# Patient Record
Sex: MALE | Race: WHITE | NOT HISPANIC OR LATINO | Employment: UNEMPLOYED | URBAN - METROPOLITAN AREA
[De-identification: names, ages, dates, MRNs, and addresses within clinical notes are randomized per-mention and may not be internally consistent; named-entity substitution may affect disease eponyms.]

---

## 2021-05-25 ENCOUNTER — RECORDS - HEALTHEAST (OUTPATIENT)
Dept: ADMINISTRATIVE | Facility: CLINIC | Age: 51
End: 2021-05-25

## 2021-09-02 ENCOUNTER — TRANSFERRED RECORDS (OUTPATIENT)
Dept: HEALTH INFORMATION MANAGEMENT | Facility: CLINIC | Age: 51
End: 2021-09-02
Payer: COMMERCIAL

## 2021-09-02 LAB
ALT SERPL-CCNC: 60 U/L (ref 9–46)
AST SERPL-CCNC: 111 U/L (ref 10–35)
CREATININE (EXTERNAL): 1.01 MG/DL (ref 0.7–1.33)
GFR ESTIMATED (EXTERNAL): 86 ML/MIN/1.73M2
GFR ESTIMATED (IF AFRICAN AMERICAN) (EXTERNAL): 99 ML/MIN/1.73M2
GLUCOSE (EXTERNAL): 94 MG/DL (ref 65–99)
POTASSIUM (EXTERNAL): 4 MMOL/L (ref 3.5–5.3)

## 2021-09-09 ENCOUNTER — TRANSFERRED RECORDS (OUTPATIENT)
Dept: HEALTH INFORMATION MANAGEMENT | Facility: CLINIC | Age: 51
End: 2021-09-09
Payer: COMMERCIAL

## 2021-11-08 ENCOUNTER — TRANSFERRED RECORDS (OUTPATIENT)
Dept: HEALTH INFORMATION MANAGEMENT | Facility: CLINIC | Age: 51
End: 2021-11-08
Payer: COMMERCIAL

## 2021-12-07 ENCOUNTER — TRANSFERRED RECORDS (OUTPATIENT)
Dept: HEALTH INFORMATION MANAGEMENT | Facility: CLINIC | Age: 51
End: 2021-12-07
Payer: COMMERCIAL

## 2022-01-18 ENCOUNTER — MEDICAL CORRESPONDENCE (OUTPATIENT)
Dept: HEALTH INFORMATION MANAGEMENT | Facility: CLINIC | Age: 52
End: 2022-01-18
Payer: COMMERCIAL

## 2022-01-19 ENCOUNTER — TRANSFERRED RECORDS (OUTPATIENT)
Dept: HEALTH INFORMATION MANAGEMENT | Facility: CLINIC | Age: 52
End: 2022-01-19
Payer: COMMERCIAL

## 2022-02-18 ENCOUNTER — APPOINTMENT (OUTPATIENT)
Dept: CT IMAGING | Facility: HOSPITAL | Age: 52
End: 2022-02-18
Attending: EMERGENCY MEDICINE
Payer: COMMERCIAL

## 2022-02-18 ENCOUNTER — HOSPITAL ENCOUNTER (INPATIENT)
Facility: HOSPITAL | Age: 52
LOS: 7 days | Discharge: PSYCHIATRIC OR SUBSTANCE USE FACILITY | End: 2022-02-25
Attending: EMERGENCY MEDICINE | Admitting: INTERNAL MEDICINE
Payer: COMMERCIAL

## 2022-02-18 ENCOUNTER — APPOINTMENT (OUTPATIENT)
Dept: RADIOLOGY | Facility: HOSPITAL | Age: 52
End: 2022-02-18
Payer: COMMERCIAL

## 2022-02-18 DIAGNOSIS — E16.2 HYPOGLYCEMIA: ICD-10-CM

## 2022-02-18 DIAGNOSIS — L03.113 RIGHT FOREARM CELLULITIS: Primary | ICD-10-CM

## 2022-02-18 DIAGNOSIS — F10.10 ALCOHOL ABUSE: ICD-10-CM

## 2022-02-18 DIAGNOSIS — E03.9 HYPOTHYROIDISM: ICD-10-CM

## 2022-02-18 DIAGNOSIS — F10.20 ALCOHOL USE DISORDER, SEVERE, DEPENDENCE (H): ICD-10-CM

## 2022-02-18 DIAGNOSIS — R29.6 FALLS FREQUENTLY: ICD-10-CM

## 2022-02-18 LAB
ALBUMIN SERPL-MCNC: 4.7 G/DL (ref 3.5–5)
ALBUMIN UR-MCNC: NEGATIVE MG/DL
ALP SERPL-CCNC: 147 U/L (ref 45–120)
ALT SERPL W P-5'-P-CCNC: 98 U/L (ref 0–45)
AMPHETAMINES UR QL SCN: NORMAL
ANION GAP SERPL CALCULATED.3IONS-SCNC: 17 MMOL/L (ref 5–18)
APPEARANCE UR: CLEAR
AST SERPL W P-5'-P-CCNC: 296 U/L (ref 0–40)
BARBITURATES UR QL: NORMAL
BASOPHILS # BLD AUTO: 0 10E3/UL (ref 0–0.2)
BASOPHILS NFR BLD AUTO: 1 %
BENZODIAZ UR QL: NORMAL
BILIRUB SERPL-MCNC: 1.2 MG/DL (ref 0–1)
BILIRUB UR QL STRIP: NEGATIVE
BUN SERPL-MCNC: 6 MG/DL (ref 8–22)
CALCIUM SERPL-MCNC: 10.2 MG/DL (ref 8.5–10.5)
CANNABINOIDS UR QL SCN: NORMAL
CHLORIDE BLD-SCNC: 95 MMOL/L (ref 98–107)
CO2 SERPL-SCNC: 24 MMOL/L (ref 22–31)
COCAINE UR QL: NORMAL
COLOR UR AUTO: YELLOW
CREAT SERPL-MCNC: 0.87 MG/DL (ref 0.7–1.3)
CREAT UR-MCNC: 68 MG/DL
EOSINOPHIL # BLD AUTO: 0.1 10E3/UL (ref 0–0.7)
EOSINOPHIL NFR BLD AUTO: 4 %
ERYTHROCYTE [DISTWIDTH] IN BLOOD BY AUTOMATED COUNT: 11.9 % (ref 10–15)
ETHANOL SERPL-MCNC: 176 MG/DL
GFR SERPL CREATININE-BSD FRML MDRD: >90 ML/MIN/1.73M2
GLUCOSE BLD-MCNC: 57 MG/DL (ref 70–125)
GLUCOSE BLDC GLUCOMTR-MCNC: 50 MG/DL (ref 70–99)
GLUCOSE BLDC GLUCOMTR-MCNC: 61 MG/DL (ref 70–99)
GLUCOSE BLDC GLUCOMTR-MCNC: 62 MG/DL (ref 70–99)
GLUCOSE BLDC GLUCOMTR-MCNC: 85 MG/DL (ref 70–99)
GLUCOSE BLDC GLUCOMTR-MCNC: 90 MG/DL (ref 70–99)
GLUCOSE UR STRIP-MCNC: NEGATIVE MG/DL
HCT VFR BLD AUTO: 34 % (ref 40–53)
HGB BLD-MCNC: 11.8 G/DL (ref 13.3–17.7)
HGB UR QL STRIP: NEGATIVE
HYALINE CASTS: 11 /LPF
IMM GRANULOCYTES # BLD: 0 10E3/UL
IMM GRANULOCYTES NFR BLD: 1 %
KETONES UR STRIP-MCNC: NEGATIVE MG/DL
LEUKOCYTE ESTERASE UR QL STRIP: NEGATIVE
LIPASE SERPL-CCNC: 128 U/L (ref 0–52)
LYMPHOCYTES # BLD AUTO: 2 10E3/UL (ref 0.8–5.3)
LYMPHOCYTES NFR BLD AUTO: 51 %
MAGNESIUM SERPL-MCNC: 1.7 MG/DL (ref 1.8–2.6)
MAGNESIUM SERPL-MCNC: 1.7 MG/DL (ref 1.8–2.6)
MAGNESIUM SERPL-MCNC: 1.8 MG/DL (ref 1.8–2.6)
MCH RBC QN AUTO: 36.9 PG (ref 26.5–33)
MCHC RBC AUTO-ENTMCNC: 34.7 G/DL (ref 31.5–36.5)
MCV RBC AUTO: 106 FL (ref 78–100)
METHADONE UR QL SCN: NORMAL
MONOCYTES # BLD AUTO: 0.3 10E3/UL (ref 0–1.3)
MONOCYTES NFR BLD AUTO: 9 %
MUCOUS THREADS #/AREA URNS LPF: PRESENT /LPF
NEUTROPHILS # BLD AUTO: 1.3 10E3/UL (ref 1.6–8.3)
NEUTROPHILS NFR BLD AUTO: 34 %
NITRATE UR QL: NEGATIVE
NRBC # BLD AUTO: 0 10E3/UL
NRBC BLD AUTO-RTO: 0 /100
OPIATES UR QL SCN: NORMAL
OXYCODONE UR QL: NORMAL
PCP UR QL SCN: NORMAL
PH UR STRIP: 5 [PH] (ref 5–7)
PHOSPHATE SERPL-MCNC: 2.4 MG/DL (ref 2.5–4.5)
PHOSPHATE SERPL-MCNC: 2.5 MG/DL (ref 2.5–4.5)
PLATELET # BLD AUTO: 212 10E3/UL (ref 150–450)
POTASSIUM BLD-SCNC: 3.8 MMOL/L (ref 3.5–5)
PROT SERPL-MCNC: 8.1 G/DL (ref 6–8)
RBC # BLD AUTO: 3.2 10E6/UL (ref 4.4–5.9)
RBC URINE: 0 /HPF
SARS-COV-2 RNA RESP QL NAA+PROBE: NEGATIVE
SODIUM SERPL-SCNC: 136 MMOL/L (ref 136–145)
SP GR UR STRIP: 1.01 (ref 1–1.03)
TROPONIN I SERPL-MCNC: <0.01 NG/ML (ref 0–0.29)
TSH SERPL DL<=0.005 MIU/L-ACNC: 184.09 UIU/ML (ref 0.3–5)
UROBILINOGEN UR STRIP-MCNC: <2 MG/DL
WBC # BLD AUTO: 3.8 10E3/UL (ref 4–11)
WBC URINE: 1 /HPF

## 2022-02-18 PROCEDURE — 250N000013 HC RX MED GY IP 250 OP 250 PS 637: Performed by: EMERGENCY MEDICINE

## 2022-02-18 PROCEDURE — 72125 CT NECK SPINE W/O DYE: CPT

## 2022-02-18 PROCEDURE — 83735 ASSAY OF MAGNESIUM: CPT | Performed by: INTERNAL MEDICINE

## 2022-02-18 PROCEDURE — 250N000009 HC RX 250: Performed by: PHYSICIAN ASSISTANT

## 2022-02-18 PROCEDURE — 250N000011 HC RX IP 250 OP 636: Performed by: PHYSICIAN ASSISTANT

## 2022-02-18 PROCEDURE — 84439 ASSAY OF FREE THYROXINE: CPT | Performed by: EMERGENCY MEDICINE

## 2022-02-18 PROCEDURE — 90471 IMMUNIZATION ADMIN: CPT | Performed by: EMERGENCY MEDICINE

## 2022-02-18 PROCEDURE — 80307 DRUG TEST PRSMV CHEM ANLYZR: CPT | Performed by: EMERGENCY MEDICINE

## 2022-02-18 PROCEDURE — 258N000003 HC RX IP 258 OP 636: Performed by: PHYSICIAN ASSISTANT

## 2022-02-18 PROCEDURE — 81001 URINALYSIS AUTO W/SCOPE: CPT | Performed by: EMERGENCY MEDICINE

## 2022-02-18 PROCEDURE — 85004 AUTOMATED DIFF WBC COUNT: CPT | Performed by: EMERGENCY MEDICINE

## 2022-02-18 PROCEDURE — 36415 COLL VENOUS BLD VENIPUNCTURE: CPT | Performed by: EMERGENCY MEDICINE

## 2022-02-18 PROCEDURE — 87635 SARS-COV-2 COVID-19 AMP PRB: CPT | Performed by: PHYSICIAN ASSISTANT

## 2022-02-18 PROCEDURE — 93005 ELECTROCARDIOGRAM TRACING: CPT | Performed by: EMERGENCY MEDICINE

## 2022-02-18 PROCEDURE — 70486 CT MAXILLOFACIAL W/O DYE: CPT

## 2022-02-18 PROCEDURE — 0HQ0XZZ REPAIR SCALP SKIN, EXTERNAL APPROACH: ICD-10-PCS | Performed by: PHYSICIAN ASSISTANT

## 2022-02-18 PROCEDURE — 83735 ASSAY OF MAGNESIUM: CPT | Performed by: PHYSICIAN ASSISTANT

## 2022-02-18 PROCEDURE — 250N000013 HC RX MED GY IP 250 OP 250 PS 637: Performed by: INTERNAL MEDICINE

## 2022-02-18 PROCEDURE — 84100 ASSAY OF PHOSPHORUS: CPT | Performed by: INTERNAL MEDICINE

## 2022-02-18 PROCEDURE — 99223 1ST HOSP IP/OBS HIGH 75: CPT | Performed by: INTERNAL MEDICINE

## 2022-02-18 PROCEDURE — 90715 TDAP VACCINE 7 YRS/> IM: CPT | Performed by: EMERGENCY MEDICINE

## 2022-02-18 PROCEDURE — C9803 HOPD COVID-19 SPEC COLLECT: HCPCS

## 2022-02-18 PROCEDURE — 250N000013 HC RX MED GY IP 250 OP 250 PS 637: Performed by: PHYSICIAN ASSISTANT

## 2022-02-18 PROCEDURE — 83690 ASSAY OF LIPASE: CPT | Performed by: PHYSICIAN ASSISTANT

## 2022-02-18 PROCEDURE — 258N000003 HC RX IP 258 OP 636: Performed by: INTERNAL MEDICINE

## 2022-02-18 PROCEDURE — 70450 CT HEAD/BRAIN W/O DYE: CPT

## 2022-02-18 PROCEDURE — 99285 EMERGENCY DEPT VISIT HI MDM: CPT | Mod: 25

## 2022-02-18 PROCEDURE — 96361 HYDRATE IV INFUSION ADD-ON: CPT

## 2022-02-18 PROCEDURE — 84439 ASSAY OF FREE THYROXINE: CPT | Performed by: INTERNAL MEDICINE

## 2022-02-18 PROCEDURE — 71045 X-RAY EXAM CHEST 1 VIEW: CPT

## 2022-02-18 PROCEDURE — 82077 ASSAY SPEC XCP UR&BREATH IA: CPT | Performed by: EMERGENCY MEDICINE

## 2022-02-18 PROCEDURE — 96374 THER/PROPH/DIAG INJ IV PUSH: CPT

## 2022-02-18 PROCEDURE — HZ2ZZZZ DETOXIFICATION SERVICES FOR SUBSTANCE ABUSE TREATMENT: ICD-10-PCS | Performed by: INTERNAL MEDICINE

## 2022-02-18 PROCEDURE — 250N000011 HC RX IP 250 OP 636: Performed by: INTERNAL MEDICINE

## 2022-02-18 PROCEDURE — 36415 COLL VENOUS BLD VENIPUNCTURE: CPT | Performed by: INTERNAL MEDICINE

## 2022-02-18 PROCEDURE — 250N000011 HC RX IP 250 OP 636: Performed by: EMERGENCY MEDICINE

## 2022-02-18 PROCEDURE — 80053 COMPREHEN METABOLIC PANEL: CPT | Performed by: EMERGENCY MEDICINE

## 2022-02-18 PROCEDURE — 84484 ASSAY OF TROPONIN QUANT: CPT | Performed by: EMERGENCY MEDICINE

## 2022-02-18 PROCEDURE — 250N000009 HC RX 250: Performed by: INTERNAL MEDICINE

## 2022-02-18 PROCEDURE — 120N000001 HC R&B MED SURG/OB

## 2022-02-18 PROCEDURE — 84443 ASSAY THYROID STIM HORMONE: CPT | Performed by: EMERGENCY MEDICINE

## 2022-02-18 RX ORDER — MAGNESIUM SULFATE HEPTAHYDRATE 40 MG/ML
2 INJECTION, SOLUTION INTRAVENOUS ONCE
Status: COMPLETED | OUTPATIENT
Start: 2022-02-18 | End: 2022-02-19

## 2022-02-18 RX ORDER — POLYETHYLENE GLYCOL 3350 17 G/17G
17 POWDER, FOR SOLUTION ORAL 2 TIMES DAILY
Status: DISCONTINUED | OUTPATIENT
Start: 2022-02-18 | End: 2022-02-25 | Stop reason: HOSPADM

## 2022-02-18 RX ORDER — LEVOTHYROXINE SODIUM 150 UG/1
150 TABLET ORAL DAILY
COMMUNITY
End: 2022-11-02

## 2022-02-18 RX ORDER — LORAZEPAM 2 MG/ML
1 INJECTION INTRAMUSCULAR ONCE
Status: COMPLETED | OUTPATIENT
Start: 2022-02-18 | End: 2022-02-18

## 2022-02-18 RX ORDER — OLANZAPINE 5 MG/1
5-10 TABLET, ORALLY DISINTEGRATING ORAL EVERY 6 HOURS PRN
Status: DISCONTINUED | OUTPATIENT
Start: 2022-02-18 | End: 2022-02-23

## 2022-02-18 RX ORDER — NICOTINE 21 MG/24HR
1 PATCH, TRANSDERMAL 24 HOURS TRANSDERMAL DAILY
Status: DISCONTINUED | OUTPATIENT
Start: 2022-02-19 | End: 2022-02-18

## 2022-02-18 RX ORDER — MULTIPLE VITAMINS W/ MINERALS TAB 9MG-400MCG
1 TAB ORAL ONCE
Status: COMPLETED | OUTPATIENT
Start: 2022-02-18 | End: 2022-02-18

## 2022-02-18 RX ORDER — LIDOCAINE 40 MG/G
CREAM TOPICAL
Status: DISCONTINUED | OUTPATIENT
Start: 2022-02-18 | End: 2022-02-25 | Stop reason: HOSPADM

## 2022-02-18 RX ORDER — LORAZEPAM 2 MG/ML
1-2 INJECTION INTRAMUSCULAR EVERY 30 MIN PRN
Status: DISCONTINUED | OUTPATIENT
Start: 2022-02-18 | End: 2022-02-21

## 2022-02-18 RX ORDER — TRAZODONE HYDROCHLORIDE 50 MG/1
100 TABLET, FILM COATED ORAL
COMMUNITY
End: 2024-05-19

## 2022-02-18 RX ORDER — ONDANSETRON 4 MG/1
4 TABLET, ORALLY DISINTEGRATING ORAL EVERY 6 HOURS PRN
Status: DISCONTINUED | OUTPATIENT
Start: 2022-02-18 | End: 2022-02-25 | Stop reason: HOSPADM

## 2022-02-18 RX ORDER — LORAZEPAM 1 MG/1
1-2 TABLET ORAL EVERY 30 MIN PRN
Status: DISCONTINUED | OUTPATIENT
Start: 2022-02-18 | End: 2022-02-21

## 2022-02-18 RX ORDER — MULTIPLE VITAMINS W/ MINERALS TAB 9MG-400MCG
1 TAB ORAL DAILY
Status: DISCONTINUED | OUTPATIENT
Start: 2022-02-19 | End: 2022-02-21

## 2022-02-18 RX ORDER — FLUMAZENIL 0.1 MG/ML
0.2 INJECTION, SOLUTION INTRAVENOUS
Status: DISCONTINUED | OUTPATIENT
Start: 2022-02-18 | End: 2022-02-23

## 2022-02-18 RX ORDER — FOLIC ACID 1 MG/1
1 TABLET ORAL ONCE
Status: COMPLETED | OUTPATIENT
Start: 2022-02-18 | End: 2022-02-18

## 2022-02-18 RX ORDER — CALCIUM CARBONATE 500 MG/1
1000 TABLET, CHEWABLE ORAL 4 TIMES DAILY PRN
Status: DISCONTINUED | OUTPATIENT
Start: 2022-02-18 | End: 2022-02-25 | Stop reason: HOSPADM

## 2022-02-18 RX ORDER — HALOPERIDOL 5 MG/ML
2.5-5 INJECTION INTRAMUSCULAR EVERY 6 HOURS PRN
Status: DISCONTINUED | OUTPATIENT
Start: 2022-02-18 | End: 2022-02-23

## 2022-02-18 RX ORDER — NICOTINE 21 MG/24HR
1 PATCH, TRANSDERMAL 24 HOURS TRANSDERMAL DAILY
Status: DISCONTINUED | OUTPATIENT
Start: 2022-02-18 | End: 2022-02-19

## 2022-02-18 RX ORDER — ONDANSETRON 2 MG/ML
4 INJECTION INTRAMUSCULAR; INTRAVENOUS EVERY 6 HOURS PRN
Status: DISCONTINUED | OUTPATIENT
Start: 2022-02-18 | End: 2022-02-25 | Stop reason: HOSPADM

## 2022-02-18 RX ORDER — FOLIC ACID 1 MG/1
1 TABLET ORAL DAILY
Status: DISCONTINUED | OUTPATIENT
Start: 2022-02-19 | End: 2022-02-21

## 2022-02-18 RX ADMIN — MULTIPLE VITAMINS W/ MINERALS TAB 1 TABLET: TAB at 17:11

## 2022-02-18 RX ADMIN — LORAZEPAM 1 MG: 2 INJECTION INTRAMUSCULAR; INTRAVENOUS at 19:12

## 2022-02-18 RX ADMIN — CLOSTRIDIUM TETANI TOXOID ANTIGEN (FORMALDEHYDE INACTIVATED), CORYNEBACTERIUM DIPHTHERIAE TOXOID ANTIGEN (FORMALDEHYDE INACTIVATED), BORDETELLA PERTUSSIS TOXOID ANTIGEN (GLUTARALDEHYDE INACTIVATED), BORDETELLA PERTUSSIS FILAMENTOUS HEMAGGLUTININ ANTIGEN (FORMALDEHYDE INACTIVATED), BORDETELLA PERTUSSIS PERTACTIN ANTIGEN, AND BORDETELLA PERTUSSIS FIMBRIAE 2/3 ANTIGEN 0.5 ML: 5; 2; 2.5; 5; 3; 5 INJECTION, SUSPENSION INTRAMUSCULAR at 16:21

## 2022-02-18 RX ADMIN — THIAMINE HYDROCHLORIDE: 100 INJECTION, SOLUTION INTRAMUSCULAR; INTRAVENOUS at 21:57

## 2022-02-18 RX ADMIN — AMOXICILLIN AND CLAVULANATE POTASSIUM 1 TABLET: 875; 125 TABLET, FILM COATED ORAL at 21:58

## 2022-02-18 RX ADMIN — Medication 1 PATCH: at 21:57

## 2022-02-18 RX ADMIN — EPINEPHRINE BITARTRATE 3 ML: 1 POWDER at 19:07

## 2022-02-18 RX ADMIN — FOLIC ACID 1 MG: 1 TABLET ORAL at 16:20

## 2022-02-18 RX ADMIN — DEXTROSE AND SODIUM CHLORIDE: 5; 900 INJECTION, SOLUTION INTRAVENOUS at 19:14

## 2022-02-18 RX ADMIN — Medication 100 MG: at 16:20

## 2022-02-18 RX ADMIN — POLYETHYLENE GLYCOL 3350 17 G: 17 POWDER, FOR SOLUTION ORAL at 21:58

## 2022-02-18 ASSESSMENT — ENCOUNTER SYMPTOMS
COUGH: 0
NECK PAIN: 0
BACK PAIN: 0
FACIAL ASYMMETRY: 0
HEADACHES: 0
APPETITE CHANGE: 1
FEVER: 0
DIARRHEA: 0
CHILLS: 0
WOUND: 1
SPEECH DIFFICULTY: 0
ABDOMINAL PAIN: 0
VOMITING: 0
SHORTNESS OF BREATH: 0
DIZZINESS: 1
NAUSEA: 0
LIGHT-HEADEDNESS: 0
WEAKNESS: 0

## 2022-02-18 ASSESSMENT — ACTIVITIES OF DAILY LIVING (ADL)
FALL_HISTORY_WITHIN_LAST_SIX_MONTHS: YES
WEAR_GLASSES_OR_BLIND: NO
VISION_MANAGEMENT: GLASSES
DIFFICULTY_COMMUNICATING: NO
CHANGE_IN_FUNCTIONAL_STATUS_SINCE_ONSET_OF_CURRENT_ILLNESS/INJURY: NO
DOING_ERRANDS_INDEPENDENTLY_DIFFICULTY: NO
TOILETING_ISSUES: NO
ADLS_ACUITY_SCORE: 5
HEARING_DIFFICULTY_OR_DEAF: NO
ADLS_ACUITY_SCORE: 5
WALKING_OR_CLIMBING_STAIRS_DIFFICULTY: NO
CONCENTRATING,_REMEMBERING_OR_MAKING_DECISIONS_DIFFICULTY: NO
ADLS_ACUITY_SCORE: 12
DIFFICULTY_EATING/SWALLOWING: NO
DRESSING/BATHING_DIFFICULTY: NO
NUMBER_OF_TIMES_PATIENT_HAS_FALLEN_WITHIN_LAST_SIX_MONTHS: 1
ADLS_ACUITY_SCORE: 3
ADLS_ACUITY_SCORE: 12

## 2022-02-18 NOTE — ED PROVIDER NOTES
ED Triage Provider Note  [facility]  Encounter Date: Feb 18, 2022      The patient was seen in triage to expedite ED workup.     History:  Chief Complaint   Patient presents with     Fall     Getachew Barcenas is a 51 year old male who presents to the ED with recent fall. His daughter notes he was found to have laceration to right parietal scalp and contusions to right forehead and right face, scabs to both knees and arms, with frequent falls. Daughter notes he is an alcoholic, last EtOH last night. She brought him here for assessment, last tetanus booster 10-15 years ago. Daughter notes she found blood on floor of his home. He lives alone.    Review of Systems:  No fever    Vitals:  /59   Pulse 107   Temp 98.5  F (36.9  C) (Oral)   Resp 16   Wt 95.3 kg (210 lb)   SpO2 98%   BMI 26.96 kg/m      Brief Exam:  Constitutional: Awake, alert, no acute distress apparent  HEENT: No nasal septal hematoma, right pareital scalp laceration without bleeding, right forehead abrasion, right midface abrasion  PSYCH: Alert, oriented and good historian  Skin: Bilateral knee scabs without swelling or redness or drainage. Bilateral dorsal upper extremity excorciated scabs present.    Medical Decision Making:  Patient arriving to the ED with problem as above. A medical screening exam was performed. Orders initiated from triage  to expedite ED workup.             Gisela Oreilly MD  02/18/22  Emergency Medicine  [facility] EMERGENCY DEPARTMENT  [address] [place] [id number]  [phone]  Dept: [phone]     Gisela Oreilly MD  02/18/22 9842

## 2022-02-18 NOTE — ED TRIAGE NOTES
Pt comes in with daughter, pt fell at his home last night around 2000. Daughter came to house this afternoon found a pool of blood on floor. Pt was sitting in his chair. Fell last night but didn't call family because he got up off the floor. Abrasions to forehead, back R of head, knees, hands .Pt alcoholic, last drink last night

## 2022-02-18 NOTE — ED PROVIDER NOTES
"Emergency Department Staff Physician Note     I had a face to face encounter with this patient seen by the Advanced Practice Provider (JAYNE).  I have seen, examined, and discussed the patient with the JAYNE and agree with their assessment and plan of management.    I, Estee Young, am serving as a scribe to document services personally performed by Dr. Ceballos, based on my observations and the provider's statements to me.     Relevant HPI:     Getachew Barcenas is a 51 year old male who presents to the Emergency Department for evaluation of fall.      I, Dr. Ceballos, attest that Estee Young was acting in a scribe capacity, has observed my performance of the services and has documented them in accordance with my direction.    ED Course:  5:30 PM  I received the patient report from the JAYNE Jessica Keller PA-C . I agree with her assessment and plan of management, and I will see the patient myself.  6:08 PM  I met with the patient to introduce myself, gather additional history, perform my initial exam, and discuss the plan.   6:43 PM Spoke to hospitalist regarding patient plan of care.     Exam:  /69 (BP Location: Left arm)   Pulse 83   Temp 98.1  F (36.7  C) (Oral)   Resp 16   Ht 1.88 m (6' 2\")   Wt 87.6 kg (193 lb 3.2 oz)   SpO2 97%   BMI 24.81 kg/m       Constitutional: tired, chronically ill appeaing  HENT: Normocephalic, laceration to scalp, mucous membranes dry, nose normal  Eyes: PERRL, EOMI, conjunctiva normal, no discharge.  Neck- Supple, gross ROM intact.   Respiratory: Normal work of breathing, normal rate, speaks in full sentences  Cardiovascular: Normal heart rate  Musculoskeletal: Moving all 4 extremities intentionally and without pain.  Neurologic: Alert & oriented x 3, cranial nerves grossly intact. Normal gross coordination  Skin: skin tears, bruising over all 4 extremities  Psychiatric: Affect normal, cooperative.       Impression / ED Plan:  ED Course as of 02/18/22 2034 Fri Feb 18, 2022 "   1700 Midlevel staffin yo M. Pt fell, had head laceration, likely intoxicated. Etoh dependent. Getting head imaging, will need ambulation trial     Head CT shows no new cranial injury, laceration was closed, he is unsteady on his feet and daughter feels very unsafe with him going home.  Plan to admit for hypoglycemia (was started on D5 drip) alcohol intoxication, ataxia with falls    Please refer to the Advanced Practice Provider's note for further details and ED course. Agree with history, plan and disposition.     Final diagnoses:   Falls frequently   Hypothyroidism   Alcohol abuse   Hypoglycemia          I reviewed all general radiology procedures.  I was present for the key portions of this procedure: none    Brice Ceballos MD  Staff Physician  [facility] Emergency Department     Brice Ceballos MD  22

## 2022-02-18 NOTE — ED PROVIDER NOTES
Emergency Department Encounter   NAME: Getachew Barcenas ; AGE: 51 year old male ; YOB: 1970 ; MRN: [id number] ; PCP: No primary care provider on file.   ED PROVIDER: Jessica Keller PA-C    Evaluation Date & Time:   2/18/2022  3:48 PM    CHIEF COMPLAINT:  Fall      Impression and Plan   MDM: Getachew Barcenas is a 51 year old male with a pertinent history of hypothyroidism and alcohol abuse, who presents to the ED by family for evaluation of following a fall.  The patient presented to the emergency department for evaluation after an unwitnessed fall that occurred yesterday or today.  He has a known history of alcohol abuse, per daughter drinks 1-2 handles of vodka per day.  She found a pool of blood on the floor and her father with a head laceration when she went to visit him today.  Here in the ED, he is hemodynamically stable, and in no acute distress.  He is alert with a GCS of 15 and is oriented though has some intermittent slurred speech and appears to be under the influence.  He denies any alcohol use today.  Performed a thorough trauma exam which was overall unremarkable besides a 5 cm laceration with underlying hematoma to his right parietal scalp.  We discussed plan for head and cervical spine imaging, continued monitoring, blood work, and EKG to further evaluate.    EKG shows normal sinus rhythm with low voltage QRS and incomplete right bundle alicia block.  T wave inversion in V1, flattening in V2, and inversion in aVR.  When compared to EKG from July 4 of 2017, T wave inversion in aVR is unchanged.  Troponin is negative.  No evidence of ACS.  Labs notable for an alcohol level of 176, lipase in the 120s, and a blood glucose in the 50s.  Sodium, magnesium, potassium all within normal limits.  Patient was given oral OJ as well as a sandwich, however blood sugar only improved into the 60's. He is not a diabetic. NS with dextrose ordered.  His TSH is extremely high at 184.  He has been off of his  levothyroxine and this likely playing into his ongoing dizziness.  CT of his head, facial bones, and cervical spine unremarkable.  UA, chest x-ray, and drug abuse is pending at this time.    Discussed detox versus admission versus discharge with the patient.  He is his own medical decision maker, however is agreeable to seeking further treatment especially with his daughter support.  Plan at this time is to admit to med telemetry for continued monitoring, further assessment of his chronic dizziness, treatment of his hypoglycemia and likely pending withdrawal.  He is not showing any symptoms at this time of active withdrawal.  Patient and daughter are comfortable with this plan.  Dr. Ceballos spoke with hospitalist.  Plan for staple repair of his right parietal scalp laceration after Ativan and topical let as patient has fear of needles.      ED COURSE:  4:05 PM I met and introduced myself to the patient. I gathered initial history and performed my physical exam. We discussed plan for initial workup.   5:10 PM I have staffed the patient with Dr. Ceballos, ED MD, who has evaluated the patient and agrees with all aspects of today's care.   6:20 PM Nursing staff states that patient is willing to stay in the hospital and/or go to detox.   6:45 PM Dr. Ceballos spoke with hospitalist - accepted for med tele admission.   7:00 PM Rechecked the patient and discussed plan for staples in his scalp.  Patient is terrified of needles.  Will order him Ativan and topical let and then reattempt with nursing assistance.  7:57 PM Performed laceration repair.       At the conclusion of the encounter I discussed the results of all the tests and the disposition. The questions were answered. The patient or family acknowledged understanding and was agreeable with the care plan.    FINAL IMPRESSION:    ICD-10-CM    1. Hypoglycemia  E16.2    2. Falls frequently  R29.6    3. Hypothyroidism  E03.9    4. Alcohol abuse  F10.10           MEDICATIONS GIVEN IN THE EMERGENCY DEPARTMENT:  Medications   thiamine (B-1) tablet 100 mg (100 mg Oral Given 2/18/22 1620)   dextrose 5% and 0.9% NaCl infusion (has no administration in time range)   LORazepam (ATIVAN) injection 1 mg (has no administration in time range)   lidocaine/EPINEPHrine/tetracaine (LET) solution KIT 3 mL (has no administration in time range)   Tdap (tetanus-diphtheria-acell pertussis) (ADACEL) injection 0.5 mL (0.5 mLs Intramuscular Given 2/18/22 1621)   folic acid (FOLVITE) tablet 1 mg (1 mg Oral Given 2/18/22 1620)   multivitamin w/minerals (THERA-VIT-M) tablet 1 tablet (1 tablet Oral Given 2/18/22 1711)         NEW PRESCRIPTIONS STARTED AT TODAY'S ED VISIT:  New Prescriptions    No medications on file         HPI   Patient information was obtained from: Patient and his daughter    Use of Intrepreter: N/A    Getachew Barcenas is a 51 year old male with a pertinent history of hypothyroidism and alcohol abuse, who presents to the ED by family for evaluation of following a fall.     The patient presents to the emergency department for evaluation after a fall in his home.  Per his daughter, he is a chronic alcoholic and drinks 1-2 handles of vodka per day.  Her and her family have been attempting to move him to a safer living environment.  They have plans to move him into an apartment and they are building a small home for him which she will move into the summer though all is unassisted.  She went over to check on him today and noticed a pool of blood on the floor as well as patient with a wound to his head.  He was sitting in a chair when she arrived.  Unsure if he fell yesterday or today, however he states this was due to his chronic dizziness.  He states that this has been thoroughly worked up and they are unable to find out the source.  He denies any alcohol use today.  Denies any other illicit drug use.  He currently states that he is asymptomatic.  He denies headache, neck pain,  "back pain, chest pain, abdominal pain.  He has not had any vomiting, tremors, seizure activity, diaphoresis or hallucinations.  He has never been hospitalized for withdrawal or suffered from a withdrawal seizure in the past.    REVIEW OF SYSTEMS:  Review of Systems   Constitutional: Positive for appetite change (decreased food intake). Negative for chills and fever.   Eyes: Negative for visual disturbance.   Respiratory: Negative for cough and shortness of breath.    Cardiovascular: Negative for chest pain.   Gastrointestinal: Negative for abdominal pain, diarrhea, nausea and vomiting.   Genitourinary: Negative.    Musculoskeletal: Negative for back pain and neck pain.   Skin: Positive for wound (scalp).   Neurological: Positive for dizziness. Negative for syncope, facial asymmetry, speech difficulty, weakness, light-headedness and headaches.   All other systems reviewed and are negative.        Medical History     No past medical history on file.    No past surgical history on file.    No family history on file.    Social History     Tobacco Use     Smoking status: Never Smoker     Smokeless tobacco: Former User   Substance Use Topics     Alcohol use: Yes     Drug use: No       acetaminophen (TYLENOL) 500 MG tablet  fenofibrate (TRICOR) 48 MG tablet  hydrOXYzine (ATARAX) 50 MG tablet  levothyroxine (SYNTHROID, LEVOTHROID) 175 MCG tablet  oxyCODONE (ROXICODONE) 5 MG immediate release tablet  psyllium (METAMUCIL) 3.4 gram packet          Physical Exam     First Vitals:  Patient Vitals for the past 24 hrs:   BP Temp Temp src Pulse Resp SpO2 Height Weight   02/18/22 1851 -- -- -- 84 12 95 % -- --   02/18/22 1830 113/68 -- -- 104 9 96 % -- --   02/18/22 1815 132/71 -- -- 109 18 93 % -- --   02/18/22 1800 126/74 -- -- 88 14 92 % -- --   02/18/22 1745 124/81 -- -- 90 11 94 % -- --   02/18/22 1730 129/89 -- -- 88 15 94 % -- --   02/18/22 1715 117/77 -- -- 82 15 95 % -- --   02/18/22 1710 -- -- -- -- -- -- 1.88 m (6' 2\") -- "   02/18/22 1700 119/84 -- -- 85 14 95 % -- --   02/18/22 1652 -- -- -- 81 19 96 % -- --   02/18/22 1650 -- -- -- 83 12 96 % -- --   02/18/22 1649 -- -- -- 83 16 95 % -- --   02/18/22 1648 -- -- -- 80 17 95 % -- --   02/18/22 1647 -- -- -- 79 12 95 % -- --   02/18/22 1645 114/86 -- -- 80 -- 94 % -- --   02/18/22 1630 111/84 -- -- 81 -- 94 % -- --   02/18/22 1615 124/84 -- -- 89 19 96 % -- --   02/18/22 1522 107/59 98.5  F (36.9  C) Oral 107 16 98 % -- 95.3 kg (210 lb)         PHYSICAL EXAM:   Physical Exam  Vitals and nursing note reviewed.   Constitutional:       General: He is not in acute distress.     Appearance: He is not toxic-appearing or diaphoretic.      Comments: Patient is disheveled. He has skin tears, and bruising throughout his body in different stages of healing.   HENT:      Head: Normocephalic.      Comments: 5 cm laceration to the right parietal scalp.  No active bleeding.  No skull depressions or bogginess.  Small hematoma underlying this laceration.     Right Ear: Tympanic membrane normal.      Left Ear: Tympanic membrane normal.      Ears:      Comments: No hemotympanum.     Mouth/Throat:      Mouth: Mucous membranes are dry.   Eyes:      Extraocular Movements: Extraocular movements intact.      Conjunctiva/sclera: Conjunctivae normal.      Pupils: Pupils are equal, round, and reactive to light.   Neck:      Comments: No midline cervical spinal tenderness or palpable bony step-offs.  Full active range of motion of the neck without pain.  Cardiovascular:      Rate and Rhythm: Normal rate and regular rhythm.      Heart sounds: Normal heart sounds.   Pulmonary:      Effort: Pulmonary effort is normal.      Breath sounds: Normal breath sounds.   Chest:      Chest wall: No tenderness.   Abdominal:      General: Abdomen is flat. Bowel sounds are normal. There is no distension.      Palpations: Abdomen is soft.      Tenderness: There is no abdominal tenderness. There is no guarding or rebound.    Musculoskeletal:         General: Normal range of motion.      Cervical back: Normal range of motion and neck supple.      Comments: No midline spinal tenderness or palpable bony step-offs.  Pelvis and hips are stable without tenderness.  Moving all extremities without difficulty.   Skin:     Comments: Telangiectasias to face.   Neurological:      Mental Status: He is alert and oriented to person, place, and time.      GCS: GCS eye subscore is 4. GCS verbal subscore is 5. GCS motor subscore is 6.      Comments: Answering questions appropriately.  Mildly slurred speech.  Under the influence.  A 5 out of 5 strength with , flexion extension at the elbow joint, shoulder flexion, dorsiflexion plantarflexion, and hip flexion.  Negative pronator drift.  Finger/nose/finger intact.  No visual field deficits.  Cranial nerves III through XII intact.             Results     LAB:  All pertinent labs reviewed and interpreted  Labs Ordered and Resulted from Time of ED Arrival to Time of ED Departure   COMPREHENSIVE METABOLIC PANEL - Abnormal       Result Value    Sodium 136      Potassium 3.8      Chloride 95 (*)     Carbon Dioxide (CO2) 24      Anion Gap 17      Urea Nitrogen 6 (*)     Creatinine 0.87      Calcium 10.2      Glucose 57 (*)     Alkaline Phosphatase 147 (*)      (*)     ALT 98 (*)     Protein Total 8.1 (*)     Albumin 4.7      Bilirubin Total 1.2 (*)     GFR Estimate >90     ETHYL ALCOHOL LEVEL - Abnormal    Alcohol, Blood 176 (*)    TSH WITH FREE T4 REFLEX - Abnormal    .09 (*)    CBC WITH PLATELETS AND DIFFERENTIAL - Abnormal    WBC Count 3.8 (*)     RBC Count 3.20 (*)     Hemoglobin 11.8 (*)     Hematocrit 34.0 (*)      (*)     MCH 36.9 (*)     MCHC 34.7      RDW 11.9      Platelet Count 212      % Neutrophils 34      % Lymphocytes 51      % Monocytes 9      % Eosinophils 4      % Basophils 1      % Immature Granulocytes 1      NRBCs per 100 WBC 0      Absolute Neutrophils 1.3 (*)      Absolute Lymphocytes 2.0      Absolute Monocytes 0.3      Absolute Eosinophils 0.1      Absolute Basophils 0.0      Absolute Immature Granulocytes 0.0      Absolute NRBCs 0.0     LIPASE - Abnormal    Lipase 128 (*)    GLUCOSE BY METER - Abnormal    GLUCOSE BY METER POCT 50 (*)    GLUCOSE BY METER - Abnormal    GLUCOSE BY METER POCT 61 (*)    TROPONIN I - Normal    Troponin I <0.01     MAGNESIUM - Normal    Magnesium 1.8     ROUTINE UA WITH MICROSCOPIC REFLEX TO CULTURE   DRUGS OF ABUSE 1+ PANEL, URINE ( EAST ONLY)   T4 FREE   COVID-19 VIRUS (CORONAVIRUS) BY PCR   GLUCOSE MONITOR NURSING POCT       RADIOLOGY:  CT Facial Bones without Contrast   Final Result   IMPRESSION:   HEAD CT:   1. Shallow soft tissue contusion is seen along the left forehead.      2. No finding for intracranial hemorrhage, mass, or acute infarct.      3. Mild volume loss.      4. Small amount of dependent fluid or mucosal thickening is seen within the mastoid sinuses compatible with inflammatory change.      FACIAL BONE CT:   1. No facial bone or mandibular fracture.      2. Lower forehead and superior periorbital soft tissue contusion without post septal extension. Orbits otherwise unremarkable.      CERVICAL SPINE CT:   1.  No fracture or posttraumatic subluxation.      2.  Straightening of the normal cervical lordosis. No prevertebral soft tissue swelling.      3.  No osseous canal or foraminal stenosis.      Cervical spine CT w/o contrast   Final Result   IMPRESSION:   HEAD CT:   1. Shallow soft tissue contusion is seen along the left forehead.      2. No finding for intracranial hemorrhage, mass, or acute infarct.      3. Mild volume loss.      4. Small amount of dependent fluid or mucosal thickening is seen within the mastoid sinuses compatible with inflammatory change.      FACIAL BONE CT:   1. No facial bone or mandibular fracture.      2. Lower forehead and superior periorbital soft tissue contusion without post septal extension.  Orbits otherwise unremarkable.      CERVICAL SPINE CT:   1.  No fracture or posttraumatic subluxation.      2.  Straightening of the normal cervical lordosis. No prevertebral soft tissue swelling.      3.  No osseous canal or foraminal stenosis.      Head CT w/o contrast   Final Result   IMPRESSION:   HEAD CT:   1. Shallow soft tissue contusion is seen along the left forehead.      2. No finding for intracranial hemorrhage, mass, or acute infarct.      3. Mild volume loss.      4. Small amount of dependent fluid or mucosal thickening is seen within the mastoid sinuses compatible with inflammatory change.      FACIAL BONE CT:   1. No facial bone or mandibular fracture.      2. Lower forehead and superior periorbital soft tissue contusion without post septal extension. Orbits otherwise unremarkable.      CERVICAL SPINE CT:   1.  No fracture or posttraumatic subluxation.      2.  Straightening of the normal cervical lordosis. No prevertebral soft tissue swelling.      3.  No osseous canal or foraminal stenosis.            ECG:  Performed at: 1610    Impression: Low voltage QRS, incomplete Right bundle branch block, left anterior fascicular block. Cannot rule out anterior infarct, age undetermined.   Abnormal ECG     Rate: 91 bpm  Rhythm: sinus rhythm  Axis: 46, -72, 55  FL Interval: 200 ms  QRS Interval: 108 ms  QTc Interval: 392/482  Comparison: when compared with ECG of 07/04/2017, premature ventricular complexes are no longer present, incomplete Right bundle branch block is now present, and minimal criteria for anterior infarct are now present    EKG results reviewed and interpreted by Dr. Tucker ED MD.       Procedures:     PROCEDURE: Laceration Repair   INDICATIONS: Laceration   PROCEDURE PROVIDER: Jessica Keller PA-C   SITE: Right parietal scalp   TYPE/SIZE: simple, clean and no foreign body visualized  5 cm (total length)   FUNCTIONAL ASSESSMENT: Distal sensation and circulation intact prior to and following  repair.    MEDICATION: Topical LET   PREPARATION: Please see nursing note   DEBRIDEMENT: wound explored, no foreign body found   CLOSURE:  Wound was closed in   one layer: Skin closed with 3 staples.     Total number of sutures/staples placed: 3         Jessica Keller PA-C   Emergency Medicine   [facility] EMERGENCY DEPARTMENT       Jessica Keller PA-C  02/18/22 1958

## 2022-02-19 ENCOUNTER — APPOINTMENT (OUTPATIENT)
Dept: ULTRASOUND IMAGING | Facility: HOSPITAL | Age: 52
End: 2022-02-19
Attending: INTERNAL MEDICINE
Payer: COMMERCIAL

## 2022-02-19 ENCOUNTER — APPOINTMENT (OUTPATIENT)
Dept: OCCUPATIONAL THERAPY | Facility: HOSPITAL | Age: 52
End: 2022-02-19
Attending: INTERNAL MEDICINE
Payer: COMMERCIAL

## 2022-02-19 ENCOUNTER — APPOINTMENT (OUTPATIENT)
Dept: PHYSICAL THERAPY | Facility: HOSPITAL | Age: 52
End: 2022-02-19
Attending: INTERNAL MEDICINE
Payer: COMMERCIAL

## 2022-02-19 PROBLEM — R79.89 LFT ELEVATION: Status: ACTIVE | Noted: 2022-02-19

## 2022-02-19 PROBLEM — E83.39 HYPOPHOSPHATEMIA: Status: ACTIVE | Noted: 2022-02-19

## 2022-02-19 PROBLEM — E83.42 HYPOMAGNESEMIA: Status: ACTIVE | Noted: 2022-02-19

## 2022-02-19 LAB
ALBUMIN SERPL-MCNC: 4.4 G/DL (ref 3.5–5)
ALP SERPL-CCNC: 144 U/L (ref 45–120)
ALT SERPL W P-5'-P-CCNC: 83 U/L (ref 0–45)
ANION GAP SERPL CALCULATED.3IONS-SCNC: 14 MMOL/L (ref 5–18)
AST SERPL W P-5'-P-CCNC: 238 U/L (ref 0–40)
BASOPHILS # BLD AUTO: 0.1 10E3/UL (ref 0–0.2)
BASOPHILS NFR BLD AUTO: 1 %
BILIRUB SERPL-MCNC: 1.5 MG/DL (ref 0–1)
BUN SERPL-MCNC: 6 MG/DL (ref 8–22)
CALCIUM SERPL-MCNC: 9.4 MG/DL (ref 8.5–10.5)
CHLORIDE BLD-SCNC: 96 MMOL/L (ref 98–107)
CO2 SERPL-SCNC: 26 MMOL/L (ref 22–31)
CORTIS SERPL-MCNC: 16.3 UG/DL
CREAT SERPL-MCNC: 0.86 MG/DL (ref 0.7–1.3)
EOSINOPHIL # BLD AUTO: 0.1 10E3/UL (ref 0–0.7)
EOSINOPHIL NFR BLD AUTO: 2 %
ERYTHROCYTE [DISTWIDTH] IN BLOOD BY AUTOMATED COUNT: 11.8 % (ref 10–15)
GFR SERPL CREATININE-BSD FRML MDRD: >90 ML/MIN/1.73M2
GLUCOSE BLD-MCNC: 107 MG/DL (ref 70–125)
HCT VFR BLD AUTO: 35.3 % (ref 40–53)
HGB BLD-MCNC: 12 G/DL (ref 13.3–17.7)
IMM GRANULOCYTES # BLD: 0 10E3/UL
IMM GRANULOCYTES NFR BLD: 1 %
INR PPP: 1.24 (ref 0.9–1.15)
LACTATE SERPL-SCNC: 2 MMOL/L (ref 0.7–2)
LACTATE SERPL-SCNC: 2.8 MMOL/L (ref 0.7–2)
LYMPHOCYTES # BLD AUTO: 1.2 10E3/UL (ref 0.8–5.3)
LYMPHOCYTES NFR BLD AUTO: 32 %
MAGNESIUM SERPL-MCNC: 2.2 MG/DL (ref 1.8–2.6)
MCH RBC QN AUTO: 36.6 PG (ref 26.5–33)
MCHC RBC AUTO-ENTMCNC: 34 G/DL (ref 31.5–36.5)
MCV RBC AUTO: 108 FL (ref 78–100)
MONOCYTES # BLD AUTO: 0.6 10E3/UL (ref 0–1.3)
MONOCYTES NFR BLD AUTO: 16 %
NEUTROPHILS # BLD AUTO: 1.9 10E3/UL (ref 1.6–8.3)
NEUTROPHILS NFR BLD AUTO: 48 %
NRBC # BLD AUTO: 0 10E3/UL
NRBC BLD AUTO-RTO: 0 /100
PHOSPHATE SERPL-MCNC: 3.8 MG/DL (ref 2.5–4.5)
PLATELET # BLD AUTO: 178 10E3/UL (ref 150–450)
POTASSIUM BLD-SCNC: 3.9 MMOL/L (ref 3.5–5)
PROT SERPL-MCNC: 7.5 G/DL (ref 6–8)
RBC # BLD AUTO: 3.28 10E6/UL (ref 4.4–5.9)
SODIUM SERPL-SCNC: 136 MMOL/L (ref 136–145)
T4 FREE SERPL-MCNC: <0.4 NG/DL (ref 0.7–1.8)
T4 FREE SERPL-MCNC: <0.4 NG/DL (ref 0.7–1.8)
WBC # BLD AUTO: 3.9 10E3/UL (ref 4–11)

## 2022-02-19 PROCEDURE — 250N000013 HC RX MED GY IP 250 OP 250 PS 637: Performed by: EMERGENCY MEDICINE

## 2022-02-19 PROCEDURE — 85610 PROTHROMBIN TIME: CPT | Performed by: INTERNAL MEDICINE

## 2022-02-19 PROCEDURE — 36415 COLL VENOUS BLD VENIPUNCTURE: CPT | Performed by: INTERNAL MEDICINE

## 2022-02-19 PROCEDURE — 76705 ECHO EXAM OF ABDOMEN: CPT

## 2022-02-19 PROCEDURE — 250N000013 HC RX MED GY IP 250 OP 250 PS 637: Performed by: STUDENT IN AN ORGANIZED HEALTH CARE EDUCATION/TRAINING PROGRAM

## 2022-02-19 PROCEDURE — 258N000003 HC RX IP 258 OP 636

## 2022-02-19 PROCEDURE — 99232 SBSQ HOSP IP/OBS MODERATE 35: CPT | Performed by: INTERNAL MEDICINE

## 2022-02-19 PROCEDURE — 97166 OT EVAL MOD COMPLEX 45 MIN: CPT | Mod: GO

## 2022-02-19 PROCEDURE — 84100 ASSAY OF PHOSPHORUS: CPT | Performed by: INTERNAL MEDICINE

## 2022-02-19 PROCEDURE — 82533 TOTAL CORTISOL: CPT | Performed by: INTERNAL MEDICINE

## 2022-02-19 PROCEDURE — 250N000009 HC RX 250: Performed by: INTERNAL MEDICINE

## 2022-02-19 PROCEDURE — 83605 ASSAY OF LACTIC ACID: CPT | Performed by: INTERNAL MEDICINE

## 2022-02-19 PROCEDURE — 250N000011 HC RX IP 250 OP 636: Performed by: INTERNAL MEDICINE

## 2022-02-19 PROCEDURE — 120N000001 HC R&B MED SURG/OB

## 2022-02-19 PROCEDURE — 83605 ASSAY OF LACTIC ACID: CPT

## 2022-02-19 PROCEDURE — 36415 COLL VENOUS BLD VENIPUNCTURE: CPT

## 2022-02-19 PROCEDURE — 258N000003 HC RX IP 258 OP 636: Performed by: INTERNAL MEDICINE

## 2022-02-19 PROCEDURE — 97110 THERAPEUTIC EXERCISES: CPT | Mod: GP

## 2022-02-19 PROCEDURE — 250N000013 HC RX MED GY IP 250 OP 250 PS 637: Performed by: INTERNAL MEDICINE

## 2022-02-19 PROCEDURE — 97161 PT EVAL LOW COMPLEX 20 MIN: CPT | Mod: GP

## 2022-02-19 PROCEDURE — 80053 COMPREHEN METABOLIC PANEL: CPT | Performed by: INTERNAL MEDICINE

## 2022-02-19 PROCEDURE — 97530 THERAPEUTIC ACTIVITIES: CPT | Mod: GP

## 2022-02-19 PROCEDURE — 85025 COMPLETE CBC W/AUTO DIFF WBC: CPT | Performed by: INTERNAL MEDICINE

## 2022-02-19 PROCEDURE — 83735 ASSAY OF MAGNESIUM: CPT | Performed by: INTERNAL MEDICINE

## 2022-02-19 PROCEDURE — 97535 SELF CARE MNGMENT TRAINING: CPT | Mod: GO

## 2022-02-19 RX ORDER — LEVOTHYROXINE SODIUM 25 UG/1
75 TABLET ORAL
Status: DISCONTINUED | OUTPATIENT
Start: 2022-02-19 | End: 2022-02-20

## 2022-02-19 RX ORDER — NICOTINE 21 MG/24HR
1 PATCH, TRANSDERMAL 24 HOURS TRANSDERMAL DAILY
Status: DISCONTINUED | OUTPATIENT
Start: 2022-02-19 | End: 2022-02-25 | Stop reason: HOSPADM

## 2022-02-19 RX ADMIN — AMOXICILLIN AND CLAVULANATE POTASSIUM 1 TABLET: 875; 125 TABLET, FILM COATED ORAL at 08:25

## 2022-02-19 RX ADMIN — Medication 1 TABLET: at 08:25

## 2022-02-19 RX ADMIN — POLYETHYLENE GLYCOL 3350 17 G: 17 POWDER, FOR SOLUTION ORAL at 08:25

## 2022-02-19 RX ADMIN — SODIUM CHLORIDE 500 ML: 9 INJECTION, SOLUTION INTRAVENOUS at 20:42

## 2022-02-19 RX ADMIN — NICOTINE POLACRILEX 2 MG: 2 GUM, CHEWING BUCCAL at 05:16

## 2022-02-19 RX ADMIN — Medication 1 PATCH: at 08:25

## 2022-02-19 RX ADMIN — NICOTINE POLACRILEX 2 MG: 2 GUM, CHEWING BUCCAL at 14:09

## 2022-02-19 RX ADMIN — LEVOTHYROXINE SODIUM 75 MCG: 0.03 TABLET ORAL at 14:12

## 2022-02-19 RX ADMIN — SODIUM PHOSPHATE, MONOBASIC, MONOHYDRATE 9 MMOL: 276; 142 INJECTION, SOLUTION INTRAVENOUS at 01:19

## 2022-02-19 RX ADMIN — MAGNESIUM SULFATE HEPTAHYDRATE 2 G: 40 INJECTION, SOLUTION INTRAVENOUS at 00:03

## 2022-02-19 RX ADMIN — POLYETHYLENE GLYCOL 3350 17 G: 17 POWDER, FOR SOLUTION ORAL at 20:00

## 2022-02-19 RX ADMIN — FOLIC ACID 1 MG: 1 TABLET ORAL at 08:25

## 2022-02-19 RX ADMIN — AMOXICILLIN AND CLAVULANATE POTASSIUM 1 TABLET: 875; 125 TABLET, FILM COATED ORAL at 20:01

## 2022-02-19 RX ADMIN — Medication 100 MG: at 08:25

## 2022-02-19 ASSESSMENT — ACTIVITIES OF DAILY LIVING (ADL)
ADLS_ACUITY_SCORE: 5
ADLS_ACUITY_SCORE: 7
ADLS_ACUITY_SCORE: 5
ADLS_ACUITY_SCORE: 5
ADLS_ACUITY_SCORE: 7
ADLS_ACUITY_SCORE: 5
ADLS_ACUITY_SCORE: 7
ADLS_ACUITY_SCORE: 5
ADLS_ACUITY_SCORE: 7
ADLS_ACUITY_SCORE: 5
ADLS_ACUITY_SCORE: 7
ADLS_ACUITY_SCORE: 5
ADLS_ACUITY_SCORE: 7
ADLS_ACUITY_SCORE: 5
ADLS_ACUITY_SCORE: 7
ADLS_ACUITY_SCORE: 7
ADLS_ACUITY_SCORE: 5
ADLS_ACUITY_SCORE: 5
ADLS_ACUITY_SCORE: 7

## 2022-02-19 NOTE — PROGRESS NOTES
[facility]    PROGRESS NOTE - Hospitalist Service    Assessment and Plan    Principal Problem:    Hypoglycemia  Active Problems:    Alcohol abuse    Hypothyroidism    Falls frequently    LFT elevation    Hypophosphatemia    Hypomagnesemia    Getachew Barcenas is a 51 year old old male with h/o hypothyroidism, alcohol abuse, peripheral neuropathy, falls, hepatic steatosis, who presented for evaluation after a fall     Falls frequently.  Likely multifactorial secondary to peripheral neuropathy, alcohol intoxication, dizziness.   - PT/OT  -  Right parietal scalp laceration s/p staples in the ER    Alcohol abuse and intoxication.  - monitor for w/d   - CIWA orders placed.   -  MVT, thiamine, folate.   -  Social work consult for CD treatment resources    Abnormal LFTs.  Likely secondary to EtOH hepatitis, also history of hepatic steatosis seen on abdominal imaging previously.    - RUQ    Hypoglycemia.  Likely due to poor oral intake.   - improving     Hypomagnesemia, hypophosphatemia.   -  Replace per protocol    Suspected left hand cellulitis (due to trauma from dog claws).  Augmentin x7 days    Hypothyroidism.   - started levothyroxine today     Constipation.    - Bowel regiment  - treating hypothyroid     Recent diagnosis of LULI.  Was recommended CPAP.  Will use supplemental O2 if needed to keep O2 sats above 94%     Chronic anemia and Leukopenia.  Likely due to bone marrow suppression from EtOH.  Monitor    COVID-19 PCR Results    COVID-19 PCR Results 2/18/22   SARS CoV2 PCR Negative      Comments are available for some flowsheets but are not being displayed.         COVID-19 Antibody Results, Testing for Immunity    COVID-19 Antibody Results, Testing for Immunity   No data to display.            VTE prophylaxis:  Pneumatic Compression Devices  DIET: Orders Placed This Encounter      Combination Diet Regular Diet Adult    Drains/Lines: none  Weight bearing status: WBAT  Disposition/Barriers to  discharge: pending improvement and likely inpatient treatment   Code Status: Full Code    Subjective:  No complaints today     PHYSICAL EXAM  Temp:  [97.5  F (36.4  C)-98.5  F (36.9  C)] 97.6  F (36.4  C)  Pulse:  [] 78  Resp:  [9-19] 16  BP: (101-132)/(59-89) 110/78  SpO2:  [92 %-98 %] 95 %  Wt Readings from Last 1 Encounters:   02/18/22 87.6 kg (193 lb 3.2 oz)       Intake/Output Summary (Last 24 hours) at 2/19/2022 0759  Last data filed at 2/19/2022 0600  Gross per 24 hour   Intake --   Output 350 ml   Net -350 ml      Body mass index is 24.81 kg/m .    Physical Exam  HENT:      Head: Normocephalic and atraumatic.   Cardiovascular:      Rate and Rhythm: Normal rate and regular rhythm.      Pulses: Normal pulses.   Pulmonary:      Effort: Pulmonary effort is normal.      Breath sounds: Normal breath sounds.   Abdominal:      General: Bowel sounds are normal. There is no distension.      Palpations: Abdomen is soft.      Tenderness: There is no abdominal tenderness.   Skin:     General: Skin is warm and dry.      Capillary Refill: Capillary refill takes less than 2 seconds.   Neurological:      General: No focal deficit present.      Mental Status: He is oriented to person, place, and time.       PERTINENT LABS/IMAGING:  Results for orders placed or performed during the hospital encounter of 02/18/22   Head CT w/o contrast    Impression    IMPRESSION:  HEAD CT:  1. Shallow soft tissue contusion is seen along the left forehead.    2. No finding for intracranial hemorrhage, mass, or acute infarct.    3. Mild volume loss.    4. Small amount of dependent fluid or mucosal thickening is seen within the mastoid sinuses compatible with inflammatory change.    FACIAL BONE CT:  1. No facial bone or mandibular fracture.    2. Lower forehead and superior periorbital soft tissue contusion without post septal extension. Orbits otherwise unremarkable.    CERVICAL SPINE CT:  1.  No fracture or posttraumatic  subluxation.    2.  Straightening of the normal cervical lordosis. No prevertebral soft tissue swelling.    3.  No osseous canal or foraminal stenosis.   Cervical spine CT w/o contrast    Impression    IMPRESSION:  HEAD CT:  1. Shallow soft tissue contusion is seen along the left forehead.    2. No finding for intracranial hemorrhage, mass, or acute infarct.    3. Mild volume loss.    4. Small amount of dependent fluid or mucosal thickening is seen within the mastoid sinuses compatible with inflammatory change.    FACIAL BONE CT:  1. No facial bone or mandibular fracture.    2. Lower forehead and superior periorbital soft tissue contusion without post septal extension. Orbits otherwise unremarkable.    CERVICAL SPINE CT:  1.  No fracture or posttraumatic subluxation.    2.  Straightening of the normal cervical lordosis. No prevertebral soft tissue swelling.    3.  No osseous canal or foraminal stenosis.   CT Facial Bones without Contrast    Impression    IMPRESSION:  HEAD CT:  1. Shallow soft tissue contusion is seen along the left forehead.    2. No finding for intracranial hemorrhage, mass, or acute infarct.    3. Mild volume loss.    4. Small amount of dependent fluid or mucosal thickening is seen within the mastoid sinuses compatible with inflammatory change.    FACIAL BONE CT:  1. No facial bone or mandibular fracture.    2. Lower forehead and superior periorbital soft tissue contusion without post septal extension. Orbits otherwise unremarkable.    CERVICAL SPINE CT:  1.  No fracture or posttraumatic subluxation.    2.  Straightening of the normal cervical lordosis. No prevertebral soft tissue swelling.    3.  No osseous canal or foraminal stenosis.   XR Chest Port 1 View    Impression    IMPRESSION: Negative chest.   US Abdomen Limited    Impression    IMPRESSION:  1.  Cholelithiasis again noted without evidence of cholecystitis.  2.  Marked hepatic steatosis.           Recent Labs   Lab 02/19/22  6295  02/18/22 2149 02/18/22  2017 02/18/22  1717 02/18/22  1622   WBC 3.9*  --   --   --  3.8*   HGB 12.0*  --   --   --  11.8*   *  --   --   --  106*     --   --   --  212   INR 1.24*  --   --   --   --      --   --   --  136   POTASSIUM 3.9  --   --   --  3.8   CHLORIDE 96*  --   --   --  95*   CO2 26  --   --   --  24   BUN 6*  --   --   --  6*   CR 0.86  --   --   --  0.87   ANIONGAP 14  --   --   --  17   ABDULKADIR 9.4  --   --   --  10.2    90 85   < > 57*   ALBUMIN 4.4  --   --   --  4.7   PROTTOTAL 7.5  --   --   --  8.1*   BILITOTAL 1.5*  --   --   --  1.2*   ALKPHOS 144*  --   --   --  147*   ALT 83*  --   --   --  98*   *  --   --   --  296*   LIPASE  --   --   --   --  128*    < > = values in this interval not displayed.     Recent Results (from the past 24 hour(s))   Head CT w/o contrast    Narrative    EXAM: CT HEAD W/O CONTRAST, CT FACIAL BONES WITHOUT CONTRAST, CT CERVICAL SPINE W/O CONTRAST  LOCATION: [facility]  DATE/TIME: 2/18/2022 3:51 PM    INDICATION: Cerebral hemorrhage suspected. Fall. Laceration right parietal scalp. Contusions right forehead and right face.  COMPARISON: CT brain and CT cervical spine 09/30/2019.  TECHNIQUE:   1) Routine CT Head without IV contrast. Multiplanar reformats. Dose reduction techniques were used.  2) Routine CT Facial Bones without IV contrast. Multiplanar reformats. Dose reduction techniques were used.  3) Routine CT Cervical Spine without IV contrast. Multiplanar reformats. Dose reduction techniques were used.    FINDINGS:  HEAD CT:   INTRACRANIAL CONTENTS: No finding for intracranial hemorrhage, mass, or acute infarct. Mild prominence of the lateral ventricles and sulci. Gray-white matter differentiation is preserved. No mass effect or midline shift.    Cerebellar tonsils are normally positioned. Sella is unremarkable. Corpus callosum is normally formed.    OSSEOUS STRUCTURES/SOFT TISSUES: Calvarium is intact,  without fracture or suspicious lytic or blastic foci. Right lateral parietal scalp soft tissue swelling. Lower left forehead superficial soft tissue swelling/contusion. Middle ear cavities are clear.   There is a small amount of patchy fluid or mucosal thickening within both mastoid tips.    FACIAL BONE CT:  OSSEOUS STRUCTURES/SOFT TISSUES: There is lower forehead superficial soft tissue swelling/contusion. No post septal extension of hemorrhage. Allowing for dental amalgam, no radiopaque foreign bodies. No fracture. Temporomandibular joints are intact.   Teeth are unremarkable.    ORBITAL CONTENTS: Periorbital contusion without post septal extension. Orbits otherwise unremarkable.    SINUSES: Left maxillary sinus retention cysts. No air-fluid levels.    CERVICAL SPINE CT:   VERTEBRA: Lateral alignment is normal. There is straightening of the normal cervical lordosis. Craniocervical junction is intact. Anterior C1-C2 articulation is unremarkable.    Vertebral body heights and intervertebral disc space heights are preserved. Facets are unremarkable.    CANAL/FORAMINA: No canal or neural foraminal stenosis.    PARASPINAL: No extraspinal abnormality. Visualized lung fields are clear.      Impression    IMPRESSION:  HEAD CT:  1. Shallow soft tissue contusion is seen along the left forehead.    2. No finding for intracranial hemorrhage, mass, or acute infarct.    3. Mild volume loss.    4. Small amount of dependent fluid or mucosal thickening is seen within the mastoid sinuses compatible with inflammatory change.    FACIAL BONE CT:  1. No facial bone or mandibular fracture.    2. Lower forehead and superior periorbital soft tissue contusion without post septal extension. Orbits otherwise unremarkable.    CERVICAL SPINE CT:  1.  No fracture or posttraumatic subluxation.    2.  Straightening of the normal cervical lordosis. No prevertebral soft tissue swelling.    3.  No osseous canal or foraminal stenosis.   Cervical  spine CT w/o contrast    Narrative    EXAM: CT HEAD W/O CONTRAST, CT FACIAL BONES WITHOUT CONTRAST, CT CERVICAL SPINE W/O CONTRAST  LOCATION: [facility]  DATE/TIME: 2/18/2022 3:51 PM    INDICATION: Cerebral hemorrhage suspected. Fall. Laceration right parietal scalp. Contusions right forehead and right face.  COMPARISON: CT brain and CT cervical spine 09/30/2019.  TECHNIQUE:   1) Routine CT Head without IV contrast. Multiplanar reformats. Dose reduction techniques were used.  2) Routine CT Facial Bones without IV contrast. Multiplanar reformats. Dose reduction techniques were used.  3) Routine CT Cervical Spine without IV contrast. Multiplanar reformats. Dose reduction techniques were used.    FINDINGS:  HEAD CT:   INTRACRANIAL CONTENTS: No finding for intracranial hemorrhage, mass, or acute infarct. Mild prominence of the lateral ventricles and sulci. Gray-white matter differentiation is preserved. No mass effect or midline shift.    Cerebellar tonsils are normally positioned. Sella is unremarkable. Corpus callosum is normally formed.    OSSEOUS STRUCTURES/SOFT TISSUES: Calvarium is intact, without fracture or suspicious lytic or blastic foci. Right lateral parietal scalp soft tissue swelling. Lower left forehead superficial soft tissue swelling/contusion. Middle ear cavities are clear.   There is a small amount of patchy fluid or mucosal thickening within both mastoid tips.    FACIAL BONE CT:  OSSEOUS STRUCTURES/SOFT TISSUES: There is lower forehead superficial soft tissue swelling/contusion. No post septal extension of hemorrhage. Allowing for dental amalgam, no radiopaque foreign bodies. No fracture. Temporomandibular joints are intact.   Teeth are unremarkable.    ORBITAL CONTENTS: Periorbital contusion without post septal extension. Orbits otherwise unremarkable.    SINUSES: Left maxillary sinus retention cysts. No air-fluid levels.    CERVICAL SPINE CT:   VERTEBRA: Lateral alignment  is normal. There is straightening of the normal cervical lordosis. Craniocervical junction is intact. Anterior C1-C2 articulation is unremarkable.    Vertebral body heights and intervertebral disc space heights are preserved. Facets are unremarkable.    CANAL/FORAMINA: No canal or neural foraminal stenosis.    PARASPINAL: No extraspinal abnormality. Visualized lung fields are clear.      Impression    IMPRESSION:  HEAD CT:  1. Shallow soft tissue contusion is seen along the left forehead.    2. No finding for intracranial hemorrhage, mass, or acute infarct.    3. Mild volume loss.    4. Small amount of dependent fluid or mucosal thickening is seen within the mastoid sinuses compatible with inflammatory change.    FACIAL BONE CT:  1. No facial bone or mandibular fracture.    2. Lower forehead and superior periorbital soft tissue contusion without post septal extension. Orbits otherwise unremarkable.    CERVICAL SPINE CT:  1.  No fracture or posttraumatic subluxation.    2.  Straightening of the normal cervical lordosis. No prevertebral soft tissue swelling.    3.  No osseous canal or foraminal stenosis.   CT Facial Bones without Contrast    Narrative    EXAM: CT HEAD W/O CONTRAST, CT FACIAL BONES WITHOUT CONTRAST, CT CERVICAL SPINE W/O CONTRAST  LOCATION: [facility]  DATE/TIME: 2/18/2022 3:51 PM    INDICATION: Cerebral hemorrhage suspected. Fall. Laceration right parietal scalp. Contusions right forehead and right face.  COMPARISON: CT brain and CT cervical spine 09/30/2019.  TECHNIQUE:   1) Routine CT Head without IV contrast. Multiplanar reformats. Dose reduction techniques were used.  2) Routine CT Facial Bones without IV contrast. Multiplanar reformats. Dose reduction techniques were used.  3) Routine CT Cervical Spine without IV contrast. Multiplanar reformats. Dose reduction techniques were used.    FINDINGS:  HEAD CT:   INTRACRANIAL CONTENTS: No finding for intracranial hemorrhage,  mass, or acute infarct. Mild prominence of the lateral ventricles and sulci. Gray-white matter differentiation is preserved. No mass effect or midline shift.    Cerebellar tonsils are normally positioned. Sella is unremarkable. Corpus callosum is normally formed.    OSSEOUS STRUCTURES/SOFT TISSUES: Calvarium is intact, without fracture or suspicious lytic or blastic foci. Right lateral parietal scalp soft tissue swelling. Lower left forehead superficial soft tissue swelling/contusion. Middle ear cavities are clear.   There is a small amount of patchy fluid or mucosal thickening within both mastoid tips.    FACIAL BONE CT:  OSSEOUS STRUCTURES/SOFT TISSUES: There is lower forehead superficial soft tissue swelling/contusion. No post septal extension of hemorrhage. Allowing for dental amalgam, no radiopaque foreign bodies. No fracture. Temporomandibular joints are intact.   Teeth are unremarkable.    ORBITAL CONTENTS: Periorbital contusion without post septal extension. Orbits otherwise unremarkable.    SINUSES: Left maxillary sinus retention cysts. No air-fluid levels.    CERVICAL SPINE CT:   VERTEBRA: Lateral alignment is normal. There is straightening of the normal cervical lordosis. Craniocervical junction is intact. Anterior C1-C2 articulation is unremarkable.    Vertebral body heights and intervertebral disc space heights are preserved. Facets are unremarkable.    CANAL/FORAMINA: No canal or neural foraminal stenosis.    PARASPINAL: No extraspinal abnormality. Visualized lung fields are clear.      Impression    IMPRESSION:  HEAD CT:  1. Shallow soft tissue contusion is seen along the left forehead.    2. No finding for intracranial hemorrhage, mass, or acute infarct.    3. Mild volume loss.    4. Small amount of dependent fluid or mucosal thickening is seen within the mastoid sinuses compatible with inflammatory change.    FACIAL BONE CT:  1. No facial bone or mandibular fracture.    2. Lower forehead and  superior periorbital soft tissue contusion without post septal extension. Orbits otherwise unremarkable.    CERVICAL SPINE CT:  1.  No fracture or posttraumatic subluxation.    2.  Straightening of the normal cervical lordosis. No prevertebral soft tissue swelling.    3.  No osseous canal or foraminal stenosis.   XR Chest Port 1 View    Narrative    EXAM: XR CHEST PORT 1 VIEW  LOCATION: [facility]  DATE/TIME: 2/18/2022 7:19 PM    INDICATION: Fall. Chest pain.  COMPARISON: None.      Impression    IMPRESSION: Negative chest.   US Abdomen Limited    Narrative    EXAM: US ABDOMEN LIMITED  LOCATION: [facility]  DATE/TIME: 2/19/2022 6:28 AM    INDICATION: Abnormal LFTs, please evaluate for biliary obstruction  COMPARISON: Ultrasound 11/24/2020, CT 09/30/2019 and older studies.  TECHNIQUE: Limited abdominal ultrasound.    FINDINGS:    GALLBLADDER: Well-distended. Mild dependent sludge and few gallstones again noted. No pericholecystic fluid or wall thickening.    BILE DUCTS: No biliary dilatation. The common duct measures 2 mm.    LIVER: Diffuse increased echotexture throughout. No focal mass.    RIGHT KIDNEY: No hydronephrosis.    PANCREAS: The visualized portions are normal.    No ascites.      Impression    IMPRESSION:  1.  Cholelithiasis again noted without evidence of cholecystitis.  2.  Marked hepatic steatosis.         Recent Labs   Lab Test 07/04/17  1659   CHOL 645*   TRIG 2,744*     No results for input(s): LDL in the last 45034 hours.  Recent Labs   Lab Test 02/18/22  2149 02/18/22  1717 02/18/22  1622   NA  --   --  136   POTASSIUM  --   --  3.8   CHLORIDE  --   --  95*   CO2  --   --  24   GLC 90   < > 57*   BUN  --   --  6*   CR  --   --  0.87   GFRESTIMATED  --   --  >90   ABDULKADIR  --   --  10.2    < > = values in this interval not displayed.     No results for input(s): A1C in the last 84296 hours.  Recent Labs   Lab Test 02/18/22  1622   HGB 11.8*     Recent  Labs   Lab Test 02/18/22  1622   TROPONINI <0.01     No results for input(s): BNP, NTBNPI, NTBNP in the last 84112 hours.  Recent Labs   Lab Test 02/18/22  1622   .09*     No results for input(s): INR in the last 33644 hours.    Anabel Paulson MD  [facility]  [phone]

## 2022-02-19 NOTE — CONSULTS
Care Management Initial Consult    General Information  Assessment completed with: Children,  (Daughter Maria Teresa)  Type of CM/SW Visit: Initial Assessment    Primary Care Provider verified and updated as needed: No   Readmission within the last 30 days:        Reason for Consult: discharge planning, substance use concerns  Advance Care Planning:            Communication Assessment  Patient's communication style: spoken language (English or Bilingual)    Hearing Difficulty or Deaf: no   Wear Glasses or Blind: no    Cognitive  Cognitive/Neuro/Behavioral: WDL                      Living Environment:   People in home: alone     Current living Arrangements: house      Able to return to prior arrangements: no  Living Arrangement Comments:  (Per dtr, pt is building a new house. Has apt from Mar-April.)    Family/Social Support:  Care provided by: self, child(caleb)  Provides care for:    Marital Status:   Children          Description of Support System:           Current Resources:   Patient receiving home care services:       Community Resources:    Equipment currently used at home: none  Supplies currently used at home:      Employment/Financial:  Employment Status:          Financial Concerns:             Lifestyle & Psychosocial Needs:  Social Determinants of Health     Tobacco Use: Not on file   Alcohol Use: Not on file   Financial Resource Strain: Not on file   Food Insecurity: Not on file   Transportation Needs: Not on file   Physical Activity: Not on file   Stress: Not on file   Social Connections: Not on file   Intimate Partner Violence: Not on file   Depression: Not on file   Housing Stability: Not on file       Functional Status:  Prior to admission patient needed assistance:              Mental Health Status:          Chemical Dependency Status:  Chemical Dependency Status: Current Concern             Values/Beliefs:  Spiritual, Cultural Beliefs, Scientology Practices, Values that affect care:                  Additional Information:    [facility] met with daughter aJmes to discuss discharge needs. Pt currently working with therapy. Pt lives along and has a hx of ETOH abuse. Pt is in the process of packing up his house, moving into an apartment for 2 months while new house in being built, and having house built for May move-in. Pt is supposed to move into new temporary apartment on March 1; ideally family could cancel apartment and get pt into IP trx program for multi-month stay. Per Maria Teresa, pt agrees to detox or IP trx - need some clarity on this from pt. Daughter aware the pt needs a Rule 25 for treatment and asks for CM to assist with setting thing up.     Plan for pt to go to detox or IP CD trx.         [facility] attempted to set up Rule 25 with [facility], [facility], or [facility]. Only open during weekdays.    Referral sent to [facility] for screening and Rule 25 scheduling after the weekend. Shawn Ramirez is primary contact for coordination.     Left generic VM with daughter Maria Teresa at the end-of-day with [facility] office phone number and advised daughter to call back for update. CM to f/u with her tomorrow.     Ruthann Hopkins, SETHSW

## 2022-02-19 NOTE — DISCHARGE INSTRUCTIONS
[facility]   [phone]   [address] [place] [id number]       For [facility] chemical dependency evaluation scheduling call [phone] Monday through Friday between 7 AM and 6 PM.      [facility] Admissions Process -- ADMISSIONS: [phone]  [facility] offers Intensive Outpatient (IOP) and Outpatient (OP) treatment to adult males age 18 and up. [facility] is now offering full in-person IOP and OP classes with standard safety protocol for COVID-19. [facility] also offers access to appointments with contracted providers for MAT, psychotropic medication, individual and/or couples/family therapy, and physical health medications. Chemical Health Assessments available by appointment. [facility] Rent Reduction Program available upon request.

## 2022-02-19 NOTE — PLAN OF CARE
Goal Outcome Evaluation:  Outcome Evaluation: CM following for transfer to [facility]      NURSING NOTE  0700 - 1500      Problem: Alcohol Withdrawal  Goal: Alcohol Withdrawal Symptom Control  Outcome: Ongoing, Progressing    D: CIWA score <5; scoring for tremors, tactile disturbance and orientation.           Withdrawn, cooperative with cares. VSS, on room air. Medical tele monitoring -                NSR. PT/OT. Denies of pain/discomfort.   A: Due meds given. Encouraged activity as tolerated. Ambulated with OT, up on       the chair - tolerated, denies lightheadedness/dizziness.  R: No acute issues. Mg & Phos recheck in am per protocol. Will continue with plan of care.

## 2022-02-19 NOTE — PROGRESS NOTES
Physical Therapy       02/19/22 1150   Quick Adds   Type of Visit Initial PT Evaluation   Living Environment   People in Home alone   Current Living Arrangements house   Home Accessibility no concerns   Transportation Anticipated family or friend will provide   Self-Care   Usual Activity Tolerance moderate   Current Activity Tolerance fair   Equipment Currently Used at Home none   Fall history within last six months yes   Number of times patient has fallen within last six months 3   General Information   Onset of Illness/Injury or Date of Surgery 02/18/22   Referring Physician Gabriela Rae MD   Pertinent History of Current Problem (include personal factors and/or comorbidities that impact the POC) Patient admitted to hospital following fall at home.   Existing Precautions/Restrictions fall   Cognition   Affect/Mental Status (Cognition) WNL   Orientation Status (Cognition) oriented x 3   Pain Assessment   Patient Currently in Pain No   Posture    Posture Forward head position   Range of Motion (ROM)   Range of Motion ROM is WFL   Strength (Manual Muscle Testing)   Strength (Manual Muscle Testing) strength is WFL   Bed Mobility   Bed Mobility supine-sit   Supine-Sit [place] (Bed Mobility) modified independence   Assistive Device (Bed Mobility) bed rails   Transfers   Transfers sit-stand transfer   Impairments Contributing to Impaired Transfers impaired balance   Sit-Stand Transfer   Sit-Stand [place] (Transfers) contact guard   Assistive Device (Sit-Stand Transfers) walker, front-wheeled   Gait/Stairs (Locomotion)   [place] Level (Gait) contact guard   Assistive Device (Gait) walker, front-wheeled   Distance in Feet (Required for LE Total Joints) 100'   Pattern (Gait) step-through   Balance   Balance other (describe)   Sitting Balance: Static good balance   Standing Balance: Static poor balance   Balance Quick Add Sitting balance: Static;Standing balance: static   Clinical Impression   Criteria  for Skilled Therapeutic Intervention Yes, treatment indicated   PT Diagnosis (PT) gait instability   Influenced by the following impairments dizziness, impaired balance    Functional limitations due to impairments gait, transfers   Clinical Presentation (PT Evaluation Complexity) Stable/Uncomplicated   Clinical Presentation Rationale Presents as diagnosed   Clinical Decision Making (Complexity) low complexity   Planned Therapy Interventions (PT) gait training;transfer training   Anticipated Equipment Needs at Discharge (PT) walker, standard  (may not need FWW pending improvement in status )   Risk & Benefits of therapy have been explained evaluation/treatment results reviewed;participants included;patient   PT Discharge Planning   PT Discharge Recommendation (DC Rec) Transitional Care Facility;home with assist   PT Rationale for DC Rec Inpatient CD, pending progress he may be appropriate for TCU placement as he requires CGA with mobility tasks for safety and falls prevention.   Total Evaluation Time   Total Evaluation Time (Minutes) 10   Physical Therapy Goals   PT Frequency Daily   PT Predicated Duration/Target Date for Goal Attainment 02/23/22   PT Goals Transfers;Gait   PT: Transfers Modified independent;Sit to/from stand   PT: Gait Modified independent;150 feet;Standard walker     Merlene Palencia DPT

## 2022-02-19 NOTE — ED NOTES
"[facility] ED Handoff Report    ED Chief Complaint: Fall    ED Diagnosis:  (E16.2) Hypoglycemia  (primary encounter diagnosis)    (R29.6) Falls frequently    (E03.9) Hypothyroidism    (F10.10) Alcohol abuse       PMH:  No past medical history on file.     Code Status:  No Order     Falls Risk: Yes Band: Applied    Current Living Situation/Residence: lives alone     Elimination Status: Continent: Yes     Activity Level: SBA    Patients Preferred Language:  English     Needed: No    Vital Signs:  /70   Pulse 92   Temp 98.5  F (36.9  C) (Oral)   Resp 12   Ht 1.88 m (6' 2\")   Wt 95.3 kg (210 lb)   SpO2 93%   BMI 26.96 kg/m       Cardiac Rhythm: ST    Pain Score: 0/10    Is the Patient Confused:  No    Last Food or Drink: 02/18/22    Focused Assessment:  Pt Alert, occasionally forgetful. Daughter at bedside, tearfully concerned about fathers health and need for sobriety. Pt verbalized he is willing to go to Detox upon discharge from hospital. Pt has hx of non compliance with medications as he admits to taking medication on his schedule and not the recommended dosing schedule. Pt treated in ED for hypoglycemia and now on continuous D5 and 0.9 NaCl 100 ml/hr. Pt had fall yesterday, found in home by daughter this morning with blood throughout the home. Pt has head lac on top of head to right side of head. Wound cleansed in ED and staples placed by provider. CIWA 3    Tests Performed: Done: Labs and Imaging    Treatments Provided:  Rest/fluids/Ativan prior to Staples placement    Family Dynamics/Concerns: No    Family Updated On Visitor Policy: Yes    Plan of Care Communicated to Family: Yes    Who Was Updated about Plan of Care: daughter at bedside informed of hospital visitor policy    Belongings Checklist Done and Signed by Patient: Yes    Medications sent with patient: Denies    Covid: asymptomatic , negative    Additional Information:     RN: Coby Mejia 2/18/2022 7:19 PM      "

## 2022-02-19 NOTE — PLAN OF CARE
Pt denies pain or discomfort.  Laceration on scalp intact with three staples placed in ER.  Scant drainage on pillowcase.  Scoring zero on CIWA.  Concerns about nicotine; patch in place and PRN nicotine gum ordered.    US of abdomen to be done this AM.  IVF continuous per order.  Mg and phosphorus replaced IV.  Urinal at bedside.  Pt reports unsteadiness about two steps into ambulation and needs to stop; unsteady.  Bed alarm in place and call light within reach.       Elizabeth Guzman RN

## 2022-02-19 NOTE — PROVIDER NOTIFICATION
"Pt requesting another nicotine patch, \"... or something.\"  Writer paged [facility]/C hospitalist requesting nicotine gum PRN.  Writer spoke with pharmacist and inquired about having PRN gum available with patch ordered; pharmacist states this is ok.    Elizabeth Guzman RN    "

## 2022-02-19 NOTE — ED NOTES
Pt's walked fairly well from the room to the bathroom and bathroom back to his room, He didn't show any pain or dizziness while ambulating. RN is been notified.

## 2022-02-19 NOTE — H&P
Admission History and Physical   Getachew MICHAEL Rai,  1970, MRN [id number]    [facility]  Alcohol abuse [F10.10]  Hypoglycemia [E16.2]  Hypothyroidism [E03.9]  Falls frequently [R29.6]    PCP: No Ref-Primary, Physician, No address on file, None   Code status:  Full Code       Extended Emergency Contact Information  Primary Emergency Contact: TIFFANIE RAI  Home Phone: [phone]  Relation: Daughter  Secondary Emergency Contact: ALESSIA RAI  Home Phone: [phone]  Relation: Son       Assessment and Plan   51 year old old male with past medical history of hypothyroidism, alcohol abuse, peripheral neuropathy, falls, hepatic steatosis, who presented for evaluation after a fall    1.  Falls frequently.  Likely multifactorial secondary to peripheral neuropathy, alcohol intoxication, dizziness.  PT/OT eval  2.  Alcohol abuse and intoxication.  No evidence of withdrawal symptoms at this time.  Patient denies any withdrawal symptoms in the past.  CIWA orders placed.  MVT, thiamine, folate.  Social work consult for CD treatment resources  3.  Abnormal LFTs.  Likely secondary to EtOH hepatitis, also history of hepatic steatosis seen on abdominal imaging previously.  Will obtain right upper quadrant ultrasound for further evaluation  4.  Hypoglycemia.  Likely due to poor oral intake.  Check cortisol level.  Improved after D10  5.  Hypomagnesemia, hypophosphatemia.  Replace per protocol  6.  Suspected left hand cellulitis (due to trauma from dog claws).  Augmentin x7 days  7.  Hypothyroidism.  TSH is significantly elevated, free T4 level is pending.  Will need to start levothyroxine  8.  Constipation.  Might be related to uncontrolled hypothyroidism.  Started on bowel regimen.  9.  Recent diagnosis of LULI.  Was recommended CPAP.  Will use supplemental O2 if needed to keep O2 sats above 94%  10.  Right parietal scalp laceration s/p staples in the ER  11.  Chronic anemia.  No evidence of active bleeding.   Monitor  12.  Leukopenia.  Likely due to bone marrow suppression from EtOH.  Monitor    DVT Prophylaxis: SCDs     Chief Complaint:  Fall     HPI:    Getachew Barcenas is a 51 year old old male with past medical history of hypothyroidism, alcohol abuse, peripheral neuropathy, falls, hepatic steatosis, who presented for evaluation after a fall  History is provided by patient, daughter and medical records  Patient was brought in to the ER by daughter for evaluation after an unwitnessed fall which happened yesterday or today.  She found patient on the floor with head laceration.  Patient reports that he has peripheral neuropathy and has fallen several times in the past.  Reports ongoing dizziness.  Complains of constipation, last bowel movement was 10 days ago.  Denies having any abdominal pain, nausea or vomiting.  He has had decreased oral intake recently.  He drinks vodka almost daily, never had withdrawal symptoms in the past.  He is interested in detox and CD treatment.  He has been seeing neurologist.  He saw sleep medicine specialist recently and was recommended CPAP.  He needs to find a new PCP.  He has only been taking multivitamins recently  On initial evaluation in the ER vital signs stable.  Labs significant for hyperglycemia BS 50, abnormal LFTs, significantly elevated TSH, leukopenia, hemoglobin 11.8, macrocytosis.  CT of the head and facial bones showed soft tissue contusion along the left forehead and superior periorbital soft tissue, no intracranial bleeding.  Cervical spine CT with no fracture or subluxation.  Patient was given orange juice and was briefly on D10 infusion with improvement in his blood sugars.     Medical History  Hypothyroidism  Alcohol abuse  Peripheral neuropathy  Falls frequently  Hepatic steatosis     Surgical History  He had no surgeries       Social History  Reviewed, and he  reports that he chews tobacco.   He reports current alcohol use. He reports that he does not use drugs.  "  Social History     Tobacco Use     Smoking status: Never Smoker     Smokeless tobacco: Former User   Substance Use Topics     Alcohol use: Yes          Allergies  No Known Allergies Family History  Reviewed, and noncontributory          Prior to Admission Medications   Medications Prior to Admission   Medication Sig Dispense Refill Last Dose     levothyroxine (SYNTHROID/LEVOTHROID) 150 MCG tablet Take 150 mcg by mouth daily   Past Month at 1 tablet in the past month     acetaminophen (TYLENOL) 500 MG tablet [ACETAMINOPHEN (TYLENOL) 500 MG TABLET] Take 1-2 tablets (500-1,000 mg total) by mouth every 6 (six) hours as needed for pain.  0 Unknown     fenofibrate (TRICOR) 48 MG tablet [FENOFIBRATE (TRICOR) 48 MG TABLET] Take 1 tablet (48 mg total) by mouth daily. 30 tablet 0 Not Taking     hydrOXYzine (ATARAX) 50 MG tablet [HYDROXYZINE (ATARAX) 50 MG TABLET] Take 50 mg by mouth at bedtime as needed for itching.   Not Taking     oxyCODONE (ROXICODONE) 5 MG immediate release tablet [OXYCODONE (ROXICODONE) 5 MG IMMEDIATE RELEASE TABLET] Take 1 tablet (5 mg total) by mouth every 6 (six) hours as needed for pain. 30 tablet 0 Not Taking     psyllium (METAMUCIL) 3.4 gram packet [PSYLLIUM (METAMUCIL) 3.4 GRAM PACKET] Take 1 packet by mouth daily.   Not Taking     traZODone (DESYREL) 50 MG tablet Take 50 mg by mouth nightly as needed for sleep   Unknown          Review of Systems:  A 12 point comprehensive review of systems was negative except as noted. Physical Exam:  Temp:  [98.1  F (36.7  C)-98.5  F (36.9  C)] 98.1  F (36.7  C)  Pulse:  [] 83  Resp:  [9-19] 16  BP: (101-132)/(59-89) 101/69  SpO2:  [92 %-98 %] 97 %    /69 (BP Location: Left arm)   Pulse 83   Temp 98.1  F (36.7  C) (Oral)   Resp 16   Ht 1.88 m (6' 2\")   Wt 87.6 kg (193 lb 3.2 oz)   SpO2 97%   BMI 24.81 kg/m      General Appearance:   No acute distress, appears chronically ill, appears older than stated age   Head:   Right parietal scalp " laceration s/p staples   Eyes:    PERRL, conjunctiva/corneas clear, EOM's intact,both eyes    Ears:    Normal external ear canals no drainage or erythema bilat.   Nose:   Nares normal by gross inspection,  mucosa normal, no drainage or sinus tenderness   Throat:   Lips, mucosa, and tongue normal; teeth and gums normal   Neck:   Supple, symmetrical, trachea midline, no adenopathy;        thyroid:  No enlargement/tenderness/nodules   Back:     Symmetric, no curvature, ROM normal, no CVA tenderness   Lungs:    Clear   Chest wall:    No tenderness or deformity   Heart:    Regular rate and rhythm, S1 and S2 normal, no murmur, no rubs, no JVD, no edema   Abdomen:     Soft, non-tender, bowel sounds active all four quadrants,     no masses, no hepatosplenomegaly   Musculoskeletal:   Extremities are warm and non-tender, atraumatic, no joint swelling or tenderness   Pulses:   2+ and symmetric all extremities   Skin:  Right parietal scalp laceration s/p staples, all extremities, mostly arms with multiple petechiae, bruises of various stages, scabbed abrasions, slight erythema around 2 scabbed wounds on the left hand    Neurologic:  Awake and alert, grossly nonfocal, no tremors noted        Pertinent Labs  Lab Results: personally reviewed.   Recent Labs   Lab 02/18/22  1622      CO2 24   BUN 6*   ALBUMIN 4.7   ALKPHOS 147*   ALT 98*   *     Recent Labs   Lab 02/18/22  1622   WBC 3.8*   HGB 11.8*   HCT 34.0*        Recent Labs   Lab 02/18/22  1622   TROPONINI <0.01       MOST RECENT A1c, Iron, TIBC, Coags, TFTs  No results found for: HGBA1C, INR, PTT  No results found for: IRON  Lab Results   Component Value Date    .09 (H) 02/18/2022         Pertinent Radiology  Radiology Results: I personally reviewed.  Results for orders placed or performed during the hospital encounter of 02/18/22   Head CT w/o contrast    Impression    IMPRESSION:  HEAD CT:  1. Shallow soft tissue contusion is seen along the left  forehead.    2. No finding for intracranial hemorrhage, mass, or acute infarct.    3. Mild volume loss.    4. Small amount of dependent fluid or mucosal thickening is seen within the mastoid sinuses compatible with inflammatory change.    FACIAL BONE CT:  1. No facial bone or mandibular fracture.    2. Lower forehead and superior periorbital soft tissue contusion without post septal extension. Orbits otherwise unremarkable.    CERVICAL SPINE CT:  1.  No fracture or posttraumatic subluxation.    2.  Straightening of the normal cervical lordosis. No prevertebral soft tissue swelling.    3.  No osseous canal or foraminal stenosis.   Cervical spine CT w/o contrast    Impression    IMPRESSION:  HEAD CT:  1. Shallow soft tissue contusion is seen along the left forehead.    2. No finding for intracranial hemorrhage, mass, or acute infarct.    3. Mild volume loss.    4. Small amount of dependent fluid or mucosal thickening is seen within the mastoid sinuses compatible with inflammatory change.    FACIAL BONE CT:  1. No facial bone or mandibular fracture.    2. Lower forehead and superior periorbital soft tissue contusion without post septal extension. Orbits otherwise unremarkable.    CERVICAL SPINE CT:  1.  No fracture or posttraumatic subluxation.    2.  Straightening of the normal cervical lordosis. No prevertebral soft tissue swelling.    3.  No osseous canal or foraminal stenosis.   CT Facial Bones without Contrast    Impression    IMPRESSION:  HEAD CT:  1. Shallow soft tissue contusion is seen along the left forehead.    2. No finding for intracranial hemorrhage, mass, or acute infarct.    3. Mild volume loss.    4. Small amount of dependent fluid or mucosal thickening is seen within the mastoid sinuses compatible with inflammatory change.    FACIAL BONE CT:  1. No facial bone or mandibular fracture.    2. Lower forehead and superior periorbital soft tissue contusion without post septal extension. Orbits otherwise  unremarkable.    CERVICAL SPINE CT:  1.  No fracture or posttraumatic subluxation.    2.  Straightening of the normal cervical lordosis. No prevertebral soft tissue swelling.    3.  No osseous canal or foraminal stenosis.   XR Chest Port 1 View    Impression    IMPRESSION: Negative chest.         Advanced Care Planning  Treatment and discharge Planning discussed with patient, daughter, nursing staff  Anticipated Length of Stay in midnights (including a midnight in the Emergency Department after triage if applicable): At least 2 midnights for evaluation and treatment of hypoglycemia, alcohol intoxication      Gabriela Rae MD  Internal Medicine Hospitalist  2/18/2022

## 2022-02-19 NOTE — PHARMACY-ADMISSION MEDICATION HISTORY
Pharmacy Note - Admission Medication History    Pertinent Provider Information:  No recent fills on any of patients medications, but he still has the medications at home.     ______________________________________________________________________    Prior To Admission (PTA) med list completed and updated in EMR.       PTA Med List   Medication Sig Note Last Dose     levothyroxine (SYNTHROID/LEVOTHROID) 150 MCG tablet Take 150 mcg by mouth daily 2/18/2022: Last filled 6/15/21 for 90 day supply Past Month at 1 tablet in the past month       Information source(s): Patient, Family member and [facility]/[employer]  Method of interview communication: in-person    Summary of Changes to PTA Med List  New: trazodone  Discontinued: none  Changed: levothyroxine dose    Patient was asked about OTC/herbal products specifically.  PTA med list reflects this.    In the past week, patient estimated taking medication this percent of the time:  less than 50% due to other.    Allergies were reviewed, assessed, and updated with the patient.      Patient does not use any multi-dose medications prior to admission.    The information provided in this note is only as accurate as the sources available at the time of the update(s).    Thank you,  Cathleen Kendirck, [facility]  2/18/2022 7:38 PM

## 2022-02-20 PROBLEM — F39 MOOD DISORDER (H): Status: ACTIVE | Noted: 2022-02-20

## 2022-02-20 PROBLEM — E87.20 LACTIC ACID ACIDOSIS: Status: ACTIVE | Noted: 2022-02-20

## 2022-02-20 PROBLEM — E87.6 HYPOKALEMIA: Status: ACTIVE | Noted: 2022-02-20

## 2022-02-20 LAB
ALBUMIN SERPL-MCNC: 4.1 G/DL (ref 3.5–5)
ALP SERPL-CCNC: 131 U/L (ref 45–120)
ALT SERPL W P-5'-P-CCNC: 73 U/L (ref 0–45)
ANION GAP SERPL CALCULATED.3IONS-SCNC: 10 MMOL/L (ref 5–18)
AST SERPL W P-5'-P-CCNC: 196 U/L (ref 0–40)
BILIRUB SERPL-MCNC: 1.5 MG/DL (ref 0–1)
BUN SERPL-MCNC: 6 MG/DL (ref 8–22)
CALCIUM SERPL-MCNC: 9.1 MG/DL (ref 8.5–10.5)
CHLORIDE BLD-SCNC: 98 MMOL/L (ref 98–107)
CO2 SERPL-SCNC: 26 MMOL/L (ref 22–31)
CREAT SERPL-MCNC: 0.82 MG/DL (ref 0.7–1.3)
ERYTHROCYTE [DISTWIDTH] IN BLOOD BY AUTOMATED COUNT: 11.9 % (ref 10–15)
GFR SERPL CREATININE-BSD FRML MDRD: >90 ML/MIN/1.73M2
GLUCOSE BLD-MCNC: 98 MG/DL (ref 70–125)
HCT VFR BLD AUTO: 32.2 % (ref 40–53)
HGB BLD-MCNC: 11.1 G/DL (ref 13.3–17.7)
MAGNESIUM SERPL-MCNC: 2.1 MG/DL (ref 1.8–2.6)
MCH RBC QN AUTO: 37.1 PG (ref 26.5–33)
MCHC RBC AUTO-ENTMCNC: 34.5 G/DL (ref 31.5–36.5)
MCV RBC AUTO: 108 FL (ref 78–100)
PHOSPHATE SERPL-MCNC: 2.5 MG/DL (ref 2.5–4.5)
PLATELET # BLD AUTO: 170 10E3/UL (ref 150–450)
POTASSIUM BLD-SCNC: 3.4 MMOL/L (ref 3.5–5)
POTASSIUM BLD-SCNC: 4 MMOL/L (ref 3.5–5)
PROT SERPL-MCNC: 7 G/DL (ref 6–8)
RBC # BLD AUTO: 2.99 10E6/UL (ref 4.4–5.9)
SODIUM SERPL-SCNC: 134 MMOL/L (ref 136–145)
WBC # BLD AUTO: 4 10E3/UL (ref 4–11)

## 2022-02-20 PROCEDURE — 250N000013 HC RX MED GY IP 250 OP 250 PS 637: Performed by: INTERNAL MEDICINE

## 2022-02-20 PROCEDURE — 84100 ASSAY OF PHOSPHORUS: CPT | Performed by: INTERNAL MEDICINE

## 2022-02-20 PROCEDURE — 99233 SBSQ HOSP IP/OBS HIGH 50: CPT | Performed by: INTERNAL MEDICINE

## 2022-02-20 PROCEDURE — 36415 COLL VENOUS BLD VENIPUNCTURE: CPT | Performed by: INTERNAL MEDICINE

## 2022-02-20 PROCEDURE — 250N000013 HC RX MED GY IP 250 OP 250 PS 637: Performed by: STUDENT IN AN ORGANIZED HEALTH CARE EDUCATION/TRAINING PROGRAM

## 2022-02-20 PROCEDURE — 80053 COMPREHEN METABOLIC PANEL: CPT | Performed by: INTERNAL MEDICINE

## 2022-02-20 PROCEDURE — 85027 COMPLETE CBC AUTOMATED: CPT | Performed by: INTERNAL MEDICINE

## 2022-02-20 PROCEDURE — 83735 ASSAY OF MAGNESIUM: CPT | Performed by: INTERNAL MEDICINE

## 2022-02-20 PROCEDURE — 84132 ASSAY OF SERUM POTASSIUM: CPT | Performed by: INTERNAL MEDICINE

## 2022-02-20 PROCEDURE — 250N000013 HC RX MED GY IP 250 OP 250 PS 637: Performed by: EMERGENCY MEDICINE

## 2022-02-20 PROCEDURE — 120N000001 HC R&B MED SURG/OB

## 2022-02-20 RX ORDER — POTASSIUM CHLORIDE 1500 MG/1
40 TABLET, EXTENDED RELEASE ORAL ONCE
Status: COMPLETED | OUTPATIENT
Start: 2022-02-20 | End: 2022-02-20

## 2022-02-20 RX ORDER — LEVOTHYROXINE SODIUM 25 UG/1
75 TABLET ORAL ONCE
Status: COMPLETED | OUTPATIENT
Start: 2022-02-20 | End: 2022-02-20

## 2022-02-20 RX ADMIN — Medication 1 TABLET: at 08:49

## 2022-02-20 RX ADMIN — LEVOTHYROXINE SODIUM 75 MCG: 0.03 TABLET ORAL at 13:37

## 2022-02-20 RX ADMIN — NICOTINE POLACRILEX 2 MG: 2 GUM, CHEWING BUCCAL at 20:58

## 2022-02-20 RX ADMIN — PSYLLIUM HUSK 1 PACKET: 3.4 POWDER ORAL at 08:49

## 2022-02-20 RX ADMIN — POTASSIUM CHLORIDE 40 MEQ: 1500 TABLET, EXTENDED RELEASE ORAL at 08:49

## 2022-02-20 RX ADMIN — AMOXICILLIN AND CLAVULANATE POTASSIUM 1 TABLET: 875; 125 TABLET, FILM COATED ORAL at 20:23

## 2022-02-20 RX ADMIN — POLYETHYLENE GLYCOL 3350 17 G: 17 POWDER, FOR SOLUTION ORAL at 08:49

## 2022-02-20 RX ADMIN — Medication 100 MG: at 08:48

## 2022-02-20 RX ADMIN — AMOXICILLIN AND CLAVULANATE POTASSIUM 1 TABLET: 875; 125 TABLET, FILM COATED ORAL at 08:49

## 2022-02-20 RX ADMIN — POLYETHYLENE GLYCOL 3350 17 G: 17 POWDER, FOR SOLUTION ORAL at 20:23

## 2022-02-20 RX ADMIN — Medication 1 PATCH: at 08:52

## 2022-02-20 RX ADMIN — FOLIC ACID 1 MG: 1 TABLET ORAL at 08:49

## 2022-02-20 RX ADMIN — LEVOTHYROXINE SODIUM 75 MCG: 0.03 TABLET ORAL at 06:14

## 2022-02-20 ASSESSMENT — ACTIVITIES OF DAILY LIVING (ADL)
ADLS_ACUITY_SCORE: 7

## 2022-02-20 NOTE — PLAN OF CARE
Goal Outcome Evaluation:    CIWA scores were 0 and 0 this shift.  No reports of pain.  Pt's O2 sats dipping to 86-87% on RA so he was placed on 1L O2 via NC and rebounded to 94%.  Plan is for pt to discharge to a treatment facility after this.   Addiction medicine and psychiatry consulted.  K+ protocol in place. Level was 3.4 this morning. Pt was given PO potassium replacement and a recheck level was 4.0. No further replacement needed today.                  Outcome Evaluation: CM following for transfer to [facility]

## 2022-02-20 NOTE — PLAN OF CARE
Goal Outcome Evaluation:               Outcome Evaluation: CM following for transfer to [facility]          Problem: Plan of Care - These are the overarching goals to be used throughout the patient stay.    Goal: Absence of Hospital-Acquired Illness or Injury  Intervention: Prevent Skin Injury  Recent Flowsheet Documentation  Taken 2/19/2022 2329 by Elizabeth Guzman, RN  Body Position: position changed independently   Pt denies pain.  Up OOB with assist of one with walker.  Had a formed BM.    Scoring 1 (tremor) and 0 on CIWA.  NSR on telemetry.    Elizabeth Guzman, RN

## 2022-02-20 NOTE — PLAN OF CARE
PProblem: Alcohol Withdrawal  Goal: Alcohol Withdrawal Symptom Control  Outcome: Ongoing, Progressing   Goal Outcome Evaluation:        Pt alert and oriented times 4. BP was slightly low around 1900. Writer rechecked and it was much better. Sepsis protocol fired up due to low BP. Temperature was WNL vitals done and lactic done too. Lactic came 2.8. House doctor was called and notified. MD seen pt and ordered bolus. Bolus  ml going right now. Will need to check lactic again at 2245 per MD order. Pt denied any pain at this moment. He complained mild pain on right shoulder at the beginning of the shift but refused medication. Denied any dizziness and lightheaded. CIWA score was 1 both times. Pt on mg and phos protocol. Recheck labs in am per protocol.

## 2022-02-20 NOTE — SIGNIFICANT EVENT
Sepsis Evaluation Progress Note    I was called to see Getachew Nuneson due to elevated lactic acid. He is known to have an infection. He is being treated for possible hand cellulitis with Augmentin. Patient denies new infectious symptoms.     Physical Exam   Vital Signs:  Temp: 97.8  F (36.6  C) Temp src: Oral BP: 94/66 Pulse: 85   Resp: 17 SpO2: 93 % O2 Device: None (Room air)       General: in no acute distress  Mental Status: baseline mental status.     Remainder of physical exam is significant for  Lungs: CTAB  CV: RRR  Abdomen: soft, non-distended, non-tender  Skin: abrasions noted on left hand with some surrounding erythema    Data   Lactic Acid   Date Value Ref Range Status   02/19/2022 2.8 (H) 0.7 - 2.0 mmol/L Final       Assessment & Plan   NO EVIDENCE OF SEPSIS at this time.  Vital sign, physical exam, and lab findings are due to liver dysfunction, here for alcohol intoxication on CIWA.. However; blood pressure is soft and patient states he has not been drinking/eating per baseline. Will give 500ml bolus with a recheck to ensure lactic acid is not elevating.    Disposition: The patient will remain on the current unit. We will continue to monitor this patient closely..  Erika Conti MD

## 2022-02-20 NOTE — PROGRESS NOTES
[facility]    PROGRESS NOTE - Hospitalist Service    Assessment and Plan    Principal Problem:    Hypoglycemia  Active Problems:    Alcohol abuse    Hypothyroidism    Falls frequently    LFT elevation    Hypophosphatemia    Hypomagnesemia    Hypokalemia    Lactic acid acidosis    Mood disorder (H)    Getcahew Barcenas is a 51 year old old male with h/o hypothyroidism, alcohol abuse, peripheral neuropathy, falls, hepatic steatosis, who presented for evaluation after a fall     Falls frequently.  Likely multifactorial secondary to peripheral neuropathy, alcohol intoxication, dizziness.   - PT/OT  -  Right parietal scalp laceration s/p staples in the ER  - Head CT no concerning findings results reviewed     Alcohol abuse and intoxication.  -  on admission. Informed daughter koffi lylest start having withdrawals symptoms until 48-96 hrs from then and that meaning this evening. Will be monitoring closely   - CIWA orders, has not been scoring but continue to monitor for now   -  MVT, thiamine, folate.   -  Social work consult for CD treatment resources  - rule 25 unable to do on the weekend and as well tomorrow since holiday   - Addiction medicine consult but do not see on weekends   - no h/o w/d in the past or seizures     Abnormal LFTs.  Likely secondary to EtOH hepatitis, also history of hepatic steatosis seen on abdominal imaging previously.    - RUQ fatty liver and cholestasis no acute findings   - LFTs trending down continue to monitor     Mood disorder with paranoia and wanting to die per daughter and question of insomnia. Per daughter she found guns and ammo with spent shells next to his chair and bed at home which she removed per SW.   - Psych to see, daughter aware they do not see on weekends  - monitor for now     Hypoglycemia.  Likely due to poor oral intake.   - improved eating now  - monitoring     Hypomagnesemia, hypophosphatemia, hypokalemia   -  Replace per protocol    Lactic  acidosis resolved     Suspected left hand cellulitis (due to trauma from dog claws).  Augmentin x7 days    Hypothyroidism.   - was not taking home dose 150mg daily  - will need reassessment in the future     Constipation.    - Bowel regiment  - treating hypothyroid     Recent diagnosis of LULI.  Was recommended CPAP.  Will use supplemental O2 if needed to keep O2 sats above 94%     Chronic anemia and Leukopenia.  Likely due to bone marrow suppression from EtOH.  Monitor    COVID-19 PCR Results    COVID-19 PCR Results 2/18/22   SARS CoV2 PCR Negative      Comments are available for some flowsheets but are not being displayed.         COVID-19 Antibody Results, Testing for Immunity    COVID-19 Antibody Results, Testing for Immunity   No data to display.            VTE prophylaxis:  Pneumatic Compression Devices  DIET: Orders Placed This Encounter      Combination Diet Regular Diet Adult    Drains/Lines: none  Weight bearing status: WBAT  Disposition/Barriers to discharge: pending improvement and likely inpatient treatment   Code Status: Full Code    Subjective:  Daughter present patient much more awake and alert. Reviewed imaging results and labs with her and about consultations.     PHYSICAL EXAM  Temp:  [97.8  F (36.6  C)-98.9  F (37.2  C)] 97.8  F (36.6  C)  Pulse:  [76-95] 90  Resp:  [16-19] 16  BP: ()/(52-76) 103/76  SpO2:  [90 %-94 %] 91 %  Wt Readings from Last 1 Encounters:   02/18/22 87.6 kg (193 lb 3.2 oz)       Intake/Output Summary (Last 24 hours) at 2/19/2022 0759  Last data filed at 2/19/2022 0600  Gross per 24 hour   Intake --   Output 350 ml   Net -350 ml      Body mass index is 24.81 kg/m .  Physical Exam  Constitutional:       Appearance: Normal appearance.   HENT:      Head: Normocephalic.   Cardiovascular:      Rate and Rhythm: Normal rate and regular rhythm.      Pulses: Normal pulses.      Heart sounds: Normal heart sounds.   Pulmonary:      Effort: Pulmonary effort is normal.   Abdominal:       General: Bowel sounds are normal.      Palpations: Abdomen is soft.   Musculoskeletal:         General: Normal range of motion.      Right lower leg: No edema.      Left lower leg: No edema.      Comments: Left hand redness   Skin:     General: Skin is warm and dry.      Capillary Refill: Capillary refill takes less than 2 seconds.   Neurological:      General: No focal deficit present.      Mental Status: He is alert and oriented to person, place, and time.      Comments: does get confused during discussion          PERTINENT LABS/IMAGING:  Results for orders placed or performed during the hospital encounter of 02/18/22   Head CT w/o contrast    Impression    IMPRESSION:  HEAD CT:  1. Shallow soft tissue contusion is seen along the left forehead.    2. No finding for intracranial hemorrhage, mass, or acute infarct.    3. Mild volume loss.    4. Small amount of dependent fluid or mucosal thickening is seen within the mastoid sinuses compatible with inflammatory change.    FACIAL BONE CT:  1. No facial bone or mandibular fracture.    2. Lower forehead and superior periorbital soft tissue contusion without post septal extension. Orbits otherwise unremarkable.    CERVICAL SPINE CT:  1.  No fracture or posttraumatic subluxation.    2.  Straightening of the normal cervical lordosis. No prevertebral soft tissue swelling.    3.  No osseous canal or foraminal stenosis.   Cervical spine CT w/o contrast    Impression    IMPRESSION:  HEAD CT:  1. Shallow soft tissue contusion is seen along the left forehead.    2. No finding for intracranial hemorrhage, mass, or acute infarct.    3. Mild volume loss.    4. Small amount of dependent fluid or mucosal thickening is seen within the mastoid sinuses compatible with inflammatory change.    FACIAL BONE CT:  1. No facial bone or mandibular fracture.    2. Lower forehead and superior periorbital soft tissue contusion without post septal extension. Orbits otherwise  unremarkable.    CERVICAL SPINE CT:  1.  No fracture or posttraumatic subluxation.    2.  Straightening of the normal cervical lordosis. No prevertebral soft tissue swelling.    3.  No osseous canal or foraminal stenosis.   CT Facial Bones without Contrast    Impression    IMPRESSION:  HEAD CT:  1. Shallow soft tissue contusion is seen along the left forehead.    2. No finding for intracranial hemorrhage, mass, or acute infarct.    3. Mild volume loss.    4. Small amount of dependent fluid or mucosal thickening is seen within the mastoid sinuses compatible with inflammatory change.    FACIAL BONE CT:  1. No facial bone or mandibular fracture.    2. Lower forehead and superior periorbital soft tissue contusion without post septal extension. Orbits otherwise unremarkable.    CERVICAL SPINE CT:  1.  No fracture or posttraumatic subluxation.    2.  Straightening of the normal cervical lordosis. No prevertebral soft tissue swelling.    3.  No osseous canal or foraminal stenosis.   XR Chest Port 1 View    Impression    IMPRESSION: Negative chest.   US Abdomen Limited    Impression    IMPRESSION:  1.  Cholelithiasis again noted without evidence of cholecystitis.  2.  Marked hepatic steatosis.           Recent Labs   Lab 02/20/22  0641 02/19/22  0822 02/18/22  2149 02/18/22  1717 02/18/22  1622   WBC 4.0 3.9*  --   --  3.8*   HGB 11.1* 12.0*  --   --  11.8*   * 108*  --   --  106*    178  --   --  212   INR  --  1.24*  --   --   --    * 136  --   --  136   POTASSIUM 3.4* 3.9  --   --  3.8   CHLORIDE 98 96*  --   --  95*   CO2 26 26  --   --  24   BUN 6* 6*  --   --  6*   CR 0.82 0.86  --   --  0.87   ANIONGAP 10 14  --   --  17   ABDULKADIR 9.1 9.4  --   --  10.2   GLC 98 107 90   < > 57*   ALBUMIN 4.1 4.4  --   --  4.7   PROTTOTAL 7.0 7.5  --   --  8.1*   BILITOTAL 1.5* 1.5*  --   --  1.2*   ALKPHOS 131* 144*  --   --  147*   ALT 73* 83*  --   --  98*   * 238*  --   --  296*   LIPASE  --   --   --   --   128*    < > = values in this interval not displayed.     No results found for this or any previous visit (from the past 24 hour(s)).  Recent Labs   Lab Test 07/04/17  1659   CHOL 645*   TRIG 2,744*     No results for input(s): LDL in the last 29233 hours.  Recent Labs   Lab Test 02/18/22  2149 02/18/22  1717 02/18/22  1622   NA  --   --  136   POTASSIUM  --   --  3.8   CHLORIDE  --   --  95*   CO2  --   --  24   GLC 90   < > 57*   BUN  --   --  6*   CR  --   --  0.87   GFRESTIMATED  --   --  >90   ABDULKADIR  --   --  10.2    < > = values in this interval not displayed.     No results for input(s): A1C in the last 15442 hours.  Recent Labs   Lab Test 02/18/22  1622   HGB 11.8*     Recent Labs   Lab Test 02/18/22  1622   TROPONINI <0.01     No results for input(s): BNP, NTBNPI, NTBNP in the last 16876 hours.  Recent Labs   Lab Test 02/18/22  1622   .09*     No results for input(s): INR in the last 64217 hours.    Anabel Paulson MD  [facility]  [phone]

## 2022-02-20 NOTE — PROGRESS NOTES
Care Management Follow Up    Length of Stay (days): 2    Expected Discharge Date: 02/22/2022     Concerns to be Addressed: substance/tobacco abuse/use   (Dtr requests detox or IP CD trx, pending pt wishes)  Patient plan of care discussed at interdisciplinary rounds: Yes    Anticipated Discharge Disposition:  Treatment center      Anticipated Discharge Services:    Anticipated Discharge DME:      Patient/family educated on Medicare website which has current facility and service quality ratings:    Education Provided on the Discharge Plan:    Patient/Family in Agreement with the Plan: yes    Referrals Placed by CM/SW:    Private pay costs discussed: Not applicable    Additional Information:  SW met with pt and pts daughter Maria Teresa in pt room. Pt janak is requesting CM look into possible treatment fo pt at.   [facility]  Address: [address] [place] [address]  Phone Number: [phone]  Website: [email or web address]    Pt stated he is agreeable to go to substance use treatment and is aware that he will be seen by addiction medicine. PRICE clarified with pt and Maria Teresa, that the referral for pt in the next couple of days for Rule 25 assessment and we would need to find opening for inpatient treatment. Maria Teresa requested to talk to SW privately. She reported that she has discovered multiple guns in his home, in his bed and car, and spent casings near his bed and chair. She has not discovered holes. She said pt states he wants to be in the ground, and has become paranoid.   PRICE requested pt be seen by Behavioral psyc.      HEATHER Joyce

## 2022-02-21 ENCOUNTER — APPOINTMENT (OUTPATIENT)
Dept: OCCUPATIONAL THERAPY | Facility: HOSPITAL | Age: 52
End: 2022-02-21
Payer: COMMERCIAL

## 2022-02-21 ENCOUNTER — APPOINTMENT (OUTPATIENT)
Dept: PHYSICAL THERAPY | Facility: HOSPITAL | Age: 52
End: 2022-02-21
Payer: COMMERCIAL

## 2022-02-21 LAB
GLUCOSE BLDC GLUCOMTR-MCNC: 98 MG/DL (ref 70–99)
MAGNESIUM SERPL-MCNC: 2.2 MG/DL (ref 1.8–2.6)
POTASSIUM BLD-SCNC: 3.6 MMOL/L (ref 3.5–5)

## 2022-02-21 PROCEDURE — 250N000013 HC RX MED GY IP 250 OP 250 PS 637: Performed by: INTERNAL MEDICINE

## 2022-02-21 PROCEDURE — 97110 THERAPEUTIC EXERCISES: CPT | Mod: GO

## 2022-02-21 PROCEDURE — 99233 SBSQ HOSP IP/OBS HIGH 50: CPT | Performed by: INTERNAL MEDICINE

## 2022-02-21 PROCEDURE — 250N000011 HC RX IP 250 OP 636: Performed by: INTERNAL MEDICINE

## 2022-02-21 PROCEDURE — 250N000013 HC RX MED GY IP 250 OP 250 PS 637: Performed by: STUDENT IN AN ORGANIZED HEALTH CARE EDUCATION/TRAINING PROGRAM

## 2022-02-21 PROCEDURE — 97110 THERAPEUTIC EXERCISES: CPT | Mod: GP

## 2022-02-21 PROCEDURE — 83735 ASSAY OF MAGNESIUM: CPT | Performed by: INTERNAL MEDICINE

## 2022-02-21 PROCEDURE — 120N000001 HC R&B MED SURG/OB

## 2022-02-21 PROCEDURE — 36415 COLL VENOUS BLD VENIPUNCTURE: CPT | Performed by: INTERNAL MEDICINE

## 2022-02-21 PROCEDURE — 97116 GAIT TRAINING THERAPY: CPT | Mod: GP

## 2022-02-21 PROCEDURE — 250N000013 HC RX MED GY IP 250 OP 250 PS 637: Performed by: EMERGENCY MEDICINE

## 2022-02-21 PROCEDURE — 99221 1ST HOSP IP/OBS SF/LOW 40: CPT | Mod: 95 | Performed by: INTERNAL MEDICINE

## 2022-02-21 PROCEDURE — 97535 SELF CARE MNGMENT TRAINING: CPT | Mod: GO

## 2022-02-21 PROCEDURE — 84132 ASSAY OF SERUM POTASSIUM: CPT | Performed by: INTERNAL MEDICINE

## 2022-02-21 RX ORDER — FOLIC ACID 1 MG/1
1 TABLET ORAL DAILY
Status: DISCONTINUED | OUTPATIENT
Start: 2022-02-22 | End: 2022-02-25 | Stop reason: HOSPADM

## 2022-02-21 RX ORDER — MULTIPLE VITAMINS W/ MINERALS TAB 9MG-400MCG
1 TAB ORAL DAILY
Status: DISCONTINUED | OUTPATIENT
Start: 2022-02-22 | End: 2022-02-25 | Stop reason: HOSPADM

## 2022-02-21 RX ORDER — GABAPENTIN 100 MG/1
100 CAPSULE ORAL 2 TIMES DAILY
Status: DISCONTINUED | OUTPATIENT
Start: 2022-02-21 | End: 2022-02-25 | Stop reason: HOSPADM

## 2022-02-21 RX ORDER — NALTREXONE HYDROCHLORIDE 50 MG/1
50 TABLET, FILM COATED ORAL AT BEDTIME
Status: DISCONTINUED | OUTPATIENT
Start: 2022-02-23 | End: 2022-02-24

## 2022-02-21 RX ORDER — POTASSIUM CHLORIDE 1500 MG/1
20 TABLET, EXTENDED RELEASE ORAL ONCE
Status: COMPLETED | OUTPATIENT
Start: 2022-02-21 | End: 2022-02-21

## 2022-02-21 RX ORDER — SODIUM CHLORIDE AND POTASSIUM CHLORIDE 150; 900 MG/100ML; MG/100ML
INJECTION, SOLUTION INTRAVENOUS CONTINUOUS
Status: DISCONTINUED | OUTPATIENT
Start: 2022-02-21 | End: 2022-02-23

## 2022-02-21 RX ADMIN — POTASSIUM CHLORIDE 20 MEQ: 1500 TABLET, EXTENDED RELEASE ORAL at 16:13

## 2022-02-21 RX ADMIN — Medication 1 PATCH: at 09:00

## 2022-02-21 RX ADMIN — GABAPENTIN 100 MG: 100 CAPSULE ORAL at 20:26

## 2022-02-21 RX ADMIN — LEVOTHYROXINE SODIUM 150 MCG: 0.03 TABLET ORAL at 08:10

## 2022-02-21 RX ADMIN — NICOTINE POLACRILEX 2 MG: 2 GUM, CHEWING BUCCAL at 15:01

## 2022-02-21 RX ADMIN — AMOXICILLIN AND CLAVULANATE POTASSIUM 1 TABLET: 875; 125 TABLET, FILM COATED ORAL at 08:10

## 2022-02-21 RX ADMIN — Medication 1 TABLET: at 08:12

## 2022-02-21 RX ADMIN — PSYLLIUM HUSK 1 PACKET: 3.4 POWDER ORAL at 08:18

## 2022-02-21 RX ADMIN — GABAPENTIN 100 MG: 100 CAPSULE ORAL at 14:49

## 2022-02-21 RX ADMIN — AMOXICILLIN AND CLAVULANATE POTASSIUM 1 TABLET: 875; 125 TABLET, FILM COATED ORAL at 20:26

## 2022-02-21 RX ADMIN — POTASSIUM CHLORIDE AND SODIUM CHLORIDE: 900; 150 INJECTION, SOLUTION INTRAVENOUS at 14:49

## 2022-02-21 RX ADMIN — FOLIC ACID 1 MG: 1 TABLET ORAL at 08:12

## 2022-02-21 RX ADMIN — Medication 100 MG: at 08:11

## 2022-02-21 RX ADMIN — NALTREXONE HYDROCHLORIDE 25 MG: 50 TABLET, FILM COATED ORAL at 20:26

## 2022-02-21 ASSESSMENT — ACTIVITIES OF DAILY LIVING (ADL)
ADLS_ACUITY_SCORE: 7
ADLS_ACUITY_SCORE: 9
ADLS_ACUITY_SCORE: 9
ADLS_ACUITY_SCORE: 7
ADLS_ACUITY_SCORE: 9
ADLS_ACUITY_SCORE: 9
ADLS_ACUITY_SCORE: 7
ADLS_ACUITY_SCORE: 9
ADLS_ACUITY_SCORE: 7
ADLS_ACUITY_SCORE: 9
ADLS_ACUITY_SCORE: 7
ADLS_ACUITY_SCORE: 7
ADLS_ACUITY_SCORE: 9
ADLS_ACUITY_SCORE: 9
ADLS_ACUITY_SCORE: 7
ADLS_ACUITY_SCORE: 7
ADLS_ACUITY_SCORE: 9
ADLS_ACUITY_SCORE: 7
ADLS_ACUITY_SCORE: 9

## 2022-02-21 NOTE — PROGRESS NOTES
[facility]    PROGRESS NOTE - Hospitalist Service    Assessment and Plan    51 years old male with a past medical history of hypothyroidism, alcohol abuse, peripheral neuropathy, recurrent to the ER falls, hepatic steatosis, who presented for evaluation after a fall     Recurrent falls with head trauma  - Likely multifactorial secondary to peripheral neuropathy, alcohol intoxication, dizziness.   - PT/OT  - Right parietal scalp laceration s/p staples in the ER  - Head CT no concerning findings results reviewed   - Patient may needs TCU versus home care on discharge     Alcohol abuse and intoxication.  -  on admission.   - Informed daughter koffi guzman start having withdrawals symptoms until 48-96 hrs from then and that meaning this evening. Will be monitoring closely   - CIWA orders, has not been scoring but continue to monitor for now   - Continue with MVT, thiamine, folate.   -  Social work consult for CD treatment resources   - Addiction medicine consult, appreciate input   - Continue naltrexone as per addiction medicine      LFTs elevation with alcoholic hepatitis  - likely secondary to EtOH hepatitis, also history of hepatic steatosis seen on abdominal imaging previously.    - RUQ fatty liver and cholestasis no acute findings   - LFTs trending down continue to monitor   - Avoid hepatotoxic drugs     Mood disorder with paranoia   - As per patient daughter he wanting to die   - Per daughter she found guns and ammo with spent shells next to his chair and bed at home which she removed per SW.   - Patient denied any suicidal ideation  -Psychiatric consult, appreciate input.    Hypoglycemia.   - Likely due to poor oral intake.   - improved after eating now  -Continue monitoring      Hypomagnesemia, hypophosphatemia, hypokalemia   - Secondary to alcohol abuse and poor oral intake  -  Replace per protocol  -Continue to monitor level     Lactic acidosis   - Second alcohol dependence   -  Improving.  - Continue IV fluid support     Suspected left hand cellulitis   - History of trauma from dog claws).   - Resume Augmentin x7 days     Hypothyroidism.   - was not taking home dose 150mg daily  - Significantly elevated TSH  - Undetectable free T4  - Resume Synthroid     Constipation.    - Bowel regiment  - treating hypothyroid      Recent diagnosis of LULI.    - Was recommended CPAP.    - Will use supplemental O2 if needed to keep O2 sats above 94%      Chronic anemia and Leukopenia.   -  Likely due to bone marrow suppression from EtOH.  - Continue to monitor CBC    Principal Problem:    Hypoglycemia  Active Problems:    Alcohol abuse    Hypothyroidism    Falls frequently    LFT elevation    Hypophosphatemia    Hypomagnesemia    Hypokalemia    Lactic acid acidosis    Mood disorder (H)      VTE prophylaxis:  Pneumatic Compression Devices  DIET: Orders Placed This Encounter      Combination Diet Regular Diet Adult      Disposition/Barriers to discharge: Psychiatric evaluation, IV fluid, monitor alcohol withdrawal  Code Status: Full Code    Subjective:  Getachew is feeling slightly better today, still feeling weak.  No nausea or vomiting.  Denies any suicidal or homicidal ideation.    PHYSICAL EXAM  Vitals:    02/18/22 1522 02/18/22 2013   Weight: 95.3 kg (210 lb) 87.6 kg (193 lb 3.2 oz)     B/P:101/68 T:98.3 P:85 R:16     Intake/Output Summary (Last 24 hours) at 2/21/2022 1623  Last data filed at 2/21/2022 1519  Gross per 24 hour   Intake 720 ml   Output 375 ml   Net 345 ml      Body mass index is 24.81 kg/m .    Constitutional: awake, alert, cooperative, no apparent distress, and appears stated age  Eyes: Lids and lashes normal, pupils equal, round and reactive to light, extra ocular muscles intact, sclera clear, conjunctiva normal  ENT: Normocephalic, without obvious abnormality, atraumatic, sinuses nontender on palpation, external ears without lesions, oral pharynx with moist mucous membranes, tonsils  without erythema or exudates, gums normal and good dentition.  Respiratory: No increased work of breathing, good air exchange, clear to auscultation bilaterally, no crackles or wheezing  Cardiovascular: Normal apical impulse, regular rate and rhythm, normal S1 and S2, no S3 or S4, and no murmur noted  GI: No scars, normal bowel sounds, soft, non-distended, non-tender, no masses palpated, no hepatosplenomegally  Skin: no bruising or bleeding and normal skin color, texture, turgor  Musculoskeletal: Multiple bruises with healing wounds on upper extremities.  Neurologic: Awake, alert, oriented to name, place and time.  Cranial nerves II-XII are grossly intact.  Motor is 5 out of 5 bilaterally.   Sensory is intact.    Neuropsychiatric: Appropriate with examiner      PERTINENT LABS/IMAGING:  Recent Labs   Lab 02/21/22  0647 02/20/22  1329 02/20/22  0641 02/19/22  0822 02/18/22  2149 02/18/22  1717 02/18/22  1622   WBC  --   --  4.0 3.9*  --   --  3.8*   HGB  --   --  11.1* 12.0*  --   --  11.8*   MCV  --   --  108* 108*  --   --  106*   PLT  --   --  170 178  --   --  212   INR  --   --   --  1.24*  --   --   --    NA  --   --  134* 136  --   --  136   POTASSIUM 3.6 4.0 3.4* 3.9  --   --  3.8   CHLORIDE  --   --  98 96*  --   --  95*   CO2  --   --  26 26  --   --  24   BUN  --   --  6* 6*  --   --  6*   CR  --   --  0.82 0.86  --   --  0.87   ANIONGAP  --   --  10 14  --   --  17   ABDULKADIR  --   --  9.1 9.4  --   --  10.2   GLC  --   --  98 107 90   < > 57*   ALBUMIN  --   --  4.1 4.4  --   --  4.7   PROTTOTAL  --   --  7.0 7.5  --   --  8.1*   BILITOTAL  --   --  1.5* 1.5*  --   --  1.2*   ALKPHOS  --   --  131* 144*  --   --  147*   ALT  --   --  73* 83*  --   --  98*   AST  --   --  196* 238*  --   --  296*   LIPASE  --   --   --   --   --   --  128*    < > = values in this interval not displayed.     No results found for this or any previous visit (from the past 24 hour(s)).    Discussed with patient, family, psychiatry,  nursing staff and discharge planner    Blaise Umaña MD  [facility] Medicine [facility]  [phone]

## 2022-02-21 NOTE — PLAN OF CARE
Patient skin assessment this morning showed a reaction to the teli patches. MD was notified. IV fluids were unheld. Skin is very fragile with bruises all over his body. Patient is comfortable, walked 2x in hallway and was up in the chair for 45 min without any symptoms. +    Problem: Plan of Care - These are the overarching goals to be used throughout the patient stay.    Goal: Plan of Care Review/Shift Note    Outcome: Ongoing, Progressing  Goal: Optimal Comfort and Wellbeing  Outcome: Ongoing, Progressing

## 2022-02-21 NOTE — PLAN OF CARE
Goal Outcome Evaluation:    Outcome Evaluation: CM following for transfer to [facility]      Problem: Alcohol Withdrawal  Goal: Alcohol Withdrawal Symptom Control  Outcome: Ongoing, Progressing       Patient scored 0-0-0 on his CIWA assessments this shift. No complaints of pain or discomfort. Telemetry read normal sinus rhythm with prolonged QTC. Fluids encouraged. PRN nicorette gum given at 2100.

## 2022-02-21 NOTE — CONSULTS
"Addiction Medicine Inpatient Consultation      Getachew Rai,  1970, MRN [id number]    [facility]  Alcohol abuse [F10.10]  Hypoglycemia [E16.2]  Hypothyroidism [E03.9]  Falls frequently [R29.6]    PCP: No Ref-Primary, Physician, None   Code status:  Full Code       Extended Emergency Contact Information  Primary Emergency Contact: TIFFANIE RAI  Home Phone: [phone]  Relation: Daughter  Secondary Emergency Contact: ALESSIA RAI  Home Phone: [phone]  Relation: Son       Date of Admission: 22  Date of Consult: 2022    Tele-Visit Details    Type of service:  Video Visit  Time Service Began (time 1st connected with pt): 1137  Time Service Ended (time completely finished with pt): 1215  Originating Location (pt. Location): Patient hospital room  Distant Location (provider location): [facility] and Addiction Offices  Reason for Televisit: COVID 19  Mode of Communication:  Video Conference via [employer]  Physician has received verbal consent for a video visit from the patient? Yes    Patient's daughter was present during interview with patient's permission.      Reason for Consultation: \"alcohol abuse\"       ASSESSMENT and RECOMMENDATIONS:   1.  Alcohol Use Disorder, severe -   Patient has evidence of medical complications including hepatic steatosis, elevated LFTs, macrocytosis and peripheral neuropathy.   Family concerned about use and patient has not bee successful at stopping on own.    Agree with CD treatment and patient is willing to attend.   He has [employer] care insurance, but will need Rule 25/ Chem Assessment.   Is willing to go directly from hospital which would be ideal.   Also discussed both acamprosate and naltrexone potential benefits and side effects of both with patient and daughter and he is willing to start naltrexone.   Also discussed management of acute pain while on naltrexone.   Patient has some elevation in LFTs, but not severe and I think he will likely be " much more compliant with naltrexone than acamprosate.      2.  Tobacco Use Disorder, severe - patient expresses a desire to quick chewing tobacco as well.      Recommendations:  -  No evidence for alcohol withdrawal - will discontinue CIWA monitoring.   Has not required any lorazepam.   -  Rule 25 / chemical assessment - can hopefully go directly from hospital to residential treatment.     -  Naltrexone 25 mg q hs x 2 d, then 50 mg q hs.     -  Gabapentin 200 mg tid started for peripheral neuropathy  -  B complex vitamins for peripheral neuropathy        Cathleen Tran MD  Addiction Medicine Service  [facility]   Page me (click here for Floresita Tran)                  Admission Status:  voluntary    Code Status:  Full    HPI:    Getachew Barcenas is a 51 year old old male who was admitted on 2/18/22 due to a fall with head laceration.  Reportedly has had numerous falls.   Has peripheral neuropathy, and drinks alcohol, which may contribute to falls. Also has known fatty liver, likely from alcohol as well.  He reports drinking for 3 - 5 days, then stopping for a few days and then resuming.  The patient admits that he does not eat much when drinking.  Family has expressed concern about his drinking.   He denies significant withdrawal.  He has expressed a desire to stop drinking.     Patient had scalp laceration that required staples.   Head CT was negative for hemorrhage.       ETOH:  Type and method of use: Hard Liquor  Last use: evening of 2/17/22; but MONALISA was -176 when he presented to ED on afternoon of 2/18/22  Amount/frequency: 1.75 L /4-5 days ;   20-22 days of month  Previous pharmacotherapy: No  Hx of complicated withdrawal:  No    He denies the use of other prescribed or illicit mood=altering substances.    Had Rx for Vicodin for past rib fractures, but has not taken in some time per patient and daughter.     Tobacco:    Chews tobacco - 1 can/ 5 days    Treatment Hx:    No     Past Medical History:     Hypothyroidism - non-compliant with meds and   L hand cellulitis  Recently diagnosed LULI - not yet on CPAP  Hepatic stteatosis    Psychiatric History:  Hx of depression    Social History:  .  Lives alone.   Recently sold his house.    Has adult son and daughter.      PTA Meds:  Medications Prior to Admission   Medication Sig Dispense Refill Last Dose     levothyroxine (SYNTHROID/LEVOTHROID) 150 MCG tablet Take 150 mcg by mouth daily   Past Month at 1 tablet in the past month     acetaminophen (TYLENOL) 500 MG tablet [ACETAMINOPHEN (TYLENOL) 500 MG TABLET] Take 1-2 tablets (500-1,000 mg total) by mouth every 6 (six) hours as needed for pain.  0 Unknown     fenofibrate (TRICOR) 48 MG tablet [FENOFIBRATE (TRICOR) 48 MG TABLET] Take 1 tablet (48 mg total) by mouth daily. 30 tablet 0 Not Taking     hydrOXYzine (ATARAX) 50 MG tablet [HYDROXYZINE (ATARAX) 50 MG TABLET] Take 50 mg by mouth at bedtime as needed for itching.   Not Taking     oxyCODONE (ROXICODONE) 5 MG immediate release tablet [OXYCODONE (ROXICODONE) 5 MG IMMEDIATE RELEASE TABLET] Take 1 tablet (5 mg total) by mouth every 6 (six) hours as needed for pain. 30 tablet 0 Not Taking     psyllium (METAMUCIL) 3.4 gram packet [PSYLLIUM (METAMUCIL) 3.4 GRAM PACKET] Take 1 packet by mouth daily.   Not Taking     traZODone (DESYREL) 50 MG tablet Take 50 mg by mouth nightly as needed for sleep   Unknown         Allergies:  No Known Allergies    [place] Prescription Monitoring Program:  No concerning prescriptions for controlled substances in [place] within the last year.      Review of Systems:    Constitutional               Possible weight loss  Vision and Hearing:     Hearing decreased bilaterally   Respiratory:              No cough or shortness or breath   Cardiovascular   No chest pain.   Gastrointestinal    some nausea and constipation - improved  Urologic   Denies dysuria or change in frequency.  Neurologic   Denies headache, tremor, hx of seizure.   "Has bilateral feet and LE numbness and tingling.      Psychiatric   Endorses loneliness and low mood.   Denies suicidal ideation, plan or intent.  Denies homicidal ideaton or hallucinations  Rheumatologic   No arthralgias or joint swelling  Hematologic   Bruises easily   Dermatalogic   No piloerection or diaphoresis.              Physical Exam:  /72 (BP Location: Left arm)   Pulse 80   Temp 98.1  F (36.7  C) (Oral)   Resp 16   Ht 1.88 m (6' 2\")   Wt 87.6 kg (193 lb 3.2 oz)   SpO2 97%   BMI 24.81 kg/m                  Physical Exam by hospitalist reviewed.   Significant findings: Dorsum L hand redness    General appearance   Appears older than stated age   Dermatologic              No piloerection or diaphoresis.  Multiple bruises.  Neurologic   Oriented to person, place, and situation.  Confused about day   No Tremor     Psychiatric Mental Status Examination     Cooperative     Mood:  dysthymic                Affect:  Congruent                Thought content:  No SI, HI or hallucinations                Thought processes:  Linear,                 Speech:  Monotone                Motor:  Normal                Insight/judgement:  fair/ fair    Pertinent Labs, Radiology, and EKG:    Amphetamines Urine   Date Value Ref Range Status   02/18/2022 Screen Negative Screen Negative Final     Barbiturates Urine   Date Value Ref Range Status   02/18/2022 Screen Negative Screen Negative Final     Cannabinoids Urine   Date Value Ref Range Status   02/18/2022 Screen Negative Screen Negative Final     Cocaine Urine   Date Value Ref Range Status   02/18/2022 Screen Negative Screen Negative Final     Opiates Urine   Date Value Ref Range Status   02/18/2022 Screen Negative Screen Negative Final     PCP Urine   Date Value Ref Range Status   02/18/2022 Screen Negative Screen Negative Final       T. Bili on 2/18   1.2,   Now 1.5  AST       \"         296            196  ALT       \"          98               73  TSH " 184

## 2022-02-22 ENCOUNTER — APPOINTMENT (OUTPATIENT)
Dept: PHYSICAL THERAPY | Facility: HOSPITAL | Age: 52
End: 2022-02-22
Payer: COMMERCIAL

## 2022-02-22 LAB
ALBUMIN SERPL-MCNC: 3.6 G/DL (ref 3.5–5)
ALP SERPL-CCNC: 124 U/L (ref 45–120)
ALT SERPL W P-5'-P-CCNC: 72 U/L (ref 0–45)
ANION GAP SERPL CALCULATED.3IONS-SCNC: 10 MMOL/L (ref 5–18)
AST SERPL W P-5'-P-CCNC: 197 U/L (ref 0–40)
ATRIAL RATE - MUSE: 91 BPM
BILIRUB SERPL-MCNC: 0.9 MG/DL (ref 0–1)
BUN SERPL-MCNC: 4 MG/DL (ref 8–22)
CALCIUM SERPL-MCNC: 8.9 MG/DL (ref 8.5–10.5)
CHLORIDE BLD-SCNC: 107 MMOL/L (ref 98–107)
CO2 SERPL-SCNC: 22 MMOL/L (ref 22–31)
CREAT SERPL-MCNC: 0.76 MG/DL (ref 0.7–1.3)
DIASTOLIC BLOOD PRESSURE - MUSE: NORMAL MMHG
ERYTHROCYTE [DISTWIDTH] IN BLOOD BY AUTOMATED COUNT: 11.9 % (ref 10–15)
GFR SERPL CREATININE-BSD FRML MDRD: >90 ML/MIN/1.73M2
GLUCOSE BLD-MCNC: 84 MG/DL (ref 70–125)
HCT VFR BLD AUTO: 28.8 % (ref 40–53)
HGB BLD-MCNC: 9.7 G/DL (ref 13.3–17.7)
INR PPP: 1.16 (ref 0.9–1.15)
INTERPRETATION ECG - MUSE: NORMAL
MAGNESIUM SERPL-MCNC: 1.8 MG/DL (ref 1.8–2.6)
MCH RBC QN AUTO: 36.9 PG (ref 26.5–33)
MCHC RBC AUTO-ENTMCNC: 33.7 G/DL (ref 31.5–36.5)
MCV RBC AUTO: 110 FL (ref 78–100)
P AXIS - MUSE: 46 DEGREES
PLATELET # BLD AUTO: 146 10E3/UL (ref 150–450)
POTASSIUM BLD-SCNC: 3.8 MMOL/L (ref 3.5–5)
PR INTERVAL - MUSE: 200 MS
PROT SERPL-MCNC: 6.1 G/DL (ref 6–8)
QRS DURATION - MUSE: 108 MS
QT - MUSE: 392 MS
QTC - MUSE: 482 MS
R AXIS - MUSE: -72 DEGREES
RBC # BLD AUTO: 2.63 10E6/UL (ref 4.4–5.9)
SODIUM SERPL-SCNC: 139 MMOL/L (ref 136–145)
SYSTOLIC BLOOD PRESSURE - MUSE: NORMAL MMHG
T AXIS - MUSE: 55 DEGREES
VENTRICULAR RATE- MUSE: 91 BPM
WBC # BLD AUTO: 2.7 10E3/UL (ref 4–11)

## 2022-02-22 PROCEDURE — 83735 ASSAY OF MAGNESIUM: CPT | Performed by: INTERNAL MEDICINE

## 2022-02-22 PROCEDURE — 83605 ASSAY OF LACTIC ACID: CPT | Performed by: INTERNAL MEDICINE

## 2022-02-22 PROCEDURE — 97116 GAIT TRAINING THERAPY: CPT | Mod: GP

## 2022-02-22 PROCEDURE — 250N000013 HC RX MED GY IP 250 OP 250 PS 637: Performed by: INTERNAL MEDICINE

## 2022-02-22 PROCEDURE — 999N000157 HC STATISTIC RCP TIME EA 10 MIN

## 2022-02-22 PROCEDURE — 36415 COLL VENOUS BLD VENIPUNCTURE: CPT | Performed by: INTERNAL MEDICINE

## 2022-02-22 PROCEDURE — 99233 SBSQ HOSP IP/OBS HIGH 50: CPT | Performed by: INTERNAL MEDICINE

## 2022-02-22 PROCEDURE — 120N000001 HC R&B MED SURG/OB

## 2022-02-22 PROCEDURE — 97112 NEUROMUSCULAR REEDUCATION: CPT | Mod: GP

## 2022-02-22 PROCEDURE — 250N000011 HC RX IP 250 OP 636: Performed by: INTERNAL MEDICINE

## 2022-02-22 PROCEDURE — 80053 COMPREHEN METABOLIC PANEL: CPT | Performed by: INTERNAL MEDICINE

## 2022-02-22 PROCEDURE — 85027 COMPLETE CBC AUTOMATED: CPT | Performed by: INTERNAL MEDICINE

## 2022-02-22 PROCEDURE — 85610 PROTHROMBIN TIME: CPT | Performed by: INTERNAL MEDICINE

## 2022-02-22 PROCEDURE — 250N000013 HC RX MED GY IP 250 OP 250 PS 637: Performed by: STUDENT IN AN ORGANIZED HEALTH CARE EDUCATION/TRAINING PROGRAM

## 2022-02-22 RX ORDER — MAGNESIUM OXIDE 400 MG/1
400 TABLET ORAL 2 TIMES DAILY
Status: COMPLETED | OUTPATIENT
Start: 2022-02-22 | End: 2022-02-23

## 2022-02-22 RX ORDER — POTASSIUM CHLORIDE 1500 MG/1
20 TABLET, EXTENDED RELEASE ORAL ONCE
Status: COMPLETED | OUTPATIENT
Start: 2022-02-22 | End: 2022-02-22

## 2022-02-22 RX ADMIN — Medication 5 MG: at 19:51

## 2022-02-22 RX ADMIN — POTASSIUM CHLORIDE AND SODIUM CHLORIDE: 900; 150 INJECTION, SOLUTION INTRAVENOUS at 02:21

## 2022-02-22 RX ADMIN — Medication 1 PATCH: at 08:31

## 2022-02-22 RX ADMIN — AMOXICILLIN AND CLAVULANATE POTASSIUM 1 TABLET: 875; 125 TABLET, FILM COATED ORAL at 08:32

## 2022-02-22 RX ADMIN — POTASSIUM CHLORIDE 20 MEQ: 1500 TABLET, EXTENDED RELEASE ORAL at 08:30

## 2022-02-22 RX ADMIN — FOLIC ACID 1 MG: 1 TABLET ORAL at 08:33

## 2022-02-22 RX ADMIN — MULTIPLE VITAMINS W/ MINERALS TAB 1 TABLET: TAB at 08:30

## 2022-02-22 RX ADMIN — MAGNESIUM OXIDE TAB 400 MG (241.3 MG ELEMENTAL MG) 400 MG: 400 (241.3 MG) TAB at 08:32

## 2022-02-22 RX ADMIN — PSYLLIUM HUSK 1 PACKET: 3.4 POWDER ORAL at 08:33

## 2022-02-22 RX ADMIN — Medication 5 MG: at 00:04

## 2022-02-22 RX ADMIN — GABAPENTIN 100 MG: 100 CAPSULE ORAL at 20:01

## 2022-02-22 RX ADMIN — POTASSIUM CHLORIDE AND SODIUM CHLORIDE: 900; 150 INJECTION, SOLUTION INTRAVENOUS at 16:28

## 2022-02-22 RX ADMIN — LEVOTHYROXINE SODIUM 150 MCG: 0.03 TABLET ORAL at 08:32

## 2022-02-22 RX ADMIN — NICOTINE POLACRILEX 2 MG: 2 GUM, CHEWING BUCCAL at 12:03

## 2022-02-22 RX ADMIN — Medication 100 MG: at 08:32

## 2022-02-22 RX ADMIN — AMOXICILLIN AND CLAVULANATE POTASSIUM 1 TABLET: 875; 125 TABLET, FILM COATED ORAL at 19:51

## 2022-02-22 RX ADMIN — MAGNESIUM OXIDE TAB 400 MG (241.3 MG ELEMENTAL MG) 400 MG: 400 (241.3 MG) TAB at 20:00

## 2022-02-22 RX ADMIN — NALTREXONE HYDROCHLORIDE 25 MG: 50 TABLET, FILM COATED ORAL at 20:00

## 2022-02-22 RX ADMIN — GABAPENTIN 100 MG: 100 CAPSULE ORAL at 08:30

## 2022-02-22 ASSESSMENT — ACTIVITIES OF DAILY LIVING (ADL)
ADLS_ACUITY_SCORE: 9

## 2022-02-22 NOTE — PLAN OF CARE
Problem: Fall Injury Risk  Goal: Absence of Fall and Fall-Related Injury  Outcome: Ongoing, Progressing  Intervention: Promote Injury-Free Environment  Recent Flowsheet Documentation  Taken 2/22/2022 0000 by Beatris Burnett RN  Safety Promotion/Fall Prevention:   activity supervised   assistive device/personal items within reach   nonskid shoes/slippers when out of bed   bed alarm on   Goal Outcome Evaluation:      He is up with assist of 1, a walker & belt;  he is unsteady on his feet.         Outcome Evaluation: CM following for transfer to [facility]

## 2022-02-22 NOTE — PROGRESS NOTES
[facility]    PROGRESS NOTE - Hospitalist Service    Assessment and Plan    51 year old man with history of hypothyroidism, alcohol abuse, peripheral neuropathy, recurrent falls and hepatic steatosis who was admitted due to episode of fall in the setting of alcohol intoxication.  Augmentin was started during hospital stay for suspected cellulitis of the left hand.     Recurrent falls with head trauma  - Likely multifactorial secondary to peripheral neuropathy, alcohol intoxication, dizziness.   - PT/OT  - Right parietal scalp laceration s/p staples in the ER  - Head CT no concerning findings results reviewed   - Patient may needs TCU versus home care on discharge     Alcohol abuse and intoxication.  -  on admission.   -No evidence of alcohol withdrawal at this time  -Not requiring IV benzodiazepine  - CIWA orders, has not been scoring but continue to monitor for now   - Continue with MVT, thiamine, folate.   -  Social work consult for CD treatment resources   - Addiction medicine consult, appreciate input   - Continue naltrexone as per addiction medicine      LFTs elevation with alcoholic hepatitis  - likely secondary to EtOH hepatitis, also history of hepatic steatosis seen on abdominal imaging previously.    - RUQ fatty liver and cholestasis no acute findings   - LFTs trending down continue to monitor   - Avoid hepatotoxic drugs     Mood disorder with paranoia   - As per patient daughter he wanting to die   - Per daughter she found guns and ammo with spent shells next to his chair and bed at home which she removed per SW.   - Patient denied any suicidal ideation  -Psychiatric consult, appreciate input.    Hypoglycemia.   - Likely due to poor oral intake.   -No new episode of hypoglycemia  -Continue IV hydration   -Oral intake as tolerated     Hypomagnesemia, hypophosphatemia, hypokalemia   - Secondary to alcohol abuse and poor oral intake  -  Replace per protocol  -Continue to  monitor level     Lactic acidosis   - Second alcohol dependence   -Resolved with IV hydration     Suspected left hand cellulitis   - History of trauma from dog claws).   -WBC 2.7 noted  -Continue Augmentin x7 days  -Repeat CBC due to concern for hemodilution     Hypothyroidism.   - was not taking home dose 150mg daily  - Significantly elevated TSH  - Undetectable free T4  -Continue Synthroid     Constipation.    - Bowel regimen  - treating hypothyroid      Recent diagnosis of LULI.    - Was recommended CPAP.    - Will use supplemental O2 if needed to keep O2 sats above 94%   -CPAP at night     Chronic anemia and Leukopenia.   -  Likely due to bone marrow suppression from EtOH.  - Continue to monitor CBC    Principal Problem:    Hypoglycemia  Active Problems:    Alcohol abuse    Hypothyroidism    Falls frequently    LFT elevation    Hypophosphatemia    Hypomagnesemia    Hypokalemia    Lactic acid acidosis    Mood disorder (H)      VTE prophylaxis:  Pneumatic Compression Devices  DIET: Orders Placed This Encounter      Combination Diet Regular Diet Adult      Disposition/Barriers to discharge: Psychiatric evaluation, IV fluid,   Possible discharge in 1 to 2 days for door to door chemical dependency treatment  Code Status: Full Code    Subjective:  No new complaints today.  He denies tremor, nausea, vomiting, headache, diaphoresis or hallucination.  He states he wants to skip in accordance with his usual eating habit.  He states he feels better.  No suicidal or homicidal ideation.    PHYSICAL EXAM  Vitals:    02/18/22 1522 02/18/22 2013   Weight: 95.3 kg (210 lb) 87.6 kg (193 lb 3.2 oz)     B/P:101/68 T:98.3 P:85 R:16     Intake/Output Summary (Last 24 hours) at 2/21/2022 1623  Last data filed at 2/21/2022 1519  Gross per 24 hour   Intake 720 ml   Output 375 ml   Net 345 ml      Body mass index is 24.81 kg/m .    Constitutional: awake, alert, cooperative, no apparent distress, and appears stated age  Eyes: Lids and  lashes normal, pupils equal, round and reactive to light, extra ocular muscles intact, sclera clear, conjunctiva normal  ENT: Normocephalic, without obvious abnormality, atraumatic, sinuses nontender on palpation, external ears without lesions, oral pharynx with moist mucous membranes, tonsils without erythema or exudates, gums normal and good dentition.  Respiratory: No increased work of breathing, good air exchange, clear to auscultation bilaterally, no crackles or wheezing  Cardiovascular: Normal apical impulse, regular rate and rhythm, normal S1 and S2, no S3 or S4, and no murmur noted  GI: No scars, normal bowel sounds, soft, non-distended, non-tender, no masses palpated, no hepatosplenomegally  Skin: no bruising or bleeding and normal skin color, texture, turgor  Musculoskeletal: Multiple bruises with healing wounds on upper extremities.  Neurologic: Awake, alert, oriented to name, place and time.  Cranial nerves II-XII are grossly intact.  Motor is 5 out of 5 bilaterally.   Sensory is intact.    Neuropsychiatric: Normal mood and affect      PERTINENT LABS/IMAGING:  Recent Labs   Lab 02/22/22  0626 02/21/22  1656 02/21/22  0647 02/20/22  1329 02/20/22  0641 02/19/22  0822 02/18/22  1717 02/18/22  1622   WBC 2.7*  --   --   --  4.0 3.9*  --  3.8*   HGB 9.7*  --   --   --  11.1* 12.0*  --  11.8*   *  --   --   --  108* 108*  --  106*   *  --   --   --  170 178  --  212   INR 1.16*  --   --   --   --  1.24*  --   --      --   --   --  134* 136  --  136   POTASSIUM 3.8  --  3.6 4.0 3.4* 3.9  --  3.8   CHLORIDE 107  --   --   --  98 96*  --  95*   CO2 22  --   --   --  26 26  --  24   BUN 4*  --   --   --  6* 6*  --  6*   CR 0.76  --   --   --  0.82 0.86  --  0.87   ANIONGAP 10  --   --   --  10 14  --  17   ABDULKADIR 8.9  --   --   --  9.1 9.4  --  10.2   GLC 84 98  --   --  98 107   < > 57*   ALBUMIN 3.6  --   --   --  4.1 4.4  --  4.7   PROTTOTAL 6.1  --   --   --  7.0 7.5  --  8.1*   BILITOTAL 0.9   --   --   --  1.5* 1.5*  --  1.2*   ALKPHOS 124*  --   --   --  131* 144*  --  147*   ALT 72*  --   --   --  73* 83*  --  98*   *  --   --   --  196* 238*  --  296*   LIPASE  --   --   --   --   --   --   --  128*    < > = values in this interval not displayed.     No results found for this or any previous visit (from the past 24 hour(s)).    Discussed with patient, nursing staff and     Sushma Yang MD  [facility] [place] Medicine Service

## 2022-02-22 NOTE — PROGRESS NOTES
Care Management Follow Up    Length of Stay (days): 4    Expected Discharge Date: 02/22/2022     Concerns to be Addressed: substance/tobacco abuse/use   (Dtr requests detox or IP CD trx, pending pt wishes)  Patient plan of care discussed at interdisciplinary rounds: Yes    Anticipated Discharge Disposition: Inpatient Chemical Dependency     Anticipated Discharge Services: Chemical Dependency Resources  Anticipated Discharge DME: none      Education Provided on the Discharge Plan: yes   Patient/Family in Agreement with the Plan: yes    Referrals Placed by CM/SW:  Inpt CD Tx facility   Private pay costs discussed: Not applicable    Additional Information:  JOSE ENRIQUE left  for [facility] inquiring about possibility of Rule 25 Assessment.   JOSE ENRIQUE left  for [facility] checking on referral and inquiring about Rule 25 Assessment.   JOSE ENRIQUE spoke with pt Dtr James and updated that [facility] continues to work on Rule 25 Assessment and Inpt tx placement. James asked about Notary for POA financial paperwork . PRICE provided information on possible video chat notary as Hospital notary will only notarize health care documents. Maria Teresa did reiterate that Pt CANNOT go home due to Mental Health and addiction.     UPDATE 9:15am- Spoke with Arin at [facility] and scheduled Rule 25 Assessment for pt for 2/23/22 at 9am.     JOSE [facility]QUE [facility] and was informed that there is only one other person on waitlist. PRICE faxed over referral for review and for pt to be placed on waitlist . PRICE will need to fax Rule 25 once completed. See fax below.     PRICE met with pt and informed him of scheduled Rule 25 Assessment for tomorrow and pt is agreeable. PRICE obtained signatures on EBONIE's from [employer] and faxed. Hard Copies in pt chart.     CONTACTS:   [facility] location would be destination.   P: [phone]  F: [phone]  E:  [email or web address]    [facility] [place] Ph: [phone]    [facility] -[phone]    Elizabeth Corcoran LGSW

## 2022-02-22 NOTE — PLAN OF CARE
Problem: Plan of Care - These are the overarching goals to be used throughout the patient stay.    Goal: Plan of Care Review/Shift Note  Description: The Plan of Care Review/Shift note should be completed every shift.  The Outcome Evaluation is a brief statement about your assessment that the patient is improving, declining, or no change.  This information will be displayed automatically on your shift note.  Outcome: Ongoing, Progressing     Problem: Plan of Care - These are the overarching goals to be used throughout the patient stay.    Goal: Absence of Hospital-Acquired Illness or Injury  Intervention: Identify and Manage Fall Risk  Recent Flowsheet Documentation  Taken 2/22/2022 1144 by Temi Sandoval RN  Safety Promotion/Fall Prevention:    nonskid shoes/slippers when out of bed    clutter free environment maintained    room organization consistent     Problem: Alcohol Withdrawal  Goal: Alcohol Withdrawal Symptom Control  Outcome: Ongoing, Progressing   Goal Outcome Evaluation:    Plan of Care Reviewed With: daughter     Overall Patient Progress: improving    Outcome Evaluation: Looking for Inpt CD tx. Needs Rule 25    Pt A&O, and VSS throughout tx. Pt denies pain throughout shift. Pt denies Miralax due to loose stools. Pt received Nicotine patch and Nicotine gum. Pt c/o toe discomfort. Pt offered medication and nonpharmalogical alternatives. Pt declined. Pt provided lavender essential oil in the afternoon for foot discomfort. Pt denies numbness or tingling in any extremity.

## 2022-02-22 NOTE — PROGRESS NOTES
"Discussed CPAP use with patient, states that he has been diagnosed with sleep apnea a while ago but did not tolerate nor uses CPAP at home, declines to use CPAP at the hospital. O2 at 2 lpm/NC, SpO2 99 %. Instructed pt to notify nursing if he has any breathing difficulty or changes his mind about CPAP use. Will change order to PRN. RT available as needed.     Vital signs:  Temp: 97.3  F (36.3  C) Temp src: Oral BP: 111/77 Pulse: 71   Resp: 18 SpO2: 99 % O2 Device: Nasal cannula Oxygen Delivery: 2 LPM Height: 188 cm (6' 2\") Weight: 87.6 kg (193 lb 3.2 oz)  Estimated body mass index is 24.81 kg/m  as calculated from the following:    Height as of this encounter: 1.88 m (6' 2\").    Weight as of this encounter: 87.6 kg (193 lb 3.2 oz).        "

## 2022-02-22 NOTE — PLAN OF CARE
Problem: Fall Injury Risk  Goal: Absence of Fall and Fall-Related Injury  2/21/2022 2137 by Alba Live RN  Outcome: Ongoing, Progressing  2/21/2022 2136 by Alba Live RN  Outcome: Ongoing, Progressing  Intervention: Identify and Manage Contributors  Recent Flowsheet Documentation  Taken 2/21/2022 1600 by Alba Live RN  Medication Review/Management: medications reviewed  Intervention: Promote Injury-Free Environment  Recent Flowsheet Documentation  Taken 2/21/2022 1600 by Alab Live RN  Safety Promotion/Fall Prevention: activity supervised   Able to make needs know to staff. Call light and items placed within reach.  Problem: Skin or Soft Tissue Infection  Goal: Absence of Infection Signs and Symptoms  Outcome: Ongoing, Progressing  Afebrile. On Augmentin.      Goal Outcome Evaluation:               Outcome Evaluation: CM following for transfer to [facility]

## 2022-02-23 ENCOUNTER — TRANSFERRED RECORDS (OUTPATIENT)
Dept: HEALTH INFORMATION MANAGEMENT | Facility: CLINIC | Age: 52
End: 2022-02-23

## 2022-02-23 ENCOUNTER — APPOINTMENT (OUTPATIENT)
Dept: PHYSICAL THERAPY | Facility: HOSPITAL | Age: 52
End: 2022-02-23
Payer: COMMERCIAL

## 2022-02-23 ENCOUNTER — APPOINTMENT (OUTPATIENT)
Dept: OCCUPATIONAL THERAPY | Facility: HOSPITAL | Age: 52
End: 2022-02-23
Payer: COMMERCIAL
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
## 2022-02-23 PROBLEM — L03.113 RIGHT FOREARM CELLULITIS: Status: ACTIVE | Noted: 2022-02-23

## 2022-02-23 LAB
ANION GAP SERPL CALCULATED.3IONS-SCNC: 12 MMOL/L (ref 5–18)
BASOPHILS # BLD AUTO: 0 10E3/UL (ref 0–0.2)
BASOPHILS NFR BLD AUTO: 1 %
BUN SERPL-MCNC: 3 MG/DL (ref 8–22)
CALCIUM SERPL-MCNC: 9.3 MG/DL (ref 8.5–10.5)
CHLORIDE BLD-SCNC: 107 MMOL/L (ref 98–107)
CO2 SERPL-SCNC: 20 MMOL/L (ref 22–31)
CORTIS SERPL-MCNC: 11.4 UG/DL
CREAT SERPL-MCNC: 0.75 MG/DL (ref 0.7–1.3)
CRP SERPL HS-MCNC: 2.6 MG/L (ref 0–3)
EOSINOPHIL # BLD AUTO: 0.1 10E3/UL (ref 0–0.7)
EOSINOPHIL NFR BLD AUTO: 4 %
ERYTHROCYTE [DISTWIDTH] IN BLOOD BY AUTOMATED COUNT: 12.3 % (ref 10–15)
GFR SERPL CREATININE-BSD FRML MDRD: >90 ML/MIN/1.73M2
GLUCOSE BLD-MCNC: 85 MG/DL (ref 70–125)
HCT VFR BLD AUTO: 29.6 % (ref 40–53)
HGB BLD-MCNC: 9.7 G/DL (ref 13.3–17.7)
HOLD SPECIMEN: NORMAL
IMM GRANULOCYTES # BLD: 0 10E3/UL
IMM GRANULOCYTES NFR BLD: 1 %
LACTATE SERPL-SCNC: 1.2 MMOL/L (ref 0.7–2)
LYMPHOCYTES # BLD AUTO: 1.1 10E3/UL (ref 0.8–5.3)
LYMPHOCYTES NFR BLD AUTO: 39 %
MCH RBC QN AUTO: 37 PG (ref 26.5–33)
MCHC RBC AUTO-ENTMCNC: 32.8 G/DL (ref 31.5–36.5)
MCV RBC AUTO: 113 FL (ref 78–100)
MONOCYTES # BLD AUTO: 0.3 10E3/UL (ref 0–1.3)
MONOCYTES NFR BLD AUTO: 12 %
NEUTROPHILS # BLD AUTO: 1.3 10E3/UL (ref 1.6–8.3)
NEUTROPHILS NFR BLD AUTO: 43 %
NRBC # BLD AUTO: 0 10E3/UL
NRBC BLD AUTO-RTO: 0 /100
PLATELET # BLD AUTO: 120 10E3/UL (ref 150–450)
POTASSIUM BLD-SCNC: 4.1 MMOL/L (ref 3.5–5)
POTASSIUM BLD-SCNC: 4.1 MMOL/L (ref 3.5–5)
PROCALCITONIN SERPL-MCNC: 0.12 NG/ML (ref 0–0.49)
RBC # BLD AUTO: 2.62 10E6/UL (ref 4.4–5.9)
SODIUM SERPL-SCNC: 139 MMOL/L (ref 136–145)
WBC # BLD AUTO: 2.9 10E3/UL (ref 4–11)

## 2022-02-23 PROCEDURE — 85025 COMPLETE CBC W/AUTO DIFF WBC: CPT | Performed by: INTERNAL MEDICINE

## 2022-02-23 PROCEDURE — 84132 ASSAY OF SERUM POTASSIUM: CPT | Performed by: INTERNAL MEDICINE

## 2022-02-23 PROCEDURE — 97130 THER IVNTJ EA ADDL 15 MIN: CPT | Mod: GO

## 2022-02-23 PROCEDURE — 250N000013 HC RX MED GY IP 250 OP 250 PS 637: Performed by: INTERNAL MEDICINE

## 2022-02-23 PROCEDURE — 99233 SBSQ HOSP IP/OBS HIGH 50: CPT | Performed by: INTERNAL MEDICINE

## 2022-02-23 PROCEDURE — 87040 BLOOD CULTURE FOR BACTERIA: CPT | Performed by: INTERNAL MEDICINE

## 2022-02-23 PROCEDURE — 97110 THERAPEUTIC EXERCISES: CPT | Mod: GP

## 2022-02-23 PROCEDURE — 97129 THER IVNTJ 1ST 15 MIN: CPT | Mod: GO

## 2022-02-23 PROCEDURE — 250N000011 HC RX IP 250 OP 636: Performed by: INTERNAL MEDICINE

## 2022-02-23 PROCEDURE — 82533 TOTAL CORTISOL: CPT | Performed by: INTERNAL MEDICINE

## 2022-02-23 PROCEDURE — 84145 PROCALCITONIN (PCT): CPT | Performed by: INTERNAL MEDICINE

## 2022-02-23 PROCEDURE — 36415 COLL VENOUS BLD VENIPUNCTURE: CPT | Performed by: INTERNAL MEDICINE

## 2022-02-23 PROCEDURE — 97116 GAIT TRAINING THERAPY: CPT | Mod: GP

## 2022-02-23 PROCEDURE — 250N000013 HC RX MED GY IP 250 OP 250 PS 637: Performed by: STUDENT IN AN ORGANIZED HEALTH CARE EDUCATION/TRAINING PROGRAM

## 2022-02-23 PROCEDURE — 120N000001 HC R&B MED SURG/OB

## 2022-02-23 PROCEDURE — 86141 C-REACTIVE PROTEIN HS: CPT | Performed by: INTERNAL MEDICINE

## 2022-02-23 RX ADMIN — MAGNESIUM OXIDE TAB 400 MG (241.3 MG ELEMENTAL MG) 400 MG: 400 (241.3 MG) TAB at 20:17

## 2022-02-23 RX ADMIN — AMOXICILLIN AND CLAVULANATE POTASSIUM 1 TABLET: 875; 125 TABLET, FILM COATED ORAL at 20:17

## 2022-02-23 RX ADMIN — GABAPENTIN 100 MG: 100 CAPSULE ORAL at 20:17

## 2022-02-23 RX ADMIN — Medication 100 MG: at 09:02

## 2022-02-23 RX ADMIN — POTASSIUM CHLORIDE AND SODIUM CHLORIDE: 900; 150 INJECTION, SOLUTION INTRAVENOUS at 05:11

## 2022-02-23 RX ADMIN — NALTREXONE HYDROCHLORIDE 50 MG: 50 TABLET, FILM COATED ORAL at 20:17

## 2022-02-23 RX ADMIN — LEVOTHYROXINE SODIUM 150 MCG: 0.03 TABLET ORAL at 09:02

## 2022-02-23 RX ADMIN — POLYETHYLENE GLYCOL 3350 17 G: 17 POWDER, FOR SOLUTION ORAL at 09:02

## 2022-02-23 RX ADMIN — Medication 1 PATCH: at 09:02

## 2022-02-23 RX ADMIN — NICOTINE POLACRILEX 2 MG: 2 GUM, CHEWING BUCCAL at 16:37

## 2022-02-23 RX ADMIN — GABAPENTIN 100 MG: 100 CAPSULE ORAL at 09:02

## 2022-02-23 RX ADMIN — AMOXICILLIN AND CLAVULANATE POTASSIUM 1 TABLET: 875; 125 TABLET, FILM COATED ORAL at 09:02

## 2022-02-23 RX ADMIN — MAGNESIUM OXIDE TAB 400 MG (241.3 MG ELEMENTAL MG) 400 MG: 400 (241.3 MG) TAB at 09:02

## 2022-02-23 RX ADMIN — MULTIPLE VITAMINS W/ MINERALS TAB 1 TABLET: TAB at 09:03

## 2022-02-23 RX ADMIN — PSYLLIUM HUSK 1 PACKET: 3.4 POWDER ORAL at 09:02

## 2022-02-23 RX ADMIN — FOLIC ACID 1 MG: 1 TABLET ORAL at 09:02

## 2022-02-23 ASSESSMENT — ACTIVITIES OF DAILY LIVING (ADL)
ADLS_ACUITY_SCORE: 9
ADLS_ACUITY_SCORE: 9
ADLS_ACUITY_SCORE: 10
ADLS_ACUITY_SCORE: 9
ADLS_ACUITY_SCORE: 9
ADLS_ACUITY_SCORE: 10
ADLS_ACUITY_SCORE: 9
ADLS_ACUITY_SCORE: 10
ADLS_ACUITY_SCORE: 9
ADLS_ACUITY_SCORE: 10
ADLS_ACUITY_SCORE: 9
ADLS_ACUITY_SCORE: 10
ADLS_ACUITY_SCORE: 9
ADLS_ACUITY_SCORE: 10
ADLS_ACUITY_SCORE: 10
ADLS_ACUITY_SCORE: 9
ADLS_ACUITY_SCORE: 9
ADLS_ACUITY_SCORE: 10
ADLS_ACUITY_SCORE: 10
ADLS_ACUITY_SCORE: 9
ADLS_ACUITY_SCORE: 10
ADLS_ACUITY_SCORE: 10

## 2022-02-23 NOTE — PLAN OF CARE
Problem: Plan of Care - These are the overarching goals to be used throughout the patient stay.    Goal: Optimal Comfort and Wellbeing  Outcome: Ongoing, Progressing     Problem: Fall Injury Risk  Goal: Absence of Fall and Fall-Related Injury  Outcome: Ongoing, Progressing  Intervention: Identify and Manage Contributors     Problem: Skin or Soft Tissue Infection  Goal: Absence of Infection Signs and Symptoms  Outcome: Ongoing, Progressing      Pt alert and oriented, calm and cooperative.   Denies pain.  Up to bathroom via SBA with walker.  Sepsis protocol fired d/t soft BP: lactic 1.2.

## 2022-02-23 NOTE — PROGRESS NOTES
Occupational Therapy    Cognistat administered  LOC: Alert  Orientation: WFL  Attention: WFL  Language Comprehension: WFL  Language Repetition: WFL  Language Naming: [facility]  Constructional Ability: WFL  Memory: Minimal impairment  Calculation: [facility]  Reasoning Similarities: [facility]  Reasoning Judgement: [facility]

## 2022-02-23 NOTE — PROGRESS NOTES
[facility]    PROGRESS NOTE - Hospitalist Service    Assessment and Plan    51 year old man with history of hypothyroidism, alcohol abuse, peripheral neuropathy, recurrent falls and hepatic steatosis who was admitted due to episode of fall in the setting of alcohol intoxication.  Augmentin was started during hospital stay for suspected cellulitis of the left hand.     Recurrent falls with head trauma  - Likely multifactorial secondary to peripheral neuropathy, alcohol intoxication, dizziness.   - PT/OT  - Right parietal scalp laceration s/p staples in the ER  - Head CT no concerning findings results reviewed   - Patient may needs TCU versus home care on discharge     Alcohol abuse and intoxication.  -  on admission.   -No evidence of alcohol withdrawal at this time  -Not requiring IV benzodiazepine  - CIWA orders, has not been scoring but continue to monitor for now   - Continue with MVT, thiamine, folate.   -  Social work consult for CD treatment resources   - Addiction medicine consult, appreciate input   - Continue naltrexone as per addiction medicine      LFTs elevation with alcoholic hepatitis  - likely secondary to EtOH hepatitis, also history of hepatic steatosis seen on abdominal imaging previously.    - RUQ fatty liver and cholestasis no acute findings   - LFTs trending down continue to monitor   - Avoid hepatotoxic drugs    Mood disorder with paranoia   - As per patient daughter he wanting to die   - Per daughter she found guns and ammo with spent shells next to his chair and bed at home which she removed per SW.   - Patient denied any suicidal ideation  -Psychiatric consult, appreciate input.    Hypoglycemia.   - Likely due to poor oral intake.   -No new episode of hypoglycemia  -Oral intake as tolerated     Hypomagnesemia, hypophosphatemia, hypokalemia   - Secondary to alcohol abuse and poor oral intake  -  Replace per protocol  -Continue to monitor level     Lactic  acidosis   - Second alcohol dependence   -Resolved with IV hydration     Suspected left hand cellulitis   - He states he sustained cat bite in the dorsal aspect of right forearm 3 times in the last one month. Last cat bite occurred about 2 weeks ago. He also sustained bruises in the dorsal aspect of left hand after a fall.    -WBC improved to 2.9 from 2.7   -Continue Augmentin x7 days  -Follow up blood culture   -Consider ID evaluation     Concern for sepsis  Per chart review, he has been having intermittent episodes of hypotension with SBP in the 80s, and requiring 1 to 2 L of supplemental oxygen in the setting of pancytopenia.  Lactate level 2/22/2022 was 1.2.  Continue p.o. Augmentin for now  Obtain chest x-ray  Check CRP and procalcitonin  Stop IV fluid; will consider intermittent boluses as needed  Check a.m. cortisol level       Hypothyroidism.   - was not taking home dose 150mg daily  - Significantly elevated TSH  - Undetectable free T4  -Continue Synthroid     Constipation.    - Bowel regimen  - treating hypothyroid      Recent diagnosis of LULI.    - Was recommended CPAP.    - Will use supplemental O2 if needed to keep O2 sats above 94%   -CPAP at night     Pancytopenia  -  Likely due to bone marrow suppression from alcohol  - Continue to monitor CBC    Principal Problem:    Hypoglycemia  Active Problems:    Alcohol abuse    Hypothyroidism    Falls frequently    LFT elevation    Hypophosphatemia    Hypomagnesemia    Hypokalemia    Lactic acid acidosis    Mood disorder (H)      VTE prophylaxis:  Pneumatic Compression Devices  DIET: Orders Placed This Encounter      Combination Diet Regular Diet Adult      Disposition/Barriers to discharge: Psychiatric evaluation, IV fluid,   Possible discharge in 1 to 2 days for door to door chemical dependency treatment; pending improved BP, need for intermittent IV fluid, cortisol level, blood culture result.   Code Status: Full Code    Subjective:  No new complaints today.   He denies tremor, nausea, vomiting, headache, diaphoresis or hallucination.  He states he sustained cat bite in the dorsal aspect of right forearm 3 times in the last one month. Last cat bite occurred about 2 weeks ago. He also sustained bruises in the dorsal aspect of left hand after a fall.  Daughter updated about current status and plans at bedside     PHYSICAL EXAM  Vitals:    02/18/22 1522 02/18/22 2013   Weight: 95.3 kg (210 lb) 87.6 kg (193 lb 3.2 oz)     B/P:101/68 T:98.3 P:85 R:16     Intake/Output Summary (Last 24 hours) at 2/21/2022 1623  Last data filed at 2/21/2022 1519  Gross per 24 hour   Intake 720 ml   Output 375 ml   Net 345 ml      Body mass index is 24.81 kg/m .    Constitutional: awake, alert, cooperative, no apparent distress, and appears stated age  Eyes: Lids and lashes normal, pupils equal, round and reactive to light, extra ocular muscles intact, sclera clear, conjunctiva normal  ENT: Normocephalic, without obvious abnormality, atraumatic, sinuses nontender on palpation, external ears without lesions, oral pharynx with moist mucous membranes, tonsils without erythema or exudates, gums normal and good dentition.  Respiratory: No increased work of breathing, good air exchange, clear to auscultation bilaterally, no crackles or wheezing  Cardiovascular: Normal apical impulse, regular rate and rhythm, normal S1 and S2, no S3 or S4, and no murmur noted  GI: No scars, normal bowel sounds, soft, non-distended, non-tender, no masses palpated, no hepatosplenomegally  Skin: Extensive bruises especially in the dorsal aspects of both upper extremities   Musculoskeletal: Multiple bruises with healing wounds on upper extremities.  Neurologic: Awake, alert, oriented to name, place and time.  Cranial nerves II-XII are grossly intact.  Motor is 5 out of 5 bilaterally.   Sensation is intact.    Neuropsychiatric: Normal mood and affect      PERTINENT LABS/IMAGING:  Recent Labs   Lab 02/23/22  0670  02/22/22  0626 02/21/22  1656 02/21/22  0647 02/20/22  1329 02/20/22  0641 02/19/22  0822 02/18/22  1717 02/18/22  1622   WBC  --  2.7*  --   --   --  4.0 3.9*  --  3.8*   HGB  --  9.7*  --   --   --  11.1* 12.0*  --  11.8*   MCV  --  110*  --   --   --  108* 108*  --  106*   PLT  --  146*  --   --   --  170 178  --  212   INR  --  1.16*  --   --   --   --  1.24*  --   --    NA  --  139  --   --   --  134* 136  --  136   POTASSIUM 4.1 3.8  --  3.6   < > 3.4* 3.9  --  3.8   CHLORIDE  --  107  --   --   --  98 96*  --  95*   CO2  --  22  --   --   --  26 26  --  24   BUN  --  4*  --   --   --  6* 6*  --  6*   CR  --  0.76  --   --   --  0.82 0.86  --  0.87   ANIONGAP  --  10  --   --   --  10 14  --  17   ABDULKADIR  --  8.9  --   --   --  9.1 9.4  --  10.2   GLC  --  84 98  --   --  98 107   < > 57*   ALBUMIN  --  3.6  --   --   --  4.1 4.4  --  4.7   PROTTOTAL  --  6.1  --   --   --  7.0 7.5  --  8.1*   BILITOTAL  --  0.9  --   --   --  1.5* 1.5*  --  1.2*   ALKPHOS  --  124*  --   --   --  131* 144*  --  147*   ALT  --  72*  --   --   --  73* 83*  --  98*   AST  --  197*  --   --   --  196* 238*  --  296*   LIPASE  --   --   --   --   --   --   --   --  128*    < > = values in this interval not displayed.     No results found for this or any previous visit (from the past 24 hour(s)).    Discussed with patient. Daughter updated about current status and plans at bedside.   Sushma Yang MD  [facility] Medicine Service

## 2022-02-23 NOTE — PLAN OF CARE
Problem: Plan of Care - These are the overarching goals to be used throughout the patient stay.    Goal: Optimal Comfort and Wellbeing  Outcome: Ongoing, Progressing   Goal Outcome Evaluation:    Plan of Care Reviewed With: daughter     Overall Patient Progress: improving    Outcome Evaluation: Looking for Inpt CD tx. Needs Rule 25      Patient was very irritable at start of shift, he was exhausted from the days activities. Requested time to nap, which he was able to do and this did help his mood immensely. Ate dinner, calm and pleasant the rest of the evening. Requested PRN melatonin for sleep.

## 2022-02-23 NOTE — PLAN OF CARE
Goal Outcome Evaluation:    Plan of Care Reviewed With: daughter     Overall Patient Progress: improving    Outcome Evaluation: Looking for Inpt CD tx. Needs Rule 25    Problem: Alcohol Withdrawal  Goal: Alcohol Withdrawal Symptom Control  Outcome: Ongoing, Progressing   No symptoms noted.  Problem: Fall Injury Risk  Goal: Absence of Fall and Fall-Related Injury  Outcome: Ongoing, Progressing   Steady and call light appropriate.    Pt spoke with representative today regarding rule 25.

## 2022-02-23 NOTE — PROGRESS NOTES
Care Management Follow Up    Length of Stay (days): 5    Expected Discharge Date: 02/24/2022     Concerns to be Addressed: substance/tobacco abuse/use   (Dtr requests detox or IP CD trx, pending pt wishes)  Patient plan of care discussed at interdisciplinary rounds: Yes    Anticipated Discharge Disposition: Inpatient Chemical Dependency     Anticipated Discharge Services: Chemical Dependency Resources  Anticipated Discharge DME:  TBD     Education Provided on the Discharge Plan: yes   Patient/Family in Agreement with the Plan: yes    Referrals Placed by CM/SW:  Inpt CD tx facilities.   Private pay costs discussed: Not applicable    Additional Information:  JOSE ENRIQUE met with pt this morning and reminded pt of Rule 25 Assessment at 9am. Pt agreeable and had phone near him. Dtr James present .    JOSE ENRIQUE spoke with Chasity Butler with [facility] and she stated that Assessment is completed and faxed copy to  for placement.     JOSE ENRIQUE faxed Rule 25 to [facility] location for review. Pt on current waiting list for placement.     PRICE faxed Referral to [facility] for review.     Copy of Rule 25 Assessment placed in Pt Chart.   CONTACTS:   [employer] [facility] location would be destination.   P: [phone]  F: [phone]  E: [email or web address]     [facility] Ph: [phone] intake / Chasity Ph: [phone]     [facility] -[phone]    [facility]   Ph: [phone]/ Fax: [phone]     LIDIA Rashid

## 2022-02-24 ENCOUNTER — APPOINTMENT (OUTPATIENT)
Dept: PHYSICAL THERAPY | Facility: HOSPITAL | Age: 52
End: 2022-02-24
Payer: COMMERCIAL

## 2022-02-24 PROBLEM — F10.20 ALCOHOL USE DISORDER, SEVERE, DEPENDENCE (H): Status: ACTIVE | Noted: 2022-02-24

## 2022-02-24 LAB
BASOPHILS # BLD AUTO: 0 10E3/UL (ref 0–0.2)
BASOPHILS NFR BLD AUTO: 1 %
EOSINOPHIL # BLD AUTO: 0.1 10E3/UL (ref 0–0.7)
EOSINOPHIL NFR BLD AUTO: 4 %
ERYTHROCYTE [DISTWIDTH] IN BLOOD BY AUTOMATED COUNT: 12.6 % (ref 10–15)
HCT VFR BLD AUTO: 29.9 % (ref 40–53)
HGB BLD-MCNC: 9.9 G/DL (ref 13.3–17.7)
HOLD SPECIMEN: NORMAL
IMM GRANULOCYTES # BLD: 0 10E3/UL
IMM GRANULOCYTES NFR BLD: 1 %
LYMPHOCYTES # BLD AUTO: 1.2 10E3/UL (ref 0.8–5.3)
LYMPHOCYTES NFR BLD AUTO: 34 %
MAGNESIUM SERPL-MCNC: 1.8 MG/DL (ref 1.8–2.6)
MCH RBC QN AUTO: 36.3 PG (ref 26.5–33)
MCHC RBC AUTO-ENTMCNC: 33.1 G/DL (ref 31.5–36.5)
MCV RBC AUTO: 110 FL (ref 78–100)
MONOCYTES # BLD AUTO: 0.4 10E3/UL (ref 0–1.3)
MONOCYTES NFR BLD AUTO: 11 %
NEUTROPHILS # BLD AUTO: 1.7 10E3/UL (ref 1.6–8.3)
NEUTROPHILS NFR BLD AUTO: 49 %
NRBC # BLD AUTO: 0 10E3/UL
NRBC BLD AUTO-RTO: 0 /100
PLATELET # BLD AUTO: 160 10E3/UL (ref 150–450)
POTASSIUM BLD-SCNC: 3.8 MMOL/L (ref 3.5–5)
RBC # BLD AUTO: 2.73 10E6/UL (ref 4.4–5.9)
WBC # BLD AUTO: 3.4 10E3/UL (ref 4–11)

## 2022-02-24 PROCEDURE — 85025 COMPLETE CBC W/AUTO DIFF WBC: CPT | Performed by: INTERNAL MEDICINE

## 2022-02-24 PROCEDURE — 84132 ASSAY OF SERUM POTASSIUM: CPT | Performed by: INTERNAL MEDICINE

## 2022-02-24 PROCEDURE — 99207 PR CDG-MDM COMPONENT: MEETS MODERATE - DOWN CODED: CPT | Performed by: INTERNAL MEDICINE

## 2022-02-24 PROCEDURE — 250N000013 HC RX MED GY IP 250 OP 250 PS 637: Performed by: FAMILY MEDICINE

## 2022-02-24 PROCEDURE — 36415 COLL VENOUS BLD VENIPUNCTURE: CPT | Performed by: INTERNAL MEDICINE

## 2022-02-24 PROCEDURE — 83735 ASSAY OF MAGNESIUM: CPT | Performed by: INTERNAL MEDICINE

## 2022-02-24 PROCEDURE — 120N000001 HC R&B MED SURG/OB

## 2022-02-24 PROCEDURE — 999N000157 HC STATISTIC RCP TIME EA 10 MIN

## 2022-02-24 PROCEDURE — 97116 GAIT TRAINING THERAPY: CPT | Mod: GP

## 2022-02-24 PROCEDURE — 97112 NEUROMUSCULAR REEDUCATION: CPT | Mod: GP

## 2022-02-24 PROCEDURE — 250N000013 HC RX MED GY IP 250 OP 250 PS 637: Performed by: INTERNAL MEDICINE

## 2022-02-24 PROCEDURE — 250N000013 HC RX MED GY IP 250 OP 250 PS 637: Performed by: STUDENT IN AN ORGANIZED HEALTH CARE EDUCATION/TRAINING PROGRAM

## 2022-02-24 PROCEDURE — 99232 SBSQ HOSP IP/OBS MODERATE 35: CPT | Performed by: INTERNAL MEDICINE

## 2022-02-24 PROCEDURE — 99231 SBSQ HOSP IP/OBS SF/LOW 25: CPT | Mod: 95 | Performed by: FAMILY MEDICINE

## 2022-02-24 RX ORDER — POTASSIUM CHLORIDE 1500 MG/1
20 TABLET, EXTENDED RELEASE ORAL ONCE
Status: COMPLETED | OUTPATIENT
Start: 2022-02-24 | End: 2022-02-24

## 2022-02-24 RX ORDER — MAGNESIUM OXIDE 400 MG/1
400 TABLET ORAL 2 TIMES DAILY
Status: DISCONTINUED | OUTPATIENT
Start: 2022-02-24 | End: 2022-02-25 | Stop reason: HOSPADM

## 2022-02-24 RX ORDER — NALTREXONE HYDROCHLORIDE 50 MG/1
50 TABLET, FILM COATED ORAL AT BEDTIME
Status: DISCONTINUED | OUTPATIENT
Start: 2022-02-24 | End: 2022-02-25 | Stop reason: HOSPADM

## 2022-02-24 RX ADMIN — FOLIC ACID 1 MG: 1 TABLET ORAL at 08:45

## 2022-02-24 RX ADMIN — MULTIPLE VITAMINS W/ MINERALS TAB 1 TABLET: TAB at 08:45

## 2022-02-24 RX ADMIN — POTASSIUM CHLORIDE 20 MEQ: 1500 TABLET, EXTENDED RELEASE ORAL at 08:45

## 2022-02-24 RX ADMIN — Medication 100 MG: at 08:44

## 2022-02-24 RX ADMIN — MAGNESIUM OXIDE TAB 400 MG (241.3 MG ELEMENTAL MG) 400 MG: 400 (241.3 MG) TAB at 20:26

## 2022-02-24 RX ADMIN — MAGNESIUM OXIDE TAB 400 MG (241.3 MG ELEMENTAL MG) 400 MG: 400 (241.3 MG) TAB at 08:45

## 2022-02-24 RX ADMIN — Medication 1 PATCH: at 08:48

## 2022-02-24 RX ADMIN — PSYLLIUM HUSK 1 PACKET: 3.4 POWDER ORAL at 08:44

## 2022-02-24 RX ADMIN — GABAPENTIN 100 MG: 100 CAPSULE ORAL at 20:26

## 2022-02-24 RX ADMIN — LEVOTHYROXINE SODIUM 150 MCG: 0.03 TABLET ORAL at 06:50

## 2022-02-24 RX ADMIN — POLYETHYLENE GLYCOL 3350 17 G: 17 POWDER, FOR SOLUTION ORAL at 20:26

## 2022-02-24 RX ADMIN — NICOTINE POLACRILEX 2 MG: 2 GUM, CHEWING BUCCAL at 13:57

## 2022-02-24 RX ADMIN — GABAPENTIN 100 MG: 100 CAPSULE ORAL at 08:45

## 2022-02-24 RX ADMIN — AMOXICILLIN AND CLAVULANATE POTASSIUM 1 TABLET: 875; 125 TABLET, FILM COATED ORAL at 20:26

## 2022-02-24 RX ADMIN — AMOXICILLIN AND CLAVULANATE POTASSIUM 1 TABLET: 875; 125 TABLET, FILM COATED ORAL at 08:44

## 2022-02-24 RX ADMIN — NALTREXONE HYDROCHLORIDE 50 MG: 50 TABLET, FILM COATED ORAL at 20:26

## 2022-02-24 ASSESSMENT — ACTIVITIES OF DAILY LIVING (ADL)
ADLS_ACUITY_SCORE: 10
ADLS_ACUITY_SCORE: 9
ADLS_ACUITY_SCORE: 7
ADLS_ACUITY_SCORE: 9
ADLS_ACUITY_SCORE: 9
ADLS_ACUITY_SCORE: 10
ADLS_ACUITY_SCORE: 9
ADLS_ACUITY_SCORE: 10
ADLS_ACUITY_SCORE: 9
ADLS_ACUITY_SCORE: 9
ADLS_ACUITY_SCORE: 7
ADLS_ACUITY_SCORE: 9
ADLS_ACUITY_SCORE: 10
ADLS_ACUITY_SCORE: 9
ADLS_ACUITY_SCORE: 10

## 2022-02-24 NOTE — PROGRESS NOTES
Care Management Follow Up    Length of Stay (days): 6    Expected Discharge Date: 02/24/2022     Concerns to be Addressed: substance/tobacco abuse/use   (Dtr requests detox or IP CD trx, pending pt wishes)  Patient plan of care discussed at interdisciplinary rounds: Yes    Anticipated Discharge Disposition: Inpatient Chemical Dependency     Anticipated Discharge Services: Chemical Dependency Resources  Anticipated Discharge DME:  None     Education Provided on the Discharge Plan:  yes  Patient/Family in Agreement with the Plan: yes    Referrals Placed by CM/SW:  Inpt CD Tx/ Rule 25 assessment   Private pay costs discussed: Not applicable    Additional Information:  JOSE ENRIQUE made follow up calls to Inpt CD tx facilities:     -[id number]: Left VM for Ciera in admissions.   -[facility] [place]: Per intake the referrals is currently being screened.     PRICE met with pt and discussed referral to [facility]. Pt agreeable and signed EBONIE.   -PRICE faxed referral to [facility]     PRICE left VM for dtr James with updated on referrals.     3:53pm- Spoke with Ciera with Ricardo Morrow at [facility] ([phone]). She noted pt looks appropriate for services but did not that there is a steep staircase to get to the Group Therapy room and wanted to make sure pt could do the stairs. PRICE call in am on discharge status to arrange transport.     PRICE left message with therapy team to try stairs.     CONTACTS:   "[employer] [facility] [facility] location would be destination.   P: [phone]  F: [phone]  E: [email or web address]     [facility] [place] Ph: [phone] intake / Chasity Ph: [phone]     [facility] -[phone]     [facility]   Ph: [phone]/ Fax: [phone]    [facility]   Ph:[phone]/ Fax: [phone]    LIDIA Rashid

## 2022-02-24 NOTE — PROGRESS NOTES
[facility]    PROGRESS NOTE - Hospitalist Service    Assessment and Plan    51 year old man with history of hypothyroidism, alcohol abuse, peripheral neuropathy, recurrent falls and hepatic steatosis who was admitted due to episode of fall in the setting of alcohol intoxication.  Augmentin was started during hospital stay for suspected cellulitis of the right forearm secondary to cat bite.     Recurrent falls with head trauma  - Likely multifactorial secondary to peripheral neuropathy, alcohol intoxication, dizziness.   - PT/OT  - Right parietal scalp laceration s/p staples in the ER  - Head CT no concerning findings results reviewed   - is assisting with placement into inpatient chemical dependency treatment facility     Alcohol abuse and intoxication.  -  on admission.   -No evidence of alcohol withdrawal at this time  -Not requiring IV benzodiazepine  - CIWA orders, has not been scoring but continue to monitor for now   - Continue with MVT, thiamine, folate.   -  Social work consult for CD treatment resources   - Addiction medicine consult, appreciate input   - Continue naltrexone as per addiction medicine      LFTs elevation with alcoholic hepatitis  - likely secondary to EtOH hepatitis, also history of hepatic steatosis seen on abdominal imaging previously.    - RUQ fatty liver and cholestasis no acute findings   - LFTs trending down continue to monitor   - Avoid hepatotoxic drugs    Mood disorder with paranoia   - As per patient daughter he wanting to die   - Per daughter she found guns and ammo with spent shells next to his chair and bed at home which she removed per SW.   - Patient denied any suicidal ideation  -Psychiatric consult, appreciate input.    Hypoglycemia.   -Resolved  -Likely due to poor oral intake.   -No new episode of hypoglycemia  -Oral intake as tolerated     Hypomagnesemia, hypophosphatemia, hypokalemia   - Secondary to alcohol abuse and poor  oral intake  -  Replace per protocol  -Continue to monitor level     Lactic acidosis   - Second alcohol dependence   -Resolved with IV hydration     Suspected right forearm cellulitis   - He states he sustained cat bite in the dorsal aspect of right forearm 3 times in the last one month. Last cat bite occurred about 2 weeks ago. He also sustained bruises in the dorsal aspect of left hand after a fall.    -WBC improved to 3.4 from 2.9  -Continue Augmentin x7 days  -Follow up blood culture       Concern for sepsis  Per chart review, he has been having intermittent episodes of hypotension with SBP in the 80s, and requiring 1 to 2 L of supplemental oxygen in the setting of pancytopenia.  Lactate level 2/22/2022 was 1.2.  A.m. cortisol is within normal limits.  Continue p.o. Augmentin-to be completed 2/27/2022  Stop IV fluid; will consider intermittent boluses as needed       Hypothyroidism.   - was not taking home dose 150mg daily  - Significantly elevated TSH  - Undetectable free T4  -Continue Synthroid     Constipation.    - Bowel regimen  - treating hypothyroid      Recent diagnosis of LULI.    -CPAP at night     Pancytopenia  -  Likely due to bone marrow suppression from alcohol  - Continue to monitor CBC-improving    Principal Problem:    Right forearm cellulitis  Active Problems:    Hypoglycemia    Hypothyroidism    Falls frequently    LFT elevation    Hypophosphatemia    Hypomagnesemia    Hypokalemia    Lactic acid acidosis    Mood disorder (H)    Cat bite    Alcohol use disorder, severe, dependence (H)      VTE prophylaxis:  Pneumatic Compression Devices  DIET: Orders Placed This Encounter      Combination Diet Regular Diet Adult      Disposition/Barriers to discharge: Medically ready for discharge.  Needs door to door chemical dependency treatment;  is assisting with placement.  Code Status: Full Code    Subjective:  No new complaints today.  He denies tremor, nausea, vomiting, headache, diaphoresis  or hallucination.  He endorsed occasional numbness in the toes bilaterally.  States numbness in the fingers has improved.    PHYSICAL EXAM  Vitals:    02/18/22 1522 02/18/22 2013   Weight: 95.3 kg (210 lb) 87.6 kg (193 lb 3.2 oz)     B/P:101/68 T:98.3 P:85 R:16     Intake/Output Summary (Last 24 hours) at 2/21/2022 1623  Last data filed at 2/21/2022 1519  Gross per 24 hour   Intake 720 ml   Output 375 ml   Net 345 ml      Body mass index is 24.81 kg/m .    Constitutional: awake, alert, cooperative, no apparent distress, and appears stated age  Eyes: Lids and lashes normal, pupils equal, round and reactive to light, extra ocular muscles intact, sclera clear, conjunctiva normal  ENT: Normocephalic, without obvious abnormality, atraumatic, sinuses nontender on palpation, external ears without lesions, oral pharynx with moist mucous membranes, tonsils without erythema or exudates, gums normal and good dentition.  Respiratory: No increased work of breathing, good air exchange, clear to auscultation bilaterally, no crackles or wheezing  Cardiovascular: Normal apical impulse, regular rate and rhythm, normal S1 and S2, no S3 or S4, and no murmur noted  GI: No scars, normal bowel sounds, soft, non-distended, non-tender, no masses palpated, no hepatosplenomegally  Skin: Extensive bruises especially in the dorsal aspects of both upper extremities   Musculoskeletal: Multiple bruises with healing wounds on upper extremities.  Neurologic: Awake, alert, oriented to name, place and time.  Cranial nerves II-XII are grossly intact.  Motor is 5 out of 5 bilaterally.   Sensation is intact.    Neuropsychiatric: Normal mood and affect      PERTINENT LABS/IMAGING:  Recent Labs   Lab 02/24/22  1001 02/24/22  0628 02/23/22  0847 02/23/22  0640 02/22/22  0626 02/21/22  1656 02/20/22  1329 02/20/22  0641 02/19/22  0822 02/18/22  1717 02/18/22  1622   WBC 3.4*  --   --  2.9* 2.7*  --   --  4.0 3.9*  --  3.8*   HGB 9.9*  --   --  9.7* 9.7*  --    --  11.1* 12.0*  --  11.8*   *  --   --  113* 110*  --   --  108* 108*  --  106*     --   --  120* 146*  --   --  170 178  --  212   INR  --   --   --   --  1.16*  --   --   --  1.24*  --   --    NA  --   --  139  --  139  --   --  134* 136  --  136   POTASSIUM  --  3.8 4.1 4.1 3.8  --    < > 3.4* 3.9  --  3.8   CHLORIDE  --   --  107  --  107  --   --  98 96*  --  95*   CO2  --   --  20*  --  22  --   --  26 26  --  24   BUN  --   --  3*  --  4*  --   --  6* 6*  --  6*   CR  --   --  0.75  --  0.76  --   --  0.82 0.86  --  0.87   ANIONGAP  --   --  12  --  10  --   --  10 14  --  17   ABDULKADIR  --   --  9.3  --  8.9  --   --  9.1 9.4  --  10.2   GLC  --   --  85  --  84 98  --  98 107   < > 57*   ALBUMIN  --   --   --   --  3.6  --   --  4.1 4.4  --  4.7   PROTTOTAL  --   --   --   --  6.1  --   --  7.0 7.5  --  8.1*   BILITOTAL  --   --   --   --  0.9  --   --  1.5* 1.5*  --  1.2*   ALKPHOS  --   --   --   --  124*  --   --  131* 144*  --  147*   ALT  --   --   --   --  72*  --   --  73* 83*  --  98*   AST  --   --   --   --  197*  --   --  196* 238*  --  296*   LIPASE  --   --   --   --   --   --   --   --   --   --  128*    < > = values in this interval not displayed.     No results found for this or any previous visit (from the past 24 hour(s)).    Discussed with patient.   Sushma Yang MD  [facility] Medicine Service

## 2022-02-24 NOTE — PROGRESS NOTES
Addiction Medicine Progress Note    Tele-Visit Details    Type of service:  Video Visit  Video Start Time: 1134  Video End Time 1146  Originating Location (pt. Location): Patient's room  Distant Location (provider location): [facility] office  Reason for Televisit: COVID 19   Mode of Communication:  VideoConference via [employer]      Assessment/Plan    Principal Problem:    Right forearm cellulitis  Active Problems:    Hypoglycemia    Hypothyroidism    Falls frequently    LFT elevation    Hypophosphatemia    Hypomagnesemia    Hypokalemia    Lactic acid acidosis    Mood disorder (H)    Cat bite    Alcohol use disorder, severe, dependence (H)    51-year-old male admitted on 02/18/2022 for a fall with head laceration.  He has reportedly been falling frequently, has peripheral neuropathy and alcohol use disorder, likely contributing to the falls.  Additionally, patient has known hepatic steatosis secondary to alcohol use.    Patient was started on naltrexone 25mg daily which was titrated up to 50mg daily. He has tolerated this well and without any side effects. A rule 25 was obtained yesterday and SW is working on referrals to substance use treatment. LFTs are mildly elevated and anticipated to continue trending down with abstinence from alcohol. It is recommended that these be rechecked in 4-6 weeks to ensure that naltrexone is not causing hepatotoxicity which would be rare.    Recommendations:  1. Continue naltrexone 50 mg daily unchanged  2. Agree with residential substance use treatment with a direct admission from this hospitalization.  3. Continue gabapentin unchanged  4. Addiction medicine will sign off.  Please do not hesitate to page with any questions or concerns through [facility].      Subjective  Patient reports no known side effects to naltrexone.  Specifically, he has had no nausea or vomiting or mood changes.  Rule 25 assessment was obtained yesterday and he is awaiting placement at a residential  treatment facility.  He agrees to going to treatment and taking naltrexone.  Family is not present but patient does state that his daughter is pleased with the plans.    ROS:    No headache  No nausea or vomiting  No changes in mood    Objective    Vital signs in last 24 hours  Temp:  [97.7  F (36.5  C)-98.3  F (36.8  C)] 98.3  F (36.8  C)  Pulse:  [64-82] 76  Resp:  [16] 16  BP: ()/(52-84) 118/84  SpO2:  [90 %-97 %] 97 %        Physical Exam    Physical exam from previous clinicians reviewed for this tele visit.    Gen: Awake and alert. In no acute distress. Pleasant and cooperative  Neuro: speech is normal  Tremor: none  Eyes: EOMI. There is no scleral icterus.  Skin: no jaundice, diaphoresis, goose bumps  Respiratory: no cough, breathing is non-labored        Pertinent Labs   Lab Results: I have personally reviewed the labs.      Clarisse Schrader MD  Addiction Medicine

## 2022-02-24 NOTE — PLAN OF CARE
Problem: Plan of Care - These are the overarching goals to be used throughout the patient stay.    Goal: Optimal Comfort and Wellbeing  Outcome: Ongoing, Progressing     Problem: Fall Injury Risk  Goal: Absence of Fall and Fall-Related Injury  Outcome: Ongoing, Progressing   Goal Outcome Evaluation:    Plan of Care Reviewed With: daughter     Overall Patient Progress: improving    Outcome Evaluation: Looking for Inpt CD tx. Needs Rule 25        Pt calm and cooperative.  He reports chronic numbness/tingling pain to bilateral feet.  Up to bathroom with gait belt, walker, and standby assist.

## 2022-02-24 NOTE — PLAN OF CARE
Problem: Pain Acute  Goal: Acceptable Pain Control and Functional Ability  Outcome: Ongoing, Progressing    Problem: Skin or Soft Tissue Infection  Goal: Absence of Infection Signs and Symptoms       Outcome: Ongoing, Progressing    Pt alert and orientated. Denies pain. On Mag and K+ Protocols. Mag 1.8, pt will get 4 doses then re check Saturday 2/26 AM. K+ 3.8, received one dose, re check tomorrow AM. Multiple bruises. Receiving Augmentin to treat hand infection. VSS. No s/sx of alcohol withdrawal noted. Awaiting acceptance for CD program.

## 2022-02-24 NOTE — PLAN OF CARE
Problem: Alcohol Withdrawal  Goal: Alcohol Withdrawal Symptom Control  Outcome: Met  Off CIWA, no withdrawal Sx.      Problem: Plan of Care  Goal: Optimal Comfort and Wellbeing  Outcome: Ongoing, Progressing  Intervention: Monitor Pain and Promote Comfort  Reports chronic neuropathy pain, ok without prn medication.  Slept well.

## 2022-02-25 ENCOUNTER — APPOINTMENT (OUTPATIENT)
Dept: PHYSICAL THERAPY | Facility: HOSPITAL | Age: 52
End: 2022-02-25
Payer: COMMERCIAL

## 2022-02-25 VITALS
TEMPERATURE: 97.7 F | DIASTOLIC BLOOD PRESSURE: 81 MMHG | BODY MASS INDEX: 24.79 KG/M2 | RESPIRATION RATE: 16 BRPM | WEIGHT: 193.2 LBS | HEART RATE: 76 BPM | HEIGHT: 74 IN | SYSTOLIC BLOOD PRESSURE: 110 MMHG | OXYGEN SATURATION: 93 %

## 2022-02-25 LAB — POTASSIUM BLD-SCNC: 3.6 MMOL/L (ref 3.5–5)

## 2022-02-25 PROCEDURE — 36415 COLL VENOUS BLD VENIPUNCTURE: CPT | Performed by: INTERNAL MEDICINE

## 2022-02-25 PROCEDURE — 250N000013 HC RX MED GY IP 250 OP 250 PS 637: Performed by: INTERNAL MEDICINE

## 2022-02-25 PROCEDURE — 97116 GAIT TRAINING THERAPY: CPT | Mod: GP

## 2022-02-25 PROCEDURE — 99239 HOSP IP/OBS DSCHRG MGMT >30: CPT | Performed by: INTERNAL MEDICINE

## 2022-02-25 PROCEDURE — 84132 ASSAY OF SERUM POTASSIUM: CPT | Performed by: INTERNAL MEDICINE

## 2022-02-25 PROCEDURE — 250N000013 HC RX MED GY IP 250 OP 250 PS 637: Performed by: STUDENT IN AN ORGANIZED HEALTH CARE EDUCATION/TRAINING PROGRAM

## 2022-02-25 PROCEDURE — 97530 THERAPEUTIC ACTIVITIES: CPT | Mod: GP

## 2022-02-25 RX ORDER — LANOLIN ALCOHOL/MO/W.PET/CERES
100 CREAM (GRAM) TOPICAL DAILY
Status: ON HOLD | DISCHARGE
Start: 2022-02-25 | End: 2022-11-04

## 2022-02-25 RX ORDER — MAGNESIUM OXIDE 400 MG/1
400 TABLET ORAL 2 TIMES DAILY
Status: ON HOLD | DISCHARGE
Start: 2022-02-25 | End: 2022-11-04

## 2022-02-25 RX ORDER — NALTREXONE HYDROCHLORIDE 50 MG/1
50 TABLET, FILM COATED ORAL AT BEDTIME
Status: ON HOLD | DISCHARGE
Start: 2022-02-25 | End: 2022-11-04

## 2022-02-25 RX ORDER — FOLIC ACID 1 MG/1
1 TABLET ORAL DAILY
DISCHARGE
Start: 2022-02-25 | End: 2023-03-17

## 2022-02-25 RX ORDER — POTASSIUM CHLORIDE 1500 MG/1
20 TABLET, EXTENDED RELEASE ORAL ONCE
Status: COMPLETED | OUTPATIENT
Start: 2022-02-25 | End: 2022-02-25

## 2022-02-25 RX ORDER — MULTIPLE VITAMINS W/ MINERALS TAB 9MG-400MCG
1 TAB ORAL DAILY
DISCHARGE
Start: 2022-02-25 | End: 2023-03-17

## 2022-02-25 RX ORDER — GABAPENTIN 100 MG/1
100 CAPSULE ORAL 2 TIMES DAILY
Status: ON HOLD | DISCHARGE
Start: 2022-02-25 | End: 2022-11-04

## 2022-02-25 RX ADMIN — GABAPENTIN 100 MG: 100 CAPSULE ORAL at 08:17

## 2022-02-25 RX ADMIN — FOLIC ACID 1 MG: 1 TABLET ORAL at 08:18

## 2022-02-25 RX ADMIN — MULTIPLE VITAMINS W/ MINERALS TAB 1 TABLET: TAB at 08:17

## 2022-02-25 RX ADMIN — PSYLLIUM HUSK 1 PACKET: 3.4 POWDER ORAL at 08:17

## 2022-02-25 RX ADMIN — Medication 1 PATCH: at 08:20

## 2022-02-25 RX ADMIN — AMOXICILLIN AND CLAVULANATE POTASSIUM 1 TABLET: 875; 125 TABLET, FILM COATED ORAL at 08:18

## 2022-02-25 RX ADMIN — POLYETHYLENE GLYCOL 3350 17 G: 17 POWDER, FOR SOLUTION ORAL at 08:18

## 2022-02-25 RX ADMIN — MAGNESIUM OXIDE TAB 400 MG (241.3 MG ELEMENTAL MG) 400 MG: 400 (241.3 MG) TAB at 08:18

## 2022-02-25 RX ADMIN — POTASSIUM CHLORIDE 20 MEQ: 1500 TABLET, EXTENDED RELEASE ORAL at 11:01

## 2022-02-25 RX ADMIN — LEVOTHYROXINE SODIUM 150 MCG: 0.03 TABLET ORAL at 06:48

## 2022-02-25 RX ADMIN — Medication 100 MG: at 08:18

## 2022-02-25 ASSESSMENT — ACTIVITIES OF DAILY LIVING (ADL)
ADLS_ACUITY_SCORE: 7

## 2022-02-25 NOTE — PLAN OF CARE
"  Problem: Plan of Care - These are the overarching goals to be used throughout the patient stay.    Goal: Plan of Care Review/Shift Note  Description: The Plan of Care Review/Shift note should be completed every shift.  The Outcome Evaluation is a brief statement about your assessment that the patient is improving, declining, or no change.  This information will be displayed automatically on your shift note.  Outcome: Adequate for Care Transition     Problem: Plan of Care - These are the overarching goals to be used throughout the patient stay.    Goal: Patient-Specific Goal (Individualized)  Description: You can add care plan individualizations to a care plan. Examples of Individualization might be:  \"Parent requests to be called daily at 9am for status\", \"I have a hard time hearing out of my right ear\", or \"Do not touch me to wake me up as it startles me\".  Outcome: Adequate for Care Transition   Goal Outcome Evaluation:    Plan of Care Reviewed With: daughter     Overall Patient Progress: improving    Outcome Evaluation: Looking for In CD tx. Needs Rule 25    Discharge orders written.  Daughter coming to transport patient to [facility].  Paperwork faxed and to be sent with patient.        "

## 2022-02-25 NOTE — PLAN OF CARE
Problem: Alcohol Withdrawal  Goal: Alcohol Withdrawal Symptom Control  Outcome: Ongoing, Progressing     Problem: Skin or Soft Tissue Infection  Goal: Absence of Infection Signs and Symptoms  Outcome: Ongoing, Progressing     Problem: Plan of Care - These are the overarching goals to be used throughout the patient stay.    Goal: Optimal Comfort and Wellbeing  Outcome: Ongoing, Progressing  Pt on PO antibiotics for cellulitis. Denies pain. Endorses denae Le numbness and tingling which is chronic. Cares clustered per pt request to promote sleep.     Goal Outcome Evaluation:    Plan of Care Reviewed With: daughter     Overall Patient Progress: improving    Outcome Evaluation: Looking for Inpt CD tx. Needs Rule 25

## 2022-02-25 NOTE — PROGRESS NOTES
Care Management Discharge Note    Discharge Date: 02/25/2022       Discharge Disposition: Inpatient Chemical Dependency    Discharge Services: Chemical Dependency Resources    Discharge DME:      Discharge Transportation:daughter, James    Private pay costs discussed: Not applicable        Handoff Referral Completed: Yes    Additional Information:  Pt to discharge to [facility]. [address] [place] [address]. BELEM spoke to Ciera who confirmed admission. discharge paperwork sent to [facility]. Fax: [phone]. Updated Ciera that pt will be arriving by 1:30.   Updated Charge RN.      LIDIA Anglin

## 2022-02-25 NOTE — PLAN OF CARE
Pt. Denies pain and nausea.    Pt. Requests that cares be clustered at night so he can sleep.

## 2022-02-25 NOTE — DISCHARGE SUMMARY
[facility]  Hospitalist Discharge Summary      Date of Admission:  2/18/2022  Date of Discharge:  2/25/2022  Discharging Provider: Sushma Yang MD  Discharge Service: Hospitalist Service    Discharge Diagnoses   Right forearm cellulitis, alcohol intoxication, recurrent falls    Follow-up Appointments    Follow Up and recommended labs and tests     Follow up with primary care provider in 1 week.  The following labs/tests   are recommended: CBC, LFT.     Repeat thyroid function tests in the next 2 to 3 weeks given markedly elevated TSH level during hospital stay.  Consider referral to endocrinologist if TSH remains persistently elevated despite therapy.  Continue Augmentin 1 tab twice daily for the next 3 days.    Unresulted Labs Ordered in the Past 30 Days of this Admission     Date and Time Order Name Status Description    2/23/2022 10:13 AM Blood Culture Peripheral Blood Preliminary     2/23/2022 10:13 AM Blood Culture Peripheral Blood Preliminary           Discharge Disposition   Discharged to chemical dependency treatment facility  Condition at discharge: Stable      Hospital Course   Getachew Barcenas is a 51 year old man with history of hypothyroidism, alcohol abuse, tobacco use disorder, peripheral neuropathy, recurrent falls and hepatic steatosis who was admitted due to episode of fall in the setting of alcohol intoxication, suspected alcohol induced hepatitis, suspected cellulitis of the right forearm secondary to cat bite on right parietal scalp laceration requiring staples in the ER.    There was no evidence of alcohol withdrawal throughout hospital stay.  Naltrexone was started by psychiatrist for alcohol abuse after discussion with patient and daughter.     He received Augmentin during hospital stay for right forearm cellulitis and will continue for another 3 days after discharge.  Blood culture is negative to date.  He was also placed on gabapentin and vitamin B complex for  peripheral neuropathy.        Patient agreed to start inpatient chemical dependency treatment.   has arranged for placement into chemical dependency treatment facility for inpatient treatment.  He is clinically stable for discharge at this time.       Consultations This Hospital Stay   CARE MANAGEMENT / SOCIAL WORK IP CONSULT  PHYSICAL THERAPY ADULT IP CONSULT  OCCUPATIONAL THERAPY ADULT IP CONSULT  ADDICTION MEDICINE INPATIENT CONSULT  PSYCHIATRY IP CONSULT  PSYCHIATRY IP CONSULT  OCCUPATIONAL THERAPY ADULT IP CONSULT  PHYSICAL THERAPY ADULT IP CONSULT    Code Status   Full Code    Time Spent on this Encounter   I, Sushma Yang MD, personally saw the patient today and spent greater than 30 minutes discharging this patient.       Sushma Yang MD  [address] [address]  Phone: [phone]  Fax: [phone]  ______________________________________________________________________    Physical Exam   Vital Signs: Temp: 97.7  F (36.5  C) Temp src: Oral BP: 110/81 Pulse: 76   Resp: 16 SpO2: 93 % O2 Device: Nasal cannula Oxygen Delivery: 1 LPM  Weight: 193 lbs 3.2 oz  Constitutional: awake, alert, cooperative, no apparent distress, and appears stated age  Eyes: Lids and lashes normal, pupils equal, round and reactive to light, extra ocular muscles intact, sclera clear, conjunctiva normal  ENT: Normocephalic, without obvious abnormality, atraumatic, sinuses nontender on palpation, external ears without lesions, oral pharynx with moist mucous membranes, tonsils without erythema or exudates, gums normal and good dentition.  Respiratory: No increased work of breathing, good air exchange, clear to auscultation bilaterally, no crackles or wheezing  Cardiovascular: Normal apical impulse, regular rate and rhythm, normal S1 and S2, no S3 or S4, and no murmur noted  GI: No scars, normal bowel sounds, soft, non-distended, non-tender, no masses  palpated, no hepatosplenomegally  Skin: Extensive bruises especially in the dorsal aspects of both upper extremities   Musculoskeletal: Multiple bruises with healing wounds on upper extremities.  Neurologic: Awake, alert, oriented to name, place and time.  Cranial nerves II-XII are grossly intact.  Motor is 5 out of 5 bilaterally.   Sensation is intact.    Neuropsychiatric: Normal mood and affect          Primary Care Physician   Physician No Ref-Primary    Discharge Orders      General info for SNF    Length of Stay Estimate: Short Term Care: Estimated # of Days <30  Condition at Discharge: Stable  Level of care:board and care  Rehabilitation Potential: Good  Admission H&P remains valid and up-to-date: Yes  Recent Chemotherapy: N/A  Use Nursing Home Standing Orders: Yes     Follow Up and recommended labs and tests    Follow up with primary care provider in 1 week.  The following labs/tests are recommended: CBC, LFT.     Reason for your hospital stay    Right forearm cellulitis, alcohol intoxication/withdrawal     Activity - Up ad johanny     Full Code     Occupational Therapy Adult Consult    Evaluate and treat as clinically indicated.    Reason:   frailty, recurrent falls     Physical Therapy Adult Consult    Evaluate and treat as clinically indicated.    Reason:  frailty, recurrent falls     Fall precautions     Diet    Follow this diet upon discharge: Orders Placed This Encounter      Combination Diet Regular Diet Adult       Significant Results and Procedures   Results for orders placed or performed during the hospital encounter of 02/18/22   Head CT w/o contrast    Narrative    EXAM: CT HEAD W/O CONTRAST, CT FACIAL BONES WITHOUT CONTRAST, CT CERVICAL SPINE W/O CONTRAST  LOCATION: [facility]  DATE/TIME: 2/18/2022 3:51 PM    INDICATION: Cerebral hemorrhage suspected. Fall. Laceration right parietal scalp. Contusions right forehead and right face.  COMPARISON: CT brain and CT cervical spine  09/30/2019.  TECHNIQUE:   1) Routine CT Head without IV contrast. Multiplanar reformats. Dose reduction techniques were used.  2) Routine CT Facial Bones without IV contrast. Multiplanar reformats. Dose reduction techniques were used.  3) Routine CT Cervical Spine without IV contrast. Multiplanar reformats. Dose reduction techniques were used.    FINDINGS:  HEAD CT:   INTRACRANIAL CONTENTS: No finding for intracranial hemorrhage, mass, or acute infarct. Mild prominence of the lateral ventricles and sulci. Gray-white matter differentiation is preserved. No mass effect or midline shift.    Cerebellar tonsils are normally positioned. Sella is unremarkable. Corpus callosum is normally formed.    OSSEOUS STRUCTURES/SOFT TISSUES: Calvarium is intact, without fracture or suspicious lytic or blastic foci. Right lateral parietal scalp soft tissue swelling. Lower left forehead superficial soft tissue swelling/contusion. Middle ear cavities are clear.   There is a small amount of patchy fluid or mucosal thickening within both mastoid tips.    FACIAL BONE CT:  OSSEOUS STRUCTURES/SOFT TISSUES: There is lower forehead superficial soft tissue swelling/contusion. No post septal extension of hemorrhage. Allowing for dental amalgam, no radiopaque foreign bodies. No fracture. Temporomandibular joints are intact.   Teeth are unremarkable.    ORBITAL CONTENTS: Periorbital contusion without post septal extension. Orbits otherwise unremarkable.    SINUSES: Left maxillary sinus retention cysts. No air-fluid levels.    CERVICAL SPINE CT:   VERTEBRA: Lateral alignment is normal. There is straightening of the normal cervical lordosis. Craniocervical junction is intact. Anterior C1-C2 articulation is unremarkable.    Vertebral body heights and intervertebral disc space heights are preserved. Facets are unremarkable.    CANAL/FORAMINA: No canal or neural foraminal stenosis.    PARASPINAL: No extraspinal abnormality. Visualized lung fields are  clear.      Impression    IMPRESSION:  HEAD CT:  1. Shallow soft tissue contusion is seen along the left forehead.    2. No finding for intracranial hemorrhage, mass, or acute infarct.    3. Mild volume loss.    4. Small amount of dependent fluid or mucosal thickening is seen within the mastoid sinuses compatible with inflammatory change.    FACIAL BONE CT:  1. No facial bone or mandibular fracture.    2. Lower forehead and superior periorbital soft tissue contusion without post septal extension. Orbits otherwise unremarkable.    CERVICAL SPINE CT:  1.  No fracture or posttraumatic subluxation.    2.  Straightening of the normal cervical lordosis. No prevertebral soft tissue swelling.    3.  No osseous canal or foraminal stenosis.   Cervical spine CT w/o contrast    Narrative    EXAM: CT HEAD W/O CONTRAST, CT FACIAL BONES WITHOUT CONTRAST, CT CERVICAL SPINE W/O CONTRAST  LOCATION: [facility]  DATE/TIME: 2/18/2022 3:51 PM    INDICATION: Cerebral hemorrhage suspected. Fall. Laceration right parietal scalp. Contusions right forehead and right face.  COMPARISON: CT brain and CT cervical spine 09/30/2019.  TECHNIQUE:   1) Routine CT Head without IV contrast. Multiplanar reformats. Dose reduction techniques were used.  2) Routine CT Facial Bones without IV contrast. Multiplanar reformats. Dose reduction techniques were used.  3) Routine CT Cervical Spine without IV contrast. Multiplanar reformats. Dose reduction techniques were used.    FINDINGS:  HEAD CT:   INTRACRANIAL CONTENTS: No finding for intracranial hemorrhage, mass, or acute infarct. Mild prominence of the lateral ventricles and sulci. Gray-white matter differentiation is preserved. No mass effect or midline shift.    Cerebellar tonsils are normally positioned. Sella is unremarkable. Corpus callosum is normally formed.    OSSEOUS STRUCTURES/SOFT TISSUES: Calvarium is intact, without fracture or suspicious lytic or blastic foci. Right  lateral parietal scalp soft tissue swelling. Lower left forehead superficial soft tissue swelling/contusion. Middle ear cavities are clear.   There is a small amount of patchy fluid or mucosal thickening within both mastoid tips.    FACIAL BONE CT:  OSSEOUS STRUCTURES/SOFT TISSUES: There is lower forehead superficial soft tissue swelling/contusion. No post septal extension of hemorrhage. Allowing for dental amalgam, no radiopaque foreign bodies. No fracture. Temporomandibular joints are intact.   Teeth are unremarkable.    ORBITAL CONTENTS: Periorbital contusion without post septal extension. Orbits otherwise unremarkable.    SINUSES: Left maxillary sinus retention cysts. No air-fluid levels.    CERVICAL SPINE CT:   VERTEBRA: Lateral alignment is normal. There is straightening of the normal cervical lordosis. Craniocervical junction is intact. Anterior C1-C2 articulation is unremarkable.    Vertebral body heights and intervertebral disc space heights are preserved. Facets are unremarkable.    CANAL/FORAMINA: No canal or neural foraminal stenosis.    PARASPINAL: No extraspinal abnormality. Visualized lung fields are clear.      Impression    IMPRESSION:  HEAD CT:  1. Shallow soft tissue contusion is seen along the left forehead.    2. No finding for intracranial hemorrhage, mass, or acute infarct.    3. Mild volume loss.    4. Small amount of dependent fluid or mucosal thickening is seen within the mastoid sinuses compatible with inflammatory change.    FACIAL BONE CT:  1. No facial bone or mandibular fracture.    2. Lower forehead and superior periorbital soft tissue contusion without post septal extension. Orbits otherwise unremarkable.    CERVICAL SPINE CT:  1.  No fracture or posttraumatic subluxation.    2.  Straightening of the normal cervical lordosis. No prevertebral soft tissue swelling.    3.  No osseous canal or foraminal stenosis.   CT Facial Bones without Contrast    Narrative    EXAM: CT HEAD W/O  CONTRAST, CT FACIAL BONES WITHOUT CONTRAST, CT CERVICAL SPINE W/O CONTRAST  LOCATION: [facility]  DATE/TIME: 2/18/2022 3:51 PM    INDICATION: Cerebral hemorrhage suspected. Fall. Laceration right parietal scalp. Contusions right forehead and right face.  COMPARISON: CT brain and CT cervical spine 09/30/2019.  TECHNIQUE:   1) Routine CT Head without IV contrast. Multiplanar reformats. Dose reduction techniques were used.  2) Routine CT Facial Bones without IV contrast. Multiplanar reformats. Dose reduction techniques were used.  3) Routine CT Cervical Spine without IV contrast. Multiplanar reformats. Dose reduction techniques were used.    FINDINGS:  HEAD CT:   INTRACRANIAL CONTENTS: No finding for intracranial hemorrhage, mass, or acute infarct. Mild prominence of the lateral ventricles and sulci. Gray-white matter differentiation is preserved. No mass effect or midline shift.    Cerebellar tonsils are normally positioned. Sella is unremarkable. Corpus callosum is normally formed.    OSSEOUS STRUCTURES/SOFT TISSUES: Calvarium is intact, without fracture or suspicious lytic or blastic foci. Right lateral parietal scalp soft tissue swelling. Lower left forehead superficial soft tissue swelling/contusion. Middle ear cavities are clear.   There is a small amount of patchy fluid or mucosal thickening within both mastoid tips.    FACIAL BONE CT:  OSSEOUS STRUCTURES/SOFT TISSUES: There is lower forehead superficial soft tissue swelling/contusion. No post septal extension of hemorrhage. Allowing for dental amalgam, no radiopaque foreign bodies. No fracture. Temporomandibular joints are intact.   Teeth are unremarkable.    ORBITAL CONTENTS: Periorbital contusion without post septal extension. Orbits otherwise unremarkable.    SINUSES: Left maxillary sinus retention cysts. No air-fluid levels.    CERVICAL SPINE CT:   VERTEBRA: Lateral alignment is normal. There is straightening of the normal cervical  lordosis. Craniocervical junction is intact. Anterior C1-C2 articulation is unremarkable.    Vertebral body heights and intervertebral disc space heights are preserved. Facets are unremarkable.    CANAL/FORAMINA: No canal or neural foraminal stenosis.    PARASPINAL: No extraspinal abnormality. Visualized lung fields are clear.      Impression    IMPRESSION:  HEAD CT:  1. Shallow soft tissue contusion is seen along the left forehead.    2. No finding for intracranial hemorrhage, mass, or acute infarct.    3. Mild volume loss.    4. Small amount of dependent fluid or mucosal thickening is seen within the mastoid sinuses compatible with inflammatory change.    FACIAL BONE CT:  1. No facial bone or mandibular fracture.    2. Lower forehead and superior periorbital soft tissue contusion without post septal extension. Orbits otherwise unremarkable.    CERVICAL SPINE CT:  1.  No fracture or posttraumatic subluxation.    2.  Straightening of the normal cervical lordosis. No prevertebral soft tissue swelling.    3.  No osseous canal or foraminal stenosis.   XR Chest Port 1 View    Narrative    EXAM: XR CHEST PORT 1 VIEW  LOCATION: [facility]  DATE/TIME: 2/18/2022 7:19 PM    INDICATION: Fall. Chest pain.  COMPARISON: None.      Impression    IMPRESSION: Negative chest.   US Abdomen Limited    Narrative    EXAM: US ABDOMEN LIMITED  LOCATION: [facility]  DATE/TIME: 2/19/2022 6:28 AM    INDICATION: Abnormal LFTs, please evaluate for biliary obstruction  COMPARISON: Ultrasound 11/24/2020, CT 09/30/2019 and older studies.  TECHNIQUE: Limited abdominal ultrasound.    FINDINGS:    GALLBLADDER: Well-distended. Mild dependent sludge and few gallstones again noted. No pericholecystic fluid or wall thickening.    BILE DUCTS: No biliary dilatation. The common duct measures 2 mm.    LIVER: Diffuse increased echotexture throughout. No focal mass.    RIGHT KIDNEY: No  hydronephrosis.    PANCREAS: The visualized portions are normal.    No ascites.      Impression    IMPRESSION:  1.  Cholelithiasis again noted without evidence of cholecystitis.  2.  Marked hepatic steatosis.             Discharge Medications   Current Discharge Medication List      START taking these medications    Details   amoxicillin-clavulanate (AUGMENTIN) 875-125 MG tablet Take 1 tablet by mouth every 12 hours for 3 days    Associated Diagnoses: Alcohol use disorder, severe, dependence (H); Right forearm cellulitis      folic acid (FOLVITE) 1 MG tablet Take 1 tablet (1 mg) by mouth daily    Associated Diagnoses: Alcohol use disorder, severe, dependence (H); Right forearm cellulitis      gabapentin (NEURONTIN) 100 MG capsule Take 1 capsule (100 mg) by mouth 2 times daily    Associated Diagnoses: Alcohol use disorder, severe, dependence (H); Right forearm cellulitis      magnesium oxide (MAG-OX) 400 MG tablet Take 1 tablet (400 mg) by mouth 2 times daily    Associated Diagnoses: Alcohol use disorder, severe, dependence (H); Right forearm cellulitis      multivitamin w/minerals (THERA-VIT-M) tablet Take 1 tablet by mouth daily    Associated Diagnoses: Alcohol use disorder, severe, dependence (H); Right forearm cellulitis      naltrexone (DEPADE/REVIA) 50 MG tablet Take 1 tablet (50 mg) by mouth At Bedtime    Associated Diagnoses: Alcohol abuse      thiamine (B-1) 100 MG tablet Take 1 tablet (100 mg) by mouth daily    Associated Diagnoses: Alcohol use disorder, severe, dependence (H); Right forearm cellulitis         CONTINUE these medications which have NOT CHANGED    Details   levothyroxine (SYNTHROID/LEVOTHROID) 150 MCG tablet Take 150 mcg by mouth daily      acetaminophen (TYLENOL) 500 MG tablet [ACETAMINOPHEN (TYLENOL) 500 MG TABLET] Take 1-2 tablets (500-1,000 mg total) by mouth every 6 (six) hours as needed for pain.  Refills: 0    Associated Diagnoses: Contusion of head of pancreas, initial encounter       fenofibrate (TRICOR) 48 MG tablet [FENOFIBRATE (TRICOR) 48 MG TABLET] Take 1 tablet (48 mg total) by mouth daily.  Qty: 30 tablet, Refills: 0    Associated Diagnoses: Hypertriglyceridemia      hydrOXYzine (ATARAX) 50 MG tablet [HYDROXYZINE (ATARAX) 50 MG TABLET] Take 50 mg by mouth at bedtime as needed for itching.      psyllium (METAMUCIL) 3.4 gram packet [PSYLLIUM (METAMUCIL) 3.4 GRAM PACKET] Take 1 packet by mouth daily.      traZODone (DESYREL) 50 MG tablet Take 50 mg by mouth nightly as needed for sleep         STOP taking these medications       oxyCODONE (ROXICODONE) 5 MG immediate release tablet Comments:   Reason for Stopping:             Allergies   No Known Allergies

## 2022-02-25 NOTE — PLAN OF CARE
Occupational Therapy Discharge Summary    Reason for therapy discharge:    All goals and outcomes met, no further needs identified.    Progress towards therapy goal(s). See goals on Care Plan in [facility] electronic health record for goal details.  Goals met    Therapy recommendation(s):    discharged to CD treatment.

## 2022-02-25 NOTE — PLAN OF CARE
Physical Therapy Discharge Summary    Reason for therapy discharge:    Discharged to Inpatient CD    Progress towards therapy goal(s). See goals on Care Plan in [facility] electronic health record for goal details.  Goals partially met.  Barriers to achieving goals:   discharge from facility.    Therapy recommendation(s):    Continue home exercise program.     Radha Carrillo, SPT   2/25/2022

## 2022-02-28 LAB
BACTERIA BLD CULT: NO GROWTH
BACTERIA BLD CULT: NO GROWTH

## 2022-11-02 ENCOUNTER — APPOINTMENT (OUTPATIENT)
Dept: CT IMAGING | Facility: HOSPITAL | Age: 52
End: 2022-11-02
Attending: EMERGENCY MEDICINE
Payer: COMMERCIAL

## 2022-11-02 ENCOUNTER — HOSPITAL ENCOUNTER (INPATIENT)
Facility: HOSPITAL | Age: 52
LOS: 1 days | Discharge: ANOTHER HOSPITAL | End: 2022-11-04
Attending: EMERGENCY MEDICINE | Admitting: INTERNAL MEDICINE
Payer: COMMERCIAL

## 2022-11-02 DIAGNOSIS — K83.8 DILATED CBD, ACQUIRED: ICD-10-CM

## 2022-11-02 DIAGNOSIS — S06.5XAA SUBDURAL HEMATOMA (H): ICD-10-CM

## 2022-11-02 DIAGNOSIS — R17 ELEVATED BILIRUBIN: ICD-10-CM

## 2022-11-02 DIAGNOSIS — K80.20 GALLSTONES: ICD-10-CM

## 2022-11-02 DIAGNOSIS — F10.929 ALCOHOLIC INTOXICATION WITH COMPLICATION (H): ICD-10-CM

## 2022-11-02 DIAGNOSIS — F10.939 ALCOHOL WITHDRAWAL SYNDROME WITH COMPLICATION (H): ICD-10-CM

## 2022-11-02 DIAGNOSIS — D64.9 ANEMIA, UNSPECIFIED TYPE: ICD-10-CM

## 2022-11-02 DIAGNOSIS — E83.42 HYPOMAGNESEMIA: ICD-10-CM

## 2022-11-02 PROBLEM — I49.3 PVC'S (PREMATURE VENTRICULAR CONTRACTIONS): Status: ACTIVE | Noted: 2022-11-02

## 2022-11-02 PROBLEM — S36.220A: Status: ACTIVE | Noted: 2017-07-04

## 2022-11-02 PROBLEM — G47.50 PARASOMNIA: Status: ACTIVE | Noted: 2021-11-10

## 2022-11-02 PROBLEM — K59.00 CONSTIPATION: Status: ACTIVE | Noted: 2022-11-02

## 2022-11-02 PROBLEM — G47.61 PERIODIC LIMB MOVEMENT DISORDER: Status: ACTIVE | Noted: 2021-12-21

## 2022-11-02 PROBLEM — R79.89 ABNORMAL TSH: Status: ACTIVE | Noted: 2022-11-02

## 2022-11-02 PROBLEM — F10.10 ALCOHOL ABUSE: Status: ACTIVE | Noted: 2022-08-24

## 2022-11-02 PROBLEM — K76.0 NONALCOHOLIC FATTY LIVER DISEASE: Status: ACTIVE | Noted: 2022-09-14

## 2022-11-02 PROBLEM — G47.00 INSOMNIA: Status: ACTIVE | Noted: 2021-11-10

## 2022-11-02 PROBLEM — G47.36 HYPOXEMIA ASSOCIATED WITH SLEEP: Status: ACTIVE | Noted: 2021-12-21

## 2022-11-02 PROBLEM — E51.9 THIAMINE DEFICIENCY: Status: ACTIVE | Noted: 2021-10-11

## 2022-11-02 PROBLEM — S39.91XA BLUNT ABDOMINAL TRAUMA, INITIAL ENCOUNTER: Status: ACTIVE | Noted: 2022-11-02

## 2022-11-02 PROBLEM — R10.12 ABDOMINAL WALL PAIN IN LEFT UPPER QUADRANT: Status: ACTIVE | Noted: 2022-11-02

## 2022-11-02 PROBLEM — Z72.821 INADEQUATE SLEEP HYGIENE: Status: ACTIVE | Noted: 2021-11-10

## 2022-11-02 LAB
ALBUMIN SERPL BCG-MCNC: 4 G/DL (ref 3.5–5.2)
ALP SERPL-CCNC: 261 U/L (ref 40–129)
ALT SERPL W P-5'-P-CCNC: 99 U/L (ref 10–50)
ANION GAP SERPL CALCULATED.3IONS-SCNC: 25 MMOL/L (ref 7–15)
AST SERPL W P-5'-P-CCNC: ABNORMAL U/L
BASOPHILS # BLD AUTO: 0.1 10E3/UL (ref 0–0.2)
BASOPHILS NFR BLD AUTO: 2 %
BILIRUB SERPL-MCNC: 6.9 MG/DL
BUN SERPL-MCNC: 8.7 MG/DL (ref 6–20)
CALCIUM SERPL-MCNC: 8.5 MG/DL (ref 8.6–10)
CHLORIDE SERPL-SCNC: 90 MMOL/L (ref 98–107)
CREAT SERPL-MCNC: 0.74 MG/DL (ref 0.67–1.17)
DEPRECATED HCO3 PLAS-SCNC: 19 MMOL/L (ref 22–29)
EOSINOPHIL # BLD AUTO: 0 10E3/UL (ref 0–0.7)
EOSINOPHIL NFR BLD AUTO: 0 %
ERYTHROCYTE [DISTWIDTH] IN BLOOD BY AUTOMATED COUNT: 11.9 % (ref 10–15)
ETHANOL SERPL-MCNC: 0.31 G/DL
GFR SERPL CREATININE-BSD FRML MDRD: >90 ML/MIN/1.73M2
GLUCOSE SERPL-MCNC: 91 MG/DL (ref 70–99)
HCT VFR BLD AUTO: 37.6 % (ref 40–53)
HGB BLD-MCNC: 12.9 G/DL (ref 13.3–17.7)
HOLD SPECIMEN: NORMAL
IMM GRANULOCYTES # BLD: 0 10E3/UL
IMM GRANULOCYTES NFR BLD: 1 %
INR PPP: 1.17 (ref 0.85–1.15)
LYMPHOCYTES # BLD AUTO: 1.7 10E3/UL (ref 0.8–5.3)
LYMPHOCYTES NFR BLD AUTO: 52 %
MAGNESIUM SERPL-MCNC: 1.6 MG/DL (ref 1.7–2.3)
MCH RBC QN AUTO: 35.4 PG (ref 26.5–33)
MCHC RBC AUTO-ENTMCNC: 34.3 G/DL (ref 31.5–36.5)
MCV RBC AUTO: 103 FL (ref 78–100)
MONOCYTES # BLD AUTO: 0.4 10E3/UL (ref 0–1.3)
MONOCYTES NFR BLD AUTO: 12 %
NEUTROPHILS # BLD AUTO: 1.1 10E3/UL (ref 1.6–8.3)
NEUTROPHILS NFR BLD AUTO: 33 %
NRBC # BLD AUTO: 0 10E3/UL
NRBC BLD AUTO-RTO: 0 /100
PLATELET # BLD AUTO: 82 10E3/UL (ref 150–450)
POTASSIUM SERPL-SCNC: 3.9 MMOL/L (ref 3.4–5.3)
PROT SERPL-MCNC: 6.7 G/DL (ref 6.4–8.3)
RADIOLOGIST FLAGS: ABNORMAL
RBC # BLD AUTO: 3.64 10E6/UL (ref 4.4–5.9)
SODIUM SERPL-SCNC: 134 MMOL/L (ref 136–145)
T4 FREE SERPL-MCNC: 0.81 NG/DL (ref 0.9–1.7)
TSH SERPL DL<=0.005 MIU/L-ACNC: 36.09 UIU/ML (ref 0.3–4.2)
WBC # BLD AUTO: 3.3 10E3/UL (ref 4–11)

## 2022-11-02 PROCEDURE — 70450 CT HEAD/BRAIN W/O DYE: CPT

## 2022-11-02 PROCEDURE — U0003 INFECTIOUS AGENT DETECTION BY NUCLEIC ACID (DNA OR RNA); SEVERE ACUTE RESPIRATORY SYNDROME CORONAVIRUS 2 (SARS-COV-2) (CORONAVIRUS DISEASE [COVID-19]), AMPLIFIED PROBE TECHNIQUE, MAKING USE OF HIGH THROUGHPUT TECHNOLOGIES AS DESCRIBED BY CMS-2020-01-R: HCPCS | Performed by: EMERGENCY MEDICINE

## 2022-11-02 PROCEDURE — 96375 TX/PRO/DX INJ NEW DRUG ADDON: CPT | Mod: 59

## 2022-11-02 PROCEDURE — 250N000011 HC RX IP 250 OP 636: Performed by: EMERGENCY MEDICINE

## 2022-11-02 PROCEDURE — 84439 ASSAY OF FREE THYROXINE: CPT | Performed by: EMERGENCY MEDICINE

## 2022-11-02 PROCEDURE — HZ2ZZZZ DETOXIFICATION SERVICES FOR SUBSTANCE ABUSE TREATMENT: ICD-10-PCS | Performed by: EMERGENCY MEDICINE

## 2022-11-02 PROCEDURE — 84443 ASSAY THYROID STIM HORMONE: CPT | Performed by: EMERGENCY MEDICINE

## 2022-11-02 PROCEDURE — 85610 PROTHROMBIN TIME: CPT | Performed by: EMERGENCY MEDICINE

## 2022-11-02 PROCEDURE — 36415 COLL VENOUS BLD VENIPUNCTURE: CPT | Performed by: STUDENT IN AN ORGANIZED HEALTH CARE EDUCATION/TRAINING PROGRAM

## 2022-11-02 PROCEDURE — 74177 CT ABD & PELVIS W/CONTRAST: CPT

## 2022-11-02 PROCEDURE — 85014 HEMATOCRIT: CPT | Performed by: EMERGENCY MEDICINE

## 2022-11-02 PROCEDURE — C9803 HOPD COVID-19 SPEC COLLECT: HCPCS

## 2022-11-02 PROCEDURE — 84155 ASSAY OF PROTEIN SERUM: CPT | Performed by: EMERGENCY MEDICINE

## 2022-11-02 PROCEDURE — 82077 ASSAY SPEC XCP UR&BREATH IA: CPT | Performed by: EMERGENCY MEDICINE

## 2022-11-02 PROCEDURE — 93005 ELECTROCARDIOGRAM TRACING: CPT | Performed by: EMERGENCY MEDICINE

## 2022-11-02 PROCEDURE — 36415 COLL VENOUS BLD VENIPUNCTURE: CPT | Performed by: EMERGENCY MEDICINE

## 2022-11-02 PROCEDURE — 99285 EMERGENCY DEPT VISIT HI MDM: CPT | Mod: 25

## 2022-11-02 PROCEDURE — 258N000003 HC RX IP 258 OP 636: Performed by: EMERGENCY MEDICINE

## 2022-11-02 PROCEDURE — 83735 ASSAY OF MAGNESIUM: CPT | Performed by: EMERGENCY MEDICINE

## 2022-11-02 PROCEDURE — 72125 CT NECK SPINE W/O DYE: CPT

## 2022-11-02 PROCEDURE — 93005 ELECTROCARDIOGRAM TRACING: CPT | Performed by: STUDENT IN AN ORGANIZED HEALTH CARE EDUCATION/TRAINING PROGRAM

## 2022-11-02 PROCEDURE — 96361 HYDRATE IV INFUSION ADD-ON: CPT

## 2022-11-02 RX ORDER — THIAMINE HYDROCHLORIDE 100 MG/ML
100 INJECTION, SOLUTION INTRAMUSCULAR; INTRAVENOUS ONCE
Status: COMPLETED | OUTPATIENT
Start: 2022-11-02 | End: 2022-11-02

## 2022-11-02 RX ORDER — LEVOTHYROXINE SODIUM 175 UG/1
175 TABLET ORAL EVERY MORNING
COMMUNITY

## 2022-11-02 RX ORDER — IOPAMIDOL 755 MG/ML
100 INJECTION, SOLUTION INTRAVASCULAR ONCE
Status: COMPLETED | OUTPATIENT
Start: 2022-11-02 | End: 2022-11-02

## 2022-11-02 RX ORDER — FOLIC ACID 5 MG/ML
1 INJECTION, SOLUTION INTRAMUSCULAR; INTRAVENOUS; SUBCUTANEOUS ONCE
Status: COMPLETED | OUTPATIENT
Start: 2022-11-02 | End: 2022-11-02

## 2022-11-02 RX ADMIN — THIAMINE HYDROCHLORIDE 100 MG: 100 INJECTION, SOLUTION INTRAMUSCULAR; INTRAVENOUS at 23:28

## 2022-11-02 RX ADMIN — IOPAMIDOL 100 ML: 755 INJECTION, SOLUTION INTRAVENOUS at 23:19

## 2022-11-02 RX ADMIN — FOLIC ACID 1 MG: 5 INJECTION, SOLUTION INTRAMUSCULAR; INTRAVENOUS; SUBCUTANEOUS at 23:27

## 2022-11-02 RX ADMIN — SODIUM CHLORIDE 1000 ML: 9 INJECTION, SOLUTION INTRAVENOUS at 21:44

## 2022-11-02 ASSESSMENT — ACTIVITIES OF DAILY LIVING (ADL): ADLS_ACUITY_SCORE: 35

## 2022-11-03 ENCOUNTER — HOSPITAL ENCOUNTER (EMERGENCY)
Age: 52
End: 2022-11-03
Attending: NEUROLOGICAL SURGERY | Admitting: NEUROLOGICAL SURGERY
Payer: COMMERCIAL

## 2022-11-03 ENCOUNTER — APPOINTMENT (OUTPATIENT)
Dept: ULTRASOUND IMAGING | Facility: HOSPITAL | Age: 52
End: 2022-11-03
Attending: EMERGENCY MEDICINE
Payer: COMMERCIAL

## 2022-11-03 ENCOUNTER — APPOINTMENT (OUTPATIENT)
Dept: CT IMAGING | Facility: HOSPITAL | Age: 52
End: 2022-11-03
Attending: EMERGENCY MEDICINE
Payer: COMMERCIAL

## 2022-11-03 ENCOUNTER — HOSPITAL ENCOUNTER (EMERGENCY)
Facility: CLINIC | Age: 52
End: 2022-11-03
Payer: COMMERCIAL

## 2022-11-03 PROBLEM — D70.8 OTHER NEUTROPENIA (H): Status: ACTIVE | Noted: 2022-11-03

## 2022-11-03 PROBLEM — F10.929 ALCOHOLIC INTOXICATION WITH COMPLICATION (H): Status: ACTIVE | Noted: 2022-11-03

## 2022-11-03 LAB
ABO/RH(D): NORMAL
ALBUMIN SERPL BCG-MCNC: 3.6 G/DL (ref 3.5–5.2)
ALP SERPL-CCNC: 222 U/L (ref 40–129)
ALT SERPL W P-5'-P-CCNC: 89 U/L (ref 10–50)
ANION GAP SERPL CALCULATED.3IONS-SCNC: 21 MMOL/L (ref 7–15)
ANTIBODY SCREEN: NEGATIVE
APTT PPP: 32 SECONDS (ref 22–38)
APTT PPP: 32 SECONDS (ref 22–38)
AST SERPL W P-5'-P-CCNC: 348 U/L (ref 10–50)
BILIRUB DIRECT SERPL-MCNC: 4.58 MG/DL (ref 0–0.3)
BILIRUB SERPL-MCNC: 6.3 MG/DL
BLD PROD TYP BPU: NORMAL
BLD PROD TYP BPU: NORMAL
BLOOD COMPONENT TYPE: NORMAL
BLOOD COMPONENT TYPE: NORMAL
BUN SERPL-MCNC: 7.3 MG/DL (ref 6–20)
CALCIUM SERPL-MCNC: 7.8 MG/DL (ref 8.6–10)
CHLORIDE SERPL-SCNC: 97 MMOL/L (ref 98–107)
CLOSURE TME COLL+ADP BLD: 130 SECONDS
CLOSURE TME COLL+EPINEP BLD: 143 SECONDS
CLOSURE TME COLL+EPINEP BLD: 214 SECONDS
CODING SYSTEM: NORMAL
CODING SYSTEM: NORMAL
CREAT SERPL-MCNC: 0.57 MG/DL (ref 0.67–1.17)
DEPRECATED HCO3 PLAS-SCNC: 20 MMOL/L (ref 22–29)
ERYTHROCYTE [DISTWIDTH] IN BLOOD BY AUTOMATED COUNT: 11.9 % (ref 10–15)
GFR SERPL CREATININE-BSD FRML MDRD: >90 ML/MIN/1.73M2
GLUCOSE SERPL-MCNC: 75 MG/DL (ref 70–99)
HCT VFR BLD AUTO: 33 % (ref 40–53)
HGB BLD-MCNC: 11.4 G/DL (ref 13.3–17.7)
HOLD SPECIMEN: NORMAL
INR PPP: 1.24 (ref 0.85–1.15)
ISSUE DATE AND TIME: NORMAL
ISSUE DATE AND TIME: NORMAL
LACTATE SERPL-SCNC: 3.9 MMOL/L (ref 0.7–2)
LACTATE SERPL-SCNC: 4.3 MMOL/L (ref 0.7–2)
MAGNESIUM SERPL-MCNC: 1.5 MG/DL (ref 1.7–2.3)
MAGNESIUM SERPL-MCNC: 1.8 MG/DL (ref 1.7–2.3)
MCH RBC QN AUTO: 35.6 PG (ref 26.5–33)
MCHC RBC AUTO-ENTMCNC: 34.5 G/DL (ref 31.5–36.5)
MCV RBC AUTO: 103 FL (ref 78–100)
PHOSPHATE SERPL-MCNC: 3.2 MG/DL (ref 2.5–4.5)
PLATELET # BLD AUTO: 67 10E3/UL (ref 150–450)
POTASSIUM SERPL-SCNC: 3.7 MMOL/L (ref 3.4–5.3)
PROT SERPL-MCNC: 6.1 G/DL (ref 6.4–8.3)
RBC # BLD AUTO: 3.2 10E6/UL (ref 4.4–5.9)
SARS-COV-2 RNA RESP QL NAA+PROBE: NEGATIVE
SODIUM SERPL-SCNC: 138 MMOL/L (ref 136–145)
SPECIMEN EXPIRATION DATE: NORMAL
UNIT ABO/RH: NORMAL
UNIT ABO/RH: NORMAL
UNIT NUMBER: NORMAL
UNIT NUMBER: NORMAL
UNIT STATUS: NORMAL
UNIT STATUS: NORMAL
UNIT TYPE ISBT: 6200
UNIT TYPE ISBT: 6200
WBC # BLD AUTO: 2.6 10E3/UL (ref 4–11)

## 2022-11-03 PROCEDURE — 36415 COLL VENOUS BLD VENIPUNCTURE: CPT | Performed by: INTERNAL MEDICINE

## 2022-11-03 PROCEDURE — 83735 ASSAY OF MAGNESIUM: CPT | Performed by: INTERNAL MEDICINE

## 2022-11-03 PROCEDURE — 80053 COMPREHEN METABOLIC PANEL: CPT | Performed by: INTERNAL MEDICINE

## 2022-11-03 PROCEDURE — 258N000003 HC RX IP 258 OP 636: Performed by: EMERGENCY MEDICINE

## 2022-11-03 PROCEDURE — 250N000011 HC RX IP 250 OP 636: Performed by: SURGERY

## 2022-11-03 PROCEDURE — 87040 BLOOD CULTURE FOR BACTERIA: CPT | Performed by: EMERGENCY MEDICINE

## 2022-11-03 PROCEDURE — 250N000013 HC RX MED GY IP 250 OP 250 PS 637: Performed by: INTERNAL MEDICINE

## 2022-11-03 PROCEDURE — 36415 COLL VENOUS BLD VENIPUNCTURE: CPT | Performed by: EMERGENCY MEDICINE

## 2022-11-03 PROCEDURE — 85730 THROMBOPLASTIN TIME PARTIAL: CPT | Performed by: INTERNAL MEDICINE

## 2022-11-03 PROCEDURE — 96375 TX/PRO/DX INJ NEW DRUG ADDON: CPT

## 2022-11-03 PROCEDURE — 86850 RBC ANTIBODY SCREEN: CPT | Performed by: EMERGENCY MEDICINE

## 2022-11-03 PROCEDURE — 83605 ASSAY OF LACTIC ACID: CPT | Performed by: INTERNAL MEDICINE

## 2022-11-03 PROCEDURE — 82248 BILIRUBIN DIRECT: CPT | Performed by: INTERNAL MEDICINE

## 2022-11-03 PROCEDURE — 96366 THER/PROPH/DIAG IV INF ADDON: CPT

## 2022-11-03 PROCEDURE — 200N000001 HC R&B ICU

## 2022-11-03 PROCEDURE — 85018 HEMOGLOBIN: CPT | Performed by: INTERNAL MEDICINE

## 2022-11-03 PROCEDURE — P9035 PLATELET PHERES LEUKOREDUCED: HCPCS | Performed by: EMERGENCY MEDICINE

## 2022-11-03 PROCEDURE — 250N000011 HC RX IP 250 OP 636: Performed by: INTERNAL MEDICINE

## 2022-11-03 PROCEDURE — 96361 HYDRATE IV INFUSION ADD-ON: CPT

## 2022-11-03 PROCEDURE — 85610 PROTHROMBIN TIME: CPT | Performed by: SURGERY

## 2022-11-03 PROCEDURE — 86901 BLOOD TYPING SEROLOGIC RH(D): CPT | Performed by: EMERGENCY MEDICINE

## 2022-11-03 PROCEDURE — 250N000011 HC RX IP 250 OP 636: Performed by: EMERGENCY MEDICINE

## 2022-11-03 PROCEDURE — 85730 THROMBOPLASTIN TIME PARTIAL: CPT | Performed by: SURGERY

## 2022-11-03 PROCEDURE — 258N000003 HC RX IP 258 OP 636: Performed by: SURGERY

## 2022-11-03 PROCEDURE — 250N000009 HC RX 250: Performed by: EMERGENCY MEDICINE

## 2022-11-03 PROCEDURE — 85576 BLOOD PLATELET AGGREGATION: CPT

## 2022-11-03 PROCEDURE — 258N000003 HC RX IP 258 OP 636: Performed by: INTERNAL MEDICINE

## 2022-11-03 PROCEDURE — 70450 CT HEAD/BRAIN W/O DYE: CPT

## 2022-11-03 PROCEDURE — 76705 ECHO EXAM OF ABDOMEN: CPT

## 2022-11-03 PROCEDURE — 96376 TX/PRO/DX INJ SAME DRUG ADON: CPT

## 2022-11-03 PROCEDURE — 83735 ASSAY OF MAGNESIUM: CPT | Performed by: EMERGENCY MEDICINE

## 2022-11-03 PROCEDURE — 272N000452 HC KIT SHRLOCK 5FR POWER PICC TRIPLE LUMEN

## 2022-11-03 PROCEDURE — 36430 TRANSFUSION BLD/BLD COMPNT: CPT

## 2022-11-03 PROCEDURE — 36569 INSJ PICC 5 YR+ W/O IMAGING: CPT

## 2022-11-03 PROCEDURE — 99223 1ST HOSP IP/OBS HIGH 75: CPT | Mod: AI | Performed by: INTERNAL MEDICINE

## 2022-11-03 PROCEDURE — 96365 THER/PROPH/DIAG IV INF INIT: CPT

## 2022-11-03 RX ORDER — ONDANSETRON 2 MG/ML
4 INJECTION INTRAMUSCULAR; INTRAVENOUS EVERY 6 HOURS PRN
Status: DISCONTINUED | OUTPATIENT
Start: 2022-11-03 | End: 2022-11-04 | Stop reason: HOSPADM

## 2022-11-03 RX ORDER — LORAZEPAM 2 MG/ML
1-2 INJECTION INTRAMUSCULAR EVERY 30 MIN PRN
Status: DISCONTINUED | OUTPATIENT
Start: 2022-11-03 | End: 2022-11-04 | Stop reason: HOSPADM

## 2022-11-03 RX ORDER — CLONIDINE HYDROCHLORIDE 0.1 MG/1
0.1 TABLET ORAL EVERY 8 HOURS
Status: DISCONTINUED | OUTPATIENT
Start: 2022-11-03 | End: 2022-11-04 | Stop reason: HOSPADM

## 2022-11-03 RX ORDER — ACETAMINOPHEN 325 MG/1
650 TABLET ORAL EVERY 4 HOURS PRN
Status: DISCONTINUED | OUTPATIENT
Start: 2022-11-03 | End: 2022-11-03

## 2022-11-03 RX ORDER — FOLIC ACID 1 MG/1
1 TABLET ORAL DAILY
Status: DISCONTINUED | OUTPATIENT
Start: 2022-11-04 | End: 2022-11-04 | Stop reason: HOSPADM

## 2022-11-03 RX ORDER — FLUMAZENIL 0.1 MG/ML
0.2 INJECTION, SOLUTION INTRAVENOUS
Status: DISCONTINUED | OUTPATIENT
Start: 2022-11-03 | End: 2022-11-04 | Stop reason: HOSPADM

## 2022-11-03 RX ORDER — MAGNESIUM SULFATE HEPTAHYDRATE 40 MG/ML
2 INJECTION, SOLUTION INTRAVENOUS ONCE
Status: COMPLETED | OUTPATIENT
Start: 2022-11-03 | End: 2022-11-03

## 2022-11-03 RX ORDER — TRAZODONE HYDROCHLORIDE 50 MG/1
50 TABLET, FILM COATED ORAL
Status: DISCONTINUED | OUTPATIENT
Start: 2022-11-03 | End: 2022-11-04 | Stop reason: HOSPADM

## 2022-11-03 RX ORDER — MULTIPLE VITAMINS W/ MINERALS TAB 9MG-400MCG
1 TAB ORAL DAILY
Status: DISCONTINUED | OUTPATIENT
Start: 2022-11-03 | End: 2022-11-03

## 2022-11-03 RX ORDER — LIDOCAINE 40 MG/G
CREAM TOPICAL
Status: DISCONTINUED | OUTPATIENT
Start: 2022-11-03 | End: 2022-11-04 | Stop reason: HOSPADM

## 2022-11-03 RX ORDER — MULTIPLE VITAMINS W/ MINERALS TAB 9MG-400MCG
1 TAB ORAL DAILY
Status: DISCONTINUED | OUTPATIENT
Start: 2022-11-04 | End: 2022-11-04 | Stop reason: HOSPADM

## 2022-11-03 RX ORDER — OLANZAPINE 5 MG/1
5-10 TABLET, ORALLY DISINTEGRATING ORAL EVERY 6 HOURS PRN
Status: DISCONTINUED | OUTPATIENT
Start: 2022-11-03 | End: 2022-11-04 | Stop reason: HOSPADM

## 2022-11-03 RX ORDER — MAGNESIUM OXIDE 400 MG/1
400 TABLET ORAL 2 TIMES DAILY
Status: DISCONTINUED | OUTPATIENT
Start: 2022-11-03 | End: 2022-11-04 | Stop reason: HOSPADM

## 2022-11-03 RX ORDER — SODIUM CHLORIDE 9 MG/ML
INJECTION, SOLUTION INTRAVENOUS CONTINUOUS
Status: DISCONTINUED | OUTPATIENT
Start: 2022-11-03 | End: 2022-11-04 | Stop reason: HOSPADM

## 2022-11-03 RX ORDER — PIPERACILLIN SODIUM, TAZOBACTAM SODIUM 3; .375 G/15ML; G/15ML
3.38 INJECTION, POWDER, LYOPHILIZED, FOR SOLUTION INTRAVENOUS ONCE
Status: COMPLETED | OUTPATIENT
Start: 2022-11-03 | End: 2022-11-03

## 2022-11-03 RX ORDER — LORAZEPAM 0.5 MG/1
1-2 TABLET ORAL EVERY 30 MIN PRN
Status: DISCONTINUED | OUTPATIENT
Start: 2022-11-03 | End: 2022-11-04 | Stop reason: HOSPADM

## 2022-11-03 RX ORDER — HALOPERIDOL 5 MG/ML
2.5-5 INJECTION INTRAMUSCULAR EVERY 6 HOURS PRN
Status: DISCONTINUED | OUTPATIENT
Start: 2022-11-03 | End: 2022-11-04 | Stop reason: HOSPADM

## 2022-11-03 RX ADMIN — CLONIDINE HYDROCHLORIDE 0.1 MG: 0.1 TABLET ORAL at 22:32

## 2022-11-03 RX ADMIN — LIDOCAINE HYDROCHLORIDE 2.5 ML: 10 INJECTION, SOLUTION EPIDURAL; INFILTRATION; INTRACAUDAL; PERINEURAL at 13:51

## 2022-11-03 RX ADMIN — LEVETIRACETAM 1000 MG: 100 INJECTION, SOLUTION INTRAVENOUS at 23:45

## 2022-11-03 RX ADMIN — SODIUM CHLORIDE: 9 INJECTION, SOLUTION INTRAVENOUS at 10:11

## 2022-11-03 RX ADMIN — LEVOTHYROXINE SODIUM 175 MCG: 100 TABLET ORAL at 06:57

## 2022-11-03 RX ADMIN — LEVETIRACETAM 1000 MG: 100 INJECTION, SOLUTION INTRAVENOUS at 11:56

## 2022-11-03 RX ADMIN — LEVETIRACETAM 1000 MG: 100 INJECTION, SOLUTION INTRAVENOUS at 00:30

## 2022-11-03 RX ADMIN — Medication 400 MG: at 19:59

## 2022-11-03 RX ADMIN — SODIUM CHLORIDE: 9 INJECTION, SOLUTION INTRAVENOUS at 02:35

## 2022-11-03 RX ADMIN — LORAZEPAM 1 MG: 2 INJECTION INTRAMUSCULAR; INTRAVENOUS at 11:05

## 2022-11-03 RX ADMIN — PIPERACILLIN AND TAZOBACTAM 3.38 G: 3; .375 INJECTION, POWDER, LYOPHILIZED, FOR SOLUTION INTRAVENOUS at 10:19

## 2022-11-03 RX ADMIN — SODIUM CHLORIDE 500 ML: 9 INJECTION, SOLUTION INTRAVENOUS at 10:14

## 2022-11-03 RX ADMIN — MAGNESIUM SULFATE HEPTAHYDRATE 2 G: 40 INJECTION, SOLUTION INTRAVENOUS at 06:26

## 2022-11-03 RX ADMIN — SODIUM CHLORIDE 1000 ML: 9 INJECTION, SOLUTION INTRAVENOUS at 06:26

## 2022-11-03 RX ADMIN — PHYTONADIONE 5 MG: 10 INJECTION, EMULSION INTRAMUSCULAR; INTRAVENOUS; SUBCUTANEOUS at 22:32

## 2022-11-03 RX ADMIN — SODIUM CHLORIDE: 9 INJECTION, SOLUTION INTRAVENOUS at 19:56

## 2022-11-03 RX ADMIN — SODIUM CHLORIDE 500 ML: 9 INJECTION, SOLUTION INTRAVENOUS at 09:35

## 2022-11-03 ASSESSMENT — ACTIVITIES OF DAILY LIVING (ADL)
ADLS_ACUITY_SCORE: 35
ADLS_ACUITY_SCORE: 22
DRESSING/BATHING_DIFFICULTY: NO
DIFFICULTY_EATING/SWALLOWING: NO
ADLS_ACUITY_SCORE: 35
ADLS_ACUITY_SCORE: 37
DOING_ERRANDS_INDEPENDENTLY_DIFFICULTY: NO
ADLS_ACUITY_SCORE: 35
FALL_HISTORY_WITHIN_LAST_SIX_MONTHS: YES
ADLS_ACUITY_SCORE: 35
ADLS_ACUITY_SCORE: 35
TOILETING_ISSUES: NO
CHANGE_IN_FUNCTIONAL_STATUS_SINCE_ONSET_OF_CURRENT_ILLNESS/INJURY: NO
CONCENTRATING,_REMEMBERING_OR_MAKING_DECISIONS_DIFFICULTY: NO
ADLS_ACUITY_SCORE: 37
VISION_MANAGEMENT: GLASSES
ADLS_ACUITY_SCORE: 35
ADLS_ACUITY_SCORE: 35
WALKING_OR_CLIMBING_STAIRS_DIFFICULTY: NO
ADLS_ACUITY_SCORE: 37
ADLS_ACUITY_SCORE: 37
WEAR_GLASSES_OR_BLIND: YES

## 2022-11-03 NOTE — PROGRESS NOTES
Chart is reviewed, discussed with ER provider Dr. Jefferson and neurosurgery Divya Jacobsen, KAROL-MARTIR    Basically Getachew Barcenas is a 52 year old male of severe alcohol use disorder, diverticulitis, hepatic steatosis, thiamine deficiency, mood disorder, hypoglycemia, frequent falls striking head who presents to this ED in with daughter for evaluation of ongoing alcohol abuse, dizziness and weakness and found to have acute on chronic subdural hematoma on left, measuring up to 17mm in size. Also right SDH, subacute measuring 7mm with associated 3mm left to right midline shift.    Neurosurgery service has been consulted and recommended transfer the patient to higher level facility where they have cranial neurosurgical care and ICU monitoring.    Hospitalist service is following as a consult regarding his alcohol abuse.    ER provider Dr. Jefferson will be arranging for patient transfer to higher level facility where they have cranial neurosurgery services.      Blaise Umaña MD

## 2022-11-03 NOTE — PROGRESS NOTES
Have discussed patient with Dr Jefferson and Dr Umaña. Patient is waiting transfer to higher level of care ([facility], [facility] without beds) for bilateral SDH (17 mm mixed density left; subacute SDH right convexity 7 mm with left to right midline shift 3 mm) in the setting of liver disease and abnormal coagulopathy. Patient is alert, neuro intact with exception of tremors likely due to alcohol withdrawal. Family at bedside. They state patient falls a lot and sleeps a lot. Question of last fall 5 days ago. Last drink yesterday prior to arriving to ED at 8pm. Patient reports going to treatment and coming home last April. Started drinking again two weeks after he got home. Sclera jaundice. Discussed with patient and family CT scans and plan of care moving forward. Answered questions. Dr Jefferson spoke with Neurocritical care at the  and has received management recommendations including platelet transfusion, Keppra. Repeat Head CT stable this . [facility] ED will call [facility] again to see if there is a bed available. SOC aware patient needing transfer.     Divya Jacobsen, AG-CNP  [facility] Neurosurgery  O: [phone]

## 2022-11-03 NOTE — ED NOTES
EMERGENCY DEPARTMENT SIGN OUT NOTE        ED COURSE AND MEDICAL DECISION MAKING  7:03 AM Patient was signed out to me by Dr Yeimy Payton.  8:39 AM I spoke to Divya Jacobsen PA-C, neurosurgery. Asking for consult.   8:42 AM I met and updated the patient.   9:46 AM I spoke to Dr. Velez, [facility] Neuro Critical Care.   9:51 AM I spoke to Dr. Umaña, the hospitalist.   10:01 AM I updated the patient and his family. I updated the patient's nurse as well.   10:23 AM I spoke to Dr. Kaur, [facility] Neurosurgery. Declining trauma transfer due to case not being acute.  10:33 AM I spoke to Dr. Coreas, [facility]. Agreed to transfer.  11:10 AM I updated the patient.   1300: updated that transfer has been refused    In brief, Getachew Barcenas is a 52 year old male who initially presented alcohol problem and dizziness.    Patient presents with drowsiness, frequent falls, failure to thrive in the setting of severe alcoholism.  Found to have bilateral subdurals acute on subacute with 3 mm change.  Unfortunately last night no ICU beds at [facility].  Hospitalist was consulted to help with withdrawal.  Neurosurgical team has been consulted with helping with transfer.  Repeat head CT ordered for 7 AM.  Repeat head CT shows subdurals and midline shift are stable    Repeat labs this morning show persistent thrombocytopenia discussed with neuro critical care at [facility] who agrees with 2 units of platelets.  Elevated bilirubin likely secondary to cirrhosis.  Does have gallstones.  Plan for right upper quadrant ultrasound to evaluate for choledocholithiasis.  With elevated lactic acid will give dose of Zosyn and send blood cultures.  Additional IV placed and additional IV fluids given.  Received IV magnesium, thiamine, lorazepam.  Plan for Keppra 1 g twice daily.  Next Keppra dose should be due at noon    Was mildly hypotensive this morning which improved with IV fluids      Neurosurgery came by and  evaluated patient this morning and agrees with transfer since he can't be managed at [facility].    I spoke with Dr. Kaur trauma neurosurgeon at [facility] who states this is not a trauma patient and the patient needs to be in a neuro critical care bed.  Dr. Kaur did not think he was a surgical candidate.    SOC recommend the ER to ER transfer is [facility].  Dr. Gotti was updated on plan for ER to ER transfer    Patient is currently hemodynamically stable.    Just notified by coordinator that patient has now been refused for transfer to [facility].     Spoke with triage intensivist patient was reportedly top of the list    PICC line placed.     Ultrasound shows CBD dilation.  Received IV Zosyn.    Patient signedout on to Dr. Womack pending transfer and likely need for GI consult.         I, Wilmar Marcelino, am serving as a scribe to document services personally performed by Gordo Jefferson MD, based on my observations and the provider's statements to me.  I, Gordo Jefferson MD, attest that Wilmar Marcelino is acting in a scribe capacity, has observed my performance of the services and has documented them in accordance with my direction.     Critical Care  Performed by: Gordo Jefferson MD  Total critical care time: 90 minutes  Critical care time was exclusive of separately billable procedures and treating other patients.  Critical care was necessary to treat or prevent imminent or life-threatening deterioration of the following conditions: Alcohol withdrawal and subdural hematomas complicated with coagulopathy and thrombocytopenia  Critical care was time spent personally by me on the following activities: development of treatment plan with patient or surrogate, discussions with consultants, examination of patient, evaluation of patient's response to treatment, obtaining history from patient or surrogate, ordering and performing treatments and interventions, ordering and review of laboratory studies, ordering and review of  radiographic studies and re-evaluation of patient's condition, this excludes any separately billable procedures.    ED Course as of 11/03/22 1638   Thu Nov 03, 2022   7235 I spoke with NCC at [facility], recommends 2 unit(s) platelets, keppra 1 gram BID   1152 Was just notified by ED coordinator that patient's now has not been accepted at [facility]   1154 I was told to call Dr. Lala ICU at [facility] who was at lunch when I called. I left message to call me back.          FINAL IMPRESSION    1. Subdural hematoma    2. Alcoholic intoxication with complication (H)    3. Anemia, unspecified type    4. Hypomagnesemia    5. Alcohol withdrawal syndrome with complication (H)    6. Gallstones    7. Elevated bilirubin        ED MEDS  Medications   cloNIDine (CATAPRES) tablet 0.1 mg (0.1 mg Oral Not Given 11/3/22 0632)   OLANZapine zydis (zyPREXA) ODT tab 5-10 mg (has no administration in time range)     Or   haloperidol lactate (HALDOL) injection 2.5-5 mg (has no administration in time range)   flumazenil (ROMAZICON) injection 0.2 mg (has no administration in time range)   melatonin tablet 5 mg (has no administration in time range)   LORazepam (ATIVAN) tablet 1-2 mg ( Oral See Alternative 11/3/22 1105)     Or   LORazepam (ATIVAN) injection 1-2 mg (1 mg Intravenous Given 11/3/22 1105)   thiamine (B-1) tablet 100 mg (has no administration in time range)   folic acid (FOLVITE) tablet 1 mg (has no administration in time range)   multivitamin w/minerals (THERA-VIT-M) tablet 1 tablet (has no administration in time range)   levETIRAcetam (KEPPRA) 1,000 mg in sodium chloride 0.9 % 100 mL intermittent infusion (1,000 mg Intravenous New Bag 11/3/22 1156)   sodium chloride 0.9% infusion ( Intravenous Rate/Dose Verify 11/3/22 1528)   acetaminophen (TYLENOL) tablet 650 mg (has no administration in time range)   ondansetron (ZOFRAN) injection 4 mg (has no administration in time range)   levothyroxine (SYNTHROID/LEVOTHROID) tablet 175  mcg (175 mcg Oral Given 11/3/22 0657)   magnesium oxide (MAG-OX) tablet 400 mg (400 mg Oral Not Given 11/3/22 0821)   traZODone (DESYREL) tablet 50 mg (has no administration in time range)   lidocaine 1 % 0.1-5 mL (2.5 mLs Other Given 11/3/22 1351)   lidocaine (LMX4) cream (has no administration in time range)   sodium chloride (PF) 0.9% PF flush 10-40 mL (has no administration in time range)   sodium chloride (PF) 0.9% PF flush 10-20 mL (has no administration in time range)   sodium chloride (PF) 0.9% PF flush 10-40 mL (10 mLs Intracatheter Not Given 11/3/22 1528)   sodium chloride (PF) 0.9% PF flush 10-40 mL (has no administration in time range)   0.9% sodium chloride BOLUS (0 mLs Intravenous Stopped 11/2/22 2244)   folic acid injection 1 mg (1 mg Intravenous Given 11/2/22 2327)   thiamine (B-1) injection 100 mg (100 mg Intravenous Given 11/2/22 2328)   iopamidol (ISOVUE-370) solution 100 mL (100 mLs Intravenous Given 11/2/22 2319)   levETIRAcetam (KEPPRA) 1,000 mg in sodium chloride 0.9 % 100 mL intermittent infusion (0 mg Intravenous Stopped 11/3/22 0045)   magnesium sulfate 2 g in water intermittent infusion (0 g Intravenous Stopped 11/3/22 0740)   0.9% sodium chloride BOLUS (0 mLs Intravenous Stopped 11/3/22 0740)   0.9% sodium chloride BOLUS (0 mLs Intravenous Stopped 11/3/22 1009)   piperacillin-tazobactam (ZOSYN) 3.375 g vial to attach to  mL bag (3.375 g Intravenous Given 11/3/22 1019)   0.9% sodium chloride BOLUS (0 mLs Intravenous Stopped 11/3/22 1045)       LAB  Labs Ordered and Resulted from Time of ED Arrival to Time of ED Departure   INR - Abnormal       Result Value    INR 1.17 (*)    COMPREHENSIVE METABOLIC PANEL - Abnormal    Sodium 134 (*)     Potassium 3.9      Chloride 90 (*)     Carbon Dioxide (CO2) 19 (*)     Anion Gap 25 (*)     Urea Nitrogen 8.7      Creatinine 0.74      Calcium 8.5 (*)     Glucose 91      Alkaline Phosphatase 261 (*)     AST        ALT 99 (*)     Protein Total 6.7       Albumin 4.0      Bilirubin Total 6.9 (*)     GFR Estimate >90     MAGNESIUM - Abnormal    Magnesium 1.6 (*)    ETHYL ALCOHOL LEVEL - Abnormal    Alcohol ethyl 0.31 (*)    TSH WITH FREE T4 REFLEX - Abnormal    TSH 36.09 (*)    CBC WITH PLATELETS AND DIFFERENTIAL - Abnormal    WBC Count 3.3 (*)     RBC Count 3.64 (*)     Hemoglobin 12.9 (*)     Hematocrit 37.6 (*)      (*)     MCH 35.4 (*)     MCHC 34.3      RDW 11.9      Platelet Count 82 (*)     % Neutrophils 33      % Lymphocytes 52      % Monocytes 12      % Eosinophils 0      % Basophils 2      % Immature Granulocytes 1      NRBCs per 100 WBC 0      Absolute Neutrophils 1.1 (*)     Absolute Lymphocytes 1.7      Absolute Monocytes 0.4      Absolute Eosinophils 0.0      Absolute Basophils 0.1      Absolute Immature Granulocytes 0.0      Absolute NRBCs 0.0     T4 FREE - Abnormal    Free T4 0.81 (*)    MAGNESIUM - Abnormal    Magnesium 1.5 (*)    LACTIC ACID WHOLE BLOOD - Abnormal    Lactic Acid 4.3 (*)    PLATELET FUNCTION CLOSURE WITH REFLEX - Abnormal    PFA-Col/Epi 214 (*)    BASIC METABOLIC PANEL - Abnormal    Sodium 138      Potassium 3.7      Chloride 97 (*)     Carbon Dioxide (CO2) 20 (*)     Anion Gap 21 (*)     Urea Nitrogen 7.3      Creatinine 0.57 (*)     Calcium 7.8 (*)     Glucose 75      GFR Estimate >90     CBC WITH PLATELETS - Abnormal    WBC Count 2.6 (*)     RBC Count 3.20 (*)     Hemoglobin 11.4 (*)     Hematocrit 33.0 (*)      (*)     MCH 35.6 (*)     MCHC 34.5      RDW 11.9      Platelet Count 67 (*)    HEPATIC FUNCTION PANEL - Abnormal    Protein Total 6.1 (*)     Albumin 3.6      Bilirubin Total 6.3 (*)     Alkaline Phosphatase 222 (*)      (*)     ALT 89 (*)     Bilirubin Direct 4.58 (*)    LACTIC ACID WHOLE BLOOD - Abnormal    Lactic Acid 3.9 (*)    PLATELET FUNCTION CLOSURE ADP - Abnormal    PFA-Col/ (*)    COVID-19 VIRUS (CORONAVIRUS) BY PCR - Normal    SARS CoV2 PCR Negative     PHOSPHORUS - Normal     Phosphorus 3.2     PARTIAL THROMBOPLASTIN TIME - Normal    aPTT 32     MAGNESIUM - Normal    Magnesium 1.8     TYPE AND SCREEN, ADULT    ABO/RH(D) AB POS      Antibody Screen Negative      SPECIMEN EXPIRATION DATE [id number]     PREPARE PHERESED PLATELETS (UNIT)    Blood Component Type Platelets      Product Code V7761M62      Unit Status Transfused      Unit Number [id number]      CODING SYSTEM [id number]      ISSUE DATE AND TIME [id number]      UNIT ABO/RH A+      UNIT TYPE ISBT 6200     PREPARE PHERESED PLATELETS (UNIT)    ISSUE DATE AND TIME [id number]      Blood Component Type Platelets      Product Code I1481W97      Unit Status Transfused      Unit Number [id number]      UNIT ABO/RH A+      CODING SYSTEM [id number]      UNIT TYPE ISBT 6200     PREPARE PHERESED PLATELETS (UNIT)   BLOOD CULTURE   BLOOD CULTURE   TRANSFUSE PHERESED PLATELETS (UNIT)   TRANSFUSE PHERESED PLATELETS (UNIT)   ABO/RH TYPE AND SCREEN         RADIOLOGY    Abdomen US, limited (RUQ only)   Final Result   IMPRESSION:   1.  Nonshadowing area at neck of gallbladder, most likely representing the stones identified on CT.  Sludge within the gallbladder.  No gallbladder wall thickening, intra/extra-hepatic biliary ductal dilatation or sonographic Kwan's sign.   2.  Hepatomegaly.  Diffuse fatty infiltration of the liver.   3.  Limited study.            Head CT w/o contrast   Final Result   IMPRESSION:   1.  Stable volume of the bilateral subdural hemorrhages.   2.  Stable associated mass effect.                        Cervical spine CT w/o contrast   Final Result   IMPRESSION:   1.  No fracture or posttraumatic subluxation.         CT Abdomen Pelvis w Contrast   Final Result   IMPRESSION:    1.  Cholelithiasis. The gallbladder is distended. No definite surrounding inflammation. Further evaluation with ultrasound or hepatobiliary scan is suggested.   2.  Diffuse fatty infiltration of the liver.   3.  Colonic diverticula without acute  diverticulitis. No bowel obstruction or inflammation.   4.  Single left renal stone. No ureteral stone or hydronephrosis.      Head CT w/o contrast   Final Result   Abnormal   IMPRESSION:   1.  Mixed attenuation likely acute on subacute left convexity subdural hemorrhage measuring up to 17 mm.      2.  Likely subacute right convexity subdural hematoma measuring 7 mm in thickness.      3.  3 mm left right midline shift.      4.  Stable age-related change.         [Critical Result: Subdural hematomas and mass effect]      Finding was identified on 11/2/2022 11:22 PM.       Dr. Shore was contacted by me on 11/2/2022 11:25 PM and verbalized understanding of the critical result.           DISCHARGE MEDS  New Prescriptions    No medications on file         Gordo Jefferson MD  Emergency Medicine  [facility] EMERGENCY DEPARTMENT  [address] [place] [id number]  [phone]       Gordo Jefferson MD  11/03/22 1111       Gordo Jefferson MD  11/03/22 1631       Gordo Jefferson MD  11/03/22 2832

## 2022-11-03 NOTE — H&P
"[facility]    History and Physical - Hospitalist Service       Date of Admission:  11/2/2022    Assessment & Plan   Chief Complaint   Weakness and dizziness with multiple falls    History is obtained from the patient, electronic health record and emergency department physician    History of Present Illness   52 year old male of severe alcohol use disorder, diverticulitis, hepatic steatosis, thiamine deficiency, mood disorder, hypoglycemia, frequent falls striking head who presents to this ED via walk in with daughter for evaluation of ongoing alcohol abuse, dizziness and weakness.      Patient reportes that leg weakness, dizziness, and balance problems have been ongoing for the past 3 years, but have recently been worse as a result from drinking alcohol. Patient reports abstaining from alcohol for four months in the past, but has been drinking since May 2022. He reports at one pint in the past he was drinking 1.75 L of alcohol every 2 days, but now reports drinking 1.75 L of alcohol every one week.   Patient reports his legs \"turn to rubber\" when he drinks. His last drink was on the day of admission.  He reports getting \"antsy\" when he experiences withdrawal symptoms.     Daughter reports the patient fell on 10/30/2022. He has bruising on his hands and back from previous falls, but he reportedly did not sustain any injuries from his most recent fall (fell on carpet). Patient reports collapsing 50 times and has 4 broken ribs. Daughter reports there are lots of holes in the drywall of his house due to his head hitting the wall. Patient also reports he needs his gallbladder removed. Patient denies abdominal pain, back pain, and headache.      Principal Problem:    Subdural hematoma: Chronic left subdural hematoma with subacute right subdural hematoma and left to right midline shift of 3 mm.  Patient has been assessed by [facility] neurosurgery and they recommend transfer to a higher level of " care.  Repeat CT pending this morning.  Patient n.p.o., on Keppra IV twice daily.  Active Problems:     Hypothyroidism: Continue home dose levothyroxine    Falls frequently: Fall precautions, PT OT    LFT elevation    Alcohol use disorder, severe, dependence: Consult  for assistance in alcohol recovery programs    Other neutropenia (H): Likely due to bone marrow suppression from alcohol    Metabolic acidosis, increased anion gap: Lactic acidosis due to alcoholic ketosis.  Aggressive fluid rehydration and recheck in the a.m.    Thrombocytopenia (H): Concerning for significant cirrhosis    Review of Systems    The 5 point Review of Systems is negative other than noted in the HPI or here.    Code Status:  Full Code      Diet: Orders Placed This Encounter      NPO per Anesthesia Guidelines for Procedure/Surgery Except for: Meds, Ice Chips      DVT prophylaxis:    Medical:  early ambulation    Mechanical:  PCD's    Triana Catheter: Not present  Central Lines/Port-a-cath: Not present  Drains: Not present    Disposition Plan   Expected discharge:    Expected Discharge Date: 11/03/2022,  3:00 PM         recommended to Pending once adequate pain management/ tolerating PO medications, mental status at baseline and safe disposition plan/ TCU bed available.    The patient's care was discussed with the Patient.    Mikael Ramachandran MD  [facility]  Securely message with the [employer] Web Console (learn more here)  Text page via [employer] Paging/Directory         Clinically Significant Risk Factors Present on Admission        _____________________________________________________________________    Medical History  I have reviewed this patient's medical history and updated it with pertinent information if needed.  Past Medical History:   Diagnosis Date     Alcohol use disorder, severe, dependence (H) 2/24/2022       Surgical History   I have reviewed this patient's surgical history and updated it with  pertinent information if needed.  No past surgical history on file.    Social History   I have reviewed this patient's social history and updated it with pertinent information if needed.  Social History     Tobacco Use     Smoking status: Never     Smokeless tobacco: Former   Substance Use Topics     Alcohol use: Yes     Drug use: No       Family History   I have reviewed this patient's family history and updated it with pertinent information if needed.  No family history on file.    Prior to Admission Medications   No current facility-administered medications on file prior to encounter.  acetaminophen (TYLENOL) 500 MG tablet, [ACETAMINOPHEN (TYLENOL) 500 MG TABLET] Take 1-2 tablets (500-1,000 mg total) by mouth every 6 (six) hours as needed for pain.  folic acid (FOLVITE) 1 MG tablet, Take 1 tablet (1 mg) by mouth daily  levothyroxine (SYNTHROID/LEVOTHROID) 175 MCG tablet, Take 175 mcg by mouth daily  multivitamin w/minerals (THERA-VIT-M) tablet, Take 1 tablet by mouth daily  psyllium (METAMUCIL) 3.4 gram packet, [PSYLLIUM (METAMUCIL) 3.4 GRAM PACKET] Take 1 packet by mouth daily.  traZODone (DESYREL) 50 MG tablet, Take 50 mg by mouth nightly as needed for sleep  fenofibrate (TRICOR) 48 MG tablet, [FENOFIBRATE (TRICOR) 48 MG TABLET] Take 1 tablet (48 mg total) by mouth daily. (Patient not taking: Reported on 11/2/2022)  gabapentin (NEURONTIN) 100 MG capsule, Take 1 capsule (100 mg) by mouth 2 times daily (Patient not taking: Reported on 11/2/2022)  hydrOXYzine (ATARAX) 50 MG tablet, [HYDROXYZINE (ATARAX) 50 MG TABLET] Take 50 mg by mouth at bedtime as needed for itching. (Patient not taking: Reported on 11/2/2022)  magnesium oxide (MAG-OX) 400 MG tablet, Take 1 tablet (400 mg) by mouth 2 times daily (Patient not taking: Reported on 11/2/2022)  naltrexone (DEPADE/REVIA) 50 MG tablet, Take 1 tablet (50 mg) by mouth At Bedtime (Patient not taking: Reported on 11/2/2022)  thiamine (B-1) 100 MG tablet, Take 1 tablet  "(100 mg) by mouth daily (Patient not taking: Reported on 11/2/2022)        Allergies    No Known Allergies    Physical Exam   Vital signs:  Temp: 98.7  F (37.1  C) Temp src: Oral BP: 95/69 Pulse: 93   Resp: 12 SpO2: 98 %     Height: 188 cm (6' 2\") Weight: 88.5 kg (195 lb)  Estimated body mass index is 25.04 kg/m  as calculated from the following:    Height as of this encounter: 1.88 m (6' 2\").    Weight as of this encounter: 88.5 kg (195 lb).    General: in no apparent distress and well developed and well nourished male lying in hospital bed oriented to person and place  HEENT: Head normocephalic atraumatic, oral mucosa moist. Sclerae anicteric  CV: Regular rhythm, normal rate, no murmurs  Resp: No wheezes, no rales or rhonchi, no focal consolidations  GI: Belly soft, nondistended, nontender, bowel sounds present  Skin: No rashes or lesions  Extremities: No peripheral edema  Psych: Normal affect, mood euthymic  Neuro: Grossly normal    Data   Data reviewed today: I reviewed all medications, new labs and imaging results over the last 24 hours.  Recent Labs   Lab 11/02/22 2150 11/02/22  2104   WBC 3.3*  --    HGB 12.9*  --    *  --    PLT 82*  --    INR  --  1.17*   *  --    POTASSIUM 3.9  --    CHLORIDE 90*  --    CO2 19*  --    BUN 8.7  --    CR 0.74  --    ANIONGAP 25*  --    ABDULKADIR 8.5*  --    GLC 91  --    ALBUMIN 4.0  --    PROTTOTAL 6.7  --    BILITOTAL 6.9*  --    ALKPHOS 261*  --    ALT 99*  --      Recent Results (from the past 24 hour(s))   Head CT w/o contrast   Result Value    Radiologist flags Subdural hematomas and mass effect (AA)    Narrative    EXAM: CT HEAD W/O CONTRAST  LOCATION: [facility]  DATE/TIME: 11/2/2022 11:14 PM    INDICATION: Frequent falls, dizziness, suspected coagulopathy from liver disease.  COMPARISON: 02/18/2022.  TECHNIQUE: Routine CT Head without IV contrast. Multiplanar reformats. Dose reduction techniques were used.    FINDINGS:  INTRACRANIAL " CONTENTS: Mixed low-attenuation left convexity subdural collection measures up to 17 mm in thickness. It is well-defined with layering hyperdense hemorrhage laterally and along its posterior margin. Additional subacute-appearing right   convexity subdural hematoma measures up to 7 mm in thickness. Mild left right midline shift of 3 mm. No parenchymal hemorrhage. Mild volume loss and presumed chronic small vessel ischemia are stable. No CT evidence of acute infarct.    VISUALIZED ORBITS/SINUSES/MASTOIDS: No intraorbital abnormality. No paranasal sinus mucosal disease. No middle ear or mastoid effusion.    BONES/SOFT TISSUES: No acute abnormality.      Impression    IMPRESSION:  1.  Mixed attenuation likely acute on subacute left convexity subdural hemorrhage measuring up to 17 mm.    2.  Likely subacute right convexity subdural hematoma measuring 7 mm in thickness.    3.  3 mm left right midline shift.    4.  Stable age-related change.      [Critical Result: Subdural hematomas and mass effect]    Finding was identified on 11/2/2022 11:22 PM.     Dr. Shore was contacted by me on 11/2/2022 11:25 PM and verbalized understanding of the critical result.    CT Abdomen Pelvis w Contrast    Narrative    EXAM: CT ABDOMEN PELVIS W CONTRAST  LOCATION: [facility]  DATE/TIME: 11/2/2022 11:18 PM    INDICATION: R flank contusions, falls, weakness  COMPARISON: 9/30/2019.  TECHNIQUE: CT scan of the abdomen and pelvis was performed following injection of IV contrast. Multiplanar reformats were obtained. Dose reduction techniques were used.  CONTRAST: 100ml isovue 370    FINDINGS:   LOWER CHEST: Mild dependent atelectasis at the right lung base. The heart size is normal.    HEPATOBILIARY: There is diffuse fatty infiltration of the liver. Multiple stones in the gallbladder. The gallbladder is distended. No definite surrounding inflammation.    PANCREAS: Normal.    SPLEEN: Normal.    ADRENAL GLANDS:  Normal.    KIDNEYS/BLADDER: There is a single 2 mm stone in the lower pole of the left kidney. The kidneys otherwise appear normal. No hydronephrosis.    BOWEL: There are colonic diverticula without acute diverticulitis. There is no bowel obstruction or inflammation. The appendix is within normal limits. No free intraperitoneal gas or fluid.    LYMPH NODES: Normal.    VASCULATURE: Atherosclerotic calcification of the aorta and its branches. No aneurysm.    PELVIC ORGANS: Normal.    MUSCULOSKELETAL: Postoperative changes in the right inguinal region.      Impression    IMPRESSION:   1.  Cholelithiasis. The gallbladder is distended. No definite surrounding inflammation. Further evaluation with ultrasound or hepatobiliary scan is suggested.  2.  Diffuse fatty infiltration of the liver.  3.  Colonic diverticula without acute diverticulitis. No bowel obstruction or inflammation.  4.  Single left renal stone. No ureteral stone or hydronephrosis.   Cervical spine CT w/o contrast    Narrative    EXAM: CT CERVICAL SPINE W/O CONTRAST  LOCATION: [facility]  DATE/TIME: 11/3/2022 12:00 AM    INDICATION: AMS, EtOH, head injury.  COMPARISON: 02/18/2022.  TECHNIQUE: Routine CT Cervical Spine without IV contrast. Multiplanar reformats. Dose reduction techniques were used.    FINDINGS:  VERTEBRA: Slight degenerative retrolisthesis C4 on C5. Alignment is otherwise normal. No acute cervical spine fracture or posttraumatic subluxation. Disc space heights are preserved. Mild multilevel facet arthropathy.    CANAL/FORAMINA: No canal or neural foraminal stenosis.    PARASPINAL: No extraspinal abnormality.      Impression    IMPRESSION:  1.  No fracture or posttraumatic subluxation.

## 2022-11-03 NOTE — ED NOTES
EMERGENCY DEPARTMENT SIGN OUT NOTE        ED COURSE AND MEDICAL DECISION MAKING  Patient was signed out to me by Dr Gautam Jefferson at 3:07 PM.    In brief, Getachew Barcenas is a 52 year old male who initially presented for evaluation of dizziness and alcohol problem. Patient presented with leg weakness, dizziness, balance problems that have worsened recently. Patient also reported he has been drinking heavily since May 2022.    Here in the ED found to have bilateral subdurals, acute on subacute, with 3 mm change. Neurology consulted, repeat head CT at 0700 showed subdurals and midline shift are stable. Previous attempts for transfer to [facility], [facility], and [facility], but no beds available.    At time of sign out, disposition was pending transfer for ICU placement.    5:22 PM Updated by charge nurse, no neuro ICU beds available in the [facility]. Call placed to intensivist at [facility] to discuss admission.  5:30 PM Updated the patient's family.  5:36 PM Update drom charge nurse, intensivist refusing to admit patient as they fell [facility] is not an appropriate place for the patient's care.  7:59 PM I spoke with the intensivist, Dr. Carlin. We discussed the patient's case and they agree to admit the patient.  Patient informed of plan.  Patient resting comfortably.  No evidence of withdrawal.  No reports of headache.  Patient alert and appropriate.    Patient represents critical care situation.  Proximately 30 minutes spent directly involved patient's care independent of any procedures.      FINAL IMPRESSION    1. Subdural hematoma    2. Alcoholic intoxication with complication (H)    3. Anemia, unspecified type    4. Hypomagnesemia    5. Alcohol withdrawal syndrome with complication (H)    6. Gallstones    7. Elevated bilirubin    8. Dilated cbd, acquired        ED MEDS  Medications   cloNIDine (CATAPRES) tablet 0.1 mg (0.1 mg Oral Not Given 11/3/22 1948)   OLANZapine zydis (zyPREXA) ODT tab 5-10 mg (has no  administration in time range)     Or   haloperidol lactate (HALDOL) injection 2.5-5 mg (has no administration in time range)   flumazenil (ROMAZICON) injection 0.2 mg (has no administration in time range)   melatonin tablet 5 mg (has no administration in time range)   LORazepam (ATIVAN) tablet 1-2 mg ( Oral See Alternative 11/3/22 1105)     Or   LORazepam (ATIVAN) injection 1-2 mg (1 mg Intravenous Given 11/3/22 1105)   thiamine (B-1) tablet 100 mg (has no administration in time range)   folic acid (FOLVITE) tablet 1 mg (has no administration in time range)   multivitamin w/minerals (THERA-VIT-M) tablet 1 tablet (has no administration in time range)   levETIRAcetam (KEPPRA) 1,000 mg in sodium chloride 0.9 % 100 mL intermittent infusion (0 mg Intravenous Stopped 11/3/22 1211)   sodium chloride 0.9% infusion ( Intravenous New Bag 11/3/22 1956)   acetaminophen (TYLENOL) tablet 650 mg (has no administration in time range)   ondansetron (ZOFRAN) injection 4 mg (has no administration in time range)   levothyroxine (SYNTHROID/LEVOTHROID) tablet 175 mcg (175 mcg Oral Given 11/3/22 0657)   magnesium oxide (MAG-OX) tablet 400 mg (400 mg Oral Given 11/3/22 1959)   traZODone (DESYREL) tablet 50 mg (has no administration in time range)   lidocaine 1 % 0.1-5 mL (2.5 mLs Other Given 11/3/22 1351)   lidocaine (LMX4) cream (has no administration in time range)   sodium chloride (PF) 0.9% PF flush 10-40 mL (has no administration in time range)   sodium chloride (PF) 0.9% PF flush 10-20 mL (has no administration in time range)   sodium chloride (PF) 0.9% PF flush 10-40 mL (10 mLs Intracatheter Not Given 11/3/22 1528)   sodium chloride (PF) 0.9% PF flush 10-40 mL (has no administration in time range)   0.9% sodium chloride BOLUS (0 mLs Intravenous Stopped 11/2/22 2244)   folic acid injection 1 mg (1 mg Intravenous Given 11/2/22 3579)   thiamine (B-1) injection 100 mg (100 mg Intravenous Given 11/2/22 2328)   iopamidol (ISOVUE-370)  solution 100 mL (100 mLs Intravenous Given 11/2/22 2319)   levETIRAcetam (KEPPRA) 1,000 mg in sodium chloride 0.9 % 100 mL intermittent infusion (0 mg Intravenous Stopped 11/3/22 0045)   magnesium sulfate 2 g in water intermittent infusion (0 g Intravenous Stopped 11/3/22 0740)   0.9% sodium chloride BOLUS (0 mLs Intravenous Stopped 11/3/22 0740)   0.9% sodium chloride BOLUS (0 mLs Intravenous Stopped 11/3/22 1009)   piperacillin-tazobactam (ZOSYN) 3.375 g vial to attach to  mL bag (3.375 g Intravenous Given 11/3/22 1019)   0.9% sodium chloride BOLUS (0 mLs Intravenous Stopped 11/3/22 1045)       LAB  Labs Ordered and Resulted from Time of ED Arrival to Time of ED Departure   INR - Abnormal       Result Value    INR 1.17 (*)    COMPREHENSIVE METABOLIC PANEL - Abnormal    Sodium 134 (*)     Potassium 3.9      Chloride 90 (*)     Carbon Dioxide (CO2) 19 (*)     Anion Gap 25 (*)     Urea Nitrogen 8.7      Creatinine 0.74      Calcium 8.5 (*)     Glucose 91      Alkaline Phosphatase 261 (*)     AST        ALT 99 (*)     Protein Total 6.7      Albumin 4.0      Bilirubin Total 6.9 (*)     GFR Estimate >90     MAGNESIUM - Abnormal    Magnesium 1.6 (*)    ETHYL ALCOHOL LEVEL - Abnormal    Alcohol ethyl 0.31 (*)    TSH WITH FREE T4 REFLEX - Abnormal    TSH 36.09 (*)    CBC WITH PLATELETS AND DIFFERENTIAL - Abnormal    WBC Count 3.3 (*)     RBC Count 3.64 (*)     Hemoglobin 12.9 (*)     Hematocrit 37.6 (*)      (*)     MCH 35.4 (*)     MCHC 34.3      RDW 11.9      Platelet Count 82 (*)     % Neutrophils 33      % Lymphocytes 52      % Monocytes 12      % Eosinophils 0      % Basophils 2      % Immature Granulocytes 1      NRBCs per 100 WBC 0      Absolute Neutrophils 1.1 (*)     Absolute Lymphocytes 1.7      Absolute Monocytes 0.4      Absolute Eosinophils 0.0      Absolute Basophils 0.1      Absolute Immature Granulocytes 0.0      Absolute NRBCs 0.0     T4 FREE - Abnormal    Free T4 0.81 (*)    MAGNESIUM -  Abnormal    Magnesium 1.5 (*)    LACTIC ACID WHOLE BLOOD - Abnormal    Lactic Acid 4.3 (*)    PLATELET FUNCTION CLOSURE WITH REFLEX - Abnormal    PFA-Col/Epi 214 (*)    BASIC METABOLIC PANEL - Abnormal    Sodium 138      Potassium 3.7      Chloride 97 (*)     Carbon Dioxide (CO2) 20 (*)     Anion Gap 21 (*)     Urea Nitrogen 7.3      Creatinine 0.57 (*)     Calcium 7.8 (*)     Glucose 75      GFR Estimate >90     CBC WITH PLATELETS - Abnormal    WBC Count 2.6 (*)     RBC Count 3.20 (*)     Hemoglobin 11.4 (*)     Hematocrit 33.0 (*)      (*)     MCH 35.6 (*)     MCHC 34.5      RDW 11.9      Platelet Count 67 (*)    HEPATIC FUNCTION PANEL - Abnormal    Protein Total 6.1 (*)     Albumin 3.6      Bilirubin Total 6.3 (*)     Alkaline Phosphatase 222 (*)      (*)     ALT 89 (*)     Bilirubin Direct 4.58 (*)    LACTIC ACID WHOLE BLOOD - Abnormal    Lactic Acid 3.9 (*)    PLATELET FUNCTION CLOSURE ADP - Abnormal    PFA-Col/ (*)    COVID-19 VIRUS (CORONAVIRUS) BY PCR - Normal    SARS CoV2 PCR Negative     PHOSPHORUS - Normal    Phosphorus 3.2     PARTIAL THROMBOPLASTIN TIME - Normal    aPTT 32     MAGNESIUM - Normal    Magnesium 1.8     TYPE AND SCREEN, ADULT    ABO/RH(D) AB POS      Antibody Screen Negative      SPECIMEN EXPIRATION DATE [id number]     PREPARE PHERESED PLATELETS (UNIT)    Blood Component Type Platelets      Product Code K4440C48      Unit Status Transfused      Unit Number [id number]      CODING SYSTEM [id number]      ISSUE DATE AND TIME [id number]      UNIT ABO/RH A+      UNIT TYPE ISBT 6200     PREPARE PHERESED PLATELETS (UNIT)    ISSUE DATE AND TIME [id number]      Blood Component Type Platelets      Product Code R2790O82      Unit Status Transfused      Unit Number [id number]      UNIT ABO/RH A+      CODING SYSTEM [id number]      UNIT TYPE ISBT 6200     PREPARE PHERESED PLATELETS (UNIT)   BLOOD CULTURE   BLOOD CULTURE   TRANSFUSE PHERESED PLATELETS (UNIT)   TRANSFUSE  PHERESED PLATELETS (UNIT)   ABO/RH TYPE AND SCREEN         RADIOLOGY    Abdomen US, limited (RUQ only)   Final Result   IMPRESSION:   1.  Nonshadowing area at neck of gallbladder, most likely representing the stones identified on CT.  Sludge within the gallbladder.  No gallbladder wall thickening, intra/extra-hepatic biliary ductal dilatation or sonographic Kwan's sign.   2.  Hepatomegaly.  Diffuse fatty infiltration of the liver.   3.  Limited study.            Head CT w/o contrast   Final Result   IMPRESSION:   1.  Stable volume of the bilateral subdural hemorrhages.   2.  Stable associated mass effect.                        Cervical spine CT w/o contrast   Final Result   IMPRESSION:   1.  No fracture or posttraumatic subluxation.         CT Abdomen Pelvis w Contrast   Final Result   IMPRESSION:    1.  Cholelithiasis. The gallbladder is distended. No definite surrounding inflammation. Further evaluation with ultrasound or hepatobiliary scan is suggested.   2.  Diffuse fatty infiltration of the liver.   3.  Colonic diverticula without acute diverticulitis. No bowel obstruction or inflammation.   4.  Single left renal stone. No ureteral stone or hydronephrosis.      Head CT w/o contrast   Final Result   Abnormal   IMPRESSION:   1.  Mixed attenuation likely acute on subacute left convexity subdural hemorrhage measuring up to 17 mm.      2.  Likely subacute right convexity subdural hematoma measuring 7 mm in thickness.      3.  3 mm left right midline shift.      4.  Stable age-related change.         [Critical Result: Subdural hematomas and mass effect]      Finding was identified on 11/2/2022 11:22 PM.       Dr. Shore was contacted by me on 11/2/2022 11:25 PM and verbalized understanding of the critical result.           DISCHARGE MEDS  New Prescriptions    No medications on file         I, Conner Mathur, am serving as a scribe to document services personally performed by Mikael Tapia MD based on my  observations and the provider's statements to me.  I, Mikael Tapia MD, attest that Conner Mathur is acting in a scribe capacity, has observed my performance of the services and has documented them in accordance with my direction.      Mikael Tapia MD  [facility] EMERGENCY DEPARTMENT  [address] [place] [id number]  [phone]     Mikael Tapia MD  11/03/22 2005

## 2022-11-03 NOTE — ED PROVIDER NOTES
EMERGENCY DEPARTMENT ENCOUNTER      NAME: Getachew Barcenas  AGE: 52 year old male  YOB: 1970  MRN: [id number]  EVALUATION DATE & TIME: 2022  9:21 PM    PCP: No Ref-Primary, Physician    ED PROVIDER: Ephraim Shore M.D.      Chief Complaint   Patient presents with     Alcohol Problem     Dizziness     failure to thrive         FINAL IMPRESSION:  1. Subdural hematoma    2. Alcoholic intoxication with complication (H)          ED COURSE & MEDICAL DECISION MAKIN year old male presents to the Emergency Department for evaluation of altered mental status and weakness.  He has a longstanding history of alcohol abuse.  He has had multiple falls at home.  He has sequelae of liver disease with scattered ecchymosis to his upper extremities and scleral icterus.  He is also intoxicated to 0.31.  He has had multiple falls at home prompting head CT which was concerning for subacute subdural hematomas more pronounced on the left side measuring 17 mm with some slight mass-effect.  He also has bruising to his right flank prompting abdominal CT otherwise negative.  Cervical spine imaging also completed and is negative.  I reviewed the case with neurosurgery at the [facility] as well as with the on-call JAYNE for community neurosurgery with [facility].  The patient has some coagulopathy of chronic liver disease.  The bleeding here was felt to be more subacute on chronic.  His intoxication also somewhat clouds his exam although he is completely oriented and extremity movement seems symmetric.  There was not felt to be an indication for immediate neurosurgical intervention, likely patient will require repeat CT scan and if stable, can be managed for his other medical comorbidities including anticipated alcohol withdrawal.  Consulted with the hospitalist here to initiate CIWA protocol.  He was given IV fluid, thiamine and folate.  He was loaded with Keppra.    Patient with SDH and mass effect  does not meet admission criteria for our level 4 trauma center.  He does not require acute neurosurgical intervention and so unfortunately  given the crisis capacity issues throughout the entire system, there is no capable center immediately available to accept him.  I did also [place] [facility] and both of these are also unable to accommodate him at this time due to crisis capacity problems.  Patient will be boarding in the emergency department with a hospitalist consult.  He is on a wait list at the [facility].  There is a repeat head CT time for 6 AM.  I did rediscuss things with the neurosurgical JAYNE and they will consult on the patient's care if he remains in the emergency department today.  I will update the oncoming ED provider to continue his evaluation.  I did try to call the patient's daughter James, she did not  for an update.      9:37 PM I met with the patient to gather history and to perform my initial exam. We discussed plans for the ED course, including diagnostic testing and treatment.    11:28 PM I spoke with [facility].  11:41 PM I spoke with neurosurgery, [place] English.  12:03 AM I spoke with neurosurgery, Dr. Walsh.  12:43 AM I spoke with [facility] to accomodate transfer.  1:12 AM I spoke with the hospitalist, Dr. Ramachandran.  1:27 AM I spoke with the [facility].  1:31 AM I spoke with the hospitalist, Dr. Ramachandran.      Critical Care  Performed by: Ephraim Shore MD  Authorized by: Ephraim Shore MD     Total critical care time: 60 minutes  Critical care time was exclusive of separately billable procedures and treating other patients.  Critical care was necessary to treat or prevent imminent or life-threatening deterioration of the following conditions:   Critical care was time spent personally by me on the following activities: development of treatment plan with patient or surrogate, discussions with consultants, examination of patient, evaluation  of patient's response to treatment, obtaining history from patient or surrogate, ordering and performing treatments and interventions, ordering and review of laboratory studies, ordering and review of radiographic studies and re-evaluation of patient's condition, this excludes any separately billable procedures.        MEDICATIONS GIVEN IN THE EMERGENCY:  Medications   cloNIDine (CATAPRES) tablet 0.1 mg (has no administration in time range)   OLANZapine zydis (zyPREXA) ODT tab 5-10 mg (has no administration in time range)     Or   haloperidol lactate (HALDOL) injection 2.5-5 mg (has no administration in time range)   flumazenil (ROMAZICON) injection 0.2 mg (has no administration in time range)   melatonin tablet 5 mg (has no administration in time range)   LORazepam (ATIVAN) tablet 1-2 mg (has no administration in time range)     Or   LORazepam (ATIVAN) injection 1-2 mg (has no administration in time range)   thiamine (B-1) tablet 100 mg (has no administration in time range)   folic acid (FOLVITE) tablet 1 mg (has no administration in time range)   multivitamin w/minerals (THERA-VIT-M) tablet 1 tablet (has no administration in time range)   levETIRAcetam (KEPPRA) 1,000 mg in sodium chloride 0.9 % 100 mL intermittent infusion (has no administration in time range)   sodium chloride 0.9% infusion ( Intravenous New Bag 11/3/22 0235)   acetaminophen (TYLENOL) tablet 650 mg (has no administration in time range)   ondansetron (ZOFRAN) injection 4 mg (has no administration in time range)   magnesium sulfate 2 g in water intermittent infusion (has no administration in time range)   0.9% sodium chloride BOLUS (0 mLs Intravenous Stopped 11/2/22 2244)   folic acid injection 1 mg (1 mg Intravenous Given 11/2/22 2327)   thiamine (B-1) injection 100 mg (100 mg Intravenous Given 11/2/22 2328)   iopamidol (ISOVUE-370) solution 100 mL (100 mLs Intravenous Given 11/2/22 2319)   levETIRAcetam (KEPPRA) 1,000 mg in sodium chloride 0.9 %  "100 mL intermittent infusion (0 mg Intravenous Stopped 11/3/22 0045)       NEW PRESCRIPTIONS STARTED AT TODAY'S ER VISIT  New Prescriptions    No medications on file          =================================================================    HPI    Patient information was obtained from: patient and daughter    Use of : N/A        Getachew Barcenas is a 52 year old male with a pertinent history of alcohol use disorder, diverticulitis, hepatic steatosis, thiamine deficiency, mood disorder, right forearm cellulitis, and hypoglycemia who presents to this ED via walk in with daughter for evaluation of alcohol problem and dizziness.     Patient presents with complaints of leg weakness, dizziness, and balance problems that have been ongoing for the past 3 years, but have recently been worse as a result from drinking alcohol. Patient reports abstaining from alcohol for four months in the past, but has been drinking since May 2022. He reports he use to drink 1.75 L of alcohol every 2 days, but now reports drinking 1.75 L of alcohol every one week. Patient reports his legs \"turn to rubber\" when he drinks. His last drink was today. He reports getting \"adams\" when he experiences withdrawal symptoms.    Daughter reports the patient fell on 10/30/2022. He has bruising on his hands and back from previous falls, but he reportedly did not sustain any injuries from his most recent fall (fell on carpet). Patient reports collapsing 50 times and has 4 broken ribs. Daughter reports there are lots of holes in the drywall of his house due to his head hitting the wall. Patient also reports he needs his gallbladder removed. Patient denies abdominal pain, back pain, and headache.     Patient does not report of any other medical concerns or complaints at this time.      HPI limited due to: Alcohol intoxication    REVIEW OF SYSTEMS   All systems reviewed and negative except as noted in HPI.    ROS limited due to: Alcohol " intoxication    PAST MEDICAL HISTORY:  Past Medical History:   Diagnosis Date     Alcohol use disorder, severe, dependence (H) 2/24/2022       PAST SURGICAL HISTORY:  No past surgical history on file.        CURRENT MEDICATIONS:    Current Facility-Administered Medications   Medication     acetaminophen (TYLENOL) tablet 650 mg     cloNIDine (CATAPRES) tablet 0.1 mg     flumazenil (ROMAZICON) injection 0.2 mg     [START ON 11/4/2022] folic acid (FOLVITE) tablet 1 mg     OLANZapine zydis (zyPREXA) ODT tab 5-10 mg    Or     haloperidol lactate (HALDOL) injection 2.5-5 mg     levETIRAcetam (KEPPRA) 1,000 mg in sodium chloride 0.9 % 100 mL intermittent infusion     LORazepam (ATIVAN) tablet 1-2 mg    Or     LORazepam (ATIVAN) injection 1-2 mg     magnesium sulfate 2 g in water intermittent infusion     melatonin tablet 5 mg     [START ON 11/4/2022] multivitamin w/minerals (THERA-VIT-M) tablet 1 tablet     ondansetron (ZOFRAN) injection 4 mg     sodium chloride 0.9% infusion     [START ON 11/4/2022] thiamine (B-1) tablet 100 mg     Current Outpatient Medications   Medication     acetaminophen (TYLENOL) 500 MG tablet     folic acid (FOLVITE) 1 MG tablet     levothyroxine (SYNTHROID/LEVOTHROID) 175 MCG tablet     multivitamin w/minerals (THERA-VIT-M) tablet     psyllium (METAMUCIL) 3.4 gram packet     traZODone (DESYREL) 50 MG tablet     fenofibrate (TRICOR) 48 MG tablet     gabapentin (NEURONTIN) 100 MG capsule     hydrOXYzine (ATARAX) 50 MG tablet     magnesium oxide (MAG-OX) 400 MG tablet     naltrexone (DEPADE/REVIA) 50 MG tablet     thiamine (B-1) 100 MG tablet         ALLERGIES:  No Known Allergies    FAMILY HISTORY:  No family history on file.    SOCIAL HISTORY:   Social History     Socioeconomic History     Marital status:    Tobacco Use     Smoking status: Never     Smokeless tobacco: Former   Substance and Sexual Activity     Alcohol use: Yes     Drug use: No       VITALS:  BP 95/69   Pulse 93   Temp  "98.7  F (37.1  C) (Oral)   Resp 12   Ht 1.88 m (6' 2\")   Wt 88.5 kg (195 lb)   SpO2 98%   BMI 25.04 kg/m      PHYSICAL EXAM    Constitutional: Disheveled appearing middle-age male patient, laying in bed, no acute distress  HENT: Normocephalic, Atraumatic. Neck Supple. No midline c-spine tenderness.  Eyes: EOMI, Conjunctiva normal.  Respiratory: Breathing comfortably on room air. Speaks full sentences easily. Lungs clear to ascultation.  Cardiovascular: Normal heart rate, Regular rhythm. No peripheral edema.  Abdomen: Soft, nontender  Musculoskeletal: There is ecchymosis to the right side low back and flank.  No midline lumbar or thoracic spine tenderness.  Integument: Warm, Dry.  Neurologic: Alert & awake, Normal motor function, Normal sensory function, No focal deficits noted.   Psychiatric: Cooperative. Affect appropriate.     LAB:  All pertinent labs reviewed and interpreted.  Labs Ordered and Resulted from Time of ED Arrival to Time of ED Departure   INR - Abnormal       Result Value    INR 1.17 (*)    COMPREHENSIVE METABOLIC PANEL - Abnormal    Sodium 134 (*)     Potassium 3.9      Chloride 90 (*)     Carbon Dioxide (CO2) 19 (*)     Anion Gap 25 (*)     Urea Nitrogen 8.7      Creatinine 0.74      Calcium 8.5 (*)     Glucose 91      Alkaline Phosphatase 261 (*)     AST        ALT 99 (*)     Protein Total 6.7      Albumin 4.0      Bilirubin Total 6.9 (*)     GFR Estimate >90     MAGNESIUM - Abnormal    Magnesium 1.6 (*)    ETHYL ALCOHOL LEVEL - Abnormal    Alcohol ethyl 0.31 (*)    TSH WITH FREE T4 REFLEX - Abnormal    TSH 36.09 (*)    CBC WITH PLATELETS AND DIFFERENTIAL - Abnormal    WBC Count 3.3 (*)     RBC Count 3.64 (*)     Hemoglobin 12.9 (*)     Hematocrit 37.6 (*)      (*)     MCH 35.4 (*)     MCHC 34.3      RDW 11.9      Platelet Count 82 (*)     % Neutrophils 33      % Lymphocytes 52      % Monocytes 12      % Eosinophils 0      % Basophils 2      % Immature Granulocytes 1      NRBCs per 100 " WBC 0      Absolute Neutrophils 1.1 (*)     Absolute Lymphocytes 1.7      Absolute Monocytes 0.4      Absolute Eosinophils 0.0      Absolute Basophils 0.1      Absolute Immature Granulocytes 0.0      Absolute NRBCs 0.0     T4 FREE - Abnormal    Free T4 0.81 (*)    MAGNESIUM - Abnormal    Magnesium 1.5 (*)    LACTIC ACID WHOLE BLOOD - Abnormal    Lactic Acid 4.3 (*)    PLATELET FUNCTION CLOSURE WITH REFLEX - Abnormal    PFA-Col/Epi 214 (*)    PLATELET FUNCTION CLOSURE ADP - Abnormal    PFA-Col/ (*)    COVID-19 VIRUS (CORONAVIRUS) BY PCR - Normal    SARS CoV2 PCR Negative     PHOSPHORUS - Normal    Phosphorus 3.2     PARTIAL THROMBOPLASTIN TIME - Normal    aPTT 32     BASIC METABOLIC PANEL   CBC WITH PLATELETS   HEPATIC FUNCTION PANEL   LACTIC ACID WHOLE BLOOD       RADIOLOGY:  Reviewed all pertinent imaging. Please see official radiology report.  Cervical spine CT w/o contrast   Final Result   IMPRESSION:   1.  No fracture or posttraumatic subluxation.         CT Abdomen Pelvis w Contrast   Final Result   IMPRESSION:    1.  Cholelithiasis. The gallbladder is distended. No definite surrounding inflammation. Further evaluation with ultrasound or hepatobiliary scan is suggested.   2.  Diffuse fatty infiltration of the liver.   3.  Colonic diverticula without acute diverticulitis. No bowel obstruction or inflammation.   4.  Single left renal stone. No ureteral stone or hydronephrosis.      Head CT w/o contrast   Final Result   Abnormal   IMPRESSION:   1.  Mixed attenuation likely acute on subacute left convexity subdural hemorrhage measuring up to 17 mm.      2.  Likely subacute right convexity subdural hematoma measuring 7 mm in thickness.      3.  3 mm left right midline shift.      4.  Stable age-related change.         [Critical Result: Subdural hematomas and mass effect]      Finding was identified on 11/2/2022 11:22 PM.       Dr. Shore was contacted by me on 11/2/2022 11:25 PM and verbalized understanding  of the critical result.       Head CT w/o contrast    (Results Pending)       EKG:    Performed at: 2052    Impression: Sinus tachycardia with PVCs, left axis deviation, incomplete RBBB    Rate: 122  Rhythm: Sinus  Axis: Leftward  ME Interval: 166  QRS Interval: 98  QTc Interval: 456  ST Changes: None significant  Comparison: Compared to February 18, 2022, PVCs and fusion complexes now present    I have independently reviewed and interpreted the EKG(s) documented above.        I, Derek Martinez, am serving as a scribe to document services personally performed by Dr. Ephraim Shore, based on my observation and the provider's statements to me. I, Ephraim Shore MD attest that Derek Martinez is acting in a scribe capacity, has observed my performance of the services and has documented them in accordance with my direction.    Ephraim Shore M.D.  Emergency Medicine  [facility] EMERGENCY DEPARTMENT  [address] [place] [id number]  [phone]  Dept: [phone]     Ephraim Shore MD  11/03/22 2047

## 2022-11-03 NOTE — PHARMACY-ADMISSION MEDICATION HISTORY
"Pharmacy Note - Admission Medication History    Pertinent Provider Information:   -Pt states only vitamin he is taking regularly is mvi   -Marked several meds on list as not taking, as pt reports not using them \"since he got out of treatment\". Please assess if these should be restarted  -pt states he is using something for pain and something for sleep, but no fills, and any meds I mentioned did not sound familiar to him, so unsure what these are. ______________________________________________________________________    Prior To Admission (PTA) med list completed and updated in EMR.       PTA Med List   Medication Sig Note Last Dose     acetaminophen (TYLENOL) 500 MG tablet [ACETAMINOPHEN (TYLENOL) 500 MG TABLET] Take 1-2 tablets (500-1,000 mg total) by mouth every 6 (six) hours as needed for pain.  More than a month     folic acid (FOLVITE) 1 MG tablet Take 1 tablet (1 mg) by mouth daily 11/2/2022: Only taking 1-2 times a month Past Month     levothyroxine (SYNTHROID/LEVOTHROID) 175 MCG tablet Take 175 mcg by mouth daily  11/2/2022     multivitamin w/minerals (THERA-VIT-M) tablet Take 1 tablet by mouth daily  11/2/2022     psyllium (METAMUCIL) 3.4 gram packet [PSYLLIUM (METAMUCIL) 3.4 GRAM PACKET] Take 1 packet by mouth daily.  More than a month     traZODone (DESYREL) 50 MG tablet Take 50 mg by mouth nightly as needed for sleep  Past Month       [facility]  Method of interview communication: in-person    Summary of Changes to PTA Med List  New: -  Discontinued: tricor, gabapentin, thiamine, vistaril, magnesium, depade  Changed: synthroid    Patient was asked about OTC/herbal products specifically.  PTA med list reflects this.    In the past week, patient estimated taking medication this percent of the time:  50-90% due to other.    Allergies were reviewed, assessed, and updated with the patient.      Patient does not use any multi-dose medications prior to " admission.    The information provided in this note is only as accurate as the sources available at the time of the update(s).    Thank you for the opportunity to participate in the care of this patient.    Chasiyt Robles, PharmD, BCPS 11/02/22 11:29 PM

## 2022-11-03 NOTE — PROCEDURES
"PICC Line Insertion Procedure Note    Pt. Name:   Getachew Barcenas  MRN:          [id number]    Procedure: Insertion of a  TRIPLE Lumen  5 fr  Bard SOLO (valved) Power PICC, Lot number [id number]    Indications: Medication Drips    Contraindications : None    Procedure Details:    Patient identified with 2 identifiers and \"Time Out\" conducted.    Central line insertion bundle followed: Hand hygiene performed prior to procedure, site cleansed with Cholraprep (CHG), hat, mask, sterile gloves, sterile gown worn, patient draped with maximum barrier head to toe drape, sterile field maintained.    The vein was assessed and found to be compressible and of adequate size.     Lidocaine 1% 2.5 ml administered SQ to the insertion site.     Modified Seldinger Technique (MST) used for insertion, ONE attempt(s) required to access vein.     A 5 Fr PICC was inserted into the BASILIC vein of the right upper arm.       Catheter threaded without difficulty. Good blood return noted.    Catheter was flushed with 30 cc normal saline.     Catheter secured with Statlock, tissue adhesive, Biopatch (CHG), and Tegaderm dressing applied.    The sharps that are included in the PICC insertion kit were accounted for and disposed of in the sharps container prior to breakdown of the sterile field.    CLABSI prevention brochure left at bedside.    Patient  tolerated procedure well.     Patient's primary RN notified PICC is ready for use.      Findings:    Total catheter length  40 cm, with 0 cm exposed. Mid upper arm circumference is 34 cm.     Tip placement verified in the distal SVC by [employer]:        Comments:  None        Miguelangel Catalan, RN, MSN, [facility]   Vascular Access - [facility]        "

## 2022-11-03 NOTE — PROGRESS NOTES
ICU Update:    Made aware of patient in ED with acute on chronic subdural hematoma after multiple falls. Initially requests made for patient to admit to ICU here at [facility] for monitoring. Voiced my concern over lack of ability to evacuate blood should his neuro exam evolve.    Discussed with Divya, neurosurgery NP for possible contingency plans as agree that patient better monitored in ICU than in ED but concerned if decompensates then more difficult to transport him to location that could intervene.    No way for this to be done at [facility] emergently.    After discussion with numerous staff, although not ideal, if no bed available we could monitor him here but prefer for patient care that he be transferred to location where surgery is possible if needed.    Khalida Hinkle MD

## 2022-11-03 NOTE — ED NOTES
"EMERGENCY DEPARTMENT SIGN OUT NOTE        ED COURSE AND MEDICAL DECISION MAKING  Patient was signed out to me by Dr Cory Shore at 4:51 AM.    In brief, Getachew Barcenas is a 52 year old male who initially presented for evaluation of alcohol problem and dizziness. Patient presents with complaints of leg weakness, dizziness, and balance problems that have been ongoing for the past 3 years, but have recently been worse as a result from drinking alcohol. Patient reports abstaining from alcohol for four months in the past, but has been drinking since May 2022. He reports he use to drink 1.75 L of alcohol every 2 days, but now reports drinking 1.75 L of alcohol every one week. Patient reports his legs \"turn to rubber\" when he drinks. His last drink was today.     At time of sign out, disposition was pending Head CT.    FINAL IMPRESSION    1. Subdural hematoma    2. Alcoholic intoxication with complication (H)        ED MEDS  Medications   cloNIDine (CATAPRES) tablet 0.1 mg (has no administration in time range)   OLANZapine zydis (zyPREXA) ODT tab 5-10 mg (has no administration in time range)     Or   haloperidol lactate (HALDOL) injection 2.5-5 mg (has no administration in time range)   flumazenil (ROMAZICON) injection 0.2 mg (has no administration in time range)   melatonin tablet 5 mg (has no administration in time range)   LORazepam (ATIVAN) tablet 1-2 mg (has no administration in time range)     Or   LORazepam (ATIVAN) injection 1-2 mg (has no administration in time range)   thiamine (B-1) tablet 100 mg (has no administration in time range)   folic acid (FOLVITE) tablet 1 mg (has no administration in time range)   multivitamin w/minerals (THERA-VIT-M) tablet 1 tablet (has no administration in time range)   levETIRAcetam (KEPPRA) 1,000 mg in sodium chloride 0.9 % 100 mL intermittent infusion (has no administration in time range)   sodium chloride 0.9% infusion ( Intravenous New Bag 11/3/22 0235)   acetaminophen " (TYLENOL) tablet 650 mg (has no administration in time range)   ondansetron (ZOFRAN) injection 4 mg (has no administration in time range)   magnesium sulfate 2 g in water intermittent infusion (has no administration in time range)   0.9% sodium chloride BOLUS (0 mLs Intravenous Stopped 11/2/22 2244)   folic acid injection 1 mg (1 mg Intravenous Given 11/2/22 2327)   thiamine (B-1) injection 100 mg (100 mg Intravenous Given 11/2/22 2328)   iopamidol (ISOVUE-370) solution 100 mL (100 mLs Intravenous Given 11/2/22 2319)   levETIRAcetam (KEPPRA) 1,000 mg in sodium chloride 0.9 % 100 mL intermittent infusion (0 mg Intravenous Stopped 11/3/22 0045)       LAB  Labs Ordered and Resulted from Time of ED Arrival to Time of ED Departure   INR - Abnormal       Result Value    INR 1.17 (*)    COMPREHENSIVE METABOLIC PANEL - Abnormal    Sodium 134 (*)     Potassium 3.9      Chloride 90 (*)     Carbon Dioxide (CO2) 19 (*)     Anion Gap 25 (*)     Urea Nitrogen 8.7      Creatinine 0.74      Calcium 8.5 (*)     Glucose 91      Alkaline Phosphatase 261 (*)     AST        ALT 99 (*)     Protein Total 6.7      Albumin 4.0      Bilirubin Total 6.9 (*)     GFR Estimate >90     MAGNESIUM - Abnormal    Magnesium 1.6 (*)    ETHYL ALCOHOL LEVEL - Abnormal    Alcohol ethyl 0.31 (*)    TSH WITH FREE T4 REFLEX - Abnormal    TSH 36.09 (*)    CBC WITH PLATELETS AND DIFFERENTIAL - Abnormal    WBC Count 3.3 (*)     RBC Count 3.64 (*)     Hemoglobin 12.9 (*)     Hematocrit 37.6 (*)      (*)     MCH 35.4 (*)     MCHC 34.3      RDW 11.9      Platelet Count 82 (*)     % Neutrophils 33      % Lymphocytes 52      % Monocytes 12      % Eosinophils 0      % Basophils 2      % Immature Granulocytes 1      NRBCs per 100 WBC 0      Absolute Neutrophils 1.1 (*)     Absolute Lymphocytes 1.7      Absolute Monocytes 0.4      Absolute Eosinophils 0.0      Absolute Basophils 0.1      Absolute Immature Granulocytes 0.0      Absolute NRBCs 0.0     T4 FREE -  Abnormal    Free T4 0.81 (*)    MAGNESIUM - Abnormal    Magnesium 1.5 (*)    LACTIC ACID WHOLE BLOOD - Abnormal    Lactic Acid 4.3 (*)    PLATELET FUNCTION CLOSURE WITH REFLEX - Abnormal    PFA-Col/Epi 214 (*)    PLATELET FUNCTION CLOSURE ADP - Abnormal    PFA-Col/ (*)    COVID-19 VIRUS (CORONAVIRUS) BY PCR - Normal    SARS CoV2 PCR Negative     PHOSPHORUS - Normal    Phosphorus 3.2     PARTIAL THROMBOPLASTIN TIME - Normal    aPTT 32     BASIC METABOLIC PANEL   CBC WITH PLATELETS   HEPATIC FUNCTION PANEL   LACTIC ACID WHOLE BLOOD         RADIOLOGY    Cervical spine CT w/o contrast   Final Result   IMPRESSION:   1.  No fracture or posttraumatic subluxation.         CT Abdomen Pelvis w Contrast   Final Result   IMPRESSION:    1.  Cholelithiasis. The gallbladder is distended. No definite surrounding inflammation. Further evaluation with ultrasound or hepatobiliary scan is suggested.   2.  Diffuse fatty infiltration of the liver.   3.  Colonic diverticula without acute diverticulitis. No bowel obstruction or inflammation.   4.  Single left renal stone. No ureteral stone or hydronephrosis.      Head CT w/o contrast   Final Result   Abnormal   IMPRESSION:   1.  Mixed attenuation likely acute on subacute left convexity subdural hemorrhage measuring up to 17 mm.      2.  Likely subacute right convexity subdural hematoma measuring 7 mm in thickness.      3.  3 mm left right midline shift.      4.  Stable age-related change.         [Critical Result: Subdural hematomas and mass effect]      Finding was identified on 11/2/2022 11:22 PM.       Dr. Shore was contacted by me on 11/2/2022 11:25 PM and verbalized understanding of the critical result.       Head CT w/o contrast    (Results Pending)       DISCHARGE MEDS  New Prescriptions    No medications on file       I, Elyse Cai, am serving as a scribe to document services personally performed by Dr. Payton, based on my observations and the provider's statements to  me.  I, Dr. aPyton, attest that Elyse Cai is acting in a scribe capacity, has observed my performance of the services and has documented them in accordance with my direction.      Otilia Payton MD  [facility] EMERGENCY DEPARTMENT  [address] [place] [id number] [phone]

## 2022-11-03 NOTE — ED TRIAGE NOTES
"Pt arrives to triage w/ daughter escorted by wheelchair. Pt is an alcoholic and cant walk/stand. Pts daughter endorsing cant walk/stand and not eating/drinking. \"cant get to the restroom\" but can still poop/pee. Frequent falls at home,  last fall Saturday. Last alcholic beverage today at 1200. Pt usually drinks 1.75 L of Vodka in 9-10 days and used to be 48 hours per pt. Per daughter she feels he still drinks 1.75 L in 48 hrs.  Pt endorsing \"no interest in drunkness\".     Pain w/ movement no pain while sitting. Cant walk and concerns of properly mulu medications including thyroid meds    "

## 2022-11-03 NOTE — PROGRESS NOTES
Neurosurgery Note:    Received call regarding 52 year old male with hx of alcohol use disorder, thiamine deficiency, thrombocytopenia presents with multiple frequent falls. Daughter reports over the last two days he was progressively unable to walk, multiple falls including head trauma falling into walls etc. He is acutely intoxicated, but otherwise awake and following commands. Head CT with acute on chronic subdural hematoma on left, measuring up to 17mm in size. Also right SDH, subacute measuring 7mm with associated 3mm left to right midline shift. Given his coagulopathy, patient should transfer to facility with cranial neurosurgical care and ICU monitoring.     Attending: Dr. Saavedra.     Lauryn English, CNP  [facility] Neurosurgery  O: [phone]  P: [phone]

## 2022-11-04 ENCOUNTER — APPOINTMENT (OUTPATIENT)
Dept: CT IMAGING | Facility: CLINIC | Age: 52
End: 2022-11-04
Attending: STUDENT IN AN ORGANIZED HEALTH CARE EDUCATION/TRAINING PROGRAM
Payer: COMMERCIAL

## 2022-11-04 ENCOUNTER — APPOINTMENT (OUTPATIENT)
Dept: GENERAL RADIOLOGY | Facility: CLINIC | Age: 52
End: 2022-11-04
Attending: STUDENT IN AN ORGANIZED HEALTH CARE EDUCATION/TRAINING PROGRAM
Payer: COMMERCIAL

## 2022-11-04 ENCOUNTER — HOSPITAL ENCOUNTER (INPATIENT)
Facility: CLINIC | Age: 52
LOS: 12 days | Discharge: HOME | End: 2022-11-16
Attending: SURGERY | Admitting: SURGERY
Payer: COMMERCIAL

## 2022-11-04 ENCOUNTER — DOCUMENTATION ONLY (OUTPATIENT)
Dept: OTHER | Facility: CLINIC | Age: 52
End: 2022-11-04

## 2022-11-04 VITALS
OXYGEN SATURATION: 93 % | BODY MASS INDEX: 25.63 KG/M2 | WEIGHT: 199.74 LBS | TEMPERATURE: 98.5 F | HEIGHT: 74 IN | RESPIRATION RATE: 19 BRPM | DIASTOLIC BLOOD PRESSURE: 75 MMHG | SYSTOLIC BLOOD PRESSURE: 115 MMHG | HEART RATE: 90 BPM

## 2022-11-04 DIAGNOSIS — S06.5XAA SUBDURAL HEMATOMA (H): ICD-10-CM

## 2022-11-04 DIAGNOSIS — F39 MOOD DISORDER (H): ICD-10-CM

## 2022-11-04 DIAGNOSIS — D69.6 THROMBOCYTOPENIA (H): Primary | ICD-10-CM

## 2022-11-04 DIAGNOSIS — K76.0 NONALCOHOLIC FATTY LIVER DISEASE: ICD-10-CM

## 2022-11-04 DIAGNOSIS — E51.9 THIAMINE DEFICIENCY: ICD-10-CM

## 2022-11-04 DIAGNOSIS — G47.33 OBSTRUCTIVE SLEEP APNEA SYNDROME: ICD-10-CM

## 2022-11-04 DIAGNOSIS — R29.6 FALLS FREQUENTLY: ICD-10-CM

## 2022-11-04 DIAGNOSIS — F10.929 ALCOHOLIC INTOXICATION WITH COMPLICATION (H): ICD-10-CM

## 2022-11-04 LAB
ALBUMIN SERPL BCG-MCNC: 3.4 G/DL (ref 3.5–5.2)
ALP SERPL-CCNC: 190 U/L (ref 40–129)
ALT SERPL W P-5'-P-CCNC: 66 U/L (ref 10–50)
AMMONIA PLAS-SCNC: 23 UMOL/L (ref 16–60)
AMPHETAMINES UR QL SCN: NORMAL
ANION GAP SERPL CALCULATED.3IONS-SCNC: 11 MMOL/L (ref 7–15)
ANION GAP SERPL CALCULATED.3IONS-SCNC: 13 MMOL/L (ref 7–15)
AST SERPL W P-5'-P-CCNC: 295 U/L (ref 10–50)
ATRIAL RATE - MUSE: 67 BPM
BARBITURATES UR QL SCN: NORMAL
BENZODIAZ UR QL SCN: NORMAL
BILIRUB DIRECT SERPL-MCNC: 5.41 MG/DL (ref 0–0.3)
BILIRUB SERPL-MCNC: 7.6 MG/DL
BUN SERPL-MCNC: 3.8 MG/DL (ref 6–20)
BUN SERPL-MCNC: 4.4 MG/DL (ref 6–20)
BZE UR QL SCN: NORMAL
CA-I BLD-MCNC: 4.1 MG/DL (ref 4.4–5.2)
CALCIUM SERPL-MCNC: 7.5 MG/DL (ref 8.6–10)
CALCIUM SERPL-MCNC: 7.9 MG/DL (ref 8.6–10)
CANNABINOIDS UR QL SCN: NORMAL
CHLORIDE SERPL-SCNC: 95 MMOL/L (ref 98–107)
CHLORIDE SERPL-SCNC: 98 MMOL/L (ref 98–107)
CREAT SERPL-MCNC: 0.56 MG/DL (ref 0.67–1.17)
CREAT SERPL-MCNC: 0.56 MG/DL (ref 0.67–1.17)
DEPRECATED HCO3 PLAS-SCNC: 22 MMOL/L (ref 22–29)
DEPRECATED HCO3 PLAS-SCNC: 25 MMOL/L (ref 22–29)
DIASTOLIC BLOOD PRESSURE - MUSE: NORMAL MMHG
ERYTHROCYTE [DISTWIDTH] IN BLOOD BY AUTOMATED COUNT: 11.9 % (ref 10–15)
FOLATE SERPL-MCNC: >40 NG/ML (ref 4.6–34.8)
GFR SERPL CREATININE-BSD FRML MDRD: >90 ML/MIN/1.73M2
GFR SERPL CREATININE-BSD FRML MDRD: >90 ML/MIN/1.73M2
GLUCOSE BLDC GLUCOMTR-MCNC: 126 MG/DL (ref 70–99)
GLUCOSE BLDC GLUCOMTR-MCNC: 136 MG/DL (ref 70–99)
GLUCOSE SERPL-MCNC: 131 MG/DL (ref 70–99)
GLUCOSE SERPL-MCNC: 133 MG/DL (ref 70–99)
HCT VFR BLD AUTO: 29.9 % (ref 40–53)
HGB BLD-MCNC: 10.1 G/DL (ref 13.3–17.7)
HOLD SPECIMEN: NORMAL
INTERPRETATION ECG - MUSE: NORMAL
KETONES UR STRIP-MCNC: NEGATIVE MG/DL
LIPASE SERPL-CCNC: 622 U/L (ref 13–60)
MAGNESIUM SERPL-MCNC: 1.5 MG/DL (ref 1.7–2.3)
MAGNESIUM SERPL-MCNC: 2.4 MG/DL (ref 1.7–2.3)
MCH RBC QN AUTO: 34.9 PG (ref 26.5–33)
MCHC RBC AUTO-ENTMCNC: 33.8 G/DL (ref 31.5–36.5)
MCV RBC AUTO: 104 FL (ref 78–100)
OPIATES UR QL SCN: NORMAL
OSMOLALITY SERPL: 279 MMOL/KG (ref 275–295)
OSMOLALITY UR: 276 MMOL/KG (ref 100–1200)
P AXIS - MUSE: 41 DEGREES
PCP QUAL URINE (ROCHE): NORMAL
PHOSPHATE SERPL-MCNC: 1.4 MG/DL (ref 2.5–4.5)
PHOSPHATE SERPL-MCNC: 2.7 MG/DL (ref 2.5–4.5)
PLATELET # BLD AUTO: 126 10E3/UL (ref 150–450)
POTASSIUM SERPL-SCNC: 3.5 MMOL/L (ref 3.4–5.3)
POTASSIUM SERPL-SCNC: 3.7 MMOL/L (ref 3.4–5.3)
PR INTERVAL - MUSE: 174 MS
PROT SERPL-MCNC: 5.5 G/DL (ref 6.4–8.3)
QRS DURATION - MUSE: 100 MS
QT - MUSE: 430 MS
QTC - MUSE: 454 MS
R AXIS - MUSE: -46 DEGREES
RBC # BLD AUTO: 2.89 10E6/UL (ref 4.4–5.9)
SODIUM SERPL-SCNC: 131 MMOL/L (ref 136–145)
SODIUM SERPL-SCNC: 133 MMOL/L (ref 136–145)
SODIUM UR-SCNC: 115 MMOL/L
SYSTOLIC BLOOD PRESSURE - MUSE: NORMAL MMHG
T AXIS - MUSE: 0 DEGREES
VENTRICULAR RATE- MUSE: 67 BPM
VIT B12 SERPL-MCNC: 937 PG/ML (ref 232–1245)
WBC # BLD AUTO: 2.7 10E3/UL (ref 4–11)

## 2022-11-04 PROCEDURE — 83735 ASSAY OF MAGNESIUM: CPT | Performed by: PSYCHIATRY & NEUROLOGY

## 2022-11-04 PROCEDURE — 83735 ASSAY OF MAGNESIUM: CPT | Performed by: STUDENT IN AN ORGANIZED HEALTH CARE EDUCATION/TRAINING PROGRAM

## 2022-11-04 PROCEDURE — 70450 CT HEAD/BRAIN W/O DYE: CPT

## 2022-11-04 PROCEDURE — 250N000009 HC RX 250: Performed by: STUDENT IN AN ORGANIZED HEALTH CARE EDUCATION/TRAINING PROGRAM

## 2022-11-04 PROCEDURE — 250N000011 HC RX IP 250 OP 636: Performed by: PSYCHIATRY & NEUROLOGY

## 2022-11-04 PROCEDURE — 82248 BILIRUBIN DIRECT: CPT | Performed by: STUDENT IN AN ORGANIZED HEALTH CARE EDUCATION/TRAINING PROGRAM

## 2022-11-04 PROCEDURE — 70450 CT HEAD/BRAIN W/O DYE: CPT | Mod: 26 | Performed by: STUDENT IN AN ORGANIZED HEALTH CARE EDUCATION/TRAINING PROGRAM

## 2022-11-04 PROCEDURE — 84100 ASSAY OF PHOSPHORUS: CPT | Performed by: PSYCHIATRY & NEUROLOGY

## 2022-11-04 PROCEDURE — 71045 X-RAY EXAM CHEST 1 VIEW: CPT

## 2022-11-04 PROCEDURE — 250N000013 HC RX MED GY IP 250 OP 250 PS 637: Performed by: HOSPITALIST

## 2022-11-04 PROCEDURE — 83935 ASSAY OF URINE OSMOLALITY: CPT | Performed by: STUDENT IN AN ORGANIZED HEALTH CARE EDUCATION/TRAINING PROGRAM

## 2022-11-04 PROCEDURE — 250N000013 HC RX MED GY IP 250 OP 250 PS 637: Performed by: PSYCHIATRY & NEUROLOGY

## 2022-11-04 PROCEDURE — 200N000002 HC R&B ICU UMMC

## 2022-11-04 PROCEDURE — 85027 COMPLETE CBC AUTOMATED: CPT | Performed by: STUDENT IN AN ORGANIZED HEALTH CARE EDUCATION/TRAINING PROGRAM

## 2022-11-04 PROCEDURE — 83930 ASSAY OF BLOOD OSMOLALITY: CPT | Performed by: STUDENT IN AN ORGANIZED HEALTH CARE EDUCATION/TRAINING PROGRAM

## 2022-11-04 PROCEDURE — 93005 ELECTROCARDIOGRAM TRACING: CPT

## 2022-11-04 PROCEDURE — 82746 ASSAY OF FOLIC ACID SERUM: CPT | Performed by: NURSE PRACTITIONER

## 2022-11-04 PROCEDURE — 250N000009 HC RX 250: Performed by: PSYCHIATRY & NEUROLOGY

## 2022-11-04 PROCEDURE — 80053 COMPREHEN METABOLIC PANEL: CPT | Performed by: STUDENT IN AN ORGANIZED HEALTH CARE EDUCATION/TRAINING PROGRAM

## 2022-11-04 PROCEDURE — 83690 ASSAY OF LIPASE: CPT | Performed by: NURSE PRACTITIONER

## 2022-11-04 PROCEDURE — 71045 X-RAY EXAM CHEST 1 VIEW: CPT | Mod: 26 | Performed by: RADIOLOGY

## 2022-11-04 PROCEDURE — 258N000003 HC RX IP 258 OP 636: Performed by: STUDENT IN AN ORGANIZED HEALTH CARE EDUCATION/TRAINING PROGRAM

## 2022-11-04 PROCEDURE — 82330 ASSAY OF CALCIUM: CPT | Performed by: STUDENT IN AN ORGANIZED HEALTH CARE EDUCATION/TRAINING PROGRAM

## 2022-11-04 PROCEDURE — 250N000013 HC RX MED GY IP 250 OP 250 PS 637: Performed by: STUDENT IN AN ORGANIZED HEALTH CARE EDUCATION/TRAINING PROGRAM

## 2022-11-04 PROCEDURE — 82140 ASSAY OF AMMONIA: CPT | Performed by: STUDENT IN AN ORGANIZED HEALTH CARE EDUCATION/TRAINING PROGRAM

## 2022-11-04 PROCEDURE — G0463 HOSPITAL OUTPT CLINIC VISIT: HCPCS

## 2022-11-04 PROCEDURE — 99291 CRITICAL CARE FIRST HOUR: CPT | Mod: GC | Performed by: PSYCHIATRY & NEUROLOGY

## 2022-11-04 PROCEDURE — 84300 ASSAY OF URINE SODIUM: CPT | Performed by: STUDENT IN AN ORGANIZED HEALTH CARE EDUCATION/TRAINING PROGRAM

## 2022-11-04 PROCEDURE — HZ2ZZZZ DETOXIFICATION SERVICES FOR SUBSTANCE ABUSE TREATMENT: ICD-10-PCS | Performed by: STUDENT IN AN ORGANIZED HEALTH CARE EDUCATION/TRAINING PROGRAM

## 2022-11-04 PROCEDURE — 258N000003 HC RX IP 258 OP 636: Performed by: PSYCHIATRY & NEUROLOGY

## 2022-11-04 PROCEDURE — 99221 1ST HOSP IP/OBS SF/LOW 40: CPT | Mod: GC | Performed by: INTERNAL MEDICINE

## 2022-11-04 PROCEDURE — 999N000226 HC STATISTIC SLP IP EVAL DEFER

## 2022-11-04 PROCEDURE — 82607 VITAMIN B-12: CPT | Performed by: NURSE PRACTITIONER

## 2022-11-04 PROCEDURE — 250N000011 HC RX IP 250 OP 636: Performed by: STUDENT IN AN ORGANIZED HEALTH CARE EDUCATION/TRAINING PROGRAM

## 2022-11-04 PROCEDURE — 83921 ORGANIC ACID SINGLE QUANT: CPT | Performed by: NURSE PRACTITIONER

## 2022-11-04 PROCEDURE — 81003 URINALYSIS AUTO W/O SCOPE: CPT | Performed by: STUDENT IN AN ORGANIZED HEALTH CARE EDUCATION/TRAINING PROGRAM

## 2022-11-04 PROCEDURE — 93010 ELECTROCARDIOGRAM REPORT: CPT | Performed by: INTERNAL MEDICINE

## 2022-11-04 PROCEDURE — 80307 DRUG TEST PRSMV CHEM ANLYZR: CPT | Performed by: STUDENT IN AN ORGANIZED HEALTH CARE EDUCATION/TRAINING PROGRAM

## 2022-11-04 PROCEDURE — 250N000011 HC RX IP 250 OP 636: Performed by: NURSE PRACTITIONER

## 2022-11-04 PROCEDURE — 272N000001 HC OR GENERAL SUPPLY STERILE

## 2022-11-04 PROCEDURE — 99238 HOSP IP/OBS DSCHRG MGMT 30/<: CPT | Performed by: SURGERY

## 2022-11-04 PROCEDURE — 84100 ASSAY OF PHOSPHORUS: CPT | Performed by: STUDENT IN AN ORGANIZED HEALTH CARE EDUCATION/TRAINING PROGRAM

## 2022-11-04 RX ORDER — MAGNESIUM SULFATE HEPTAHYDRATE 40 MG/ML
4 INJECTION, SOLUTION INTRAVENOUS ONCE
Status: COMPLETED | OUTPATIENT
Start: 2022-11-04 | End: 2022-11-04

## 2022-11-04 RX ORDER — POTASSIUM CHLORIDE 750 MG/1
20 TABLET, EXTENDED RELEASE ORAL ONCE
Status: COMPLETED | OUTPATIENT
Start: 2022-11-04 | End: 2022-11-04

## 2022-11-04 RX ORDER — CLONIDINE HYDROCHLORIDE 0.1 MG/1
0.1 TABLET ORAL EVERY 8 HOURS
Status: DISCONTINUED | OUTPATIENT
Start: 2022-11-04 | End: 2022-11-05

## 2022-11-04 RX ORDER — POLYETHYLENE GLYCOL 3350 17 G/17G
17 POWDER, FOR SOLUTION ORAL DAILY PRN
Status: DISCONTINUED | OUTPATIENT
Start: 2022-11-04 | End: 2022-11-13

## 2022-11-04 RX ORDER — FLUMAZENIL 0.1 MG/ML
0.2 INJECTION, SOLUTION INTRAVENOUS
Status: DISCONTINUED | OUTPATIENT
Start: 2022-11-04 | End: 2022-11-04

## 2022-11-04 RX ORDER — GABAPENTIN 300 MG/1
300 CAPSULE ORAL EVERY 8 HOURS
Status: DISPENSED | OUTPATIENT
Start: 2022-11-09 | End: 2022-11-11

## 2022-11-04 RX ORDER — SODIUM CHLORIDE, SODIUM GLUCONATE, SODIUM ACETATE, POTASSIUM CHLORIDE AND MAGNESIUM CHLORIDE 526; 502; 368; 37; 30 MG/100ML; MG/100ML; MG/100ML; MG/100ML; MG/100ML
INJECTION, SOLUTION INTRAVENOUS CONTINUOUS
Status: DISCONTINUED | OUTPATIENT
Start: 2022-11-04 | End: 2022-11-05

## 2022-11-04 RX ORDER — METOPROLOL TARTRATE 1 MG/ML
5 INJECTION, SOLUTION INTRAVENOUS EVERY 6 HOURS PRN
Status: DISCONTINUED | OUTPATIENT
Start: 2022-11-04 | End: 2022-11-04

## 2022-11-04 RX ORDER — FOLIC ACID 1 MG/1
1 TABLET ORAL DAILY
Status: DISCONTINUED | OUTPATIENT
Start: 2022-11-07 | End: 2022-11-04

## 2022-11-04 RX ORDER — GABAPENTIN 100 MG/1
100 CAPSULE ORAL EVERY 8 HOURS
Status: DISCONTINUED | OUTPATIENT
Start: 2022-11-11 | End: 2022-11-04

## 2022-11-04 RX ORDER — FLUMAZENIL 0.1 MG/ML
0.2 INJECTION, SOLUTION INTRAVENOUS
Status: DISCONTINUED | OUTPATIENT
Start: 2022-11-04 | End: 2022-11-14

## 2022-11-04 RX ORDER — FOLIC ACID 5 MG/ML
1 INJECTION, SOLUTION INTRAMUSCULAR; INTRAVENOUS; SUBCUTANEOUS ONCE
Status: DISCONTINUED | OUTPATIENT
Start: 2022-11-04 | End: 2022-11-04

## 2022-11-04 RX ORDER — MULTIPLE VITAMINS W/ MINERALS TAB 9MG-400MCG
1 TAB ORAL DAILY
Status: DISCONTINUED | OUTPATIENT
Start: 2022-11-04 | End: 2022-11-04

## 2022-11-04 RX ORDER — GABAPENTIN 600 MG/1
1200 TABLET ORAL ONCE
Status: COMPLETED | OUTPATIENT
Start: 2022-11-04 | End: 2022-11-04

## 2022-11-04 RX ORDER — LEVOTHYROXINE SODIUM 175 UG/1
175 TABLET ORAL DAILY
Status: DISCONTINUED | OUTPATIENT
Start: 2022-11-04 | End: 2022-11-16 | Stop reason: HOSPADM

## 2022-11-04 RX ORDER — GABAPENTIN 300 MG/1
300 CAPSULE ORAL EVERY 8 HOURS
Status: DISCONTINUED | OUTPATIENT
Start: 2022-11-09 | End: 2022-11-04

## 2022-11-04 RX ORDER — MULTIPLE VITAMINS W/ MINERALS TAB 9MG-400MCG
1 TAB ORAL DAILY
Status: DISCONTINUED | OUTPATIENT
Start: 2022-11-04 | End: 2022-11-16 | Stop reason: HOSPADM

## 2022-11-04 RX ORDER — FOLIC ACID 1 MG/1
1 TABLET ORAL DAILY
Status: DISCONTINUED | OUTPATIENT
Start: 2022-11-04 | End: 2022-11-16 | Stop reason: HOSPADM

## 2022-11-04 RX ORDER — GABAPENTIN 300 MG/1
900 CAPSULE ORAL EVERY 8 HOURS
Status: COMPLETED | OUTPATIENT
Start: 2022-11-04 | End: 2022-11-07

## 2022-11-04 RX ORDER — GABAPENTIN 100 MG/1
100 CAPSULE ORAL EVERY 8 HOURS
Status: DISCONTINUED | OUTPATIENT
Start: 2022-11-11 | End: 2022-11-08

## 2022-11-04 RX ORDER — AMOXICILLIN 250 MG
1-2 CAPSULE ORAL 2 TIMES DAILY PRN
Status: DISCONTINUED | OUTPATIENT
Start: 2022-11-04 | End: 2022-11-16 | Stop reason: HOSPADM

## 2022-11-04 RX ORDER — BISACODYL 10 MG
10 SUPPOSITORY, RECTAL RECTAL DAILY PRN
Status: DISCONTINUED | OUTPATIENT
Start: 2022-11-04 | End: 2022-11-16 | Stop reason: HOSPADM

## 2022-11-04 RX ORDER — HALOPERIDOL 5 MG/ML
2.5-5 INJECTION INTRAMUSCULAR EVERY 6 HOURS PRN
Status: DISCONTINUED | OUTPATIENT
Start: 2022-11-04 | End: 2022-11-04

## 2022-11-04 RX ORDER — GABAPENTIN 600 MG/1
1200 TABLET ORAL ONCE
Status: DISCONTINUED | OUTPATIENT
Start: 2022-11-04 | End: 2022-11-04

## 2022-11-04 RX ORDER — HALOPERIDOL 5 MG/ML
2.5-5 INJECTION INTRAMUSCULAR EVERY 4 HOURS PRN
Status: DISCONTINUED | OUTPATIENT
Start: 2022-11-04 | End: 2022-11-04

## 2022-11-04 RX ORDER — GABAPENTIN 300 MG/1
600 CAPSULE ORAL EVERY 8 HOURS
Status: DISCONTINUED | OUTPATIENT
Start: 2022-11-07 | End: 2022-11-08

## 2022-11-04 RX ORDER — DIAZEPAM 5 MG
10 TABLET ORAL EVERY 30 MIN PRN
Status: DISCONTINUED | OUTPATIENT
Start: 2022-11-04 | End: 2022-11-06

## 2022-11-04 RX ORDER — CALCIUM GLUCONATE 20 MG/ML
2 INJECTION, SOLUTION INTRAVENOUS ONCE
Status: COMPLETED | OUTPATIENT
Start: 2022-11-04 | End: 2022-11-04

## 2022-11-04 RX ORDER — LABETALOL HYDROCHLORIDE 5 MG/ML
10 INJECTION, SOLUTION INTRAVENOUS
Status: DISCONTINUED | OUTPATIENT
Start: 2022-11-04 | End: 2022-11-14

## 2022-11-04 RX ORDER — DIAZEPAM 10 MG/2ML
5-10 INJECTION, SOLUTION INTRAMUSCULAR; INTRAVENOUS EVERY 30 MIN PRN
Status: DISCONTINUED | OUTPATIENT
Start: 2022-11-04 | End: 2022-11-04

## 2022-11-04 RX ORDER — GABAPENTIN 300 MG/1
600 CAPSULE ORAL EVERY 8 HOURS
Status: DISCONTINUED | OUTPATIENT
Start: 2022-11-07 | End: 2022-11-04

## 2022-11-04 RX ORDER — OLANZAPINE 5 MG/1
5-10 TABLET, ORALLY DISINTEGRATING ORAL EVERY 6 HOURS PRN
Status: DISCONTINUED | OUTPATIENT
Start: 2022-11-04 | End: 2022-11-06

## 2022-11-04 RX ORDER — FOLIC ACID 5 MG/ML
1 INJECTION, SOLUTION INTRAMUSCULAR; INTRAVENOUS; SUBCUTANEOUS DAILY
Status: DISCONTINUED | OUTPATIENT
Start: 2022-11-05 | End: 2022-11-04

## 2022-11-04 RX ORDER — GABAPENTIN 300 MG/1
900 CAPSULE ORAL EVERY 8 HOURS
Status: DISCONTINUED | OUTPATIENT
Start: 2022-11-04 | End: 2022-11-04

## 2022-11-04 RX ORDER — HYDRALAZINE HYDROCHLORIDE 20 MG/ML
10 INJECTION INTRAMUSCULAR; INTRAVENOUS EVERY 4 HOURS PRN
Status: DISCONTINUED | OUTPATIENT
Start: 2022-11-04 | End: 2022-11-14

## 2022-11-04 RX ADMIN — CLONIDINE HYDROCHLORIDE 0.1 MG: 0.1 TABLET ORAL at 19:44

## 2022-11-04 RX ADMIN — POTASSIUM PHOSPHATE, MONOBASIC AND POTASSIUM PHOSPHATE, DIBASIC 30 MMOL: 224; 236 INJECTION, SOLUTION INTRAVENOUS at 10:55

## 2022-11-04 RX ADMIN — BISACODYL 10 MG: 10 SUPPOSITORY RECTAL at 03:29

## 2022-11-04 RX ADMIN — FOLIC ACID 1 MG: 1 TABLET ORAL at 08:34

## 2022-11-04 RX ADMIN — Medication 1 TABLET: at 08:34

## 2022-11-04 RX ADMIN — THIAMINE HYDROCHLORIDE 500 MG: 100 INJECTION, SOLUTION INTRAMUSCULAR; INTRAVENOUS at 08:33

## 2022-11-04 RX ADMIN — SODIUM CHLORIDE, SODIUM GLUCONATE, SODIUM ACETATE, POTASSIUM CHLORIDE AND MAGNESIUM CHLORIDE: 526; 502; 368; 37; 30 INJECTION, SOLUTION INTRAVENOUS at 22:25

## 2022-11-04 RX ADMIN — THIAMINE HYDROCHLORIDE 500 MG: 100 INJECTION, SOLUTION INTRAMUSCULAR; INTRAVENOUS at 19:44

## 2022-11-04 RX ADMIN — THIAMINE HYDROCHLORIDE 500 MG: 100 INJECTION, SOLUTION INTRAMUSCULAR; INTRAVENOUS at 14:34

## 2022-11-04 RX ADMIN — GABAPENTIN 900 MG: 300 CAPSULE ORAL at 11:36

## 2022-11-04 RX ADMIN — CALCIUM GLUCONATE 2 G: 20 INJECTION, SOLUTION INTRAVENOUS at 11:12

## 2022-11-04 RX ADMIN — LEVOTHYROXINE SODIUM 175 MCG: 0.15 TABLET ORAL at 08:34

## 2022-11-04 RX ADMIN — SODIUM CHLORIDE, SODIUM GLUCONATE, SODIUM ACETATE, POTASSIUM CHLORIDE AND MAGNESIUM CHLORIDE: 526; 502; 368; 37; 30 INJECTION, SOLUTION INTRAVENOUS at 06:45

## 2022-11-04 RX ADMIN — GABAPENTIN 900 MG: 300 CAPSULE ORAL at 19:44

## 2022-11-04 RX ADMIN — MAGNESIUM SULFATE IN WATER 4 G: 40 INJECTION, SOLUTION INTRAVENOUS at 08:33

## 2022-11-04 RX ADMIN — POTASSIUM CHLORIDE 20 MEQ: 750 TABLET, EXTENDED RELEASE ORAL at 08:34

## 2022-11-04 RX ADMIN — CLONIDINE HYDROCHLORIDE 0.1 MG: 0.1 TABLET ORAL at 11:36

## 2022-11-04 RX ADMIN — GABAPENTIN 1200 MG: 600 TABLET, FILM COATED ORAL at 04:38

## 2022-11-04 RX ADMIN — POTASSIUM & SODIUM PHOSPHATES POWDER PACK 280-160-250 MG 1 PACKET: 280-160-250 PACK at 21:50

## 2022-11-04 ASSESSMENT — ACTIVITIES OF DAILY LIVING (ADL)
TOILETING_ISSUES: NO
ADLS_ACUITY_SCORE: 45
DRESSING/BATHING_DIFFICULTY: NO
ADLS_ACUITY_SCORE: 24
ADLS_ACUITY_SCORE: 45
ADLS_ACUITY_SCORE: 24
VISION_MANAGEMENT: GLASSES
CHANGE_IN_FUNCTIONAL_STATUS_SINCE_ONSET_OF_CURRENT_ILLNESS/INJURY: NO
ADLS_ACUITY_SCORE: 45
ADLS_ACUITY_SCORE: 30
DIFFICULTY_EATING/SWALLOWING: NO
FALL_HISTORY_WITHIN_LAST_SIX_MONTHS: YES
ADLS_ACUITY_SCORE: 45
ADLS_ACUITY_SCORE: 26
WEAR_GLASSES_OR_BLIND: YES
ADLS_ACUITY_SCORE: 24
WALKING_OR_CLIMBING_STAIRS_DIFFICULTY: NO
ADLS_ACUITY_SCORE: 26
CONCENTRATING,_REMEMBERING_OR_MAKING_DECISIONS_DIFFICULTY: NO
ADLS_ACUITY_SCORE: 24
NUMBER_OF_TIMES_PATIENT_HAS_FALLEN_WITHIN_LAST_SIX_MONTHS: 1
DOING_ERRANDS_INDEPENDENTLY_DIFFICULTY: NO
ADLS_ACUITY_SCORE: 26

## 2022-11-04 NOTE — LETTER
[facility] [address] [place] [address]  [phone]    FACSIMILE TRANSMITTAL SHEET    TO: [facility]    _____URGENT _____REVIEW ONLY _____PLEASE COMMENT____PLEASE REPLY    NOTES/COMMENTS: Attached please find home care referral for  Getachew Barcenas.  Requesting home RN, PT, OT.  Please let me know if you are able to accept this referral.  Thanks    Aaron Rodriguez RN BSN  7C RN Care Coordinator   Ph: [phone]  Pager: [phone]                  IF YOU DID NOT RECEIVE THE CORRECT NUMBER OF PAGES OR THE FAX DID NOT COME THROUGH CLEARLY, PLEASE CALL THE SENDER     CONFIDENTIALITY STATEMENT: Confidential information that may accompany this transmission contains protected health information under state and federal law and is legally privileged. This information is intended only for the use of the individual or entity named above and may be used only for carrying out treatment, payment or other healthcare operations. The recipient or person responsible for delivering this information is prohibited by law from disclosing this information without proper authorization to any other party, unless required to do so by law or regulation. If you are not the intended recipient, you are hereby notified that any review, dissemination, distribution, or copying of this message is strictly prohibited. If you have received this communication in error, please destroy the materials and contact us immediately by calling the number listed above. No response indicates that the information was received by the appropriate authorized party

## 2022-11-04 NOTE — PLAN OF CARE
Major Shift Events:   Q1 neuro checks and CIWAS initiated after pt arrival. Aox3-4, able to follow commands. PERRLA. MAEx4, tremors present in BUE. N/T present in BLE at baseline. Denies auditory/visual hallucinations. SBP WNL. SR/ST in 90-100s. Satting >95% on RA. NPO besides meds. Pt c/o constipation with frequent smears, suppository given. AUO in external catheter. No new skin deficits noted. Plasmalye MIV initiated. Electroytes to be replaced this AM.     Plan: Plan for CT this AM, notify primary team with any changes in pt condition.       For vital signs and complete assessments, please see documentation flowsheets.

## 2022-11-04 NOTE — PROGRESS NOTES
[facility]    Progress Note - Teaching service Medicine Service, MARO[facility] TEAM 4       Date of Admission:  11/4/2022  Transfer from Neuro ICU to Medicine     Assessment & Plan   Getachew Barcenas is a 52 year old man with a past medical history of alcohol use disorder, thiamine deficiency, frequent falls, peripheral neuropathy, hypothyroidism, former tobacco use, mild LULI, somnambulism, RLS, diverticulitis, and mood disorder who presented as transfer to [facility] on 11/4/2022 for higher level of cares in the setting of new diagnosis of acute on chronic L SDH and subacute R SDH. Patient is hemodynamically stable and has minimal neurological deficits at this time. He does have mild dysmetria, horizontal nystagmus, and peripheral neuropathy, which I suspect are chronic in the setting of longstanding alcohol use disorder. He also has mild right hand weakness, which I suspect is secondary to subdural hematoma. Patient currently stable from neurological standpoint.     Today's change:  - MMA embolization rescheduled to Monday 11/7 by NeuroIR  - CTH confirmed stable acute and chronic b/l SDH and   - Transfer to  when bed available  - Neuro Q4h checks  - Seizure ppx  - PRN Valium/Haldol for CIWA > 7      #Acute on chronic left subdural hematoma, chronic bilateral subdural hematoma, stable  #Brain compression  #3 mm rightward midline shift, stable  #Alcohol use disorder  #Falls secondary to above  #Concern for alcohol withdrawal   #Thiamine Deficiency likely secondary to EtOH use  #Concern for Wernicke-Korsakoff Syndrome  Long history of EtOH abuse w/ short sobriety episode. Last fall happened a few days ago. Upon arrival he was found to have B/L SDH w/ 3mm L to R midline shift. Ct C-spine was unremarkableMMA embolization post-pone to Monday 11/7, pt in a stable state, no active intra-cranial bleeding confirmed on CTH 11/4 which is  showing no acute intracranial hemorrhage, no  substantial change in size or appearance of mixed density b/l SDH, left > right, c/to CTH 11/3/22.   - Neuro Q4h checks  - Seizure prophylaxis at this time   - Neurosurgery consultation, recs appreciated          - Plan for NSG follow-up in 1mo w/ repeat CTH  - NeuroIR consulted for MMA Embolization  - SBP goal < 140 mmHg           -PRN Labetalol and Hydralazine  - HOB > 30   - CT cervical spine at OSH with no fracture --> cleared C spine   - SLP defer evaluation   - to re-consult if change in swallowing function or communication skills appears  - PT/OT consults ordered  - UDS  - CIWA protocol with scheduled clonidine and gabapentin  - PRN Valium/Haldol for CIWA > 7  - IV Thiamine 500mg TID for 2d -> IV Thiamine 250mg daily for 5d -> PO Thiamine 100mg daily  - Folate 1mg daily  - MV 1 tab daily  - Vit B12 level  - Chem Dept consult when transfers to floor        #Hyperbilirubinemia (elevated direct bilirubin)  #Elevated LFT's (AST > ALT)  #Cholelithiasis   # ETOH Hepatic steatosis   #Colonic diverticula   Mild jaundice revealed clinically. CT C/A/P on 11/2. RUQ U/S on 11/3. Suspect many of these hepatic lab abnormalities are secondary to alcohol consumption. Although the same CT revealed cholethiasis and diffuse fatty infiltration of the liver. MELD-Na 19; MELD 16. In addition, Abdomen US notes likely gallstones and sludge w/o wall-thickening or biliary duct dilatation. Lipase 622, however pt reports no LUQ pain, Ct abdomen showing normal pancreas, unlikely pancreatitis  - Daily LFT's   - GI Consult  - Follow up with Hepatology outpatient     #Anion Gap Metabolic Acidosis  Highest Anion Gap = 25 mmol/L (Ref range: 7-15) in last 2 days, will monitor and treat as appropriate. Query alcoholic ketoacidosis considered. Anion gap on 11/4 is 13  - mIVF  - Urine ketones negative  - Consideration for methanol, ethylene glycol ingestion as possible etiologies   - Addiction service       #Hyponatremia    #Hypocalcemia  #Hypomagnesemia  Na 131 at 11PM at OSH prior to transfer. Na 12 hours earlier was 138. Ddx includes (beer potomania, cerebral salt wasting, SIADH, liver disease, etc.  Lowest Ca = 7.5 mg/dL (Ref range: 8.5 - 10.1 mg/dL) in last 2 days, will monitor and replace as appropriate. Mg improving from 1.6 to 2.4 w/ oral magnesium oxide 11/4   - Repeat BMP  - Monitor lytes and replace as needed  - Serum osmolality, urine osmolality, urine sodium ordered   - Ionized calcium drawn and pending.        #Peripheral neuropathy  #Mild cerebellar atrophy  Suspect from longstanding alcohol use disorder. Stable.        #PLMD vs RLS  #REM w/o atonia but no dream enactment  #Rare Somnambulism   - Consider Gabapentin 100-300mg at bedtime once out of withdrawal window       #Mild LULI  - Continuous pulse ox  - Maintain O2 saturations greater than 92%  - Consider CPAP when out of withdrawal window       #Hx of hypoglycemia   Suspect secondary to poor oral intake in the past. Diet pending neurosurgery final recs.   -Hgb A1c  -Monitor glucose levels       #Hypothyroidism  TSH 36, free T4 0.81. Unclear compliance of medication  - PTA Levothyroxine 175mcg daily  - Recheck TSH/free T4 in 2wks  - Consider Endocrine consult       #Coagulation Defect  #Macrocytic Anemia  #Thrombocytopenia  Ddx bone marrow suppression from chronic heavy EtoH abuse  INR = 1.24 (Ref range: 0.85 - 1.15) and/or PTT = 32 Seconds (Ref range: 22 - 38 Seconds), will monitor for bleeding. Presumably due to declining liver function. Lowest platelets = 67 (Ref range: 150-450) in last 2 days, will monitor for bleeding. Hgb 10.1 w/     - Daily CBC  - Hgb goal >7, plt goal >50k  -Transfuse to meet Hgb and plt goals     #Leukopenia  -No infection concern  -Daily CBC  -Follow temperature curve     Diet: Regular  DVT Prophylaxis: SCD's   Triana Catheter: Not present  Fluids: Plasmalyte 75ml/hr  Central Lines: PRESENT  PICC 11/03/22 Triple Lumen Right-Site  "Assessment: WDL  Cardiac Monitoring: ACTIVE order. Indication: ICU  Code Status: Full Code      Disposition Plan      Expected Discharge Date: 11/06/2022                The patient's care was discussed with the Attending Physician, Dr. Oliveira, Patient and Patient's Family.    Simba Craig MD  Medicine Service, [facility] TEAM [address] [place] [address]  Securely message with the Vocera Web Console (learn more here)  Text page via [facility] Paging/Directory   Please see signed in provider for up to date coverage information      Clinically Significant Risk Factors Present on Admission         # Hyponatremia: Lowest Na = 131 mmol/L (Ref range: 136-145) in last 2 days, will monitor as appropriate  # Hypocalcemia: Lowest Ca = 7.5 mg/dL (Ref range: 8.5 - 10.1 mg/dL) in last 2 days, will monitor and replace as appropriate   # Hypomagnesemia: Lowest Mg = 1.5 mg/dL (Ref range: 1.7-2.3) in last 2 days, will replace as needed  # Anion Gap Metabolic Acidosis: Highest Anion Gap = 25 mmol/L (Ref range: 7-15) in last 2 days, will monitor and treat as appropriate  # Hypoalbuminemia: Lowest albumin = 3.4 g/dL (Ref range: 3.5-5.2) at 11/4/2022  4:25 AM, will monitor as appropriate  # Coagulation Defect: INR = 1.24 (Ref range: 0.85 - 1.15) and/or PTT = 32 Seconds (Ref range: 22 - 38 Seconds), will monitor for bleeding  # Thrombocytopenia: Lowest platelets = 67 (Ref range: 150-450) in last 2 days, will monitor for bleeding       # Overweight: Estimated body mass index is 26.26 kg/m  as calculated from the following:    Height as of this encounter: 1.854 m (6' 1\").    Weight as of this encounter: 90.3 kg (199 lb 1.2 oz).    # Severe Malnutrition: based on nutrition assessment       _____________________________________________________________________    Interval History   Pt seen bedside w/ family (daughter and boyfriend) in pm. Pt is lying comfortably in bed, no acute distress. Pt reports no headache, " fever/chills/nausea/vomiting. He denies SOB, chest pain, abdominal pain, guarding and tenderness. He also reports no constipation/diarrhea or incontinence. Neurovascular resident came in to annouce to patient that procedure (MMA) was rescheduled to Monday bc of emergent procedure, pt became upset, but remained calm. We performed a brief exam revealing normal strength in all extremities except the right hand weakness and tremor noted. normal neurosensation and coordination.    Data reviewed today: I reviewed all medications, new labs and imaging results over the last 24 hours. I personally reviewed the chest x-ray revealed Right upper extremity PICC tip over the mid superior vena cava. No focal consolidation and Ct-scan head revealed No substantial change in size or appearance of mixed density bilateral subdural hematomas, left greater than right, compared to CT, Stable 3 mm left-to-right midline shift    Physical Exam   Vital Signs: Temp: 98.1  F (36.7  C) Temp src: Oral BP: 110/79 Pulse: 75   Resp: 9 SpO2: 100 %      Weight: 199 lbs 1.21 oz  Constitutional: awake, alert, cooperative, no apparent distress, and appears stated age  Eyes: Lids and lashes normal, pupils equal, round and reactive to light, extra ocular muscles intact, sclera clear, conjunctiva normal  ENT: Normocephalic, without obvious abnormality, atraumatic, sinuses nontender on palpation, external ears without lesions, oral pharynx with moist mucous membranes, tonsils without erythema or exudates, gums normal and good dentition.  Hematologic / Lymphatic: no cervical lymphadenopathy  Respiratory: No increased work of breathing, good air exchange, clear to auscultation bilaterally, no crackles or wheezing  Cardiovascular: Normal apical impulse, regular rate and rhythm, normal S1 and S2, no S3 or S4, and no murmur noted  GI: No scars, normal bowel sounds, soft, non-distended, non-tender, no masses palpated, no hepatosplenomegally  Skin: scattered  ecchymosis, superficial wounds b/l LE  Musculoskeletal: There is no redness, warmth, or swelling of the joints.  Full range of motion noted.  Motor strength is 5 out of 5 all extremities bilaterally w/ exception of subtle hand weakness on right hand and tremor.  Tone is normal.  Neurologic: Awake, alert, oriented to name, place and time.  Cranial nerves II-XII are grossly intact.  Motor is 5 out of 5 bilaterally.  Cerebellar finger to nose, heel to shin intact.  Sensory is intact.  Babinski down going, Romberg negative, and gait is normal.  Neuropsychiatric: General: normal, calm and normal eye contact  Level of consciousness: alert / normal  Affect: normal  Orientation: oriented to self, place, time and situation      Data   Recent Labs   Lab 11/04/22  0429 11/04/22  0425 11/03/22  2341 11/03/22  2141 11/03/22  0700 11/02/22  2150 11/02/22  2150 11/02/22  2104   WBC  --  2.7*  --   --  2.6*  --  3.3*  --    HGB  --  10.1*  --   --  11.4*  --  12.9*  --    MCV  --  104*  --   --  103*  --  103*  --    PLT  --  126*  --   --  67*  --  82*  --    INR  --   --   --  1.24*  --   --   --  1.17*   NA  --  133* 131*  --  138   < > 134*  --    POTASSIUM  --  3.7 3.5  --  3.7   < > 3.9  --    CHLORIDE  --  98 95*  --  97*   < > 90*  --    CO2  --  22 25  --  20*   < > 19*  --    BUN  --  3.8* 4.4*  --  7.3   < > 8.7  --    CR  --  0.56* 0.56*  --  0.57*   < > 0.74  --    ANIONGAP  --  13 11  --  21*   < > 25*  --    ABDULKADIR  --  7.9* 7.5*  --  7.8*   < > 8.5*  --    * 131* 133*  --  75   < > 91  --    ALBUMIN  --  3.4*  --   --  3.6   < > 4.0  --    PROTTOTAL  --  5.5*  --   --  6.1*   < > 6.7  --    BILITOTAL  --  7.6*  --   --  6.3*   < > 6.9*  --    ALKPHOS  --  190*  --   --  222*   < > 261*  --    ALT  --  66*  --   --  89*   < > 99*  --    AST  --  295*  --   --  348*  --   --   --    LIPASE  --  622*  --   --   --   --   --   --     < > = values in this interval not displayed.     Recent Results (from the past 24  hour(s))   XR Chest Port 1 View    Narrative    Exam: XR CHEST PORT 1 VIEW, 11/4/2022 3:36 AM    Comparison: None    History: confirm PICC    Findings:  Single portable semiupright view of the chest is obtained. Right upper  extremity PICC tip terminates over the mid superior vena cava.    Trachea is midline. Mediastinum is within normal limits.  Cardiopulmonary silhouette is within normal limits. No acute airspace  disease. There is no pneumothorax or pleural effusion. The upper  abdomen is unremarkable.      Impression    Impression:   1. Right upper extremity PICC tip over the mid superior vena cava.  2. No focal consolidation.    I have personally reviewed the examination and initial interpretation  and I agree with the findings.    SAAD BRAVO MD         SYSTEM ID:  [id number]   CT Head w/o Contrast    Narrative    EXAM: CT HEAD W/O CONTRAST  11/4/2022 9:06 AM     HISTORY:  Headache; Trauma, acute/subacute, without suspected cervical  artery trauma       COMPARISON:  11/3/2022    TECHNIQUE: Using multidetector thin collimation helical acquisition  technique, axial, coronal and sagittal CT images from the skull base  to the vertex were obtained without intravenous contrast.   (topogram) image(s) also obtained and reviewed.    FINDINGS:  No substantial change in size or appearance of predominantly hypodense  large 18 mm subdural hematoma overlying the left cerebral convexity  with layering hyperattenuating products. Additionally, no substantial  change in size or appearance of abdominal hypodense 5 mm subdural  hematoma overlying the right cerebral convexity with scattered  hyperattenuating products. Stable left right 3 mm midline shift. Mild  associated mass effect of the surrounding cerebral sulci, most  pronounced over the left frontal and parietal lobes No acute  intracranial hemorrhage. No acute loss of gray-white matter  differentiation in the cerebral hemispheres. Ventricles are  proportionate to the  cerebral sulci. No hydrocephalus. Basal cisterns  are clear. Scattered periventricular and subcortical white matter  hypodensities, nonspecific, represents sequela of chronic small  ischemic disease.. Moderate global cerebral and cerebellar volume  loss,.     The bony calvarium bones and skull base are unremarkable. Large mucous  retention cyst in left maxillary sinus. Otherwise the visualized  paranasal sinuses are unremarkable. Mastoid air cells are clear..  Orbits are grossly unremarkable.      Impression    IMPRESSION:   1. No substantial change in size or appearance of mixed density  bilateral subdural hematomas, left greater than right, compared to CT  head, 11/3/2022.  2. Stable 3 mm left-to-right midline shift.    I have personally reviewed the examination and initial interpretation  and I agree with the findings.    URIAH POST MD         SYSTEM ID:  [id number]     Medications     Plasma-Lyte A 75 mL/hr at 11/04/22 1500       cloNIDine  0.1 mg Oral Q8H     folic acid  1 mg Oral Daily     [START ON 11/11/2022] gabapentin  100 mg Oral Q8H     [START ON 11/9/2022] gabapentin  300 mg Oral Q8H     [START ON 11/7/2022] gabapentin  600 mg Oral Q8H     gabapentin  900 mg Oral Q8H     levothyroxine  175 mcg Oral or Feeding Tube Daily     multivitamin w/minerals  1 tablet Oral Daily     thiamine  500 mg Intravenous TID    Followed by     [START ON 11/6/2022] thiamine  250 mg Intravenous Daily    Followed by     [START ON 11/11/2022] thiamine  100 mg Oral or Feeding Tube Daily

## 2022-11-04 NOTE — LETTER
[facility] [address] [place]  [address]  [place] [address]  [phone]    FACSIMILE TRANSMITTAL SHEET    TO: [facility]    _____URGENT _____REVIEW ONLY _____PLEASE COMMENT____PLEASE REPLY    NOTES/COMMENTS: Attached please find initial clinical information for Getachew Barcenas.  Requesting home RN, PT, OT.  Please let me know if you are able to accept this referral.     Leyla Tierney, RN BSN, PHN, ACM-RN  7A RN Care Coordinator  Phone: [phone]  Pager [phone]    To contact the weekend On license of [facility] (0800 - 1630) Saturday and Sunday    Units: 4A, 4C, 4E, 5A and 5B- Pager 1: [phone]    Units: 6A, 6B, 6C, 6D- Pager 2: [phone]    Units: 7A, 7B, 7C, 7D, and 5C-Pager 3: [phone]    [facility] ([phone]) Saturday and Sunday    Units: 5 Ortho, 8A, 10 ICU, & Pediatric Units-Pager 4: [phone]    11/14/2022 3:16 PM                                        IF YOU DID NOT RECEIVE THE CORRECT NUMBER OF PAGES OR THE FAX DID NOT COME THROUGH CLEARLY, PLEASE CALL THE SENDER     CONFIDENTIALITY STATEMENT: Confidential information that may accompany this transmission contains protected health information under state and federal law and is legally privileged. This information is intended only for the use of the individual or entity named above and may be used only for carrying out treatment, payment or other healthcare operations. The recipient or person responsible for delivering this information is prohibited by law from disclosing this information without proper authorization to any other party, unless required to do so by law or regulation. If you are not the intended recipient, you are hereby notified that any review, dissemination, distribution, or copying of this message is strictly prohibited. If you have received this communication in error, please destroy the materials and contact us immediately by calling the number listed above. No response indicates that the  information was received by the appropriate authorized party

## 2022-11-04 NOTE — CONSULTS
Care Management Initial Consult    General Information  Assessment completed with: James Ashley  Type of CM/SW Visit: Initial Assessment    Primary Care Provider verified and updated as needed: Yes   Readmission within the last 30 days: no previous admission in last 30 days      Reason for Consult: discharge planning  Advance Care Planning: Advance Care Planning Reviewed: no concerns identified       Communication Assessment  Patient's communication style: spoken language (English or Bilingual)        Cognitive  Cognitive/Neuro/Behavioral: WDL  Level of Consciousness: alert  Arousal Level: opens eyes spontaneously  Orientation: oriented x 4        Speech: spontaneous (mumbles)    Living Environment:   People in home: alone     Current living Arrangements: house      Able to return to prior arrangements: yes     Family/Social Support:  Care provided by: self, child(caleb)  Provides care for: no one  Marital Status:   Children          Description of Support System: Supportive, Involved    Support Assessment: Adequate family and caregiver support    Current Resources:   Patient receiving home care services:  No     Community Resources:  No  Equipment currently used at home: None  Supplies currently used at home: None    Employment/Financial:  Employment Status: Unemployed.        Financial Concerns: No. Dtr is POA for finances.     Lifestyle & Psychosocial Needs:  Social Determinants of Health     Tobacco Use: Medium Risk     Smoking Tobacco Use: Never     Smokeless Tobacco Use: Former     Passive Exposure: Not on file   Alcohol Use: Not on file   Financial Resource Strain: Not on file   Food Insecurity: Not on file   Transportation Needs: Not on file   Physical Activity: Not on file   Stress: Not on file   Social Connections: Not on file   Intimate Partner Violence: Not on file   Depression: Not on file   Housing Stability: Not on file     Functional Status:  Prior to admission patient needed assistance:  "Was independent. Not receiving any services or assistance.    Mental Health Status: No        Chemical Dependency Status:  Chemical Dependency Status: Past Concern           Values/Beliefs:  Spiritual, Cultural Beliefs, Latter-day Practices, Values that affect care: No            Additional Information:  Per H&P, patient is a 52 year old man with a past medical history of alcohol use disorder, thiamine deficiency, frequent falls, peripheral neuropathy, hypothyroidism, former tobacco use, mild LULI, somnambulism, RLS, diverticulitis, and mood disorder who presented as transfer to [facility] on 11/4/2022 for higher level of cares in the setting of new diagnosis of acute on chronic L SDH and subacute R SDH.   Writer spoke with daughter James via phone due to patient's inability to communicate at this time. James is primary contact and POA for finances. Daughter reports that patient is residing in one level independent home. He does not receive any outside assistance, support or use any equipment. Daughter states patient doesn't eat much, but when he does eat, he utilizes grocery delivery. Daughter is interested in completing a POA for health care. Daughter also asked about a cognitive assessment being completed.   Daughter states that patient's chemical dependency is \"awful\". Patient went to treatment following previous admission, but left after one week. Daughter states that much of the family has not been in contact with patient recently, due to current alcohol use. He has friends that check in on him, but they also use Alcohol. Writer did speak with medical team regarding a cognitive assessment and chemical dependency referral. Team states that this will need to be completed after withdrawal symptoms are through. Writer will assess patient and daughter with providing Healthcare Directive and connecting them with notary.  to continue to follow for support and discharge planning.     ADDENDUM:     11:19 AM " Healthcare Directive left at bedside for patient with note to call when completed.     2:13 PM Healthcare Directive completed by patient and notarized via Boomi Kade Miller. Document emailed to Kade Miller.    KARLA Cisneros, MSW  Adult Acute Care Float   Pager [phone]

## 2022-11-04 NOTE — H&P
[employer] History and Physical    Getachew Barcenas MRN# [id number]   Age: 52 year old YOB: 1970     Date of Admission:  11/2/2022    Primary care provider: No Ref-Primary, Physician          Assessment and Plan:   Assessment:   53 yo male with a PMhx ETOH abuse who was drinking (ETOH 0.3) when he fell and hit his head, he has a history of multiple falls, he has an acute on chronic b/l SDH with midline shift 0.3 mm. It is stable on repeat scan. He had PLT dysfunction so received 1 pack of PLTs and Vitamin K. He was admitted for q1 hr Neuro checks.       Plan:   TBI: Patient admitted for TBI, stable on repeat examination, GCS currently 15. He will receive q1 hr neuro checks. If he worsens clinically we will initiate an emergent transfer to a level 1 or 2 trauma center pending bed availability, we do not have neurosurgical services. We attempted to have him admitted to a hospital with NS expertise; however, all of the trauma centers and level 1-2 trauma centers in our state are full. He was observed for the past 24 hours in the ED.    - I will repeat his Plt function test to confirm it improved after PLT transfusion, he may require a 2nd transfusion.    - Q1 Neuro and GCS checks.    - [facility] protocol    - Repeat Head CT in the am   - No DVT PPx at this time, SCDs    Elevated LFTs. GI medicine consultation in the AM for alcoholic cirrhosis    Restarted home medications    He received a pan CT scan that was negative for any other injuries. On my examination I did not identify any other concerns for additional injuries.                  Chief Complaint:   52 year old male of severe alcohol use disorder, diverticulitis, hepatic steatosis, thiamine deficiency, mood disorder, hypoglycemia, frequent falls striking head who presents to this ED via walk in with daughter for evaluation of ongoing alcohol abuse, dizziness and weakness.      Patient reportes that leg weakness, dizziness, and balance  "problems have been ongoing for the past 3 years, but have recently been worse as a result from drinking alcohol. Patient reports abstaining from alcohol for four months in the past, but has been drinking since May 2022. He reports at one pint in the past he was drinking 1.75 L of alcohol every 2 days, but now reports drinking 1.75 L of alcohol every one week.   Patient reports his legs \"turn to rubber\" when he drinks. His last drink was on the day of admission.  He reports getting \"antsy\" when he experiences withdrawal symptoms.     Daughter reports the patient fell on 10/30/2022. He has bruising on his hands and back from previous falls, but he reportedly did not sustain any injuries from his most recent fall (fell on carpet). Patient reports collapsing 50 times and has 4 broken ribs. Daughter reports there are lots of holes in the drywall of his house due to his head hitting the wall. Patient also reports he needs his gallbladder removed. Patient denies abdominal pain, back pain, and headache.            Past Medical History:     Past Medical History:   Diagnosis Date     Alcohol use disorder, severe, dependence (H) 2/24/2022             Past Surgical History:     Past Surgical History:   Procedure Laterality Date     PICC TRIPLE LUMEN PLACEMENT  11/3/2022                  Social History:     Social History     Tobacco Use     Smoking status: Never     Smokeless tobacco: Former   Substance Use Topics     Alcohol use: Yes             Family History:   No family history on file.  Family history reviewed and updated in [facility]          Immunizations:   Immunizations are up to date          Allergies:   All allergies reviewed and addressed          Medications:     Current Facility-Administered Medications   Medication     cloNIDine (CATAPRES) tablet 0.1 mg     flumazenil (ROMAZICON) injection 0.2 mg     [START ON 11/4/2022] folic acid (FOLVITE) tablet 1 mg     OLANZapine zydis (zyPREXA) ODT tab 5-10 mg    Or     " haloperidol lactate (HALDOL) injection 2.5-5 mg     levETIRAcetam (KEPPRA) 1,000 mg in sodium chloride 0.9 % 100 mL intermittent infusion     levothyroxine (SYNTHROID/LEVOTHROID) tablet 175 mcg     lidocaine (LMX4) cream     lidocaine 1 % 0.1-5 mL     LORazepam (ATIVAN) tablet 1-2 mg    Or     LORazepam (ATIVAN) injection 1-2 mg     magnesium oxide (MAG-OX) tablet 400 mg     melatonin tablet 5 mg     [START ON 11/4/2022] multivitamin w/minerals (THERA-VIT-M) tablet 1 tablet     ondansetron (ZOFRAN) injection 4 mg     phytonadione 5 mg in sodium chloride 0.9 % 50 mL intermittent infusion     sodium chloride (PF) 0.9% PF flush 10-20 mL     sodium chloride (PF) 0.9% PF flush 10-40 mL     sodium chloride (PF) 0.9% PF flush 10-40 mL     sodium chloride (PF) 0.9% PF flush 10-40 mL     sodium chloride 0.9% infusion     [START ON 11/4/2022] thiamine (B-1) tablet 100 mg     traZODone (DESYREL) tablet 50 mg             Review of Systems:   The Review of Systems is negative other than noted in the HPI     Gen: NAD  Pupils equal round and reactive to light, EOMI, CN intact  GCS 15  Motor/senstory adequate b/l, normal ROM. Non tender in b/l arms and legs, no tenderness with ROM  CVS: RRR  Lung CTA B/L  Abd: Soft, nt, nd  2+ peripheral pulses           Data:   All laboratory data reviewed  All cardiac studies reviewed by me.  All imaging studies reviewed by me.     Attestation:  I have reviewed today's vital signs, notes, medications, labs and imaging.    Campos Carlin MD

## 2022-11-04 NOTE — CONSULTS
[facility]    Hepatology Consult    Requesting provider: Dr. Feng    Consult requested for elevated liver tests      HPI:  52 year old male history of hypothyroidism, alcohol use disorder, LULI who was admitted to [facility] as a transfer from [facility] for acute on chronic subdural hematoma.  Patient admitted to ICU under neuro critical care.  He was evaluated by neurosurgery, neuro interventional radiology and is planned for middle meningeal artery embolization next week.  He was also found to have elevated liver tests and hepatology consult was requested for further evaluation.    Patient was seen at his bedside, his daughter and son were next to him.  He suffers from alcohol use disorder and has passed her about liver disease in 2020.  Since then he had short periods of sobriety but relapsed back and continued to drink.  Last drink was on Wednesday this week right before getting admitted to [facility].      He started alcohol use at the age of 17 or 18, initially it was predominantly beers but now he consumes vodka (1.75 L bottle in a span of 2 to 3 days).  He was  at the [employer] for prior to retiring at the age of 50.  At the time of my evaluation he denied any nausea or vomiting or abdominal pain. No fevers or chills.      Medical hx Surgical hx   Past Medical History:   Diagnosis Date    Alcohol use disorder, severe, dependence (H) 2/24/2022      Past Surgical History:   Procedure Laterality Date    PICC TRIPLE LUMEN PLACEMENT  11/3/2022               Medications  Current Facility-Administered Medications   Medication Dose Route Frequency    cloNIDine  0.1 mg Oral Q8H    folic acid  1 mg Oral Daily    [START ON 11/11/2022] gabapentin  100 mg Oral Q8H    [START ON 11/9/2022] gabapentin  300 mg Oral Q8H    [START ON 11/7/2022] gabapentin  600 mg Oral Q8H    gabapentin  900 mg Oral Q8H    levothyroxine  175 mcg Oral or Feeding Tube Daily     "multivitamin w/minerals  1 tablet Oral Daily    thiamine  500 mg Intravenous TID    Followed by    [START ON 11/6/2022] thiamine  250 mg Intravenous Daily    Followed by    [START ON 11/11/2022] thiamine  100 mg Oral or Feeding Tube Daily       Allergies  No Known Allergies    Family hx Social hx   No family history on file.   Social History     Tobacco Use    Smoking status: Never    Smokeless tobacco: Former   Substance Use Topics    Alcohol use: Yes    Drug use: No          Review of systems  A 10-point review of systems was negative.      Examination  /69   Pulse 85   Temp 98.8  F (37.1  C) (Oral)   Resp 23   Ht 1.854 m (6' 1\")   Wt 90.3 kg (199 lb 1.2 oz)   SpO2 98%   BMI 26.26 kg/m      Intake/Output Summary (Last 24 hours) at 11/4/2022 1801  Last data filed at 11/4/2022 1800  Gross per 24 hour   Intake 1651.25 ml   Output 1450 ml   Net 201.25 ml       Gen- well, NAD, A+Ox3  Eye- Sclera anciteric  CVS- RRR  RS- Non labored breathing   Abd-Soft, non distended  Extr- no JUDIT  Neuro- no asterixis  Skin- Jaundice      Laboratory  Lab Results   Component Value Date     11/04/2022    POTASSIUM 3.7 11/04/2022    POTASSIUM 3.6 02/25/2022    CHLORIDE 98 11/04/2022    CHLORIDE 107 02/23/2022    CO2 22 11/04/2022    CO2 20 02/23/2022    BUN 3.8 11/04/2022    BUN 3 02/23/2022    CR 0.56 11/04/2022       Lab Results   Component Value Date    BILITOTAL 7.6 11/04/2022    ALT 66 11/04/2022     11/04/2022    ALKPHOS 190 11/04/2022       Lab Results   Component Value Date    ALBUMIN 3.4 11/04/2022    ALBUMIN 3.6 02/22/2022    PROTTOTAL 5.5 11/04/2022        Lab Results   Component Value Date    WBC 2.7 11/04/2022    HGB 10.1 11/04/2022     11/04/2022     11/04/2022       Lab Results   Component Value Date    INR 1.24 11/03/2022       Radiology  CT Abdomen and Pelvis with IV contrast (11/2/2022)                                                                   IMPRESSION:   1.  " Cholelithiasis. The gallbladder is distended. No definite surrounding inflammation. Further evaluation with ultrasound or hepatobiliary scan is suggested.  2.  Diffuse fatty infiltration of the liver.  3.  Colonic diverticula without acute diverticulitis. No bowel obstruction or inflammation.  4.  Single left renal stone. No ureteral stone or hydronephrosis.    US Abdomen Limited (11/3/2022)  IMPRESSION:  1.  Nonshadowing area at neck of gallbladder, most likely representing the stones identified on CT.  Sludge within the gallbladder.  No gallbladder wall thickening, intra/extra-hepatic biliary ductal dilatation or sonographic Kwan's sign.  2.  Hepatomegaly.  Diffuse fatty infiltration of the liver.  3.  Limited study.     Assessment  Elevated liver tests likely alcohol liver disease   Alcohol use disorder    52 year old male history of hypothyroidism, alcohol use disorder, LULI who was admitted to [facility] as a transfer from [facility] for acute on chronic subdural hematoma.  Patient admitted to ICU under neuro critical care.  He was evaluated by neurosurgery, neuro interventional radiology and is planned for middle meningeal artery embolization next week.  He was also found to have elevated liver tests and hepatology consult was requested for further evaluation.    Labs reviewed, T denae 7.6, D denae 5.41, , ALT 66 and . AST is elevated more than ALT.  INR 1.24 and albumin 3.4.    R factor of 1 suggestive of predominant cholestatic pattern but  ultrasound imaging with normal bile duct and some gallbladder sludge.  Overall, the likely cause for elevation of liver tests is alcohol which can cause cholestatic pattern of liver enzyme elevation in addition to causing predominant elevation of AST compared to ALT. Having said that, alternate causes of chronic liver disease have to be excluded. With regards to stage of liver disease, pt most likely has steatosis and less likely to be a cirrhotic   considering absence of any radiologic evidence of both cirrhosis and portal hypertension (spleen normal on CT).     Recommendations  - Chronic liver disease work up; HAV IgG, HbsAg, anti Hbs AG, anti Hep   B core antibody  - Monitor liver tests and INR daily   --Chemical dependency consult when he is more stable and receptive  -- Nutrition consult   - Outpatient hepatology follow up on discharge      Marcus Alexander MD  Hepatology  #    Attestation:  This patient has been seen and evaluated by me, Ashley Tong.  Discussed with the house staff team or resident(s) and agree with the findings and plan in this note.

## 2022-11-04 NOTE — LETTER
[facility] [address] [place]  [address]  [place] [address]  [phone]    FACSIMILE TRANSMITTAL SHEET    TO:Marleny ANDERS ph:[phone] fx: [phone]     _____URGENT _____REVIEW ONLY _____PLEASE COMMENT____PLEASE REPLY    NOTES/COMMENTS: Attached please find initial clinical information for Getachew Barcenas.  Requesting home RN, PT, OT.  Please let me know if you are able to accept this referral.  Thanks    Leyla Tierney, RN BSN, PHN, ACM-RN  7A RN Care Coordinator  Phone: [phone]  Pager [phone]    To contact the [facility] ([phone]) Saturday and Sunday    Units: 4A, 4C, 4E, 5A and 5B- Pager 1: [phone]    Units: 6A, 6B, 6C, 6D- Pager 2: [phone]    Units: 7A, 7B, 7C, 7D, and 5C-Pager 3: [phone]    [facility] ([phone]) Saturday and Sunday    Units: 5 Ortho, 8A, 10 ICU, & Pediatric Units-Pager 4: [phone]    11/14/2022 3:26 PM                                        IF YOU DID NOT RECEIVE THE CORRECT NUMBER OF PAGES OR THE FAX DID NOT COME THROUGH CLEARLY, PLEASE CALL THE SENDER     CONFIDENTIALITY STATEMENT: Confidential information that may accompany this transmission contains protected health information under state and federal law and is legally privileged. This information is intended only for the use of the individual or entity named above and may be used only for carrying out treatment, payment or other healthcare operations. The recipient or person responsible for delivering this information is prohibited by law from disclosing this information without proper authorization to any other party, unless required to do so by law or regulation. If you are not the intended recipient, you are hereby notified that any review, dissemination, distribution, or copying of this message is strictly prohibited. If you have received this communication in error, please destroy the materials and contact us immediately by calling the number listed above. No response  indicates that the information was received by the appropriate authorized party

## 2022-11-04 NOTE — CONSULTS
[facility]       NEUROSURGERY CONSULTATION NOTE    This consultation was requested by Dr. Vleez from the Neurocritical service.    Reason for Consultation: SDH      HPI: Getachew Barcenas is a 52 year old male with a PMH of alcohol abuse,  Hepatic steatosis, possible liver cirrhosis in the setting of alcohol abuse with thrombocytopenia, frequent falls in the setting of alcohol abuse, presented to OSH with his daughter due to weakness and dizziness, last fall was 5 days ago. He denies any headaches currently. Denies weakness or numbness.         PAST MEDICAL HISTORY:   Past Medical History:   Diagnosis Date     Alcohol use disorder, severe, dependence (H) 2/24/2022       PAST SURGICAL HISTORY:   Past Surgical History:   Procedure Laterality Date     PICC TRIPLE LUMEN PLACEMENT  11/3/2022            FAMILY HISTORY: No family history on file.    SOCIAL HISTORY:   Social History     Tobacco Use     Smoking status: Never     Smokeless tobacco: Former   Substance Use Topics     Alcohol use: Yes       MEDICATIONS:  Medications Prior to Admission   Medication Sig Dispense Refill Last Dose     acetaminophen (TYLENOL) 500 MG tablet [ACETAMINOPHEN (TYLENOL) 500 MG TABLET] Take 1-2 tablets (500-1,000 mg total) by mouth every 6 (six) hours as needed for pain.  0      folic acid (FOLVITE) 1 MG tablet Take 1 tablet (1 mg) by mouth daily        levothyroxine (SYNTHROID/LEVOTHROID) 175 MCG tablet Take 175 mcg by mouth daily        multivitamin w/minerals (THERA-VIT-M) tablet Take 1 tablet by mouth daily        psyllium (METAMUCIL) 3.4 gram packet [PSYLLIUM (METAMUCIL) 3.4 GRAM PACKET] Take 1 packet by mouth daily.        traZODone (DESYREL) 50 MG tablet Take 50 mg by mouth nightly as needed for sleep          Allergies:  No Known Allergies    ROS: 10 point ROS of systems including Constitutional, Eyes, Respiratory, Cardiovascular, Gastroenterology, Genitourinary, Integumentary,  "Muscularskeletal, Psychiatric were all negative except for pertinent positives noted in my HPI.    Physical exam:   Blood pressure 98/77, pulse 83, temperature 98.2  F (36.8  C), temperature source Oral, resp. rate 17, height 1.854 m (6' 1\"), SpO2 95 %.  CV: BP and HR as noted above  PULM: breathing comfortably on room air  ABD: soft, non-distended  NEUROLOGIC:  -- Awake; Alert; oriented x 3  -- Follows commands briskly  -- +repetition, calculation, and naming  -- Speech fluent, spontaneous. No aphasia or dysarthria.  -- no gaze preference. No apparent hemineglect.  Cranial Nerves:  -- visual fields full to confrontation, PERRL 3-2mm bilat and brisk, extraocular movements intact  -- face symmetrical, tongue midline  -- sensory V1-V3 intact bilaterally  -- palate elevates symmetrically, uvula midline  -- hearing grossly intact bilat  -- Trapezii 5/5 strength bilat symmetric  -- Cerebellar: Finger nose finger without dysmetria, intact rapid alternating motions bilaterally    Motor:  Normal bulk / tone; no, rigidity, or bradykinesia.  No muscle wasting or fasciculations. Resting tremor  No Pronator Drift     Delt Bi Tri Hand Flexion/  Extension Iliopsoas Quadriceps Hamstrings Tibialis Anterior Gastroc    C5 C6 C7 C8/T1 L2 L3 L4-S1 L4 S1   R 5 5 5 5 5 5 5 5 5   L 5 5 5 5 5 5 5 5 5   Sensory:  intact to LT x 4 extremities     Reflexes:     Bi Tri BR Jayy Pat Ach Bab    C5-6 C7-8 C6 UMN L2-4 S1 UMN   R 2+ 2+ 2+ Norm 2+ 2+ Norm   L 2+ 2+ 2+ Norm 2+ 2+ Norm     Gait: Deferred      LABS:  Last Comprehensive Metabolic Panel:  Sodium   Date Value Ref Range Status   11/03/2022 131 (L) 136 - 145 mmol/L Final     Potassium   Date Value Ref Range Status   11/03/2022 3.5 3.4 - 5.3 mmol/L Final   02/25/2022 3.6 3.5 - 5.0 mmol/L Final     Chloride   Date Value Ref Range Status   11/03/2022 95 (L) 98 - 107 mmol/L Final   02/23/2022 107 98 - 107 mmol/L Final     Carbon Dioxide (CO2)   Date Value Ref Range Status   11/03/2022 25 22 - 29 " mmol/L Final   02/23/2022 20 (L) 22 - 31 mmol/L Final     Anion Gap   Date Value Ref Range Status   11/03/2022 11 7 - 15 mmol/L Final   02/23/2022 12 5 - 18 mmol/L Final     Glucose   Date Value Ref Range Status   11/03/2022 133 (H) 70 - 99 mg/dL Final   02/23/2022 85 70 - 125 mg/dL Final     Urea Nitrogen   Date Value Ref Range Status   11/03/2022 4.4 (L) 6.0 - 20.0 mg/dL Final   02/23/2022 3 (L) 8 - 22 mg/dL Final     Creatinine   Date Value Ref Range Status   11/03/2022 0.56 (L) 0.67 - 1.17 mg/dL Final     GFR Estimate   Date Value Ref Range Status   11/03/2022 >90 >60 mL/min/1.73m2 Final     Comment:     Effective December 21, 2021 eGFRcr in adults is calculated using the 2021 CKD-EPI creatinine equation which includes age and gender (Meño et al., [facility], DOI: 10.1056/SQPNwr5692273)     Calcium   Date Value Ref Range Status   11/03/2022 7.5 (L) 8.6 - 10.0 mg/dL Final     Lab Results   Component Value Date    WBC 2.6 11/03/2022     Lab Results   Component Value Date    RBC 3.20 11/03/2022     Lab Results   Component Value Date    HGB 11.4 11/03/2022     Lab Results   Component Value Date    HCT 33.0 11/03/2022     Lab Results   Component Value Date     11/03/2022     Lab Results   Component Value Date    MCH 35.6 11/03/2022     Lab Results   Component Value Date    MCHC 34.5 11/03/2022     Lab Results   Component Value Date    RDW 11.9 11/03/2022     Lab Results   Component Value Date    PLT 67 11/03/2022         IMAGING:  CT head:                                                                 IMPRESSION:  1.  Mixed attenuation likely acute on subacute left convexity subdural hemorrhage measuring up to 17 mm.     2.  Likely subacute right convexity subdural hematoma measuring 7 mm in thickness.     3.  3 mm left right midline shift.     4.  Stable age-related change.    Repeat CT head 11/3 6 am  IMPRESSION:  1.  Stable volume of the bilateral subdural hemorrhages.  2.  Stable associated mass effect.                ASSESSMENT: Getachew Barcenas is a 53 yo male with alcohol abuse and frequent falls, thrombocytopenia, found to have traumatic  L>R acute on chronic SDH, with minimal rightward midline shift, asymptomatic at this moment.     RECOMMENDATIONS:  No neurosurgical intervention indicated at this time   Platelets > 100,000  INR < 1.5  Serial neuro exams  Consider IR consultation for bilateral MMA embolization  Follow up in Neurosurgery clinic in 1 mo with repeat CT head    The patient was discussed with Dr. Contreras, neurosurgery chief resident and they agree with the above.    Norma Blanchard MD  Neurosurgery Resident, PGY-3      Clinically Significant Risk Factors Present on Admission         # Hyponatremia: Lowest Na = 131 mmol/L (Ref range: 136-145) in last 2 days, will monitor as appropriate  # Hypocalcemia: Lowest Ca = 7.5 mg/dL (Ref range: 8.5 - 10.1 mg/dL) in last 2 days, will monitor and replace as appropriate   # Hypomagnesemia: Lowest Mg = 1.5 mg/dL (Ref range: 1.7-2.3) in last 2 days, will replace as needed  # Anion Gap Metabolic Acidosis: Highest Anion Gap = 25 mmol/L (Ref range: 7-15) in last 2 days, will monitor and treat as appropriate   # Coagulation Defect: INR = 1.24 (Ref range: 0.85 - 1.15) and/or PTT = 32 Seconds (Ref range: 22 - 38 Seconds), will monitor for bleeding  # Thrombocytopenia: Lowest platelets = 67 (Ref range: 150-450) in last 2 days, will monitor for bleeding    # Compression of brain

## 2022-11-04 NOTE — LETTER
[facility] [address] [place] [address]  [phone]    FACSIMILE TRANSMITTAL SHEET    TO: [facility]    _____URGENT _____REVIEW ONLY _____PLEASE COMMENT____PLEASE REPLY    NOTES/COMMENTS: Attached please find home care referral for  Getachew Barcenas.  Requesting home RN, PT, OT.  Please let me know if you are able to accept this referral.  Thanks    Aaron Rodriguez RN BSN  7C RN Care Coordinator   Ph: [phone]  Pager: [phone]                  IF YOU DID NOT RECEIVE THE CORRECT NUMBER OF PAGES OR THE FAX DID NOT COME THROUGH CLEARLY, PLEASE CALL THE SENDER     CONFIDENTIALITY STATEMENT: Confidential information that may accompany this transmission contains protected health information under state and federal law and is legally privileged. This information is intended only for the use of the individual or entity named above and may be used only for carrying out treatment, payment or other healthcare operations. The recipient or person responsible for delivering this information is prohibited by law from disclosing this information without proper authorization to any other party, unless required to do so by law or regulation. If you are not the intended recipient, you are hereby notified that any review, dissemination, distribution, or copying of this message is strictly prohibited. If you have received this communication in error, please destroy the materials and contact us immediately by calling the number listed above. No response indicates that the information was received by the appropriate authorized party

## 2022-11-04 NOTE — PROGRESS NOTES
Discussed concerns for lack of alternative hospital bed placement with Dr Hinkle and Dr Slade and consideration of placement in ICU at [facility]. Evidently there is a not a bed in the state for this patient. [facility] is not equipped to perform any cranial procedures nor could it be arranged within a days time. Question and discussed with ICU providers if an emergency room holding bed at a higher level of care facility who has capacity to perform emergent SDH evacuation is a better option. I do not believe patient needs evacuation at this current time based on stable clinical exam this am and review of 2 head CT's. His altered coagulopathy puts him at higher risk of complications if he does in the future require evacuation. He received platelets in the ED today. If patient decompensates neurologically he should transfer lights and cory to closest hospital which I presume would be [facility]. If he stays at [facility] I would recommend repeating his coag's in the morning as well as a head CT. Discussed case and recommendations with Dr Saavedra who is in agreement.     2100 ADDENDUM: discussed with ICU charge RN - plan of care if patient has change in neuro exam. RN reports clinically patient looks very stable.     Divya Jacobsen, KAROL-CNP  [facility]  O: [phone]

## 2022-11-04 NOTE — CARE PLAN
Deferral - SLP orders received for swallow evaluation. Chart reviewed and discussed with RN. Per RN, no acute changes in swallowing. SLP will defer evaluation at this time and complete orders, will page MD. Please reconsult SLP with changes in swallow function or communication skills.

## 2022-11-04 NOTE — CONSULTS
Focus- Coccyx, BL buttocks  D/I/A: Received consult for suspected pressure injury on buttocks. Assessed the area and noted bruising on R) buttock. Currently covered with Mepilex for protection on coccyx. No pressure injury detected. Able to shift weight from side to side. Educated pt on pressure injury, risk factors, and preventive measures. Verbalized understanding.  P. Continue to follow pressure injury prevention protocol. Cover the area with Mepilex for protection and change as needed. No further visit planned, will sign off.

## 2022-11-04 NOTE — LETTER
[facility] [address] [place]  [address]  [place] [address]  [phone]    FACSIMILE TRANSMITTAL SHEET    [facility] (aka: [facility]) (ph:[phone] fx:[phone])    _____URGENT _____REVIEW ONLY _____PLEASE COMMENT____PLEASE REPLY    NOTES/COMMENTS: Attached please find initial clinical information for Getachew Barcenas.  Requesting home RN, PT, OT.  Please let me know if you are able to accept this referral.  Thanks    Leyla Tierney, RN BSN, PHN, ACM-RN  7A RN Care Coordinator  Phone: [phone]  Pager [phone]    To contact the [facility] ([phone]) Saturday and Sunday    Units: 4A, 4C, 4E, 5A and 5B- Pager 1: [phone]    Units: 6A, 6B, 6C, 6D- Pager 2: [phone]    Units: 7A, 7B, 7C, 7D, and 5C-Pager 3: [phone]    [facility] ([phone]) Saturday and Sunday    Units: 5 Ortho, 8A, 10 ICU, & Pediatric Units-Pager 4: [phone]    11/14/2022 3:30 PM                                      IF YOU DID NOT RECEIVE THE CORRECT NUMBER OF PAGES OR THE FAX DID NOT COME THROUGH CLEARLY, PLEASE CALL THE SENDER     CONFIDENTIALITY STATEMENT: Confidential information that may accompany this transmission contains protected health information under state and federal law and is legally privileged. This information is intended only for the use of the individual or entity named above and may be used only for carrying out treatment, payment or other healthcare operations. The recipient or person responsible for delivering this information is prohibited by law from disclosing this information without proper authorization to any other party, unless required to do so by law or regulation. If you are not the intended recipient, you are hereby notified that any review, dissemination, distribution, or copying of this message is strictly prohibited. If you have received this communication in error, please destroy the materials and contact us immediately by  calling the number listed above. No response indicates that the information was received by the appropriate authorized party

## 2022-11-04 NOTE — PLAN OF CARE
Admitted from: [facility]   Reason for admission/transfer: Closer monitoring   2 RN skin assessment: completed by: Gisela RN and Jean RN  Result of skin assessment and interventions/actions: Pending WOC consult from primary team  Height, weight, drug calc weight: Done  Patient belongings: Hat, glasses, cell phone  MDRO education added to care plan: N/A  ?

## 2022-11-04 NOTE — PLAN OF CARE
Major Shift Events:  Neuro checks remain unchanged; forgetful/slightly confused at times, but redirectable. Oriented x4. PERRLA. SANCHEZ purposefully with 4/5 strength. Ataxic at times when doing finger to nose exam. Denies pain/nausea/headaches. No longer having tremors. CIWA 2-3. VSS on RA. Tolerating regular diet. Voiding with urinal; has some urgency and can be incontinent at times. Loose BM x1. Stood at edge of bed x1 assist. K/Mg/Phos replaced.     Plan: Transfer to  when bed available. Q4 neuros. CIWA. Rescheduled IR for MMA embolization to Monday 11/7.    For vital signs and complete assessments, please see documentation flowsheets.         Goal Outcome Evaluation:      Plan of Care Reviewed With: patient, child    Overall Patient Progress: improvingOverall Patient Progress: improving

## 2022-11-04 NOTE — H&P
Addended by: GAUTAM ABARCA on: 9/14/2022 11:19 AM     Modules accepted: Orders     [facility]    History and Physical / Consult note: Trauma Service     Date of Admission:  11/4/2022    Time of Admission/Consult Request (page/call): 2:00 AM    Time of my evaluation: 2:30 AM  Consulting services:  Neurosurgery - Emergent consult (within 30 mins): Called by ED   Neurology    Assessment & Plan   Trauma mechanism: GLF  Time/date of injury: 3 days prior  Known Injuries:  1. Acute on chronic SDH  2. TBI    Other diagnoses:   1. Etoh abuse  2. Cirrhosis   3. Anemia  3. Hypothyroidism       Plan:  1. Admit to ICU Trauma and Neurocrit   2. Appreciate Neurosurgery recommendations  3. Appreciate Neurocrit recommendations  4. Q1 hour neuro checks  5. PT/OT  6. CIWA protocol  7. Tertiary in AM    Code status: Full confirmed with patient.     ETOH: This patient was asked if in the last 3-6 months there has been a time when he had  5 or more drinks in a single day/outing.. Patient answer to the screening question was in the positive. Spoke with the patient about the correlation of ETOH use and accidents/injuries. We also discussed the importance of abstaining from ETOH use while healing from existing injuries, especially if prescribed narcotics at the time of discharge. The patient demonstrated understanding.  Primary Care Physician   Physician No Ref-Primary    Chief Complaint   GLF    History is obtained from the patient    History of Present Illness   Getachew Barcenas is a 52 year old male with a history of alcohol abuse. He has been having frequent falls. He was admitted initially after being brought in to the ED by his daughter. Initial findings were acute on chronic SDH with a stable Follow-up head CT. Because there was no neurosurgery available at the [facility] he was transferred to the [facility] for further care. He is a poor historian but denies headache. He denies SOB/CP.      Past Medical History    I have reviewed this patient's medical history and updated it with  pertinent information if needed.   Past Medical History:   Diagnosis Date     Alcohol use disorder, severe, dependence (H) 2/24/2022     Past Medical History:   Diagnosis Date     Alcohol use disorder, severe, dependence (H) 2/24/2022     Past Surgical History   I have reviewed this patient's surgical history and updated it with pertinent information if needed.  Past Surgical History:   Procedure Laterality Date     PICC TRIPLE LUMEN PLACEMENT  11/3/2022          Prior to Admission Medications   Prior to Admission Medications   Prescriptions Last Dose Informant Patient Reported? Taking?   acetaminophen (TYLENOL) 500 MG tablet   No No   Sig: [ACETAMINOPHEN (TYLENOL) 500 MG TABLET] Take 1-2 tablets (500-1,000 mg total) by mouth every 6 (six) hours as needed for pain.   folic acid (FOLVITE) 1 MG tablet   No No   Sig: Take 1 tablet (1 mg) by mouth daily   levothyroxine (SYNTHROID/LEVOTHROID) 175 MCG tablet   Yes No   Sig: Take 175 mcg by mouth daily   multivitamin w/minerals (THERA-VIT-M) tablet   No No   Sig: Take 1 tablet by mouth daily   psyllium (METAMUCIL) 3.4 gram packet   Yes No   Sig: [PSYLLIUM (METAMUCIL) 3.4 GRAM PACKET] Take 1 packet by mouth daily.   traZODone (DESYREL) 50 MG tablet   Yes No   Sig: Take 50 mg by mouth nightly as needed for sleep      Facility-Administered Medications: None     Allergies   No Known Allergies    Social History   Social History     Socioeconomic History     Marital status:      Spouse name: Not on file     Number of children: Not on file     Years of education: Not on file     Highest education level: Not on file   Occupational History     Not on file   Tobacco Use     Smoking status: Never     Smokeless tobacco: Former   Substance and Sexual Activity     Alcohol use: Yes     Drug use: No     Sexual activity: Not on file   Other Topics Concern     Not on file   Social History Narrative     Not on file     Social Determinants of Health     Financial Resource Strain: Not  on file   Food Insecurity: Not on file   Transportation Needs: Not on file   Physical Activity: Not on file   Stress: Not on file   Social Connections: Not on file   Intimate Partner Violence: Not on file   Housing Stability: Not on file       Family History   Family history reviewed with patient and is noncontributory.    Review of Systems   CONSTITUTIONAL: No fever, chills, sweats, fatigue   EYES: no visual blurring, no double vision or visual loss  ENT: no decrease in hearing, no tinnitus, no vertigo, no hoarseness  RESPIRATORY: no shortness of breath, no cough, no sputum   CARDIOVASCULAR: no palpitations, no chest  pain, no exertional chest pain or pressure  GASTROINTESTINAL: no nausea or vomiting, or abd pain  GENITOURINARY: no dysuria, no frequency or hesitancy, no hematuria  MUSCULOSKELETAL: no weakness, no redness, no swelling, no joint pain,   SKIN: no rashes, ecchymoses, abrasions or lacerations  NEUROLOGIC: no numbness or tingling of hands, no numbness or tingling  of feet, no syncope, no tremors or weakness  PSYCHIATRIC: no sleep disturbances, no anxiety or depression    Physical Exam   Temp: 98.2  F (36.8  C) Temp src: Oral BP: 98/77 Pulse: 82   Resp: 17 SpO2: 98 %      Vital Signs with Ranges  Temp:  [98.2  F (36.8  C)-98.8  F (37.1  C)] 98.2  F (36.8  C)  Pulse:  [] 82  Resp:  [10-24] 17  BP: ()/(59-82) 98/77  SpO2:  [90 %-99 %] 98 % 0 lbs 0 oz    Primary Survey:  Airway: patient talking  Breathing: symmetric respiratory effort bilaterally  Circulation: central pulses present and peripheral pulses present  Disability: Pupils - left 4 mm and brisk, right 4 mm and brisk     Lex Coma Scale - Total 15/15  Eye Response (E): 4  4= spontaneous,  3= to verbal/voice, 2=  to pain, 1= No response   Verbal Response (V): 5   5= Orientated, converses,  4= Confused, converses, 3= Inappropriate words,  2= Incomprehensible sounds,  1=No response   Motor Response (M): 6   6= Obeys commands, 5= Localizes  to pain, 4= Withdrawal to pain, 3=Fexion to pain, 2= Extension to pain, 1= No response    Secondary Survey:  General: alert, oriented to person, place, time  Head: atraumatic, normocephalic, trachea midline  Eyes: PERRLA, pupils 3mm, EOMI, corneas and conjunctivae clear  Ears: pearly grey bilateral TMs and non-inflamed external ear canals  Nose: nares patent, no drainage, nasal septum non-tender  Mouth/Throat: no exudates or erythema,  no dental tenderness or malocclusions, no tongue lacerations  Neck:  no cervical collar present. No midline posterior tenderness, full AROM without pain or tenderness   Chest/Pulmonary: normal respiratory rate and rhythm,  bilateral clear breath sounds, no wheezes, rales or rhonchi, no chest wall tenderness or deformities,   Cardiovascular: S1, S2,  normal and regular rate and rhythm, no murmurs  Abdomen: soft, non-tender, no guarding, no rebound tenderness and no tenderness to palpation  : normal external genitalia, pelvis stable to lateral compression, no bearden, no urine assess/urine yellow and clear  Back/Spine: no deformity, no midline tenderness, no sacral tenderness,  no step-offs and no abrasions or contusions  Musculoskel/Extremities: normal extremities, full AROM of major joints without tenderness, edema, erythema, ecchymosis, or abrasions.  Hand: no gross deformities of hands or fingers. Full AROM of hand and fingers in flexion and extension.  strength equal and symmetric.   Skin: no rashes, laceration, ecchymosis, skin warm and dry.   Neuro: PERRLA, alert, oriented x 3. CN II-XII grossly intact. No focal deficits. Strength 5/5 x 4 extremities.  Sensation intact.  Psychiatric: affect/mood normal, cooperative, normal judgement/insight and memory intact  # Pain Assessment:  Current Pain Score 11/4/2022   Patient currently in pain? no   - Getachew is experiencing pain due to SDH. Pain management was discussed and the plan was created in a collaborative fashion.  Getachew's  response to the current recommendations: engaged  - Pharmacologic adjuvants: Acetaminophen        Data   UA RESULTS:  Recent Labs   Lab Test 02/18/22  1843   COLOR Yellow   APPEARANCE Clear   URINEGLC Negative   URINEBILI Negative   URINEKETONE Negative   SG 1.006   UBLD Negative   URINEPH 5.0   PROTEIN Negative   NITRITE Negative   LEUKEST Negative   RBCU 0   WBCU 1      Results for orders placed or performed during the hospital encounter of 11/02/22 (from the past 24 hour(s))   Head CT w/o contrast    Narrative    EXAM: CT HEAD W/O CONTRAST  LOCATION: [facility]  DATE/TIME: 11/3/2022 6:10 AM    INDICATION: Follow up subdural hematomas.  COMPARISON: 11/02/2022.  TECHNIQUE: Routine CT Head without IV contrast. Multiplanar reformats. Dose reduction techniques were used.    FINDINGS:  INTRACRANIAL CONTENTS: The bilateral subdural hemorrhages are stable in overall volume. Stable mass effect on the underlying parenchyma, with 3 mm left-to-right midline shift. The basal cisterns are preserved. No CT evidence of acute infarct. Mild   presumed chronic small vessel ischemic changes. Mild generalized volume loss. No hydrocephalus.     VISUALIZED ORBITS/SINUSES/MASTOIDS: No intraorbital abnormality. Mucous retention cyst in the left maxillary sinus. Trace right mastoid effusion.    BONES/SOFT TISSUES: No acute abnormality.      Impression    IMPRESSION:  1.  Stable volume of the bilateral subdural hemorrhages.  2.  Stable associated mass effect.                 Basic metabolic panel   Result Value Ref Range    Sodium 138 136 - 145 mmol/L    Potassium 3.7 3.4 - 5.3 mmol/L    Chloride 97 (L) 98 - 107 mmol/L    Carbon Dioxide (CO2) 20 (L) 22 - 29 mmol/L    Anion Gap 21 (H) 7 - 15 mmol/L    Urea Nitrogen 7.3 6.0 - 20.0 mg/dL    Creatinine 0.57 (L) 0.67 - 1.17 mg/dL    Calcium 7.8 (L) 8.6 - 10.0 mg/dL    Glucose 75 70 - 99 mg/dL    GFR Estimate >90 >60 mL/min/1.73m2   CBC with platelets   Result Value Ref  Range    WBC Count 2.6 (L) 4.0 - 11.0 10e3/uL    RBC Count 3.20 (L) 4.40 - 5.90 10e6/uL    Hemoglobin 11.4 (L) 13.3 - 17.7 g/dL    Hematocrit 33.0 (L) 40.0 - 53.0 %     (H) 78 - 100 fL    MCH 35.6 (H) 26.5 - 33.0 pg    MCHC 34.5 31.5 - 36.5 g/dL    RDW 11.9 10.0 - 15.0 %    Platelet Count 67 (L) 150 - 450 10e3/uL   Hepatic panel   Result Value Ref Range    Protein Total 6.1 (L) 6.4 - 8.3 g/dL    Albumin 3.6 3.5 - 5.2 g/dL    Bilirubin Total 6.3 (H) <=1.2 mg/dL    Alkaline Phosphatase 222 (H) 40 - 129 U/L     (H) 10 - 50 U/L    ALT 89 (H) 10 - 50 U/L    Bilirubin Direct 4.58 (H) 0.00 - 0.30 mg/dL   Lactic acid whole blood   Result Value Ref Range    Lactic Acid 3.9 (H) 0.7 - 2.0 mmol/L   ABO/Rh type and screen    Narrative    The following orders were created for panel order ABO/Rh type and screen.  Procedure                               Abnormality         Status                     ---------                               -----------         ------                     Adult Type and Screen[[id number]]                            Final result                 Please view results for these tests on the individual orders.   Adult Type and Screen   Result Value Ref Range    ABO/RH(D) AB POS     Antibody Screen Negative Negative    SPECIMEN EXPIRATION DATE [id number]    Prepare pheresed platelets (unit)   Result Value Ref Range    Blood Component Type Platelets     Product Code C6166B03     Unit Status Transfused     Unit Number [id number]     CODING SYSTEM [id number]     ISSUE DATE AND TIME [id number]     UNIT ABO/RH A+     UNIT TYPE ISBT 6200    Prepare pheresed platelets (unit)   Result Value Ref Range    ISSUE DATE AND TIME [id number]     Blood Component Type Platelets     Product Code G8072X96     Unit Status Transfused     Unit Number [id number]     UNIT ABO/RH A+     CODING SYSTEM [id number]     UNIT TYPE ISBT 6200    Blood Culture Peripheral Blood    Specimen: Peripheral Blood   Result  Value Ref Range    Culture No growth after 12 hours    Blood Culture Peripheral Blood    Specimen: Peripheral Blood   Result Value Ref Range    Culture No growth after 12 hours     Narrative    Only an Aerobic Blood Culture Bottle was collected, interpret results with caution.       Magnesium   Result Value Ref Range    Magnesium 1.8 1.7 - 2.3 mg/dL   Abdomen US, limited (RUQ only)    Narrative    EXAM: US ABDOMEN LIMITED  LOCATION: [facility]  DATE/TIME: 11/3/2022 1:00 PM    INDICATION: lactic acid elevation, elevated bilrubin, head bleed, alcohol abuse, pancytopenia  COMPARISON: None.  TECHNIQUE: Limited abdominal ultrasound.    FINDINGS:    GALLBLADDER: Nonshadowing area identified the neck of the gallbladder, measuring 2.7 x 2.4 x 1.1 cm.  This may represent the stones identified on CT. There appears to be thick sludge within the gallbladder     BILE DUCTS: No biliary dilatation. The common duct measures 2.25 mm.    LIVER: Increased echogenicity from diffuse fatty infiltration. No focal mass. The liver measures 20.1 cm.    RIGHT KIDNEY: No hydronephrosis.    PANCREAS: The pancreas is largely obscured by overlying gas.    No ascites.    Limited study due to patient's body habitus and inability to roll      Impression    IMPRESSION:  1.  Nonshadowing area at neck of gallbladder, most likely representing the stones identified on CT.  Sludge within the gallbladder.  No gallbladder wall thickening, intra/extra-hepatic biliary ductal dilatation or sonographic Kwan's sign.  2.  Hepatomegaly.  Diffuse fatty infiltration of the liver.  3.  Limited study.       INR   Result Value Ref Range    INR 1.24 (H) 0.85 - 1.15   Partial thromboplastin time   Result Value Ref Range    aPTT 32 22 - 38 Seconds   Platelet function closure with reflex   Result Value Ref Range    PFA-Col/Epi 143 <170 Seconds   Basic metabolic panel   Result Value Ref Range    Sodium 131 (L) 136 - 145 mmol/L    Potassium 3.5 3.4  - 5.3 mmol/L    Chloride 95 (L) 98 - 107 mmol/L    Carbon Dioxide (CO2) 25 22 - 29 mmol/L    Anion Gap 11 7 - 15 mmol/L    Urea Nitrogen 4.4 (L) 6.0 - 20.0 mg/dL    Creatinine 0.56 (L) 0.67 - 1.17 mg/dL    Calcium 7.5 (L) 8.6 - 10.0 mg/dL    Glucose 133 (H) 70 - 99 mg/dL    GFR Estimate >90 >60 mL/min/1.73m2       Studies:  No orders to display     Discussed with trauma staff on call, Dr. Cadena.    Johnny Bourgeois MD  [facility]

## 2022-11-04 NOTE — CONSULTS
Stroke education not needed- patient has SDH d/t frequent falls and ETOH use at home.    Ilya Bucio RN  [facility]  [phone]

## 2022-11-04 NOTE — H&P
Neurocritical Care History & Physical    Reason for critical care admission: Subdural hematoma   Admitting Team: NCC + Trauma   Date of Service:  11/04/2022  Date of Admission:  11/4/2022  Hospital Day: 1    Assessment/Plan  Getachew Barcenas is a 52 year old man with a past medical history of alcohol use disorder, thiamine deficiency, frequent falls, peripheral neuropathy, hypothyroidism, former tobacco use, mild LULI, somnambulism, RLS, diverticulitis, and mood disorder who presented as transfer to [facility] on 11/4/2022 for higher level of cares in the setting of new diagnosis of acute on chronic L SDH and subacute R SDH. Patient is hemodynamically stable and has minimal neurological deficits at this time. He does have mild dysmetria, horizontal nystagmus, and peripheral neuropathy, which I suspect are chronic in the setting of longstanding alcohol use disorder. He also has mild right hand weakness, which I suspect is secondary to subdural hematoma. Patient currently stable from neurological standpoint.     Neuro  #Acute on chronic left subdural hematoma   #Chronic bilateral subdural hematoma  #Brain compression  #3 mm rightward midline shift   - Neurochecks every 1 hr  - No indication for seizure prophylaxis at this time   - Neurosurgery consultation, recs appreciated   - Plan for NSG follow-up in 1mo w/ repeat CTH  - NeuroIR consulted for MMA Embolization  - SBP goal < 140 mmHg  - HOB > 30   - PT/OT/SLP  - UDS  - Repeat CTH this morning    #Alcohol use disorder  #Falls secondary to above  #Concern for alcohol withdrawal   - CIWA protocol with scheduled clonidine and gabapentin  - PRN Valium/Haldol for CIWA > 7  - Chem Dept consult when transfers to floor   - CT cervical spine at OSH with no fracture --> cleared C spine   - Trauma surgery following, appreciate recs. Trauma surgery to become primary when patient transfers to the floor.     #Thiamine Deficiency likely secondary to EtOH use  #Concern for Wernicke-Korsakoff  Syndrome  - IV Thiamine 500mg TID for 2d -> IV Thiamine 250mg daily for 5d -> PO Thiamine 100mg daily  - Folate 1mg daily  - MV 1 tab daily  - Vit B12 level    #Peripheral neuropathy  #Mild cerebellar atrophy  Suspect from longstanding alcohol use disorder. Stable.      #PLMD vs RLS  #REM w/o atonia but no dream enactment  #Rare Somnambulism   - Consider Gabapentin 100-300mg at bedtime once out of withdrawal window    CV  #No acute concerns.   -Cardiac monitoring  -SBP goal < 140 mmHg   -PRN Labetalol and Hydralazine    Resp  #No acute concerns.   -Continuous pulse ox  -Maintain O2 saturations greater than 92%    #Mild LULI  - Consider CPAP when out of withdrawal window    GI  #Hyperbilirubinemia (elevated direct bilirubin)  #Elevated LFT's (AST > ALT)  #Cholelithiasis   #Hepatic steatosis   #Colonic diverticula   CT C/A/P on 11/2. RUQ U/S on 11/3. Suspect many of these hepatic lab abnormalities are secondary to alcohol consumption. MELD-Na 19; MELD 16. In addition, Ab US notes likely gallstones and sludge w/o wall-thickening or biliary duct dilatation  - Daily LFT's   - Lipase  - GI Consult  - Follow up with Hepatology outpatient    Diet: NPO for procedure  Last BM: Pt reports he is constipated - PRN dulcolax suppository ordered   GI prophylaxis: Not indicated  -Bowel regimen: scheduled senna-docusate and Miralax    Renal/  #Hyponatremia   Na 131 at 11PM at OSH prior to transfer. Na 12 hours earlier was 138. .Ddx includes (beer potomania, cerebral salt wasting, SIADH, liver disease, etc.   - Repeat BMP  - Serum osmolality, urine osmolality, urine sodium ordered     #Hypocalcemia   Lowest Ca = 7.5 mg/dL (Ref range: 8.5 - 10.1 mg/dL) in last 2 days, will monitor and replace as appropriate.  - Ionized calcium drawn and pending.     #Hypomagnesemia  1.6 on admission  - Improving slowly with oral magnesium oxide at OSH  - IV replacement protocol while in ICU.     #Anion Gap Metabolic Acidosis  Highest Anion Gap = 25  mmol/L (Ref range: 7-15) in last 2 days, will monitor and treat as appropriate  - Query alcoholic ketoacidosis. Urine ketones ordered.   - Consideration for methanol, ethylene glycol ingestion as possible etiologies     Fluids:  - Plasmalyte 75ml/hr     Endo  #Hx of hypoglycemia   Suspect secondary to poor oral intake in the past. Diet pending neurosurgery final recs.   -Hgb A1c  -Monitor glucose levels    #Hypothyroidism  TSH 36, free T4 0.81. Unclear compliance of medication  - PTA Levothyroxine 175mcg daily  - Recheck TSH/free T4 in 2wks  - Consider Endocrine consult    Heme  #Coagulation Defect  INR = 1.24 (Ref range: 0.85 - 1.15) and/or PTT = 32 Seconds (Ref range: 22 - 38 Seconds), will monitor for bleeding. Presumably due to declining liver function.     #Macrocytic Anemia  #Thrombocytopenia   Lowest platelets = 67 (Ref range: 150-450) in last 2 days, will monitor for bleeding. Hgb 10.1 w/     -Daily CBC  -Hgb goal >7, plt goal >50k  -Transfuse to meet Hgb and plt goals    ID  #Leukopenia  -No infection concern  -Daily CBC  -Follow temperature curve    ICU Checklist  Lines/tubes/drains: PIV x1, PICC (confirmed acceptable location on CXR 11/4)   FEN: NPO for potential procedure  PPX: DVT - SCD's   Code: FULL  Social: Called and updated daughter James at 6:30 AM on 11/4. All questions answered to her satisfaction.   Dispo: ICU - NCC    Patient discussed with NCC attending, Dr. Velez.     Isaías Jiménez MD  PGY-4 Neurology Resident   P: [phone]    History of Present Illness:  Getachew Barcenas is a 52yr old male w/ alcohol use disorder, thiamine deficiency, frequent falls, peripheral neuropathy, hypothyroidism, former tobacco use, mild LULI, somnambulism, RLS, diverticulitis, and mood disorder who presented on 11/4/2022 as a transfer from [facility] after found to have traumatic subacute on chronic L SDH and subacute R SDH likely secondary to EtOH intoxication/peripehral neuropathy    The patient is a poor  "historian and contradicts himself within his history. Per chart review, he has been having increased falls (at least 25 this year) for at least the past 3yrs that has become worse since May 2022 when he started drinking EtOH again. He has a long hx of alcoholism and had been in treatment w/ sobriety for 4mo, but on return to home started drinking again. He typically drinks 1.75L every 48hrs and reports his \"legs turn to rubber\" when he drinks. Per his daughter, there are multiple holes in his drywall due to his head hitting the wall when he falls and he has broken at least 4 ribs. He last fell a few days ago, hitting his head onto the couch/table. He then presented to [facility] on 11/2/2022 with confusion, dizziness, and generalized weakness. BAL 0.31 at that time w/ last reported drink at noon earlier that day. A CTH was taken given his hx of frequent falls and was found to have a subacute on chronic L SDH + subacute R SDH w/ 3mm left-to-right midline shift. CT C-spine was unremarkable. A R flank contusion was also noted w/ CT Ab/Pelvis noting only cholelithiasis and diffuse fatty infiltration of the liver. The pt was subsequently loaded w/ Keppra and started on CIWA protocol. Neurosurgery was consulted and affirmed no need for acute intervention. Repeat CTH was stable and pt was ultimately transferred to [facility] on 11/4 for ICU level cares    Speaking with the pt himself, he reports he has been having increased falls for the last few years and around 25 falls this year alone. He reports he lost his balance two nights ago and fell and his head on the table or couch, prompting hospital presentation. Patient denies headache. He endorses balance difficulties and neuropathy in his feet. He denies weakness. He reports he is tired. No visual disturbances.       No Known Allergies    PAST MEDICAL HISTORY:   Past Medical History:   Diagnosis Date     Alcohol use disorder, severe, dependence (H) 2/24/2022       PAST " "SURGICAL HISTORY:   Past Surgical History:   Procedure Laterality Date     PICC TRIPLE LUMEN PLACEMENT  11/3/2022            FAMILY HISTORY: Grandmother with dementia.     SOCIAL HISTORY:   Social History     Tobacco Use     Smoking status: Never     Smokeless tobacco: Former   Substance Use Topics     Alcohol use: Yes       ROS: The 10 point Review of Systems is negative other than noted in the HPI or here.     Current Medications:  Reviewed    Physical Examination:  Vitals: /84   Pulse 75   Temp 98.2  F (36.8  C) (Oral)   Resp 17   Ht 1.854 m (6' 1\")   SpO2 95%   BMI 26.35 kg/m    General: Adult male patient, laying in bed, appears comfortable.   HEENT: Normocephalic, atraumatic, no icterus, oral cavity/oropharynx pink and moist  Cardiac: RRR, s1/s2 auscultated without murmur  Pulm: CTAB.  Abdomen: Soft, non-distended, bowel sounds present  Extremities: Warm, no edema.   Skin: No rash or lesion  Psych: Calm and cooperative  Neuro:  Mental status: Awake, alert, attentive, oriented to self, time, place, and circumstance. Language is fluent and coherent with intact comprehension of complex commands, naming and repetition.  Cranial nerves: VFF, PERRL, conjugate gaze, horizontal nystagmus present in all directions, facial sensation intact, face symmetric, shoulder shrug strong, tongue midline, no dysarthria.   Motor: Normal bulk and tone. No abnormal movements. 5/5 strength in 4/4 extremities with exception of subtle hand weakness on right (hand , finger extensors).   Sensory: Intact to light touch x 4 extremities  Coordination: FNF with very mild ataxia bilaterally. Mild ataxia of RLE.   Gait: NIYAH, deferred.    NIHSS  Interval transfer (11/04/22 0331)   1a. Level of Consciousness 0-->Alert, keenly responsive   1b. LOC Questions 0-->Answers both questions correctly   1c. LOC Commands 0-->Performs both tasks correctly   2.   Best Gaze 0-->Normal   3.   Visual 0-->No visual loss   4.   Facial Palsy " 0-->Normal symmetrical movements   5a. Motor Arm, Left 0-->No drift, limb holds 90 (or 45) degrees for full 10 secs   5b. Motor Arm, Right 0-->No drift, limb holds 90 (or 45) degrees for full 10 secs   6a. Motor Leg, Left 0-->No drift, leg holds 30 degree position for full 5 secs   6b. Motor Leg, right 0-->No drift, leg holds 30 degree position for full 5 secs   7.   Limb Ataxia 2-->Present in two limbs   8.   Sensory 0-->Normal, no sensory loss   9.   Best Language 0-->No aphasia, normal   10. Dysarthria 0-->Normal   11. Extinction and Inattention  0-->No abnormality   Total 2 (11/04/22 5350)     Labs:  Recent Labs   Lab 11/03/22  2341 11/03/22  0700 11/02/22  2150   * 138 134*   POTASSIUM 3.5 3.7 3.9   CHLORIDE 95* 97* 90*   CO2 25 20* 19*   BUN 4.4* 7.3 8.7   CR 0.56* 0.57* 0.74   ABDULKADIR 7.5* 7.8* 8.5*   BILITOTAL  --  6.3* 6.9*   ALKPHOS  --  222* 261*   ALT  --  89* 99*   AST  --  348*  --        Recent Labs   Lab 11/03/22  0700 11/02/22  2150   WBC 2.6* 3.3*   HGB 11.4* 12.9*   PLT 67* 82*       No results for input(s): PH, PCO2, PO2, HCO3 in the last 168 hours.    All cultures:  Recent Labs   Lab 11/03/22  1021 11/03/22  1012   CULTURE No growth after 12 hours No growth after 12 hours       Imaging - personally reviewed    11/3/22 CTH -   1.  Stable volume of the bilateral subdural hemorrhages.  2.  Stable associated mass effect.     11/2/22 CTH -   1.  Mixed attenuation likely acute on subacute left convexity subdural hemorrhage measuring up to 17 mm.  2.  Likely subacute right convexity subdural hematoma measuring 7 mm in thickness.  3.  3 mm left right midline shift.  4.  Stable age-related change.

## 2022-11-04 NOTE — PROGRESS NOTES
"CLINICAL NUTRITION SERVICES - ASSESSMENT NOTE     Nutrition Prescription    RECOMMENDATIONS FOR MDs/PROVIDERS TO ORDER:  - ADAT post-procedure as able; SLP assessment as indicated   - Agree with B12, thiamine, folic acid. Continue MVI, as likely at risk for other B vitamin/micronutrient deficiencies.   - Electrolyte replacement per protocol as indicated      Malnutrition Status:    - Severe malnutrition in the context of acute illness super-impossed on socio-environmental    Recommendations already ordered by Registered Dietitian (RD):  - None currently while NPO    Future/Additional Recommendations:  - Diet/PO (order supps)     REASON FOR ASSESSMENT  Getachew Barcenas is a/an 52 year old male assessed by the dietitian for Nutrition Risk Monitoring    Medical History: PMH of alcohol use disorder, thiamine deficiency, frequent falls, peripheral neuropathy, hypothyroidism, former tobacco use, mild LULI, somnambulism, RLS, diverticulitis, and mood disorder who presented as transfer to [facility] on 11/4/2022 for higher level of cares in the setting of new diagnosis of acute on chronic L SDH and subacute R SDH. Patient is hemodynamically stable and has minimal neurological deficits at this time.    NUTRITION HISTORY  Pt reports poor intake PTA -- decreased appetite and decreased PO with alcohol intake. Pt reports not being able to tolerate heavy foods. Usual intake is \"like a chicken\" and has been eating yogurt, apples, grapes, and sometimes tries to drink a protein supplement. Pt reports taking a MVI and another \"B\" vitamin at home Unclear consistency.     CURRENT NUTRITION ORDERS  Diet: NPO    LABS  Labs reviewed  Na+ 133 (H)  Creatinine: 0.56 (L)  Mg++ 1.5 (L)  Phos: 1.4 (L)  Alk phos: 190 (H)  ALT: 66 (H)  AST: 295 (H)  Bili direct 5.41 (H)    MEDICATIONS  Medications reviewed  Folic acid  Thiamine   Thera-vit  Lytes   Plasma-Lyte IVF (140 mEq Na+) @ 75 ml/hr     ANTHROPOMETRICS  Height: 185.4 cm (6' 1\") 73\"   Most Recent " Weight: 90.3 kg (199 lb 1.2 oz)    IBW: 84 kg  BMI: Overweight BMI 25-29.9  Weight History:   Wt Readings from Last 9 Encounters:   11/04/22 90.3 kg (199 lb 1.2 oz)   11/03/22 90.6 kg (199 lb 11.8 oz)   02/18/22 87.6 kg (193 lb 3.2 oz)     Dosing Weight: 90 kg (current weight)    ASSESSED NUTRITION NEEDS  Estimated Energy Needs: 3872-9280 kcals/day (25 - 30 kcals/kg)  Justification: Maintenance  Estimated Protein Needs: 135+ grams protein/day (1.5 + grams of pro/kg)  Justification: Increased needs  Estimated Fluid Needs: (1 mL/kcal)   Justification: Maintenance and Per provider pending fluid status    PHYSICAL FINDINGS  See malnutrition section below.  Skin: Pale, fragile, dry, ecchymotic   Nails: No obvious issues  Mouth, lips, tongue - no obvious issues noted  LBM assessment - pt with decreased LBM  Hair: WNL     MALNUTRITION  % Intake: </=50% for >/= 1 month (severe)  % Weight Loss: None noted - weight history; none per pt but only weight history from 2022  Subcutaneous Fat Loss: Facial region: possible mild and Upper arm: mild  Muscle Loss: Facial & jaw region:, Scapular bone:, Thoracic region (clavicle, acromium bone, deltoid, trapezius, pectoral):, Upper arm (bicep, tricep):, Lower arm  (forearm) and Posterior calf: moderate/severe  Fluid Accumulation/Edema: None noted  Malnutrition Diagnosis: Severe malnutrition in the context of acute illness super-impossed on socio-environmental     NUTRITION DIAGNOSIS  Inadequate oral intake related to decreased appetite, EtOH as evidenced by pt report, h/o micronutrient deficiency, and severe malnutrition       INTERVENTIONS  Implementation  Nutrition Education: Provided education on role of RD; NFPA   Medical food supplement therapy - once diet advanced   Micronutrients - ordered by NCC     Goals  Diet adv within 24-48 hours.     Monitoring/Evaluation  Progress toward goals will be monitored and evaluated per protocol.    Yessenia Villatoro RD, LD, [facility]  Neuro ICU  Pager:  [phone]

## 2022-11-04 NOTE — LETTER
Transition Communication Hand-off for Care Transitions to Next Level of Care Provider    Name: Getachew Barcenas  : 1970  MRN #: [id number]  Primary Care Provider: Physician No Ref-Primary     Primary Clinic: No address on file     Reason for Hospitalization:  Subdural hematoma [S06.5XAA]  Admit Date/Time: 2022  1:19 AM  Discharge Date: 22  Payor Source: Payor: ARE / Plan: UCARE PMAP / Product Type: HMO /          Reason for Communication Hand-off Referral: Continuity of Cares    Discharge Plan: Home       Concern for non-adherence with plan of care: No     Discharge Needs Assessment:  Needs    Flowsheet Row Most Recent Value   Equipment Currently Used at Home walker, standard          Follow-up plan:    Future Appointments   Date Time Provider Department Center   2022  1:45 PM Isaiah Manriquez, PT [facility] O   2022  8:30 AM Manisha Pace OTR [facility] O   2022 11:40 AM [facility]   2022 12:30 PM Judith Wan PA-C [facility]       Any outstanding tests or procedures:        Radiology & Cardiology Orders     Future Labs/Procedures Complete By Expires    CT Head w/o contrast*  2022 (Approximate) 2023    Process Instructions:    Administration of IV contrast (contrast agent, dose, and amount) will be tailored to this examination per the appropriate written protocol listed in the Protocol E-Book, or by the interpreting provider.         Referrals     Future Labs/Procedures    Adult GI  Referral - Consult Only     Comments:    Please be aware that coverage of these services is subject to the terms and limitations of your health insurance plan.  Call member services at your health plan with any benefit or coverage questions.  [facility] will call you to coordinate your care as prescribed by the provider.  If you don t hear from a representative within 2 business days, please call [phone].    Adult Mental Health   Referral     Process Instructions:    WE MAKE EVERY ATTEMPT TO SCHEDULE THE PATIENT INTERNALLY; IF CAPACITY IS LIMITED, PATIENTS WILL BE OFFERED REFERRAL TO [facility]. Please see the Psychiatry, Psychological Testing, and Eating Disorders Reference Link below.    FOR SUBSTANCE USE, PLEASE REVIEW EXCLUSION CRITERIA. Please see the Substance Use Reference Link below.    DBT Clinic Referral Form must be completed when referring to the DBT program. Please see the DBT Clinic Referral Form Reference Link below.     Comments:    Please be aware that coverage of these services is subject to the terms and limitations of your health insurance plan.  Call member services at your health plan with any benefit or coverage questions.  [facility] will call you to coordinate your care as prescribed by your provider. If you don't hear from a representative within 2 business days, please call [phone].      Neurosurgery Referral     Process Instructions:    **If referring to spine, use Spine Surgery Referral or Ortho & Spine  Referral**    Comments:    Please be aware that coverage of these services is subject to the terms and limitations of your health insurance plan.  Call member services at your health plan with any benefit or coverage questions.  Please call to schedule your appointment      Physical Therapy Referral     Process Instructions:    Work Related Injury: Functional Capacity and Work Conditioning are only offered at [facility] and [facility] (service can be provided by PT or OT).    *This therapy referral will be filtered to a centralized scheduling office at [facility] and the patient will receive a call to schedule an appointment at a [place] location most convenient for them. *    Comments:    Please be aware that coverage of these services is subject to the terms and limitations of your health insurance plan.  Call member services at your health plan with  any benefit or coverage questions.  If you have not heard from the scheduling office within 2 business days, please call [phone] for [employer], [phone] for [employer] and [phone] for [place].            Key Recommendations:      Aaron Rodriguez RN    AVS/Discharge Summary is the source of truth; this is a helpful guide for improved communication of patient story

## 2022-11-04 NOTE — PHARMACY-ADMISSION MEDICATION HISTORY
Admission Medication History Completed by Pharmacy    See [facility] Admission Navigator for allergy information, preferred outpatient pharmacy, prior to admission medications and immunization status.     Medication History Sources:     Med history completed by pharmacist at OSH prior to transfer (see note from [facility] dated 11/2/2022    Changes made to PTA medication list (reason):    Added: None    Deleted: None    Changed: None    Additional Information:    None    Prior to Admission medications    Medication Sig Last Dose Taking? Auth Provider Long Term End Date   acetaminophen (TYLENOL) 500 MG tablet [ACETAMINOPHEN (TYLENOL) 500 MG TABLET] Take 1-2 tablets (500-1,000 mg total) by mouth every 6 (six) hours as needed for pain.   Percy MANUEL MD     folic acid (FOLVITE) 1 MG tablet Take 1 tablet (1 mg) by mouth daily   Sushma Yang MD     levothyroxine (SYNTHROID/LEVOTHROID) 175 MCG tablet Take 175 mcg by mouth daily   Unknown, Entered By History     multivitamin w/minerals (THERA-VIT-M) tablet Take 1 tablet by mouth daily   Sushma Yang MD     psyllium (METAMUCIL) 3.4 gram packet [PSYLLIUM (METAMUCIL) 3.4 GRAM PACKET] Take 1 packet by mouth daily.   Provider, Historical     traZODone (DESYREL) 50 MG tablet Take 50 mg by mouth nightly as needed for sleep   Unknown, Entered By History Yes        Date completed: 11/04/22    Medication history completed by: Renee Sage, PharmD

## 2022-11-04 NOTE — PLAN OF CARE
Goal Outcome Evaluation:  Problem: Oral Intake Inadequate  Goal: Improved Oral Intake  Outcome: Not Progressing - NPO for procedure

## 2022-11-04 NOTE — LETTER
[facility] [address]  [address]  [place] [address]  [phone]    FACSIMILE TRANSMITTAL SHEET    TO: [facility]    _____URGENT _____REVIEW ONLY _____PLEASE COMMENT____PLEASE REPLY    NOTES/COMMENTS: Attached please find initially clinical information for Getachew Barcenas.  Requesting home RN, PT, OT.  Thanks    Leyla Tierney, RN BSN, PHN, ACM-RN  7A RN Care Coordinator  Phone: [phone]  Pager [phone]    To contact the [facility] (0800 - 1630) Saturday and Sunday    Units: 4A, 4C, 4E, 5A and 5B- Pager 1: [phone]    Units: 6A, 6B, 6C, 6D- Pager 2: [phone]    Units: 7A, 7B, 7C, 7D, and 5C-Pager 3: [phone]    [facility] ([phone]) Saturday and Sunday    Units: 5 Ortho, 8A, 10 ICU, & Pediatric Units-Pager 4: [phone]    11/14/2022 3:00 PM                                        IF YOU DID NOT RECEIVE THE CORRECT NUMBER OF PAGES OR THE FAX DID NOT COME THROUGH CLEARLY, PLEASE CALL THE SENDER     CONFIDENTIALITY STATEMENT: Confidential information that may accompany this transmission contains protected health information under state and federal law and is legally privileged. This information is intended only for the use of the individual or entity named above and may be used only for carrying out treatment, payment or other healthcare operations. The recipient or person responsible for delivering this information is prohibited by law from disclosing this information without proper authorization to any other party, unless required to do so by law or regulation. If you are not the intended recipient, you are hereby notified that any review, dissemination, distribution, or copying of this message is strictly prohibited. If you have received this communication in error, please destroy the materials and contact us immediately by calling the number listed above. No response indicates that the information was received by the appropriate authorized party

## 2022-11-04 NOTE — LETTER
[facility] UN[address] [place]  [address]  [place] [address]  [phone]    FACSIMILE TRANSMITTAL SHEET    TO:  [facility]  _____URGENT _____REVIEW ONLY _____PLEASE COMMENT____PLEASE REPLY    NOTES/COMMENTS: Attached please find initial clinical information for Getachew naqvi.  Requesting home RN, PT, OT.  Please let me know if you are able to accept this referral.  Thanks    Leyla Tierney, RN BSN, PHN, ACM-RN  7A RN Care Coordinator  Phone: [phone]  Pager [phone]    To contact the weekend [facility] (0800 [phone] 1630) Saturday and Sunday    Units: 4A, 4C, 4E, 5A and 5B- Pager 1: [phone]    Units: 6A, 6B, 6C, 6D- Pager 2: [phone]    Units: 7A, 7B, 7C, 7D, and 5C-Pager 3: [phone]    [facility] ([phone]) Saturday and Sunday    Units: 5 Ortho, 8A, 10 ICU, & Pediatric Units-Pager 4: [phone]    11/14/2022 3:11 PM                                        IF YOU DID NOT RECEIVE THE CORRECT NUMBER OF PAGES OR THE FAX DID NOT COME THROUGH CLEARLY, PLEASE CALL THE SENDER     CONFIDENTIALITY STATEMENT: Confidential information that may accompany this transmission contains protected health information under state and federal law and is legally privileged. This information is intended only for the use of the individual or entity named above and may be used only for carrying out treatment, payment or other healthcare operations. The recipient or person responsible for delivering this information is prohibited by law from disclosing this information without proper authorization to any other party, unless required to do so by law or regulation. If you are not the intended recipient, you are hereby notified that any review, dissemination, distribution, or copying of this message is strictly prohibited. If you have received this communication in error, please destroy the materials and contact us immediately by calling the number listed above. No response indicates that  the information was received by the appropriate authorized party

## 2022-11-05 ENCOUNTER — APPOINTMENT (OUTPATIENT)
Dept: OCCUPATIONAL THERAPY | Facility: CLINIC | Age: 52
End: 2022-11-05
Attending: STUDENT IN AN ORGANIZED HEALTH CARE EDUCATION/TRAINING PROGRAM
Payer: COMMERCIAL

## 2022-11-05 LAB
ALBUMIN SERPL BCG-MCNC: 3.1 G/DL (ref 3.5–5.2)
ALBUMIN SERPL BCG-MCNC: 3.2 G/DL (ref 3.5–5.2)
ALP SERPL-CCNC: 190 U/L (ref 40–129)
ALP SERPL-CCNC: 201 U/L (ref 40–129)
ALT SERPL W P-5'-P-CCNC: 59 U/L (ref 10–50)
ALT SERPL W P-5'-P-CCNC: 62 U/L (ref 10–50)
ANION GAP SERPL CALCULATED.3IONS-SCNC: 11 MMOL/L (ref 7–15)
AST SERPL W P-5'-P-CCNC: 206 U/L (ref 10–50)
AST SERPL W P-5'-P-CCNC: 227 U/L (ref 10–50)
BILIRUB DIRECT SERPL-MCNC: 5.22 MG/DL (ref 0–0.3)
BILIRUB SERPL-MCNC: 6.8 MG/DL
BILIRUB SERPL-MCNC: 7.5 MG/DL
BUN SERPL-MCNC: 2 MG/DL (ref 6–20)
CALCIUM SERPL-MCNC: 8.3 MG/DL (ref 8.6–10)
CHLORIDE SERPL-SCNC: 100 MMOL/L (ref 98–107)
CREAT SERPL-MCNC: 0.6 MG/DL (ref 0.67–1.17)
DEPRECATED HCO3 PLAS-SCNC: 22 MMOL/L (ref 22–29)
ERYTHROCYTE [DISTWIDTH] IN BLOOD BY AUTOMATED COUNT: 11.9 % (ref 10–15)
GFR SERPL CREATININE-BSD FRML MDRD: >90 ML/MIN/1.73M2
GLUCOSE BLDC GLUCOMTR-MCNC: 110 MG/DL (ref 70–99)
GLUCOSE SERPL-MCNC: 105 MG/DL (ref 70–99)
HAV IGG SER QL IA: NONREACTIVE
HBV CORE AB SERPL QL IA: NONREACTIVE
HBV SURFACE AB SERPL IA-ACNC: 0 M[IU]/ML
HBV SURFACE AB SERPL IA-ACNC: NONREACTIVE M[IU]/ML
HBV SURFACE AG SERPL QL IA: NONREACTIVE
HCT VFR BLD AUTO: 29.2 % (ref 40–53)
HGB BLD-MCNC: 9.7 G/DL (ref 13.3–17.7)
INR PPP: 1.21 (ref 0.85–1.15)
MCH RBC QN AUTO: 34.8 PG (ref 26.5–33)
MCHC RBC AUTO-ENTMCNC: 33.2 G/DL (ref 31.5–36.5)
MCV RBC AUTO: 105 FL (ref 78–100)
PHOSPHATE SERPL-MCNC: 2.9 MG/DL (ref 2.5–4.5)
PLATELET # BLD AUTO: 104 10E3/UL (ref 150–450)
POTASSIUM SERPL-SCNC: 3.8 MMOL/L (ref 3.4–5.3)
PROT SERPL-MCNC: 5.3 G/DL (ref 6.4–8.3)
PROT SERPL-MCNC: 5.4 G/DL (ref 6.4–8.3)
RBC # BLD AUTO: 2.79 10E6/UL (ref 4.4–5.9)
SODIUM SERPL-SCNC: 133 MMOL/L (ref 136–145)
WBC # BLD AUTO: 2.7 10E3/UL (ref 4–11)

## 2022-11-05 PROCEDURE — 84100 ASSAY OF PHOSPHORUS: CPT | Performed by: HOSPITALIST

## 2022-11-05 PROCEDURE — 250N000013 HC RX MED GY IP 250 OP 250 PS 637: Performed by: HOSPITALIST

## 2022-11-05 PROCEDURE — 97535 SELF CARE MNGMENT TRAINING: CPT | Mod: GO

## 2022-11-05 PROCEDURE — 97530 THERAPEUTIC ACTIVITIES: CPT | Mod: GO

## 2022-11-05 PROCEDURE — 87340 HEPATITIS B SURFACE AG IA: CPT | Performed by: STUDENT IN AN ORGANIZED HEALTH CARE EDUCATION/TRAINING PROGRAM

## 2022-11-05 PROCEDURE — 85610 PROTHROMBIN TIME: CPT | Performed by: STUDENT IN AN ORGANIZED HEALTH CARE EDUCATION/TRAINING PROGRAM

## 2022-11-05 PROCEDURE — 85027 COMPLETE CBC AUTOMATED: CPT | Performed by: STUDENT IN AN ORGANIZED HEALTH CARE EDUCATION/TRAINING PROGRAM

## 2022-11-05 PROCEDURE — 250N000013 HC RX MED GY IP 250 OP 250 PS 637

## 2022-11-05 PROCEDURE — 86708 HEPATITIS A ANTIBODY: CPT | Performed by: STUDENT IN AN ORGANIZED HEALTH CARE EDUCATION/TRAINING PROGRAM

## 2022-11-05 PROCEDURE — 86704 HEP B CORE ANTIBODY TOTAL: CPT | Performed by: STUDENT IN AN ORGANIZED HEALTH CARE EDUCATION/TRAINING PROGRAM

## 2022-11-05 PROCEDURE — 97165 OT EVAL LOW COMPLEX 30 MIN: CPT | Mod: GO

## 2022-11-05 PROCEDURE — 250N000011 HC RX IP 250 OP 636: Performed by: STUDENT IN AN ORGANIZED HEALTH CARE EDUCATION/TRAINING PROGRAM

## 2022-11-05 PROCEDURE — 120N000002 HC R&B MED SURG/OB UMMC

## 2022-11-05 PROCEDURE — 99233 SBSQ HOSP IP/OBS HIGH 50: CPT | Mod: GC | Performed by: STUDENT IN AN ORGANIZED HEALTH CARE EDUCATION/TRAINING PROGRAM

## 2022-11-05 PROCEDURE — 80053 COMPREHEN METABOLIC PANEL: CPT | Performed by: STUDENT IN AN ORGANIZED HEALTH CARE EDUCATION/TRAINING PROGRAM

## 2022-11-05 PROCEDURE — 250N000013 HC RX MED GY IP 250 OP 250 PS 637: Performed by: STUDENT IN AN ORGANIZED HEALTH CARE EDUCATION/TRAINING PROGRAM

## 2022-11-05 PROCEDURE — 258N000003 HC RX IP 258 OP 636: Performed by: STUDENT IN AN ORGANIZED HEALTH CARE EDUCATION/TRAINING PROGRAM

## 2022-11-05 PROCEDURE — 86706 HEP B SURFACE ANTIBODY: CPT | Performed by: STUDENT IN AN ORGANIZED HEALTH CARE EDUCATION/TRAINING PROGRAM

## 2022-11-05 PROCEDURE — 82248 BILIRUBIN DIRECT: CPT | Performed by: STUDENT IN AN ORGANIZED HEALTH CARE EDUCATION/TRAINING PROGRAM

## 2022-11-05 RX ORDER — NALOXONE HYDROCHLORIDE 0.4 MG/ML
0.4 INJECTION, SOLUTION INTRAMUSCULAR; INTRAVENOUS; SUBCUTANEOUS
Status: CANCELLED | OUTPATIENT
Start: 2022-11-05

## 2022-11-05 RX ORDER — NALOXONE HYDROCHLORIDE 0.4 MG/ML
0.2 INJECTION, SOLUTION INTRAMUSCULAR; INTRAVENOUS; SUBCUTANEOUS
Status: CANCELLED | OUTPATIENT
Start: 2022-11-05

## 2022-11-05 RX ORDER — LIDOCAINE 40 MG/G
CREAM TOPICAL
Status: CANCELLED | OUTPATIENT
Start: 2022-11-05

## 2022-11-05 RX ORDER — HEPARIN SODIUM 200 [USP'U]/100ML
1 INJECTION, SOLUTION INTRAVENOUS CONTINUOUS PRN
Status: CANCELLED | OUTPATIENT
Start: 2022-11-05

## 2022-11-05 RX ORDER — SODIUM CHLORIDE 9 MG/ML
INJECTION, SOLUTION INTRAVENOUS CONTINUOUS
Status: CANCELLED | OUTPATIENT
Start: 2022-11-05

## 2022-11-05 RX ORDER — UBIDECARENONE 75 MG
100 CAPSULE ORAL DAILY
Status: DISCONTINUED | OUTPATIENT
Start: 2022-11-05 | End: 2022-11-16 | Stop reason: HOSPADM

## 2022-11-05 RX ORDER — POTASSIUM CHLORIDE 750 MG/1
20 TABLET, EXTENDED RELEASE ORAL ONCE
Status: COMPLETED | OUTPATIENT
Start: 2022-11-05 | End: 2022-11-05

## 2022-11-05 RX ORDER — FLUMAZENIL 0.1 MG/ML
0.2 INJECTION, SOLUTION INTRAVENOUS
Status: CANCELLED | OUTPATIENT
Start: 2022-11-05

## 2022-11-05 RX ORDER — CLONIDINE HYDROCHLORIDE 0.1 MG/1
0.1 TABLET ORAL EVERY 8 HOURS PRN
Status: DISCONTINUED | OUTPATIENT
Start: 2022-11-05 | End: 2022-11-14

## 2022-11-05 RX ORDER — FENTANYL CITRATE 50 UG/ML
25-50 INJECTION, SOLUTION INTRAMUSCULAR; INTRAVENOUS EVERY 5 MIN PRN
Status: CANCELLED | OUTPATIENT
Start: 2022-11-05

## 2022-11-05 RX ADMIN — VITAM B12 100 MCG: 100 TAB at 14:40

## 2022-11-05 RX ADMIN — GABAPENTIN 900 MG: 300 CAPSULE ORAL at 13:30

## 2022-11-05 RX ADMIN — GABAPENTIN 900 MG: 300 CAPSULE ORAL at 03:26

## 2022-11-05 RX ADMIN — FOLIC ACID 1 MG: 1 TABLET ORAL at 07:32

## 2022-11-05 RX ADMIN — Medication 1 TABLET: at 07:32

## 2022-11-05 RX ADMIN — CLONIDINE HYDROCHLORIDE 0.1 MG: 0.1 TABLET ORAL at 03:26

## 2022-11-05 RX ADMIN — THIAMINE HYDROCHLORIDE 500 MG: 100 INJECTION, SOLUTION INTRAMUSCULAR; INTRAVENOUS at 07:32

## 2022-11-05 RX ADMIN — THIAMINE HYDROCHLORIDE 500 MG: 100 INJECTION, SOLUTION INTRAMUSCULAR; INTRAVENOUS at 21:09

## 2022-11-05 RX ADMIN — LEVOTHYROXINE SODIUM 175 MCG: 0.15 TABLET ORAL at 07:32

## 2022-11-05 RX ADMIN — GABAPENTIN 900 MG: 300 CAPSULE ORAL at 19:45

## 2022-11-05 RX ADMIN — SODIUM CHLORIDE, POTASSIUM CHLORIDE, SODIUM LACTATE AND CALCIUM CHLORIDE 500 ML: 600; 310; 30; 20 INJECTION, SOLUTION INTRAVENOUS at 23:34

## 2022-11-05 RX ADMIN — POTASSIUM CHLORIDE 20 MEQ: 750 TABLET, EXTENDED RELEASE ORAL at 05:07

## 2022-11-05 RX ADMIN — THIAMINE HYDROCHLORIDE 500 MG: 100 INJECTION, SOLUTION INTRAMUSCULAR; INTRAVENOUS at 16:43

## 2022-11-05 RX ADMIN — POTASSIUM & SODIUM PHOSPHATES POWDER PACK 280-160-250 MG 1 PACKET: 280-160-250 PACK at 01:28

## 2022-11-05 ASSESSMENT — VISUAL ACUITY
OU: GLASSES;NORMAL ACUITY

## 2022-11-05 ASSESSMENT — ACTIVITIES OF DAILY LIVING (ADL)
ADLS_ACUITY_SCORE: 25
ADLS_ACUITY_SCORE: 25
ADLS_ACUITY_SCORE: 24
ADLS_ACUITY_SCORE: 24
ADLS_ACUITY_SCORE: 25

## 2022-11-05 NOTE — PROGRESS NOTES
Trauma Service Interval Progress Note    Date of Service: 11/05/2022    Trauma mechanism: GLF  Time/date of injury: 3 days prior  Known Injuries:  1. Acute on chronic SDH  2. TBI     Other diagnoses:   1. Etoh abuse  2. Cirrhosis   3. Anemia  3. Hypothyroidism     Interval History   Patient was evaluated in [facility] ED and admitted to the ICU under Dr. Grady Whitten until able to transfer. Patient was admitted to the NeuroCrit ICU with Trauma assisting in managing. Tertiary was preformed by the Trauma moonlighter Dr. Johnny Bourgeois and no other acute injuries were identified.   Per discussion with NeuroCrit on 11/4, we recommended consulting Addiction Medicine. They had no concerns from a Trauma stand point and were reaching out to Medicine for a possible transfer out of the Unit.      Assessment & Plan   Medicine is now primary   Plan:  - Tertiary exam completed. No additional injuries notes.  - No further trauma workup needed. Trauma will sign off. Please contact with any questions or concerns. Job code [id number]       CLEMENCIA Cowart  To contact the trauma service use job code pager [phone],   Numeric texts or alpha text through [facility]

## 2022-11-05 NOTE — PLAN OF CARE
Transferred to: 6A at 1145  Belongings: duffel bag containing clothing/books,  Cell phone/, shoes, toiletries  Triana removed? N/A  Central line removed? No, PICC in place per MD  Chart and medications sent with patient: Yes  Family notified: James (daughter) notified

## 2022-11-05 NOTE — PLAN OF CARE
Major Shift Events:    Neuro status remains unchanged. PERRLA. Aox4, calm and cooperative with cares. CIWA scores between 1-5, primarily dt tremor. Tremors present in BUE. MAEx4. VSS. Afebrile. SR, MAPs remained >65. Satting >94% on RA. Tolerating regular diet, fair appetite. AOU in urinal. No BM this shift. No new skin deficits noted. K+ replaced this am.       Plan: Tx to 6A when bed becomes availible, notify primary team with any changes in pt condition.     For vital signs and complete assessments, please see documentation flowsheets.

## 2022-11-05 NOTE — PLAN OF CARE
Status: Getachew Barcenas is a 52 year old male with hx of alcohol abuse, thrombocytopenia, and thiamine deficiency who presented for weakness and dizziness in the context of recent falls and is found to have bilateral SDH, for whom Neuro IR is consulted for bilateral MMA embolization.   Vitals: WNL,   Neuros: oriented x 4, ataxic, BLE weakness, impulsive,  IV: right PICC line  Labs/Electrolytes: WNL  Resp/trach: sats on RA 94%  Diet: regular  Bowel status: +BS  : Voding at BS with Urinal and assist of one.  Skin: UE bruises, scabs on LE knees and shins  Pain: denies  Activity: turns self in bed,   Social: called family on person phone  Plan: Scheduled for a MMA embolization o Monday.  Updates this shift:     Stroke Patients: Pt given stroke booklet  PLC scheduled or completed: done on 4a  Pneumoboots in place: yes.

## 2022-11-05 NOTE — PROGRESS NOTES
[facility]     Endovascular Surgical Neuroradiology Pre-Procedure Note      HPI:  Getachew Barcenas is a 52 year old male with hx of alcohol abuse, thrombocytopenia, and thiamine deficiency who presented for weakness and dizziness in the context of recent falls and is found to have bilateral SDH, for whom Neuro IR is consulted for bilateral MMA embolization.    Medical History:  Reviewed    Surgical History:  Reviewed    Family History:  Reviewed    Social History:  Reviewed    Allergies:  No Known Allergies    Is there a contrast allergy?  No    Medications:  Current Facility-Administered Medications   Medication     bisacodyl (DULCOLAX) suppository 10 mg     cloNIDine (CATAPRES) tablet 0.1 mg     cyanocobalamin (VITAMIN B-12) tablet 100 mcg     diazepam (VALIUM) tablet 10 mg     flumazenil (ROMAZICON) injection 0.2 mg     folic acid (FOLVITE) tablet 1 mg     [START ON 11/11/2022] gabapentin (NEURONTIN) capsule 100 mg     [START ON 11/9/2022] gabapentin (NEURONTIN) capsule 300 mg     [START ON 11/7/2022] gabapentin (NEURONTIN) capsule 600 mg     gabapentin (NEURONTIN) capsule 900 mg     hydrALAZINE (APRESOLINE) injection 10 mg     labetalol (NORMODYNE/TRANDATE) injection 10 mg     levothyroxine (SYNTHROID/LEVOTHROID) tablet 175 mcg     melatonin tablet 5 mg     multivitamin w/minerals (THERA-VIT-M) tablet 1 tablet     OLANZapine zydis (zyPREXA) ODT tab 5-10 mg     polyethylene glycol (MIRALAX) Packet 17 g     senna-docusate (SENOKOT-S/PERICOLACE) 8.6-50 MG per tablet 1-2 tablet     thiamine (B-1) 500 mg in sodium chloride 0.9 % 50 mL intermittent infusion    Followed by     [START ON 11/6/2022] thiamine (B-1) 250 mg in sodium chloride 0.9 % 50 mL intermittent infusion    Followed by     [START ON 11/11/2022] thiamine (B-1) tablet 100 mg   .    ROS:  The 10 point Review of Systems is negative other than noted in the HPI or here.     PHYSICAL EXAMINATION  Vital Signs:  B/P:  87/67,  T: 98,  P: 67,  R: 11    Cardio:  RRR  Pulmonary:  no respiratory distress  Abdomen:  soft, non-tender, non-distended    Neurologic  Mental Status:  fully alert, attentive and oriented, follows commands, speech clear and fluent  Cranial Nerves:  visual fields intact, PERRL, EOMI with normal smooth pursuit, facial sensation intact and symmetric, facial movements symmetric, hearing not formally tested but intact to conversation, palate elevation symmetric and uvula midline, no dysarthria, shoulder shrug strong bilaterally, tongue protrusion midline  Motor:  strength 5/5 throughout upper and lower extremities  Sensory:  intact/symmetric to light touch and pin prick throughout upper and lower extremities  Coordination:  FNF and HS intact without dysmetria    Pre-procedure National Institutes of Health Stroke Scale:   Not applicable    LABS  (most recent Cr, BUN, GFR, PLT, INR, PTT within the past 7 days):  Recent Labs   Lab 11/05/22  0641 11/05/22  0324 11/03/22  2341 11/03/22  2141   CR  --  0.60*   < >  --    BUN  --  2.0*   < >  --    GFRESTIMATED  --  >90   < >  --    PLT  --  104*   < >  --    INR 1.21*  --   --  1.24*   PTT  --   --   --  32    < > = values in this interval not displayed.        Platelet Function P2Y12 (PRU):  Not applicable      ASSESSMENT: Bilateral traumatic SDH    PLAN: Bilateral MMA embolization under general anesthesia        PRE-PROCEDURE SEDATION ASSESSMENT     Per Anesthesia    Patient was discussed with the Attending, Dr. Bernard, who agrees with the plan.    Haydee Baker MD   Pager: [phone]

## 2022-11-05 NOTE — PROGRESS NOTES
"CLINICAL NUTRITION SERVICES - BRIEF NOTE     Reason for RD note: Provider order - \"severe alcohol use disorder with concern for malnutrition - please eval and assist with recs\"    New Findings/Chart Review:  -RD spoke with pt 11/4: \"Pt reports poor intake PTA -- decreased appetite and decreased PO with alcohol intake. Pt reports not being able to tolerate heavy foods. Usual intake is \"like a chicken\" and has been eating yogurt, apples, grapes, and sometimes tries to drink a protein supplement. Pt reports taking a MVI and another \"B\" vitamin at home Unclear consistency.\"  -Diagnosed with severe malnutrition   -Pt now on regular diet and has been eating % PO intake per RN documentation    Interventions:  - Continue B12, thiamine, folic acid. Continue MVI, as likely at risk for other B vitamin/micronutrient deficiencies.   - Electrolyte replacement per protocol as indicated    - Ordered Ensure BID   - Will continue to monitor PO intake/adequacy     Nutrition will follow per protocol or sooner if consulted.    Yolis Sewell, MS, RD, LD  4E (CVICU) RD pager: [phone]  Ascom: [phone]  Weekend/Holiday RD pager: [phone]  "

## 2022-11-05 NOTE — PROGRESS NOTES
[facility]    Progress Note - Teaching service Medicine Service, [facility] TEAM 4       Date of Admission:  11/4/2022    Assessment & Plan     Getachew Barcenas is a 52 year old man with a past medical history of alcohol use disorder, thiamine deficiency, frequent falls, peripheral neuropathy, hypothyroidism, former tobacco use, mild LULI, somnambulism, RLS, diverticulitis, and mood disorder who presented as transfer to [facility] on 11/4/2022 for higher level of cares in the setting of new diagnosis of acute on chronic L SDH and subacute R SDH. Patient is hemodynamically stable and has minimal neurological deficits at this time. He does have mild dysmetria, horizontal nystagmus, and peripheral neuropathy, which I suspect are chronic in the setting of longstanding alcohol use disorder. He also has mild right hand weakness, which I suspect is secondary to subdural hematoma. Patient currently stable from neurological standpoint. Pt now transferred to floor 6A.        Today's change:  - MMA embolization rescheduled to Monday 11/7 by NeuroIR  - CTH confirmed stable acute and chronic b/l SDH and   - Neuro Q4h checks  - Seizure ppx  - PRN Valium/Haldol for CIWA > 7  - Continue Thiamine protocol  - Appreciate GI hepatology reccs      - Chronic liver disease work up   - Monitor Liver test/INR   - Appreciate nutritionist recs    - Continue MV regimen added Ensure BID  - Off mIVF  - Pt inclined to seek addiction service     #Acute on chronic left subdural hematoma, chronic bilateral subdural hematoma, stable  #Brain compression  #3 mm rightward midline shift, stable  #Alcohol use disorder  #Falls secondary to above  #Concern for alcohol withdrawal   #Thiamine Deficiency likely secondary to EtOH use  #Concern for Wernicke-Korsakoff Syndrome  Long history of EtOH abuse w/ short sobriety episode. Last fall happened a few days ago. Upon arrival he was found to have B/L SDH w/ 3mm L to R midline  shift. Ct C-spine was unremarkableMMA embolization post-pone to Monday 11/7, pt in a stable state, no active intra-cranial bleeding confirmed on CTH 11/4 which is  showing no acute intracranial hemorrhage, no substantial change in size or appearance of mixed density b/l SDH, left > right, c/to CTH 11/3/22. Vit B12 WNL @ 937 11/4. As per SICU RN, neuro remained unchanged except for the slight confused/forgetful episodes. Tremors resolved, Ataxic at time when doing finger to nose 11/4 otherwise normal.   - Neuro Q4h checks  - Seizure prophylaxis at this time   - Neurosurgery consultation, recs appreciated          - Plan for NSG follow-up in 1mo w/ repeat CTH  - Off mIVF concern for fluid overload  - MMA Embolization post-pone to 11/7  - CIWA protocol with scheduled clonidine and gabapentin  - PRN Valium/Haldol for CIWA > 7  - SBP goal < 140 mmHg           - PRN Labetalol and Hydralazine  - HOB > 30   - CT cervical spine at OSH with no fracture --> cleared C spine   - SLP defer evaluation              - to re-consult if change in swallowing function or communication skills appears  - PT/OT consults ordered  - UDS  -Nutritionist recs 11/5:   - Continue B12, thiamine, folic acid. Continue MVI, as likely at risk for other B vitamin/micronutrient deficiencies.    - Electrolyte replacement per protocol as indicated     - Ordered Ensure BID    - Will continue to monitor PO intake/adequacy   - IV Thiamine 500mg TID for 2d -> IV Thiamine 250mg daily for 5d -> PO Thiamine 100mg daily  - Folate/B12 1mg daily  - MV 1 tab daily  - Chem Dept consult when transfers to floor      #Hyperbilirubinemia (elevated direct bilirubin)  #Hypoalbuminemia/hypoproteinemia  #Elevated LFT's (AST > ALT)  #Cholelithiasis   # ETOH Hepatic steatosis   #Colonic diverticula   Mild jaundice revealed clinically. CT C/A/P on 11/2. RUQ U/S on 11/3. Suspect many of these hepatic lab abnormalities are secondary to alcohol consumption. Although the same CT  revealed cholethiasis and diffuse fatty infiltration of the liver. MELD-Na 19; MELD 16. In addition, Abdomen US notes likely gallstones and sludge w/o wall-thickening or biliary duct dilatation. Lipase 622, No focal pain or symptoms suspicious for pancreatitis, Ct abdomen showing normal pancreas, unlikely pancreatitis. Bilirubin level remained elevated at 7.5 11/5.  Hepatic functions are slowly improving 11/5. Viral hepatitis test all negative more likely suggesting EtoH acute Hepatic steatosis.  - Good oral intake in the last 24h   - Nutrition revised and recommended as stated above  - Daily LFT's  - Viral Hepatitis negative   - GI hepatology recs 11/4 :   - Chronic liver disease work up   - Monitor liver tests and INR daily    - Chemical dependency consult when he is more stable and receptive    - Outpatient hepatology follow up on discharge       #Anion Gap Metabolic Acidosis, resolved   Highest Anion Gap = 25 mmol/L (Ref range: 7-15) in last 2 days, will monitor and treat as appropriate. Query alcoholic ketoacidosis considered. Anion gap on 11/4 is 13  - mIVF  - Urine ketones negative  - Consideration for methanol, ethylene glycol ingestion as possible etiologies   - Addiction service        #Hyponatremia   #Hypokalemia  #Hypocalcemia  #Hypomagnesemia  Labs notable for Na 131 at 11PM at OSH prior to transfer. Na 12 hours earlier was 138. Ddx includes (beer potomania, cerebral salt wasting, SIADH, liver disease, etc. Low K  btw 3.5-3.8. Lowest Ca = 7.5 mg/dL (Ref range: 8.5 - 10.1 mg/dL) in last 2 days, will monitor and replace as appropriate. Mg improving from 1.6 to 2.4 w/ oral magnesium oxide 11/4. - Ionized calcium 4.1 c/w hypoalbuminemia and other lab findings, off MIVF, PO intake only, pt is doing well, good appetite.  - Repeat BMP  - Monitor lytes and replace as needed  - Serum osmolality, urine osmolality, urine sodium ordered        #Peripheral neuropathy  #Mild cerebellar atrophy  Suspect from  longstanding alcohol use disorder. Stable.         #PLMD vs RLS  #REM w/o atonia but no dream enactment  #Rare Somnambulism   - Consider Gabapentin 100-300mg at bedtime once out of withdrawal window        #Mild LULI  - Continuous pulse ox  - Maintain O2 saturations greater than 92%  - Consider CPAP when out of withdrawal window        #Hx of hypoglycemia   Suspect secondary to poor oral intake in the past. Regular diet resumed, Nutritionist consulted  -Hgb A1c  -Monitor glucose levels        #Hypothyroidism  TSH 36, free T4 0.81. Unclear compliance of medication  - PTA Levothyroxine 175mcg daily  - Recheck TSH/free T4 in 2wks  - Consider Endocrine consult        #Coagulation Defect  #Macrocytic Anemia  #Thrombocytopenia  Ddx likely bone marrow suppression from chronic heavy EtoH abuse  INR = 1.24 (Ref range: 0.85 - 1.15) now downtrending.  PTT = 32 Seconds (Ref range: 22 - 38 Seconds), will monitor for bleeding. Presumably due to declining liver function. Lowest platelets = 67 (Ref range: 150-450) in last 2 days, will monitor for bleeding. Hgb 10.1 w/     - Daily CBC  - Hgb goal >7, plt goal >50k  -Transfuse to meet Hgb and plt goals     #Leukopenia  -No infection concern  -Daily CBC  -Follow temperature curve       Diet: Regular Diet Adult    DVT Prophylaxis: SCD's  Triana Catheter: Not present  Fluids: NA  Central Lines: PRESENT  PICC 11/03/22 Triple Lumen Right-Site Assessment: WDL  Cardiac Monitoring: None  Code Status: Full Code      Disposition Plan     Expected Discharge Date: 11/06/2022                The patient's care was discussed with the Attending Physician, Dr. Oliveira and Patient.    Simba Craig MD  Medicine Service, [facility] TEAM [address] [place] [address]  Securely message with the Vocera Web Console (learn more here)  Text page via [employer] Paging/Directory   Please see signed in provider for up to date coverage information      Clinically Significant Risk  "Factors         # Hyponatremia: Lowest Na = 131 mmol/L (Ref range: 136-145) in last 2 days, will monitor as appropriate  # Hypocalcemia: Lowest Ca = 7.5 mg/dL (Ref range: 8.5 - 10.1 mg/dL) in last 2 days, will monitor and replace as appropriate   # Hypomagnesemia: Lowest Mg = 1.5 mg/dL (Ref range: 1.7-2.3) in last 2 days, will replace as needed   # Hypoalbuminemia: Lowest albumin = 3.2 g/dL (Ref range: 3.5-5.2) at 11/5/2022  3:24 AM, will monitor as appropriate   # Thrombocytopenia: Lowest platelets = 104 (Ref range: 150-450) in last 2 days, will monitor for bleeding        # Overweight: Estimated body mass index is 25.83 kg/m  as calculated from the following:    Height as of this encounter: 1.854 m (6' 1\").    Weight as of this encounter: 88.8 kg (195 lb 12.3 oz)., PRESENT ON ADMISSION  # Severe Malnutrition: based on nutrition assessment, PRESENT ON ADMISSION       ______________________________________________________________________    Interval History   Pt seen bedside in am. NAEON. Afebrile, Tolerating regular diet, good appetite. Was able to get some sleep last night. Demonstrated no more tremors. However he reported Overactive bladder and urinating more frequently. Inclined to discuss with addiction service. Mentioned he would like a referral to an orthopedic clinic for new custom orthotics.  Denies Headache/F/C/SOB.     4 pt ROS performed and otherwise negative    Data reviewed today: I reviewed all medications, new labs and imaging results over the last 24 hours. I personally reviewed no images or EKG's today.    Physical Exam   Vital Signs: Temp: 97.4  F (36.3  C) Temp src: Oral BP: 96/65 Pulse: 65   Resp: 15 SpO2: 94 % O2 Device: None (Room air)    Weight: 195 lbs 12.3 oz  General Appearance: AAOx3, NAD  Respiratory: unlabored on RA, no wheezing, SOB  Cardiovascular: RRR, NS1S2   GI: No scars, normal bowel sounds, soft, non-distended, non-tender, no masses palpated, no hepatosplenomegally  Skin: " scattered ecchymosis, superficial wounds b/l LE  Other: Tremor diminished, oriented to self, place, time, situation    Data   Recent Labs   Lab 11/05/22  0641 11/05/22  0325 11/05/22  0324 11/04/22  2337 11/04/22  0429 11/04/22  0425 11/03/22  2341 11/03/22  2141 11/03/22  0700 11/02/22  2150 11/02/22  2104   WBC  --   --  2.7*  --   --  2.7*  --   --  2.6*   < >  --    HGB  --   --  9.7*  --   --  10.1*  --   --  11.4*   < >  --    MCV  --   --  105*  --   --  104*  --   --  103*   < >  --    PLT  --   --  104*  --   --  126*  --   --  67*   < >  --    INR 1.21*  --   --   --   --   --   --  1.24*  --   --  1.17*   NA  --   --  133*  --   --  133* 131*  --  138   < >  --    POTASSIUM  --   --  3.8  --   --  3.7 3.5  --  3.7   < >  --    CHLORIDE  --   --  100  --   --  98 95*  --  97*   < >  --    CO2  --   --  22  --   --  22 25  --  20*   < >  --    BUN  --   --  2.0*  --   --  3.8* 4.4*  --  7.3   < >  --    CR  --   --  0.60*  --   --  0.56* 0.56*  --  0.57*   < >  --    ANIONGAP  --   --  11  --   --  13 11  --  21*   < >  --    ABDULKADIR  --   --  8.3*  --   --  7.9* 7.5*  --  7.8*   < >  --    GLC  --  110* 105* 126*   < > 131* 133*  --  75   < >  --    ALBUMIN 3.1*  --  3.2*  --   --  3.4*  --   --  3.6   < >  --    PROTTOTAL 5.3*  --  5.4*  --   --  5.5*  --   --  6.1*   < >  --    BILITOTAL 6.8*  --  7.5*  --   --  7.6*  --   --  6.3*   < >  --    ALKPHOS 190*  --  201*  --   --  190*  --   --  222*   < >  --    ALT 59*  --  62*  --   --  66*  --   --  89*   < >  --    *  --  227*  --   --  295*  --   --  348*   < >  --    LIPASE  --   --   --   --   --  622*  --   --   --   --   --     < > = values in this interval not displayed.     Recent Results (from the past 24 hour(s))   CT Head w/o Contrast    Narrative    EXAM: CT HEAD W/O CONTRAST  11/4/2022 9:06 AM     HISTORY:  Headache; Trauma, acute/subacute, without suspected cervical  artery trauma       COMPARISON:  11/3/2022    TECHNIQUE: Using  multidetector thin collimation helical acquisition  technique, axial, coronal and sagittal CT images from the skull base  to the vertex were obtained without intravenous contrast.   (topogram) image(s) also obtained and reviewed.    FINDINGS:  No substantial change in size or appearance of predominantly hypodense  large 18 mm subdural hematoma overlying the left cerebral convexity  with layering hyperattenuating products. Additionally, no substantial  change in size or appearance of abdominal hypodense 5 mm subdural  hematoma overlying the right cerebral convexity with scattered  hyperattenuating products. Stable left right 3 mm midline shift. Mild  associated mass effect of the surrounding cerebral sulci, most  pronounced over the left frontal and parietal lobes No acute  intracranial hemorrhage. No acute loss of gray-white matter  differentiation in the cerebral hemispheres. Ventricles are  proportionate to the cerebral sulci. No hydrocephalus. Basal cisterns  are clear. Scattered periventricular and subcortical white matter  hypodensities, nonspecific, represents sequela of chronic small  ischemic disease.. Moderate global cerebral and cerebellar volume  loss,.     The bony calvarium bones and skull base are unremarkable. Large mucous  retention cyst in left maxillary sinus. Otherwise the visualized  paranasal sinuses are unremarkable. Mastoid air cells are clear..  Orbits are grossly unremarkable.      Impression    IMPRESSION:   1. No substantial change in size or appearance of mixed density  bilateral subdural hematomas, left greater than right, compared to CT  head, 11/3/2022.  2. Stable 3 mm left-to-right midline shift.    I have personally reviewed the examination and initial interpretation  and I agree with the findings.    URIAH POST MD         SYSTEM ID:  [id number]     Medications       cloNIDine  0.1 mg Oral Q8H     folic acid  1 mg Oral Daily     [START ON 11/11/2022] gabapentin  100 mg Oral Q8H      [START ON 11/9/2022] gabapentin  300 mg Oral Q8H     [START ON 11/7/2022] gabapentin  600 mg Oral Q8H     gabapentin  900 mg Oral Q8H     levothyroxine  175 mcg Oral or Feeding Tube Daily     multivitamin w/minerals  1 tablet Oral Daily     thiamine  500 mg Intravenous TID    Followed by     [START ON 11/6/2022] thiamine  250 mg Intravenous Daily    Followed by     [START ON 11/11/2022] thiamine  100 mg Oral or Feeding Tube Daily

## 2022-11-06 ENCOUNTER — APPOINTMENT (OUTPATIENT)
Dept: PHYSICAL THERAPY | Facility: CLINIC | Age: 52
End: 2022-11-06
Attending: STUDENT IN AN ORGANIZED HEALTH CARE EDUCATION/TRAINING PROGRAM
Payer: COMMERCIAL

## 2022-11-06 LAB
ALBUMIN SERPL BCG-MCNC: 3.3 G/DL (ref 3.5–5.2)
ALP SERPL-CCNC: 248 U/L (ref 40–129)
ALT SERPL W P-5'-P-CCNC: 60 U/L (ref 10–50)
ANION GAP SERPL CALCULATED.3IONS-SCNC: 15 MMOL/L (ref 7–15)
AST SERPL W P-5'-P-CCNC: 179 U/L (ref 10–50)
BILIRUB SERPL-MCNC: 4.6 MG/DL
BUN SERPL-MCNC: 5.7 MG/DL (ref 6–20)
CALCIUM SERPL-MCNC: 9.1 MG/DL (ref 8.6–10)
CHLORIDE SERPL-SCNC: 101 MMOL/L (ref 98–107)
CREAT SERPL-MCNC: 0.71 MG/DL (ref 0.67–1.17)
DEPRECATED HCO3 PLAS-SCNC: 19 MMOL/L (ref 22–29)
ERYTHROCYTE [DISTWIDTH] IN BLOOD BY AUTOMATED COUNT: 12.3 % (ref 10–15)
GFR SERPL CREATININE-BSD FRML MDRD: >90 ML/MIN/1.73M2
GLUCOSE SERPL-MCNC: 100 MG/DL (ref 70–99)
HCT VFR BLD AUTO: 28.3 % (ref 40–53)
HGB BLD-MCNC: 9.2 G/DL (ref 13.3–17.7)
INR PPP: 1.21 (ref 0.85–1.15)
MAGNESIUM SERPL-MCNC: 1.6 MG/DL (ref 1.7–2.3)
MCH RBC QN AUTO: 35.5 PG (ref 26.5–33)
MCHC RBC AUTO-ENTMCNC: 32.5 G/DL (ref 31.5–36.5)
MCV RBC AUTO: 109 FL (ref 78–100)
PHOSPHATE SERPL-MCNC: 1.6 MG/DL (ref 2.5–4.5)
PLATELET # BLD AUTO: 124 10E3/UL (ref 150–450)
POTASSIUM SERPL-SCNC: 4.3 MMOL/L (ref 3.4–5.3)
PROT SERPL-MCNC: 5.6 G/DL (ref 6.4–8.3)
RBC # BLD AUTO: 2.59 10E6/UL (ref 4.4–5.9)
SODIUM SERPL-SCNC: 135 MMOL/L (ref 136–145)
WBC # BLD AUTO: 3.5 10E3/UL (ref 4–11)

## 2022-11-06 PROCEDURE — 250N000011 HC RX IP 250 OP 636: Performed by: STUDENT IN AN ORGANIZED HEALTH CARE EDUCATION/TRAINING PROGRAM

## 2022-11-06 PROCEDURE — 999N000007 HC SITE CHECK

## 2022-11-06 PROCEDURE — 80053 COMPREHEN METABOLIC PANEL: CPT | Performed by: STUDENT IN AN ORGANIZED HEALTH CARE EDUCATION/TRAINING PROGRAM

## 2022-11-06 PROCEDURE — 250N000013 HC RX MED GY IP 250 OP 250 PS 637

## 2022-11-06 PROCEDURE — 99232 SBSQ HOSP IP/OBS MODERATE 35: CPT | Performed by: STUDENT IN AN ORGANIZED HEALTH CARE EDUCATION/TRAINING PROGRAM

## 2022-11-06 PROCEDURE — 97162 PT EVAL MOD COMPLEX 30 MIN: CPT | Mod: GP

## 2022-11-06 PROCEDURE — 97530 THERAPEUTIC ACTIVITIES: CPT | Mod: GP

## 2022-11-06 PROCEDURE — 83735 ASSAY OF MAGNESIUM: CPT | Performed by: HOSPITALIST

## 2022-11-06 PROCEDURE — 36415 COLL VENOUS BLD VENIPUNCTURE: CPT

## 2022-11-06 PROCEDURE — 258N000003 HC RX IP 258 OP 636: Performed by: STUDENT IN AN ORGANIZED HEALTH CARE EDUCATION/TRAINING PROGRAM

## 2022-11-06 PROCEDURE — 250N000013 HC RX MED GY IP 250 OP 250 PS 637: Performed by: STUDENT IN AN ORGANIZED HEALTH CARE EDUCATION/TRAINING PROGRAM

## 2022-11-06 PROCEDURE — 120N000002 HC R&B MED SURG/OB UMMC

## 2022-11-06 PROCEDURE — 250N000009 HC RX 250: Performed by: STUDENT IN AN ORGANIZED HEALTH CARE EDUCATION/TRAINING PROGRAM

## 2022-11-06 PROCEDURE — 85027 COMPLETE CBC AUTOMATED: CPT | Performed by: STUDENT IN AN ORGANIZED HEALTH CARE EDUCATION/TRAINING PROGRAM

## 2022-11-06 PROCEDURE — 85610 PROTHROMBIN TIME: CPT

## 2022-11-06 PROCEDURE — 84100 ASSAY OF PHOSPHORUS: CPT | Performed by: HOSPITALIST

## 2022-11-06 PROCEDURE — 999N000147 HC STATISTIC PT IP EVAL DEFER

## 2022-11-06 RX ORDER — MAGNESIUM SULFATE HEPTAHYDRATE 40 MG/ML
2 INJECTION, SOLUTION INTRAVENOUS ONCE
Status: COMPLETED | OUTPATIENT
Start: 2022-11-06 | End: 2022-11-06

## 2022-11-06 RX ADMIN — VITAM B12 100 MCG: 100 TAB at 08:25

## 2022-11-06 RX ADMIN — GABAPENTIN 900 MG: 300 CAPSULE ORAL at 21:19

## 2022-11-06 RX ADMIN — LEVOTHYROXINE SODIUM 175 MCG: 0.15 TABLET ORAL at 08:24

## 2022-11-06 RX ADMIN — Medication 1 TABLET: at 08:24

## 2022-11-06 RX ADMIN — GABAPENTIN 900 MG: 300 CAPSULE ORAL at 05:05

## 2022-11-06 RX ADMIN — FOLIC ACID 1 MG: 1 TABLET ORAL at 08:24

## 2022-11-06 RX ADMIN — GABAPENTIN 900 MG: 300 CAPSULE ORAL at 12:03

## 2022-11-06 RX ADMIN — POTASSIUM PHOSPHATE, MONOBASIC POTASSIUM PHOSPHATE, DIBASIC 9 MMOL: 224; 236 INJECTION, SOLUTION, CONCENTRATE INTRAVENOUS at 22:05

## 2022-11-06 RX ADMIN — MAGNESIUM SULFATE HEPTAHYDRATE 2 G: 40 INJECTION, SOLUTION INTRAVENOUS at 21:18

## 2022-11-06 RX ADMIN — POTASSIUM PHOSPHATE, MONOBASIC POTASSIUM PHOSPHATE, DIBASIC 9 MMOL: 224; 236 INJECTION, SOLUTION, CONCENTRATE INTRAVENOUS at 17:51

## 2022-11-06 RX ADMIN — THIAMINE HYDROCHLORIDE 250 MG: 100 INJECTION, SOLUTION INTRAMUSCULAR; INTRAVENOUS at 08:25

## 2022-11-06 ASSESSMENT — ACTIVITIES OF DAILY LIVING (ADL)
ADLS_ACUITY_SCORE: 27
ADLS_ACUITY_SCORE: 25
ADLS_ACUITY_SCORE: 27

## 2022-11-06 NOTE — PROVIDER NOTIFICATION
2003 - Could you change vitals order from q2hrs to q4hrs. BP 85/51. Has been low. Can you put in orders for vitals parameters for low BPs. And not running fluids for possible fluid overload?

## 2022-11-06 NOTE — PROGRESS NOTES
[facility]    Progress Note - Teaching service Medicine Service, [facility] TEAM 4       Date of Admission:  11/4/2022    Assessment & Plan     Getachew Barcenas is a 52 year old man with a past medical history of alcohol use disorder, thiamine deficiency, frequent falls, peripheral neuropathy, hypothyroidism, former tobacco use, mild LULI, somnambulism, RLS, diverticulitis, and mood disorder who presented as transfer to [facility] on 11/4/2022 for higher level of cares in the setting of new diagnosis of acute on chronic L SDH and subacute R SDH. Patient is hemodynamically stable and has minimal neurological deficits at this time. He does have mild dysmetria, horizontal nystagmus, and peripheral neuropathy, which I suspect are chronic in the setting of longstanding alcohol use disorder. He also has mild right hand weakness, which I suspect is secondary to subdural hematoma. Patient currently stable from neurological standpoint. Pt now transferred to floor 6A.        Today's change:  - MMA embolization Monday 11/7 by Neuro IR  - NPO at midnight  - Neuro Q4h checks  - Stop CIWA protocol/PRN valium and clonidine  - Continue Thiamine protocol  - Addiction Medicine consulted     #Acute on chronic left subdural hematoma, chronic bilateral subdural hematoma, stable  #Brain compression  #3 mm rightward midline shift, stable  #Alcohol use disorder  #Falls secondary to above  #Concern for alcohol withdrawal   #Thiamine Deficiency likely secondary to EtOH use  #Concern for Wernicke-Korsakoff Syndrome  Long history of EtOH abuse w/ short sobriety episode. Last fall happened a few days ago. Upon arrival he was found to have B/L SDH w/ 3mm L to R midline shift. Ct C-spine was unremarkableMMA embolization post-pone to Monday 11/7, pt in a stable state, no active intra-cranial bleeding confirmed on CTH 11/4 which is  showing no acute intracranial hemorrhage, no substantial change in size or  appearance of mixed density b/l SDH, left > right, c/to CTH 11/3/22. Vit B12 WNL @ 937 11/4. As per SICU RN, neuro remained unchanged except for the slight confused/forgetful episodes. Tremors resolved, Ataxic at time when doing finger to nose 11/4 otherwise normal.   - Neuro Q4h checks  - Continue seizure prophylaxis  - Neurosurgery consultation, recs appreciated          - Plan for NSG follow-up in 1mo w/ repeat CTH  - MMA Embolization post-pone to 11/7  - SBP goal < 140 mmHg           - PRN Labetalol and Hydralazine  - HOB > 30   - CT cervical spine at OSH with no fracture --> cleared C spine   - SLP defer evaluation              - to re-consult if change in swallowing function or communication skills appears  - PT/OT consults ordered  - UDS  -Nutritionist recs 11/5:   - Continue B12, thiamine, folic acid. Continue MVI, as likely at risk for other B vitamin/micronutrient deficiencies.    - Electrolyte replacement per protocol as indicated     - Ordered Ensure BID    - Will continue to monitor PO intake/adequacy   - IV Thiamine 500mg TID for 2d -> IV Thiamine 250mg daily for 5d -> PO Thiamine 100mg daily  - Folate/B12 1mg daily  - MV 1 tab daily  - Addiction Medicine consult placed     #Hyperbilirubinemia (elevated direct bilirubin)  #Hypoalbuminemia/hypoproteinemia  #Elevated LFT's (AST > ALT)  #Cholelithiasis   # ETOH Hepatic steatosis   #Colonic diverticula   Mild jaundice revealed clinically. CT C/A/P on 11/2. RUQ U/S on 11/3. Suspect many of these hepatic lab abnormalities are secondary to alcohol consumption. Although the same CT revealed cholethiasis and diffuse fatty infiltration of the liver. MELD-Na 19; MELD 16. In addition, Abdomen US notes likely gallstones and sludge w/o wall-thickening or biliary duct dilatation. Lipase 622, No focal pain or symptoms suspicious for pancreatitis, Ct abdomen showing normal pancreas, unlikely pancreatitis. Bilirubin level remained elevated at 7.5 11/5.  Hepatic  functions are slowly improving 11/5. Viral hepatitis test all negative more likely suggesting EtoH acute Hepatic steatosis.  - Good oral intake in the last 24h   - Nutrition revised and recommended as stated above  - Daily LFT's  - Viral Hepatitis negative   - GI hepatology recs 11/4 :   - Chronic liver disease work up   - Monitor liver tests and INR daily    - Chemical dependency consult when he is more stable and receptive    - Outpatient hepatology follow up on discharge       #Anion Gap Metabolic Acidosis, resolved   Highest Anion Gap = 25 mmol/L (Ref range: 7-15) in last 2 days, will monitor and treat as appropriate. Query alcoholic ketoacidosis considered. Anion gap on 11/4 is 13  - mIVF  - Urine ketones negative  - Consideration for methanol, ethylene glycol ingestion as possible etiologies   - Addiction service        #Hyponatremia   #Hypokalemia  #Hypocalcemia  #Hypomagnesemia  Labs notable for Na 131 at 11PM at OSH prior to transfer. Na 12 hours earlier was 138. Ddx includes (beer potomania, cerebral salt wasting, SIADH, liver disease, etc. Low K  btw 3.5-3.8. Lowest Ca = 7.5 mg/dL (Ref range: 8.5 - 10.1 mg/dL) in last 2 days, will monitor and replace as appropriate. Mg improving from 1.6 to 2.4 w/ oral magnesium oxide 11/4. - Ionized calcium 4.1 c/w hypoalbuminemia and other lab findings, off MIVF, PO intake only, pt is doing well, good appetite.  - Repeat BMP  - Monitor lytes and replace as needed  - Serum osmolality, urine osmolality, urine sodium ordered        #Peripheral neuropathy  #Mild cerebellar atrophy  Suspect from longstanding alcohol use disorder. Stable.         #PLMD vs RLS  #REM w/o atonia but no dream enactment  #Rare Somnambulism   - Consider Gabapentin 100-300mg at bedtime once out of withdrawal window        #Mild LULI  - Continuous pulse ox  - Maintain O2 saturations greater than 92%  - Sleep study referral on discharge         #Hx of hypoglycemia   Suspect secondary to poor oral  intake in the past. Regular diet resumed, Nutritionist consulted  -Hgb A1c  -Monitor glucose levels        #Hypothyroidism  TSH 36, free T4 0.81. Unclear compliance of medication  - PTA Levothyroxine 175mcg daily  - Recheck TSH/free T4 in 2wks  - Consider Endocrine consult        #Coagulation Defect  #Macrocytic Anemia  #Thrombocytopenia  Ddx likely bone marrow suppression from chronic heavy EtoH abuse  INR = 1.24 (Ref range: 0.85 - 1.15) now downtrending.  PTT = 32 Seconds (Ref range: 22 - 38 Seconds), will monitor for bleeding. Presumably due to declining liver function. Lowest platelets = 67 (Ref range: 150-450) in last 2 days, will monitor for bleeding. Hgb 10.1 w/     - Daily CBC  - Hgb goal >7, plt goal >50k  -Transfuse to meet Hgb and plt goals     #Leukopenia  -No infection concern  -Daily CBC  -Follow temperature curve       Diet: Regular Diet Adult  Snacks/Supplements Adult: Ensure Enlive; Between Meals  NPO per Anesthesia Guidelines for Procedure/Surgery Except for: Meds    DVT Prophylaxis: SCD's  Triana Catheter: Not present  Fluids: NA  Central Lines: PRESENT  PICC 11/03/22 Triple Lumen Right-Site Assessment: WDL  Cardiac Monitoring: None  Code Status: Full Code      Disposition Plan      Expected Discharge Date: 11/08/2022        Discharge Comments: Pending MMA embolization procedure with Neuro IR on 11/7        The patient's care was discussed with the Attending Physician, Dr. Oliveira and Patient.    Sheila Oliveira MD  Medicine Service, [address]  Securely message with the Vocera Web Console (learn more here)  Text page via [facility] Paging/Directory   Please see signed in provider for up to date coverage information      Clinically Significant Risk Factors         # Hyponatremia: Lowest Na = 133 mmol/L (Ref range: 136-145) in last 2 days, will monitor as appropriate      # Hypoalbuminemia: Lowest albumin = 3.1 g/dL (Ref range: 3.5-5.2)  "at 11/5/2022  6:41 AM, will monitor as appropriate   # Thrombocytopenia: Lowest platelets = 104 (Ref range: 150-450) in last 2 days, will monitor for bleeding        # Overweight: Estimated body mass index is 25.83 kg/m  as calculated from the following:    Height as of this encounter: 1.854 m (6' 1\").    Weight as of this encounter: 88.8 kg (195 lb 12.3 oz)., PRESENT ON ADMISSION  # Severe Malnutrition: based on nutrition assessment, PRESENT ON ADMISSION       ______________________________________________________________________    Interval History   Doing well today. No headache or any other pain. Eating well, no nausea or vomiting. Not having any withdrawal symptoms. Looking forward to physical therapy today. Understands he will be NPO tonight for procedure tomorrow. Grateful for the care he has received in the hospital.     4 pt ROS performed and otherwise negative    Data reviewed today: I reviewed all medications, new labs and imaging results over the last 24 hours. I personally reviewed no images or EKG's today.    Physical Exam   Vital Signs: Temp: 98.3  F (36.8  C) Temp src: Oral BP: 103/59 Pulse: 69   Resp: 16 SpO2: 93 % O2 Device: None (Room air)    Weight: 195 lbs 12.3 oz  General Appearance: AAOx3, NAD  Respiratory: unlabored on RA, no wheezing, SOB  Cardiovascular: RRR, NS1S2   GI: No scars, normal bowel sounds, soft, non-distended, non-tender, no masses palpated, no hepatosplenomegally  Skin: scattered ecchymosis, superficial wounds b/l LE  Neuro:oriented to self, place, time, situation. Face symmetric, speech intact. Moves all extremities equally and symmetrically.     Data   Recent Labs   Lab 11/06/22  1147 11/05/22  0641 11/05/22  0325 11/05/22  0324 11/04/22  2337 11/04/22  0429 11/04/22  0425 11/03/22  2341 11/03/22  2141   WBC 3.5*  --   --  2.7*  --   --  2.7*  --   --    HGB 9.2*  --   --  9.7*  --   --  10.1*  --   --    *  --   --  105*  --   --  104*  --   --    *  --   --  " 104*  --   --  126*  --   --    INR 1.21* 1.21*  --   --   --   --   --   --  1.24*   NA  --   --   --  133*  --   --  133* 131*  --    POTASSIUM  --   --   --  3.8  --   --  3.7 3.5  --    CHLORIDE  --   --   --  100  --   --  98 95*  --    CO2  --   --   --  22  --   --  22 25  --    BUN  --   --   --  2.0*  --   --  3.8* 4.4*  --    CR  --   --   --  0.60*  --   --  0.56* 0.56*  --    ANIONGAP  --   --   --  11  --   --  13 11  --    ABDULKADIR  --   --   --  8.3*  --   --  7.9* 7.5*  --    GLC  --   --  110* 105* 126*   < > 131* 133*  --    ALBUMIN  --  3.1*  --  3.2*  --   --  3.4*  --   --    PROTTOTAL  --  5.3*  --  5.4*  --   --  5.5*  --   --    BILITOTAL  --  6.8*  --  7.5*  --   --  7.6*  --   --    ALKPHOS  --  190*  --  201*  --   --  190*  --   --    ALT  --  59*  --  62*  --   --  66*  --   --    AST  --  206*  --  227*  --   --  295*  --   --    LIPASE  --   --   --   --   --   --  622*  --   --     < > = values in this interval not displayed.     No results found for this or any previous visit (from the past 24 hour(s)).  Medications       cyanocobalamin  100 mcg Oral Daily     folic acid  1 mg Oral Daily     [START ON 11/11/2022] gabapentin  100 mg Oral Q8H     [START ON 11/9/2022] gabapentin  300 mg Oral Q8H     [START ON 11/7/2022] gabapentin  600 mg Oral Q8H     gabapentin  900 mg Oral Q8H     levothyroxine  175 mcg Oral or Feeding Tube Daily     multivitamin w/minerals  1 tablet Oral Daily     thiamine  250 mg Intravenous Daily    Followed by     [START ON 11/11/2022] thiamine  100 mg Oral or Feeding Tube Daily

## 2022-11-06 NOTE — PLAN OF CARE
Status: Acute on chronic L SDH and subacute R SDH. Hx alcohol use disorder, thiamine deficiency, frequent falls, peripheral neuropathy, hypothyroidism, former tobacco use, mild LULI, somnambulism, RLS, diverticulitis, and mood disorder   Vitals: Low blood pressures this shift. 80s/40s-50s. 500 mL LR bolus given but not effective after 30 minutes. MD assessed at bedside. Patient asymptomatic. 0400 vital check 100/59.   Neuros: A&Ox4. BLE weakness, impulsive at times. Baseline numbness to BLE. Denies HA.   IV: Triple lumen PICC. SL, blood return noted.   Labs/Electrolytes: WNL  Resp/trach: Denies SOB  Diet: Regular diet. Good intake.   Bowel status: BS+  : Voiding spont  Skin: Bruising and scabs throughout   Pain: Denies pain this shift   Activity: Up with 2/GB, standing at bedside this shift. Unsteady.   Social: Updating family  Plan: Plan for a MMA embolization Monday.

## 2022-11-06 NOTE — PROGRESS NOTES
"   11/06/22 0904   Appointment Info   Signing Clinician's Name / Credentials (PT) Kayla Almanza, PT, DPT       Present no   Living Environment   People in Home alone   Current Living Arrangements house   Home Accessibility no concerns   Transportation Anticipated family or friend will provide   Living Environment Comments Pt lives alone in a single level house. Pt reporting walk - in shower, uses aluminum outdoor chair as shower chair. History provided by pt unclear with order of location ([facility], [facility] rehab and home)   Self-Care   Usual Activity Tolerance moderate   Current Activity Tolerance fair   Regular Exercise No   Equipment Currently Used at Home walker, standard   Fall history within last six months yes   Number of times patient has fallen within last six months   (Pt reports around 1x/wk both inside home and outside. Pt reports they are due to suideen onset of \"dizziness\".)   Activity/Exercise/Self-Care Comment Pt reports Mod I with FWW with all mobility.  mod I with BADLs, family/aide A with IADLs, gets groceries delivered. Services to clean home. Recently moved and has services for lawn and snow care. Pt reports falling around 1x/week. Pt reports fall are due to sudden onsets of \"dizziness\" and has 1 sec before he falls so he tries to lower himself to his knees.   General Information   Onset of Illness/Injury or Date of Surgery 11/04/22   Referring Physician Isaías Jiménez MD   Patient/Family Therapy Goals Statement (PT) Return home, be sober   Pertinent History of Current Problem (include personal factors and/or comorbidities that impact the POC) Per EMR: \" 52 year old man with a past medical history of alcohol use disorder, thiamine deficiency, frequent falls, peripheral neuropathy, hypothyroidism, former tobacco use, mild LULI, somnambulism, RLS, diverticulitis, and mood disorder who presented as transfer to [facility] on 11/4/2022 for higher level of cares in the setting of " "new diagnosis of acute on chronic L SDH and subacute R SDH\"   Existing Precautions/Restrictions fall;seizures   Cognition   Affect/Mental Status (Cognition) confused   Cognitive Status Comments Unclear and inconsistent report of timeline of symptoms   Pain Assessment   Patient Currently in Pain No   Integumentary/Edema   Integumentary/Edema Comments Yellowing of whites of eyes   Posture    Posture Protracted shoulders   Range of Motion (ROM)   Range of Motion ROM is WFL   Strength (Manual Muscle Testing)   Strength Comments General deconditioning   Bed Mobility   Bed Mobility supine-sit;sit-supine   Supine-Sit Reeves (Bed Mobility) supervision   Sit-Supine Reeves (Bed Mobility) supervision   Comment, (Bed Mobility) HOB elevated, slightly slow speed, SBA for safety, required cuyes to scoot to edge of bed   Transfers   Transfers sit-stand transfer   Sit-Stand Transfer   Sit-Stand Reeves (Transfers) contact guard   Comment, (Sit-Stand Transfer) Pt required multiple attempts before success, reaching to PT to pull for assist.   Gait/Stairs (Locomotion)   Comment, (Gait/Stairs) Did not complete due to pt tolerance and low BP   Balance   Balance Comments Pt kept B UE support when sitting. Standing, maintained B UE support on walker. When completing knee extension, posterior trunk lean demo's, nearly losing balance   Sensory Examination   Sensory Perception Comments Pt reports chronic foot numbness   Coordination   Coordination Comments Heel to shin WNL B LE, alternate toe tapping WNL. UE FTN-FTF, Slow speed, slight path deviations R>L UE   Muscle Tone   Muscle Tone no deficits were identified   Clinical Impression   Criteria for Skilled Therapeutic Intervention Yes, treatment indicated   PT Diagnosis (PT) impaired mobility   Influenced by the following impairments Strength, OH   Functional limitations due to impairments transfers, gait   Clinical Presentation (PT Evaluation Complexity) Evolving/Changing "   Clinical Presentation Rationale clinical judgement   Clinical Decision Making (Complexity) moderate complexity   Planned Therapy Interventions (PT) bed mobility training;balance training;gait training;home exercise program;strengthening;neuromuscular re-education;transfer training;progressive activity/exercise   Risk & Benefits of therapy have been explained evaluation/treatment results reviewed;care plan/treatment goals reviewed;risks/benefits reviewed;current/potential barriers reviewed;participants voiced agreement with care plan;participants included;patient   PT Total Evaluation Time   PT Eval, Moderate Complexity Minutes (32372) 10   Physical Therapy Goals   PT Frequency 5x/week   PT Predicted Duration/Target Date for Goal Attainment 11/25/22   PT Goals Bed Mobility;Transfers;Gait;PT Goal 1   PT: Bed Mobility Independent   PT: Transfers Modified independent   PT: Gait Modified independent;100 feet   Interventions   Interventions Quick Adds Gait Training;Neuromuscular Re-ed;Therapeutic Activity;Therapeutic Procedure   Therapeutic Activity   Therapeutic Activities: dynamic activities to improve functional performance Minutes (37759) 27   Treatment Detail/Skilled Intervention Pt supine upon arrival, agreeable to therapy session, RN ok'd mobility. To progress functional independence, pt completed transfers. BP monitoried with each change in position, see Vital sign Flowsheet for values. Skilled monitoring of symptoms and tolerance of each position before progressing.  STS, edu on using walker to complete transfer, after pt unsuccessful without UE support and then pt reaching to PT to pull for assist. Statc standing CGA, progressed to lateral weight shifts and lateral steps towards HOB. Assist with management of walker. Pt denied dizziness throughout session.No furhter mobility completed due to hypotension that did not improve with activity. Discussion held on discharge planning, edu on role of TCU. Pt open to  TCU, reporting he will do whatever he needs to get better. Pt supine at end of session, call light in reach, bed alarm activated.   PT Discharge Planning   PT Plan Gait with chair follow, STS   PT Discharge Recommendation (DC Rec) Transitional Care Facility   PT Rationale for DC Rec Pt requiring Ax1 for mobility, presenting with maximal tolerance of standing due to OH. Pt is a high fall risk and demonstrates impairments in safety awareness. Recommend TCU to progress functional independence to level that caregiver can provide.   PT Brief overview of current status Ax1 bed mob and STS   Total Session Time   Timed Code Treatment Minutes 27   Total Session Time (sum of timed and untimed services) 37

## 2022-11-07 ENCOUNTER — ANESTHESIA EVENT (OUTPATIENT)
Dept: SURGERY | Facility: CLINIC | Age: 52
End: 2022-11-07
Payer: COMMERCIAL

## 2022-11-07 ENCOUNTER — ANESTHESIA (OUTPATIENT)
Dept: SURGERY | Facility: CLINIC | Age: 52
End: 2022-11-07
Payer: COMMERCIAL

## 2022-11-07 ENCOUNTER — APPOINTMENT (OUTPATIENT)
Dept: INTERVENTIONAL RADIOLOGY/VASCULAR | Facility: CLINIC | Age: 52
End: 2022-11-07
Attending: SURGERY
Payer: COMMERCIAL

## 2022-11-07 LAB
ALBUMIN SERPL BCG-MCNC: 3.7 G/DL (ref 3.5–5.2)
ALP SERPL-CCNC: 246 U/L (ref 40–129)
ALT SERPL W P-5'-P-CCNC: 63 U/L (ref 10–50)
ANION GAP SERPL CALCULATED.3IONS-SCNC: 14 MMOL/L (ref 7–15)
AST SERPL W P-5'-P-CCNC: 156 U/L (ref 10–50)
BILIRUB SERPL-MCNC: 5.2 MG/DL
BUN SERPL-MCNC: 4.8 MG/DL (ref 6–20)
CALCIUM SERPL-MCNC: 9.1 MG/DL (ref 8.6–10)
CHLORIDE SERPL-SCNC: 101 MMOL/L (ref 98–107)
CREAT SERPL-MCNC: 0.68 MG/DL (ref 0.67–1.17)
DEPRECATED HCO3 PLAS-SCNC: 20 MMOL/L (ref 22–29)
ERYTHROCYTE [DISTWIDTH] IN BLOOD BY AUTOMATED COUNT: 12.4 % (ref 10–15)
GFR SERPL CREATININE-BSD FRML MDRD: >90 ML/MIN/1.73M2
GLUCOSE BLDC GLUCOMTR-MCNC: 130 MG/DL (ref 70–99)
GLUCOSE SERPL-MCNC: 97 MG/DL (ref 70–99)
HCT VFR BLD AUTO: 33.2 % (ref 40–53)
HGB BLD-MCNC: 10.9 G/DL (ref 13.3–17.7)
INR PPP: 1.11 (ref 0.85–1.15)
MAGNESIUM SERPL-MCNC: 1.9 MG/DL (ref 1.7–2.3)
MCH RBC QN AUTO: 35.2 PG (ref 26.5–33)
MCHC RBC AUTO-ENTMCNC: 32.8 G/DL (ref 31.5–36.5)
MCV RBC AUTO: 107 FL (ref 78–100)
PHOSPHATE SERPL-MCNC: 3.1 MG/DL (ref 2.5–4.5)
PLATELET # BLD AUTO: 145 10E3/UL (ref 150–450)
POTASSIUM SERPL-SCNC: 4.1 MMOL/L (ref 3.4–5.3)
PROT SERPL-MCNC: 6.5 G/DL (ref 6.4–8.3)
RBC # BLD AUTO: 3.1 10E6/UL (ref 4.4–5.9)
SODIUM SERPL-SCNC: 135 MMOL/L (ref 136–145)
WBC # BLD AUTO: 4.3 10E3/UL (ref 4–11)

## 2022-11-07 PROCEDURE — 250N000011 HC RX IP 250 OP 636: Performed by: STUDENT IN AN ORGANIZED HEALTH CARE EDUCATION/TRAINING PROGRAM

## 2022-11-07 PROCEDURE — 255N000002 HC RX 255 OP 636: Performed by: RADIOLOGY

## 2022-11-07 PROCEDURE — 36217 PLACE CATHETER IN ARTERY: CPT | Mod: XU | Performed by: RADIOLOGY

## 2022-11-07 PROCEDURE — 83735 ASSAY OF MAGNESIUM: CPT | Performed by: STUDENT IN AN ORGANIZED HEALTH CARE EDUCATION/TRAINING PROGRAM

## 2022-11-07 PROCEDURE — 85610 PROTHROMBIN TIME: CPT

## 2022-11-07 PROCEDURE — C1887 CATHETER, GUIDING: HCPCS

## 2022-11-07 PROCEDURE — 250N000009 HC RX 250: Performed by: STUDENT IN AN ORGANIZED HEALTH CARE EDUCATION/TRAINING PROGRAM

## 2022-11-07 PROCEDURE — C1769 GUIDE WIRE: HCPCS

## 2022-11-07 PROCEDURE — 75894 X-RAYS TRANSCATH THERAPY: CPT | Mod: 26 | Performed by: RADIOLOGY

## 2022-11-07 PROCEDURE — 85027 COMPLETE CBC AUTOMATED: CPT | Performed by: STUDENT IN AN ORGANIZED HEALTH CARE EDUCATION/TRAINING PROGRAM

## 2022-11-07 PROCEDURE — 272N000192 HC ACCESSORY CR2

## 2022-11-07 PROCEDURE — C2628 CATHETER, OCCLUSION: HCPCS

## 2022-11-07 PROCEDURE — 272N000566 HC SHEATH CR3

## 2022-11-07 PROCEDURE — 76937 US GUIDE VASCULAR ACCESS: CPT | Mod: 26 | Performed by: RADIOLOGY

## 2022-11-07 PROCEDURE — 61626 TCAT PERM OCCLS/EMBOL NONCNS: CPT

## 2022-11-07 PROCEDURE — 999N000141 HC STATISTIC PRE-PROCEDURE NURSING ASSESSMENT

## 2022-11-07 PROCEDURE — 250N000009 HC RX 250

## 2022-11-07 PROCEDURE — 75898 FOLLOW-UP ANGIOGRAPHY: CPT

## 2022-11-07 PROCEDURE — 250N000011 HC RX IP 250 OP 636

## 2022-11-07 PROCEDURE — 36592 COLLECT BLOOD FROM PICC: CPT

## 2022-11-07 PROCEDURE — 61624 TCAT PERM OCCLS/EMBOLJ CNS: CPT | Mod: GC | Performed by: RADIOLOGY

## 2022-11-07 PROCEDURE — 370N000017 HC ANESTHESIA TECHNICAL FEE, PER MIN

## 2022-11-07 PROCEDURE — 75898 FOLLOW-UP ANGIOGRAPHY: CPT | Mod: 26 | Performed by: RADIOLOGY

## 2022-11-07 PROCEDURE — 200N000002 HC R&B ICU UMMC

## 2022-11-07 PROCEDURE — 80053 COMPREHEN METABOLIC PANEL: CPT | Performed by: STUDENT IN AN ORGANIZED HEALTH CARE EDUCATION/TRAINING PROGRAM

## 2022-11-07 PROCEDURE — 03VG3DZ RESTRICTION OF INTRACRANIAL ARTERY WITH INTRALUMINAL DEVICE, PERCUTANEOUS APPROACH: ICD-10-PCS | Performed by: RADIOLOGY

## 2022-11-07 PROCEDURE — 272N000506 HC NEEDLE CR6

## 2022-11-07 PROCEDURE — 258N000003 HC RX IP 258 OP 636: Performed by: STUDENT IN AN ORGANIZED HEALTH CARE EDUCATION/TRAINING PROGRAM

## 2022-11-07 PROCEDURE — 258N000003 HC RX IP 258 OP 636

## 2022-11-07 PROCEDURE — 250N000013 HC RX MED GY IP 250 OP 250 PS 637: Performed by: STUDENT IN AN ORGANIZED HEALTH CARE EDUCATION/TRAINING PROGRAM

## 2022-11-07 PROCEDURE — 272N000110 HC GLUE EMBOLI CR34

## 2022-11-07 PROCEDURE — 250N000025 HC SEVOFLURANE, PER MIN

## 2022-11-07 PROCEDURE — 250N000011 HC RX IP 250 OP 636: Performed by: NURSE ANESTHETIST, CERTIFIED REGISTERED

## 2022-11-07 PROCEDURE — 710N000011 HC RECOVERY PHASE 1, LEVEL 3, PER MIN

## 2022-11-07 PROCEDURE — 99232 SBSQ HOSP IP/OBS MODERATE 35: CPT | Mod: GC | Performed by: STUDENT IN AN ORGANIZED HEALTH CARE EDUCATION/TRAINING PROGRAM

## 2022-11-07 PROCEDURE — 75894 X-RAYS TRANSCATH THERAPY: CPT

## 2022-11-07 PROCEDURE — 84100 ASSAY OF PHOSPHORUS: CPT | Performed by: STUDENT IN AN ORGANIZED HEALTH CARE EDUCATION/TRAINING PROGRAM

## 2022-11-07 PROCEDURE — 36299 UNLISTED PX VASCULAR NJX: CPT

## 2022-11-07 PROCEDURE — 250N000013 HC RX MED GY IP 250 OP 250 PS 637

## 2022-11-07 PROCEDURE — C1760 CLOSURE DEV, VASC: HCPCS

## 2022-11-07 PROCEDURE — 250N000009 HC RX 250: Performed by: NURSE ANESTHETIST, CERTIFIED REGISTERED

## 2022-11-07 PROCEDURE — 76937 US GUIDE VASCULAR ACCESS: CPT

## 2022-11-07 RX ORDER — PROCHLORPERAZINE MALEATE 5 MG
10 TABLET ORAL EVERY 6 HOURS PRN
Status: DISCONTINUED | OUTPATIENT
Start: 2022-11-07 | End: 2022-11-16 | Stop reason: HOSPADM

## 2022-11-07 RX ORDER — FENTANYL CITRATE 50 UG/ML
INJECTION, SOLUTION INTRAMUSCULAR; INTRAVENOUS PRN
Status: DISCONTINUED | OUTPATIENT
Start: 2022-11-07 | End: 2022-11-07

## 2022-11-07 RX ORDER — ALBUTEROL SULFATE 0.83 MG/ML
2.5 SOLUTION RESPIRATORY (INHALATION) EVERY 4 HOURS PRN
Status: DISCONTINUED | OUTPATIENT
Start: 2022-11-07 | End: 2022-11-07 | Stop reason: HOSPADM

## 2022-11-07 RX ORDER — LABETALOL HYDROCHLORIDE 5 MG/ML
10 INJECTION, SOLUTION INTRAVENOUS
Status: DISCONTINUED | OUTPATIENT
Start: 2022-11-07 | End: 2022-11-07 | Stop reason: HOSPADM

## 2022-11-07 RX ORDER — IODIXANOL 320 MG/ML
150 INJECTION, SOLUTION INTRAVASCULAR ONCE
Status: COMPLETED | OUTPATIENT
Start: 2022-11-07 | End: 2022-11-07

## 2022-11-07 RX ORDER — HYDROMORPHONE HCL IN WATER/PF 6 MG/30 ML
0.2 PATIENT CONTROLLED ANALGESIA SYRINGE INTRAVENOUS EVERY 5 MIN PRN
Status: DISCONTINUED | OUTPATIENT
Start: 2022-11-07 | End: 2022-11-07 | Stop reason: HOSPADM

## 2022-11-07 RX ORDER — HEPARIN SODIUM 200 [USP'U]/100ML
1 INJECTION, SOLUTION INTRAVENOUS CONTINUOUS PRN
Status: DISCONTINUED | OUTPATIENT
Start: 2022-11-07 | End: 2022-11-07 | Stop reason: HOSPADM

## 2022-11-07 RX ORDER — SODIUM CHLORIDE, SODIUM LACTATE, POTASSIUM CHLORIDE, CALCIUM CHLORIDE 600; 310; 30; 20 MG/100ML; MG/100ML; MG/100ML; MG/100ML
INJECTION, SOLUTION INTRAVENOUS CONTINUOUS PRN
Status: DISCONTINUED | OUTPATIENT
Start: 2022-11-07 | End: 2022-11-07

## 2022-11-07 RX ORDER — ONDANSETRON 2 MG/ML
4 INJECTION INTRAMUSCULAR; INTRAVENOUS EVERY 30 MIN PRN
Status: DISCONTINUED | OUTPATIENT
Start: 2022-11-07 | End: 2022-11-07 | Stop reason: HOSPADM

## 2022-11-07 RX ORDER — LIDOCAINE HYDROCHLORIDE 20 MG/ML
INJECTION, SOLUTION INFILTRATION; PERINEURAL PRN
Status: DISCONTINUED | OUTPATIENT
Start: 2022-11-07 | End: 2022-11-07

## 2022-11-07 RX ORDER — IODIXANOL 320 MG/ML
150 INJECTION, SOLUTION INTRAVASCULAR ONCE
Status: DISCONTINUED | OUTPATIENT
Start: 2022-11-07 | End: 2022-11-08

## 2022-11-07 RX ORDER — SODIUM CHLORIDE, SODIUM LACTATE, POTASSIUM CHLORIDE, CALCIUM CHLORIDE 600; 310; 30; 20 MG/100ML; MG/100ML; MG/100ML; MG/100ML
INJECTION, SOLUTION INTRAVENOUS CONTINUOUS
Status: DISCONTINUED | OUTPATIENT
Start: 2022-11-07 | End: 2022-11-07 | Stop reason: HOSPADM

## 2022-11-07 RX ORDER — MAGNESIUM OXIDE 400 MG/1
400 TABLET ORAL EVERY 4 HOURS
Status: DISPENSED | OUTPATIENT
Start: 2022-11-07 | End: 2022-11-07

## 2022-11-07 RX ORDER — PROCHLORPERAZINE 25 MG
25 SUPPOSITORY, RECTAL RECTAL EVERY 12 HOURS PRN
Status: DISCONTINUED | OUTPATIENT
Start: 2022-11-07 | End: 2022-11-16 | Stop reason: HOSPADM

## 2022-11-07 RX ORDER — HALOPERIDOL 5 MG/ML
1 INJECTION INTRAMUSCULAR
Status: DISCONTINUED | OUTPATIENT
Start: 2022-11-07 | End: 2022-11-07 | Stop reason: HOSPADM

## 2022-11-07 RX ORDER — DEXAMETHASONE SODIUM PHOSPHATE 4 MG/ML
INJECTION, SOLUTION INTRA-ARTICULAR; INTRALESIONAL; INTRAMUSCULAR; INTRAVENOUS; SOFT TISSUE PRN
Status: DISCONTINUED | OUTPATIENT
Start: 2022-11-07 | End: 2022-11-07

## 2022-11-07 RX ORDER — ONDANSETRON 2 MG/ML
4 INJECTION INTRAMUSCULAR; INTRAVENOUS EVERY 6 HOURS PRN
Status: DISCONTINUED | OUTPATIENT
Start: 2022-11-07 | End: 2022-11-16 | Stop reason: HOSPADM

## 2022-11-07 RX ORDER — ONDANSETRON 2 MG/ML
INJECTION INTRAMUSCULAR; INTRAVENOUS PRN
Status: DISCONTINUED | OUTPATIENT
Start: 2022-11-07 | End: 2022-11-07

## 2022-11-07 RX ORDER — LIDOCAINE HYDROCHLORIDE 10 MG/ML
1-30 INJECTION, SOLUTION EPIDURAL; INFILTRATION; INTRACAUDAL; PERINEURAL
Status: COMPLETED | OUTPATIENT
Start: 2022-11-07 | End: 2022-11-07

## 2022-11-07 RX ORDER — HEPARIN SODIUM 1000 [USP'U]/ML
INJECTION, SOLUTION INTRAVENOUS; SUBCUTANEOUS PRN
Status: DISCONTINUED | OUTPATIENT
Start: 2022-11-07 | End: 2022-11-07

## 2022-11-07 RX ORDER — SODIUM CHLORIDE 9 MG/ML
INJECTION, SOLUTION INTRAVENOUS CONTINUOUS
Status: DISCONTINUED | OUTPATIENT
Start: 2022-11-07 | End: 2022-11-10

## 2022-11-07 RX ORDER — OXYCODONE HYDROCHLORIDE 5 MG/1
5 TABLET ORAL EVERY 4 HOURS PRN
Status: DISCONTINUED | OUTPATIENT
Start: 2022-11-07 | End: 2022-11-07 | Stop reason: HOSPADM

## 2022-11-07 RX ORDER — FENTANYL CITRATE 50 UG/ML
25 INJECTION, SOLUTION INTRAMUSCULAR; INTRAVENOUS EVERY 5 MIN PRN
Status: DISCONTINUED | OUTPATIENT
Start: 2022-11-07 | End: 2022-11-07 | Stop reason: HOSPADM

## 2022-11-07 RX ORDER — ONDANSETRON 4 MG/1
4 TABLET, ORALLY DISINTEGRATING ORAL EVERY 6 HOURS PRN
Status: DISCONTINUED | OUTPATIENT
Start: 2022-11-07 | End: 2022-11-16 | Stop reason: HOSPADM

## 2022-11-07 RX ORDER — PROPOFOL 10 MG/ML
INJECTION, EMULSION INTRAVENOUS PRN
Status: DISCONTINUED | OUTPATIENT
Start: 2022-11-07 | End: 2022-11-07

## 2022-11-07 RX ORDER — HYDRALAZINE HYDROCHLORIDE 20 MG/ML
2.5-5 INJECTION INTRAMUSCULAR; INTRAVENOUS EVERY 10 MIN PRN
Status: DISCONTINUED | OUTPATIENT
Start: 2022-11-07 | End: 2022-11-07 | Stop reason: HOSPADM

## 2022-11-07 RX ORDER — ONDANSETRON 4 MG/1
4 TABLET, ORALLY DISINTEGRATING ORAL EVERY 30 MIN PRN
Status: DISCONTINUED | OUTPATIENT
Start: 2022-11-07 | End: 2022-11-07 | Stop reason: HOSPADM

## 2022-11-07 RX ADMIN — PHENYLEPHRINE HYDROCHLORIDE 100 MCG: 10 INJECTION INTRAVENOUS at 10:59

## 2022-11-07 RX ADMIN — SUGAMMADEX 200 MG: 100 INJECTION, SOLUTION INTRAVENOUS at 12:31

## 2022-11-07 RX ADMIN — PHENYLEPHRINE HYDROCHLORIDE 200 MCG: 10 INJECTION INTRAVENOUS at 11:23

## 2022-11-07 RX ADMIN — NOREPINEPHRINE BITARTRATE 12.8 MCG: 1 INJECTION, SOLUTION, CONCENTRATE INTRAVENOUS at 11:47

## 2022-11-07 RX ADMIN — Medication 10 MG: at 12:16

## 2022-11-07 RX ADMIN — FOLIC ACID 1 MG: 1 TABLET ORAL at 07:57

## 2022-11-07 RX ADMIN — PHENYLEPHRINE HYDROCHLORIDE 100 MCG: 10 INJECTION INTRAVENOUS at 11:53

## 2022-11-07 RX ADMIN — HEPARIN SODIUM 5 BAG: 200 INJECTION, SOLUTION INTRAVENOUS at 11:35

## 2022-11-07 RX ADMIN — FENTANYL CITRATE 25 MCG: 50 INJECTION, SOLUTION INTRAMUSCULAR; INTRAVENOUS at 14:13

## 2022-11-07 RX ADMIN — PHENYLEPHRINE HYDROCHLORIDE 200 MCG: 10 INJECTION INTRAVENOUS at 11:57

## 2022-11-07 RX ADMIN — FENTANYL CITRATE 25 MCG: 50 INJECTION, SOLUTION INTRAMUSCULAR; INTRAVENOUS at 14:02

## 2022-11-07 RX ADMIN — NOREPINEPHRINE BITARTRATE 6.4 MCG: 1 INJECTION, SOLUTION, CONCENTRATE INTRAVENOUS at 11:39

## 2022-11-07 RX ADMIN — PHENYLEPHRINE HYDROCHLORIDE 100 MCG: 10 INJECTION INTRAVENOUS at 12:08

## 2022-11-07 RX ADMIN — FENTANYL CITRATE 25 MCG: 50 INJECTION, SOLUTION INTRAMUSCULAR; INTRAVENOUS at 13:38

## 2022-11-07 RX ADMIN — PHENYLEPHRINE HYDROCHLORIDE 200 MCG: 10 INJECTION INTRAVENOUS at 11:50

## 2022-11-07 RX ADMIN — HEPARIN SODIUM 7000 UNITS: 1000 INJECTION INTRAVENOUS; SUBCUTANEOUS at 11:35

## 2022-11-07 RX ADMIN — PHENYLEPHRINE HYDROCHLORIDE 100 MCG: 10 INJECTION INTRAVENOUS at 11:08

## 2022-11-07 RX ADMIN — GABAPENTIN 600 MG: 300 CAPSULE ORAL at 19:51

## 2022-11-07 RX ADMIN — THIAMINE HYDROCHLORIDE 250 MG: 100 INJECTION, SOLUTION INTRAMUSCULAR; INTRAVENOUS at 07:56

## 2022-11-07 RX ADMIN — PROPOFOL 120 MG: 10 INJECTION, EMULSION INTRAVENOUS at 10:59

## 2022-11-07 RX ADMIN — FENTANYL CITRATE 50 MCG: 50 INJECTION, SOLUTION INTRAMUSCULAR; INTRAVENOUS at 10:59

## 2022-11-07 RX ADMIN — Medication 50 MG: at 10:59

## 2022-11-07 RX ADMIN — Medication 1 TABLET: at 07:57

## 2022-11-07 RX ADMIN — Medication 10 MG: at 11:45

## 2022-11-07 RX ADMIN — VITAM B12 100 MCG: 100 TAB at 07:57

## 2022-11-07 RX ADMIN — NOREPINEPHRINE BITARTRATE 6.4 MCG: 1 INJECTION, SOLUTION, CONCENTRATE INTRAVENOUS at 11:43

## 2022-11-07 RX ADMIN — LIDOCAINE HYDROCHLORIDE 100 MG: 20 INJECTION, SOLUTION INFILTRATION; PERINEURAL at 10:59

## 2022-11-07 RX ADMIN — SODIUM CHLORIDE, POTASSIUM CHLORIDE, SODIUM LACTATE AND CALCIUM CHLORIDE: 600; 310; 30; 20 INJECTION, SOLUTION INTRAVENOUS at 10:48

## 2022-11-07 RX ADMIN — IODIXANOL 35 ML: 320 INJECTION, SOLUTION INTRAVASCULAR at 12:47

## 2022-11-07 RX ADMIN — PHENYLEPHRINE HYDROCHLORIDE 200 MCG: 10 INJECTION INTRAVENOUS at 11:34

## 2022-11-07 RX ADMIN — Medication 400 MG: at 07:57

## 2022-11-07 RX ADMIN — GABAPENTIN 900 MG: 300 CAPSULE ORAL at 03:51

## 2022-11-07 RX ADMIN — MIDAZOLAM 2 MG: 1 INJECTION INTRAMUSCULAR; INTRAVENOUS at 10:46

## 2022-11-07 RX ADMIN — Medication 1 UNITS: at 12:08

## 2022-11-07 RX ADMIN — LIDOCAINE HYDROCHLORIDE 8 ML: 10 INJECTION, SOLUTION EPIDURAL; INFILTRATION; INTRACAUDAL; PERINEURAL at 11:34

## 2022-11-07 RX ADMIN — PHENYLEPHRINE HYDROCHLORIDE 0.5 MCG/KG/MIN: 10 INJECTION INTRAVENOUS at 11:50

## 2022-11-07 RX ADMIN — ONDANSETRON 4 MG: 2 INJECTION INTRAMUSCULAR; INTRAVENOUS at 12:28

## 2022-11-07 RX ADMIN — NOREPINEPHRINE BITARTRATE 12.8 MCG: 1 INJECTION, SOLUTION, CONCENTRATE INTRAVENOUS at 12:08

## 2022-11-07 RX ADMIN — DEXAMETHASONE SODIUM PHOSPHATE 4 MG: 4 INJECTION, SOLUTION INTRA-ARTICULAR; INTRALESIONAL; INTRAMUSCULAR; INTRAVENOUS; SOFT TISSUE at 11:23

## 2022-11-07 RX ADMIN — PHENYLEPHRINE HYDROCHLORIDE 200 MCG: 10 INJECTION INTRAVENOUS at 11:16

## 2022-11-07 RX ADMIN — LEVOTHYROXINE SODIUM 175 MCG: 0.15 TABLET ORAL at 07:57

## 2022-11-07 ASSESSMENT — VISUAL ACUITY: OU: GLASSES;NORMAL ACUITY

## 2022-11-07 ASSESSMENT — ACTIVITIES OF DAILY LIVING (ADL)
ADLS_ACUITY_SCORE: 32
ADLS_ACUITY_SCORE: 28
ADLS_ACUITY_SCORE: 27
ADLS_ACUITY_SCORE: 28
ADLS_ACUITY_SCORE: 28
ADLS_ACUITY_SCORE: 27
ADLS_ACUITY_SCORE: 28
ADLS_ACUITY_SCORE: 28
ADLS_ACUITY_SCORE: 27
ADLS_ACUITY_SCORE: 27
ADLS_ACUITY_SCORE: 32
ADLS_ACUITY_SCORE: 27

## 2022-11-07 ASSESSMENT — LIFESTYLE VARIABLES: TOBACCO_USE: 1

## 2022-11-07 NOTE — PROCEDURES
[facility]     Endovascular Surgical Neuroradiology Post-Procedure Note    Pre-Procedure Diagnosis:  BILATERAL SDH  Post-Procedure Diagnosis:   Same s/p B/l MMA embo    Procedure(s):   MMA demobilization    Findings:   As above    Plan:   1 month head CT followed by clinic visit with Dr Bernard  Right leg flat x 2 hours    Primary Surgeon:  Dr. Indra Bernard  Secondary Surgeon:  Not applicable  Secondary Surgeon Review:  None  Fellow:  Helga Reed   Additional Assistants:  None     Prior to the start of the procedure and with procedural staff participation, I verbally confirmed: the patient s identity using two indicators, relevant allergies, that the procedure was appropriate and matched the consent or emergent situation, and that the correct equipment/implants were available. Immediately prior to starting the procedure I conducted the Time Out with the procedural staff and re-confirmed the patient s name, procedure, and site/side. (The Joint Commission universal protocol was followed.)  Yes    PRU value: none    Anesthesia:  Performed by Anesthesia  Medications:  Sedation per anesthesia + Heparin 6000 IU   Puncture site:  Right Femoral Artery    Fluoroscopy time (minutes):  33.2  Radiation dose (mGy):  1015    Contrast amount (mL):  50     Estimated blood loss (mL):  40    Closure:  Device    Disposition:  Will be followed in hospital by the Neuro Critical Care/Stroke team.        Sedation Post-Procedure Summary    Sedatives: Per anesthesia     Vital signs and pulse oximetry were monitored and remained stable throughout the procedure, and sedation was maintained until the procedure was complete.  The patient was monitored by staff until sedation discharge criteria were met.    Patient tolerance:  Patient tolerated the procedure well with no immediate complications.    Time of sedation in minutes: Per anesthesia     SAUL REED MD  Pager:  [phone]

## 2022-11-07 NOTE — PROGRESS NOTES
[facility]    Progress Note - Teaching service Medicine Service, MARO[facility] TEAM 4       Date of Admission:  11/4/2022    Assessment & Plan      Getachew Barcenas is a 52 year old man with a past medical history of alcohol use disorder, thiamine deficiency, frequent falls, peripheral neuropathy, hypothyroidism, former tobacco use, mild LULI, somnambulism, RLS, diverticulitis, and mood disorder who presented as transfer to [facility] on 11/4/2022 for higher level of cares in the setting of new diagnosis of acute on chronic L SDH and subacute R SDH. Patient is hemodynamically stable and has minimal neurological deficits at this time. He does have mild dysmetria, horizontal nystagmus, and peripheral neuropathy, which I suspect are chronic in the setting of longstanding alcohol use disorder. He also has mild right hand weakness, which I suspect is secondary to subdural hematoma. Patient currently stable from neurological standpoint. Pt is now status post B/L MMA embolization.      Today's change:  - IR Embolization today   - Neuro Q4h checks  - Stop CIWA protocol/PRN valium and clonidine  - Continue Thiamine protocol  - Addiction Medicine consulted--will plan to see patient tomorrow as procedure is scheduled today      #Acute on chronic left subdural hematoma, chronic bilateral subdural hematoma, s/p B/L MMA Emb.  #Brain compression  #3 mm rightward midline shift, stable  #Alcohol use disorder  #Falls secondary to above  #Concern for alcohol withdrawal   #Thiamine Deficiency likely secondary to EtOH use  #Concern for Wernicke-Korsakoff Syndrome  Long history of EtOH abuse w/ short sobriety episode. Last fall happened a few days ago. Upon arrival he was found to have B/L SDH w/ 3mm L to R midline shift. Ct C-spine was unremarkable, pt in a stable state, no active intra-cranial bleeding confirmed on CTH 11/4 which is  showing no acute intracranial hemorrhage, no substantial change in  size or appearance of mixed density b/l SDH, left > right, c/to CTH 11/3/22. Vit B12 WNL @ 937 11/4. As per SICU RN, neuro remained unchanged except for the slight confused/forgetful episodes. Tremors resolved, Ataxic at time when doing finger to nose 11/4 otherwise normal. Pt now s/p B/L MMA embolization 11/7.  - Neuro Q4h checks  - Continue seizure prophylaxis  - Neurosurgery consultation, recs appreciated          - Plan for NSG follow-up in 1mo w/ repeat CTH  - MMA Embolization post-pone to 11/7  - SBP goal < 140 mmHg           - PRN Labetalol and Hydralazine  - HOB > 30   - CT cervical spine at OSH with no fracture --> cleared C spine   - SLP defer evaluation              - to re-consult if change in swallowing function or communication skills appears  - PT recs 11/6:   - TCU @ discharge   - Gait/imbalance disability, pt requiring aid w/ chair ans walker  - UDS  -Nutritionist recs 11/5:              - Continue B12, thiamine, folic acid. Continue MVI, as likely at risk for other B vitamin/micronutrient def.              - Electrolyte replacement per protocol as indicated                - Ordered Ensure BID               - Will continue to monitor PO intake/adequacy   - IV Thiamine 500mg TID for 2d -> IV Thiamine 250mg daily for 5d -> PO Thiamine 100mg daily  - Folate/B12 1mg daily  - MV 1 tab daily  - Addiction Medicine consult placed     #Hyperbilirubinemia (elevated direct bilirubin)  #Hypoalbuminemia/hypoproteinemia, improving  #Elevated LFT's (AST > ALT), improving  #Cholelithiasis   #ETOH Hepatic steatosis   #Colonic diverticula   Mild jaundice revealed clinically. CT C/A/P on 11/2. RUQ U/S on 11/3. Suspect many of these hepatic lab abnormalities are secondary to alcohol consumption. Although the same CT revealed cholethiasis and diffuse fatty infiltration of the liver. MELD-Na 19; MELD 16. In addition, Abdomen US notes likely gallstones and sludge w/o wall-thickening or biliary duct dilatation. Lipase 622,  No focal pain or symptoms suspicious for pancreatitis, Ct abdomen showing normal pancreas, unlikely pancreatitis. Bilirubin level remained elevated at 7.5 11/5.  Hepatic functions are slowly improving 11/5. Viral hepatitis test all negative more likely suggesting EtoH acute Hepatic steatosis.  - Good oral intake    - Nutrition revised and recommended as stated above  - Daily LFT's  - Viral Hepatitis negative   - GI hepatology recs 11/4 :              - Chronic liver disease work up              - Monitor liver tests and INR daily               - Chemical dependency consult when he is more stable and receptive               - Outpatient hepatology follow up on discharge     #Anion Gap Metabolic Acidosis, resolved   Highest Anion Gap = 25 mmol/L (Ref range: 7-15) in last 2 days, will monitor and treat as appropriate. Query alcoholic ketoacidosis considered. Anion gap on 11/4 is 13  - mIVF  - Urine ketones negative  - Consideration for methanol, ethylene glycol ingestion as possible etiologies   - Addiction service        #Hyponatremia, improving  #Hypokalemia, resolved  #Hypocalcemia resolved  #Hypomagnesemia resolved  Labs notable for Na 131 at 11PM at OSH prior to transfer. Na 12 hours earlier was 138. Ddx includes (beer potomania, cerebral salt wasting, SIADH, liver disease, etc. Low K  btw 3.5-3.8. Lowest Ca = 7.5 mg/dL (Ref range: 8.5 - 10.1 mg/dL) in last 2 days, will monitor and replace as appropriate. Mg improving and stable w/ oral magnesium oxide. - Ionized calcium 4.1 c/w hypoalbuminemia and other lab findings, off MIVF, PO intake only, pt is doing well, good appetite.  - Repeat BMP  - Monitor lytes and replace as needed  - Serum osmolality, urine osmolality, urine sodium ordered         #Peripheral neuropathy  #Mild cerebellar atrophy  Suspect from longstanding alcohol use disorder. Stable.         #PLMD vs RLS  #REM w/o atonia but no dream enactment  #Rare Somnambulism   - Consider Gabapentin 100-300mg at  bedtime once out of withdrawal window        #Mild LULI  - Continuous pulse ox  - Maintain O2 saturations greater than 92%  - Sleep study referral on discharge         #Hx of hypoglycemia, improving  Suspect secondary to poor oral intake in the past. Regular diet resumed, Nutritionist consulted  -Hgb A1c  -Monitor glucose levels        #Hypothyroidism  TSH 36, free T4 0.81. Unclear compliance of medication  - PTA Levothyroxine 175mcg daily  - Recheck TSH/free T4 in 2wks  - Consider Endocrine consult        #Coagulation Defect, improving  #Macrocytic Anemia  #Thrombocytopenia, improving  Ddx likely bone marrow suppression from chronic heavy EtoH abuse  INR = 1.24 (Ref range: 0.85 - 1.15) now downtrending.  PTT = 32 Seconds (Ref range: 22 - 38 Seconds), will monitor for bleeding. Presumably due to declining liver function. Lowest platelets = 67 (Ref range: 150-450) in last 2 days Hgb however improving steadily. 10.1 w/     - Daily CBC  - Hgb goal >7, plt goal >50k  -Transfuse to meet Hgb and plt goals     #Leukopenia  - No infection concern  - Daily CBC  - Follow temperature curve        Diet: Regular Diet Adult  Snacks/Supplements Adult: Ensure Enlive; Between Meals  NPO per Anesthesia Guidelines for Procedure/Surgery Except for: Meds    DVT Prophylaxis: SCD's  Triana Catheter: Not present  Fluids: NA  Central Lines: PRESENT  PICC 11/03/22 Triple Lumen Right-Site Assessment: WDL  Cardiac Monitoring: None  Code Status: Full Code         Disposition Plan     Expected Discharge Date: 11/08/2022        Discharge Comments: Pending MMA embolization procedure with Neuro IR on 11/7        The patient's care was discussed with the Attending Physician, Dr. Oliveira and Patient.    Simba Craig MD  Medicine Service, [facility] TEAM [address] [place] [address]  Securely message with the Vocera Web Console (learn more here)  Text page via [employer] Paging/Directory   Please see signed in provider  "for up to date coverage information      Clinically Significant Risk Factors         # Hyponatremia: Lowest Na = 135 mmol/L (Ref range: 136-145) in last 2 days, will monitor as appropriate    # Hypomagnesemia: Lowest Mg = 1.6 mg/dL (Ref range: 1.7-2.3) in last 2 days, will replace as needed   # Hypoalbuminemia: Lowest albumin = 3.1 g/dL (Ref range: 3.5-5.2) at 11/5/2022  6:41 AM, will monitor as appropriate   # Thrombocytopenia: Lowest platelets = 124 (Ref range: 150-450) in last 2 days, will monitor for bleeding        # Overweight: Estimated body mass index is 25.83 kg/m  as calculated from the following:    Height as of this encounter: 1.854 m (6' 1\").    Weight as of this encounter: 88.8 kg (195 lb 12.3 oz)., PRESENT ON ADMISSION  # Severe Malnutrition: based on nutrition assessment, PRESENT ON ADMISSION       ______________________________________________________________________    Interval History   NAEON. Doing well today. No headache or any other pain. Eating well, no nausea or vomiting. Not having any withdrawal symptoms however anxious, but ready for his surgery today. Grateful for the care he has received in the hospital. Explained what TCU entails and which type of care he will receive, still incline and willing to get better.      4 pt ROS performed and otherwise negative  Data reviewed today: I reviewed all medications, new labs and imaging results over the last 24 hours. I personally reviewed no images or EKG's today.    Physical Exam   Vital Signs: Temp: 98  F (36.7  C) Temp src: Oral BP: 90/61 Pulse: 65   Resp: 16 SpO2: 95 % O2 Device: None (Room air)    Weight: 195 lbs 12.3 oz  General Appearance:  AAOx3, NAD  Respiratory: unlabored on RA, no wheezing, SOB  Cardiovascular: RRR, NS1S2   GI: No scars, normal bowel sounds, soft, non-distended, non-tender, no masses palpated, no hepatosplenomegally  Skin: scattered ecchymosis, superficial wounds b/l LE  Neuro:oriented to self, place, time, situation. " Face symmetric, speech intact. Moves all extremities equally and symmetrically.        Data   Recent Labs   Lab 11/07/22  0427 11/06/22  1147 11/05/22  0641 11/05/22  0325 11/05/22 0324 11/04/22 0429 11/04/22 0425   WBC 4.3 3.5*  --   --  2.7*  --  2.7*   HGB 10.9* 9.2*  --   --  9.7*  --  10.1*   * 109*  --   --  105*  --  104*   * 124*  --   --  104*  --  126*   INR 1.11 1.21* 1.21*  --   --   --   --    * 135*  --   --  133*  --  133*   POTASSIUM 4.1 4.3  --   --  3.8  --  3.7   CHLORIDE 101 101  --   --  100  --  98   CO2 20* 19*  --   --  22  --  22   BUN 4.8* 5.7*  --   --  2.0*  --  3.8*   CR 0.68 0.71  --   --  0.60*  --  0.56*   ANIONGAP 14 15  --   --  11  --  13   ABDULKADIR 9.1 9.1  --   --  8.3*  --  7.9*   GLC 97 100*  --  110* 105*   < > 131*   ALBUMIN 3.7 3.3* 3.1*  --  3.2*  --  3.4*   PROTTOTAL 6.5 5.6* 5.3*  --  5.4*  --  5.5*   BILITOTAL 5.2* 4.6* 6.8*  --  7.5*  --  7.6*   ALKPHOS 246* 248* 190*  --  201*  --  190*   ALT 63* 60* 59*  --  62*  --  66*   * 179* 206*  --  227*  --  295*   LIPASE  --   --   --   --   --   --  622*    < > = values in this interval not displayed.     No results found for this or any previous visit (from the past 24 hour(s)).  Medications    heparin        cyanocobalamin  100 mcg Oral Daily    folic acid  1 mg Oral Daily    [START ON 11/11/2022] gabapentin  100 mg Oral Q8H    [START ON 11/9/2022] gabapentin  300 mg Oral Q8H    gabapentin  600 mg Oral Q8H    levothyroxine  175 mcg Oral or Feeding Tube Daily    magnesium oxide  400 mg Oral Q4H    multivitamin w/minerals  1 tablet Oral Daily    thiamine  250 mg Intravenous Daily    Followed by    [START ON 11/11/2022] thiamine  100 mg Oral or Feeding Tube Daily

## 2022-11-07 NOTE — PLAN OF CARE
Status: Acute on chronic L SDH and subacute R SDH. Hx alcohol use disorder, thiamine deficiency, frequent falls, peripheral neuropathy, hypothyroidism, former tobacco use, mild LULI, somnambulism, RLS, diverticulitis, and mood disorder   Vitals: VSS on RA, soft BP's at times. HR in 50-60.   Neuros: AO x 4. Baseline neuropathy and weakness in BLE. 5/5 BUE.   IV: PIV SL   Labs/Electrolytes: Started MG replacement this shift, next dose due when pt in OR.    Resp/trach: LS clear, no SOB reported.   Diet: NPO   Bowel status: No BM this shift.  : Urgency, intermittent incontinence.   Skin: Bruising/scabbing throughout.   Pain: No statements of pain this shift.   Activity: A1 w/ GB and walker, unsteady.  Plan: IR for embolization.   Updates this shift: CHG wipes done this shift. New gown, no void prior to OR.

## 2022-11-07 NOTE — ANESTHESIA PROCEDURE NOTES
Airway       Patient location during procedure: OR       Procedure Start/Stop Times: 11/7/2022 11:05 AM  Staff -        Anesthesiologist:  Keyla Lazar MD       CRNA: Benjy Hubbard APRN CRNA       Performed By: other anesthesia staff  Consent for Airway        Urgency: elective  Indications and Patient Condition       Indications for airway management: franck-procedural       Induction type:intravenous       Mask difficulty assessment: 1 - vent by mask    Final Airway Details       Final airway type: endotracheal airway       Successful airway: ETT - single  Endotracheal Airway Details        ETT size (mm): 7.0       Cuffed: yes       Successful intubation technique: direct laryngoscopy       DL Blade Type: MAC 3       Grade View of Cords: 1       Adjucts: stylet       Position: Center       Measured from: lips       Secured at (cm): 24       Bite block used: None    Post intubation assessment        Placement verified by: capnometry, equal breath sounds and chest rise        Number of attempts at approach: 1       Number of other approaches attempted: 0       Secured with: silk tape       Ease of procedure: easy       Dentition: Intact and Unchanged    Medication(s) Administered   Medication Administration Time: 11/7/2022 11:05 AM

## 2022-11-07 NOTE — PROGRESS NOTES
Care Management Follow Up    Length of Stay (days): 3    Expected Discharge Date: Ready for discharge when MMA embolization, addiction medicine consult, OT cognitive assessment and psychiatry consult complete   Concerns to be Addressed:  Disposition planning     Patient plan of care discussed at interdisciplinary rounds: Yes    Anticipated Discharge Disposition:  Occupational and Physical Therapy are currently recommending a TCU stay   Anticipated Discharge Services:  Occupational and Physical Therapy are currently recommending a TCU stay     Anticipated Discharge DME:  Not applicable at this time    Patient/family educated on Medicare website which has current facility and service quality ratings:    Not on this date  Education Provided on the Discharge Plan:   yes  Patient/Family in Agreement with the Plan:  yes    Referrals Placed by CM/SW:  None on this date  Private pay costs discussed: Not applicable at this time    Additional Information:  Per chart review, pt is up with 2 and a gait belt, has a picc line and has a remote/recent history of etoh abuse.  OT and Physical Therapy are recommending a TCU stay.  Per PRICE Case Management assessment, pt's daughter would like pt to have cognitive testing.  Pt has a Health Care Directive present in [facility].  An Addiction Medicine consult is pending.  PRICE received a call from pt's daughter (James).  James reiterated request for cognitive testing.  PRICE spoke with Simona from OT who states that OT will complete cognitive testing as per daughter's request.  PRICE informed James that TCU stay is recommended and addiction medicine consult is pending.  James is hopeful that in the future (following a TCU stay) that pt would be agreeable to CD treatment and that arrangements would be made for pt to participate in a CD treatment program.  James also relays that pt's mother has a history of paranoid schizophrenia.  James states that in her opinion, pt has been displaying possible  signs of paranoid schizophrenia.  James states that pt is paranoid, threatens people and has a list of about 300 people that he would like to kill.  PRICE informed James that a psych eval will be requested.  James would like update when addiction medicine, cognitive assessment and psych eval are complete.  PRICE phoned Dr. Lilly Austin ([place] [address] resident) and informed of Nick concerns about pt's mental health/behaviors.  PRICE requested psych eval orders and recommended that psych contact James for add'l information about her concerns related to pt's mental health.  Dr. Austin indicated that she would follow up with pt in the am (on 11/8/2022) in regards to ordering a psych eval.   PRICE inquired about anticipated discharge date.  Per Dr. Austin, readiness for discharge is anticipated when MMA embolization (scheduled to be completed today 11/7/2022), cogn testing, Addiction Medicine Consult and Psych eval are complete.      PRICE will continue to follow for discharge planning.      ECHO Sneed  Social Work, 6A  Phone:  [phone]  Pager:  [phone]  11/8/2022          ALEBRTO Castellanos

## 2022-11-07 NOTE — ANESTHESIA CARE TRANSFER NOTE
Patient: Getachew Barcenas    Procedure: Procedure(s):  ANESTHESIA OUT OF OR Cerebral Emolization @1000       Diagnosis: Bleeding [R58]  Diagnosis Additional Information: No value filed.    Anesthesia Type:   General     Note:    Oropharynx: spontaneously breathing  Level of Consciousness: awake  Oxygen Supplementation: face mask    Independent Airway: airway patency satisfactory and stable  Dentition: dentition unchanged  Vital Signs Stable: post-procedure vital signs reviewed and stable  Report to RN Given: handoff report given  Patient transferred to: PACU    Handoff Report: Identifed the Patient, Identified the Reponsible Provider, Reviewed the pertinent medical history, Discussed the surgical course, Reviewed Intra-OP anesthesia mangement and issues during anesthesia, Set expectations for post-procedure period and Allowed opportunity for questions and acknowledgement of understanding      Vitals:  Vitals Value Taken Time   /85 11/07/22 1245   Temp     Pulse 79 11/07/22 1247   Resp 15 11/07/22 1247   SpO2 96 % 11/07/22 1247   Vitals shown include unvalidated device data.    Electronically Signed By: THALIA Rogers CRNA  November 7, 2022  12:48 PM

## 2022-11-07 NOTE — PLAN OF CARE
Status: Getachew Barcenas is a 52 year old male with hx of alcohol abuse, thrombocytopenia, and thiamine deficiency who presented for weakness and dizziness in the context of recent falls and is found to have bilateral SDH, for whom Neuro IR is consulted for bilateral MMA embolization.   Vitals: WNL,   Neuros: oriented x 4, ataxic, BLE weakness, impulsive,  IV: right PICC line triple luman  Labs/Electrolytes: phos 1,6, Mag 1.6 both replaced redraw in AM  Resp/trach: sats on RA 94%  Diet: regular, NPO after MN for OR  Bowel status: +BS  : Voiding at BS with Urinal and assist of one.  Skin: UE bruises, scabs on LE knees and shins  Pain: denies  Activity: turns self in bed,   Social: family visited,   Plan: Scheduled for a MMA embolization o Monday.

## 2022-11-07 NOTE — ANESTHESIA PREPROCEDURE EVALUATION
Anesthesia Pre-Procedure Evaluation    Patient: Getachew Barcenas   MRN: [id number] : 1970        Procedure : Procedure(s):  ANESTHESIA OUT OF OR Cerebral Emolization @1000          Past Medical History:   Diagnosis Date     Alcohol use disorder, severe, dependence (H) 2022      Past Surgical History:   Procedure Laterality Date     PICC TRIPLE LUMEN PLACEMENT  11/3/2022           No Known Allergies   Social History     Tobacco Use     Smoking status: Never     Smokeless tobacco: Former   Substance Use Topics     Alcohol use: Yes      Wt Readings from Last 1 Encounters:   22 88.8 kg (195 lb 12.3 oz)        Anesthesia Evaluation            ROS/MED HX  ENT/Pulmonary:     (+) sleep apnea, doesn't use CPAP, tobacco use (smokeless), Past use,     Neurologic: Comment: Acute on chronic left subdural hematoma   Chronic bilateral subdural hematoma  Peripheral neuropathy likely 2/2 chronic alcohol abuse  Mild cerebellar atrophy      Cardiovascular:     (+) -----Previous cardiac testing   Echo: Date:  Results:  1. Normal left ventricular size and systolic performance with a visually estimated ejection fraction of 55-60%.   2. No significant valvular heart disease is identified on this study.   3. Normal right ventricular size and systolic performance.   Stress Test: Date: Results:    ECG Reviewed: Date: Results:    Cath: Date: Results:   (-) hypertension, irregular heartbeat/palpitations and dyslipidemia   METS/Exercise Tolerance:     Hematologic:     (+) anemia,     Musculoskeletal:  - neg musculoskeletal ROS     GI/Hepatic: Comment: Cholelithiasis   Hepatic steatosis  Thiamine Deficiency  Malnutrition   (-) GERD   Renal/Genitourinary:  - neg Renal ROS  (-) renal disease   Endo:     (+) thyroid problem, hypothyroidism,     Psychiatric/Substance Use:     (+) alcohol abuse     Infectious Disease:  - neg infectious disease ROS     Malignancy:  - neg malignancy ROS     Other:               OUTSIDE LABS:  CBC:    Lab Results   Component Value Date    WBC 4.3 11/07/2022    WBC 3.5 (L) 11/06/2022    HGB 10.9 (L) 11/07/2022    HGB 9.2 (L) 11/06/2022    HCT 33.2 (L) 11/07/2022    HCT 28.3 (L) 11/06/2022     (L) 11/07/2022     (L) 11/06/2022     BMP:   Lab Results   Component Value Date     (L) 11/07/2022     (L) 11/06/2022    POTASSIUM 4.1 11/07/2022    POTASSIUM 4.3 11/06/2022    CHLORIDE 101 11/07/2022    CHLORIDE 101 11/06/2022    CO2 20 (L) 11/07/2022    CO2 19 (L) 11/06/2022    BUN 4.8 (L) 11/07/2022    BUN 5.7 (L) 11/06/2022    CR 0.68 11/07/2022    CR 0.71 11/06/2022    GLC 97 11/07/2022     (H) 11/06/2022     COAGS:   Lab Results   Component Value Date    PTT 32 11/03/2022    INR 1.11 11/07/2022     POC: No results found for: BGM, HCG, HCGS  HEPATIC:   Lab Results   Component Value Date    ALBUMIN 3.7 11/07/2022    PROTTOTAL 6.5 11/07/2022    ALT 63 (H) 11/07/2022     (H) 11/07/2022    ALKPHOS 246 (H) 11/07/2022    BILITOTAL 5.2 (H) 11/07/2022    EVERARDO 23 11/04/2022     OTHER:   Lab Results   Component Value Date    LACT 3.9 (H) 11/03/2022    ABDULKADIR 9.1 11/07/2022    PHOS 3.1 11/07/2022    MAG 1.9 11/07/2022    LIPASE 622 (H) 11/04/2022    TSH 36.09 (H) 11/02/2022    T4 0.81 (L) 11/02/2022    CRP 2.6 02/23/2022       Anesthesia Plan    ASA Status:  3   NPO Status:  NPO Appropriate    Anesthesia Type: General.     - Airway: ETT   Induction: Intravenous.   Maintenance: Inhalation.        Consents            Postoperative Care    Pain management: IV analgesics.   PONV prophylaxis: Ondansetron (or other 5HT-3), Dexamethasone or Solumedrol     Comments:                Keyla Lazar MD

## 2022-11-07 NOTE — ANESTHESIA POSTPROCEDURE EVALUATION
Patient: Getachew Barcenas    Procedure: Procedure(s):  ANESTHESIA OUT OF OR Cerebral Emolization @1000       Anesthesia Type:  General    Note:  Disposition: Inpatient   Postop Pain Control: Uneventful            Sign Out: Well controlled pain   PONV: No   Neuro/Psych: Uneventful            Sign Out: Acceptable/Baseline neuro status   Airway/Respiratory: Uneventful            Sign Out: Acceptable/Baseline resp. status   CV/Hemodynamics: Uneventful            Sign Out: Acceptable CV status; No obvious hypovolemia; No obvious fluid overload   Other NRE: NONE   DID A NON-ROUTINE EVENT OCCUR? No           Last vitals:  Vitals Value Taken Time   /58 11/07/22 1410   Temp 36.6  C (97.8  F) 11/07/22 1245   Pulse 75 11/07/22 1412   Resp 8 11/07/22 1412   SpO2 95 % 11/07/22 1412   Vitals shown include unvalidated device data.    Electronically Signed By: Keyla Lazar MD  November 7, 2022  2:14 PM

## 2022-11-07 NOTE — PLAN OF CARE
Status: Acute on chronic L SDH and subacute R SDH. Hx alcohol use disorder, thiamine deficiency, frequent falls, peripheral neuropathy, hypothyroidism, former tobacco use, mild LULI, somnambulism, RLS, diverticulitis, and mood disorder   Vitals: VSS on RA. Keep MAPs above 65. Bradycardic into the 50s.   Neuros: A&Ox4. BLE weakness. Baseline numbness to BLE. Denies HA.   IV: Triple lumen PICC. SL, blood return noted.   Labs/Electrolytes: Rechecks this AM   Resp/trach: Denies SOB  Diet: Regular diet. Good intake.   Bowel status: BS+  : Voiding spont  Skin: Bruising and scabs throughout   Pain: Denies pain this shift   Activity: Up with 2/GB, standing at bedside this shift. Unsteady.   Plan: MMA embolization today. Jed wipes completed.

## 2022-11-07 NOTE — IR NOTE
Patient Name: Getachew Barcenas  Medical Record Number: [id number]  Today's Date: 11/7/2022    Procedure: Cerebral angiogram with bilateral middle meningeal artery embolization  Proceduralist: Dr. Bernard, Dr. Reed and Dr. Partida  Pathology present: NA    Procedure Start: 1128  Procedure end: 1230  Sedation medications administered: Off Site Anesthesia     Report given to: RN PACU  : LEILANI    Other Notes: Pt arrived to IR room 3 from . Consent reviewed. Pt denies any questions or concerns regarding procedure. Pt positioned supine and monitored per protocol. Pt tolerated procedure without any noted complications. Pt transferred to PACU.    Right groin access site WDL, +CMS, 6FR Angioseal closure device deployed at 1227. FLAT BEDREST FOR 2 HOURS, UNTIL 1427. Distal pulses +3 pedal, doppler post tib..

## 2022-11-08 LAB
ALBUMIN SERPL BCG-MCNC: 3.1 G/DL (ref 3.5–5.2)
ALP SERPL-CCNC: 197 U/L (ref 40–129)
ALT SERPL W P-5'-P-CCNC: 59 U/L (ref 10–50)
ANION GAP SERPL CALCULATED.3IONS-SCNC: 13 MMOL/L (ref 7–15)
AST SERPL W P-5'-P-CCNC: 129 U/L (ref 10–50)
BACTERIA BLD CULT: NO GROWTH
BACTERIA BLD CULT: NO GROWTH
BILIRUB SERPL-MCNC: 4.4 MG/DL
BUN SERPL-MCNC: 3.5 MG/DL (ref 6–20)
CALCIUM SERPL-MCNC: 8 MG/DL (ref 8.6–10)
CHLORIDE SERPL-SCNC: 101 MMOL/L (ref 98–107)
CREAT SERPL-MCNC: 0.54 MG/DL (ref 0.67–1.17)
DEPRECATED HCO3 PLAS-SCNC: 20 MMOL/L (ref 22–29)
ERYTHROCYTE [DISTWIDTH] IN BLOOD BY AUTOMATED COUNT: 12.6 % (ref 10–15)
GFR SERPL CREATININE-BSD FRML MDRD: >90 ML/MIN/1.73M2
GLUCOSE SERPL-MCNC: 97 MG/DL (ref 70–99)
HCT VFR BLD AUTO: 28.9 % (ref 40–53)
HGB BLD-MCNC: 9.5 G/DL (ref 13.3–17.7)
INR PPP: 1.15 (ref 0.85–1.15)
MAGNESIUM SERPL-MCNC: 1.7 MG/DL (ref 1.7–2.3)
MCH RBC QN AUTO: 35.3 PG (ref 26.5–33)
MCHC RBC AUTO-ENTMCNC: 32.9 G/DL (ref 31.5–36.5)
MCV RBC AUTO: 107 FL (ref 78–100)
PHOSPHATE SERPL-MCNC: 3.4 MG/DL (ref 2.5–4.5)
PLATELET # BLD AUTO: 154 10E3/UL (ref 150–450)
POTASSIUM SERPL-SCNC: 3.8 MMOL/L (ref 3.4–5.3)
PROT SERPL-MCNC: 5.7 G/DL (ref 6.4–8.3)
RBC # BLD AUTO: 2.69 10E6/UL (ref 4.4–5.9)
SODIUM SERPL-SCNC: 134 MMOL/L (ref 136–145)
WBC # BLD AUTO: 4.8 10E3/UL (ref 4–11)

## 2022-11-08 PROCEDURE — 258N000003 HC RX IP 258 OP 636: Performed by: STUDENT IN AN ORGANIZED HEALTH CARE EDUCATION/TRAINING PROGRAM

## 2022-11-08 PROCEDURE — 250N000013 HC RX MED GY IP 250 OP 250 PS 637: Performed by: STUDENT IN AN ORGANIZED HEALTH CARE EDUCATION/TRAINING PROGRAM

## 2022-11-08 PROCEDURE — 84100 ASSAY OF PHOSPHORUS: CPT | Performed by: STUDENT IN AN ORGANIZED HEALTH CARE EDUCATION/TRAINING PROGRAM

## 2022-11-08 PROCEDURE — 80053 COMPREHEN METABOLIC PANEL: CPT | Performed by: STUDENT IN AN ORGANIZED HEALTH CARE EDUCATION/TRAINING PROGRAM

## 2022-11-08 PROCEDURE — 250N000013 HC RX MED GY IP 250 OP 250 PS 637

## 2022-11-08 PROCEDURE — 85027 COMPLETE CBC AUTOMATED: CPT | Performed by: STUDENT IN AN ORGANIZED HEALTH CARE EDUCATION/TRAINING PROGRAM

## 2022-11-08 PROCEDURE — 83735 ASSAY OF MAGNESIUM: CPT | Performed by: STUDENT IN AN ORGANIZED HEALTH CARE EDUCATION/TRAINING PROGRAM

## 2022-11-08 PROCEDURE — 99222 1ST HOSP IP/OBS MODERATE 55: CPT | Performed by: PSYCHIATRY & NEUROLOGY

## 2022-11-08 PROCEDURE — 120N000003 HC R&B IMCU UMMC

## 2022-11-08 PROCEDURE — 99207 PR APP CREDIT; MD BILLING SHARED VISIT: CPT | Performed by: INTERNAL MEDICINE

## 2022-11-08 PROCEDURE — 250N000011 HC RX IP 250 OP 636: Performed by: STUDENT IN AN ORGANIZED HEALTH CARE EDUCATION/TRAINING PROGRAM

## 2022-11-08 PROCEDURE — 85610 PROTHROMBIN TIME: CPT

## 2022-11-08 PROCEDURE — 99232 SBSQ HOSP IP/OBS MODERATE 35: CPT | Mod: GC | Performed by: INTERNAL MEDICINE

## 2022-11-08 RX ORDER — MAGNESIUM OXIDE 400 MG/1
400 TABLET ORAL EVERY 4 HOURS
Status: COMPLETED | OUTPATIENT
Start: 2022-11-08 | End: 2022-11-08

## 2022-11-08 RX ORDER — POTASSIUM CHLORIDE 750 MG/1
20 TABLET, EXTENDED RELEASE ORAL ONCE
Status: COMPLETED | OUTPATIENT
Start: 2022-11-08 | End: 2022-11-08

## 2022-11-08 RX ORDER — OLANZAPINE 5 MG/1
5 TABLET, ORALLY DISINTEGRATING ORAL AT BEDTIME
Status: DISCONTINUED | OUTPATIENT
Start: 2022-11-08 | End: 2022-11-16 | Stop reason: HOSPADM

## 2022-11-08 RX ORDER — NALTREXONE HYDROCHLORIDE 50 MG/1
50 TABLET, FILM COATED ORAL DAILY
Status: DISCONTINUED | OUTPATIENT
Start: 2022-11-11 | End: 2022-11-16 | Stop reason: HOSPADM

## 2022-11-08 RX ADMIN — MAGNESIUM OXIDE TAB 400 MG (241.3 MG ELEMENTAL MG) 400 MG: 400 (241.3 MG) TAB at 07:55

## 2022-11-08 RX ADMIN — LEVOTHYROXINE SODIUM 175 MCG: 0.15 TABLET ORAL at 07:55

## 2022-11-08 RX ADMIN — SENNOSIDES AND DOCUSATE SODIUM 2 TABLET: 50; 8.6 TABLET ORAL at 07:55

## 2022-11-08 RX ADMIN — GABAPENTIN 600 MG: 300 CAPSULE ORAL at 04:39

## 2022-11-08 RX ADMIN — POTASSIUM CHLORIDE 20 MEQ: 750 TABLET, EXTENDED RELEASE ORAL at 07:55

## 2022-11-08 RX ADMIN — Medication 1 TABLET: at 07:55

## 2022-11-08 RX ADMIN — MAGNESIUM OXIDE TAB 400 MG (241.3 MG ELEMENTAL MG) 400 MG: 400 (241.3 MG) TAB at 10:58

## 2022-11-08 RX ADMIN — OLANZAPINE 5 MG: 5 TABLET, ORALLY DISINTEGRATING ORAL at 22:16

## 2022-11-08 RX ADMIN — FOLIC ACID 1 MG: 1 TABLET ORAL at 07:55

## 2022-11-08 RX ADMIN — VITAM B12 100 MCG: 100 TAB at 09:32

## 2022-11-08 RX ADMIN — GABAPENTIN 600 MG: 300 CAPSULE ORAL at 11:00

## 2022-11-08 RX ADMIN — THIAMINE HYDROCHLORIDE 250 MG: 100 INJECTION, SOLUTION INTRAMUSCULAR; INTRAVENOUS at 08:30

## 2022-11-08 ASSESSMENT — ACTIVITIES OF DAILY LIVING (ADL)
ADLS_ACUITY_SCORE: 28

## 2022-11-08 ASSESSMENT — VISUAL ACUITY
OU: GLASSES;BASELINE
OU: GLASSES;BASELINE

## 2022-11-08 NOTE — PLAN OF CARE
Admitted/transferred from: PACU  Reason for admission/transfer: post op  2 RN skin assessment: completed by Piper Duarte and Era Vela  Result of skin assessment and interventions/actions: Preventative mepilex  Height, weight, drug calc weight: done  Patient belongings (see Flowsheet)  MDRO education added to care plan: yes  ?    Piper Duarte, RN on 11/7/2022 at 6:57 PM

## 2022-11-08 NOTE — CONSULTS
[facility]   Consult Note - Addiction Service     Date of Admission:  11/4/2022    Consult Requested by: medicine  Reason for Consult: assistance with alcohol dependence    Assessment & Plan   Getachew Barcenas is a 53 yo with a long history of alcohol dependence, peripheral neuropathy, and recently diagnosed schizophrenia, admitted to the hospital after a fall, and found to have a chronic SDH.      # Severe Alcohol Use Disorder  # Alcohol Hepatitis  # Subdural Hematoma secondary to fall  # peripheral neuropathy/instability secondary to chronic alcohol use  -We discussed different options to maintain goal of sobriety, including medications to manage cravings, including acamprosate and naltrexone.  He would like naltrexone since its once a day.  Because naltrexone it can often cause transient elevations in liver enzymes, we will start at half dose, 25 mg for a few days, and then increase to 50 mg daily.  -Naltrexone as above, please order on discharge.  -Once disposition known, please let our team know, and if he is going home instead of transitional care unit, we can discuss options for chemical dependency treatment if he is interested.  - Would recommend offering immunizations to Hep A and B prior to discharge (future infections could lead to decompensation of his liver disease)    For alcohol withdrawal:  -Continue CIWA;  consider adding on gabapentin, 600 mg TID.  Consider prescribing at discharge if this helps with underlying anxiety.    # Mental health:Appreciate input by Psychiatry    # Immunization review: He is not immune to Hep A or B.  Would recommend offering immunizations prior to discharge (future infections could lead to decompensation of his liver disease)     # Peer Support:   -Our peer  will meet the patient if agreeable and still hospitalized on Thursday, to provide additional outpatient resources  -To contact Serene Peer  from [facility]): call or text: [phone]    # Addiction Social Worker:   Our addiction social worker Manohar Donato can be contacted on her pager [phone] or texted/called at [phone]       # Linkage to Care:   Pending disposition.  If he is discharging home, would have a low threshold to place a referral on discharge to outpatient addiction medicine (mental health ).    The patient's care was discussed with the Bedside Nurse and Patient.    I spent 120 minutes on the unit/floor managing the care of Getachew Barcenas. Over 50% of my time was spent on the following:   Significant education and counseling spent on: how substance use disorders and dependence occur, and how it can become a chronic relapsing and remitting medical condition.  In addition, the pharmacology of medical treatments including naltrexone and acamprosate, the importance of follow up were discussed today.      Tr Anderson MD  [facility]   Contact information available via [facility] Paging/Directory  Please see sign in/sign out for up to date coverage information    ChAT team (Addiction Consult Team): Coverage Monday-Friday 8-4pm    Provider (Pager)  (Pager)   Mon Dr. Tr Anderson [phone]   Tues Dr. Tr Anderson [phone]   Wed Dr. Tr Anderson [phone] None   Thurs Dr. Edy Tom [phone]   Fri Dr. Getachew Herr [phone]   [facility] Psych Team- Refer to [facility].  For urgent needs, please place a  consult for psychiatry. None     ______________________________________________________________________    Chief Complaint   Alcohol dependence    History is obtained from the patient    History of Present Illness   Getachew Barcenas is a 53 yo with a long history of alcohol dependence, peripheral neuropathy, and recently diagnosed schizophrenia, admitted to the hospital after a fall, and found to have a chronic SDH.    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  When working, he reports drinking alcohol 2 help him handle the stress of his daily work life, and would not drink till he got home from work.  He also would drink alcohol to help manage his anger and emotions.  Since he retired several years ago, he has been struggling with loneliness, and has started drinking alcohol earlier and earlier.    After a hospitalization in February he was discharged to chemical dependency treatment, to [facility], through [facility].  He reports then being sober for 4 months, but on review of the electronic medical record, other providers discussed with his daughter, and does not seem that he avoid alcohol for that length of time.    He states he has never been offered medications for alcohol cravings, and had not heard of acamprosate or naltrexone.  However, he does report cravings and anticipation of alcohol, and thinks these medications could help him meet his goal.  He does state his long-term goal is avoidance of alcohol.    He states he would benefit from \"any help offered,\" but is not clear if he would be interested in going back to chemical dependency treatment upon hospital discharge.    At the moment it sounds like his disposition would be going to a transitional care unit until he can get stronger.  History of alf or snf? Denies    Active or prior justice related issues secondary to substance use: Denies    Identified race/ethnicity/important cultural/spiritual/ethnic identities: White  Employed: no, retired  Housing status/insecurity: Has his own house  Where patient lives/what town:[place]  Transportation status/insecurity: has own vehicle         Review of Systems   The 10 point Review of Systems is negative other than noted in the HPI or here.      Past Medical History:   Diagnosis Date     Alcohol use disorder, severe, dependence (H) 2/24/2022       Past Surgical History: "   Procedure Laterality Date     IR CAROTID CEREBRAL ANGIOGRAM BILATERAL  11/7/2022     PICC TRIPLE LUMEN PLACEMENT  11/3/2022            Social History   Social History     Socioeconomic History     Marital status:      Spouse name: Not on file     Number of children: Not on file     Years of education: Not on file     Highest education level: Not on file   Occupational History     Not on file   Tobacco Use     Smoking status: Never     Smokeless tobacco: Former   Substance and Sexual Activity     Alcohol use: Yes     Drug use: No     Sexual activity: Not on file   Other Topics Concern     Not on file   Social History Narrative     Not on file     Social Determinants of Health     Financial Resource Strain: Not on file   Food Insecurity: Not on file   Transportation Needs: Not on file   Physical Activity: Not on file   Stress: Not on file   Social Connections: Not on file   Intimate Partner Violence: Not on file   Housing Stability: Not on file       Family History   Schizophrenia, mom      Medications   I have reviewed this patient's current medications    Allergies   No Known Allergies    Physical Exam   Temp: 97.7  F (36.5  C) Temp src: Oral BP: 111/84 Pulse: 77   Resp: 27 SpO2: 97 % O2 Device: None (Room air) Oxygen Delivery: 2 LPM    Gen: NAD  HEENT: EOMI, PERRL, MMM  CV: extremities warm and well perfused  Resp: breathing comfortably on RA  : deferred  Msk: no LE edema  Skin: no rashes  Neuro: nonfocal exam      Due to regulation of Title 42 of the Code of Federal Regulations (CFR) Part 2: Confidentiality laws apply to this note and the information wherein.  Thus, this note cannot be copy and pasted into any other health care staff's note nor can it be included in general medical records sent to ANY outside agency without the patient's written consent.

## 2022-11-08 NOTE — CONSULTS
"          Initial Psychiatric Consult   Consult date: November 8, 2022         Reason for Consult, requesting source:    Psychiatric condition?   Requesting source: Sheila Oliveira    Labs and imaging reviewed. Discussed with nursing.         HPI:   From 11/4 neurology note:  \"Getachew Barcenas is a 52 year old man with a past medical history of alcohol use disorder, thiamine deficiency, frequent falls, peripheral neuropathy, hypothyroidism, former tobacco use, mild LULI, somnambulism, RLS, diverticulitis, and mood disorder who presented as transfer to [facility] on 11/4/2022 for higher level of cares in the setting of new diagnosis of acute on chronic L SDH and subacute R SDH. Patient is hemodynamically stable and has minimal neurological deficits at this time. He does have mild dysmetria, horizontal nystagmus, and peripheral neuropathy, which I suspect are chronic in the setting of longstanding alcohol use disorder. He also has mild right hand weakness, which I suspect is secondary to subdural hematoma. Patient currently stable from neurological standpoint.\"    He is seen today for psychiatric consultation due to daughter's concern over him having schizophrenia (his mother had this condition); see note by Stacey Callejas 11/7.  During my interview he was clearly quite defensive and when pressed for information he became more irritable and more uncooperative.  He did clearly had a lot of paranoia and was likely delusional about a number of things.  He denies any problems with anxiety but admits to occasionally being a bit down but without associated depressive symptoms.  He denies voices or paranoia. He was not the best historian, was doing some confabulation.     I had an opportunity to talk with his daughter James.  She mentioned that he has been paranoid and delusional for many years and this finally did interfere with his employability.  Apparently he is to be quite high up in a [employer] company but by age 50 he " had lost his last 3 jobs and had to retire early.  However, he is quite well off financially due to some success with the stock market.  He just purchased a new home.  Due to him making threats against people there have been numerous police reports against him but the police are not able to take any action since he actually go through with his threats.  She mentions he does have a lot of guns in the house.           Past Psychiatric History:   No psychiatric admissions and no use of psychotropic medications.  He says he has had some therapy to work on his anger issues; he does admit to being easily angered and chronically irritable.        Substance Use and History:   He has been drinking heavily for least the last 5 years and at 1 point he was consuming 1.75 L of vodka every 2 days, about a bottle was lasting about 10 days prior to his current admission.  He was in a treatment earlier this year at [facility] he said he was sober for several months following this.  However, his daughter mentions that he did not complete the program and resumed drinking immediately.  No drug use but he does chew tobacco.        Past Medical History:   PAST MEDICAL HISTORY:   Past Medical History:   Diagnosis Date     Alcohol use disorder, severe, dependence (H) 2/24/2022       PAST SURGICAL HISTORY:   Past Surgical History:   Procedure Laterality Date     IR CAROTID CEREBRAL ANGIOGRAM BILATERAL  11/7/2022     PICC TRIPLE LUMEN PLACEMENT  11/3/2022                  Family History:   FAMILY HISTORY: His mother had paranoid schizophrenia versus schizoaffective disorder        Social History:   He grew up with 2 sisters and his parents were  when he was about age 2 and then raised by his father.  His mother was hospitalized frequently for psychiatric problems and apparently ended up in a group home.   He has 2 children in their late 20s from when he was  for about 10 years.  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         Physical ROS:   The 10 point Review of Systems is negative other than noted in the HPI or here.           Medications:       cyanocobalamin  100 mcg Oral Daily     folic acid  1 mg Oral Daily     [START ON 11/11/2022] gabapentin  100 mg Oral Q8H     [START ON 11/9/2022] gabapentin  300 mg Oral Q8H     gabapentin  600 mg Oral Q8H     levothyroxine  175 mcg Oral or Feeding Tube Daily     magnesium oxide  400 mg Oral Q4H     multivitamin w/minerals  1 tablet Oral Daily     thiamine  250 mg Intravenous Daily    Followed by     [START ON 11/11/2022] thiamine  100 mg Oral or Feeding Tube Daily              Allergies:   No Known Allergies       Labs:     Recent Results (from the past 48 hour(s))   CBC with platelets    Collection Time: 11/06/22 11:47 AM   Result Value Ref Range    WBC Count 3.5 (L) 4.0 - 11.0 10e3/uL    RBC Count 2.59 (L) 4.40 - 5.90 10e6/uL    Hemoglobin 9.2 (L) 13.3 - 17.7 g/dL    Hematocrit 28.3 (L) 40.0 - 53.0 %     (H) 78 - 100 fL    MCH 35.5 (H) 26.5 - 33.0 pg    MCHC 32.5 31.5 - 36.5 g/dL    RDW 12.3 10.0 - 15.0 %    Platelet Count 124 (L) 150 - 450 10e3/uL   Comprehensive metabolic panel    Collection Time: 11/06/22 11:47 AM   Result Value Ref Range    Sodium 135 (L) 136 - 145 mmol/L    Potassium 4.3 3.4 - 5.3 mmol/L    Chloride 101 98 - 107 mmol/L    Carbon Dioxide (CO2) 19 (L) 22 - 29 mmol/L    Anion Gap 15 7 - 15 mmol/L    Urea Nitrogen 5.7 (L) 6.0 - 20.0 mg/dL    Creatinine 0.71 0.67 - 1.17 mg/dL    Calcium 9.1 8.6 - 10.0 mg/dL    Glucose 100 (H) 70 - 99 mg/dL    Alkaline Phosphatase 248 (H) 40 - 129 U/L     (H) 10 - 50 U/L    ALT 60 (H) 10 - 50 U/L    Protein Total 5.6 (L) 6.4 - 8.3 g/dL    Albumin 3.3 (L) 3.5 - 5.2 g/dL    Bilirubin Total 4.6 (H) <=1.2 mg/dL    GFR Estimate >90 >60 mL/min/1.73m2   Magnesium    Collection Time: 11/06/22 11:47 AM   Result Value Ref Range    Magnesium 1.6 (L) 1.7 - 2.3  mg/dL   Phosphorus    Collection Time: 11/06/22 11:47 AM   Result Value Ref Range    Phosphorus 1.6 (L) 2.5 - 4.5 mg/dL   INR    Collection Time: 11/06/22 11:47 AM   Result Value Ref Range    INR 1.21 (H) 0.85 - 1.15   Phosphorus    Collection Time: 11/07/22  4:27 AM   Result Value Ref Range    Phosphorus 3.1 2.5 - 4.5 mg/dL   CBC with platelets    Collection Time: 11/07/22  4:27 AM   Result Value Ref Range    WBC Count 4.3 4.0 - 11.0 10e3/uL    RBC Count 3.10 (L) 4.40 - 5.90 10e6/uL    Hemoglobin 10.9 (L) 13.3 - 17.7 g/dL    Hematocrit 33.2 (L) 40.0 - 53.0 %     (H) 78 - 100 fL    MCH 35.2 (H) 26.5 - 33.0 pg    MCHC 32.8 31.5 - 36.5 g/dL    RDW 12.4 10.0 - 15.0 %    Platelet Count 145 (L) 150 - 450 10e3/uL   Comprehensive metabolic panel    Collection Time: 11/07/22  4:27 AM   Result Value Ref Range    Sodium 135 (L) 136 - 145 mmol/L    Potassium 4.1 3.4 - 5.3 mmol/L    Chloride 101 98 - 107 mmol/L    Carbon Dioxide (CO2) 20 (L) 22 - 29 mmol/L    Anion Gap 14 7 - 15 mmol/L    Urea Nitrogen 4.8 (L) 6.0 - 20.0 mg/dL    Creatinine 0.68 0.67 - 1.17 mg/dL    Calcium 9.1 8.6 - 10.0 mg/dL    Glucose 97 70 - 99 mg/dL    Alkaline Phosphatase 246 (H) 40 - 129 U/L     (H) 10 - 50 U/L    ALT 63 (H) 10 - 50 U/L    Protein Total 6.5 6.4 - 8.3 g/dL    Albumin 3.7 3.5 - 5.2 g/dL    Bilirubin Total 5.2 (H) <=1.2 mg/dL    GFR Estimate >90 >60 mL/min/1.73m2   INR    Collection Time: 11/07/22  4:27 AM   Result Value Ref Range    INR 1.11 0.85 - 1.15   Magnesium    Collection Time: 11/07/22  4:27 AM   Result Value Ref Range    Magnesium 1.9 1.7 - 2.3 mg/dL   Glucose by meter    Collection Time: 11/07/22  3:31 PM   Result Value Ref Range    GLUCOSE BY METER POCT 130 (H) 70 - 99 mg/dL   CBC with platelets    Collection Time: 11/08/22  4:41 AM   Result Value Ref Range    WBC Count 4.8 4.0 - 11.0 10e3/uL    RBC Count 2.69 (L) 4.40 - 5.90 10e6/uL    Hemoglobin 9.5 (L) 13.3 - 17.7 g/dL    Hematocrit 28.9 (L) 40.0 - 53.0 %     " (H) 78 - 100 fL    MCH 35.3 (H) 26.5 - 33.0 pg    MCHC 32.9 31.5 - 36.5 g/dL    RDW 12.6 10.0 - 15.0 %    Platelet Count 154 150 - 450 10e3/uL   Comprehensive metabolic panel    Collection Time: 11/08/22  4:41 AM   Result Value Ref Range    Sodium 134 (L) 136 - 145 mmol/L    Potassium 3.8 3.4 - 5.3 mmol/L    Chloride 101 98 - 107 mmol/L    Carbon Dioxide (CO2) 20 (L) 22 - 29 mmol/L    Anion Gap 13 7 - 15 mmol/L    Urea Nitrogen 3.5 (L) 6.0 - 20.0 mg/dL    Creatinine 0.54 (L) 0.67 - 1.17 mg/dL    Calcium 8.0 (L) 8.6 - 10.0 mg/dL    Glucose 97 70 - 99 mg/dL    Alkaline Phosphatase 197 (H) 40 - 129 U/L     (H) 10 - 50 U/L    ALT 59 (H) 10 - 50 U/L    Protein Total 5.7 (L) 6.4 - 8.3 g/dL    Albumin 3.1 (L) 3.5 - 5.2 g/dL    Bilirubin Total 4.4 (H) <=1.2 mg/dL    GFR Estimate >90 >60 mL/min/1.73m2   INR    Collection Time: 11/08/22  4:41 AM   Result Value Ref Range    INR 1.15 0.85 - 1.15   Magnesium    Collection Time: 11/08/22  4:41 AM   Result Value Ref Range    Magnesium 1.7 1.7 - 2.3 mg/dL   Phosphorus    Collection Time: 11/08/22  4:41 AM   Result Value Ref Range    Phosphorus 3.4 2.5 - 4.5 mg/dL          Physical and Psychiatric Examination:     /84   Pulse 77   Temp 97.7  F (36.5  C) (Oral)   Resp 27   Ht 1.854 m (6' 1\")   Wt 87 kg (191 lb 12.8 oz)   SpO2 97%   BMI 25.30 kg/m    Weight is 191 lbs 12.8 oz  Body mass index is 25.3 kg/m .    Physical Exam:  I have reviewed the physical exam as documented by by the medical team and agree with findings and assessment and have no additional findings to add at this time.         MSE:   Appearance: awake, alert and adequately groomed  Attitude:  slightly uncooperative  Eye Contact:  fair  Mood:  \"OK\"  Affect:  : slightly restricted  Speech:  clear, coherent  Psychomotor Behavior:  intact station, gait and muscle tone  Thought Process:  circumstantial  Associations:  no loose associations  Thought Content:  paranoid and delusional, confabulates " "  Insight:  limited  Judgement:  limited  Oriented to:  time, person, and place  Attention Span and Concentration:  fair  Recent and Remote Memory:  unable to formally assess (lack of cooperation)              DSM-5 Diagnosis:   295.90  (F20.9) Schizophrenia   Rule out unspecified neurocognitive disorder   Alcohol use disorder          Assessment:   He clearly does have paranoid schizophrenia but I doubt we can get him to take medications these would likely make him much more comfortable).  He would have to be more disorganized and ill in order to pursue court commitment and forced medications; this does not currently apply in his case.  The high dose thiamine is a good idea.   It would be nice to have a cognitive assessment by OT, but I'm not sure he will cooperate (he becomes very defensive).           Summary of Recommendations:   You could from 5 mg of Zyprexa at bedtime for sleep    Page me or re-consult psychiatry as needed (psychiatry is signing off).     Getachew Herr M.D.   [facility]   Contact information available via [facility] Paging/Directory.  If I am not available, then [facility] intake ([phone]) should know who   Is on call       \"This dictation was performed with voice recognition software and may contain errors,  omissions and inadvertent word substitution.\"           "

## 2022-11-08 NOTE — PROGRESS NOTES
Major Shift Events:    Neuro: A/Ox4, can be forgetful and little off at times, SANCHEZ weak, tremors at baseline in BUE. Baseline neuropathy in BLE. Pupils = round and reactive, juandice in color with sclera yellowing  CV: irregular rhythm, BP WNL, pt declined to wear tele monitor device, afebrile  Respi: Ls clear on RA   Gi/Gu: tolerating diet, good oral intake, no BM, some bowel meds given, bearden with good UOP    Plan: discontinue to bearden? Work with therapies  For vital signs and complete assessments, please see documentation flowsheets.     Transferred to: Unit 6B at @1240  Belongings:   Bearden removed? No: orders for strict I & Os  Central line removed? No, PICC in place- pt does not need PICC? Can we remove it- 6B RN will follow through with that  Chart and medications sent with patient Yes  Family notified: No: Pt stated he would inform family

## 2022-11-08 NOTE — PROVIDER NOTIFICATION
BP getting soft, 85/61. Asymptomatic. HR 80s, irregular (has been since PACU). Notified Deonte 4 via page.

## 2022-11-08 NOTE — PROGRESS NOTES
Transfer  Transferred from: 4A  Via:bed  Reason for transfer: Pt appropriate for 6B- improved patient condition  Family: Aware of transfer  Belongings: Received with pt  Chart: Received with pt  Medications: Meds received from old unit with pt  Code Status verified on armband: yes  2 RN Skin Assessment Completed By: Valeria Mccann, generalized bruising, slightly jaundice, no blanchable redness.   Med rec completed: yes  Bed surface reassessed with algorithm and charted: yes  New bed surface ordered: no  Suction/Ambu bag/Flowmeter at bedside: yes    Report received from: 4A nurse  Pt status:     Neuro: A&Ox4. Afebrile, needs bed alarm on for safety, impulsive. Intermittently forgetful, neuros unchanged. Neuro exam ordered q 6 hours.   Cardiac: Off tele. VSS. Denies SOB and chest pain.   Respiratory: Sating >95% on RA.  GI/: Adequately urine output, no BM during shift.   Diet/appetite: Tolerating regular diet. Eating well.  Activity:  Assist of x1 with gait belt and walker, independently repositioning in bed.  Pain: At acceptable level on current regimen.   Skin: No new deficits noted.   LDA's: TL PICC-SL.     Plan: Possible discharge tomorrow, keep PICC in until then. Continue with POC. Notify primary team with changes.

## 2022-11-08 NOTE — PLAN OF CARE
"Major Shift Events:  Neurologically intact. Some intermittent confusion and agitation overnight. Complained about hearing the tube system and alarms. Given ear plugs, but was still irritable and impulsive. Attempted to get out of bed and required redirecting multiple times throughout the night. More agitated this AM and stating \"I am leaving today no matter what\" and removed all monitoring equipment refusing to put back on. Ruby at baseline. Numbness and tingling in BLE at baseline. Irregular HR 60-80s and met SBP goal <140. On RA with clear lung sounds. Adequate UOP via Triana. Tolerating clear liquids. R groin site remains unchanged and WNL. NS @75.       Plan: Transfer to  when bed available. Continue to monitor neurological status.     For vital signs and complete assessments, please see documentation flowsheets.     "

## 2022-11-08 NOTE — PROGRESS NOTES
[facility]    Progress Note - Medicine Service, [facility] TEAM 4       Date of Admission:  11/4/2022    Assessment & Plan   Getachew Barcenas is a 52 year old man with a past medical history of alcohol use disorder, thiamine deficiency, frequent falls, peripheral neuropathy, hypothyroidism, former tobacco use, mild LULI, somnambulism, RLS, diverticulitis, and mood disorder who presented as transfer to [facility] on 11/4/2022 for higher level of cares in the setting of new diagnosis of acute on chronic L SDH and subacute R SDH. Patient is hemodynamically stable and has minimal neurological deficits at this time. He does have mild dysmetria, horizontal nystagmus, and peripheral neuropathy, which I suspect are chronic in the setting of longstanding alcohol use disorder. He also has mild right hand weakness, which I suspect is secondary to subdural hematoma. Patient currently stable from neurological standpoint. Pt is now status post B/L MMA embolization.      Today's changes:  - Transferred to   - Triana removed  - Neuro Q6h checks  - Stop CIWA protocol/PRN valium and clonidine  - Continue Thiamine protocol  - Psychiatry consulted no definitive medication mgmt  - Addiction Medicine consulted  - Zyprexa 5mg   - Tapered Gabapentin to 300mg  - Expected discharge pending TCU availability     #Acute on chronic left subdural hematoma, chronic bilateral subdural hematoma, s/p B/L MMA Emb.  #Brain compression  #3 mm rightward midline shift, stable  #Alcohol use disorder  #Falls secondary to above  #Concern for alcohol withdrawal   #Thiamine Deficiency likely secondary to EtOH use  #Concern for Wernicke-Korsakoff Syndrome  Long history of EtOH abuse w/ short sobriety episode. Last fall happened a few days ago. Upon arrival he was found to have B/L SDH w/ 3mm L to R midline shift. Ct C-spine was unremarkable, pt in a stable state, no active intra-cranial bleeding confirmed on CTH 11/4 which  is  showing no acute intracranial hemorrhage, no substantial change in size or appearance of mixed density b/l SDH, left > right, c/to CTH 11/3/22. Vit B12 WNL @ 937 11/4. As per SICU RN, neuro remained unchanged except for the slight confused/forgetful episodes. Tremors resolved, Ataxic at time when doing finger to nose 11/4 otherwise normal. Pt now s/p B/L MMA embolization 11/7. Pt denies any new onset of numbness/tingling s/p Embolization.  - Neuro Q6h checks  - Tapered Gabapentin to 300mg  - Continue seizure prophylaxis  - Neurosurgery consultation, recs appreciated          - Plan for NSG follow-up in 1mo w/ repeat CTH  - SBP goal < 140 mmHg           - PRN Labetalol and Hydralazine  - HOB > 30   - CT cervical spine at OSH with no fracture --> cleared C spine   - SLP defer evaluation              - to re-consult if change in swallowing function or communication skills appears  - PT recs 11/6:              - TCU @ discharge              - Gait/imbalance disability, pt requiring aid w/ chair ans walker  - UDS  -Nutritionist recs 11/5:              - Continue B12, thiamine, folic acid. Continue MVI, as likely at risk for other B vitamin/micronutrient def.              - Electrolyte replacement per protocol as indicated                - Ordered Ensure BID               - Will continue to monitor PO intake/adequacy   - IV Thiamine 500mg TID for 2d -> IV Thiamine 250mg daily for 5d -> PO Thiamine 100mg daily  - Folate/B12 1mg daily  - MV 1 tab daily  - Addiction Medicine reccs pending       #Hyperbilirubinemia (elevated direct bilirubin)  #Hypoalbuminemia/hypoproteinemia, improving  #Elevated LFT's (AST > ALT), improving  #Cholelithiasis   #ETOH Hepatic steatosis   #Colonic diverticula   Mild jaundice revealed clinically. CT C/A/P on 11/2. RUQ U/S on 11/3. Suspect many of these hepatic lab abnormalities are secondary to alcohol consumption. Although the same CT revealed cholethiasis and diffuse fatty infiltration of  the liver. MELD-Na 19; MELD 16. In addition, Abdomen US notes likely gallstones and sludge w/o wall-thickening or biliary duct dilatation. Lipase 622, No focal pain or symptoms suspicious for pancreatitis, Ct abdomen showing normal pancreas, unlikely pancreatitis. Bilirubin level remained elevated at 7.5 11/5.  Hepatic functions are slowly improving 11/5. Viral hepatitis test all negative more likely suggesting EtoH acute Hepatic steatosis.  - Good oral intake    - Nutrition revised and recommended as stated above  - Daily LFT's  - Viral Hepatitis negative   - GI hepatology recs 11/4 :              - Chronic liver disease work up              - Monitor liver tests and INR daily               - Chemical dependency consult when he is more stable and receptive               - Outpatient hepatology follow up on discharge     #Anion Gap Metabolic Acidosis, resolved   Highest Anion Gap = 25 mmol/L (Ref range: 7-15) in last 2 days, will monitor and treat as appropriate. Query alcoholic ketoacidosis considered. Anion gap on 11/4 is 13  - mIVF  - Urine ketones negative  - Consideration for methanol, ethylene glycol ingestion as possible etiologies   - Addiction service        #Hyponatremia, improving  #Hypokalemia, resolved  #Hypocalcemia resolved  #Hypomagnesemia resolved  Labs notable for Na 131 at 11PM at OSH prior to transfer. Na 12 hours earlier was 138. Ddx includes (beer potomania, cerebral salt wasting, SIADH, liver disease, etc. Low K  btw 3.5-3.8. Lowest Ca = 7.5 mg/dL (Ref range: 8.5 - 10.1 mg/dL) in last 2 days, will monitor and replace as appropriate. Mg improving and stable w/ oral magnesium oxide. - Ionized calcium 4.1 c/w hypoalbuminemia and other lab findings, off MIVF, PO intake only, pt is doing well after procedure, good appetite.  - Repeat BMP  - Monitor lytes and replace as needed  - Serum osmolality, urine osmolality, urine sodium ordered         #Peripheral neuropathy  #Mild cerebellar  "atrophy  Suspect from longstanding alcohol use disorder. Stable.     #Paranoid schizophrenia   Daughter's concern over him having schizophrenia (wife had the condition). H/o declined employability due to paranoia and delusional and making threats against people Psychiatry was consulted 11/8, pt became defensive, uncooperative and dilusional about a number of things.   As per psy reccs : \" He would have to be more disorganized and ill in order to pursue court commitment and forced medications; this does not currently apply in his case.\"  - Continue thiamine protocol  - Zyprexa 5mg prn at bedtime  - No definitive medication regimen prescribed       #PLMD vs RLS  #REM w/o atonia but no dream enactment  #Rare Somnambulism   - Consider Gabapentin 100-300mg at bedtime once out of withdrawal window        #Mild LULI  - Continuous pulse ox  - Maintain O2 saturations greater than 92%  - Sleep study referral on discharge         #Hx of hypoglycemia, stable  Suspect secondary to poor oral intake in the past. Regular diet resumed, Nutritionist consulted  -Hgb A1c  -Monitor glucose levels        #Hypothyroidism  TSH 36, free T4 0.81. Unclear compliance of medication  - PTA Levothyroxine 175mcg daily  - Recheck TSH/free T4 in 2wks  - Consider Endocrine consult        #Coagulation Defect, stable  #Macrocytic Anemia  #Thrombocytopenia, improving  Ddx likely bone marrow suppression from chronic heavy EtoH abuse  INR = 1.24 (Ref range: 0.85 - 1.15) now downtrending.  PTT = 32 Seconds (Ref range: 22 - 38 Seconds), will monitor for bleeding. Presumably due to declining liver function. Lowest platelets = 67 (Ref range: 150-450) in last 2 days Hgb however improving steadily. 10.1 w/     - Daily CBC  - Hgb goal >7, plt goal >50k  -Transfuse to meet Hgb and plt goals     #Leukopenia  - No infection concern  - Daily CBC  - Follow temperature curve        Diet: Regular Diet Adult  Snacks/Supplements Adult: Ensure Enlive; Between " "Meals  NPO per Anesthesia Guidelines for Procedure/Surgery Except for: Meds    DVT Prophylaxis: SCD's  Triana Catheter: Not present  Fluids: NA  Central Lines: PRESENT  PICC 11/03/22 Triple Lumen Right-Site Assessment: WDL will be removed 11/9  Cardiac Monitoring: None  Code Status: Full Code         Disposition Plan     Expected Discharge Date: 11/08/2022      Destination: home  Discharge Comments: Barriers: pending MMA embolization procedure with Neuro IR today 11/7  Dispo: PT recommending TCU        The patient's care was discussed with the Attending Physician, Dr. Moreno, Patient and Primary team.    Simba Craig MD  Medicine Service, [facility] TEAM [address] [place] [address]  Securely message with the Vocera Web Console (learn more here)  Text page via Touch[employer] Paging/Directory   Please see signed in provider for up to date coverage information      Clinically Significant Risk Factors         # Hyponatremia: Lowest Na = 134 mmol/L (Ref range: 136-145) in last 2 days, will monitor as appropriate      # Hypoalbuminemia: Lowest albumin = 3.1 g/dL (Ref range: 3.5-5.2) at 11/8/2022  4:41 AM, will monitor as appropriate          # Overweight: Estimated body mass index is 25.3 kg/m  as calculated from the following:    Height as of this encounter: 1.854 m (6' 1\").    Weight as of this encounter: 87 kg (191 lb 12.8 oz).   # Severe Malnutrition: based on nutrition assessment        ______________________________________________________________________    Interval History   Pt seen at pm rounds bedside. ATIF, NAD. Pt lying in comfortably extremely happy to be more isolated from other patients and noise. He's looking forward for a jens sleep, endorsing exhaustion and fatigue from all the people who come and see him. As for today, pt denies new onset of pain/numbness/tingling, denies F/C/N/V and headache. He reports improvement in his peripheral UE sensations which he says it attributed to " the gabapentin. Although, he mentioned being tired of being called depressed and anxious. He admitted     We discussed briefly about the psychiatric and addiction consult and the next step which is waiting for a spot in TCU, pt is really inclined to collaborate and is willing to cooperate with us.      Data reviewed today: I reviewed all medications, new labs and imaging results over the last 24 hours. I personally reviewed no images or EKG's today.    Physical Exam   Vital Signs: Temp: 97.7  F (36.5  C) Temp src: Oral BP: 111/84 Pulse: 77   Resp: 27 SpO2: 97 % O2 Device: None (Room air) Oxygen Delivery: 2 LPM  Weight: 191 lbs 12.8 oz  General Appearance: A/Ox4  Respiratory: Un-labored on RA  Cardiovascular: irregular rhythm, BP WNL  GI: BM +, S/ND/NT  Skin: Mild Jaundice, no new rashes and lesions  Neuro: Tremors at baseline in BUE. Baseline neuropathy.  Sclera jaundice in color    Data   Recent Labs   Lab 11/08/22  0441 11/07/22  1531 11/07/22  0427 11/06/22  1147 11/04/22  0429 11/04/22  0425   WBC 4.8  --  4.3 3.5*   < > 2.7*   HGB 9.5*  --  10.9* 9.2*   < > 10.1*   *  --  107* 109*   < > 104*     --  145* 124*   < > 126*   INR 1.15  --  1.11 1.21*   < >  --    *  --  135* 135*   < > 133*   POTASSIUM 3.8  --  4.1 4.3   < > 3.7   CHLORIDE 101  --  101 101   < > 98   CO2 20*  --  20* 19*   < > 22   BUN 3.5*  --  4.8* 5.7*   < > 3.8*   CR 0.54*  --  0.68 0.71   < > 0.56*   ANIONGAP 13  --  14 15   < > 13   ABDULKADIR 8.0*  --  9.1 9.1   < > 7.9*   GLC 97 130* 97 100*   < > 131*   ALBUMIN 3.1*  --  3.7 3.3*   < > 3.4*   PROTTOTAL 5.7*  --  6.5 5.6*   < > 5.5*   BILITOTAL 4.4*  --  5.2* 4.6*   < > 7.6*   ALKPHOS 197*  --  246* 248*   < > 190*   ALT 59*  --  63* 60*   < > 66*   *  --  156* 179*   < > 295*   LIPASE  --   --   --   --   --  622*    < > = values in this interval not displayed.     No results found for this or any previous visit (from the past 24 hour(s)).  Medications     sodium  chloride Stopped (11/08/22 0946)       cyanocobalamin  100 mcg Oral Daily     folic acid  1 mg Oral Daily     [START ON 11/9/2022] gabapentin  300 mg Oral Q8H     levothyroxine  175 mcg Oral or Feeding Tube Daily     multivitamin w/minerals  1 tablet Oral Daily     [START ON 11/9/2022] naltrexone  25 mg Oral Daily    Followed by     [START ON 11/11/2022] naltrexone  50 mg Oral Daily     OLANZapine zydis  5 mg Oral At Bedtime     thiamine  250 mg Intravenous Daily    Followed by     [START ON 11/11/2022] thiamine  100 mg Oral or Feeding Tube Daily

## 2022-11-09 ENCOUNTER — APPOINTMENT (OUTPATIENT)
Dept: PHYSICAL THERAPY | Facility: CLINIC | Age: 52
End: 2022-11-09
Attending: SURGERY
Payer: COMMERCIAL

## 2022-11-09 LAB
ALBUMIN SERPL BCG-MCNC: 3.2 G/DL (ref 3.5–5.2)
ALP SERPL-CCNC: 225 U/L (ref 40–129)
ALT SERPL W P-5'-P-CCNC: 61 U/L (ref 10–50)
ANION GAP SERPL CALCULATED.3IONS-SCNC: 13 MMOL/L (ref 7–15)
AST SERPL W P-5'-P-CCNC: 119 U/L (ref 10–50)
BILIRUB SERPL-MCNC: 3.4 MG/DL
BUN SERPL-MCNC: 5.1 MG/DL (ref 6–20)
CALCIUM SERPL-MCNC: 8.8 MG/DL (ref 8.6–10)
CHLORIDE SERPL-SCNC: 101 MMOL/L (ref 98–107)
CREAT SERPL-MCNC: 0.55 MG/DL (ref 0.67–1.17)
DEPRECATED HCO3 PLAS-SCNC: 20 MMOL/L (ref 22–29)
ERYTHROCYTE [DISTWIDTH] IN BLOOD BY AUTOMATED COUNT: 13.2 % (ref 10–15)
GFR SERPL CREATININE-BSD FRML MDRD: >90 ML/MIN/1.73M2
GLUCOSE SERPL-MCNC: 96 MG/DL (ref 70–99)
HCT VFR BLD AUTO: 30 % (ref 40–53)
HGB BLD-MCNC: 9.7 G/DL (ref 13.3–17.7)
INR PPP: 1.11 (ref 0.85–1.15)
MAGNESIUM SERPL-MCNC: 1.7 MG/DL (ref 1.7–2.3)
MCH RBC QN AUTO: 35.9 PG (ref 26.5–33)
MCHC RBC AUTO-ENTMCNC: 32.3 G/DL (ref 31.5–36.5)
MCV RBC AUTO: 111 FL (ref 78–100)
PLATELET # BLD AUTO: 200 10E3/UL (ref 150–450)
POTASSIUM SERPL-SCNC: 3.7 MMOL/L (ref 3.4–5.3)
PROT SERPL-MCNC: 5.9 G/DL (ref 6.4–8.3)
RBC # BLD AUTO: 2.7 10E6/UL (ref 4.4–5.9)
SODIUM SERPL-SCNC: 134 MMOL/L (ref 136–145)
WBC # BLD AUTO: 4.4 10E3/UL (ref 4–11)

## 2022-11-09 PROCEDURE — 250N000013 HC RX MED GY IP 250 OP 250 PS 637

## 2022-11-09 PROCEDURE — 250N000011 HC RX IP 250 OP 636: Performed by: STUDENT IN AN ORGANIZED HEALTH CARE EDUCATION/TRAINING PROGRAM

## 2022-11-09 PROCEDURE — 80053 COMPREHEN METABOLIC PANEL: CPT | Performed by: STUDENT IN AN ORGANIZED HEALTH CARE EDUCATION/TRAINING PROGRAM

## 2022-11-09 PROCEDURE — 120N000003 HC R&B IMCU UMMC

## 2022-11-09 PROCEDURE — 250N000013 HC RX MED GY IP 250 OP 250 PS 637: Performed by: STUDENT IN AN ORGANIZED HEALTH CARE EDUCATION/TRAINING PROGRAM

## 2022-11-09 PROCEDURE — 99207 PR APP CREDIT; MD BILLING SHARED VISIT: CPT | Performed by: STUDENT IN AN ORGANIZED HEALTH CARE EDUCATION/TRAINING PROGRAM

## 2022-11-09 PROCEDURE — 36415 COLL VENOUS BLD VENIPUNCTURE: CPT | Performed by: STUDENT IN AN ORGANIZED HEALTH CARE EDUCATION/TRAINING PROGRAM

## 2022-11-09 PROCEDURE — 999N000007 HC SITE CHECK

## 2022-11-09 PROCEDURE — 97116 GAIT TRAINING THERAPY: CPT | Mod: GP

## 2022-11-09 PROCEDURE — 83735 ASSAY OF MAGNESIUM: CPT | Performed by: STUDENT IN AN ORGANIZED HEALTH CARE EDUCATION/TRAINING PROGRAM

## 2022-11-09 PROCEDURE — 85014 HEMATOCRIT: CPT | Performed by: STUDENT IN AN ORGANIZED HEALTH CARE EDUCATION/TRAINING PROGRAM

## 2022-11-09 PROCEDURE — 258N000003 HC RX IP 258 OP 636: Performed by: STUDENT IN AN ORGANIZED HEALTH CARE EDUCATION/TRAINING PROGRAM

## 2022-11-09 PROCEDURE — 97530 THERAPEUTIC ACTIVITIES: CPT | Mod: GP

## 2022-11-09 PROCEDURE — 250N000013 HC RX MED GY IP 250 OP 250 PS 637: Performed by: INTERNAL MEDICINE

## 2022-11-09 PROCEDURE — 99232 SBSQ HOSP IP/OBS MODERATE 35: CPT | Mod: GC | Performed by: INTERNAL MEDICINE

## 2022-11-09 PROCEDURE — 85610 PROTHROMBIN TIME: CPT

## 2022-11-09 RX ORDER — HYDROXYZINE HYDROCHLORIDE 25 MG/1
25 TABLET, FILM COATED ORAL 3 TIMES DAILY PRN
Status: DISCONTINUED | OUTPATIENT
Start: 2022-11-09 | End: 2022-11-16 | Stop reason: HOSPADM

## 2022-11-09 RX ORDER — ACETAMINOPHEN 325 MG/1
650 TABLET ORAL EVERY 6 HOURS PRN
Status: DISCONTINUED | OUTPATIENT
Start: 2022-11-09 | End: 2022-11-16 | Stop reason: HOSPADM

## 2022-11-09 RX ADMIN — HYDROXYZINE HYDROCHLORIDE 25 MG: 25 TABLET, FILM COATED ORAL at 12:33

## 2022-11-09 RX ADMIN — HYDROXYZINE HYDROCHLORIDE 25 MG: 25 TABLET, FILM COATED ORAL at 21:15

## 2022-11-09 RX ADMIN — FOLIC ACID 1 MG: 1 TABLET ORAL at 08:11

## 2022-11-09 RX ADMIN — THIAMINE HYDROCHLORIDE 250 MG: 100 INJECTION, SOLUTION INTRAMUSCULAR; INTRAVENOUS at 08:36

## 2022-11-09 RX ADMIN — GABAPENTIN 300 MG: 300 CAPSULE ORAL at 12:33

## 2022-11-09 RX ADMIN — GABAPENTIN 300 MG: 300 CAPSULE ORAL at 21:15

## 2022-11-09 RX ADMIN — OLANZAPINE 5 MG: 5 TABLET, ORALLY DISINTEGRATING ORAL at 21:15

## 2022-11-09 RX ADMIN — Medication 25 MG: at 08:10

## 2022-11-09 RX ADMIN — Medication 5 MG: at 21:15

## 2022-11-09 RX ADMIN — Medication 1 TABLET: at 08:11

## 2022-11-09 RX ADMIN — VITAM B12 100 MCG: 100 TAB at 08:11

## 2022-11-09 RX ADMIN — LEVOTHYROXINE SODIUM 175 MCG: 0.15 TABLET ORAL at 08:11

## 2022-11-09 ASSESSMENT — ACTIVITIES OF DAILY LIVING (ADL)
ADLS_ACUITY_SCORE: 28

## 2022-11-09 NOTE — PROGRESS NOTES
HEPATOLOGY BRIEF NOTE       Pt seen by Hepatology earlier in the hospital course for elevated liver tests, clinical picture was consistent with alcohol related liver disease. Ultrasound showed evidence of gallstones but there was no clinical (no abd pain fever or RUQ tenderness) and no radiologic evidence of cholecystitis.       Reccomendations    Monitor liver tests as an inpatient (24-48 hourly)    Will need establishment of care with hepatology at the time of discharge. He can follow with local GI/Hepatology at this point of time. If continued worsening of liver disease or concern for need for liver transplant, he can be referred to [facility] transplant Hepatology clinics at that time.

## 2022-11-09 NOTE — PROGRESS NOTES
Neuro: A&Ox4. Impulsive, irritable. Needs bed alarm for safety. No new neuro deficits.  Cardiac: No tele, pt refused. VSS.      Respiratory: Sating >90 on RA.  GI/: Adequate urine output. No BM this shift.  Diet/appetite: Tolerating regular diet. Eating well.  Activity:  Assist of 1, very unsteady.  Pain: At acceptable level on current regimen.   Skin: No new deficits noted. Scattered bruises and abrasions on BUE.   LDA's: PICC SL     Plan: Continue with POC. Notify primary team with changes. Plan for TCU.

## 2022-11-09 NOTE — PROGRESS NOTES
Neuro: A&Ox4. Impulsive, irritable. Needs bed alarm for safety. No new neuro deficits, pt refused neuro assessment at 0200.   Cardiac: No tele, pt refused. VSS.   Respiratory: Sating >90 on RA.  GI/: Adequate urine output. No BM this shift.  Diet/appetite: Tolerating regular diet. Eating well.  Activity:  Assist of 1, very unsteady.  Pain: At acceptable level on current regimen.   Skin: No new deficits noted. Scattered bruises and abrasions on BUE.   LDA's: PICC SL- to be pulled in AM.     Plan: Continue with POC. Notify primary team with changes.

## 2022-11-09 NOTE — PROGRESS NOTES
[facility]    Progress Note - Medicine Service, MAROON TEAM 4       Date of Admission:  11/4/2022    Assessment & Plan   Getachew Barcenas is a 52 year old man with a past medical history of alcohol use disorder, thiamine deficiency, frequent falls, peripheral neuropathy, hypothyroidism, former tobacco use, mild LULI, somnambulism, RLS, diverticulitis, and mood disorder who presented as transfer to [facility] on 11/4/2022 for higher level of cares in the setting of new diagnosis of acute on chronic L SDH and subacute R SDH. Patient is hemodynamically stable and has minimal neurological deficits at this time. He does have mild dysmetria, horizontal nystagmus, and peripheral neuropathy, which I suspect are chronic in the setting of longstanding alcohol use disorder. He also has mild right hand weakness, which I suspect is secondary to subdural hematoma. Patient currently stable from neurological standpoint. Pt is now status post B/L MMA embolization w/out complications, now showing signs of paranoia.     Today's changes:  - Transferred to   - Stop CIWA protocol/PRN valium and clonidine  - Continue Thiamine protocol  - Appreciate Psychiatry/addiction   - Psychiatry consulted no definitive medication mgmt  - Addiction goal of sobriety w/ medication  - Discuss w/ pt tmrw about Hep A/B immu  - Zyprexa 5mg   - Tapered Gabapentin to 300mg  - Expected discharge pending TCU availability     #Acute on chronic left subdural hematoma, chronic bilateral subdural hematoma, s/p B/L MMA Emb.  #Brain compression  #3 mm rightward midline shift, stable  #Alcohol use disorder  #Falls secondary to above  #Concern for alcohol withdrawal   #Thiamine Deficiency likely secondary to EtOH use  #Concern for Wernicke-Korsakoff Syndrome  Long history of EtOH abuse w/ short sobriety episode. Last fall happened a few days ago. Upon arrival he was found to have B/L SDH w/ 3mm L to R midline shift. Ct C-spine  was unremarkable, pt in a stable state, no active intra-cranial bleeding confirmed on CTH 11/4 which is  showing no acute intracranial hemorrhage, no substantial change in size or appearance of mixed density b/l SDH, left > right, c/to CTH 11/3/22. Vit B12 WNL @ 937 11/4. As per SICU RN, neuro remained unchanged except for the slight confused/forgetful episodes. Tremors resolved, Ataxic at time when doing finger to nose 11/4 otherwise normal. Pt now s/p B/L MMA embolization 11/7. Pt denies any new onset of numbness/tingling s/p Embolization.  At bedside rounds in am, pt seemed confabulatory vs mild hallucinations w/ construction work going on until 10pm, could be his normal perspective of the reality, however we thought it was worth mentioning.  - Neuro Q6h checks  - Tapered Gabapentin to 300mg  - Continue seizure prophylaxis  - Neurosurgery consultation, recs appreciated          - Plan for NSG follow-up in 1mo w/ repeat CTH  - SBP goal < 140 mmHg           - PRN Labetalol and Hydralazine  - HOB > 30   - CT cervical spine at OSH with no fracture --> cleared C spine   - SLP defer evaluation              - to re-consult if change in swallowing function or communication skills appears  - PT recs 11/6:              - TCU @ discharge              - Gait/imbalance disability, pt requiring aid w/ chair ans walker  - UDS  -Nutritionist recs 11/5:              - Continue B12, thiamine, folic acid. Continue MVI, as likely at risk for other B vitamin/micronutrient def.              - Electrolyte replacement per protocol as indicated                - Ordered Ensure BID               - Will continue to monitor PO intake/adequacy   - Addiction 11/9:    - Medication mgmt for sobriety w/ Acamprosate vs naltrexone, pt prefer naltrexone since q24h.   - Naltrexone can elevate LFT so began w/ 1/2 dose 25mg then taper   - continue CIWA w/ bunny and consider rx @ discharge   - IV Thiamine 500mg TID for 2d -> IV Thiamine 250mg daily for  5d -> PO Thiamine 100mg daily  - Folate/B12 1mg daily  - MV 1 tab daily  - Addiction Medicine reccs pending        #Hyperbilirubinemia (elevated direct bilirubin)  #Hypoalbuminemia/hypoproteinemia, improving  #Elevated LFT's (AST > ALT), improving  #Cholelithiasis, resolved  #ETOH Hepatic steatosis   #Colonic diverticula   Mild jaundice revealed clinically. CT C/A/P on 11/2. RUQ U/S on 11/3. Suspect many of these hepatic lab abnormalities are secondary to alcohol consumption. Although the same CT revealed cholethiasis and diffuse fatty infiltration of the liver. MELD-Na 19; MELD 16. In addition, Abdomen US notes likely gallstones and sludge w/o wall-thickening or biliary duct dilatation but no clinical (no abd pain fever or RUQ tenderness) and no radiologic evidence of cholecystitis. Lipase 622, No focal pain or symptoms suspicious for pancreatitis, Ct abdomen showing normal pancreas, unlikely pancreatitis. Bilirubin level remained elevated at 7.5 11/5.  Hepatic functions are slowly improving 11/5. Viral hepatitis test all negative more likely suggesting EtoH acute Hepatic steatosis.  - Good oral intake    - Nutrition revised and recommended as stated above  - Daily LFT's  - Viral Hepatitis negative   - GI hepatology recs 11/9 updates :              - monitor LFT's   - F/U as OP w/ local hepatology/GI  - Addiction reccs 11/9:   - Recommend immu for HepA/B prior to discharge      #Anion Gap Metabolic Acidosis, resolved   Highest Anion Gap = 25 mmol/L (Ref range: 7-15) in last 2 days, will monitor and treat as appropriate. Query alcoholic ketoacidosis considered. Anion gap on 11/4 is 13  - mIVF  - Urine ketones negative  - Consideration for methanol, ethylene glycol ingestion as possible etiologies   - Addiction service        #Hyponatremia, improving  #Hypokalemia, resolved  #Hypocalcemia resolved  #Hypomagnesemia resolved  Labs notable for Na 131 at 11PM at OSH prior to transfer. Na 12 hours earlier was 138. Ddx  "includes (beer potomania, cerebral salt wasting, SIADH, liver disease, etc. Low K  btw 3.5-3.8. Lowest Ca = 7.5 mg/dL (Ref range: 8.5 - 10.1 mg/dL) in last 2 days, will monitor and replace as appropriate. Mg improving and stable w/ oral magnesium oxide. - Ionized calcium 4.1 c/w hypoalbuminemia and other lab findings, off MIVF, PO intake only, pt is doing well after procedure, good appetite.  - Repeat BMP  - Monitor lytes and replace as needed  - Serum osmolality, urine osmolality, urine sodium ordered         #Peripheral neuropathy  #Mild cerebellar atrophy  Suspect from longstanding alcohol use disorder. Stable.      #Paranoid schizophrenia   Daughter's concern over him having schizophrenia (wife had the condition). H/o declined employability due to paranoia and delusional and making threats against people Psychiatry was consulted 11/8, pt became defensive, uncooperative and dilusional about a number of things.   As per psy reccs : \" He would have to be more disorganized and ill in order to pursue court commitment and forced medications; this does not currently apply in his case.\"  - Continue thiamine protocol  - Zyprexa 5mg prn at bedtime  - No definitive medication regimen prescribed        #PLMD vs RLS  #REM w/o atonia but no dream enactment  #Rare Somnambulism   - Consider Gabapentin 100-300mg at bedtime once out of withdrawal window        #Mild LULI  - Continuous pulse ox  - Maintain O2 saturations greater than 92%  - Sleep study referral on discharge         #Hx of hypoglycemia, stable  Suspect secondary to poor oral intake in the past. Regular diet resumed, Nutritionist consulted  -Hgb A1c  -Monitor glucose levels        #Hypothyroidism  TSH 36, free T4 0.81. Unclear compliance of medication  - PTA Levothyroxine 175mcg daily  - Recheck TSH/free T4 in 2wks  - Consider Endocrine consult        #Coagulation Defect, stable  #Macrocytic Anemia  #Thrombocytopenia, improving  Ddx likely bone marrow suppression " "from chronic heavy EtoH abuse  INR = 1.24 (Ref range: 0.85 - 1.15) now downtrending.  PTT = 32 Seconds (Ref range: 22 - 38 Seconds), will monitor for bleeding. Presumably due to declining liver function. Lowest platelets = 67 (Ref range: 150-450) in last 2 days Hgb however improving steadily. 10.1 w/     - Daily CBC  - Hgb goal >7, plt goal >50k  -Transfuse to meet Hgb and plt goals     #Leukopenia  - No infection concern  - Daily CBC  - Follow temperature curve        Diet: Regular Diet Adult  Snacks/Supplements Adult: Ensure Enlive; Between Meals  NPO per Anesthesia Guidelines for Procedure/Surgery Except for: Meds    DVT Prophylaxis: SCD's  Triana Catheter: Not present  Fluids: NA  Central Lines: PRESENT  PICC 11/03/22 Triple Lumen Right-Site Assessment: WDL will be removed 11/9  Cardiac Monitoring: None  Code Status: Full Code         Disposition Plan      Expected Discharge Date: 11/09/2022      Destination: home  Discharge Comments: Medically Ready to discharge to TCU  Dispo:  TCU        The patient's care was discussed with the Attending Physician, Dr. Moreno and Patient.    Simba Craig MD  Medicine Service, [address]  Securely message with the Vocera Web Console (learn more here)  Text page via [employer] Paging/Directory   Please see signed in provider for up to date coverage information      Clinically Significant Risk Factors         # Hyponatremia: Lowest Na = 134 mmol/L (Ref range: 136-145) in last 2 days, will monitor as appropriate      # Hypoalbuminemia: Lowest albumin = 3.1 g/dL (Ref range: 3.5-5.2) at 11/8/2022  4:41 AM, will monitor as appropriate          # Overweight: Estimated body mass index is 25.3 kg/m  as calculated from the following:    Height as of this encounter: 1.854 m (6' 1\").    Weight as of this encounter: 87 kg (191 lb 12.8 oz).   # Severe Malnutrition: based on nutrition assessment    "     ______________________________________________________________________    Interval History      Pt complained of noise and construction, through the evening until 10pm. It prevented him from sleep, then he was visited by the nurse for Q6h neuro checks, which he became angry and mentioned it is the 14th day he doesn't sleep. Doesn't remember if he received his dose of zyprexa. However, still incline of continuing with our care and is willing to start PT asap. Denies new onset of neuropathy and numbness. Denies F/C/V/N and headache.     Data reviewed today: I reviewed all medications, new labs and imaging results over the last 24 hours. I personally reviewed no images or EKG's today.    Physical Exam   Vital Signs: Temp: 98.1  F (36.7  C) Temp src: Oral BP: 113/76 Pulse: 76   Resp: 16 SpO2: 98 % O2 Device: None (Room air)    Weight: 191 lbs 12.8 oz  General Appearance:  A/Ox4  Respiratory: Un-labored on RA  Cardiovascular: irregular rhythm, BP WNL  GI: BM +, S/ND/NT  Skin: Mild Jaundice, no new rashes and lesions  Neuro: Tremors at baseline in BUE. Baseline neuropathy.  Sclera jaundice in color     Data   Recent Labs   Lab 11/09/22  0623 11/08/22  0441 11/07/22  1531 11/07/22  0427 11/04/22  0429 11/04/22  0425   WBC 4.4 4.8  --  4.3   < > 2.7*   HGB 9.7* 9.5*  --  10.9*   < > 10.1*   * 107*  --  107*   < > 104*    154  --  145*   < > 126*   INR 1.11 1.15  --  1.11   < >  --    * 134*  --  135*   < > 133*   POTASSIUM 3.7 3.8  --  4.1   < > 3.7   CHLORIDE 101 101  --  101   < > 98   CO2 20* 20*  --  20*   < > 22   BUN 5.1* 3.5*  --  4.8*   < > 3.8*   CR 0.55* 0.54*  --  0.68   < > 0.56*   ANIONGAP 13 13  --  14   < > 13   ABDULKADIR 8.8 8.0*  --  9.1   < > 7.9*   GLC 96 97 130* 97   < > 131*   ALBUMIN 3.2* 3.1*  --  3.7   < > 3.4*   PROTTOTAL 5.9* 5.7*  --  6.5   < > 5.5*   BILITOTAL 3.4* 4.4*  --  5.2*   < > 7.6*   ALKPHOS 225* 197*  --  246*   < > 190*   ALT 61* 59*  --  63*   < > 66*   * 129*   --  156*   < > 295*   LIPASE  --   --   --   --   --  622*    < > = values in this interval not displayed.     No results found for this or any previous visit (from the past 24 hour(s)).  Medications     sodium chloride Stopped (11/08/22 0946)       cyanocobalamin  100 mcg Oral Daily     folic acid  1 mg Oral Daily     gabapentin  300 mg Oral Q8H     levothyroxine  175 mcg Oral or Feeding Tube Daily     multivitamin w/minerals  1 tablet Oral Daily     naltrexone  25 mg Oral Daily    Followed by     [START ON 11/11/2022] naltrexone  50 mg Oral Daily     OLANZapine zydis  5 mg Oral At Bedtime     thiamine  250 mg Intravenous Daily    Followed by     [START ON 11/11/2022] thiamine  100 mg Oral or Feeding Tube Daily

## 2022-11-09 NOTE — PLAN OF CARE
"  Problem: Plan of Care - These are the overarching goals to be used throughout the patient stay.    Goal: Plan of Care Review  Description: The Plan of Care Review/Shift note should be completed every shift.  The Outcome Evaluation is a brief statement about your assessment that the patient is improving, declining, or no change.  This information will be displayed automatically on your shift note.  Outcome: Progressing  Flowsheets (Taken 11/9/2022 1746)  Plan of Care Reviewed With: patient  Overall Patient Progress: improving  Goal: Patient-Specific Goal (Individualized)  Description: You can add care plan individualizations to a care plan. Examples of Individualization might be:  \"Parent requests to be called daily at 9am for status\", \"I have a hard time hearing out of my right ear\", or \"Do not touch me to wake me up as it startles me\".  Outcome: Progressing  Goal: Absence of Hospital-Acquired Illness or Injury  Outcome: Progressing  Intervention: Identify and Manage Fall Risk  Recent Flowsheet Documentation  Taken 11/9/2022 1600 by Xavier Avitia, RN  Safety Promotion/Fall Prevention:   activity supervised   assistive device/personal items within reach   bed alarm on   clutter free environment maintained   fall prevention program maintained   lighting adjusted   increase visualization of patient   nonskid shoes/slippers when out of bed   patient and family education   room organization consistent   safety round/check completed  Taken 11/9/2022 1200 by Xavier Avitia, RN  Safety Promotion/Fall Prevention:   activity supervised   assistive device/personal items within reach   bed alarm on   clutter free environment maintained   fall prevention program maintained   lighting adjusted   increase visualization of patient   nonskid shoes/slippers when out of bed   patient and family education   room organization consistent   safety round/check completed  Taken 11/9/2022 0800 by Xavier Avitia, RN  Safety " Promotion/Fall Prevention:   activity supervised   assistive device/personal items within reach   bed alarm on   clutter free environment maintained   fall prevention program maintained   lighting adjusted   increase visualization of patient   nonskid shoes/slippers when out of bed   patient and family education   room organization consistent   safety round/check completed  Intervention: Prevent Skin Injury  Recent Flowsheet Documentation  Taken 11/9/2022 1600 by Xavier Avitia RN  Body Position: position changed independently  Taken 11/9/2022 1200 by Xavier Avitia RN  Body Position: position changed independently  Taken 11/9/2022 0800 by Xavier Avitia RN  Body Position: position changed independently  Goal: Optimal Comfort and Wellbeing  Outcome: Progressing  Intervention: Provide Person-Centered Care  Recent Flowsheet Documentation  Taken 11/9/2022 1600 by Xavier Avitia RN  Trust Relationship/Rapport:   care explained   choices provided  Taken 11/9/2022 1200 by Xavier Avitia RN  Trust Relationship/Rapport:   care explained   choices provided  Taken 11/9/2022 0800 by Xavier Avitia RN  Trust Relationship/Rapport:   care explained   choices provided  Goal: Readiness for Transition of Care  Outcome: Progressing     Problem: Risk for Delirium  Goal: Optimal Coping  Outcome: Progressing  Goal: Improved Behavioral Control  Outcome: Progressing  Intervention: Minimize Safety Risk  Recent Flowsheet Documentation  Taken 11/9/2022 1600 by Xavier Avitia RN  Enhanced Safety Measures: bed alarm set  Trust Relationship/Rapport:   care explained   choices provided  Taken 11/9/2022 1200 by Xavier Avitia RN  Enhanced Safety Measures: bed alarm set  Trust Relationship/Rapport:   care explained   choices provided  Taken 11/9/2022 0800 by Xavier Avitia RN  Enhanced Safety Measures: bed alarm set  Trust Relationship/Rapport:   care explained   choices provided  Goal: Improved Attention and Thought  Clarity  Outcome: Progressing  Goal: Improved Sleep  Outcome: Progressing     Problem: Oral Intake Inadequate  Goal: Improved Oral Intake  Outcome: Progressing     Problem: Stroke, Hemorrhagic  Goal: Optimal Coping  Outcome: Progressing  Goal: Effective Bowel Elimination  Outcome: Progressing  Goal: Optimal Cerebral Tissue Perfusion  Outcome: Progressing  Goal: Optimal Cognitive Function  Outcome: Progressing  Goal: Effective Communication Skills  Outcome: Progressing  Goal: Optimal Functional Ability  Outcome: Progressing  Goal: Acceptable Pain Control  Outcome: Progressing  Goal: Effective Oxygenation and Ventilation  Outcome: Progressing  Intervention: Optimize Oxygenation and Ventilation  Recent Flowsheet Documentation  Taken 11/9/2022 1600 by Xavier Avitia RN  Head of Bed (HOB) Positioning: HOB at 30 degrees  Taken 11/9/2022 1200 by Xavier Avitia RN  Head of Bed ([facility]) Positioning: HOB at 30 degrees  Taken 11/9/2022 0800 by Xavier Avitia RN  Head of Bed ([facility]) Positioning: HOB at 30 degrees  Goal: Improved Sensorimotor Function  Outcome: Progressing  Intervention: Optimize Range of Motion, Motor Control and Function  Recent Flowsheet Documentation  Taken 11/9/2022 1600 by Xavier Avitia RN  Positioning/Transfer Devices:   pillows   in use  Taken 11/9/2022 1200 by Xavier Avitia RN  Positioning/Transfer Devices:   pillows   in use  Taken 11/9/2022 0800 by Xavier Avitia RN  Positioning/Transfer Devices:   pillows   in use  Goal: Optimal Eating and Swallowing Without Aspiration  Outcome: Progressing  Goal: Effective Urinary Elimination  Outcome: Progressing   Goal Outcome Evaluation:      Plan of Care Reviewed With: patient    Overall Patient Progress: improvingOverall Patient Progress: improving

## 2022-11-09 NOTE — PROGRESS NOTES
"Care Management Follow Up    Length of Stay (days): 5    Expected Discharge Date: 11/09/2022     Concerns to be Addressed: discharge planning, TCU placement       Patient plan of care discussed at interdisciplinary rounds: Yes    Anticipated Discharge Disposition: transitional care      Anticipated Discharge Services: TBD   Anticipated Discharge DME: TBD     Patient/family educated on Medicare website which has current facility and service quality ratings: yes  Education Provided on the Discharge Plan: yes  Patient/Family in Agreement with the Plan: yes    Referrals Placed by CM/SW:      Pending Referrals 11/9:    [facility]  [address] [place] [address]  [phone]    [facility]  [address] [place] [address]  [phone]    [place] at [address] [address]  PH: [phone]    [facility] [address]  [address] [place] [address]  Adm: [phone]  P: [phone]  FAX: [phone]    [facility]  [address] [place] [address]  [phone]  Admissions: [phone]    [facility]  [address] [place] [address]  P: [phone]  F: [phone]    [facility]  [address] [place] [address]  [phone]    [facility]   [address] [place] [address]  PH: [phone]    Declined/Discontinued:      [facility] - declined due to no appropriate beds     [facility] - declined in [place] [facility] due to \"acuity too high\"    [facility] - declined due to history of recent alcohol use     [facility] - not able to meet needs due to history of alcohol use     Private pay costs discussed: Not applicable    Additional Information:    SW met with pt and pt's daughter in his room at bedside. SW provided pt and daughter with " medicare.gov list of TCU choices. Pt's daughter would like for pt to discharge as soon as possible. SW made nine initial TCU referrals, with additional options available.     Update at 3:15PM: SW spoke with pt's daughter James (PH: [phone]) who had requested a phone call to see if any TCU facilities had accepted him yet. SW let her know that three facilities have declined so far. SW will continue to update pt and James with any decisions.     SW to continue to follow for discharge planning and TCU placement.     HEATHER Brady, LGSW   6B    Phone: [phone]  Pager: [phone]

## 2022-11-09 NOTE — PROGRESS NOTES
[facility]     Addiction Progress Note - Addiction Service        Date of Admission:  11/4/2022    Assessment & Plan       Getachew Barcenas is a 53 yo with a long history of alcohol dependence, peripheral neuropathy, and recently diagnosed schizophrenia, admitted to the hospital after a fall, and found to have a chronic SDH.       # Severe Alcohol Use Disorder  # Alcohol Hepatitis  # Subdural Hematoma secondary to fall  # peripheral neuropathy/instability secondary to chronic alcohol use  -We discussed different options to maintain goal of sobriety, including medications to manage cravings, including acamprosate and naltrexone.  He would like naltrexone since its once a day.  Because naltrexone it can often cause transient elevations in liver enzymes, we will start at half dose, 25 mg for a few days, and then increase to 50 mg daily.  -Naltrexone as above, please order on discharge.  -Once disposition known, please let our team know, and if he is going home instead of transitional care unit, we can discuss options for chemical dependency treatment if he is interested.  - Would recommend offering immunizations to Hep A and B prior to discharge (future infections could lead to decompensation of his liver disease)     For alcohol withdrawal:  -Continue CIWA;  consider adding on gabapentin, 600 mg TID.  Consider prescribing at discharge if this helps with underlying anxiety.     # Mental health:Appreciate input by Psychiatry     # Immunization review: He is not immune to Hep A or B.  Would recommend offering immunizations prior to discharge (future infections could lead to decompensation of his liver disease)     # Peer Support:   -Our peer  will meet the patient if agreeable and still hospitalized on Thursday, to provide additional outpatient resources  -To contact Serene Peer  from [facility] ([facility]): call or text: [phone]     # Addiction  :   Our addiction social worker Manohar Sewell can be contacted on her pager [phone] or texted/called at [phone]        # Linkage to Care:   Pending disposition.  If he is discharging home, would have a low threshold to place a referral on discharge to outpatient addiction medicine (mental health ).     The patient's care was discussed with the Bedside Nurse and Patient.      The patient's care was discussed with the Bedside Nurse and Patient.    Time Spent on this Encounter   I spent 25-34 minutes on the unit/floor managing the care of Getachew Barcenas. Over 50% of my time was spent on the following:   - Counseling the patient and/or family regarding: recommended follow-up, medical compliance and prevention of disease    Jesus Loyola,    Addiction Service   [facility]   [phone]  Contact information available via [facility] Paging/Directory    ChAT team (Addiction Consult Team): Coverage Monday-Friday 8-4pm    Provider (Pager)  (Pager)   Mon Dr. Tr Anderson [phone] Manohar Sewell [phone]   Tues Dr. Tr Anderson [phone] Manohar Sewell [phone]   Wed Dr. Tr Anderson [phone] None   Thurs Dr. Edy Tom [phone] Manohar Sewell [phone]   Fri Dr. Getachew Herr [phone] Manohar Sewell [phone]   Sat-[facility] Team- Refer to [facility].  For urgent needs, please place a  consult for psychiatry. None     ______________________________________________________________________    Interval History     Patient did not sleep well overnight as he believed there was construction underneath his room all night followed by a man playing the [employer]. Besides the no sleep he is doing well. He is having no cravings for alcohol. We discussed that he was started on naltrexone and that he should continue it when he leaves. We discussed plans for after discharge including after his TCU and resources. His goal continues to be abstinence.    ROS:  CV, Pulm, GI and  assessed. Pertinent positives as above, otherwise  negative.     Data reviewed today: I reviewed all medications, new labs and imaging results over the last 24 hours. I personally reviewed no images or EKG's today.    Physical Exam   Vital Signs: Temp: 99.5  F (37.5  C) Temp src: Oral BP: 94/63 Pulse: 108   Resp: 16 SpO2: 96 % O2 Device: None (Room air)    Weight: 185 lbs 6.51 oz  General Appearance: In bed, no distress  Respiratory: Breathing well on room air  Skin: Old bruises noted, mild scleral icterus   Other: Non focal neuro exam      Due to regulation of Title 42 of the Code of Federal Regulations (CFR) Part 2: Confidentiality laws apply to this note and the information wherein.  Thus, this note cannot be copy and pasted into any other health care staff's note nor can it be included in general medical records sent to ANY outside agency without the patient's written consent.

## 2022-11-10 ENCOUNTER — APPOINTMENT (OUTPATIENT)
Dept: PHYSICAL THERAPY | Facility: CLINIC | Age: 52
End: 2022-11-10
Attending: SURGERY
Payer: COMMERCIAL

## 2022-11-10 LAB
ALBUMIN SERPL BCG-MCNC: 3.2 G/DL (ref 3.5–5.2)
ALP SERPL-CCNC: 215 U/L (ref 40–129)
ALT SERPL W P-5'-P-CCNC: 56 U/L (ref 10–50)
ANION GAP SERPL CALCULATED.3IONS-SCNC: 11 MMOL/L (ref 7–15)
AST SERPL W P-5'-P-CCNC: 99 U/L (ref 10–50)
BILIRUB SERPL-MCNC: 3.1 MG/DL
BUN SERPL-MCNC: 5.4 MG/DL (ref 6–20)
CALCIUM SERPL-MCNC: 8.8 MG/DL (ref 8.6–10)
CHLORIDE SERPL-SCNC: 102 MMOL/L (ref 98–107)
CREAT SERPL-MCNC: 0.57 MG/DL (ref 0.67–1.17)
DEPRECATED HCO3 PLAS-SCNC: 20 MMOL/L (ref 22–29)
ERYTHROCYTE [DISTWIDTH] IN BLOOD BY AUTOMATED COUNT: 13.3 % (ref 10–15)
GFR SERPL CREATININE-BSD FRML MDRD: >90 ML/MIN/1.73M2
GLUCOSE SERPL-MCNC: 94 MG/DL (ref 70–99)
HCT VFR BLD AUTO: 31.4 % (ref 40–53)
HGB BLD-MCNC: 9.9 G/DL (ref 13.3–17.7)
INR PPP: 1.08 (ref 0.85–1.15)
MCH RBC QN AUTO: 35.6 PG (ref 26.5–33)
MCHC RBC AUTO-ENTMCNC: 31.5 G/DL (ref 31.5–36.5)
MCV RBC AUTO: 113 FL (ref 78–100)
METHYLMALONATE SERPL-SCNC: 0.1 UMOL/L (ref 0–0.4)
PLATELET # BLD AUTO: 231 10E3/UL (ref 150–450)
POTASSIUM SERPL-SCNC: 3.9 MMOL/L (ref 3.4–5.3)
PROT SERPL-MCNC: 5.9 G/DL (ref 6.4–8.3)
RBC # BLD AUTO: 2.78 10E6/UL (ref 4.4–5.9)
SODIUM SERPL-SCNC: 133 MMOL/L (ref 136–145)
WBC # BLD AUTO: 4.6 10E3/UL (ref 4–11)

## 2022-11-10 PROCEDURE — 258N000003 HC RX IP 258 OP 636: Performed by: STUDENT IN AN ORGANIZED HEALTH CARE EDUCATION/TRAINING PROGRAM

## 2022-11-10 PROCEDURE — 80053 COMPREHEN METABOLIC PANEL: CPT | Performed by: STUDENT IN AN ORGANIZED HEALTH CARE EDUCATION/TRAINING PROGRAM

## 2022-11-10 PROCEDURE — 120N000003 HC R&B IMCU UMMC

## 2022-11-10 PROCEDURE — 85610 PROTHROMBIN TIME: CPT

## 2022-11-10 PROCEDURE — 250N000013 HC RX MED GY IP 250 OP 250 PS 637: Performed by: INTERNAL MEDICINE

## 2022-11-10 PROCEDURE — 250N000011 HC RX IP 250 OP 636: Performed by: STUDENT IN AN ORGANIZED HEALTH CARE EDUCATION/TRAINING PROGRAM

## 2022-11-10 PROCEDURE — 99232 SBSQ HOSP IP/OBS MODERATE 35: CPT | Mod: GC | Performed by: INTERNAL MEDICINE

## 2022-11-10 PROCEDURE — 250N000013 HC RX MED GY IP 250 OP 250 PS 637

## 2022-11-10 PROCEDURE — 97116 GAIT TRAINING THERAPY: CPT | Mod: GP

## 2022-11-10 PROCEDURE — 36415 COLL VENOUS BLD VENIPUNCTURE: CPT | Performed by: STUDENT IN AN ORGANIZED HEALTH CARE EDUCATION/TRAINING PROGRAM

## 2022-11-10 PROCEDURE — 250N000013 HC RX MED GY IP 250 OP 250 PS 637: Performed by: STUDENT IN AN ORGANIZED HEALTH CARE EDUCATION/TRAINING PROGRAM

## 2022-11-10 PROCEDURE — 85027 COMPLETE CBC AUTOMATED: CPT | Performed by: STUDENT IN AN ORGANIZED HEALTH CARE EDUCATION/TRAINING PROGRAM

## 2022-11-10 RX ADMIN — GABAPENTIN 300 MG: 300 CAPSULE ORAL at 20:08

## 2022-11-10 RX ADMIN — Medication 25 MG: at 08:18

## 2022-11-10 RX ADMIN — ACETAMINOPHEN 650 MG: 325 TABLET, FILM COATED ORAL at 20:08

## 2022-11-10 RX ADMIN — LEVOTHYROXINE SODIUM 175 MCG: 0.15 TABLET ORAL at 08:18

## 2022-11-10 RX ADMIN — Medication 5 MG: at 20:08

## 2022-11-10 RX ADMIN — FOLIC ACID 1 MG: 1 TABLET ORAL at 08:18

## 2022-11-10 RX ADMIN — GABAPENTIN 300 MG: 300 CAPSULE ORAL at 08:22

## 2022-11-10 RX ADMIN — OLANZAPINE 5 MG: 5 TABLET, ORALLY DISINTEGRATING ORAL at 21:03

## 2022-11-10 RX ADMIN — HYDROXYZINE HYDROCHLORIDE 25 MG: 25 TABLET, FILM COATED ORAL at 08:18

## 2022-11-10 RX ADMIN — VITAM B12 100 MCG: 100 TAB at 08:19

## 2022-11-10 RX ADMIN — Medication 1 TABLET: at 08:18

## 2022-11-10 RX ADMIN — THIAMINE HYDROCHLORIDE 250 MG: 100 INJECTION, SOLUTION INTRAMUSCULAR; INTRAVENOUS at 08:22

## 2022-11-10 ASSESSMENT — ACTIVITIES OF DAILY LIVING (ADL)
ADLS_ACUITY_SCORE: 28

## 2022-11-10 NOTE — PROGRESS NOTES
[facility]    Progress Note - Medicine Service, MAROON TEAM 4       Date of Admission:  11/4/2022    Assessment & Plan     Getachew Barcenas is a 52 year old man with a past medical history of alcohol use disorder, thiamine deficiency, frequent falls, peripheral neuropathy, hypothyroidism, former tobacco use, mild LULI, somnambulism, RLS, diverticulitis, and mood disorder who presented as transfer to [facility] on 11/4/2022 for higher level of cares in the setting of new diagnosis of acute on chronic L SDH and subacute R SDH. Patient is hemodynamically stable and has minimal neurological deficits at this time. He does have mild dysmetria, horizontal nystagmus, and peripheral neuropathy, which I suspect are chronic in the setting of longstanding alcohol use disorder. He also has mild right hand weakness, which I suspect is secondary to subdural hematoma. Patient currently stable from neurological standpoint. Pt is now status post B/L MMA embolization w/out complications, now showing signs of paranoia.     Today's changes:  - Stop CIWA protocol/PRN valium and clonidine  - Vitals Qshift  - Continue Thiamine protocol  - Appreciate Psychiatry/addiction   - Psychiatry consulted no definitive medication mgmt  - Addiction goal of sobriety w/ medication  - Discuss w/ pt tmrw about Hep A/B immu  - Zyprexa 5mg   - Tapered Gabapentin to 300mg  - Expected discharge pending TCU availability  - 4/9 TCU facilities denied pt due to EtOH behavior     #Acute on chronic left subdural hematoma, chronic bilateral subdural hematoma, s/p B/L MMA Emb.  #Brain compression  #3 mm rightward midline shift, stable  #Alcohol use disorder  #Falls secondary to above  #Concern for alcohol withdrawal   #Thiamine Deficiency likely secondary to EtOH use  #Concern for Wernicke-Korsakoff Syndrome  Long history of EtOH abuse w/ short sobriety episode. Last fall happened a few days ago. Upon arrival he was found to  have B/L SDH w/ 3mm L to R midline shift. Ct C-spine was unremarkable, pt in a stable state, no active intra-cranial bleeding confirmed on CTH 11/4 which is  showing no acute intracranial hemorrhage, no substantial change in size or appearance of mixed density b/l SDH, left > right, c/to CTH 11/3/22. Vit B12 WNL @ 937 11/4. As per SICU RN, neuro remained unchanged except for the slight confused/forgetful episodes. Tremors resolved, Ataxic at time when doing finger to nose 11/4 otherwise normal. Pt now s/p B/L MMA embolization 11/7. Pt denies any new onset of numbness/tingling s/p Embolization.  At bedside rounds in am, pt seemed confabulatory vs mild hallucinations w/ construction work going on until 10pm, could be his normal perspective of the reality, however we thought it was worth mentioning.  - Stopped Neuro Q6h checks  - Tapered Gabapentin to 300mg  - Continue seizure prophylaxis  - Neurosurgery consultation, recs appreciated          - Plan for NSG follow-up in 1mo w/ repeat CTH  - SBP goal < 140 mmHg           - PRN Labetalol and Hydralazine  - HOB > 30   - CT cervical spine at OSH with no fracture --> cleared C spine   - SLP defer evaluation              - to re-consult if change in swallowing function or communication skills appears  - PT recs 11/6:              - TCU @ discharge              - Gait/imbalance disability, pt requiring aid w/ chair ans walker  -Nutritionist recs 11/5:              - Continue B12, thiamine, folic acid. Continue MVI, as likely at risk for other B vitamin/micronutrient def.              - Electrolyte replacement per protocol as indicated                - Ordered Ensure BID               - Will continue to monitor PO intake/adequacy   - Addiction 11/9:               - Medication mgmt for sobriety w/ Acamprosate vs naltrexone, pt prefer naltrexone since q24h.              - Naltrexone can elevate LFT so began w/ 1/2 dose 25mg then taper              - continue CIWA w/ bunny and  consider rx @ discharge  - UDS   - IV Thiamine 500mg TID for 2d -> IV Thiamine 250mg daily for 5d -> PO Thiamine 100mg daily  - Folate/B12 1mg daily  - MV 1 tab daily  - Addiction Medicine reccs pending        #Hyperbilirubinemia (elevated direct bilirubin)  #Hypoalbuminemia/hypoproteinemia, improving  #Elevated LFT's (AST > ALT), improving  #Cholelithiasis, resolved  #ETOH Hepatic steatosis   #Colonic diverticula   Mild jaundice revealed clinically. CT C/A/P on 11/2. RUQ U/S on 11/3. Suspect many of these hepatic lab abnormalities are secondary to alcohol consumption. Although the same CT revealed cholethiasis and diffuse fatty infiltration of the liver. MELD-Na 19; MELD 16. In addition, Abdomen US notes likely gallstones and sludge w/o wall-thickening or biliary duct dilatation but no clinical (no abd pain fever or RUQ tenderness) and no radiologic evidence of cholecystitis. Lipase 622, No focal pain or symptoms suspicious for pancreatitis, Ct abdomen showing normal pancreas, unlikely pancreatitis. Bilirubin level remained elevated at 7.5 11/5.  Hepatic functions are slowly improving 11/5. Viral hepatitis test all negative more likely suggesting EtoH acute Hepatic steatosis.  - Good oral intake    - Nutrition revised and recommended as stated above  - Daily LFT's  - Viral Hepatitis negative   - GI hepatology recs 11/9 updates :              - monitor LFT's              - F/U as OP w/ local hepatology/GI  - Addiction reccs 11/9:              - Recommend immu for HepA/B prior to discharge      #Anion Gap Metabolic Acidosis, resolved   Highest Anion Gap = 25 mmol/L (Ref range: 7-15) in last 2 days, will monitor and treat as appropriate. Query alcoholic ketoacidosis considered. Anion gap on 11/4 is 13  - mIVF  - Urine ketones negative  - Consideration for methanol, ethylene glycol ingestion as possible etiologies   - Addiction service        #Hyponatremia, improving  #Hypokalemia,  "resolved  #Hypocalcemia resolved  #Hypomagnesemia resolved  Labs notable for Na 131 at 11PM at OSH prior to transfer. Na 12 hours earlier was 138. Ddx includes (beer potomania, cerebral salt wasting, SIADH, liver disease, etc. Low K  btw 3.5-3.8. Lowest Ca = 7.5 mg/dL (Ref range: 8.5 - 10.1 mg/dL) in last 2 days, will monitor and replace as appropriate. Mg improving and stable w/ oral magnesium oxide. - Ionized calcium 4.1 c/w hypoalbuminemia and other lab findings, off MIVF, PO intake only, pt is doing well after procedure, good appetite.  - Repeat BMP  - Monitor lytes and replace as needed  - Serum osmolality, urine osmolality, urine sodium ordered         #Peripheral neuropathy  #Mild cerebellar atrophy  Suspect from longstanding alcohol use disorder. Stable.      #Paranoid schizophrenia   Daughter's concern over him having schizophrenia (wife had the condition). H/o declined employability due to paranoia and delusional and making threats against people Psychiatry was consulted 11/8, pt became defensive, uncooperative and dilusional about a number of things.   As per psy reccs : \" He would have to be more disorganized and ill in order to pursue court commitment and forced medications; this does not currently apply in his case.\"  - Continue thiamine protocol  - Zyprexa 5mg prn at bedtime  - No definitive medication regimen prescribed        #PLMD vs RLS  #REM w/o atonia but no dream enactment  #Rare Somnambulism   - Consider Gabapentin 100-300mg at bedtime once out of withdrawal window        #Mild LULI  - Continuous pulse ox  - Maintain O2 saturations greater than 92%  - Sleep study referral on discharge         #Hx of hypoglycemia, stable  Suspect secondary to poor oral intake in the past. Regular diet resumed, Nutritionist consulted  -Hgb A1c  -Monitor glucose levels        #Hypothyroidism  TSH 36, free T4 0.81. Unclear compliance of medication  - PTA Levothyroxine 175mcg daily  - Recheck TSH/free T4 in 2wks  - " Consider Endocrine consult        #Coagulation Defect, stable  #Macrocytic Anemia  #Thrombocytopenia, improving  Ddx likely bone marrow suppression from chronic heavy EtoH abuse  INR = 1.24 (Ref range: 0.85 - 1.15) now downtrending.  PTT = 32 Seconds (Ref range: 22 - 38 Seconds), will monitor for bleeding. Presumably due to declining liver function. Lowest platelets = 67 (Ref range: 150-450) in last 2 days Hgb however improving steadily. 10.1 w/     - Daily CBC  - Hgb goal >7, plt goal >50k  -Transfuse to meet Hgb and plt goals     #Leukopenia  - No infection concern  - Daily CBC  - Follow temperature curve        Diet: Regular Diet Adult  Snacks/Supplements Adult: Ensure Enlive; Between Meals  NPO per Anesthesia Guidelines for Procedure/Surgery Except for: Meds    DVT Prophylaxis: SCD's  Triana Catheter: Not present  Fluids: NA  Central Lines: PRESENT  PICC 11/03/22 Triple Lumen Right-Site Assessment: WDL will be removed 11/9  Cardiac Monitoring: None  Code Status: Full Code      Disposition Plan      Expected Discharge Date: 11/11/2022    Discharge Delays: *Medically Ready for Discharge  Placement - TCU  Destination: home  Discharge Comments: Medicaly ready on 11/9, awaiting TCU placement.        The patient's care was discussed with the Attending Physician, Dr. Moreno and Patient.    Simba Craig MD  Medicine Service, [address]  Securely message with the Vocera Web Console (learn more here)  Text page via [facility] Paging/Directory   Please see signed in provider for up to date coverage information      Clinically Significant Risk Factors         # Hyponatremia: Lowest Na = 133 mmol/L (Ref range: 136-145) in last 2 days, will monitor as appropriate      # Hypoalbuminemia: Lowest albumin = 3.1 g/dL (Ref range: 3.5-5.2) at 11/8/2022  4:41 AM, will monitor as appropriate          # Overweight: Estimated body mass index is 25.3 kg/m  as calculated  "from the following:    Height as of this encounter: 1.854 m (6' 1\").    Weight as of this encounter: 87 kg (191 lb 12.8 oz).   # Severe Malnutrition: based on nutrition assessment        ______________________________________________________________________    Interval History   Pt was sleeping while entering the room, didn't disturb considering h/o paranoid schizophrenia. Pt was sleepping comfortably in NAD. Was breathing on RA  Discussed with bedside RN, no acute events, will be transferred to med-surg.    Data reviewed today: I reviewed all medications, new labs and imaging results over the last 24 hours. I personally reviewed no images or EKG's today.    Physical Exam   Vital Signs: Temp: 98.9  F (37.2  C) Temp src: Oral BP: 115/85 Pulse: 77   Resp: 18 SpO2: 99 % O2 Device: None (Room air)    Weight: 191 lbs 12.8 oz  General Appearance: Sleeping in bed comfortably  Respiratory: Breathing on RA  Cardiovascular: RRR  GI: No reported BM  Skin: No new deficits noted    Data   Recent Labs   Lab 11/10/22  0701 11/09/22  0623 11/08/22  0441 11/04/22  0429 11/04/22  0425   WBC 4.6 4.4 4.8   < > 2.7*   HGB 9.9* 9.7* 9.5*   < > 10.1*   * 111* 107*   < > 104*    200 154   < > 126*   INR 1.08 1.11 1.15   < >  --    * 134* 134*   < > 133*   POTASSIUM 3.9 3.7 3.8   < > 3.7   CHLORIDE 102 101 101   < > 98   CO2 20* 20* 20*   < > 22   BUN 5.4* 5.1* 3.5*   < > 3.8*   CR 0.57* 0.55* 0.54*   < > 0.56*   ANIONGAP 11 13 13   < > 13   ABDULKADIR 8.8 8.8 8.0*   < > 7.9*   GLC 94 96 97   < > 131*   ALBUMIN 3.2* 3.2* 3.1*   < > 3.4*   PROTTOTAL 5.9* 5.9* 5.7*   < > 5.5*   BILITOTAL 3.1* 3.4* 4.4*   < > 7.6*   ALKPHOS 215* 225* 197*   < > 190*   ALT 56* 61* 59*   < > 66*   AST 99* 119* 129*   < > 295*   LIPASE  --   --   --   --  622*    < > = values in this interval not displayed.     No results found for this or any previous visit (from the past 24 hour(s)).  Medications       cyanocobalamin  100 mcg Oral Daily     folic " acid  1 mg Oral Daily     gabapentin  300 mg Oral Q8H     levothyroxine  175 mcg Oral or Feeding Tube Daily     multivitamin w/minerals  1 tablet Oral Daily     [START ON 11/11/2022] naltrexone  50 mg Oral Daily     OLANZapine zydis  5 mg Oral At Bedtime     [START ON 11/11/2022] thiamine  100 mg Oral or Feeding Tube Daily

## 2022-11-10 NOTE — PROGRESS NOTES
"Care Management Follow Up    Length of Stay (days): 6    Expected Discharge Date: 11/09/2022     Concerns to be Addressed: discharge planning, TCU placement   Patient plan of care discussed at interdisciplinary rounds: Yes    Anticipated Discharge Disposition: transitional care      Anticipated Discharge Services: TBD   Anticipated Discharge DME: TBD    Patient/family educated on Medicare website which has current facility and service quality ratings:    Education Provided on the Discharge Plan:    Patient/Family in Agreement with the Plan:      Referrals Placed by CM/SW:      Pending Referrals 11/10:     [facility]  [address] [place] [address]  [phone]     [facility]  [address] [place] [address]  [phone]     [place] at [address] [address]  PH: [phone]     [facility]  [address] [place] [address]  [phone]  Admissions: [phone]     [facility]  [address] [place] [address]  P: [phone]  F: [phone]     [facility]  [address] [place] [address]  [phone]     [facility]   [address] [place] [address]  PH: [phone]     Declined/Discontinued:       [facility] [place] - declined in [facility] due to complex medical needs, hx of alcohol use    [facility] - declined due to no appropriate beds     [facility] - declined in [facility] due to \"acuity too high\"    [facility] - declined due to history of recent alcohol use     [facility] - not able to meet needs due to history of alcohol use     Private pay costs discussed: Not applicable    Additional Information:    SW to continue to follow for discharge placement. No other questions at this time.     Linn Anguiano, HEATHER, LGSW   6B "    Phone: [phone]  Pager: [phone]

## 2022-11-10 NOTE — PLAN OF CARE
"/86   Pulse 72   Temp 99.3  F (37.4  C) (Oral)   Resp 18   Ht 1.854 m (6' 1\")   Wt 87 kg (191 lb 12.8 oz)   SpO2 97%   BMI 25.30 kg/m      Neuro: A&Ox4  Cardiac: Refusing tele  Respiratory:  Refusing pulse oximetry   GI/: voiding via urinal, no reported BM  Diet/appetite:  Regular diet orders  Activity:  Assist of 1  Pain: Denies   Skin: No new deficits noted  LDA's: PICC    Plan: Pt refused midnight and 4am vitals, assessment, and meds, requesting staff do not disturb him until 7am. Consider transfer orders or do not disturb orders over night. Nursing will continue to follow the POC and update the MD team with concerns.    Domonique Hearn RN  6B Intermediate Care     "

## 2022-11-10 NOTE — PLAN OF CARE
Goal Outcome Evaluation:                      Patient was appropriate and able to rest throughout the night. VSS on RA. Alert and oriented. No delusions or paranoia. Moved SBA w/ walker, would become hypotensive/dizzy initially with movement. Skin intact aside from generalized bruising. PICC to be removed, but patient refused PIV placement, so will let day team know in morning to assess further need. Continent of bowel and bladder. Awaiting TCU placement. No other concerns.

## 2022-11-11 ENCOUNTER — APPOINTMENT (OUTPATIENT)
Dept: PHYSICAL THERAPY | Facility: CLINIC | Age: 52
End: 2022-11-11
Attending: SURGERY
Payer: COMMERCIAL

## 2022-11-11 ENCOUNTER — APPOINTMENT (OUTPATIENT)
Dept: OCCUPATIONAL THERAPY | Facility: CLINIC | Age: 52
End: 2022-11-11
Attending: SURGERY
Payer: COMMERCIAL

## 2022-11-11 LAB
HOLD SPECIMEN: NORMAL
HOLD SPECIMEN: NORMAL
INR PPP: 1.2 (ref 0.85–1.15)

## 2022-11-11 PROCEDURE — 250N000013 HC RX MED GY IP 250 OP 250 PS 637: Performed by: STUDENT IN AN ORGANIZED HEALTH CARE EDUCATION/TRAINING PROGRAM

## 2022-11-11 PROCEDURE — 36592 COLLECT BLOOD FROM PICC: CPT

## 2022-11-11 PROCEDURE — 97530 THERAPEUTIC ACTIVITIES: CPT | Mod: GP

## 2022-11-11 PROCEDURE — 99232 SBSQ HOSP IP/OBS MODERATE 35: CPT | Mod: GC | Performed by: INTERNAL MEDICINE

## 2022-11-11 PROCEDURE — 250N000013 HC RX MED GY IP 250 OP 250 PS 637

## 2022-11-11 PROCEDURE — 97535 SELF CARE MNGMENT TRAINING: CPT | Mod: GO

## 2022-11-11 PROCEDURE — 85610 PROTHROMBIN TIME: CPT

## 2022-11-11 PROCEDURE — 97110 THERAPEUTIC EXERCISES: CPT | Mod: GP

## 2022-11-11 PROCEDURE — 120N000003 HC R&B IMCU UMMC

## 2022-11-11 PROCEDURE — 250N000013 HC RX MED GY IP 250 OP 250 PS 637: Performed by: INTERNAL MEDICINE

## 2022-11-11 PROCEDURE — 97530 THERAPEUTIC ACTIVITIES: CPT | Mod: GO

## 2022-11-11 PROCEDURE — 97129 THER IVNTJ 1ST 15 MIN: CPT | Mod: GO

## 2022-11-11 RX ADMIN — VITAM B12 100 MCG: 100 TAB at 08:09

## 2022-11-11 RX ADMIN — Medication 5 MG: at 21:49

## 2022-11-11 RX ADMIN — NALTREXONE HYDROCHLORIDE 50 MG: 50 TABLET, FILM COATED ORAL at 08:09

## 2022-11-11 RX ADMIN — OLANZAPINE 5 MG: 5 TABLET, ORALLY DISINTEGRATING ORAL at 21:42

## 2022-11-11 RX ADMIN — LEVOTHYROXINE SODIUM 175 MCG: 0.15 TABLET ORAL at 08:09

## 2022-11-11 RX ADMIN — THIAMINE HCL TAB 100 MG 100 MG: 100 TAB at 08:09

## 2022-11-11 RX ADMIN — HYDROXYZINE HYDROCHLORIDE 25 MG: 25 TABLET, FILM COATED ORAL at 21:49

## 2022-11-11 RX ADMIN — FOLIC ACID 1 MG: 1 TABLET ORAL at 08:09

## 2022-11-11 RX ADMIN — Medication 1 TABLET: at 08:09

## 2022-11-11 ASSESSMENT — ACTIVITIES OF DAILY LIVING (ADL)
ADLS_ACUITY_SCORE: 28

## 2022-11-11 NOTE — PLAN OF CARE
OT 6B: OT: Th administered the [employer] cognition screening version 8.1. Pt scored a 24/30 (26 is considered normal), indicating mild cognition deficits overall with most difficulty noted with visuospatial abilities and abstraction. Increased time needed for processing. Pt very cooperative during cognition screen, no evidence of paranoid thoughts. Th provided education on results of cognition screening and importance of cognition especially when completing higher level IADLs such as med management, cooking, finances, and driving. Pt very receptive to education but does demo some limited insight to deficits, reports he has not noticed any changes in cognition since being admitted to hospital. Will continue to provide reinforcement and education, pt aware that cognition is very important for safe completion of all tasks at home and in the community.

## 2022-11-11 NOTE — PROGRESS NOTES
[facility]    Progress Note - Medicine Service, MAROON TEAM 4       Date of Admission:  11/4/2022    Assessment & Plan   Getachew Barcenas is a 52 year old man with a past medical history of alcohol use disorder, thiamine deficiency, frequent falls, peripheral neuropathy, hypothyroidism, former tobacco use, mild LULI, somnambulism, RLS, diverticulitis, and mood disorder who presented as transfer to [facility] on 11/4/2022 for higher level of cares in the setting of new diagnosis of acute on chronic L SDH and subacute R SDH. Patient is hemodynamically stable and has minimal neurological deficits at this time. He does have mild dysmetria, horizontal nystagmus, and peripheral neuropathy, which I suspect are chronic in the setting of longstanding alcohol use disorder. He also has mild right hand weakness, which I suspect is secondary to subdural hematoma. Patient currently stable from neurological standpoint. Pt is now status post B/L MMA embolization w/out complications, now showing signs of paranoia.     Today's changes:  - All TCU declined for now  - Stopped CIWA protocol/PRN valium and clonidine  - Vitals Qshift  - Continue Thiamine protocol  - Appreciate Psychiatry/addiction   - Psychiatry consulted no definitive medication mgmt  - Addiction goal of sobriety w/ medication  - Discuss w/ pt tmrw about Hep A/B immu  - Zyprexa 5mg   - Tapered Gabapentin to 300mg  - Expected discharge pending TCU availability  - 4/9 TCU facilities denied pt due to EtOH behavior     #Acute on chronic left subdural hematoma, chronic bilateral subdural hematoma, s/p B/L MMA Emb.  #Brain compression  #3 mm rightward midline shift, stable  #Alcohol use disorder  #Falls secondary to above  #Concern for alcohol withdrawal   #Thiamine Deficiency likely secondary to EtOH use  #Concern for Wernicke-Korsakoff Syndrome  Long history of EtOH abuse w/ short sobriety episode. Last fall happened a few days ago.  Upon arrival he was found to have B/L SDH w/ 3mm L to R midline shift. Ct C-spine was unremarkable, pt in a stable state, no active intra-cranial bleeding confirmed on CTH 11/4 which is  showing no acute intracranial hemorrhage, no substantial change in size or appearance of mixed density b/l SDH, left > right, c/to CTH 11/3/22. Vit B12 WNL @ 937 11/4. As per SICU RN, neuro remained unchanged except for the slight confused/forgetful episodes. Tremors resolved, Ataxic at time when doing finger to nose 11/4 otherwise normal. Pt now s/p B/L MMA embolization 11/7. Pt denies any new onset of numbness/tingling s/p Embolization.  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  - Stopped Neuro Q6h checks  - Vitals Q shift  - Tapered Gabapentin to 300mg  - Continue seizure prophylaxis  - Neurosurgery consultation, recs appreciated          - Plan for NSG follow-up in 1mo w/ repeat CTH  - SBP goal < 140 mmHg           - PRN Labetalol and Hydralazine  - HOB > 30   - CT cervical spine at OSH with no fracture --> cleared C spine   - SLP defer evaluation              - to re-consult if change in swallowing function or communication skills appears  - PT recs 11/6:              - TCU @ discharge              - Gait/imbalance disability, pt requiring aid w/ chair ans walker  -Nutritionist recs 11/5:              - Continue B12, thiamine, folic acid. Continue MVI, as likely at risk for other B vitamin/micronutrient def.              - Electrolyte replacement per protocol as indicated                - Ordered Ensure BID               - Will continue to monitor PO intake/adequacy   - Addiction 11/9:               - Medication mgmt for sobriety w/ Acamprosate vs naltrexone, pt prefer naltrexone since q24h.              - Naltrexone can elevate LFT so began w/ 1/2 dose 25mg then taper              - continue CIWA w/ bunny and  consider rx @ discharge  - UDS   - IV Thiamine 500mg TID for 2d -> IV Thiamine 250mg daily for 5d -> PO Thiamine 100mg daily  - Folate/B12 1mg daily  - MV 1 tab daily  - Addiction Medicine reccs pending        #Hyperbilirubinemia (elevated direct bilirubin)  #Hypoalbuminemia/hypoproteinemia, improving  #Elevated LFT's (AST > ALT), improving  #Cholelithiasis, resolved  #ETOH Hepatic steatosis   #Colonic diverticula   Mild jaundice revealed clinically. CT C/A/P on 11/2. RUQ U/S on 11/3. Suspect many of these hepatic lab abnormalities are secondary to alcohol consumption. Although the same CT revealed cholethiasis and diffuse fatty infiltration of the liver. MELD-Na 19; MELD 16. In addition, Abdomen US notes likely gallstones and sludge w/o wall-thickening or biliary duct dilatation but no clinical (no abd pain fever or RUQ tenderness) and no radiologic evidence of cholecystitis. Lipase 622, No focal pain or symptoms suspicious for pancreatitis, Ct abdomen showing normal pancreas, unlikely pancreatitis. Bilirubin level remained elevated at 7.5 11/5.  Hepatic functions are slowly improving 11/5. Viral hepatitis test all negative more likely suggesting EtoH acute Hepatic steatosis.  - Good oral intake    - Nutrition revised and recommended as stated above  - Daily LFT's  - Viral Hepatitis negative   - GI hepatology recs 11/9 updates :              - monitor LFT's              - F/U as OP w/ local hepatology/GI  - Addiction reccs 11/9:              - Recommend immu for HepA/B prior to discharge   - Recommended Immunization for Hep A/B, need to discuss w/ pt    #Anion Gap Metabolic Acidosis, resolved   Highest Anion Gap = 25 mmol/L (Ref range: 7-15) in last 2 days, will monitor and treat as appropriate. Query alcoholic ketoacidosis considered. Anion gap on 11/4 is 13  - Stopped mIVF  - Urine ketones negative  - Consideration for methanol, ethylene glycol ingestion as possible etiologies   - Addiction  "service      #Hyponatremia, improving  #Hypokalemia, resolved  #Hypocalcemia resolved  #Hypomagnesemia resolved  Labs notable for Na 131 at 11PM at OSH prior to transfer. Na 12 hours earlier was 138. Ddx includes (beer potomania, cerebral salt wasting, SIADH, liver disease, etc. Low K  btw 3.5-3.8. Lowest Ca = 7.5 mg/dL (Ref range: 8.5 - 10.1 mg/dL) in last 2 days, will monitor and replace as appropriate. Mg improving and stable w/ oral magnesium oxide. - Ionized calcium 4.1 c/w hypoalbuminemia and other lab findings, off MIVF, PO intake only, pt is doing well after procedure, good appetite.  - Repeat BMP  - Monitor lytes and replace as needed  - Serum osmolality, urine osmolality, urine sodium ordered         #Peripheral neuropathy  #Mild cerebellar atrophy  Suspect from longstanding alcohol use disorder. Stable.      #Paranoid schizophrenia   Daughter's concern over him having schizophrenia (wife had the condition). H/o declined employability due to paranoia and delusional and making threats against people Psychiatry was consulted 11/8, pt became defensive, uncooperative and dilusional about a number of things.   As per psy reccs : \" He would have to be more disorganized and ill in order to pursue court commitment and forced medications; this does not currently apply in his case.\"  Pt demonstrated for several consecutive days no signs of delirium, hallucination or paranoia.   - Continue thiamine protocol  - Zyprexa 5mg prn at bedtime  - No definitive medication regimen prescribed        #PLMD vs RLS  #REM w/o atonia but no dream enactment  #Rare Somnambulism   - Consider Gabapentin 100-300mg at bedtime once out of withdrawal window        #Mild LULI  - Continuous pulse ox  - Maintain O2 saturations greater than 92%  - Sleep study referral on discharge         #Hx of hypoglycemia, stable  Suspect secondary to poor oral intake in the past. Regular diet resumed, Nutritionist consulted  -Hgb A1c  -Monitor glucose " levels        #Hypothyroidism  TSH 36, free T4 0.81. Unclear compliance of medication  - PTA Levothyroxine 175mcg daily  - Recheck TSH/free T4 in 2wks  - Consider Endocrine consult        #Coagulation Defect, stable  #Macrocytic Anemia  #Thrombocytopenia, improving  Ddx likely bone marrow suppression from chronic heavy EtoH abuse  INR = 1.24 (Ref range: 0.85 - 1.15) now downtrending.  PTT = 32 Seconds (Ref range: 22 - 38 Seconds), will monitor for bleeding. Presumably due to declining liver function. Lowest platelets = 67 (Ref range: 150-450) in last 2 days Hgb however improving steadily. 10.1 w/ MCV still trending high 11/11.  - Daily CBC  - Hgb goal >7, plt goal >50k  -Transfuse to meet Hgb and plt goals     #Leukopenia  - No infection concern  - Daily CBC  - Follow temperature curve        Diet: Regular Diet Adult  Snacks/Supplements Adult: Ensure Enlive; Between Meals  NPO per Anesthesia Guidelines for Procedure/Surgery Except for: Meds    DVT Prophylaxis: SCD's  Triana Catheter: Not present  Fluids: NA  Central Lines: PRESENT  PICC 11/03/22 Triple Lumen Right-Site Assessment: WDL will be removed 11/9  Cardiac Monitoring: None  Code Status: Full Code      Disposition Plan     Expected Discharge Date: 11/11/2022    Discharge Delays: *Medically Ready for Discharge  Placement - TCU  Destination: home  Discharge Comments: Medicaly ready on 11/9, awaiting TCU placement.        The patient's care was discussed with the Attending Physician, Dr. Moreno and Patient.    Simba Craig MD  Medicine Service, [facility] TEAM [address] [place] [address]  Securely message with the Vocera Web Console (learn more here)  Text page via [facility] Paging/Directory   Please see signed in provider for up to date coverage information      Clinically Significant Risk Factors         # Hyponatremia: Lowest Na = 133 mmol/L (Ref range: 136-145) in last 2 days, will monitor as appropriate      # Hypoalbuminemia:  "Lowest albumin = 3.1 g/dL (Ref range: 3.5-5.2) at 11/8/2022  4:41 AM, will monitor as appropriate          # Overweight: Estimated body mass index is 25.3 kg/m  as calculated from the following:    Height as of this encounter: 1.854 m (6' 1\").    Weight as of this encounter: 87 kg (191 lb 12.8 oz).   # Severe Malnutrition: based on nutrition assessment        ______________________________________________________________________    Interval History   Pt seen at am rounds bedside, seating in bed watching TV. Pleasant and cooperative. Reported two consecutives good night sleep w/ zyprexa. Didn't report any construction noise or music. Was able to walk 2 times with walker yesterday, willing to do the same today. Takes his time to step out of bed. He needs time to get control of his balance.  Mentioned to him we're still waiting for a TCU spot. Agreed on compliance of care. Was happy to be not disturbed last night. Refused PICC line removal.    Data reviewed today: I reviewed all medications, new labs and imaging results over the last 24 hours. I personally reviewed no images or EKG's today.    Physical Exam   Vital Signs: Temp: 99.6  F (37.6  C) Temp src: Oral BP: 117/83 Pulse: 90   Resp: 16 SpO2: 98 % O2 Device: None (Room air)    Weight: 191 lbs 12.8 oz  General Appearance:  Sitting in bed comfortably. NAD, AAOx3  Respiratory: Breathing on RA  Cardiovascular: RRR  GI: BS+, BM +. Abd soft/non-distended/non-tender  Skin: No new deficits noted  Other: Cooperative and pleasant.     Data   Recent Labs   Lab 11/11/22  0647 11/10/22  0701 11/09/22  0623 11/08/22  0441   WBC  --  4.6 4.4 4.8   HGB  --  9.9* 9.7* 9.5*   MCV  --  113* 111* 107*   PLT  --  231 200 154   INR 1.20* 1.08 1.11 1.15   NA  --  133* 134* 134*   POTASSIUM  --  3.9 3.7 3.8   CHLORIDE  --  102 101 101   CO2  --  20* 20* 20*   BUN  --  5.4* 5.1* 3.5*   CR  --  0.57* 0.55* 0.54*   ANIONGAP  --  11 13 13   ABDULKADIR  --  8.8 8.8 8.0*   GLC  --  94 96 97   ALBUMIN "  --  3.2* 3.2* 3.1*   PROTTOTAL  --  5.9* 5.9* 5.7*   BILITOTAL  --  3.1* 3.4* 4.4*   ALKPHOS  --  215* 225* 197*   ALT  --  56* 61* 59*   AST  --  99* 119* 129*     No results found for this or any previous visit (from the past 24 hour(s)).  Medications       cyanocobalamin  100 mcg Oral Daily     folic acid  1 mg Oral Daily     levothyroxine  175 mcg Oral or Feeding Tube Daily     multivitamin w/minerals  1 tablet Oral Daily     naltrexone  50 mg Oral Daily     OLANZapine zydis  5 mg Oral At Bedtime     thiamine  100 mg Oral or Feeding Tube Daily

## 2022-11-11 NOTE — PROGRESS NOTES
"Care Management Follow Up    Length of Stay (days): 7    Expected Discharge Date: 11/11/2022     Concerns to be Addressed: discharge planning, TCU placement      Patient plan of care discussed at interdisciplinary rounds: Yes    Anticipated Discharge Disposition: transitional care      Anticipated Discharge Services: TBD   Anticipated Discharge DME: TBD     Patient/family educated on Medicare website which has current facility and service quality ratings: yes  Education Provided on the Discharge Plan:    Patient/Family in Agreement with the Plan:      Referrals Placed by CM/SW:      Pending Referrals 11/11:     [facility]  [address] [place] [address]  [phone]     [facility]   [address] [place] [address]  PH: [phone]     [facility]  [address] [place] [address]  [phone]  Admissions: [phone]     [facility]  [address] [place] [address]  P: [phone]  F: [phone]     [facility]  [address] [place] [address]  [phone]     [facility]   [address] [place] [address]  PH: [phone]     Declined/Discontinued:       [facility] - declined in [facility], not able to meet needs due to history of alcohol use and psychological hx     [facility] - declined in [facility] due to complex medical needs, hx of alcohol use    [facility] - declined due to no appropriate beds     [facility] - declined in [facility] due to \"acuity too high\"    [facility] - declined due to history of recent alcohol use     [facility] - not able to meet needs due to history of alcohol use     Private pay costs discussed: Not applicable    Additional Information:    SW to continue to follow for discharge planning and TCU placement. All facilities have " been contacted or left voicemail.     HEATHER Brady, SW   6B    Phone: [phone]  Pager: [phone]

## 2022-11-11 NOTE — PLAN OF CARE
Neuro: A&Ox4. No new neuro deficit   Cardiac: Refuses tele.  VSS.   Respiratory: Sating > 95% on RA.  GI/: Adequate urine output. Uses the urinal. No BM this shift   Diet/appetite: Tolerating reg  diet. Eating well.  Activity:  Assist of 1 up to chair and in halls.  Pain: At acceptable level on current regimen. Denies pain   Skin: No new deficits noted.  LDA's: Right triple lumen PICC. Ordered to be removed but pt refused.     Plan: Discharge to TCU pending placement. Continue with POC. Notify primary team with changes.     Patient refused 11pm and 4am assessment and med.       Goal Outcome Evaluation:      Plan of Care Reviewed With: patient    Overall Patient Progress: improvingOverall Patient Progress: improving

## 2022-11-11 NOTE — PROGRESS NOTES
CLINICAL NUTRITION SERVICES - REASSESSMENT NOTE     Nutrition Prescription    Malnutrition Status:    Severe malnutrition in the context of acute illness super-impossed on socio-environmental    Recommendations already ordered by Registered Dietitian (RD):  - Encouraged PO intake, high protein foods, high protein snacks/ONS  - Modified ONS as below: Discontinued Ensure Enlive order; replaced with Ensure max Protein at 2 PM (30 gm protein per carton) and Special K protein bar at HS   - Allow/encourage additional supplements when/if requested     Future/Additional Recommendations:  Monitor nutrition-related findings and follow pt per protocol     EVALUATION OF THE PROGRESS TOWARD GOALS   Diet: Regular  Supplements: Ensure Enlive between meals (2 PM and 8 PM)     PO Intake: 100% documented at most meals, though variance on occasion. Has been ordering 2-3 meals per day via room service. Reports fair to good appetite. Likes the food here. Has only been drinking 1 Ensure per day so they are starting to collect up in his room; modified supplement order in collaboration with pt. Encouraged protein intake, discussed high protein foods.      NEW FINDINGS   General:    Tx from ICU --> [facility] 11/8    Waiting for TCU placement.     GI:    Last BM x 14 on 11/4, no BM since this date.     Weights:    Down 3.3 kg since admit (3.7%), last weight taken 4 days ago.     Labs:    BUN low, potentially 2/2 inadequate dietary protein intake     Elevated LFTs, reviewed     BG trends elevated but acceptable for acute care setting (acceptable BG range for hospital setting is 140-180 mg/dL per ADA for most patients)    Medications:    B12, 100 mcg/day    Folvite, 1 mg/day    B1/Thiamine 100 mg/day    Skin:    No new deficits noted per RN documentation    MALNUTRITION  % Intake: < 75% for > 7 days (moderate)  % Weight Loss: > 2% in 1 week (severe)  Subcutaneous Fat Loss: Facial region: possible mild and Upper arm: mild -Continues  Muscle Loss:  Facial & jaw region:, Scapular bone:, Thoracic region (clavicle, acromium bone, deltoid, trapezius, pectoral):, Upper arm (bicep, tricep):, Lower arm  (forearm) and Posterior calf: moderate/severe -Continues  Fluid Accumulation/Edema: Does not meet criteria (trace)  Malnutrition Diagnosis: Severe malnutrition in the context of acute illness super-impossed on socio-environmental    Previous Goals   Diet adv within 24-48 hours.  Evaluation: Met    Previous Nutrition Diagnosis  Inadequate oral intake related to decreased appetite, EtOH as evidenced by pt report, h/o micronutrient deficiency, and severe malnutrition     Evaluation: improving    CURRENT NUTRITION DIAGNOSIS  Predicted inadequate nutrient intake (energy/protein) related to variable appetite, poor nutrition status PTA with Etoh hx as evidenced by pt diet hx, current intake 100% of most meals with report of good intake but not consistently taking oral nutrition supplement as ordered (modified 11/11), low BUN potentially indicating inadequate protein (though trending up).    INTERVENTIONS  Implementation  Medical food supplement therapy - Modified  Nutrition education for nutrition relationship to health/disease    Goals  Patient to consume % of nutritionally adequate meal trays TID, or the equivalent with supplements/snacks.    Monitoring/Evaluation  Progress toward goals will be monitored and evaluated per protocol.    Mack Reid RDN, LD, CNSC  6B RD pager: [phone]   6B work-room RD phone: *[phone]    Weekend/Holiday RD pager [phone]

## 2022-11-12 LAB
ALBUMIN SERPL BCG-MCNC: 3.4 G/DL (ref 3.5–5.2)
ALP SERPL-CCNC: 218 U/L (ref 40–129)
ALT SERPL W P-5'-P-CCNC: 61 U/L (ref 10–50)
ANION GAP SERPL CALCULATED.3IONS-SCNC: 13 MMOL/L (ref 7–15)
AST SERPL W P-5'-P-CCNC: 96 U/L (ref 10–50)
BILIRUB SERPL-MCNC: 3.3 MG/DL
BUN SERPL-MCNC: 6.7 MG/DL (ref 6–20)
CALCIUM SERPL-MCNC: 9.1 MG/DL (ref 8.6–10)
CHLORIDE SERPL-SCNC: 101 MMOL/L (ref 98–107)
CREAT SERPL-MCNC: 0.57 MG/DL (ref 0.67–1.17)
DEPRECATED HCO3 PLAS-SCNC: 22 MMOL/L (ref 22–29)
ERYTHROCYTE [DISTWIDTH] IN BLOOD BY AUTOMATED COUNT: 12.9 % (ref 10–15)
GFR SERPL CREATININE-BSD FRML MDRD: >90 ML/MIN/1.73M2
GLUCOSE SERPL-MCNC: 89 MG/DL (ref 70–99)
HCT VFR BLD AUTO: 31.6 % (ref 40–53)
HGB BLD-MCNC: 9.9 G/DL (ref 13.3–17.7)
INR PPP: 1.1 (ref 0.85–1.15)
MAGNESIUM SERPL-MCNC: 1.8 MG/DL (ref 1.7–2.3)
MCH RBC QN AUTO: 34.9 PG (ref 26.5–33)
MCHC RBC AUTO-ENTMCNC: 31.3 G/DL (ref 31.5–36.5)
MCV RBC AUTO: 111 FL (ref 78–100)
PHOSPHATE SERPL-MCNC: 3.5 MG/DL (ref 2.5–4.5)
PLATELET # BLD AUTO: 334 10E3/UL (ref 150–450)
POTASSIUM SERPL-SCNC: 3.8 MMOL/L (ref 3.4–5.3)
PROT SERPL-MCNC: 6.1 G/DL (ref 6.4–8.3)
RBC # BLD AUTO: 2.84 10E6/UL (ref 4.4–5.9)
SODIUM SERPL-SCNC: 136 MMOL/L (ref 136–145)
WBC # BLD AUTO: 5.1 10E3/UL (ref 4–11)

## 2022-11-12 PROCEDURE — 250N000013 HC RX MED GY IP 250 OP 250 PS 637: Performed by: INTERNAL MEDICINE

## 2022-11-12 PROCEDURE — 250N000013 HC RX MED GY IP 250 OP 250 PS 637: Performed by: STUDENT IN AN ORGANIZED HEALTH CARE EDUCATION/TRAINING PROGRAM

## 2022-11-12 PROCEDURE — 250N000013 HC RX MED GY IP 250 OP 250 PS 637

## 2022-11-12 PROCEDURE — 83735 ASSAY OF MAGNESIUM: CPT | Performed by: INTERNAL MEDICINE

## 2022-11-12 PROCEDURE — 120N000003 HC R&B IMCU UMMC

## 2022-11-12 PROCEDURE — 999N000044 HC STATISTIC CVC DRESSING CHANGE

## 2022-11-12 PROCEDURE — 84100 ASSAY OF PHOSPHORUS: CPT | Performed by: INTERNAL MEDICINE

## 2022-11-12 PROCEDURE — 85610 PROTHROMBIN TIME: CPT

## 2022-11-12 PROCEDURE — 36592 COLLECT BLOOD FROM PICC: CPT

## 2022-11-12 PROCEDURE — 80053 COMPREHEN METABOLIC PANEL: CPT

## 2022-11-12 PROCEDURE — 99232 SBSQ HOSP IP/OBS MODERATE 35: CPT | Performed by: INTERNAL MEDICINE

## 2022-11-12 PROCEDURE — 85027 COMPLETE CBC AUTOMATED: CPT

## 2022-11-12 RX ORDER — MAGNESIUM OXIDE 400 MG/1
400 TABLET ORAL EVERY 4 HOURS
Status: COMPLETED | OUTPATIENT
Start: 2022-11-12 | End: 2022-11-12

## 2022-11-12 RX ORDER — POTASSIUM CHLORIDE 750 MG/1
20 TABLET, EXTENDED RELEASE ORAL ONCE
Status: COMPLETED | OUTPATIENT
Start: 2022-11-12 | End: 2022-11-12

## 2022-11-12 RX ADMIN — LEVOTHYROXINE SODIUM 175 MCG: 0.15 TABLET ORAL at 08:53

## 2022-11-12 RX ADMIN — POLYETHYLENE GLYCOL 3350 17 G: 17 POWDER, FOR SOLUTION ORAL at 08:59

## 2022-11-12 RX ADMIN — POTASSIUM CHLORIDE 20 MEQ: 750 TABLET, EXTENDED RELEASE ORAL at 10:45

## 2022-11-12 RX ADMIN — FOLIC ACID 1 MG: 1 TABLET ORAL at 08:52

## 2022-11-12 RX ADMIN — VITAM B12 100 MCG: 100 TAB at 08:54

## 2022-11-12 RX ADMIN — MAGNESIUM OXIDE TAB 400 MG (241.3 MG ELEMENTAL MG) 400 MG: 400 (241.3 MG) TAB at 10:45

## 2022-11-12 RX ADMIN — MAGNESIUM OXIDE TAB 400 MG (241.3 MG ELEMENTAL MG) 400 MG: 400 (241.3 MG) TAB at 13:48

## 2022-11-12 RX ADMIN — Medication 5 MG: at 21:17

## 2022-11-12 RX ADMIN — OLANZAPINE 5 MG: 5 TABLET, ORALLY DISINTEGRATING ORAL at 21:17

## 2022-11-12 RX ADMIN — Medication 1 TABLET: at 08:53

## 2022-11-12 RX ADMIN — NALTREXONE HYDROCHLORIDE 50 MG: 50 TABLET, FILM COATED ORAL at 08:52

## 2022-11-12 RX ADMIN — HYDROXYZINE HYDROCHLORIDE 25 MG: 25 TABLET, FILM COATED ORAL at 21:17

## 2022-11-12 RX ADMIN — THIAMINE HCL TAB 100 MG 100 MG: 100 TAB at 08:53

## 2022-11-12 ASSESSMENT — ACTIVITIES OF DAILY LIVING (ADL)
ADLS_ACUITY_SCORE: 28

## 2022-11-12 NOTE — PROGRESS NOTES
[facility]    Progress Note - Medicine Service, MAROON TEAM 4       Date of Admission:  11/4/2022    Assessment & Plan   Getachew Barcenas is a 52 year old man with a past medical history of alcohol use disorder, thiamine deficiency, frequent falls, peripheral neuropathy, hypothyroidism, former tobacco use, mild LULI, somnambulism, RLS, diverticulitis, and mood disorder who presented as transfer to [facility] on 11/4/2022 for higher level of cares in the setting of new diagnosis of acute on chronic L SDH and subacute R SDH. Patient is hemodynamically stable and has minimal neurological deficits at this time. He does have mild dysmetria, horizontal nystagmus, and peripheral neuropathy, which I suspect are chronic in the setting of longstanding alcohol use disorder. He also has mild right hand weakness, which I suspect is secondary to subdural hematoma. Patient currently stable from neurological standpoint. Pt is now status post B/L MMA embolization w/out complications, now showing signs of paranoia.     Today's changes:  - Awaiting TCU placement      #Acute on chronic left subdural hematoma, chronic bilateral subdural hematoma, s/p B/L MMA Emb.  #Brain compression  #3 mm rightward midline shift, stable  #Alcohol use disorder  #Falls secondary to above  #Concern for alcohol withdrawal   #Thiamine Deficiency likely secondary to EtOH use  #Concern for Wernicke-Korsakoff Syndrome  Long history of EtOH abuse w/ short sobriety episode. Last fall happened a few days ago. Upon arrival he was found to have B/L SDH w/ 3mm L to R midline shift. Ct C-spine was unremarkable, pt in a stable state, no active intra-cranial bleeding confirmed on CTH 11/4 which is  showing no acute intracranial hemorrhage, no substantial change in size or appearance of mixed density b/l SDH, left > right, c/to CTH 11/3/22. Vit B12 WNL @ 937 11/4. As per SICU RN, neuro remained unchanged except for the slight  confused/forgetful episodes. Tremors resolved, Ataxic at time when doing finger to nose 11/4 otherwise normal. Pt now s/p B/L MMA embolization 11/7. Pt denies any new onset of numbness/tingling s/p Embolization.  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  - Vitals Q shift  - Tapered Gabapentin to 300mg  - Continue seizure prophylaxis  - Neurosurgery consultation, recs appreciated          - Plan for NSG follow-up in 1mo w/ repeat CTH  - SBP goal < 140 mmHg           - PRN Labetalol and Hydralazine  - HOB > 30   - CT cervical spine at OSH with no fracture --> cleared C spine   - SLP defer evaluation              - to re-consult if change in swallowing function or communication skills appears  - PT recs 11/6:              - TCU @ discharge              - Gait/imbalance disability, pt requiring aid w/ chair ans walker  -Nutritionist recs 11/5:              - Continue B12, thiamine, folic acid. Continue MVI, as likely at risk for other B vitamin/micronutrient def.              - Electrolyte replacement per protocol as indicated                - Ordered Ensure BID               - Will continue to monitor PO intake/adequacy   - Addiction 11/9:               - Medication mgmt for sobriety w/ Acamprosate vs naltrexone, pt prefer naltrexone since q24h.              - Naltrexone can elevate LFT so began w/ 1/2 dose 25mg then taper              - continue CIWA w/ bunny and consider rx @ discharge  - UDS   - IV Thiamine 500mg TID for 2d -> IV Thiamine 250mg daily for 5d -> PO Thiamine 100mg daily  - Folate/B12 1mg daily  - MV 1 tab daily  - Addiction Medicine reccs pending        #Hyperbilirubinemia (elevated direct bilirubin)  #Hypoalbuminemia/hypoproteinemia, improving  #Elevated LFT's (AST > ALT), improving  #Cholelithiasis, resolved  #ETOH Hepatic steatosis   #Colonic diverticula   Mild jaundice revealed  clinically. CT C/A/P on 11/2. RUQ U/S on 11/3. Suspect many of these hepatic lab abnormalities are secondary to alcohol consumption. Although the same CT revealed cholethiasis and diffuse fatty infiltration of the liver. MELD-Na 19; MELD 16. In addition, Abdomen US notes likely gallstones and sludge w/o wall-thickening or biliary duct dilatation but no clinical (no abd pain fever or RUQ tenderness) and no radiologic evidence of cholecystitis. Lipase 622, No focal pain or symptoms suspicious for pancreatitis, Ct abdomen showing normal pancreas, unlikely pancreatitis. Bilirubin level remained elevated at 7.5 11/5.  Hepatic functions are slowly improving 11/5. Viral hepatitis test all negative more likely suggesting EtoH acute Hepatic steatosis.  - Good oral intake    - Nutrition revised and recommended as stated above  - Daily LFT's  - Viral Hepatitis negative   - GI hepatology recs 11/9 updates :              - monitor LFT's              - F/U as OP w/ local hepatology/GI  - Addiction reccs 11/9:              - Recommend immu for HepA/B prior to discharge     #Anion Gap Metabolic Acidosis, resolved   Highest Anion Gap = 25 mmol/L (Ref range: 7-15) in last 2 days, will monitor and treat as appropriate. Query alcoholic ketoacidosis considered. Anion gap on 11/4 is 13  - Stopped mIVF  - Urine ketones negative  - Consideration for methanol, ethylene glycol ingestion as possible etiologies   - Addiction service      #Hyponatremia, improving  #Hypokalemia, resolved  #Hypocalcemia resolved  #Hypomagnesemia resolved  Labs notable for Na 131 at 11PM at OSH prior to transfer. Na 12 hours earlier was 138. Ddx includes (beer potomania, cerebral salt wasting, SIADH, liver disease, etc. Low K  btw 3.5-3.8. Lowest Ca = 7.5 mg/dL (Ref range: 8.5 - 10.1 mg/dL) in last 2 days, will monitor and replace as appropriate. Mg improving and stable w/ oral magnesium oxide. - Ionized calcium 4.1 c/w hypoalbuminemia and other lab findings, off  "MIVF, PO intake only, pt is doing well after procedure, good appetite.  - Repeat BMP  - Monitor lytes and replace as needed  - Serum osmolality, urine osmolality, urine sodium ordered      #Peripheral neuropathy  #Mild cerebellar atrophy  Suspect from longstanding alcohol use disorder. Stable.      #Paranoid schizophrenia   Daughter's concern over him having schizophrenia (wife had the condition). H/o declined employability due to paranoia and delusional and making threats against people Psychiatry was consulted 11/8, pt became defensive, uncooperative and dilusional about a number of things.   As per psy reccs : \" He would have to be more disorganized and ill in order to pursue court commitment and forced medications; this does not currently apply in his case.\"  Pt demonstrated for several consecutive days no signs of delirium, hallucination or paranoia.   - Continue thiamine protocol  - Zyprexa 5mg prn at bedtime  - No definitive medication regimen prescribed        #PLMD vs RLS  #REM w/o atonia but no dream enactment  #Rare Somnambulism   - Consider Gabapentin 100-300mg at bedtime once out of withdrawal window        #Mild LULI  - Continuous pulse ox  - Maintain O2 saturations greater than 92%  - Sleep study referral on discharge         #Hx of hypoglycemia, stable  Suspect secondary to poor oral intake in the past. Regular diet resumed, Nutritionist consulted  -Hgb A1c  -Monitor glucose levels        #Hypothyroidism  TSH 36, free T4 0.81. Unclear compliance of medication  - PTA Levothyroxine 175mcg daily  - Recheck TSH/free T4 in 2wks  - Consider Endocrine consult        #Coagulation Defect, stable  #Macrocytic Anemia  #Thrombocytopenia, improving  Ddx likely bone marrow suppression from chronic heavy EtoH abuse  INR = 1.24 (Ref range: 0.85 - 1.15) now downtrending.  PTT = 32 Seconds (Ref range: 22 - 38 Seconds), will monitor for bleeding. Presumably due to declining liver function. Lowest platelets = 67 (Ref " "range: 150-450) in last 2 days Hgb however improving steadily. 10.1 w/ MCV still trending high 11/11.  - Daily CBC  - Hgb goal >7, plt goal >50k  -Transfuse to meet Hgb and plt goals     #Leukopenia  - No infection concern  - Daily CBC  - Follow temperature curve        Diet: Regular Diet Adult  Snacks/Supplements Adult: Ensure Enlive; Between Meals  NPO per Anesthesia Guidelines for Procedure/Surgery Except for: Meds    DVT Prophylaxis: SCD's  Triana Catheter: Not present  Fluids: NA  Central Lines: PRESENT  PICC 11/03/22 Triple Lumen Right-Site Assessment: WDL will be removed 11/9  Cardiac Monitoring: None  Code Status: Full Code      Disposition Plan      Expected Discharge Date: 11/14/2022    Discharge Delays: *Medically Ready for Discharge  Placement - TCU  Destination: home  Discharge Comments: Medicaly ready on 11/9, awaiting TCU placement.        The patient's care was discussed with the Patient.    Zane Moreno MD  Medicine Service, [address]  Securely message with the Vocera Web Console (learn more here)  Text page via [facility] Paging/Directory   Please see signed in provider for up to date coverage information      Clinically Significant Risk Factors              # Hypoalbuminemia: Lowest albumin = 3.1 g/dL (Ref range: 3.5-5.2) at 11/8/2022  4:41 AM, will monitor as appropriate          # Overweight: Estimated body mass index is 25.3 kg/m  as calculated from the following:    Height as of this encounter: 1.854 m (6' 1\").    Weight as of this encounter: 87 kg (191 lb 12.8 oz).   # Severe Malnutrition: based on nutrition assessment        ______________________________________________________________________    Interval History   No acute events overnight, awaiting TCU    Data reviewed today: I reviewed all medications, new labs and imaging results over the last 24 hours. I personally reviewed no images or EKG's today.    Physical Exam   Vital " Signs: Temp: 99  F (37.2  C) Temp src: Oral BP: 114/84 Pulse: 78   Resp: 18 SpO2: 96 % O2 Device: None (Room air)    Weight: 191 lbs 12.8 oz  General Appearance:  Alert, in NAD  Respiratory: CTAB  Cardiovascular: RRR  GI: BS+, BM +. Abd soft/non-distended/non-tender  Other: Cooperative and pleasant.     Data   Recent Labs   Lab 11/12/22  0630 11/11/22  0647 11/10/22  0701 11/09/22  0623   WBC 5.1  --  4.6 4.4   HGB 9.9*  --  9.9* 9.7*   *  --  113* 111*     --  231 200   INR 1.10 1.20* 1.08 1.11     --  133* 134*   POTASSIUM 3.8  --  3.9 3.7   CHLORIDE 101  --  102 101   CO2 22  --  20* 20*   BUN 6.7  --  5.4* 5.1*   CR 0.57*  --  0.57* 0.55*   ANIONGAP 13  --  11 13   ABDULKADIR 9.1  --  8.8 8.8   GLC 89  --  94 96   ALBUMIN 3.4*  --  3.2* 3.2*   PROTTOTAL 6.1*  --  5.9* 5.9*   BILITOTAL 3.3*  --  3.1* 3.4*   ALKPHOS 218*  --  215* 225*   ALT 61*  --  56* 61*   AST 96*  --  99* 119*     No results found for this or any previous visit (from the past 24 hour(s)).  Medications       cyanocobalamin  100 mcg Oral Daily     folic acid  1 mg Oral Daily     levothyroxine  175 mcg Oral or Feeding Tube Daily     magnesium oxide  400 mg Oral Q4H     multivitamin w/minerals  1 tablet Oral Daily     naltrexone  50 mg Oral Daily     OLANZapine zydis  5 mg Oral At Bedtime     thiamine  100 mg Oral or Feeding Tube Daily

## 2022-11-12 NOTE — PLAN OF CARE
End of shift summary: [id number]    Neuro: A&Ox4. Pleasant and cooperative. No witnessed paranoid or delusional thoughts. Requested something for anxiety and sleep last night.  Received atarax, zyprexa, and melatonin with good result.  Cardiac: Refuses tele.  VSS.   Respiratory: Sating > 95% on RA.  GI/: Adequate UOP. Uses the urinal. No BM overnight.  Diet/appetite: Tolerating reg diet. Good appetite.  Activity:  Up to chair with one assist.  Pain: Denies pain   Skin: No new deficits noted.  LDA's: Right triple lumen PICC.      Plan: Discharge to TCU, pending placement. Continue with POC. Notify primary team with changes.

## 2022-11-12 NOTE — PROGRESS NOTES
"Care Management Follow Up    Length of Stay (days): 8    Expected Discharge Date: 11/14/2022     Concerns to be Addressed:     discharge planning  Patient plan of care discussed at interdisciplinary rounds: Yes    Anticipated Discharge Disposition:  TCU     Anticipated Discharge Services:  rehab services via TCU  Anticipated Discharge DME:  None    Patient/family educated on Medicare website which has current facility and service quality ratings:  Yes  Education Provided on the Discharge Plan:  Yes  Patient/Family in Agreement with the Plan:  Yes    Referrals Placed by CM/SW:      Pending Referrals 11/11:     [facility]"" at [place]   [address] [place] [address]  PH: [phone]  11/12: No weekend admissions. Requested SW call back on Monday, 11/14 to inquire.     [facility]  [address] [place] [address]  P: [phone]  F: [phone]  11/12: Spoke with rep who reported that they believed there was weekend admissions but would need their charge nurse to call SW back with that information. SW provided call back phone number.     [facility]  [address] [place] [address]  [phone]     [facility]   [address] [place] [address]  PH: [phone]  11/12: Left VM for Admissions requesting a call back. No weekend admissions noted on automated VM.     Declined/Discontinued:     [facility] - declined in [facility], not able to meet needs due to history of alcohol use and psychological hx   [facility] - declined in [facility] due to complex medical needs, hx of alcohol use  [facility] - declined due to no appropriate beds   [facility] - declined in [facility] due to \"acuity too high\"  [facility] - declined due to history of recent alcohol use   [facility] - not able to meet needs due to history of alcohol use   [facility]" [place]   11/12: Spoke with Admissions, not accepting TCU referrals unless it is for pt who is from [facility] indefinitely. Pt was not living in [facility] PTA.     [facility]  [address] [place] [address]  [phone]  11/12: Confirmed they have weekend admissions. Left VM for Admissions requesting a call back.  ADDENDUM 1630: Received phone call back from [place] with admissions. Facility not currently accepting pts d/t COVID outbreak at facility. Can call back/make new referral in 10 days if pt still happens to be here.    Private pay costs discussed: Not applicable    Additional Information:  SW followed up on TCU referrals as noted above in bold. SW will continue to follow pt for discharge planning with PT/OT recs for TCU.     LIDIA Larios   [facility]     Social Work and Care Management Department       SEARCHABLE in [employer] - search SOCIAL WORK       [place] (0800 - 1630) Saturday and Sunday     Units: 4A, 4C, & 4E Pager: [phone]     Units: 5A & 5B Pager: [phone]     Units: 6A & 6B   Pager: [phone]     Units: 6C & 6D Pager: [phone]     Units: 7A &7B  Pager: [phone]     Units: 7C, 7D, & 5C Pager: [phone]     Unit: [place] [facility] Pager: [phone]      [facility] (5650-5751) Saturday and Sunday      Units: 5 Ortho, 5 Med/Surg & WB ED  Pager:[phone]     Units: 6 Med/Surg, 8A, & 10A ICU  Pager: [phone]        After hours (1630 - 0000) Saturday & Sunday; (4614-8124) Mon-Fri; (7737-1642) [facility] Recognized Holidays     Units: ALL  Pager: [phone]

## 2022-11-12 NOTE — PLAN OF CARE
End of Shift Summary. See flowsheets for vital signs and detailed assessment.    Changes this shift: Patient without acute event or change this shift. Maintaining sats on room air. Remains afebrile and hemodynamically stable. A&Ox4.    Plan: continue to monitor patient status; notify physician of changes.

## 2022-11-13 LAB
INR PPP: 1.18 (ref 0.85–1.15)
MAGNESIUM SERPL-MCNC: 2 MG/DL (ref 1.7–2.3)
PHOSPHATE SERPL-MCNC: 3.7 MG/DL (ref 2.5–4.5)
POTASSIUM SERPL-SCNC: 4 MMOL/L (ref 3.4–5.3)

## 2022-11-13 PROCEDURE — 84132 ASSAY OF SERUM POTASSIUM: CPT | Performed by: INTERNAL MEDICINE

## 2022-11-13 PROCEDURE — 250N000013 HC RX MED GY IP 250 OP 250 PS 637: Performed by: STUDENT IN AN ORGANIZED HEALTH CARE EDUCATION/TRAINING PROGRAM

## 2022-11-13 PROCEDURE — 85610 PROTHROMBIN TIME: CPT

## 2022-11-13 PROCEDURE — 250N000013 HC RX MED GY IP 250 OP 250 PS 637: Performed by: INTERNAL MEDICINE

## 2022-11-13 PROCEDURE — 36415 COLL VENOUS BLD VENIPUNCTURE: CPT

## 2022-11-13 PROCEDURE — 83735 ASSAY OF MAGNESIUM: CPT | Performed by: INTERNAL MEDICINE

## 2022-11-13 PROCEDURE — 250N000013 HC RX MED GY IP 250 OP 250 PS 637

## 2022-11-13 PROCEDURE — 84100 ASSAY OF PHOSPHORUS: CPT | Performed by: INTERNAL MEDICINE

## 2022-11-13 PROCEDURE — 120N000002 HC R&B MED SURG/OB UMMC

## 2022-11-13 PROCEDURE — 99232 SBSQ HOSP IP/OBS MODERATE 35: CPT | Mod: GC | Performed by: INTERNAL MEDICINE

## 2022-11-13 RX ORDER — POLYETHYLENE GLYCOL 3350 17 G/17G
17 POWDER, FOR SOLUTION ORAL 2 TIMES DAILY
Status: DISCONTINUED | OUTPATIENT
Start: 2022-11-13 | End: 2022-11-16 | Stop reason: HOSPADM

## 2022-11-13 RX ORDER — MAGNESIUM OXIDE 400 MG/1
400 TABLET ORAL EVERY 4 HOURS
Status: COMPLETED | OUTPATIENT
Start: 2022-11-13 | End: 2022-11-13

## 2022-11-13 RX ADMIN — LEVOTHYROXINE SODIUM 175 MCG: 0.15 TABLET ORAL at 07:47

## 2022-11-13 RX ADMIN — FOLIC ACID 1 MG: 1 TABLET ORAL at 07:47

## 2022-11-13 RX ADMIN — POLYETHYLENE GLYCOL 3350 17 G: 17 POWDER, FOR SOLUTION ORAL at 18:17

## 2022-11-13 RX ADMIN — Medication 5 MG: at 20:36

## 2022-11-13 RX ADMIN — POLYETHYLENE GLYCOL 3350 17 G: 17 POWDER, FOR SOLUTION ORAL at 07:47

## 2022-11-13 RX ADMIN — MAGNESIUM OXIDE TAB 400 MG (241.3 MG ELEMENTAL MG) 400 MG: 400 (241.3 MG) TAB at 10:03

## 2022-11-13 RX ADMIN — ACETAMINOPHEN 650 MG: 325 TABLET, FILM COATED ORAL at 20:36

## 2022-11-13 RX ADMIN — VITAM B12 100 MCG: 100 TAB at 07:47

## 2022-11-13 RX ADMIN — OLANZAPINE 5 MG: 5 TABLET, ORALLY DISINTEGRATING ORAL at 20:37

## 2022-11-13 RX ADMIN — MAGNESIUM OXIDE TAB 400 MG (241.3 MG ELEMENTAL MG) 400 MG: 400 (241.3 MG) TAB at 13:23

## 2022-11-13 RX ADMIN — NALTREXONE HYDROCHLORIDE 50 MG: 50 TABLET, FILM COATED ORAL at 07:47

## 2022-11-13 RX ADMIN — Medication 1 TABLET: at 07:47

## 2022-11-13 RX ADMIN — THIAMINE HCL TAB 100 MG 100 MG: 100 TAB at 07:47

## 2022-11-13 ASSESSMENT — ACTIVITIES OF DAILY LIVING (ADL)
ADLS_ACUITY_SCORE: 28

## 2022-11-13 NOTE — PLAN OF CARE
"Transfer  Transferred to: 7C  Via:bed  Reason for transfer: Pt no longer appropriate for 6B- improved patient condition  Family: Aware of transfer  Belongings: Packed and sent with pt  Chart: Delivered with pt to next unit  Medications: Meds sent to new unit with pt    Pt status: BP (!) 110/90 (BP Location: Left arm)   Pulse 85   Temp 98.5  F (36.9  C) (Oral)   Resp 16   Ht 1.854 m (6' 1\")   Wt 87 kg (191 lb 12.8 oz)   SpO2 98%   BMI 25.30 kg/m     Alert and oriented, on RA. No tele, VS q12 hrs. PICC. Mag replaced. Regular diet, good appetite.       "

## 2022-11-13 NOTE — PLAN OF CARE
End of shift summary: [id number]    Neuro: A&Ox4. Pleasant and cooperative. No witnessed paranoid or delusional thoughts. Requested something for anxiety and sleep.  Received atarax, zyprexa, and melatonin with good result.  Cardiac: No tele.  VSS.   Respiratory: Sating > 95% on RA.  GI/: Adequate UOP. Uses the urinal. No BM overnight.  Diet/appetite: Reg diet with good appetite  Activity:  Slept comfortably in bed most of the night.  Pain: Denies pain   Skin: No new deficits noted.  LDA's: Right triple lumen PICC. Patient refusing PIV.     Plan: Discharge to TCU, pending placement. Continue with POC. Notify primary team with changes.

## 2022-11-13 NOTE — PLAN OF CARE
"Assumed cares [id number]    /85 (BP Location: Left arm)   Pulse 76   Temp (!) 96.5  F (35.8  C) (Oral)   Resp 16   Ht 1.854 m (6' 1\")   Wt 87 kg (191 lb 12.8 oz)   SpO2 100%   BMI 25.30 kg/m        Status: VSS on RA  Neuro: A&Ox4. Bilateral upper and lower extremity numbness/tingling at baseline.   Respiratory: WDL  Cardiac: WDL  GI: Last BM 11/4/22. Passing flatus. Bowel sound audible and normoactive  : Voided spontaneously without difficulty in bedside urinal. Anaya colored urine.   Pain/Nausea: Denied both   Mobility: Independent   Diet: Tolerated regular diet with good appetite   Lines: Right basilic triple lumen PICC SL  Skin/Incisions: Bruising on bilateral upper extremities. Left great toe nail bruised and coming off.   Plan: Awaiting TCU placement       "

## 2022-11-13 NOTE — PLAN OF CARE
"Neuro: A&Ox4.   Cardiac: No tele, 's. Vitals q shift.    Respiratory: Spot check, RA.   GI/: Adequate urine output, dark laura. No BM, prn miralax given.   Diet/appetite: Tolerating regular diet. Eating well.  Activity:  Standby assist  Pain: Denied.   Skin: No new deficits noted.  LDA's: R PICC, SL.     Plan: K and Mag replaced, recheck in am. Continue with POC. Notify primary team with changes.  /84 (BP Location: Left arm)   Pulse 78   Temp 99  F (37.2  C) (Oral)   Resp 18   Ht 1.854 m (6' 1\")   Wt 87 kg (191 lb 12.8 oz)   SpO2 96%   BMI 25.30 kg/m          "

## 2022-11-13 NOTE — PROGRESS NOTES
[facility]    Progress Note - Medicine Service, [facility] TEAM 4       Date of Admission:  11/4/2022    Assessment & Plan   Getachew Barcenas is a 52 year old man with a past medical history of alcohol use disorder, thiamine deficiency, frequent falls, peripheral neuropathy, hypothyroidism, former tobacco use, mild LULI, somnambulism, RLS, diverticulitis, and mood disorder who presented as transfer to [facility] on 11/4/2022 for higher level of cares in the setting of new diagnosis of acute on chronic L SDH and subacute R SDH. Patient is hemodynamically stable and has minimal neurological deficits at this time. He does have mild dysmetria, horizontal nystagmus, and peripheral neuropathy, which I suspect are chronic in the setting of longstanding alcohol use disorder. He also has mild right hand weakness, which I suspect is secondary to subdural hematoma. Patient currently stable from neurological standpoint. Pt is now status post B/L MMA embolization w/out complications, now showing signs of paranoia.     Today's changes:  - Awaiting TCU placement      #Acute on chronic left subdural hematoma, chronic bilateral subdural hematoma, s/p B/L MMA Emb.  #Brain compression  #3 mm rightward midline shift, stable  #Alcohol use disorder  #Falls secondary to above  #Concern for alcohol withdrawal   #Thiamine Deficiency likely secondary to EtOH use  #Concern for Wernicke-Korsakoff Syndrome  Long history of EtOH abuse w/ short sobriety episode. Last fall happened a few days ago. Upon arrival he was found to have B/L SDH w/ 3mm L to R midline shift. Ct C-spine was unremarkable, pt in a stable state, no active intra-cranial bleeding confirmed on CTH 11/4 which is  showing no acute intracranial hemorrhage, no substantial change in size or appearance of mixed density b/l SDH, left > right, c/to CTH 11/3/22. Vit B12 WNL @ 937 11/4. As per SICU RN, neuro remained unchanged except for the slight  confused/forgetful episodes. Tremors resolved, Ataxic at time when doing finger to nose 11/4 otherwise normal. Pt now s/p B/L MMA embolization 11/7. Pt denies any new onset of numbness/tingling s/p Embolization.  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  - Vitals Q shift  - Tapered Gabapentin to 300mg  - Continue seizure prophylaxis  - Neurosurgery consultation, recs appreciated          - Plan for NSG follow-up in 1mo w/ repeat CTH  - SBP goal < 140 mmHg           - PRN Labetalol and Hydralazine  - HOB > 30   - CT cervical spine at OSH with no fracture --> cleared C spine   - SLP defer evaluation              - to re-consult if change in swallowing function or communication skills appears  - PT recs 11/6:              - TCU @ discharge              - Gait/imbalance disability, pt requiring aid w/ chair ans walker  -Nutritionist recs 11/5:              - Continue B12, thiamine, folic acid. Continue MVI, as likely at risk for other B vitamin/micronutrient def.              - Electrolyte replacement per protocol as indicated                - Ordered Ensure BID               - Will continue to monitor PO intake/adequacy   - Addiction 11/9:               - Medication mgmt for sobriety w/ Acamprosate vs naltrexone, pt prefer naltrexone since q24h.              - Naltrexone can elevate LFT so began w/ 1/2 dose 25mg then taper              - continue CIWA w/ bunny and consider rx @ discharge  - UDS   - IV Thiamine 500mg TID for 2d -> IV Thiamine 250mg daily for 5d -> PO Thiamine 100mg daily  - Folate/B12 1mg daily  - MV 1 tab daily  - Addiction Medicine reccs pending        #Hyperbilirubinemia (elevated direct bilirubin)  #Hypoalbuminemia/hypoproteinemia, improving  #Elevated LFT's (AST > ALT), improving  #Cholelithiasis, resolved  #ETOH Hepatic steatosis   #Colonic diverticula   Mild jaundice revealed  clinically. CT C/A/P on 11/2. RUQ U/S on 11/3. Suspect many of these hepatic lab abnormalities are secondary to alcohol consumption. Although the same CT revealed cholethiasis and diffuse fatty infiltration of the liver. MELD-Na 19; MELD 16. In addition, Abdomen US notes likely gallstones and sludge w/o wall-thickening or biliary duct dilatation but no clinical (no abd pain fever or RUQ tenderness) and no radiologic evidence of cholecystitis. Lipase 622, No focal pain or symptoms suspicious for pancreatitis, Ct abdomen showing normal pancreas, unlikely pancreatitis. Bilirubin level remained elevated at 7.5 11/5.  Hepatic functions are slowly improving 11/5. Viral hepatitis test all negative more likely suggesting EtoH acute Hepatic steatosis.  - Good oral intake    - Nutrition revised and recommended as stated above  - Daily LFT's  - Viral Hepatitis negative   - GI hepatology recs 11/9 updates :              - monitor LFT's              - F/U as OP w/ local hepatology/GI  - Addiction reccs 11/9:              - Recommend immu for HepA/B prior to discharge      #Anion Gap Metabolic Acidosis, resolved   Highest Anion Gap = 25 mmol/L (Ref range: 7-15) in last 2 days, will monitor and treat as appropriate. Query alcoholic ketoacidosis considered. Anion gap on 11/4 is 13  - Stopped mIVF  - Urine ketones negative  - Consideration for methanol, ethylene glycol ingestion as possible etiologies   - Addiction service      #Hyponatremia, improving  #Hypokalemia, resolved  #Hypocalcemia resolved  #Hypomagnesemia resolved  Labs notable for Na 131 at 11PM at OSH prior to transfer. Na 12 hours earlier was 138. Ddx includes (beer potomania, cerebral salt wasting, SIADH, liver disease, etc. Low K  btw 3.5-3.8. Lowest Ca = 7.5 mg/dL (Ref range: 8.5 - 10.1 mg/dL) in last 2 days, will monitor and replace as appropriate. Mg improving and stable w/ oral magnesium oxide. - Ionized calcium 4.1 c/w hypoalbuminemia and other lab findings, off  "MIVF, PO intake only, pt is doing well after procedure, good appetite.  - Repeat BMP  - Monitor lytes and replace as needed  - Serum osmolality, urine osmolality, urine sodium ordered      #Peripheral neuropathy  #Mild cerebellar atrophy  Suspect from longstanding alcohol use disorder. Stable.      #Paranoid schizophrenia   Daughter's concern over him having schizophrenia (wife had the condition). H/o declined employability due to paranoia and delusional and making threats against people Psychiatry was consulted 11/8, pt became defensive, uncooperative and dilusional about a number of things.   As per psy reccs : \" He would have to be more disorganized and ill in order to pursue court commitment and forced medications; this does not currently apply in his case.\"  Pt demonstrated for several consecutive days no signs of delirium, hallucination or paranoia.   - Continue thiamine protocol  - Zyprexa 5mg prn at bedtime  - No definitive medication regimen prescribed        #PLMD vs RLS  #REM w/o atonia but no dream enactment  #Rare Somnambulism   - Consider Gabapentin 100-300mg at bedtime once out of withdrawal window        #Mild LULI  - Continuous pulse ox  - Maintain O2 saturations greater than 92%  - Sleep study referral on discharge         #Hx of hypoglycemia, stable  Suspect secondary to poor oral intake in the past. Regular diet resumed, Nutritionist consulted  -Hgb A1c  -Monitor glucose levels        #Hypothyroidism  TSH 36, free T4 0.81. Unclear compliance of medication  - PTA Levothyroxine 175mcg daily  - Recheck TSH/free T4 in 2wks  - Consider Endocrine consult        #Coagulation Defect, stable  #Macrocytic Anemia  #Thrombocytopenia, improving  Ddx likely bone marrow suppression from chronic heavy EtoH abuse  INR = 1.24 (Ref range: 0.85 - 1.15) now downtrending.  PTT = 32 Seconds (Ref range: 22 - 38 Seconds), will monitor for bleeding. Presumably due to declining liver function. Lowest platelets = 67 (Ref " "range: 150-450) in last 2 days Hgb however improving steadily. 10.1 w/ MCV still trending high 11/11.  - Daily CBC  - Hgb goal >7, plt goal >50k  -Transfuse to meet Hgb and plt goals     #Leukopenia  - No infection concern  - Daily CBC  - Follow temperature curve        Diet: Regular Diet Adult  Snacks/Supplements Adult: Ensure Enlive; Between Meals  NPO per Anesthesia Guidelines for Procedure/Surgery Except for: Meds    DVT Prophylaxis: SCD's  Triana Catheter: Not present  Fluids: NA  Central Lines: PRESENT  PICC 11/03/22 Triple Lumen Right-Site Assessment: WDL will be removed 11/9  Cardiac Monitoring: None  Code Status: Full Code      Disposition Plan      Expected Discharge Date: 11/14/2022    Discharge Delays: *Medically Ready for Discharge  Placement - TCU  Destination: home  Discharge Comments: Medicaly ready on 11/9, awaiting TCU options        The patient's care was discussed with the Attending Physician, Dr. Moreno and Patient.    Simba Craig MD  Medicine Service, [address]  Securely message with the Vocera Web Console (learn more here)  Text page via [facility] Paging/Directory   Please see signed in provider for up to date coverage information      Clinically Significant Risk Factors              # Hypoalbuminemia: Lowest albumin = 3.1 g/dL (Ref range: 3.5-5.2) at 11/8/2022  4:41 AM, will monitor as appropriate          # Overweight: Estimated body mass index is 25.3 kg/m  as calculated from the following:    Height as of this encounter: 1.854 m (6' 1\").    Weight as of this encounter: 87 kg (191 lb 12.8 oz).   # Severe Malnutrition: based on nutrition assessment        ______________________________________________________________________    Interval History   NAEON  No reported new onset of pain, headache, F/C/N/V  Awaiting for TCU placement    Data reviewed today: I reviewed all medications, new labs and imaging results over the last 24 hours. " I personally reviewed no images or EKG's today.    Physical Exam   Vital Signs: Temp: 98.5  F (36.9  C) Temp src: Oral BP: (!) 110/90 Pulse: 85   Resp: 16 SpO2: 98 % O2 Device: None (Room air)    Weight: 191 lbs 12.8 oz  General Appearance: AAOx3  Respiratory: CTAB  Cardiovascular: RRR  GI: BS+, BM+, Abd soft/NT/NS  Skin: No new rashes  Other: Cooperative and pleasant    Data   Recent Labs   Lab 11/13/22  0639 11/12/22  0630 11/11/22  0647 11/10/22  0701 11/09/22  0623   WBC  --  5.1  --  4.6 4.4   HGB  --  9.9*  --  9.9* 9.7*   MCV  --  111*  --  113* 111*   PLT  --  334  --  231 200   INR 1.18* 1.10 1.20* 1.08 1.11   NA  --  136  --  133* 134*   POTASSIUM 4.0 3.8  --  3.9 3.7   CHLORIDE  --  101  --  102 101   CO2  --  22  --  20* 20*   BUN  --  6.7  --  5.4* 5.1*   CR  --  0.57*  --  0.57* 0.55*   ANIONGAP  --  13  --  11 13   ABDULKADIR  --  9.1  --  8.8 8.8   GLC  --  89  --  94 96   ALBUMIN  --  3.4*  --  3.2* 3.2*   PROTTOTAL  --  6.1*  --  5.9* 5.9*   BILITOTAL  --  3.3*  --  3.1* 3.4*   ALKPHOS  --  218*  --  215* 225*   ALT  --  61*  --  56* 61*   AST  --  96*  --  99* 119*     No results found for this or any previous visit (from the past 24 hour(s)).  Medications       cyanocobalamin  100 mcg Oral Daily     folic acid  1 mg Oral Daily     levothyroxine  175 mcg Oral or Feeding Tube Daily     magnesium oxide  400 mg Oral Q4H     multivitamin w/minerals  1 tablet Oral Daily     naltrexone  50 mg Oral Daily     OLANZapine zydis  5 mg Oral At Bedtime     sodium chloride (PF)  3 mL Intracatheter Q8H     thiamine  100 mg Oral or Feeding Tube Daily

## 2022-11-14 ENCOUNTER — APPOINTMENT (OUTPATIENT)
Dept: PHYSICAL THERAPY | Facility: CLINIC | Age: 52
End: 2022-11-14
Attending: SURGERY
Payer: COMMERCIAL

## 2022-11-14 ENCOUNTER — APPOINTMENT (OUTPATIENT)
Dept: OCCUPATIONAL THERAPY | Facility: CLINIC | Age: 52
End: 2022-11-14
Attending: SURGERY
Payer: COMMERCIAL

## 2022-11-14 LAB
ALBUMIN SERPL BCG-MCNC: 3.5 G/DL (ref 3.5–5.2)
ALP SERPL-CCNC: 227 U/L (ref 40–129)
ALT SERPL W P-5'-P-CCNC: 66 U/L (ref 10–50)
ANION GAP SERPL CALCULATED.3IONS-SCNC: 12 MMOL/L (ref 7–15)
AST SERPL W P-5'-P-CCNC: 112 U/L (ref 10–50)
BILIRUB SERPL-MCNC: 2.6 MG/DL
BUN SERPL-MCNC: 8.2 MG/DL (ref 6–20)
CALCIUM SERPL-MCNC: 9 MG/DL (ref 8.6–10)
CHLORIDE SERPL-SCNC: 102 MMOL/L (ref 98–107)
CREAT SERPL-MCNC: 0.6 MG/DL (ref 0.67–1.17)
DEPRECATED HCO3 PLAS-SCNC: 20 MMOL/L (ref 22–29)
ERYTHROCYTE [DISTWIDTH] IN BLOOD BY AUTOMATED COUNT: 12.6 % (ref 10–15)
GFR SERPL CREATININE-BSD FRML MDRD: >90 ML/MIN/1.73M2
GLUCOSE SERPL-MCNC: 88 MG/DL (ref 70–99)
HCT VFR BLD AUTO: 32.1 % (ref 40–53)
HGB BLD-MCNC: 10.2 G/DL (ref 13.3–17.7)
INR PPP: 1.11 (ref 0.85–1.15)
MAGNESIUM SERPL-MCNC: 1.9 MG/DL (ref 1.7–2.3)
MCH RBC QN AUTO: 34.8 PG (ref 26.5–33)
MCHC RBC AUTO-ENTMCNC: 31.8 G/DL (ref 31.5–36.5)
MCV RBC AUTO: 110 FL (ref 78–100)
PHOSPHATE SERPL-MCNC: 4.5 MG/DL (ref 2.5–4.5)
PLATELET # BLD AUTO: 374 10E3/UL (ref 150–450)
POTASSIUM SERPL-SCNC: 3.9 MMOL/L (ref 3.4–5.3)
PROT SERPL-MCNC: 6.4 G/DL (ref 6.4–8.3)
RBC # BLD AUTO: 2.93 10E6/UL (ref 4.4–5.9)
SODIUM SERPL-SCNC: 134 MMOL/L (ref 136–145)
WBC # BLD AUTO: 5.6 10E3/UL (ref 4–11)

## 2022-11-14 PROCEDURE — 85027 COMPLETE CBC AUTOMATED: CPT

## 2022-11-14 PROCEDURE — 83735 ASSAY OF MAGNESIUM: CPT | Performed by: INTERNAL MEDICINE

## 2022-11-14 PROCEDURE — 97530 THERAPEUTIC ACTIVITIES: CPT | Mod: GP

## 2022-11-14 PROCEDURE — 85610 PROTHROMBIN TIME: CPT

## 2022-11-14 PROCEDURE — 97110 THERAPEUTIC EXERCISES: CPT | Mod: GP

## 2022-11-14 PROCEDURE — 97530 THERAPEUTIC ACTIVITIES: CPT | Mod: GO

## 2022-11-14 PROCEDURE — 36592 COLLECT BLOOD FROM PICC: CPT

## 2022-11-14 PROCEDURE — 250N000013 HC RX MED GY IP 250 OP 250 PS 637: Performed by: INTERNAL MEDICINE

## 2022-11-14 PROCEDURE — 84100 ASSAY OF PHOSPHORUS: CPT | Performed by: INTERNAL MEDICINE

## 2022-11-14 PROCEDURE — 250N000013 HC RX MED GY IP 250 OP 250 PS 637

## 2022-11-14 PROCEDURE — 250N000013 HC RX MED GY IP 250 OP 250 PS 637: Performed by: STUDENT IN AN ORGANIZED HEALTH CARE EDUCATION/TRAINING PROGRAM

## 2022-11-14 PROCEDURE — 999N000007 HC SITE CHECK

## 2022-11-14 PROCEDURE — 97116 GAIT TRAINING THERAPY: CPT | Mod: GP

## 2022-11-14 PROCEDURE — 120N000002 HC R&B MED SURG/OB UMMC

## 2022-11-14 PROCEDURE — 99232 SBSQ HOSP IP/OBS MODERATE 35: CPT | Mod: GC | Performed by: INTERNAL MEDICINE

## 2022-11-14 PROCEDURE — 80053 COMPREHEN METABOLIC PANEL: CPT

## 2022-11-14 RX ADMIN — THIAMINE HCL TAB 100 MG 100 MG: 100 TAB at 08:17

## 2022-11-14 RX ADMIN — NALTREXONE HYDROCHLORIDE 50 MG: 50 TABLET, FILM COATED ORAL at 08:19

## 2022-11-14 RX ADMIN — HYDROXYZINE HYDROCHLORIDE 25 MG: 25 TABLET, FILM COATED ORAL at 10:31

## 2022-11-14 RX ADMIN — OLANZAPINE 5 MG: 5 TABLET, ORALLY DISINTEGRATING ORAL at 21:16

## 2022-11-14 RX ADMIN — VITAM B12 100 MCG: 100 TAB at 08:18

## 2022-11-14 RX ADMIN — Medication 1 TABLET: at 08:17

## 2022-11-14 RX ADMIN — FOLIC ACID 1 MG: 1 TABLET ORAL at 08:17

## 2022-11-14 RX ADMIN — LEVOTHYROXINE SODIUM 175 MCG: 0.15 TABLET ORAL at 10:31

## 2022-11-14 ASSESSMENT — ACTIVITIES OF DAILY LIVING (ADL)
ADLS_ACUITY_SCORE: 28

## 2022-11-14 NOTE — PROGRESS NOTES
[facility]    Progress Note - Medicine Service, [facility] TEAM 4       Date of Admission:  11/4/2022    Assessment & Plan     Getachew Barcenas is a 52 year old man with a past medical history of alcohol use disorder, thiamine deficiency, frequent falls, peripheral neuropathy, hypothyroidism, former tobacco use, mild LULI, somnambulism, RLS, diverticulitis, and mood disorder who presented as transfer to [facility] on 11/4/2022 for higher level of cares in the setting of new diagnosis of acute on chronic L SDH and subacute R SDH. Patient is hemodynamically stable and has minimal neurological deficits at this time. He does have mild dysmetria, horizontal nystagmus, and peripheral neuropathy, which I suspect are chronic in the setting of longstanding alcohol use disorder. He also has mild right hand weakness, which I suspect is secondary to subdural hematoma. Patient currently stable from neurological standpoint. Pt is now status post B/L MMA embolization w/out complications, now showing signs of paranoia.     Today's changes:  - Care mgmt home care family assistance  - Change discharge Tcu to home care  - Expected discharge trmw w/ home care     #Acute on chronic left subdural hematoma, chronic bilateral subdural hematoma, s/p B/L MMA Emb.  #Brain compression  #3 mm rightward midline shift, stable  #Alcohol use disorder  #Falls secondary to above  #Concern for alcohol withdrawal   #Thiamine Deficiency likely secondary to EtOH use  #Concern for Wernicke-Korsakoff Syndrome  Long history of EtOH abuse w/ short sobriety episode. Last fall happened a few days ago. Upon arrival he was found to have B/L SDH w/ 3mm L to R midline shift. Ct C-spine was unremarkable, pt in a stable state, no active intra-cranial bleeding confirmed on CTH 11/4 which is  showing no acute intracranial hemorrhage, no substantial change in size or appearance of mixed density b/l SDH, left > right,  c/to CTH 11/3/22. Vit B12 WNL @ 937 11/4. As per SICU RN, neuro remained unchanged except for the slight confused/forgetful episodes. Tremors resolved, Ataxic at time when doing finger to nose 11/4 otherwise normal. Pt now s/p B/L MMA embolization 11/7. Pt denies any new onset of numbness/tingling s/p Embolization.  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  - Vitals Q shift  - Tapered Gabapentin to 300mg  - Continue seizure prophylaxis  - Neurosurgery consultation, recs appreciated          - Plan for NSG follow-up in 1mo w/ repeat CTH  - SBP goal < 140 mmHg           - PRN Labetalol and Hydralazine  - HOB > 30   - CT cervical spine at OSH with no fracture --> cleared C spine   - SLP defer evaluation              - to re-consult if change in swallowing function or communication skills appears  - PT recs 11/6:              - TCU @ discharge              - Gait/imbalance disability, pt requiring aid w/ chair ans walker  -Nutritionist recs 11/5:              - Continue B12, thiamine, folic acid. Continue MVI, as likely at risk for other B vitamin/micronutrient def.              - Electrolyte replacement per protocol as indicated                - Ordered Ensure BID               - Will continue to monitor PO intake/adequacy   - Addiction 11/9:               - Medication mgmt for sobriety w/ Acamprosate vs naltrexone, pt prefer naltrexone since q24h.              - Naltrexone can elevate LFT so began w/ 1/2 dose 25mg then taper              - continue CIWA w/ bunny and consider rx @ discharge  - UDS   - IV Thiamine 500mg TID for 2d -> IV Thiamine 250mg daily for 5d -> PO Thiamine 100mg daily  - Folate/B12 1mg daily  - MV 1 tab daily  - Addiction Medicine reccs pending        #Hyperbilirubinemia (elevated direct bilirubin)  #Hypoalbuminemia/hypoproteinemia, improving  #Elevated LFT's (AST > ALT),  improving  #Cholelithiasis, resolved  #ETOH Hepatic steatosis   #Colonic diverticula   Mild jaundice revealed clinically. CT C/A/P on 11/2. RUQ U/S on 11/3. Suspect many of these hepatic lab abnormalities are secondary to alcohol consumption. Although the same CT revealed cholethiasis and diffuse fatty infiltration of the liver. MELD-Na 19; MELD 16. In addition, Abdomen US notes likely gallstones and sludge w/o wall-thickening or biliary duct dilatation but no clinical (no abd pain fever or RUQ tenderness) and no radiologic evidence of cholecystitis. Lipase 622, No focal pain or symptoms suspicious for pancreatitis, Ct abdomen showing normal pancreas, unlikely pancreatitis. Bilirubin level remained elevated at 7.5 11/5.  Hepatic functions are slowly improving 11/5. Viral hepatitis test all negative more likely suggesting EtoH acute Hepatic steatosis.  - Good oral intake    - Nutrition revised and recommended as stated above  - Daily LFT's  - Viral Hepatitis negative   - GI hepatology recs 11/9 updates :              - monitor LFT's              - F/U as OP w/ local hepatology/GI  - Addiction reccs 11/9:              - Recommend immu for HepA/B prior to discharge      #Anion Gap Metabolic Acidosis, resolved   Highest Anion Gap = 25 mmol/L (Ref range: 7-15) in last 2 days, will monitor and treat as appropriate. Query alcoholic ketoacidosis considered. Anion gap on 11/4 is 13  - Stopped mIVF  - Urine ketones negative  - Consideration for methanol, ethylene glycol ingestion as possible etiologies   - Addiction service      #Hyponatremia, improving  #Hypokalemia, resolved  #Hypocalcemia resolved  #Hypomagnesemia resolved  Labs notable for Na 131 at 11PM at OSH prior to transfer. Na 12 hours earlier was 138. Ddx includes (beer potomania, cerebral salt wasting, SIADH, liver disease, etc. Low K  btw 3.5-3.8. Lowest Ca = 7.5 mg/dL (Ref range: 8.5 - 10.1 mg/dL) in last 2 days, will monitor and replace as  "appropriate. Mg improving and stable w/ oral magnesium oxide. - Ionized calcium 4.1 c/w hypoalbuminemia and other lab findings, off MIVF, PO intake only, pt is doing well after procedure, good appetite.  - Repeat BMP  - Monitor lytes and replace as needed  - Serum osmolality, urine osmolality, urine sodium ordered      #Peripheral neuropathy  #Mild cerebellar atrophy  Suspect from longstanding alcohol use disorder. Stable.      #Paranoid schizophrenia   Daughter's concern over him having schizophrenia (wife had the condition). H/o declined employability due to paranoia and delusional and making threats against people Psychiatry was consulted 11/8, pt became defensive, uncooperative and dilusional about a number of things.   As per psy reccs : \" He would have to be more disorganized and ill in order to pursue court commitment and forced medications; this does not currently apply in his case.\"  Pt demonstrated for several consecutive days no signs of delirium, hallucination or paranoia.   - Continue thiamine protocol  - Zyprexa 5mg prn at bedtime  - No definitive medication regimen prescribed        #PLMD vs RLS  #REM w/o atonia but no dream enactment  #Rare Somnambulism   - Consider Gabapentin 100-300mg at bedtime once out of withdrawal window        #Mild LULI  - Continuous pulse ox  - Maintain O2 saturations greater than 92%  - Sleep study referral on discharge         #Hx of hypoglycemia, stable  Suspect secondary to poor oral intake in the past. Regular diet resumed, Nutritionist consulted  -Hgb A1c  -Monitor glucose levels        #Hypothyroidism  TSH 36, free T4 0.81. Unclear compliance of medication  - PTA Levothyroxine 175mcg daily  - Recheck TSH/free T4 in 2wks  - Consider Endocrine consult        #Coagulation Defect, stable  #Macrocytic Anemia  #Thrombocytopenia, improving  Ddx likely bone marrow suppression from chronic heavy EtoH abuse  INR = 1.24 (Ref range: 0.85 - 1.15) now downtrending.  PTT = 32 Seconds " "(Ref range: 22 - 38 Seconds), will monitor for bleeding. Presumably due to declining liver function. Lowest platelets = 67 (Ref range: 150-450) in last 2 days Hgb however improving steadily. 10.1 w/ MCV still trending high 11/11.  - Daily CBC  - Hgb goal >7, plt goal >50k  -Transfuse to meet Hgb and plt goals     #Leukopenia  - No infection concern  - Daily CBC  - Follow temperature curve        Diet: Regular Diet Adult  Snacks/Supplements Adult: Ensure Enlive; Between Meals  NPO per Anesthesia Guidelines for Procedure/Surgery Except for: Meds    DVT Prophylaxis: SCD's  Triana Catheter: Not present  Fluids: NA  Central Lines: PRESENT  PICC 11/03/22 Triple Lumen Right-Site Assessment: WDL will be removed 11/9  Cardiac Monitoring: None  Code Status: Full Code      Disposition Plan      Expected Discharge Date: 11/14/2022    Discharge Delays: *Medically Ready for Discharge  Placement - TCU  Destination: home  Discharge Comments: Medicaly ready on 11/9, awaiting TCU options        The patient's care was discussed with the Attending Physician, Dr. Moreno and Patient.    Simba Craig MD  Medicine Service, [facility] TEAM [address] [place] [address]  Securely message with the Vocera Web Console (learn more here)  Text page via [facility] Paging/Directory   Please see signed in provider for up to date coverage information      Clinically Significant Risk Factors         # Hyponatremia: Lowest Na = 134 mmol/L (Ref range: 136-145) in last 2 days, will monitor as appropriate      # Hypoalbuminemia: Lowest albumin = 3.1 g/dL (Ref range: 3.5-5.2) at 11/8/2022  4:41 AM, will monitor as appropriate          # Overweight: Estimated body mass index is 25.3 kg/m  as calculated from the following:    Height as of this encounter: 1.854 m (6' 1\").    Weight as of this encounter: 87 kg (191 lb 12.8 oz).   # Severe Malnutrition: based on nutrition assessment    "     ______________________________________________________________________    Interval History    NAEON, tolerating po, good night sleep. Pleasant and motivated to be discharged.  Per discussion with pt he did not want his adult children to be called or notified regarding discharge plans  Pt was able to make some great improvements with physical therapist today.  Denies F/C/V/N    Data reviewed today: I reviewed all medications, new labs and imaging results over the last 24 hours. I personally reviewed no images or EKG's today.    Physical Exam   Vital Signs: Temp: 98.4  F (36.9  C) Temp src: Temporal BP: 107/67 Pulse: 62   Resp: 18 SpO2: 95 %      Weight: 191 lbs 12.8 oz  General Appearance:  AAOx3  Respiratory: CTAB  Cardiovascular: RRR  GI: BS+, BM+, Abd soft/NT/NS  Skin: No new rashes  Other:  Cooperative and pleasant       Data   Recent Labs   Lab 11/14/22  0555 11/13/22  0639 11/12/22  0630 11/11/22  0647 11/10/22  0701   WBC 5.6  --  5.1  --  4.6   HGB 10.2*  --  9.9*  --  9.9*   *  --  111*  --  113*     --  334  --  231   INR 1.11 1.18* 1.10   < > 1.08   *  --  136  --  133*   POTASSIUM 3.9 4.0 3.8  --  3.9   CHLORIDE 102  --  101  --  102   CO2 20*  --  22  --  20*   BUN 8.2  --  6.7  --  5.4*   CR 0.60*  --  0.57*  --  0.57*   ANIONGAP 12  --  13  --  11   ABDULKADIR 9.0  --  9.1  --  8.8   GLC 88  --  89  --  94   ALBUMIN 3.5  --  3.4*  --  3.2*   PROTTOTAL 6.4  --  6.1*  --  5.9*   BILITOTAL 2.6*  --  3.3*  --  3.1*   ALKPHOS 227*  --  218*  --  215*   ALT 66*  --  61*  --  56*   *  --  96*  --  99*    < > = values in this interval not displayed.     No results found for this or any previous visit (from the past 24 hour(s)).  Medications       cyanocobalamin  100 mcg Oral Daily     folic acid  1 mg Oral Daily     levothyroxine  175 mcg Oral or Feeding Tube Daily     multivitamin w/minerals  1 tablet Oral Daily     naltrexone  50 mg Oral Daily     OLANZapine zydis  5 mg Oral At  Bedtime     polyethylene glycol  17 g Oral or Feeding Tube BID     sodium chloride (PF)  3 mL Intracatheter Q8H     thiamine  100 mg Oral or Feeding Tube Daily

## 2022-11-14 NOTE — PROGRESS NOTES
HD10 admitted from OSH with chronic L SDH and subacute R SDH. A&Ox4, VSS on room air. Denies pain. Up SBA, GB, walker, calls appropriately. Skin and sclera yellow. Denies nausea, voiding adequately, BM 11/13. Plan is to discharge home with home care.     1700 - pt ambulated the halls with GB and no walker. Unsteady at times. Showered, Refused bed alarm, states he will call for assistance, although has been found not to call while preparing for the shower.     Pt's daughter James called. Requested information on pt status. Explained that Pt does not want information shared with her. She states she is POA of pt. Which she is. Explained that pt is decisional and does not information shared with her. She States she has information for us and that Pt's sister will call back with the information.

## 2022-11-14 NOTE — PLAN OF CARE
Goal Outcome Evaluation:  Care from 7 to 11 pm  Pt transferred from  this evening per day RN report  A&Ox4. AVSS.Pt reported headache rated 3/10, PRN tylenol 650 mg tab po given.  Bruising on bilateral upper extremities. Left great toe nail bruised and coming off.   Pt is on seizure precaution; seizure pads are on bilateral side rails.      Continue with POC     Medicaly ready on 11/9, awaiting TCU options  per provider note.

## 2022-11-14 NOTE — PROGRESS NOTES
Care Management Follow Up    Length of Stay (days): 10    Expected Discharge Date: 11/14/2022     Concerns to be Addressed:     Discharge  Patient plan of care discussed at interdisciplinary rounds: Yes    Anticipated Discharge Disposition: home with home care      Anticipated Discharge Services:  Home care  Anticipated Discharge DME:  None    Patient/family educated on Medicare website which has current facility and service quality ratings:  Yes  Education Provided on the Discharge Plan:  Yes  Patient/Family in Agreement with the Plan:  Yes    Referrals Placed by CM/SW:  Home Care    Additional Information:  Pt called writer requesting assistance with discharge planning.  Writer had received notification from  [facility] that pt discharge plan had shifted from TCU to home with home care.  Per discussion with pt he did not want his adult children to be called or notified regarding discharge plans.   Reviewed home health options, medicare.gov site.  Per pt his family will be able to assist him with transportation.  PT notes that his PCP is Dr. Domonique [facility].   Pt open to having home health referrals sent.       Spoke with Deonte Sevilla  Resident.  Pt is medically ready for discharge.  Discharge today vs tomorrow pending confirmation of home care acceptance.     Home Care Referrals initiated:  [facility]  (ph:[phone] fx:[phone]) - initial clinical faxed for review    [facility]  (ph:[phone] fx:[phone])  left requesting return/clnical faxed for review.    [facility] AND[facility] ph:[phone] fx: [phone] -  left and clinical information faxed.     [facility] (aka: [facility]) (ph:[phone] fx:[phone])  left and clinical information faxed    Home Care Declined:  [facility] - unable to accept d/t staffing.   [facility] unable to accept d/t pt payor.   [facility] - not accepting new referrals  [facility] - unable to accept at capacity with payor  [facility] -unable to accept d/t staffing  [facility] - don't take insurance  [facility]    - don't take insurance  [facility] - take insurance, as of 4/21 only taking [facility] ref[facility]  [facility] - unable to accept d/t payor   [facility] - unable to accept d/t payor  [facility] - unable to accept d/t staffing  [facility] - unable to accept d/t staffing  [facility] - unable to accept d/t staffing      Leyla Tierney, RN BSN, PHN, ACM-RN  7A RN Care Coordinator  Phone: [phone]  Pager [phone]    To contact the weekend RNCC  [place] (0800 - 1630) Saturday and Sunday    Units: 4A, 4C, 4E, 5A and 5B- Pager 1: [phone]    Units: 6A, 6B, 6C, 6D- Pager 2: [phone]    Units: 7A, 7B, 7C, 7D, and 5C-Pager 3: [phone]    [facility] ([phone]) Saturday and Sunday    Units: 5 Ortho, 8A, 10 ICU, & Pediatric Units-Pager 4: [phone]    11/14/2022 1:03 PM

## 2022-11-14 NOTE — PLAN OF CARE
"Goal Outcome Evaluation: Pt afebrile. Vitals stable. Pt slept at long intervals last night. He stated he slept until 0330 at which time he was able to have a medium sized soft brown bm. Pt stated,\"it was effortless.\" Pt in good spirits this am. Urinal at bs. Voiding w/o difficulty, clear laura uo. Pt denied any c/o's headache this am. Refused any tylenol. TL picc intact and saline locked. Seizure pads in place, no sz noted tonight. Plan: pt medically stable and await TCU plcmt.                        "

## 2022-11-14 NOTE — PROGRESS NOTES
Care Management Follow Up    Length of Stay (days): 10    Expected Discharge Date: 11/14/2022     Concerns to be Addressed: Discharge       Patient plan of care discussed at interdisciplinary rounds: Yes    Anticipated Discharge Disposition:  Home with Home Care and family assistance     Anticipated Discharge Services: Home Care     Anticipated Discharge DME: None      Patient/family educated on Medicare website which has current facility and service quality ratings:  Yes    Education Provided on the Discharge Plan:  Yes    Patient/Family in Agreement with the Plan: Yes     Referrals Placed by CM/SW: TCU     Private pay costs discussed: Not applicable    Additional Information:  Writer met with patient to discuss discharge plans, due to request from floor nurse. RNCC is working on setting up Home Health Care for patient, due to therapy recommendation change to home with home care this morning. Patient had requested to have his daughter and son removed from this contact list due to family conflict/argument. Writer informed patient that the family could not be removed due to them being his POAHC. He stated that this was ok, but that he didn't want them making decisions for him at this time, due to them lying to him.   He stated that his daughter told him he had to go to treatment and didn't have the option to discharge home. Writer told him that there were likely recommendations for treatment, but this was not a stipulation. Writer assured him that he did have the ability to make his own decisions at this time, but that if he went home staff would need assurance that someone was assisting him at home. Patient stated that his friend or his daughter would assist at home. He stated that he is going to schedule appointments to meet with his therapist a couple times a week and will continue going to Taoism and his Taoism group as these things assist with his sobriety.      Dayanna Aldridge, KARLA, MSW  Adult Acute Care Float    Pager [phone]

## 2022-11-15 ENCOUNTER — APPOINTMENT (OUTPATIENT)
Dept: OCCUPATIONAL THERAPY | Facility: CLINIC | Age: 52
End: 2022-11-15
Attending: SURGERY
Payer: COMMERCIAL

## 2022-11-15 LAB
HOLD SPECIMEN: NORMAL
HOLD SPECIMEN: NORMAL
INR PPP: 1.17 (ref 0.85–1.15)

## 2022-11-15 PROCEDURE — 250N000013 HC RX MED GY IP 250 OP 250 PS 637: Performed by: STUDENT IN AN ORGANIZED HEALTH CARE EDUCATION/TRAINING PROGRAM

## 2022-11-15 PROCEDURE — 97535 SELF CARE MNGMENT TRAINING: CPT | Mod: GO | Performed by: OCCUPATIONAL THERAPIST

## 2022-11-15 PROCEDURE — 258N000003 HC RX IP 258 OP 636

## 2022-11-15 PROCEDURE — 250N000013 HC RX MED GY IP 250 OP 250 PS 637: Performed by: INTERNAL MEDICINE

## 2022-11-15 PROCEDURE — 120N000002 HC R&B MED SURG/OB UMMC

## 2022-11-15 PROCEDURE — 250N000013 HC RX MED GY IP 250 OP 250 PS 637

## 2022-11-15 PROCEDURE — 36415 COLL VENOUS BLD VENIPUNCTURE: CPT

## 2022-11-15 PROCEDURE — 99232 SBSQ HOSP IP/OBS MODERATE 35: CPT | Mod: GC | Performed by: INTERNAL MEDICINE

## 2022-11-15 PROCEDURE — 85610 PROTHROMBIN TIME: CPT

## 2022-11-15 PROCEDURE — 250N000011 HC RX IP 250 OP 636: Performed by: HOSPITALIST

## 2022-11-15 RX ORDER — HEPARIN SODIUM,PORCINE 10 UNIT/ML
5-20 VIAL (ML) INTRAVENOUS
Status: DISCONTINUED | OUTPATIENT
Start: 2022-11-15 | End: 2022-11-16 | Stop reason: HOSPADM

## 2022-11-15 RX ORDER — HEPARIN SODIUM,PORCINE 10 UNIT/ML
5-20 VIAL (ML) INTRAVENOUS EVERY 24 HOURS
Status: DISCONTINUED | OUTPATIENT
Start: 2022-11-15 | End: 2022-11-16 | Stop reason: HOSPADM

## 2022-11-15 RX ADMIN — SODIUM CHLORIDE, POTASSIUM CHLORIDE, SODIUM LACTATE AND CALCIUM CHLORIDE 500 ML: 600; 310; 30; 20 INJECTION, SOLUTION INTRAVENOUS at 02:33

## 2022-11-15 RX ADMIN — VITAM B12 100 MCG: 100 TAB at 08:32

## 2022-11-15 RX ADMIN — NALTREXONE HYDROCHLORIDE 50 MG: 50 TABLET, FILM COATED ORAL at 08:32

## 2022-11-15 RX ADMIN — OLANZAPINE 5 MG: 5 TABLET, ORALLY DISINTEGRATING ORAL at 21:08

## 2022-11-15 RX ADMIN — FOLIC ACID 1 MG: 1 TABLET ORAL at 08:32

## 2022-11-15 RX ADMIN — THIAMINE HCL TAB 100 MG 100 MG: 100 TAB at 08:32

## 2022-11-15 RX ADMIN — Medication 1 TABLET: at 08:32

## 2022-11-15 RX ADMIN — LEVOTHYROXINE SODIUM 175 MCG: 0.15 TABLET ORAL at 08:32

## 2022-11-15 RX ADMIN — Medication 15 ML: at 02:35

## 2022-11-15 ASSESSMENT — ACTIVITIES OF DAILY LIVING (ADL)
ADLS_ACUITY_SCORE: 28
ADLS_ACUITY_SCORE: 28
ADLS_ACUITY_SCORE: 22
ADLS_ACUITY_SCORE: 28
ADLS_ACUITY_SCORE: 22
ADLS_ACUITY_SCORE: 22
ADLS_ACUITY_SCORE: 28
ADLS_ACUITY_SCORE: 22
ADLS_ACUITY_SCORE: 28
ADLS_ACUITY_SCORE: 22

## 2022-11-15 NOTE — PROVIDER NOTIFICATION
Notified  at 0200 regarding pt having low UOP.      Spoke with: Maryjane Degroot    Orders 500ml LR bolus

## 2022-11-15 NOTE — PROGRESS NOTES
Care Management Follow Up    Length of Stay (days): 11    Expected Discharge Date: 11/16/2022     Concerns to be Addressed: All concern addressed      Patient plan of care discussed at interdisciplinary rounds: Yes    Anticipated Discharge Disposition:  Home with out patient PT     Anticipated Discharge Services: None   Anticipated Discharge DME: None     Education Provided on the Discharge Plan: Yes   Patient/Family in Agreement with the Plan:  Yes    Referrals Placed by CM/SW:  HC referral placed by RNCC    Additional Information:  CM met with pt at bedside for discharge planning. Discussed TCU need due to safety concern reported by pt's family. Pt insist that he wants to go home. RNCC has been working to secure home PT for pt and has not been able to fine a HC agency that will accept pt's insurance coverage.     Pt is accepting to discharge home with outpatient PT    Per family report, pt has had multiple falls due to alcohol abuse. That pt was on the floor for four (4) hours in one of his falls. That pt has been unable to independently complete ADLs and has been found multiple times in his own urine. That the police had been called multiple times for welfare check and pt won't open his door. That a vulnerable adult report was filed with [facility] and nothing was done. That family is out of option to commit pt into treatment     Intervention  CM provide family with [facility] Commitment Intake line to support family concern.      CM to continue following for safe discharge. Father Velasquez Mosher) to transport upon discharge.    Marivel Tate, 7C [facility]  P: [phone]  Pager: [phone]  F: [phone]  Weekend Pager: [phone]  Weekend Coverage: 7C; 7D; 5C

## 2022-11-15 NOTE — PLAN OF CARE
Assumed care for pt [id number]  Soft BP's, OAVSS, AOx4. Denies pain or nausea. Up with SBA voiding spontaneously with maginal urine output. MD made aware, ordered 500ml LR bolus. LBM, 11/14/22. PICC HL. Seizure pads in place, Pt refused bed alarm.     Spoke with Pt sister Fracisco Mendoza and updated her on pt status with pt permission. Pt sister would like to talk to  about pt discharge planning. Report passed on to oncoming nurse.

## 2022-11-15 NOTE — PROGRESS NOTES
Care Management Follow Up    Length of Stay (days): 11  Expected Discharge Date: 11/15/2022  Concerns to be Addressed:     Discharge planning  Patient plan of care discussed at interdisciplinary rounds: Yes  Anticipated Discharge Disposition: home  Anticipated Discharge Services:  Home care  Anticipated Discharge DME:  None  Patient/family educated on Medicare website which has current facility and service quality ratings:  Yes  Education Provided on the Discharge Plan:  Yes  Patient/Family in Agreement with the Plan:  Yes  Referrals Placed by CM/SW:  Home Care    Additional Information:  RNCC followed up on current pending referrals and sent new referrals per Medicare.gov list. Only 4 home care referrals left before home care is exhausted. [facility] Orta, spoke with pt regarding difficulty finding home care. Pt states that if home care cannot be found, he is willing to go to an OP Clinic to get therapies.       PCP is Dr. Domonique [facility]ry [facility].     Ph: [phone]  Fax: [phone]    Home Care Referrals Pending:  Marleny ANDERS ph:[phone] fx: [phone] -  left and clinical information faxed.   [facility]-sent referral via Fax: [phone]  [facility]:  for return call regarding availability for referral      Home Care Declined:  [facility] - unable to accept d/t staffing.   [facility] unable to accept d/t pt payor.   [facility] - not accepting new referrals  [facility] - unable to accept at capacity with payor  [facility] -unable to accept d/t staffing  [facility]  (ph:[phone] fx:[phone]) - unable to take due to payor  [facility] - don't take insurance  [facility]    - don't take insurance  [facility] - take insurance, as of 4/21 only taking [facility] referrals  [facility] - unable to accept d/t payor   [facility] - unable to accept d/t payor  [facility] - unable to accept d/t staffing  [facility] - unable to accept d/t staffing  [facility] - unable to accept d/t staffing  [facility]-declined  [facility]-declined  [facility]-pediatric, no adult therapy services  [facility]-does not provided skilled therapy services  [facility]-unable to accept due to distance  [facility]: Only taking [employer] system   [facility]: OON  [facility]: -unable due to staffing  [facility]: cannot do PT/OT with PMAP  [facility]- unable to service area at this time      Aaron Rodriguez, RN BSN  7C RN Care Coordinator   Ph: [phone]  Pager: [phone]

## 2022-11-15 NOTE — PROGRESS NOTES
"/78 (BP Location: Left arm)   Pulse 82   Temp 96.8  F (36  C) (Oral)   Resp 17   Ht 1.854 m (6' 1\")   Wt 87 kg (191 lb 12.8 oz)   SpO2 98%   BMI 25.30 kg/m      2083-1663    Neuro: A&Ox4. Seizure pads in place.  Cardiac:Soft BP's. Afebrile, VSS.   Respiratory: RA   GI/: Voiding spontaneously AUOP in hat.LBM 11/14/22.   Diet/appetite: Tolerating regular diet. Denies nausea   Activity: Up with 1 assist    Pain: . Denies   Skin: No new deficits noted.  Lines: PICC line HL.  Pt has been resting comfortably. Will continue to monitor and follow plan of care.Father Olson called for updates, provider paged and decision arrived not to d/t him not being listed as contact. Dad is his d/charge .  "

## 2022-11-15 NOTE — PROGRESS NOTES
[facility]    Progress Note - Medicine Service, MAROON TEAM 4       Date of Admission:  11/4/2022    Assessment & Plan     Getachew Barcenas is a 52 year old man with a past medical history of alcohol use disorder, thiamine deficiency, frequent falls, peripheral neuropathy, hypothyroidism, former tobacco use, mild LULI, somnambulism, RLS, diverticulitis, and mood disorder who presented as transfer to [facility] on 11/4/2022 for higher level of cares in the setting of new diagnosis of acute on chronic L SDH and subacute R SDH. Patient is hemodynamically stable and has minimal neurological deficits at this time. He does have mild dysmetria, horizontal nystagmus, and peripheral neuropathy, which I suspect are chronic in the setting of longstanding alcohol use disorder. He also has mild right hand weakness, which I suspect is secondary to subdural hematoma. Patient currently stable from neurological standpoint. Pt is now status post B/L MMA embolization w/out complications, now showing signs of paranoia.     Today's changes:  - Home care family assistance  - Change discharge Tcu to home care  - Expected discharge trmw w/ home care     #Acute on chronic left subdural hematoma, chronic bilateral subdural hematoma, s/p B/L MMA Emb.  #Brain compression  #3 mm rightward midline shift, stable  #Alcohol use disorder  #Falls secondary to above  #Concern for alcohol withdrawal   #Thiamine Deficiency likely secondary to EtOH use  #Concern for Wernicke-Korsakoff Syndrome  Long history of EtOH abuse w/ short sobriety episode. Last fall happened a few days ago. Upon arrival he was found to have B/L SDH w/ 3mm L to R midline shift. Ct C-spine was unremarkable, pt in a stable state, no active intra-cranial bleeding confirmed on CTH 11/4 which is  showing no acute intracranial hemorrhage, no substantial change in size or appearance of mixed density b/l SDH, left > right, c/to CTH 11/3/22. Vit B12  WNL @ 937 11/4. As per SICU RN, neuro remained unchanged except for the slight confused/forgetful episodes. Tremors resolved, Ataxic at time when doing finger to nose 11/4 otherwise normal. Pt now s/p B/L MMA embolization 11/7. Pt denies any new onset of numbness/tingling s/p Embolization.  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  - Vitals Q shift  - Tapered Gabapentin to 300mg  - Continue seizure prophylaxis  - Neurosurgery consultation, recs appreciated          - Plan for NSG follow-up in 1mo w/ repeat CTH  - SBP goal < 140 mmHg           - PRN Labetalol and Hydralazine  - HOB > 30   - CT cervical spine at OSH with no fracture --> cleared C spine   - SLP defer evaluation              - to re-consult if change in swallowing function or communication skills appears  - PT recs 11/6:              - TCU @ discharge              - Gait/imbalance disability, pt requiring aid w/ chair ans walker  -Nutritionist recs 11/5:              - Continue B12, thiamine, folic acid. Continue MVI, as likely at risk for other B vitamin/micronutrient def.              - Electrolyte replacement per protocol as indicated                - Ordered Ensure BID               - Will continue to monitor PO intake/adequacy   - Addiction 11/9:               - Medication mgmt for sobriety w/ Acamprosate vs naltrexone, pt prefer naltrexone since q24h.              - Naltrexone can elevate LFT so began w/ 1/2 dose 25mg then taper              - continue CIWA w/ bunny and consider rx @ discharge  - UDS   - IV Thiamine 500mg TID for 2d -> IV Thiamine 250mg daily for 5d -> PO Thiamine 100mg daily  - Folate/B12 1mg daily  - MV 1 tab daily  - Addiction Medicine reccs pending        #Hyperbilirubinemia (elevated direct bilirubin)  #Hypoalbuminemia/hypoproteinemia, improving  #Elevated LFT's (AST > ALT), improving  #Cholelithiasis,  resolved  #ETOH Hepatic steatosis   #Colonic diverticula   Mild jaundice revealed clinically. CT C/A/P on 11/2. RUQ U/S on 11/3. Suspect many of these hepatic lab abnormalities are secondary to alcohol consumption. Although the same CT revealed cholethiasis and diffuse fatty infiltration of the liver. MELD-Na 19; MELD 16. In addition, Abdomen US notes likely gallstones and sludge w/o wall-thickening or biliary duct dilatation but no clinical (no abd pain fever or RUQ tenderness) and no radiologic evidence of cholecystitis. Lipase 622, No focal pain or symptoms suspicious for pancreatitis, Ct abdomen showing normal pancreas, unlikely pancreatitis. Bilirubin level remained elevated at 7.5 11/5.  Hepatic functions are slowly improving 11/5. Viral hepatitis test all negative more likely suggesting EtoH acute Hepatic steatosis.  - Good oral intake    - Nutrition revised and recommended as stated above  - Daily LFT's  - Viral Hepatitis negative   - GI hepatology recs 11/9 updates :              - monitor LFT's              - F/U as OP w/ local hepatology/GI  - Addiction reccs 11/9:              - Recommend immu for HepA/B prior to discharge      #Anion Gap Metabolic Acidosis, resolved   Highest Anion Gap = 25 mmol/L (Ref range: 7-15) in last 2 days, will monitor and treat as appropriate. Query alcoholic ketoacidosis considered. Anion gap on 11/4 is 13  - Stopped mIVF  - Urine ketones negative  - Consideration for methanol, ethylene glycol ingestion as possible etiologies   - Addiction service      #Hyponatremia, improving  #Hypokalemia, resolved  #Hypocalcemia resolved  #Hypomagnesemia resolved  Labs notable for Na 131 at 11PM at OSH prior to transfer. Na 12 hours earlier was 138. Ddx includes (beer potomania, cerebral salt wasting, SIADH, liver disease, etc. Low K  btw 3.5-3.8. Lowest Ca = 7.5 mg/dL (Ref range: 8.5 - 10.1 mg/dL) in last 2 days, will monitor and replace as appropriate. Mg improving and stable w/ oral  "magnesium oxide. - Ionized calcium 4.1 c/w hypoalbuminemia and other lab findings, off MIVF, PO intake only, pt is doing well after procedure, good appetite.  - Repeat BMP  - Monitor lytes and replace as needed  - Serum osmolality, urine osmolality, urine sodium ordered      #Peripheral neuropathy  #Mild cerebellar atrophy  Suspect from longstanding alcohol use disorder. Stable.      #Paranoid schizophrenia   Daughter's concern over him having schizophrenia (wife had the condition). H/o declined employability due to paranoia and delusional and making threats against people Psychiatry was consulted 11/8, pt became defensive, uncooperative and dilusional about a number of things.   As per psy reccs : \" He would have to be more disorganized and ill in order to pursue court commitment and forced medications; this does not currently apply in his case.\"  Pt demonstrated for several consecutive days no signs of delirium, hallucination or paranoia.   - Continue thiamine protocol  - Zyprexa 5mg prn at bedtime  - No definitive medication regimen prescribed        #PLMD vs RLS  #REM w/o atonia but no dream enactment  #Rare Somnambulism   - Consider Gabapentin 100-300mg at bedtime once out of withdrawal window        #Mild LULI  - Continuous pulse ox  - Maintain O2 saturations greater than 92%  - Sleep study referral on discharge         #Hx of hypoglycemia, stable  Suspect secondary to poor oral intake in the past. Regular diet resumed, Nutritionist consulted  -Hgb A1c  -Monitor glucose levels        #Hypothyroidism  TSH 36, free T4 0.81. Unclear compliance of medication  - PTA Levothyroxine 175mcg daily  - Recheck TSH/free T4 in 2wks  - Consider Endocrine consult        #Coagulation Defect, stable  #Macrocytic Anemia  #Thrombocytopenia, improving  Ddx likely bone marrow suppression from chronic heavy EtoH abuse  INR = 1.24 (Ref range: 0.85 - 1.15) now downtrending.  PTT = 32 Seconds (Ref range: 22 - 38 Seconds), will monitor " "for bleeding. Presumably due to declining liver function. Lowest platelets = 67 (Ref range: 150-450) in last 2 days Hgb however improving steadily. 10.1 w/ MCV still trending high 11/11.  - Daily CBC  - Hgb goal >7, plt goal >50k  -Transfuse to meet Hgb and plt goals     #Leukopenia  - No infection concern  - Daily CBC  - Follow temperature curve        Diet: Regular Diet Adult  Snacks/Supplements Adult: Ensure Enlive; Between Meals  NPO per Anesthesia Guidelines for Procedure/Surgery Except for: Meds    DVT Prophylaxis: SCD's  Triana Catheter: Not present  Fluids: NA  Central Lines: PRESENT  PICC 11/03/22 Triple Lumen Right-Site Assessment: WDL will be removed 11/9  Cardiac Monitoring: None  Code Status: Full Code      Disposition Plan      Expected Discharge Date: 11/16/2022    Discharge Delays: *Medically Ready for Discharge  Placement - TCU  Destination: home  Discharge Comments: Medicaly ready on 11/9  Barriers: home care acceptance; will f/u with [facility]        The patient's care was discussed with the attending physician Dr. Avery and patient.    Simba Craig MD  Medicine Service, [address]  Securely message with the Vocera Web Console (learn more here)  Text page via [facility] Paging/Directory   Please see signed in provider for up to date coverage information      Clinically Significant Risk Factors         # Hyponatremia: Lowest Na = 134 mmol/L (Ref range: 136-145) in last 2 days, will monitor as appropriate      # Hypoalbuminemia: Lowest albumin = 3.1 g/dL (Ref range: 3.5-5.2) at 11/8/2022  4:41 AM, will monitor as appropriate          # Overweight: Estimated body mass index is 25.3 kg/m  as calculated from the following:    Height as of this encounter: 1.854 m (6' 1\").    Weight as of this encounter: 87 kg (191 lb 12.8 oz).   # Severe Malnutrition: based on nutrition assessment    "     ______________________________________________________________________    Interval History    NAEON, tolerating po, good night sleep. Pleasant and motivated to be discharged.  Per discussion with pt he did not want his adult children to be called or notified regarding discharge plans  Pt was able to make some great improvements with physical therapist today.  Denies F/C/V/N    Data reviewed today: I reviewed all medications, new labs and imaging results over the last 24 hours. I personally reviewed no images or EKG's today.    Physical Exam   Vital Signs: Temp: 96.8  F (36  C) Temp src: Oral BP: 110/78 Pulse: 82   Resp: 17 SpO2: 98 % O2 Device: None (Room air)    Weight: 191 lbs 12.8 oz  General Appearance:  AAOx3  Respiratory: CTAB  Cardiovascular: RRR  GI: BS+, BM+, Abd soft/NT/NS  Skin: No new rashes  Other:  Cooperative and pleasant       Data   Recent Labs   Lab 11/15/22  0550 11/14/22  0555 11/13/22  0639 11/12/22  0630 11/11/22  0647 11/10/22  0701   WBC  --  5.6  --  5.1  --  4.6   HGB  --  10.2*  --  9.9*  --  9.9*   MCV  --  110*  --  111*  --  113*   PLT  --  374  --  334  --  231   INR 1.17* 1.11 1.18* 1.10   < > 1.08   NA  --  134*  --  136  --  133*   POTASSIUM  --  3.9 4.0 3.8  --  3.9   CHLORIDE  --  102  --  101  --  102   CO2  --  20*  --  22  --  20*   BUN  --  8.2  --  6.7  --  5.4*   CR  --  0.60*  --  0.57*  --  0.57*   ANIONGAP  --  12  --  13  --  11   ABDULKADIR  --  9.0  --  9.1  --  8.8   GLC  --  88  --  89  --  94   ALBUMIN  --  3.5  --  3.4*  --  3.2*   PROTTOTAL  --  6.4  --  6.1*  --  5.9*   BILITOTAL  --  2.6*  --  3.3*  --  3.1*   ALKPHOS  --  227*  --  218*  --  215*   ALT  --  66*  --  61*  --  56*   AST  --  112*  --  96*  --  99*    < > = values in this interval not displayed.     No results found for this or any previous visit (from the past 24 hour(s)).  Medications      cyanocobalamin  100 mcg Oral Daily    folic acid  1 mg Oral Daily    heparin lock flush  5-20 mL Intracatheter  Q24H    levothyroxine  175 mcg Oral or Feeding Tube Daily    multivitamin w/minerals  1 tablet Oral Daily    naltrexone  50 mg Oral Daily    OLANZapine zydis  5 mg Oral At Bedtime    polyethylene glycol  17 g Oral or Feeding Tube BID    sodium chloride (PF)  10-40 mL Intracatheter Q8H    sodium chloride (PF)  3 mL Intracatheter Q8H    thiamine  100 mg Oral or Feeding Tube Daily       Internal Medicine Staff Addendum  Date of Service: 11/15/2022    I have seen and examined this patient, reviewed the data and discussed the plan of care. I agree with the above documentation including plan and ddx unless otherwise stated:     #    Rodrigo Avery MD  Internal Medicine Hospitalist  [facility]  Attending pager: [phone]

## 2022-11-16 ENCOUNTER — APPOINTMENT (OUTPATIENT)
Dept: OCCUPATIONAL THERAPY | Facility: CLINIC | Age: 52
End: 2022-11-16
Attending: SURGERY
Payer: COMMERCIAL

## 2022-11-16 VITALS
SYSTOLIC BLOOD PRESSURE: 111 MMHG | BODY MASS INDEX: 25.9 KG/M2 | OXYGEN SATURATION: 97 % | TEMPERATURE: 96.9 F | WEIGHT: 195.4 LBS | DIASTOLIC BLOOD PRESSURE: 74 MMHG | RESPIRATION RATE: 16 BRPM | HEART RATE: 101 BPM | HEIGHT: 73 IN

## 2022-11-16 LAB
ALBUMIN SERPL BCG-MCNC: 3.5 G/DL (ref 3.5–5.2)
ALP SERPL-CCNC: 210 U/L (ref 40–129)
ALT SERPL W P-5'-P-CCNC: 66 U/L (ref 10–50)
ANION GAP SERPL CALCULATED.3IONS-SCNC: 12 MMOL/L (ref 7–15)
AST SERPL W P-5'-P-CCNC: 91 U/L (ref 10–50)
BILIRUB SERPL-MCNC: 2 MG/DL
BUN SERPL-MCNC: 8.3 MG/DL (ref 6–20)
CALCIUM SERPL-MCNC: 9.2 MG/DL (ref 8.6–10)
CHLORIDE SERPL-SCNC: 106 MMOL/L (ref 98–107)
CREAT SERPL-MCNC: 0.63 MG/DL (ref 0.67–1.17)
DEPRECATED HCO3 PLAS-SCNC: 21 MMOL/L (ref 22–29)
ERYTHROCYTE [DISTWIDTH] IN BLOOD BY AUTOMATED COUNT: 12.9 % (ref 10–15)
GFR SERPL CREATININE-BSD FRML MDRD: >90 ML/MIN/1.73M2
GLUCOSE SERPL-MCNC: 87 MG/DL (ref 70–99)
HCT VFR BLD AUTO: 32.4 % (ref 40–53)
HGB BLD-MCNC: 10.2 G/DL (ref 13.3–17.7)
INR PPP: 1.08 (ref 0.85–1.15)
MAGNESIUM SERPL-MCNC: 1.7 MG/DL (ref 1.7–2.3)
MCH RBC QN AUTO: 34.7 PG (ref 26.5–33)
MCHC RBC AUTO-ENTMCNC: 31.5 G/DL (ref 31.5–36.5)
MCV RBC AUTO: 110 FL (ref 78–100)
PHOSPHATE SERPL-MCNC: 4.5 MG/DL (ref 2.5–4.5)
PLATELET # BLD AUTO: 435 10E3/UL (ref 150–450)
POTASSIUM SERPL-SCNC: 3.9 MMOL/L (ref 3.4–5.3)
PROT SERPL-MCNC: 6.4 G/DL (ref 6.4–8.3)
RBC # BLD AUTO: 2.94 10E6/UL (ref 4.4–5.9)
SODIUM SERPL-SCNC: 139 MMOL/L (ref 136–145)
WBC # BLD AUTO: 7 10E3/UL (ref 4–11)

## 2022-11-16 PROCEDURE — 84100 ASSAY OF PHOSPHORUS: CPT | Performed by: HOSPITALIST

## 2022-11-16 PROCEDURE — G0010 ADMIN HEPATITIS B VACCINE: HCPCS

## 2022-11-16 PROCEDURE — 250N000011 HC RX IP 250 OP 636: Performed by: HOSPITALIST

## 2022-11-16 PROCEDURE — 250N000013 HC RX MED GY IP 250 OP 250 PS 637

## 2022-11-16 PROCEDURE — 85027 COMPLETE CBC AUTOMATED: CPT

## 2022-11-16 PROCEDURE — 250N000011 HC RX IP 250 OP 636

## 2022-11-16 PROCEDURE — 83735 ASSAY OF MAGNESIUM: CPT | Performed by: HOSPITALIST

## 2022-11-16 PROCEDURE — 85610 PROTHROMBIN TIME: CPT

## 2022-11-16 PROCEDURE — 250N000011 HC RX IP 250 OP 636: Performed by: INTERNAL MEDICINE

## 2022-11-16 PROCEDURE — 80053 COMPREHEN METABOLIC PANEL: CPT

## 2022-11-16 PROCEDURE — 90472 IMMUNIZATION ADMIN EACH ADD: CPT | Performed by: INTERNAL MEDICINE

## 2022-11-16 PROCEDURE — 250N000013 HC RX MED GY IP 250 OP 250 PS 637: Performed by: INTERNAL MEDICINE

## 2022-11-16 PROCEDURE — 999N000007 HC SITE CHECK

## 2022-11-16 PROCEDURE — 90632 HEPA VACCINE ADULT IM: CPT | Performed by: INTERNAL MEDICINE

## 2022-11-16 PROCEDURE — 90746 HEPB VACCINE 3 DOSE ADULT IM: CPT

## 2022-11-16 PROCEDURE — 99239 HOSP IP/OBS DSCHRG MGMT >30: CPT | Mod: GC | Performed by: INTERNAL MEDICINE

## 2022-11-16 PROCEDURE — 250N000013 HC RX MED GY IP 250 OP 250 PS 637: Performed by: STUDENT IN AN ORGANIZED HEALTH CARE EDUCATION/TRAINING PROGRAM

## 2022-11-16 PROCEDURE — 36415 COLL VENOUS BLD VENIPUNCTURE: CPT

## 2022-11-16 PROCEDURE — 97530 THERAPEUTIC ACTIVITIES: CPT | Mod: GO

## 2022-11-16 RX ORDER — LANOLIN ALCOHOL/MO/W.PET/CERES
100 CREAM (GRAM) TOPICAL DAILY
Qty: 30 TABLET | Refills: 3 | Status: ON HOLD | OUTPATIENT
Start: 2022-11-17 | End: 2023-02-03

## 2022-11-16 RX ORDER — OLANZAPINE 5 MG/1
5 TABLET, ORALLY DISINTEGRATING ORAL
Qty: 30 TABLET | Refills: 3 | Status: SHIPPED | OUTPATIENT
Start: 2022-11-16 | End: 2023-02-02

## 2022-11-16 RX ORDER — GABAPENTIN 300 MG/1
300 CAPSULE ORAL 3 TIMES DAILY
Qty: 90 CAPSULE | Refills: 3 | Status: SHIPPED | OUTPATIENT
Start: 2022-11-16 | End: 2023-03-17

## 2022-11-16 RX ORDER — NALTREXONE HYDROCHLORIDE 50 MG/1
50 TABLET, FILM COATED ORAL DAILY
Qty: 30 TABLET | Refills: 3 | Status: SHIPPED | OUTPATIENT
Start: 2022-11-17 | End: 2023-02-02

## 2022-11-16 RX ADMIN — HEPATITIS A VACCINE 1440 UNITS: 1440 INJECTION, SUSPENSION INTRAMUSCULAR at 13:32

## 2022-11-16 RX ADMIN — VITAM B12 100 MCG: 100 TAB at 09:50

## 2022-11-16 RX ADMIN — NALTREXONE HYDROCHLORIDE 50 MG: 50 TABLET, FILM COATED ORAL at 09:44

## 2022-11-16 RX ADMIN — Medication 5 ML: at 06:11

## 2022-11-16 RX ADMIN — LEVOTHYROXINE SODIUM 175 MCG: 0.15 TABLET ORAL at 09:45

## 2022-11-16 RX ADMIN — THIAMINE HCL TAB 100 MG 100 MG: 100 TAB at 09:49

## 2022-11-16 RX ADMIN — HEPATITIS B VACCINE (RECOMBINANT) 20 MCG: 20 INJECTION, SUSPENSION INTRAMUSCULAR at 13:30

## 2022-11-16 RX ADMIN — POLYETHYLENE GLYCOL 3350 17 G: 17 POWDER, FOR SOLUTION ORAL at 09:43

## 2022-11-16 RX ADMIN — FOLIC ACID 1 MG: 1 TABLET ORAL at 09:45

## 2022-11-16 RX ADMIN — Medication 15 ML: at 09:58

## 2022-11-16 RX ADMIN — Medication 1 TABLET: at 09:45

## 2022-11-16 ASSESSMENT — ACTIVITIES OF DAILY LIVING (ADL)
ADLS_ACUITY_SCORE: 22

## 2022-11-16 NOTE — DISCHARGE SUMMARY
[facility]  Discharge Summary - Medicine & Pediatrics       Date of Admission:  11/4/2022  Date of Discharge:  11/16/22  Discharging Provider: Dr. Rodrigo Avery  Discharge Service: Medicine Service, JONATHAN TEAM 4    Discharge Diagnoses      #Acute on chronic left subdural hematoma, chronic bilateral subdural hematoma, s/p B/L MMA Emb.  #Brain compression  #3 mm rightward midline shift, stable  #Alcohol use disorder  #Falls secondary to above  #Concern for alcohol withdrawal   #Thiamine Deficiency likely secondary to EtOH use  #Concern for Wernicke-Korsakoff Syndrome  #Hyperbilirubinemia (elevated direct bilirubin)  #Hypoalbuminemia/hypoproteinemia, improving  #Elevated LFT's (AST > ALT), improving  #Cholelithiasis, resolved  #ETOH Hepatic steatosis   #Colonic diverticula   #Hyponatremia, improving  #Hypokalemia, resolved  #Hypocalcemia resolved  #Hypomagnesemia resolved  #Peripheral neuropathy  #Mild cerebellar atrophy  #PLMD vs RLS  #REM w/o atonia but no dream enactment  #Rare Somnambulism   #Mild LULI  #Hx of hypoglycemia, stable  #Hypothyroidism  #Coagulation Defect, stable  #Macrocytic Anemia  #Thrombocytopenia, improving  #Leukopenia    Follow-ups Needed After Discharge   Follow-up Appointments     Follow Up and recommended labs and tests      - Follow up with primary care provider, Physician No Ref-Primary, within   7 days for hospital follow- up.  No follow up labs or test are needed.  - Follow up with Neurosurgery within 1 month for repeat head CT  - Follow up with local gastroenterology for liver disease  - Follow up with physical therapy outpatient             Unresulted Labs Ordered in the Past 30 Days of this Admission       No orders found from 10/5/2022 to 11/5/2022.            Discharge Disposition   Discharged to home  Condition at discharge: Stable    Hospital Course     Getachew Barcenas is a 52 year old man with a past medical history of alcohol use  disorder, thiamine deficiency, frequent falls, peripheral neuropathy, hypothyroidism, former tobacco use, mild LULI, somnambulism, RLS, diverticulitis, and mood disorder who presented as transfer to [facility] on 11/4/2022 for higher level of cares in the setting of new diagnosis of acute on chronic L SDH and subacute R SDH. Patient is hemodynamically stable and has minimal neurological deficits at this time. He does have mild dysmetria, horizontal nystagmus, and peripheral neuropathy, which I suspect are chronic in the setting of longstanding alcohol use disorder. He also has mild right hand weakness, which I suspect is secondary to subdural hematoma. Patient currently stable from neurological standpoint. Pt is status post B/L MMA embolization w/out complications, now showing signs of paranoia.  Paranoia symptoms resolved. Pt demonstrated strong consideration to be discharged home. He's very apprehensive about TCU discharge and is really motivated to make all the necessary to get better.     #Acute on chronic left subdural hematoma, chronic bilateral subdural hematoma, s/p B/L MMA Emb.  #Brain compression  #3 mm rightward midline shift, stable  #Alcohol use disorder  #Falls secondary to above  #Concern for alcohol withdrawal   #Thiamine Deficiency likely secondary to EtOH use  #Concern for Wernicke-Korsakoff Syndrome  Long history of EtOH abuse w/ short sobriety episode. Last fall happened a few days ago. Upon arrival he was found to have B/L SDH w/ 3mm L to R midline shift. Ct C-spine was unremarkable, pt in a stable state, no active intra-cranial bleeding confirmed on CTH 11/4 which is  showing no acute intracranial hemorrhage, no substantial change in size or appearance of mixed density b/l SDH, left > right, c/to CTH 11/3/22. Vit B12 WNL @ 937 11/4. As per SICU RN, neuro remained unchanged except for the slight confused/forgetful episodes. Tremors resolved, Ataxic at time when doing finger to nose 11/4 otherwise  normal. Pt now s/p B/L MMA embolization 11/7. Pt denies any new onset of numbness/tingling s/p Embolization. 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 Pt has shown no signs of paranoia/hallucination or schizophrenia symptoms for multiple days in row.  - Pt is instructed to follow up with outpatient physical therapist  - Neurosurgery follow-up in 1mo w/ repeat CT-scan of head  - Pt is instructed to maintain a balanced diet that include a multi-vitamin and enough protein for daily requirements  - Pt instructed to continue naltrexone 50mg daily prn          #Hyperbilirubinemia (elevated direct bilirubin)  #Hypoalbuminemia/hypoproteinemia, improving  #Elevated LFT's (AST > ALT), improving  #Cholelithiasis, resolved  #ETOH Hepatic steatosis   #Colonic diverticula   Mild jaundice revealed clinically. CT C/A/P on 11/2. RUQ U/S on 11/3. Suspect many of these hepatic lab abnormalities are secondary to alcohol consumption. Although the same CT revealed cholethiasis and diffuse fatty infiltration of the liver. MELD-Na 19; MELD 16. In addition, Abdomen US notes likely gallstones and sludge w/o wall-thickening or biliary duct dilatation but no clinical (no abd pain fever or RUQ tenderness) and no radiologic evidence of cholecystitis. Lipase 622, No focal pain or symptoms suspicious for pancreatitis, Ct abdomen showing normal pancreas, unlikely pancreatitis. Bilirubin level remained elevated at 7.5 11/5.  Hepatic functions are slowly improving 11/5. Viral hepatitis test all negative more likely suggesting EtoH acute Hepatic steatosis. Pt will get vaccination for Hep A/B before discharge.  - Maintain a good well-balanced nutrition  - Follow up as outpatient w/ local hepatology/GI       #Anion Gap Metabolic Acidosis, resolved   Highest Anion Gap = 25 mmol/L (Ref range: 7-15) in last 2 days, will monitor and treat  "as appropriate. Query alcoholic ketoacidosis considered. Anion gap on 11/4 is 13  - Maintain well-balanced nutrition    #Hyponatremia, improving  #Hypokalemia, resolved  #Hypocalcemia resolved  #Hypomagnesemia resolved  Labs notable for Na 131 at 11PM at OSH prior to transfer. Na 12 hours earlier was 138. Ddx includes (beer potomania, cerebral salt wasting, SIADH, liver disease, etc. Low K  btw 3.5-3.8. Lowest Ca = 7.5 mg/dL (Ref range: 8.5 - 10.1 mg/dL) in last 2 days, will monitor and replace as appropriate. Mg improving and stable w/ oral magnesium oxide. - Ionized calcium 4.1 c/w hypoalbuminemia and other lab findings, off MIVF, PO intake only, pt is doing well after procedure, good appetite.  - Follow up with primary care provider     #Peripheral neuropathy  #Mild cerebellar atrophy  Suspect from longstanding alcohol use disorder. Stable.      #Paranoid schizophrenia   Daughter's concern over him having schizophrenia (wife had the condition). H/o declined employability due to paranoia and delusional and making threats against people Psychiatry was consulted 11/8, pt became defensive, uncooperative and dilusional about a number of things.   As per psy reccs : \" He would have to be more disorganized and ill in order to pursue court commitment and forced medications; this does not currently apply in his case.\"  Pt demonstrated for several consecutive days no signs of delirium, hallucination or paranoia.   - No definitive medication regimen prescribed        #PLMD vs RLS  #REM w/o atonia but no dream enactment  #Rare Somnambulism   -  Gabapentin 300mg TID        #Mild LULI  - Sleep study referral on discharge   - Trazodone 50mg at bedtime as needed        #Hx of hypoglycemia, stable  Suspect secondary to poor oral intake in the past. Regular diet resumed, Nutritionist consulted       #Hypothyroidism  TSH 36, free T4 0.81. Unclear compliance of medication  - PTA Levothyroxine 175mcg daily  - Recheck TSH/free T4 in " 2wks  - Consider Endocrine consult        #Coagulation Defect, stable  #Macrocytic Anemia  #Thrombocytopenia, improving  Ddx likely bone marrow suppression from chronic heavy EtoH abuse  INR = 1.24 (Ref range: 0.85 - 1.15) now downtrending.  PTT = 32 Seconds (Ref range: 22 - 38 Seconds), will monitor for bleeding. Presumably due to declining liver function. Lowest platelets = 67 (Ref range: 150-450) in last 2 days Hgb however improving steadily. 10.1 w/ MCV still trending high 11/11.  - Daily CBC  - Hgb goal >7, plt goal >50k  -Transfuse to meet Hgb and plt goals     #Leukopenia  - No infection concern  - Daily CBC  - Follow temperature curve    Consultations This Hospital Stay   WOUND OSTOMY CONTINENCE NURSE  IP CONSULT  PHARMACY IP CONSULT  PHYSICAL THERAPY ADULT IP CONSULT  OCCUPATIONAL THERAPY ADULT IP CONSULT  CARE MANAGEMENT / SOCIAL WORK IP CONSULT  SPEECH LANGUAGE PATH ADULT IP CONSULT  PATIENT LEARNING CENTER IP CONSULT  NEUROSURGERY ADULT IP CONSULT  NEUROLOGY INTERVENTIONAL ADULT IP CONSULT  GI HEPATOLOGY ADULT IP CONSULT  ADDICTION SERVICE ADULT IP CONSULT FOR [place]  NUTRITION SERVICES ADULT IP CONSULT  PSYCHIATRY IP CONSULT  NURSING TO CONSULT FOR VASCULAR ACCESS CARE IP CONSULT    Code Status   Full Code       The patient was discussed with MD Deonte Hinton  Medicine service  [facility] [address] [place]  [address] [facility] [address]  Phone: [phone]  ______________________________________________________________________    Physical Exam   Vital Signs: Temp: 96.9  F (36.1  C) Temp src: Temporal BP: 111/74 Pulse: 101   Resp: 16 SpO2: 97 % O2 Device: None (Room air)    Weight: 191 lbs 12.8 oz  General Appearance:  AAOx3  Respiratory: CTAB  Cardiovascular: RRR  GI: BS+, BM+, Abd soft/NT/NS  Skin: No new rashes  Other:  Cooperative and pleasant       Primary Care Physician   Physician No Ref-Primary    Discharge Orders      CT Head w/o contrast*      Neurosurgery Referral      Adult GI  Referral - Consult Only      Physical Therapy Referral      Adult Mental Health  Referral      Reason for your hospital stay    You were admitted after frequent falls and hitting your head in the setting of alcohol use. You were found to have a brain bleed and was assessed by the neurosurgery team without plans for intervention. You were assessed by physical therapy and discharge recommendations is to be discharged home with plans for physical therapy at home. You should follow up with physical therapy at home, follow up with a primary care physician within 1 week of discharge, follow up with neurosurgery for ongoing cares of brain bleed where they will image your head. Also, you have been vaccinated against the hepatitis A and B viruses and you should follow up with a gastroenterologist for liver disease given alcohol use history. You appear motivated for alcohol cessation and I highly recommend enrolling with your counselor for ongoing therapy.     Activity    Your activity upon discharge: activity as tolerated     Follow Up and recommended labs and tests    - Follow up with primary care provider, Physician No Ref-Primary, within 7 days for hospital follow- up.  No follow up labs or test are needed.  - Follow up with Neurosurgery within 1 month for repeat head CT  - Follow up with local gastroenterology for liver disease  - Follow up with physical therapy outpatient     Diet    Follow this diet upon discharge: Orders Placed This Encounter      Snacks/Supplements Adult: Ensure Max Protein (bariatric); Between Meals      Snacks/Supplements Adult: Other; Special K protein bar at 8 PM; Between Meals      Regular Diet Adult       Significant Results and Procedures   Most Recent 3 CBC's:  Recent Labs   Lab Test 11/16/22  0616 11/14/22  0555 11/12/22  0630   WBC 7.0 5.6 5.1   HGB 10.2* 10.2* 9.9*   * 110* 111*    374 334     Most Recent 3  BMP's:  Recent Labs   Lab Test 11/16/22  0616 11/14/22  0555 11/13/22  0639 11/12/22  0630    134*  --  136   POTASSIUM 3.9 3.9 4.0 3.8   CHLORIDE 106 102  --  101   CO2 21* 20*  --  22   BUN 8.3 8.2  --  6.7   CR 0.63* 0.60*  --  0.57*   ANIONGAP 12 12  --  13   ABDULKADIR 9.2 9.0  --  9.1   GLC 87 88  --  89     Most Recent 2 LFT's:  Recent Labs   Lab Test 11/16/22  0616 11/14/22  0555   AST 91* 112*   ALT 66* 66*   ALKPHOS 210* 227*   BILITOTAL 2.0* 2.6*       Discharge Medications   Current Discharge Medication List        START taking these medications    Details   cyanocobalamin (CYANOCOBALAMIN) 100 MCG tablet Take 1 tablet (100 mcg) by mouth daily  Qty: 30 tablet, Refills: 3    Associated Diagnoses: Alcoholic intoxication with complication (H)      gabapentin (NEURONTIN) 300 MG capsule Take 1 capsule (300 mg) by mouth 3 times daily  Qty: 90 capsule, Refills: 3    Associated Diagnoses: Alcoholic intoxication with complication (H)      naltrexone (DEPADE/REVIA) 50 MG tablet Take 1 tablet (50 mg) by mouth daily  Qty: 30 tablet, Refills: 3    Associated Diagnoses: Alcoholic intoxication with complication (H)      OLANZapine zydis (ZYPREXA) 5 MG ODT Take 1 tablet (5 mg) by mouth nightly as needed for agitation  Qty: 30 tablet, Refills: 3    Associated Diagnoses: Mood disorder (H)      thiamine (B-1) 100 MG tablet 1 tablet (100 mg) by Oral or Feeding Tube route daily  Qty: 30 tablet, Refills: 3    Associated Diagnoses: Thiamine deficiency           CONTINUE these medications which have NOT CHANGED    Details   acetaminophen (TYLENOL) 500 MG tablet [ACETAMINOPHEN (TYLENOL) 500 MG TABLET] Take 1-2 tablets (500-1,000 mg total) by mouth every 6 (six) hours as needed for pain.  Refills: 0    Associated Diagnoses: Contusion of head of pancreas, initial encounter      folic acid (FOLVITE) 1 MG tablet Take 1 tablet (1 mg) by mouth daily    Associated Diagnoses: Alcohol use disorder, severe, dependence (H); Right forearm  cellulitis      levothyroxine (SYNTHROID/LEVOTHROID) 175 MCG tablet Take 175 mcg by mouth daily      multivitamin w/minerals (THERA-VIT-M) tablet Take 1 tablet by mouth daily    Associated Diagnoses: Alcohol use disorder, severe, dependence (H); Right forearm cellulitis      psyllium (METAMUCIL) 3.4 gram packet [PSYLLIUM (METAMUCIL) 3.4 GRAM PACKET] Take 1 packet by mouth daily.      traZODone (DESYREL) 50 MG tablet Take 50 mg by mouth nightly as needed for sleep           Allergies   No Known Allergies      Staff Addendum to Discharge Summary  Date of Service: 11/16/2022  I have seen and examined the patient, reviewed the data and discussed the plan of care with the service team.  I agree with the above documentation.    >30 minutes spent in discharge, including >50% in counseling and coordination of care, medication review and plan of care recommended on follow up. Questions were answered at length with patient including medication counseling and follow-up care.     Dr. Devlin Ref-Primary, Physician  (PCP) was contacted electronically at the time of discharge, so as to bridge from hospital to outpatient care.   It was our pleasure to care for the patient during this hospitalization. Please do not hesitate to contact me should there be questions regarding the hospital course or discharge plan.      Rodrigo Avery MD   of Medicine  [facility]    Attending pager: [phone]

## 2022-11-16 NOTE — PROVIDER NOTIFICATION
DO [id number]     Raymond GUERRA [id number]    Pt has okay'd his sister Kelly Yap be updated.  She called this AM requesting a provider update, her phone number is [phone].  Thank you!   English

## 2022-11-16 NOTE — PROGRESS NOTES
"Oby-ie-rbzhu progress note. Care from: 15:00 - 23:00     /68 (BP Location: Left arm, Patient Position: Semi-Salazar's)   Pulse 82   Temp 97.6  F (36.4  C) (Oral)   Resp 16   Ht 1.854 m (6' 1\")   Wt 87 kg (191 lb 12.8 oz)   SpO2 97%   BMI 25.30 kg/m         Vitals: VSS     Neuro: WDL   o Pain: Denies   o Mood/Behavior: Calm, cooperative. Pt requests no interruptions to sleep starting at 21:00 unless absolutely necessary. Rec'd patient care order from team for no overnight vitals starting at 21:00     Activity: Resting in bed. Up ad johanny in room. Watching [place].     Cardiovascular: WDL     Respiratory: WDL     GI & Nutrition: Excellent appetite.   o Nausea: Denies   o Bowel Movement: Last BM yesterday     : Voiding not saving    Skin, Wounds & LDAs: Scattered bruising throughout skin. PICC intact, heparin locked.     Events & Plan Updates: Per notes, appears discharge to home with home care likely tomorrow pending clinical stability. No acute changes this evening, few requests.   "

## 2022-11-16 NOTE — PLAN OF CARE
Assumed care for pt [id number]  VSS on RA. Denies pain or nausea. Up with SBA voiding spontaneously with adequate output. Seizure pads in place. Refuses bed alarm. PICC HL Possible discharge today.

## 2022-11-16 NOTE — PROGRESS NOTES
"Care Management Follow Up    Length of Stay (days): 12    Expected Discharge Date: 11/16/2022     Concerns to be Addressed:  Family concern about home safety due to pt's alcohol use        Patient plan of care discussed at interdisciplinary rounds: Yes    Anticipated Discharge Disposition: Home       Anticipated Discharge Services: Outpatient PT     Patient/family educated on Medicare website which has current facility and service quality ratings:  Pt declines  Education Provided on the Discharge Plan:  Yes  Patient/Family in Agreement with the Plan:  Yes/No - Pt in agreement with discharge plan but family is not.    Referrals Placed by CM/SW:HC referral placed by [facility] and has not been able to fine a HC agency that will accept pt's insurance coverage. Pt is accepting to discharge home with outpatient PT      Additional Information:  Family continue to express concern about pt discharging home due to alcohol used. CM provide education on pt's right to self determination including his right to make bad decisions.      Family report they follow-up with [facility] and was told that referral for civil commitment should come from the hospital not family members.    Pt's father report great disappointment with the systems and states that \"too many people end up dying because they are sent home with a bandage in the same situation we are living in, as though [place] are in a [place]\".    Family request a call from attending MD. [place] [facility] team notified. CM to keep following for safe discharge planning.     Addendum:  Call from Francisca  with [facility] adult protection. Francisca admits that the [facility] has had multiple vulnerable adult (VA) report about pt by community members due to self neglect.      CM inquire what [facility] has done to mitigate the issue with multiple VA report of self-neglect to the [facility]. Per francisca, \"it's the hospital responsibility to resolve pt's " "issue of not being able to drive; will go for days without food, instead of discharging pt home, just because he says so\".     CM provide education on pt's right to self determination and encourage Francisca to do a home investigation, make a recommendation and support pt/family with community resources to resolve the issue.    It's armando that Adult Protection is now following, No further concern at this time.       Marivel Tate, 7C [facility]  P: [phone]  Pager: [phone]  F: [phone]  Weekend Pager: [phone]  Weekend Coverage: 7C; 7D; 5C      "

## 2022-11-17 NOTE — PLAN OF CARE
Occupational Therapy Discharge Summary    Reason for therapy discharge:    Discharged to home with home therapy.    Progress towards therapy goal(s). See goals on Care Plan in [facility] electronic health record for goal details.  Goals partially met.  Barriers to achieving goals:   discharge from facility.    Therapy recommendation(s):    Pt appears closer to baseline however would benefit from further therapy to progress functional independence with all mobility and ADLs/IADLs. Pt currently requires Ax1 for all mobility and ADLs and has mild cognitive impairment with limited insight to deficits. Anticipate pt will be able to dc to home with assist and continued therapy to address impairements in cognition, ADLs, and activity tolerance.

## 2022-11-18 ENCOUNTER — PATIENT OUTREACH (OUTPATIENT)
Dept: CARE COORDINATION | Facility: CLINIC | Age: 52
End: 2022-11-18

## 2022-11-18 NOTE — PROGRESS NOTES
[facility]    Background: Transitional Care Management program identified per system criteria and reviewed by [facility] Resource [place] team for possible outreach.    Assessment: Upon chart review, CCR Team member will not proceed with patient outreach related to this episode of Transitional Care Management program due to reason below:    Talked with Patient - Getachew answered the phone and then hung up. CHW was unable to talk to patient at this time.    Patient declined to answer all  post hospital discharge questions with CCRC team member and disconnected call.    Plan: Transitional Care Management episode addressed appropriately per reason noted above.      ANA Castro  [phone]  [facility]    *Connected Care Resource Team does NOT follow patient ongoing. Referrals are identified based on internal discharge reports and the outreach is to ensure patient has an understanding of their discharge instructions.

## 2022-11-25 NOTE — TELEPHONE ENCOUNTER
DIAGNOSIS:  Nonalcoholic fatty liver disease    Appt Date: 12.27.2022    NOTES STATUS DETAILS   OFFICE NOTE from referring provider Internal 11.04.2022 Rodrigo Avery MD   OFFICE NOTES from other specialists     DISCHARGE SUMMARY from hospital Internal 11.04.2022 Rodrigo Avery MD   MEDICATION LIST Internal    LIVER BIOSPY (IF APPLICABLE)      PATHOLOGY REPORTS      IMAGING     ENDOSCOPY (IF AVAILABLE)     COLONOSCOPY (IF AVAILABLE)     ULTRASOUND LIVER Internal 11.03.2022 US ABDOMEN LIMITED   CT OF ABDOMEN     MRI OF LIVER     FIBROSCAN, US ELASTOGRAPHY, FIBROSIS SCAN, MR ELASTOGRAPHY     LABS     HEPATIC PANEL (LIVER PANEL) Internal 11.05.2022   BASIC METABOLIC PANEL Internal 11.03.2022   COMPLETE METABOLIC PANEL Internal 11.16.2022   COMPLETE BLOOD COUNT (CBC) Internal 11.16.2022   INTERNATIONAL NORMALIZED RATIO (INR) Internal 11.16.2022   HEPATITIS C ANTIBODY Care Everywhere 09.24.2022   HEPATITIS C VIRAL LOAD/PCR     HEPATITIS C GENOTYPE     HEPATITIS B SURFACE ANTIGEN Internal 11.05.2022   HEPATITIS B SURFACE ANTIBODY Internal 11.05.2022   HEPATITIS B DNA QUANT LEVEL     HEPATITIS B CORE ANTIBODY Internal 11.05.2022

## 2022-11-28 NOTE — DISCHARGE SUMMARY
[facility] Discharge Summary    Getachew Barcenas MRN# [id number]   Age: 52 year old YOB: 1970     Date of Admission:  11/2/2022  Date of Discharge::  11/4/2022  1:00 AM  Admitting Physician:  Mikael Ramachandran MD  Discharge Physician:  Campos Carlin MD            Admission Diagnoses:   Hypomagnesemia [E83.42]  Subdural hematoma [S06.5XAA]  Gallstones [K80.20]  Dilated cbd, acquired [K83.8]  Elevated bilirubin [R17]  Alcohol withdrawal syndrome with complication (H) [F10.939]  Alcoholic intoxication with complication (H) [F10.929]  Anemia, unspecified type [D64.9]          Discharge Diagnosis:   TBI          Procedures:   Procedure(s): None                Consultations:   Neurosurgery and Neurocritical care and Trauma Surgery          Brief History of Illness:   Patient was briefly admitted to the ICU for approximately 1 hour until transferred to a higher level of care. Please see ICU H&P for additional details.           Hospital Course:   As per above, the patient was only briefly admitted to the ICU until transfer to [facility], a level 2 trauma center with dedicated neurosurgery, trauma, and neurocritical care services.          Discharge Disposition:   Transferred to intensive care      Attestation:  I have reviewed today's vital signs, notes, medications, labs and imaging.    Campos Carlin MD

## 2022-12-27 ENCOUNTER — PRE VISIT (OUTPATIENT)
Dept: GASTROENTEROLOGY | Facility: CLINIC | Age: 52
End: 2022-12-27

## 2023-01-03 ENCOUNTER — NURSE TRIAGE (OUTPATIENT)
Dept: NURSING | Facility: CLINIC | Age: 53
End: 2023-01-03

## 2023-01-03 NOTE — TELEPHONE ENCOUNTER
"Triage Call:    Caller: Patient  Vertigo has kicking in \"tremendously\". He had brain surgery 5 weeks ago to try and correct it.  He has \"tipped over\" twice today.  He is currently not comfortable standing.    Patient is wanting to have a pacemaker and wondering how to get that scheduled.    Got patient to agree to triage and he is having to pee in a large cup next to his couch as he is too concerned about falling again in his bathroom, which is where he did twice today already.          Protocol Recommended Disposition: ED  Patient verbalized that he would wait until tomorrow.  Advised patient since he is home alone to call 911.  Patient declined and is going to call family to come and get him.     Caller verbalized understanding of instructions and questions answered.      Ciera Moore RN on 1/3/2023 at 5:25 PM        Reason for Disposition    SEVERE dizziness (vertigo) (e.g., unable to walk without assistance)    Additional Information    Negative: [1] Weakness (i.e., paralysis, loss of muscle strength) of the face, arm or leg on one side of the body AND [2] sudden onset AND [3] present now    Negative: [1] Numbness (i.e., loss of sensation) of the face, arm or leg on one side of the body AND [2] sudden onset AND [3] present now    Negative: [1] Loss of speech or garbled speech AND [2] sudden onset AND [3] present now    Negative: Difficult to awaken or acting confused (e.g., disoriented, slurred speech)    Negative: Sounds like a life-threatening emergency to the triager    Protocols used: DIZZINESS - VERTIGO-A-AH      "

## 2023-01-07 ENCOUNTER — HEALTH MAINTENANCE LETTER (OUTPATIENT)
Age: 53
End: 2023-01-07

## 2023-01-12 LAB
ATRIAL RATE - MUSE: 122 BPM
DIASTOLIC BLOOD PRESSURE - MUSE: NORMAL MMHG
INTERPRETATION ECG - MUSE: NORMAL
P AXIS - MUSE: 70 DEGREES
PR INTERVAL - MUSE: 166 MS
QRS DURATION - MUSE: 98 MS
QT - MUSE: 320 MS
QTC - MUSE: 456 MS
R AXIS - MUSE: -57 DEGREES
SYSTOLIC BLOOD PRESSURE - MUSE: NORMAL MMHG
T AXIS - MUSE: 67 DEGREES
VENTRICULAR RATE- MUSE: 122 BPM

## 2023-01-25 ENCOUNTER — TELEPHONE (OUTPATIENT)
Dept: NEUROSURGERY | Facility: CLINIC | Age: 53
End: 2023-01-25
Payer: COMMERCIAL

## 2023-01-25 NOTE — TELEPHONE ENCOUNTER
Writer routed to Neurosurgery Clinic Scheduling.   Per discharge summary   Follow up with Neurosurgery within 1 month for repeat head CT    Radha Burnett LPN  Neurosurgery

## 2023-01-25 NOTE — TELEPHONE ENCOUNTER
[facility] Call Center    Phone Message    May a detailed message be left on voicemail: yes     Reason for Call: Other: Patient wants to make a follow up appointment but stated he saw multiple physicians and wasnt sure who to follow up with. Please call to discuss.     Action Taken: Other: Neurosurgery    Travel Screening: Not Applicable

## 2023-01-27 ENCOUNTER — TRANSCRIBE ORDERS (OUTPATIENT)
Dept: OTHER | Age: 53
End: 2023-01-27

## 2023-01-27 DIAGNOSIS — G60.8 PERIPHERAL SENSORY NEUROPATHY: Primary | ICD-10-CM

## 2023-01-31 ENCOUNTER — TRANSCRIBE ORDERS (OUTPATIENT)
Dept: OTHER | Age: 53
End: 2023-01-31

## 2023-01-31 DIAGNOSIS — I62.03 CHRONIC SUBDURAL HEMATOMA (H): Primary | ICD-10-CM

## 2023-01-31 NOTE — TELEPHONE ENCOUNTER
1/31/2023-Spoke will [facility] Radiology to have images pushed ASAP, request for images faxed to Urgency room-MR @ 603am    2/1/2023-All Images now in PACS-MR @ 436am    FUTURE VISIT INFORMATION      FUTURE VISIT INFORMATION:    Date: 2/1/2023    Time: 8am    [place] [facility] [place]  REFERRAL INFORMATION:    Referring provider:  Dr. Gallardo     Referring providers clinic:  [facility]     Reason for visit/diagnosis  Subdural Hematoma     RECORDS REQUESTED FROM:       Clinic name Comments Records Status Imaging Status   [facility] ED Visit-1/3/2023    CT Head-1/3/2023 Care Everywhere Requested          Internal ED Visit/Admission-11/2/2022    CT Head-11/4/2022 Epic PACS

## 2023-02-01 ENCOUNTER — PRE VISIT (OUTPATIENT)
Dept: NEUROSURGERY | Facility: CLINIC | Age: 53
End: 2023-02-01

## 2023-02-01 ENCOUNTER — VIRTUAL VISIT (OUTPATIENT)
Dept: NEUROSURGERY | Facility: CLINIC | Age: 53
End: 2023-02-01
Payer: COMMERCIAL

## 2023-02-01 DIAGNOSIS — S06.5XAA SUBDURAL HEMATOMA (H): Primary | ICD-10-CM

## 2023-02-01 PROCEDURE — 99203 OFFICE O/P NEW LOW 30 MIN: CPT | Mod: 95 | Performed by: NURSE PRACTITIONER

## 2023-02-01 NOTE — LETTER
2023       RE: Getachew Barcenas  [address] [place] [address]     Dear Colleague,    Thank you for referring your patient, Getachew Barcenas, to the [facility] NEUROSURGERY CLIN[facility] [place] at [facility]. Please see a copy of my visit note below.      [facility]  Department of Neurosurgery      Name: Getachew Barcenas  MRN: [id number]  Age: 52 year old  : 1970  Referring provider: Rodrigo Avery  2023      Chief Complaint:   Acute on chronic left subdural hematoma, chronic bilateral subdural hematoma 2022  S/p B/L MMA Embolization on 2022  Post discharge follow up    History of Present Illness:   Getachew Barcenas is a 52 year old male with a history of alcohol use disorder, thiamine deficiency, frequent falls, peripheral neuropathy, hypothyroidism, former tobacco use, mild LULI, somnambulism, RLS, diverticulitis, and mood disorder who presented as transfer to [facility] on 2022 for higher level of cares in the setting of new diagnosis of acute on chronic L SDH and subacute R SDH. Ct C-spine was unremarkable, pt in a stable state, no active intra-cranial bleeding confirmed on CTH  which is  showing no acute intracranial hemorrhage, no substantial change in size or appearance of mixed density b/l SDH, left > right, c/to CTH 11/3/22.    On 2022, patient underwent bilateral MMA embolization and was discharged on 2022. Today, I had a video visit with the patient. Per patient, he still has dizziness and falls. Fall frequency reduced to 1-2 times a week from daily falls prior to the hospitalization in . He reports headaches and uses Tylenol as needed. He had right hand weakness during hospital stay, possible from the left SDH, which remains the same. He has been sober since  and currently in a outpatient Rehab program through [facility].     He is retired. He lives alone. Has family and friends who visits  with him every day. He had a follow up head CT on 1/3. Please see the results below. He is not on any anticoagulation.     Review of Systems:   Pertinent items are noted in HPI or as in patient entered ROS below, remainder of complete ROS is negative.   No flowsheet data found.     Physical Exam:   There were no vitals taken for this visit.   General: No acute distress.    Neuro: The patient is fully oriented. Speech is normal.   Psych: Normal mood and affect. Behavior is normal.        Imagin/3/2023 Head CT:  Impression    1.  No acute findings.   2.  Large left chronic subdural hematoma is similar in size and density.   3.  Smaller right chronic subdural hematoma has decreased in volume relative to prior.   4.  Left to right midline shift 1 mm.        Assessment:  Acute on chronic left subdural hematoma, chronic bilateral subdural hematoma 2022  S/p B/L MMA Embolization on 2022  Post discharge follow up    Plan: Discussed and images reviewed with Dr. Bernard.   Overall patient is progressing well after the recent MMA embolization. Head CT from 1/3/23 shows smaller SDH. We will plan for a follow-up head CT in 8 weeks. Patient to follow-up with me after imaging.      I spent 30 minutes on patient care activities related to this encounter on the date of service, including time spent reviewing the chart, obtaining history and examination and in counseling the patient, and in documentation in the electronic medical record.      Naila VÁSQUEZ, CNP  Department of Neurosurgery

## 2023-02-01 NOTE — PROGRESS NOTES
Dav is a 52 year old who is being evaluated via a billable video visit.      How would you like to obtain your AVS? MyChart  If the video visit is dropped, the invitation should be resent by: Text to cell phone: [phone]  Will anyone else be joining your video visit? No        Video-Visit Details    Type of service:  Video Visit   Video Start Time: 807  Video End Time:8:31 AM    Originating Location (pt. Location): Home    Distant Location (provider location):  On-site  Platform used for Video Visit: [facility]  Department of Neurosurgery      Name: Getachew Barcenas  MRN: [id number]  Age: 52 year old  : 1970  Referring provider: Rodrigo Avery  2023      Chief Complaint:   Acute on chronic left subdural hematoma, chronic bilateral subdural hematoma 2022  S/p B/L MMA Embolization on 2022  Post discharge follow up    History of Present Illness:   Getachew Barcenas is a 52 year old male with a history of alcohol use disorder, thiamine deficiency, frequent falls, peripheral neuropathy, hypothyroidism, former tobacco use, mild LULI, somnambulism, RLS, diverticulitis, and mood disorder who presented as transfer to [facility] on 2022 for higher level of cares in the setting of new diagnosis of acute on chronic L SDH and subacute R SDH. Ct C-spine was unremarkable, pt in a stable state, no active intra-cranial bleeding confirmed on CTH  which is  showing no acute intracranial hemorrhage, no substantial change in size or appearance of mixed density b/l SDH, left > right, c/to CTH 11/3/22.    On 2022, patient underwent bilateral MMA embolization and was discharged on 2022. Today, I had a video visit with the patient. Per patient, he still has dizziness and falls. Fall frequency reduced to 1-2 times a week from daily falls prior to the hospitalization in . He reports headaches and uses Tylenol as needed. He had right hand weakness during hospital stay,  possible from the left SDH, which remains the same. He has been sober since  and currently in a outpatient Rehab program through [facility].     He is retired. He lives alone. Has family and friends who visits with him every day. He had a follow up head CT on 1/3. Please see the results below. He is not on any anticoagulation.     Review of Systems:   Pertinent items are noted in HPI or as in patient entered ROS below, remainder of complete ROS is negative.   No flowsheet data found.     Physical Exam:   There were no vitals taken for this visit.   General: No acute distress.    Neuro: The patient is fully oriented. Speech is normal.   Psych: Normal mood and affect. Behavior is normal.        Imagin/3/2023 Head CT:  Impression    1.  No acute findings.   2.  Large left chronic subdural hematoma is similar in size and density.   3.  Smaller right chronic subdural hematoma has decreased in volume relative to prior.   4.  Left to right midline shift 1 mm.        Assessment:  Acute on chronic left subdural hematoma, chronic bilateral subdural hematoma 2022  S/p B/L MMA Embolization on 2022  Post discharge follow up    Plan: Discussed and images reviewed with Dr. Bernard.   Overall patient is progressing well after the recent MMA embolization. Head CT from 1/3/23 shows smaller SDH. We will plan for a follow-up head CT in 8 weeks. Patient to follow-up with me after imaging.      I spent 30 minutes on patient care activities related to this encounter on the date of service, including time spent reviewing the chart, obtaining history and examination and in counseling the patient, and in documentation in the electronic medical record.      Naila VÁSQUEZ CNP  Department of Neurosurgery

## 2023-02-02 ENCOUNTER — HOSPITAL ENCOUNTER (OUTPATIENT)
Facility: HOSPITAL | Age: 53
Setting detail: OBSERVATION
Discharge: HOME | End: 2023-02-03
Attending: EMERGENCY MEDICINE | Admitting: EMERGENCY MEDICINE
Payer: COMMERCIAL

## 2023-02-02 ENCOUNTER — APPOINTMENT (OUTPATIENT)
Dept: CT IMAGING | Facility: HOSPITAL | Age: 53
End: 2023-02-02
Attending: EMERGENCY MEDICINE
Payer: COMMERCIAL

## 2023-02-02 DIAGNOSIS — I62.03 ACUTE ON CHRONIC INTRACRANIAL SUBDURAL HEMATOMA (H): ICD-10-CM

## 2023-02-02 DIAGNOSIS — I62.01 ACUTE ON CHRONIC INTRACRANIAL SUBDURAL HEMATOMA (H): ICD-10-CM

## 2023-02-02 DIAGNOSIS — S06.5XAA SUBDURAL HEMATOMA (H): ICD-10-CM

## 2023-02-02 DIAGNOSIS — R52 BODY ACHES: ICD-10-CM

## 2023-02-02 DIAGNOSIS — E51.9 THIAMINE DEFICIENCY: ICD-10-CM

## 2023-02-02 DIAGNOSIS — D69.6 THROMBOCYTOPENIA (H): Primary | ICD-10-CM

## 2023-02-02 LAB
ALBUMIN SERPL BCG-MCNC: 4 G/DL (ref 3.5–5.2)
ALBUMIN UR-MCNC: NEGATIVE MG/DL
ALP SERPL-CCNC: 120 U/L (ref 40–129)
ALT SERPL W P-5'-P-CCNC: 52 U/L (ref 10–50)
AMMONIA PLAS-SCNC: 23 UMOL/L (ref 16–60)
ANION GAP SERPL CALCULATED.3IONS-SCNC: 10 MMOL/L (ref 7–15)
APPEARANCE UR: CLEAR
AST SERPL W P-5'-P-CCNC: 44 U/L (ref 10–50)
BASOPHILS # BLD AUTO: 0.1 10E3/UL (ref 0–0.2)
BASOPHILS NFR BLD AUTO: 1 %
BILIRUB SERPL-MCNC: 0.4 MG/DL
BILIRUB UR QL STRIP: NEGATIVE
BUN SERPL-MCNC: 11.7 MG/DL (ref 6–20)
CALCIUM SERPL-MCNC: 9.7 MG/DL (ref 8.6–10)
CHLORIDE SERPL-SCNC: 103 MMOL/L (ref 98–107)
CK SERPL-CCNC: 32 U/L (ref 39–308)
CLOSURE TME COLL+EPINEP BLD: 89 SECONDS
COLOR UR AUTO: YELLOW
CREAT SERPL-MCNC: 0.74 MG/DL (ref 0.67–1.17)
CRP SERPL-MCNC: <3 MG/L
DEPRECATED HCO3 PLAS-SCNC: 26 MMOL/L (ref 22–29)
EOSINOPHIL # BLD AUTO: 0.4 10E3/UL (ref 0–0.7)
EOSINOPHIL NFR BLD AUTO: 6 %
ERYTHROCYTE [DISTWIDTH] IN BLOOD BY AUTOMATED COUNT: 13.3 % (ref 10–15)
ETHANOL SERPL-MCNC: <0.01 G/DL
FLUAV AG SPEC QL IA: NEGATIVE
FLUBV AG SPEC QL IA: NEGATIVE
GFR SERPL CREATININE-BSD FRML MDRD: >90 ML/MIN/1.73M2
GLUCOSE SERPL-MCNC: 92 MG/DL (ref 70–99)
GLUCOSE UR STRIP-MCNC: NEGATIVE MG/DL
HCT VFR BLD AUTO: 42.6 % (ref 40–53)
HGB BLD-MCNC: 14.1 G/DL (ref 13.3–17.7)
HGB UR QL STRIP: NEGATIVE
IMM GRANULOCYTES # BLD: 0 10E3/UL
IMM GRANULOCYTES NFR BLD: 1 %
INR PPP: 1.07 (ref 0.85–1.15)
KETONES UR STRIP-MCNC: NEGATIVE MG/DL
LACTATE SERPL-SCNC: 1.1 MMOL/L (ref 0.7–2)
LEUKOCYTE ESTERASE UR QL STRIP: ABNORMAL
LIPASE SERPL-CCNC: 109 U/L (ref 13–60)
LYMPHOCYTES # BLD AUTO: 2.1 10E3/UL (ref 0.8–5.3)
LYMPHOCYTES NFR BLD AUTO: 34 %
MAGNESIUM SERPL-MCNC: 1.8 MG/DL (ref 1.7–2.3)
MCH RBC QN AUTO: 30.5 PG (ref 26.5–33)
MCHC RBC AUTO-ENTMCNC: 33.1 G/DL (ref 31.5–36.5)
MCV RBC AUTO: 92 FL (ref 78–100)
MONOCYTES # BLD AUTO: 0.6 10E3/UL (ref 0–1.3)
MONOCYTES NFR BLD AUTO: 10 %
MUCOUS THREADS #/AREA URNS LPF: PRESENT /LPF
NEUTROPHILS # BLD AUTO: 3.2 10E3/UL (ref 1.6–8.3)
NEUTROPHILS NFR BLD AUTO: 48 %
NITRATE UR QL: NEGATIVE
NRBC # BLD AUTO: 0 10E3/UL
NRBC BLD AUTO-RTO: 0 /100
PH UR STRIP: 5 [PH] (ref 5–7)
PLATELET # BLD AUTO: 266 10E3/UL (ref 150–450)
POTASSIUM SERPL-SCNC: 3.8 MMOL/L (ref 3.4–5.3)
PROT SERPL-MCNC: 7.1 G/DL (ref 6.4–8.3)
RBC # BLD AUTO: 4.63 10E6/UL (ref 4.4–5.9)
RBC URINE: <1 /HPF
SARS-COV-2 RNA RESP QL NAA+PROBE: NEGATIVE
SODIUM SERPL-SCNC: 139 MMOL/L (ref 136–145)
SP GR UR STRIP: 1.02 (ref 1–1.03)
SQUAMOUS EPITHELIAL: <1 /HPF
TSH SERPL DL<=0.005 MIU/L-ACNC: 1.76 UIU/ML (ref 0.3–4.2)
UROBILINOGEN UR STRIP-MCNC: <2 MG/DL
WBC # BLD AUTO: 6.4 10E3/UL (ref 4–11)
WBC URINE: 3 /HPF

## 2023-02-02 PROCEDURE — 99222 1ST HOSP IP/OBS MODERATE 55: CPT | Mod: GC | Performed by: STUDENT IN AN ORGANIZED HEALTH CARE EDUCATION/TRAINING PROGRAM

## 2023-02-02 PROCEDURE — 250N000013 HC RX MED GY IP 250 OP 250 PS 637: Performed by: EMERGENCY MEDICINE

## 2023-02-02 PROCEDURE — 250N000013 HC RX MED GY IP 250 OP 250 PS 637: Performed by: STUDENT IN AN ORGANIZED HEALTH CARE EDUCATION/TRAINING PROGRAM

## 2023-02-02 PROCEDURE — 83735 ASSAY OF MAGNESIUM: CPT | Performed by: EMERGENCY MEDICINE

## 2023-02-02 PROCEDURE — 96361 HYDRATE IV INFUSION ADD-ON: CPT

## 2023-02-02 PROCEDURE — 70450 CT HEAD/BRAIN W/O DYE: CPT | Mod: 76

## 2023-02-02 PROCEDURE — 250N000013 HC RX MED GY IP 250 OP 250 PS 637

## 2023-02-02 PROCEDURE — 258N000003 HC RX IP 258 OP 636: Performed by: EMERGENCY MEDICINE

## 2023-02-02 PROCEDURE — G0378 HOSPITAL OBSERVATION PER HR: HCPCS | Mod: CS

## 2023-02-02 PROCEDURE — 36415 COLL VENOUS BLD VENIPUNCTURE: CPT | Performed by: NURSE PRACTITIONER

## 2023-02-02 PROCEDURE — C9803 HOPD COVID-19 SPEC COLLECT: HCPCS

## 2023-02-02 PROCEDURE — 96360 HYDRATION IV INFUSION INIT: CPT

## 2023-02-02 PROCEDURE — 999N000157 HC STATISTIC RCP TIME EA 10 MIN

## 2023-02-02 PROCEDURE — 70450 CT HEAD/BRAIN W/O DYE: CPT

## 2023-02-02 PROCEDURE — 85576 BLOOD PLATELET AGGREGATION: CPT

## 2023-02-02 PROCEDURE — 250N000013 HC RX MED GY IP 250 OP 250 PS 637: Performed by: FAMILY MEDICINE

## 2023-02-02 PROCEDURE — 86140 C-REACTIVE PROTEIN: CPT | Performed by: EMERGENCY MEDICINE

## 2023-02-02 PROCEDURE — 82077 ASSAY SPEC XCP UR&BREATH IA: CPT | Performed by: EMERGENCY MEDICINE

## 2023-02-02 PROCEDURE — 82550 ASSAY OF CK (CPK): CPT | Performed by: EMERGENCY MEDICINE

## 2023-02-02 PROCEDURE — 36415 COLL VENOUS BLD VENIPUNCTURE: CPT | Performed by: EMERGENCY MEDICINE

## 2023-02-02 PROCEDURE — 99285 EMERGENCY DEPT VISIT HI MDM: CPT | Mod: 25,CS

## 2023-02-02 PROCEDURE — 99221 1ST HOSP IP/OBS SF/LOW 40: CPT | Performed by: PHYSICIAN ASSISTANT

## 2023-02-02 PROCEDURE — G0378 HOSPITAL OBSERVATION PER HR: HCPCS

## 2023-02-02 PROCEDURE — 83690 ASSAY OF LIPASE: CPT | Performed by: EMERGENCY MEDICINE

## 2023-02-02 PROCEDURE — 81001 URINALYSIS AUTO W/SCOPE: CPT | Performed by: EMERGENCY MEDICINE

## 2023-02-02 PROCEDURE — 80053 COMPREHEN METABOLIC PANEL: CPT | Performed by: EMERGENCY MEDICINE

## 2023-02-02 PROCEDURE — 85610 PROTHROMBIN TIME: CPT | Performed by: EMERGENCY MEDICINE

## 2023-02-02 PROCEDURE — 87804 INFLUENZA ASSAY W/OPTIC: CPT | Performed by: EMERGENCY MEDICINE

## 2023-02-02 PROCEDURE — 85025 COMPLETE CBC W/AUTO DIFF WBC: CPT | Performed by: EMERGENCY MEDICINE

## 2023-02-02 PROCEDURE — 82140 ASSAY OF AMMONIA: CPT | Performed by: EMERGENCY MEDICINE

## 2023-02-02 PROCEDURE — 83605 ASSAY OF LACTIC ACID: CPT | Performed by: EMERGENCY MEDICINE

## 2023-02-02 PROCEDURE — 84443 ASSAY THYROID STIM HORMONE: CPT | Performed by: EMERGENCY MEDICINE

## 2023-02-02 PROCEDURE — U0005 INFEC AGEN DETEC AMPLI PROBE: HCPCS | Performed by: EMERGENCY MEDICINE

## 2023-02-02 RX ORDER — ONDANSETRON 4 MG/1
4 TABLET, ORALLY DISINTEGRATING ORAL EVERY 6 HOURS PRN
Status: DISCONTINUED | OUTPATIENT
Start: 2023-02-02 | End: 2023-02-03 | Stop reason: HOSPADM

## 2023-02-02 RX ORDER — ACETAMINOPHEN 325 MG/1
975 TABLET ORAL EVERY 6 HOURS PRN
Status: DISCONTINUED | OUTPATIENT
Start: 2023-02-02 | End: 2023-02-03 | Stop reason: HOSPADM

## 2023-02-02 RX ORDER — HYDROCODONE BITARTRATE AND ACETAMINOPHEN 5; 325 MG/1; MG/1
2 TABLET ORAL ONCE
Status: COMPLETED | OUTPATIENT
Start: 2023-02-02 | End: 2023-02-02

## 2023-02-02 RX ORDER — HYDROXYZINE PAMOATE 25 MG/1
25 CAPSULE ORAL 3 TIMES DAILY PRN
Status: DISCONTINUED | OUTPATIENT
Start: 2023-02-02 | End: 2023-02-03 | Stop reason: HOSPADM

## 2023-02-02 RX ORDER — ONDANSETRON 2 MG/ML
4 INJECTION INTRAMUSCULAR; INTRAVENOUS EVERY 6 HOURS PRN
Status: DISCONTINUED | OUTPATIENT
Start: 2023-02-02 | End: 2023-02-03 | Stop reason: HOSPADM

## 2023-02-02 RX ORDER — HYDROXYZINE PAMOATE 25 MG/1
75 CAPSULE ORAL AT BEDTIME
COMMUNITY
End: 2023-03-17

## 2023-02-02 RX ORDER — POLYETHYLENE GLYCOL 3350 17 G/17G
17 POWDER, FOR SOLUTION ORAL DAILY PRN
Status: DISCONTINUED | OUTPATIENT
Start: 2023-02-02 | End: 2023-02-03 | Stop reason: HOSPADM

## 2023-02-02 RX ORDER — ACAMPROSATE CALCIUM 333 MG/1
666 TABLET, DELAYED RELEASE ORAL 3 TIMES DAILY
COMMUNITY
End: 2023-02-03

## 2023-02-02 RX ORDER — LIDOCAINE 40 MG/G
CREAM TOPICAL
Status: DISCONTINUED | OUTPATIENT
Start: 2023-02-02 | End: 2023-02-03 | Stop reason: HOSPADM

## 2023-02-02 RX ORDER — ACAMPROSATE CALCIUM 333 MG/1
666 TABLET, DELAYED RELEASE ORAL 3 TIMES DAILY
Status: DISCONTINUED | OUTPATIENT
Start: 2023-02-02 | End: 2023-02-03 | Stop reason: HOSPADM

## 2023-02-02 RX ORDER — TRAZODONE HYDROCHLORIDE 50 MG/1
100 TABLET, FILM COATED ORAL
Status: DISCONTINUED | OUTPATIENT
Start: 2023-02-02 | End: 2023-02-03 | Stop reason: HOSPADM

## 2023-02-02 RX ORDER — HYDROXYZINE PAMOATE 25 MG/1
25 CAPSULE ORAL 3 TIMES DAILY PRN
COMMUNITY
End: 2023-12-07

## 2023-02-02 RX ORDER — GABAPENTIN 300 MG/1
300 CAPSULE ORAL 3 TIMES DAILY
Status: DISCONTINUED | OUTPATIENT
Start: 2023-02-02 | End: 2023-02-03 | Stop reason: HOSPADM

## 2023-02-02 RX ORDER — MAGNESIUM OXIDE 400 MG/1
800 TABLET ORAL 2 TIMES DAILY
Status: DISCONTINUED | OUTPATIENT
Start: 2023-02-02 | End: 2023-02-03 | Stop reason: HOSPADM

## 2023-02-02 RX ORDER — SODIUM CHLORIDE 9 MG/ML
INJECTION, SOLUTION INTRAVENOUS CONTINUOUS
Status: DISCONTINUED | OUTPATIENT
Start: 2023-02-02 | End: 2023-02-02

## 2023-02-02 RX ORDER — PROCHLORPERAZINE 25 MG
25 SUPPOSITORY, RECTAL RECTAL EVERY 12 HOURS PRN
Status: DISCONTINUED | OUTPATIENT
Start: 2023-02-02 | End: 2023-02-03 | Stop reason: HOSPADM

## 2023-02-02 RX ORDER — MAGNESIUM OXIDE 400 MG/1
800 TABLET ORAL 2 TIMES DAILY
COMMUNITY
End: 2023-03-17

## 2023-02-02 RX ORDER — PROCHLORPERAZINE MALEATE 10 MG
10 TABLET ORAL EVERY 6 HOURS PRN
Status: DISCONTINUED | OUTPATIENT
Start: 2023-02-02 | End: 2023-02-03 | Stop reason: HOSPADM

## 2023-02-02 RX ORDER — FOLIC ACID 1 MG/1
1 TABLET ORAL DAILY
Status: DISCONTINUED | OUTPATIENT
Start: 2023-02-02 | End: 2023-02-03 | Stop reason: HOSPADM

## 2023-02-02 RX ADMIN — MAGNESIUM OXIDE TAB 400 MG (241.3 MG ELEMENTAL MG) 800 MG: 400 (241.3 MG) TAB at 19:09

## 2023-02-02 RX ADMIN — GABAPENTIN 300 MG: 300 CAPSULE ORAL at 19:07

## 2023-02-02 RX ADMIN — THIAMINE HCL TAB 100 MG 100 MG: 100 TAB at 19:06

## 2023-02-02 RX ADMIN — ACETAMINOPHEN 975 MG: 325 TABLET ORAL at 20:39

## 2023-02-02 RX ADMIN — HYDROCODONE BITARTRATE AND ACETAMINOPHEN 2 TABLET: 5; 325 TABLET ORAL at 07:44

## 2023-02-02 RX ADMIN — SODIUM CHLORIDE: 9 INJECTION, SOLUTION INTRAVENOUS at 07:50

## 2023-02-02 RX ADMIN — SODIUM CHLORIDE 1000 ML: 9 INJECTION, SOLUTION INTRAVENOUS at 06:36

## 2023-02-02 RX ADMIN — ACAMPROSATE CALCIUM 666 MG: 333 TABLET, DELAYED RELEASE ORAL at 19:08

## 2023-02-02 RX ADMIN — HYDROXYZINE HYDROCHLORIDE 75 MG: 25 TABLET, FILM COATED ORAL at 20:30

## 2023-02-02 RX ADMIN — FOLIC ACID 1 MG: 1 TABLET ORAL at 19:07

## 2023-02-02 ASSESSMENT — ENCOUNTER SYMPTOMS
NECK PAIN: 1
HEADACHES: 1
VOMITING: 0
POLYDIPSIA: 1
MYALGIAS: 1
FEVER: 0
ARTHRALGIAS: 1
DYSURIA: 0
ABDOMINAL PAIN: 0

## 2023-02-02 ASSESSMENT — ACTIVITIES OF DAILY LIVING (ADL)
ADLS_ACUITY_SCORE: 35
ADLS_ACUITY_SCORE: 35
ADLS_ACUITY_SCORE: 33
ADLS_ACUITY_SCORE: 35
DEPENDENT_IADLS:: INDEPENDENT
ADLS_ACUITY_SCORE: 35
ADLS_ACUITY_SCORE: 33

## 2023-02-02 NOTE — ED TRIAGE NOTES
Pt is a recovering alcoholic and has been sober for 32 days. Pt reports onset of bilateral leg pain, neck pain, zambrano pain and overall joint pain since last week. Also reports weight loss despite eating normally. Pt reports about 10 lbs loss over the past week.

## 2023-02-02 NOTE — ED PROVIDER NOTES
NAME: Getachew Barcenas  AGE: 52 year old male  YOB: 1970  MRN: [id number]  EVALUATION DATE & TIME: 2023  4:55 AM    PCP: No Ref-Primary, Physician    ED PROVIDER: Campos Tran M.D.      Chief Complaint   Patient presents with     Joint Pain     Weight Loss     FINAL IMPRESSION:  1. Body aches    2. Acute on chronic intracranial subdural hematoma (H)        MEDICAL DECISION MAKIN:18 AM I met with the patient, obtained history, performed an initial exam, and discussed options and plan for diagnostics and treatment here in the ED.   6:27 AM callback from radiology with findings of a new hyperdense collection of blood on the left past subdural.  6:47 AM patient discussed with Carmen from neurosurgery who recommends repeat CT in 6 hours, at 12 PM.  They will see patient first thing this morning and if any concern plan for transfer or if CT shows expansion of acute on chronic subdural likely related to fall 1 week ago.  7:03 AM patient signed out pending repeat CT and disposition/admission.  Patient was clinically assessed and consented to treatment. After assessment, medical decision making and workup were discussed with the patient. The patient was agreeable to plan for testing, workup, and treatment.  Pertinent Labs & Imaging studies reviewed. (See chart for details)       Medical Decision Making    History:    Supplemental history from: Documented in chart, if applicable    External Record(s) reviewed: Documented in chart, if applicable.    Work Up:    Chart documentation includes differential considered and any EKGs or imaging independently interpreted by provider, where specified.    In additional to work up documented, I considered the following work up: Documented in chart, if applicable.    External consultation:    Discussion of management with another provider: Documented in chart, if applicable    Complicating factors:    Care impacted by chronic illness: N/A    Care affected by  social determinants of health: N/A    Disposition considerations: Admission considered. Patient was signed out to the oncoming physician, disposition pending.    Getachew Barcenas is a 52 year old male who presents with joint pain and weight loss.   Differential diagnosis includes but not limited to diabetes, dehydration, Warnicke's encephalopathy, diabetes insipidus, acute on chronic subdural.  Patient presenting with feelings of body aches and weight loss.  He does have history of alcohol abuse as well as history of falls and intracranial injury.  Patient recently admitted at the [facility] following accident that involved fall and a acute on chronic subdural hemorrhage.  He required embolization of 2 veins that were bleeding.  Patient has subsequently recovered and been going through rehab.  He is now sober for 1 month.  He reports doing well at home however now noting some body aches that are both myalgias and arthralgias.  Patient may be just suffering from viral illness or COVID-19.  Is also possible patient may have diabetes as he does report polydipsia.  He denies any polyuria.  Patient also questionable for Warnicke's encephalopathy in the past but has been taking thiamine, folate and other multivitamins.  We will check labs and electrolytes as well as repeat CT scan of the head as he did have this acute on chronic subdural and repeat scan on January 3 was unchanged.  He does follow with neurosurgery and did have a video appointment yesterday which was stable at that time.  Patient reports fall 1 week ago that possibly struck the back of his head.  Patient waiting labs and CT.   CT scan returned showing acute on chronic subdural on the left side.  Patient likely with subdural related to fall last week.  This is new compared to prior scans on January 3 which were follow-up from previous subdural he was treated for in January.  I did speak with Carmen from neurosurgery and she did see his images.  At this  time they recommended no surgical intervention and repeat CT scan in 6 hours with disposition after that.  They did recommend admission but whether patient will need to remain here at [facility] for close follow-up or possibly [facility] to be determined after the CT scan at 12 noon.  They will come and see him first thing this morning and as long as exam does not change plan for repeat CT.  Labs so far show no acute lactic acidosis, metabolic abnormality or infectious process.  Glucose was normal and TSH was normal.  CBC unremarkable and alcohol was negative.  Patient will be plan for signout pending repeat CT scan and also follow-up for COVID which could be possibly causing his body aches.    32 minutes of critical care time    MEDICATIONS GIVEN IN THE EMERGENCY:  Medications   0.9% sodium chloride BOLUS (1,000 mLs Intravenous New Bag 2/2/23 0636)     Followed by   sodium chloride 0.9% infusion (has no administration in time range)       NEW PRESCRIPTIONS STARTED AT TODAY'S ER VISIT:  New Prescriptions    No medications on file          =================================================================    HPI    Patient information was obtained from: Patient    Use of : N/A      Getachew Barcenas is a 52 year old male with a past medical history of alcohol abuse, subdural hematoma, and vertigo, who presents myalgias and arthralgias.       Per chart review, the patient had a virtual visit with Neurosurgery yesterday.  The patient had bilateral MMA embolization on 11/7/22 for left acute on chronic SDH and subacute R SDH.  He did have a CT on 1/3/23 which showed smaller SDH.  The plan discussed with patient was f/u CT in eight weeks.     The patient reports here with millages and arthralgias especially in the legs.  He reports pain is well in his neck and right shoulder over the last 24 hours.  He reports he has been hydrating well and had a good appetite.  He is a recovering alcoholic and has been sober for 32  days.  He reports a history of vertigo with frequent falls although this is improved since he stopped using alcohol.  He reports his last fall was about 6 days ago when he scraped his bilateral arms and maybe hit his posterior head.  He reports mild headaches which is a chronic ongoing issue for him.  He denies any sick contacts.  He does report some polydipsia and a dry mouth.  He reports drinking two gallons of fluids a day but still feels dehydrated.  He denies any fevers, dysuria, abdominal pain, vomiting, rash, or other complaints at this time. Of note, he reports taking his medications and vitamins as prescribed.     REVIEW OF SYSTEMS   Review of Systems   Constitutional: Negative for fever.   HENT:        Positive for dry mouth   Gastrointestinal: Negative for abdominal pain and vomiting.   Endocrine: Positive for polydipsia.   Genitourinary: Negative for dysuria.   Musculoskeletal: Positive for arthralgias (legs), myalgias (legs) and neck pain.        Positive for right shoulder pain   Skin: Negative for rash.   Neurological: Positive for headaches (mild, chronic).   All other systems reviewed and are negative.       PAST MEDICAL HISTORY:  Past Medical History:   Diagnosis Date     Alcohol use disorder, severe, dependence (H) 2/24/2022       PAST SURGICAL HISTORY:  Past Surgical History:   Procedure Laterality Date     IR CAROTID CEREBRAL ANGIOGRAM BILATERAL  11/7/2022     PICC TRIPLE LUMEN PLACEMENT  11/3/2022            CURRENT MEDICATIONS:      Current Facility-Administered Medications:      0.9% sodium chloride BOLUS, 1,000 mL, Intravenous, Once, Last Rate: 1,000 mL/hr at 02/02/23 0636, 1,000 mL at 02/02/23 0636 **FOLLOWED BY** sodium chloride 0.9% infusion, , Intravenous, Continuous, Campos Tran MD    Current Outpatient Medications:      acetaminophen (TYLENOL) 500 MG tablet, [ACETAMINOPHEN (TYLENOL) 500 MG TABLET] Take 1-2 tablets (500-1,000 mg total) by mouth every 6 (six) hours as  "needed for pain., Disp: , Rfl: 0     cyanocobalamin (CYANOCOBALAMIN) 100 MCG tablet, Take 1 tablet (100 mcg) by mouth daily, Disp: 30 tablet, Rfl: 3     folic acid (FOLVITE) 1 MG tablet, Take 1 tablet (1 mg) by mouth daily, Disp: , Rfl:      gabapentin (NEURONTIN) 300 MG capsule, Take 1 capsule (300 mg) by mouth 3 times daily, Disp: 90 capsule, Rfl: 3     levothyroxine (SYNTHROID/LEVOTHROID) 175 MCG tablet, Take 175 mcg by mouth daily, Disp: , Rfl:      multivitamin w/minerals (THERA-VIT-M) tablet, Take 1 tablet by mouth daily, Disp: , Rfl:      naltrexone (DEPADE/REVIA) 50 MG tablet, Take 1 tablet (50 mg) by mouth daily, Disp: 30 tablet, Rfl: 3     OLANZapine zydis (ZYPREXA) 5 MG ODT, Take 1 tablet (5 mg) by mouth nightly as needed for agitation, Disp: 30 tablet, Rfl: 3     psyllium (METAMUCIL) 3.4 gram packet, [PSYLLIUM (METAMUCIL) 3.4 GRAM PACKET] Take 1 packet by mouth daily., Disp: , Rfl:      thiamine (B-1) 100 MG tablet, 1 tablet (100 mg) by Oral or Feeding Tube route daily, Disp: 30 tablet, Rfl: 3     traZODone (DESYREL) 50 MG tablet, Take 50 mg by mouth nightly as needed for sleep, Disp: , Rfl:     ALLERGIES:  No Known Allergies    FAMILY HISTORY:  No family history on file.    SOCIAL HISTORY:   Social History     Socioeconomic History     Marital status:    Tobacco Use     Smoking status: Never     Smokeless tobacco: Current     Types: Chew   Substance and Sexual Activity     Alcohol use: Yes     Drug use: No       PHYSICAL EXAM:    Vitals: /75   Pulse 81   Temp 98.1  F (36.7  C) (Oral)   Resp 13   Ht 1.88 m (6' 2\")   Wt 98.4 kg (217 lb)   SpO2 95%   BMI 27.86 kg/m     Physical Exam  Vitals and nursing note reviewed.   Constitutional:       General: He is not in acute distress.     Appearance: Normal appearance. He is normal weight. He is not ill-appearing, toxic-appearing or diaphoretic.   HENT:      Head: Normocephalic and atraumatic.      Mouth/Throat:      Mouth: Mucous membranes " are moist.      Pharynx: Oropharynx is clear.   Eyes:      Extraocular Movements: Extraocular movements intact.      Pupils: Pupils are equal, round, and reactive to light.   Cardiovascular:      Rate and Rhythm: Regular rhythm. Tachycardia present.      Heart sounds: Normal heart sounds.   Pulmonary:      Effort: Pulmonary effort is normal. No respiratory distress.      Breath sounds: Normal breath sounds.   Abdominal:      General: There is no distension.      Palpations: Abdomen is soft.      Tenderness: There is no abdominal tenderness. There is no right CVA tenderness, left CVA tenderness, guarding or rebound.   Musculoskeletal:         General: No tenderness, deformity or signs of injury.      Cervical back: Normal range of motion and neck supple. No tenderness.   Skin:     General: Skin is warm and dry.      Capillary Refill: Capillary refill takes less than 2 seconds.      Coloration: Skin is not jaundiced.   Neurological:      General: No focal deficit present.      Mental Status: He is alert and oriented to person, place, and time.      Cranial Nerves: No cranial nerve deficit.      Motor: No weakness.      Coordination: Coordination normal.   Psychiatric:         Mood and Affect: Mood normal.        LAB:  All pertinent labs reviewed and interpreted.  Labs Ordered and Resulted from Time of ED Arrival to Time of ED Departure   LIPASE - Abnormal       Result Value    Lipase 109 (*)    COMPREHENSIVE METABOLIC PANEL - Abnormal    Sodium 139      Potassium 3.8      Chloride 103      Carbon Dioxide (CO2) 26      Anion Gap 10      Urea Nitrogen 11.7      Creatinine 0.74      Calcium 9.7      Glucose 92      Alkaline Phosphatase 120      AST 44      ALT 52 (*)     Protein Total 7.1      Albumin 4.0      Bilirubin Total 0.4      GFR Estimate >90     CK TOTAL - Abnormal    CK 32 (*)    INR - Normal    INR 1.07     LACTIC ACID WHOLE BLOOD - Normal    Lactic Acid 1.1     MAGNESIUM - Normal    Magnesium 1.8     AMMONIA  - Normal    Ammonia 23     TSH WITH FREE T4 REFLEX - Normal    TSH 1.76     CRP INFLAMMATION - Normal    CRP Inflammation <3.00     COVID-19 VIRUS (CORONAVIRUS) BY PCR - Normal    SARS CoV2 PCR Negative     ETHYL ALCOHOL LEVEL - Normal    Alcohol ethyl <0.01     CBC WITH PLATELETS AND DIFFERENTIAL    WBC Count 6.4      RBC Count 4.63      Hemoglobin 14.1      Hematocrit 42.6      MCV 92      MCH 30.5      MCHC 33.1      RDW 13.3      Platelet Count 266      % Neutrophils 48      % Lymphocytes 34      % Monocytes 10      % Eosinophils 6      % Basophils 1      % Immature Granulocytes 1      NRBCs per 100 WBC 0      Absolute Neutrophils 3.2      Absolute Lymphocytes 2.1      Absolute Monocytes 0.6      Absolute Eosinophils 0.4      Absolute Basophils 0.1      Absolute Immature Granulocytes 0.0      Absolute NRBCs 0.0     ROUTINE UA WITH MICROSCOPIC REFLEX TO CULTURE   PLATELET FUNCTION CLOSURE WITH REFLEX       RADIOLOGY:  CT Head w/o Contrast   Final Result   IMPRESSION:   1.  Stable volume of the mixed density left hemispheric subdural hemorrhage with increased hyperdense blood products layering posteriorly, suspicious for an acute component.   2.  Decreased volume of the mixed density subdural hemorrhage along the right cerebral convexity.   3.  Mild associated mass effect with approximately 2 mm left-to-right midline shift.        Exams were discussed with Dr. Tran at 6:28 AM on 02/02/2023.      CT Head w/o Contrast    (Results Pending)       PROCEDURES:   Procedures       I, Jesus Leal, am serving as a scribe to document services personally performed by Dr. Campos Tran  based on my observation and the provider's statements to me. I, Campos Tran MD attest that Jesus Leal is acting in a scribe capacity, has observed my performance of the services and has documented them in accordance with my direction.      Campos Tran M.D.  Emergency Medicine  [facility] Emergency Department      Campos Tran MD  02/02/23 0627       Campos Tran MD  02/02/23 0706       Campos Tran MD  02/02/23 0717

## 2023-02-02 NOTE — CONSULTS
[facility]    Neurosurgery Consultation     Date of Admission:  2/2/2023  Date of Consult (When I saw the patient): 02/02/23    Assessment & Plan   Dav is a 53yo alcoholic in recovery with known denae SDH since Nov 2 (followed by the [facility]) status post [facility] IR cerebral angiogram with bilateral middle meningeal artery embolization on 11/7/2022 who presented to [facility] ED 2/2/23 with complaints of body aches, joint pain, leg and back pain, with 10lb weight loss over the last week. Fell 1 wk ago. Notes frequent falls     Active Problems:  SDH   Low back pain  Lumbar radiculopathy   Plan:   1. Repeat head CT 6hrs  2. Hold anticoagulation including DVT ppx  3. Do not admit patient at this time  Pending repeat head CT patient may require transfer to [facility] for further evaluation and treatment of SDH   May require further evaluation of thoracic and lumbar pain with completion of MRI of which can be completed inpatient or outpatient pending head CT     Addendum:   Repeat head CT with stable appearance of bilateral mixed density SDH noted  Okay for patient to stay at [facility] will plan for repeat head CT tomorrow morning or earlier should there be any neurological changes  Okay to eat no planned surgical intervention from neurosurgery   Okay to progress activity as tolerated with PT/OT  Neuro q4hrs checks        I have discussed the following assessment and plan with Dr. Saavedra who is in agreement with initial plan and will follow up with further consultation recommendations.    Mariela Rosado PA-C  [facility] Neurosurgery  O: [phone]        Code Status    Prior    Reason for Consult   Reason for consult: I was asked by Dr. Tran to evaluate this patient for SDH fall last week known SDH since November 2022    Primary Care Physician   Physician No Ref-Primary    Chief Complaint   Back pain leg pain dull headaches     History is obtained from the patient    History of Present Illness   Getachew RODRIGUEZ  Swapna is a 52 year old male who presents with who presents with joint pain and weight loss. He has had frequent falls in the past with the last fall a week ago stating that he was walking in his garage when he fell backwards landing on his elbows. Unsure if he hit the back of his head. He denies LOC. He notes intermittent dull headaches since November but denies any nausea, emesis, or visual changes. He is currently sober for the past month and following with rehab. He was noted to have bilateral SDH in November when he presented to [facility] ED and got transferred to [facility] of which has been followed by them with virtual visit yesterday. He is status post cerebral angiogram with bilateral middle meningeal artery embolization 11/7 with IR at [facility] and states that his most recent head CT on 1/3 was stable and planned to follow up in 8 weeks with repeat head CT at that time. His new head CT today demonstrates stable mixed density left SDH with some hyperdense layering posteriorly, suspicious for an acute component. Decreased volume of the mixed density SDH on the right. Mild assoc mass effect with 2mm left to right midline shift.  He states that over the past 4-5 days he has had progressive complaint of mid to low back pain that he describes as a tightness and stabbing pain that will radiate into his anterior thighs bilaterally (R>L). He notes chronic feet and hand neuropathy that remains unchanged and denies any radicular numbness or tingling. He also notes complaint of right shoulder pain and soreness that he states is worsened with shoulder movement. He denies any neck pain or radicular upper extremity pain, numbness, tingling or weakness. He denies any changes in bowel or bladder habits. He denies gait instability but notes limitation in activities secondary to his back pain.          Past Medical History   I have reviewed this patient's medical history and updated it with pertinent information if needed.   Past  Medical History:   Diagnosis Date     Alcohol use disorder, severe, dependence (H) 2022       Past Surgical History   I have reviewed this patient's surgical history and updated it with pertinent information if needed.  Past Surgical History:   Procedure Laterality Date     IR CAROTID CEREBRAL ANGIOGRAM BILATERAL  2022     PICC TRIPLE LUMEN PLACEMENT  11/3/2022            Prior to Admission Medications   Prior to Admission Medications   Prescriptions Last Dose Informant Patient Reported? Taking?   acamprosate (CAMPRAL) 333 MG EC tablet 2023  Yes Yes   Sig: Take 666 mg by mouth 3 times daily   acetaminophen (TYLENOL) 500 MG tablet 2023  No Yes   Sig: [ACETAMINOPHEN (TYLENOL) 500 MG TABLET] Take 1-2 tablets (500-1,000 mg total) by mouth every 6 (six) hours as needed for pain.   folic acid (FOLVITE) 1 MG tablet More than a month  No Yes   Sig: Take 1 tablet (1 mg) by mouth daily   gabapentin (NEURONTIN) 300 MG capsule 2023  No Yes   Sig: Take 1 capsule (300 mg) by mouth 3 times daily   hydrOXYzine (VISTARIL) 25 MG capsule 2023  Yes Yes   Sig: Take 25 mg by mouth 3 times daily as needed for anxiety   hydrOXYzine (VISTARIL) 25 MG capsule 2023  Yes Yes   Sig: Take 75 mg by mouth At Bedtime   levothyroxine (SYNTHROID/LEVOTHROID) 175 MCG tablet 2023  Yes Yes   Sig: Take 175 mcg by mouth daily   magnesium oxide (MAG-OX) 400 MG tablet 2023  Yes Yes   Sig: Take 800 mg by mouth 2 times daily   multivitamin w/minerals (THERA-VIT-M) tablet 2023  No Yes   Sig: Take 1 tablet by mouth daily   thiamine (B-1) 100 MG tablet 2023  No Yes   Si tablet (100 mg) by Oral or Feeding Tube route daily   Patient taking differently: Take 100 mg by mouth daily   traZODone (DESYREL) 50 MG tablet Past Month  Yes Yes   Sig: Take 100 mg by mouth nightly as needed for sleep   vitamin B complex with vitamin C (VITAMIN  B COMPLEX) tablet 2023  Yes Yes   Sig: Take 1 tablet by mouth daily     "  Facility-Administered Medications: None     Allergies   No Known Allergies    Social History   I have reviewed this patient's social history and updated it with pertinent information if needed. Getachew Barcenas  reports that he has never smoked. His smokeless tobacco use includes chew. He reports current alcohol use. He reports that he does not use drugs.    Family History   I have reviewed this patient's family history and updated it with pertinent information if needed.   No family history on file.    Review of Systems   14 point review of systems negative with exception to HPI     Physical Exam   Temp: 98.1  F (36.7  C) Temp src: Oral BP: 123/64 Pulse: 83   Resp: 13 SpO2: 96 % O2 Device: None (Room air)    Vital Signs with Ranges  Temp:  [98.1  F (36.7  C)] 98.1  F (36.7  C)  Pulse:  [] 83  Resp:  [13-16] 13  BP: (113-128)/(62-78) 123/64  SpO2:  [95 %-97 %] 96 %  217 lbs 0 oz     , Blood pressure 123/64, pulse 83, temperature 98.1  F (36.7  C), temperature source Oral, resp. rate 13, height 1.88 m (6' 2\"), weight 98.4 kg (217 lb), SpO2 96 %.  217 lbs 0 oz  HEENT:  Normocephalic, atraumatic.  PERRLA.  EOM s intact.   Neck:  Supple, non-tender, without lymphadenopathy.  Heart:  No peripheral edema  Lungs:  No SOB  Abdomen:  Soft, non-tender, non-distended.  Normal bowel sounds.  Skin:  Warm and dry, good capillary refill.  Extremities:  Good radial and dorsalis pedis pulses bilaterally, no edema, cyanosis or clubbing.    NEUROLOGICAL EXAMINATION:   Mental status:  Alert and Oriented x 3, speech is fluent.  Cranial nerves:  II-XII intact.   Motor:  Strength is 5/5 throughout the upper and lower extremities  Deltoids:  Right: 5/5   Left:  5/5  Biceps:  Right: 5/5   Left:  5/5  Triceps: Right: 5/5   Left:  5/5                       Wrist Extensors: Right: 5/5   Left:  5/5        Wrist Flexors:Right: 5/5   Left:  5/5             Hand : Right: 5/5   Left:  5/5         Hip Flexor: Right: 5/5   Left:  5/5 give " way weakness of right secondary to back pain      Knee extension:Right: 5/5   Left:  5/5               Knee flexion: Right: 5/5   Left:  5/5              Plantar flexion:Right: 5/5   Left:  5/5        dorsiflexion:Right: 5/5   Left:  5/5                        EHL:Right: 5/5   Left:  5/5                     Sensation:  Intact to LT throughout   Reflexes:  Negative Babinski.  Negative Clonus.    Coordination:  Smooth finger to nose testing.   Negative pronator drift.   Gait:  Not tested     Cervical examination reveals good range of motion.  No tenderness to palpation of the cervical spine or paraspinous muscles bilaterally.     Severe pain to palpation of mid thoracic spine with marked muscle spasms of paraspinous muscles     Lumbar examination reveals no tenderness of the spine or paraspinous muscles.    Straight leg raise is negative bilaterally.     Images reviewed personally   FINDINGS:  INTRACRANIAL CONTENTS: Decreased volume of the mixed density right-sided subdural hemorrhage measuring up to 7 mm in thickness. Stable volume of the mixed density left hemispheric subdural hemorrhage measuring up to 15 mm in thickness, however, there are   increased hyperdense blood products layering posteriorly, suspicious for an acute component. Trace left-to-right midline shift measuring 2 mm. The basal cisterns are preserved. The gray-white differentiation is preserved. Mild presumed chronic small   vessel ischemic changes. Mild generalized volume loss. No hydrocephalus.      VISUALIZED ORBITS/SINUSES/MASTOIDS: No intraorbital abnormality. Mucous retention cyst in the left maxillary sinus. No middle ear or mastoid effusion.     BONES/SOFT TISSUES: No acute abnormality.                                                                   IMPRESSION:  1.  Stable volume of the mixed density left hemispheric subdural hemorrhage with increased hyperdense blood products layering posteriorly, suspicious for an acute component.  2.   Decreased volume of the mixed density subdural hemorrhage along the right cerebral convexity.  3.  Mild associated mass effect with approximately 2 mm left-to-right midline shift.           Data    platelet function WNL   CBC RESULTS:   Recent Labs   Lab Test 02/02/23  0600   WBC 6.4   RBC 4.63   HGB 14.1   HCT 42.6   MCV 92   MCH 30.5   MCHC 33.1   RDW 13.3        Basic Metabolic Panel:  Lab Results   Component Value Date     02/02/2023      Lab Results   Component Value Date    POTASSIUM 3.8 02/02/2023    POTASSIUM 3.6 02/25/2022     Lab Results   Component Value Date    CHLORIDE 103 02/02/2023    CHLORIDE 107 02/23/2022     Lab Results   Component Value Date    ABDULKADIR 9.7 02/02/2023     Lab Results   Component Value Date    CO2 26 02/02/2023    CO2 20 02/23/2022     Lab Results   Component Value Date    BUN 11.7 02/02/2023    BUN 3 02/23/2022     Lab Results   Component Value Date    CR 0.74 02/02/2023     Lab Results   Component Value Date    GLC 92 02/02/2023     11/07/2022    GLC 85 02/23/2022     INR:  Lab Results   Component Value Date    INR 1.07 02/02/2023    INR 1.08 11/16/2022    INR 1.17 11/15/2022    INR 1.11 11/14/2022    INR 1.18 11/13/2022    INR 1.10 11/12/2022    INR 1.20 11/11/2022    INR 1.08 11/10/2022    INR 1.11 11/09/2022    INR 1.15 11/08/2022    INR 1.11 11/07/2022    INR 1.21 11/06/2022

## 2023-02-02 NOTE — H&P
"Admission History and Physical   Getachew Barcenas,  1970, MRN [id number]    [facility]  Acute on chronic intracranial subdural hematoma (H) [I62.01, I62.03]    PCP: No Ref-Primary, Physician, None   Code status:  Full Code       Extended Emergency Contact Information  Primary Emergency Contact: Kelly Yap  Address: [address] [place] [address] [place]  Mobile Phone: [phone]  Relation: Sister  Secondary Emergency Contact: James Barcenas (Do not use contact per patient request)  Address: [address] [place] [address] [place]  Mobile Phone: [phone]  Relation: Daughter       Assessment and Plan   Getachew Barcenas is a 52 year old male with PMH significant for chronic subdural hematoma, alcohol abuse in remission, frequent falls, peripheral neuropathy, hypothyroidism s/p radiation who presented to the ED for \"not feeling right,\" found to have acute on chronic subdural hematoma. Nsgy consulted and recommended monitoring overnight with repeat CT head in AM. Likely go if stable.     Active problems: Acute on chronic subdural hematoma    Acute on chronic subdural hematoma   Recurrent falls  Underwent MMA embolization 2022 for left acute on chronic subdural hematoma. Fell 2 weeks ago and thinks he hit back of head; fall described as mechanical and related to trying to throw bag of recyclables away. CT head 23 shows acute on chronic subdural and thus nsgy recommended monitoring inpatient and then repeating head CT in AM. OK to eat per nsgy given no planned surgical intervention.   -NSGY consulted  -Head CT in AM  -PT/OT  -Fall precautions  -Neuro checks q4h per nsgy    Alcohol abuse in remission   Significant prior drinking hx with x3 relapses. Quit drinking again 23. Going to regular meetings.  Unclear if truly taking acamprosate at home. Ethyl alcohol level negligible on admission.  -PTA acamprosate  -PTA vitamins/supplements    Hypothyroidism " "s/p radiation tx   And radiation treatment many years ago; has been on stable dose of Synthroid. TSH wnl here.  -PTA Synthroid    Musculoskeletal pain, generalized  Occurred after fall. Generalized soreness and discomfort. No findings on exam to suggest more nefarious etiology.  -Tylenol and conservative methods  -PTA gabapentin    Peripheral neuropathy  Thought related to prior chronic alcohol use.  Takes gabapentin at home.  -PTA gabapentin    Anxiety: PTA prn hydroxyzine  Insomnia, latency: PTA hydroxyzine and trazodone      Medication Reconciliation Status: Complete    FEN: P.o.  DVT Prophylaxis: SCDs  Code: Full    Dispo: observation status for Neuro monitoring and imaging, anticipate discharge to Home   Barriers to discharge:  work up in progress   Anticipated discharge date:  1-2 days     Chief Complaint:  Not feeling well, headaches     HPI:    Getachew Barcenas is a 52 year old male with PMH significant for chronic subdural hematoma, alcohol abuse in remission, frequent falls, peripheral neuropathy, hypothyroidism s/p radiation who presented to the ED for \"not feeling right.\"    Says body has ached after having fall 2 weeks ago.   Says he fell backwards when he lost his footing trying to throw a big bag of recyclables away outside.  States had intermittent headaches and general musculoskeletal aches.  Has not been moving around as much since fall.  Denies any muscle weakness.  Does not feel like his balance is changed.  No change in vision  Has chronic peripheral neuropathy but no change in sensation.    History is provided by patient     Medical History  .  Past Medical History:   Diagnosis Date     Alcohol use disorder, severe, dependence (H) 2/24/2022       Medications  No current facility-administered medications on file prior to encounter.  acamprosate (CAMPRAL) 333 MG EC tablet, Take 666 mg by mouth 3 times daily  acetaminophen (TYLENOL) 500 MG tablet, [ACETAMINOPHEN (TYLENOL) 500 MG TABLET] Take 1-2 " tablets (500-1,000 mg total) by mouth every 6 (six) hours as needed for pain.  folic acid (FOLVITE) 1 MG tablet, Take 1 tablet (1 mg) by mouth daily  gabapentin (NEURONTIN) 300 MG capsule, Take 1 capsule (300 mg) by mouth 3 times daily  hydrOXYzine (VISTARIL) 25 MG capsule, Take 25 mg by mouth 3 times daily as needed for anxiety  hydrOXYzine (VISTARIL) 25 MG capsule, Take 75 mg by mouth At Bedtime  levothyroxine (SYNTHROID/LEVOTHROID) 175 MCG tablet, Take 175 mcg by mouth daily  magnesium oxide (MAG-OX) 400 MG tablet, Take 800 mg by mouth 2 times daily  multivitamin w/minerals (THERA-VIT-M) tablet, Take 1 tablet by mouth daily  thiamine (B-1) 100 MG tablet, 1 tablet (100 mg) by Oral or Feeding Tube route daily (Patient taking differently: Take 100 mg by mouth daily)  traZODone (DESYREL) 50 MG tablet, Take 100 mg by mouth nightly as needed for sleep  vitamin B complex with vitamin C (VITAMIN  B COMPLEX) tablet, Take 1 tablet by mouth daily     Surgical History  Reviewed, and He  has a past surgical history that includes PICC/Midline Placement (11/3/2022) and IR Carotid Cerebral Angiogram Bilateral (11/7/2022).   Social History  Reviewed, and he  reports that he has never smoked. His smokeless tobacco use includes chew. He reports no alcohol use since 1/1/2023; has history of prior alcohol abuse. He reports that he does not use drugs.  He is retired, used to be  of a [employer].  Lives at home alone.   Allergies  No Known Allergies Family History  Reviewed, and not pertinent.        Prior to Admission Medications   (Not in a hospital admission)       Review of Systems:    12 system review of systems negative except for what's noted in HPI         Physical Exam:  Temp:  [98.1  F (36.7  C)] 98.1  F (36.7  C)  Pulse:  [] 71  Resp:  [12-30] 14  BP: (103-135)/() 111/62  SpO2:  [90 %-100 %] 99 %      General: Sitting comfortably. No acute distress.   HEENT: Conjunctivae are clear, nonicteric.  EOMI  with PERRL.  Neck: No masses appreciated.  Normal range of motion.  No tenderness over cervical spine.  Respiratory: No respiratory distress. Lung sounds are clear without rales, ronchi, or wheezes.   Cardiac: RRR. No m/g/r. Brisk cap refill.  Warm well perfused.  Abdominal: Abdomen is overweight, soft and non-tender without distention.  Back: No spinal tenderness.  Extremities: Upper and lower extremities grossly normal.  Skin: Skin in warm without rashes.  Neurological: Motor function is grossly normal.  Sensation diminished in bilateral distal lower extremities.  Cranial nerves fully intact throughout.  Psychiatric: Good insight.  Normal affect.     Pertinent Labs  Lab Results: personally reviewed.   Results for orders placed or performed during the hospital encounter of 02/02/23 (from the past 24 hour(s))   Symptomatic COVID-19 Virus (Coronavirus) by PCR Nasopharyngeal    Specimen: Nasopharyngeal; Swab   Result Value Ref Range    SARS CoV2 PCR Negative Negative    Narrative    Testing was performed using the Xpert Xpress SARS-CoV-2 Assay on the Cepheid Gene-Xpert Instrument Systems. Additional information about this Emergency Use Authorization (EUA) assay can be found via the Lab Guide. This test should be ordered for the detection of SARS-CoV-2 in individuals who meet SARS-CoV-2 clinical and/or epidemiological criteria as well as from individuals without symptoms or other reasons to suspect COVID-19. Test performance for asymptomatic patients has only been established in anterior nasal swab specimens. This test is for in vitro diagnostic use under the FDA EUA for laboratories certified under CLIA to perform high complexity testing. This test has not been FDA cleared or approved. A negative result does not rule out the presence of PCR inhibitors in the specimen or target RNA concentration below the limit of detection for the assay. The possibility of a false negative should be considered if the patient's recent  exposure or clinical presentation suggests COVID-19.. This test was validated by the [facility]. This laboratory is certified under the Clinical Laboratory Improvement Amendments (CLIA) as qualified to perform high complexity laboratory testing.     Lipase   Result Value Ref Range    Lipase 109 (H) 13 - 60 U/L   Comprehensive metabolic panel   Result Value Ref Range    Sodium 139 136 - 145 mmol/L    Potassium 3.8 3.4 - 5.3 mmol/L    Chloride 103 98 - 107 mmol/L    Carbon Dioxide (CO2) 26 22 - 29 mmol/L    Anion Gap 10 7 - 15 mmol/L    Urea Nitrogen 11.7 6.0 - 20.0 mg/dL    Creatinine 0.74 0.67 - 1.17 mg/dL    Calcium 9.7 8.6 - 10.0 mg/dL    Glucose 92 70 - 99 mg/dL    Alkaline Phosphatase 120 40 - 129 U/L    AST 44 10 - 50 U/L    ALT 52 (H) 10 - 50 U/L    Protein Total 7.1 6.4 - 8.3 g/dL    Albumin 4.0 3.5 - 5.2 g/dL    Bilirubin Total 0.4 <=1.2 mg/dL    GFR Estimate >90 >60 mL/min/1.73m2   CBC with platelets differential    Narrative    The following orders were created for panel order CBC with platelets differential.  Procedure                               Abnormality         Status                     ---------                               -----------         ------                     CBC with platelets and d...[[id number]]                      Final result                 Please view results for these tests on the individual orders.   INR   Result Value Ref Range    INR 1.07 0.85 - 1.15   Magnesium   Result Value Ref Range    Magnesium 1.8 1.7 - 2.3 mg/dL   TSH with free T4 reflex   Result Value Ref Range    TSH 1.76 0.30 - 4.20 uIU/mL   CRP inflammation   Result Value Ref Range    CRP Inflammation <3.00 <5.00 mg/L   CK total   Result Value Ref Range    CK 32 (L) 39 - 308 U/L   CBC with platelets and differential   Result Value Ref Range    WBC Count 6.4 4.0 - 11.0 10e3/uL    RBC Count 4.63 4.40 - 5.90 10e6/uL    Hemoglobin 14.1 13.3 - 17.7 g/dL    Hematocrit 42.6 40.0 -  53.0 %    MCV 92 78 - 100 fL    MCH 30.5 26.5 - 33.0 pg    MCHC 33.1 31.5 - 36.5 g/dL    RDW 13.3 10.0 - 15.0 %    Platelet Count 266 150 - 450 10e3/uL    % Neutrophils 48 %    % Lymphocytes 34 %    % Monocytes 10 %    % Eosinophils 6 %    % Basophils 1 %    % Immature Granulocytes 1 %    NRBCs per 100 WBC 0 <1 /100    Absolute Neutrophils 3.2 1.6 - 8.3 10e3/uL    Absolute Lymphocytes 2.1 0.8 - 5.3 10e3/uL    Absolute Monocytes 0.6 0.0 - 1.3 10e3/uL    Absolute Eosinophils 0.4 0.0 - 0.7 10e3/uL    Absolute Basophils 0.1 0.0 - 0.2 10e3/uL    Absolute Immature Granulocytes 0.0 <=0.4 10e3/uL    Absolute NRBCs 0.0 10e3/uL   Ethyl Alcohol Level   Result Value Ref Range    Alcohol ethyl <0.01 <=0.01 g/dL   Lactic acid whole blood   Result Value Ref Range    Lactic Acid 1.1 0.7 - 2.0 mmol/L   Ammonia   Result Value Ref Range    Ammonia 23 16 - 60 umol/L   CT Head w/o Contrast    Narrative    EXAM: CT HEAD W/O CONTRAST  LOCATION: [facility]  DATE/TIME: 2/2/2023 6:12 AM    INDICATION: Weakness and falls. History of subdural hemorrhages.  COMPARISON: 01/03/2023.  TECHNIQUE: Routine CT Head without IV contrast. Multiplanar reformats. Dose reduction techniques were used.    FINDINGS:  INTRACRANIAL CONTENTS: Decreased volume of the mixed density right-sided subdural hemorrhage measuring up to 7 mm in thickness. Stable volume of the mixed density left hemispheric subdural hemorrhage measuring up to 15 mm in thickness, however, there are   increased hyperdense blood products layering posteriorly, suspicious for an acute component. Trace left-to-right midline shift measuring 2 mm. The basal cisterns are preserved. The gray-white differentiation is preserved. Mild presumed chronic small   vessel ischemic changes. Mild generalized volume loss. No hydrocephalus.     VISUALIZED ORBITS/SINUSES/MASTOIDS: No intraorbital abnormality. Mucous retention cyst in the left maxillary sinus. No middle ear or mastoid  effusion.    BONES/SOFT TISSUES: No acute abnormality.      Impression    IMPRESSION:  1.  Stable volume of the mixed density left hemispheric subdural hemorrhage with increased hyperdense blood products layering posteriorly, suspicious for an acute component.  2.  Decreased volume of the mixed density subdural hemorrhage along the right cerebral convexity.  3.  Mild associated mass effect with approximately 2 mm left-to-right midline shift.      Exams were discussed with Dr. Tran at 6:28 AM on 02/02/2023.   Influenza A/B antigen    Specimen: Nose; Swab   Result Value Ref Range    Influenza A antigen Negative Negative    Influenza B antigen Negative Negative    Narrative    Test results must be correlated with clinical data. If necessary, results should be confirmed by a molecular assay or viral culture.   Platelet function closure with reflex   Result Value Ref Range    PFA-Col/Epi 89 <170 Seconds   UA with Microscopic reflex to Culture    Specimen: Urine, Midstream   Result Value Ref Range    Color Urine Yellow Colorless, Straw, Light Yellow, Yellow    Appearance Urine Clear Clear    Glucose Urine Negative Negative mg/dL    Bilirubin Urine Negative Negative    Ketones Urine Negative Negative mg/dL    Specific Gravity Urine 1.023 1.001 - 1.030    Blood Urine Negative Negative    pH Urine 5.0 5.0 - 7.0    Protein Albumin Urine Negative Negative mg/dL    Urobilinogen Urine <2.0 <2.0 mg/dL    Nitrite Urine Negative Negative    Leukocyte Esterase Urine 25 Nash/uL (A) Negative    Mucus Urine Present (A) None Seen /LPF    RBC Urine <1 <=2 /HPF    WBC Urine 3 <=5 /HPF    Squamous Epithelials Urine <1 <=1 /HPF    Narrative    Urine Culture not indicated   CT Head w/o Contrast    Narrative    EXAM: CT HEAD W/O CONTRAST  LOCATION: [facility]  DATE/TIME: 2/2/2023 12:12 PM    INDICATION: repeat CT for follow up of new acute on chronic SDH.  COMPARISON: CT brain to February 2023 at 0609 hours  TECHNIQUE:  Routine CT Head without IV contrast. Multiplanar reformats. Dose reduction techniques were used.    FINDINGS:  INTRACRANIAL CONTENTS: Stable volume of acute on chronic left frontoparietal subdural hematoma. Maximum thickness over the left frontoparietal junction of 15 mm. No change. Localized mass effect. Small, mixed   density right frontal subdural hematoma is present measuring up to 6 mm in thickness without substantial change in volume. No appreciable mass effect. Slight bowing of the septum pellucidum to the right of midline measures 2 mm. No change. No new   hemorrhages. Mild, generalized atrophy. Mild chronic small vessel vascular change. Skull base vascular calcifications.     6 VISUALIZED ORBITS/SINUSES/MASTOIDS: No intraorbital abnormality. Retention cyst-left maxillary sinus No middle ear or mastoid effusion.    BONES/SOFT TISSUES: No acute abnormality.      Impression    IMPRESSION:  1.  Stable bihemispheric acute on chronic subdural hemorrhages. There is slight, stable left to right midline shift.       Pertinent Radiology  Radiology Results: Personally reviewed image/s and personally reviewed impression/s  Results for orders placed or performed during the hospital encounter of 02/02/23   CT Head w/o Contrast    Impression    IMPRESSION:  1.  Stable volume of the mixed density left hemispheric subdural hemorrhage with increased hyperdense blood products layering posteriorly, suspicious for an acute component.  2.  Decreased volume of the mixed density subdural hemorrhage along the right cerebral convexity.  3.  Mild associated mass effect with approximately 2 mm left-to-right midline shift.      Exams were discussed with Dr. Tran at 6:28 AM on 02/02/2023.   CT Head w/o Contrast    Impression    IMPRESSION:  1.  Stable bihemispheric acute on chronic subdural hemorrhages. There is slight, stable left to right midline shift.       Precepted patient with Dr. Bucky Rasmussen    This note was created with the  use of voice recognition dictation technology, and as such there may be unintended typographical or voice recognition errors that were not corrected during proofreading.    Pablo Coleman MD  [facility], PGY-3  Pager # [phone]

## 2023-02-02 NOTE — CONSULTS
Care Management Initial Consult    General Information  Assessment completed with: Patient,    Type of CM/SW Visit: Initial Assessment    Primary Care Provider verified and updated as needed: Yes   Readmission within the last 30 days: no previous admission in last 30 days      Reason for Consult: discharge planning  Advance Care Planning: Advance Care Planning Reviewed: present on chart          Communication Assessment  Patient's communication style: spoken language (English or Bilingual)             Cognitive  Cognitive/Neuro/Behavioral:    Level of Consciousness: alert  Arousal Level: opens eyes spontaneously  Orientation: oriented x 4     Best Language: 0 - No aphasia  Speech: clear, spontaneous, logical    Living Environment:   People in home: alone, child(caleb), adult     Current living Arrangements: house      Able to return to prior arrangements: yes       Family/Social Support:  Care provided by: self  Provides care for: no one                Description of Support System:           Current Resources:   Patient receiving home care services: No     Community Resources: Other (see comment)  Equipment currently used at home: grab bar, toilet, grab bar, tub/shower, walker, rolling  Supplies currently used at home: None    Employment/Financial:  Employment Status:          Financial Concerns:             Lifestyle & Psychosocial Needs:  Social Determinants of Health     Tobacco Use: High Risk     Smoking Tobacco Use: Never     Smokeless Tobacco Use: Current     Passive Exposure: Not on file   Alcohol Use: Not on file   Financial Resource Strain: Not on file   Food Insecurity: Not on file   Transportation Needs: Not on file   Physical Activity: Not on file   Stress: Not on file   Social Connections: Not on file   Intimate Partner Violence: Not on file   Depression: Not at risk     PHQ-2 Score: 0   Housing Stability: Not on file       Functional Status:  Prior to admission patient needed assistance:   Dependent  ADLs:: Ambulation-walker  Dependent IADLs:: Independent  Assesssment of Functional Status: At functional baseline    Mental Health Status:          Chemical Dependency Status:                Values/Beliefs:  Spiritual, Cultural Beliefs, Denominational Practices, Values that affect care:                 Additional Information:  AIDET completed. Writer met with Pt. Pt lives alone but his adult daughter will be moving in with him soon. His son is moving out to [facility] up [place]. Pt has chronic L SDH. He said he has been 32 days sober. He was to start outpatient PT through [facility] today at a center near him and hopes to be able to joinig a fitness club when more stable. He experiences vertigo when standing up. He says he has to rest on side of the bed prior to standing. He says he still drives. Pt uses a walker when needed. Pt to have repeat MRI in am.     Discussed observation stay.     Informed CM will follow. Family will transport. MRI to determine whether patient transfers to the  for more work up.     Damaris Smith RN,CM, [facility]

## 2023-02-02 NOTE — PROGRESS NOTES
Neurosurgery progress note    Plan:  1. Repeat head CT 6hrs  2. Add platelet function   3. Hold anticoagulation including DVT ppx  4. Will see him for full consult this morning  5. Do not admit patient at this time    Will discuss with Dr Saavedra and addend my note if any changes to plan    HPI:  Per Dr Tran  Dav is a 53yo alcoholic in recovery with known denae SDH since Nov 2 (followed by the ) who presented to [facility] ED 2/2/23 with complaints of body aches, joint pain, leg and back pain, with 10lb weight loss over the last week. Fell 1 wk ago. He falls 1-2x per week. His exam is reported as neurologically intact. He saw neurosurgery at the  for a virtual visit yesterday with a head CT for appt dated 1/3. Plan was to repeat head CT in 8wk with follow up visit. His new head CT today demonstrates stable mixed density left SDH with some hyperdense layering posteriorly, suspicious for an acute component. Decreased volume of the mixed density SDH on the right. Mild assoc mass effect with 2mm left to right midline shift.    Overall when I look at comparison head CT in November, L SDH looks about the same if not a little smaller now. I cannot see any images from 1/3. If patient fell a week ago, the small acute component on current scan could be from that. Would plan to repeat a head CT in 6hr and transfer out if worse.    Carmen Ramsey FNP-C  [facility]surgery  O. [phone]

## 2023-02-02 NOTE — ED PROVIDER NOTES
EMERGENCY DEPARTMENT SIGN OUT NOTE        ED COURSE AND MEDICAL DECISION MAKING  52-year-old male who initially presented to the ED for arthralgias and myalgias who was found to have a new acute on chronic subdural hemorrhage was checked out to me by Dr. Tran.  Because the patient's most recent fall was 1 week ago it is suspected that the  Hemorrhage is in fact 1 week old.  At the time of checkout the plan, as discussed with neurosurgery, is to repeat the head CT scan in 6 hours.  If the head CT scan is unchanged the patient will likely be admitted for overnight observation with neurosurgery evaluation.    The previous laboratory tests were reviewed.  CBC, BMP, hepatic panel, COVID testing, CRP, lipase, lactic acid, CK, magnesium, and TSH all reassuring.  Blood alcohol level was negative.    Additional test including influenza and urinalysis were both unremarkable.      Repeat head CT scan shows stable changes without any new or worsening bleeding or edema.  Patient was reevaluated informed of the reassuring lab and head CT scan results.   The patient's head CT scan results were discussed with the on-call neurosurgery midlevel provider.  The patient will be admitted for overnight observation with repeat CT scan in a.m.    Patient was then admitted after discussed the case with the [place] resident physician.    Patient was signed out to me by Dr Grady Tran at 7:17 AM.   12:42 PM I spoke with the Mariela Rosado, neurosurgery, regarding the patient.   12:53 PM I updated the patient on plan for admission and care, which he is agreeable to.   1:12 PM I spoke with the [place] hospitalist, Dr. Coleman, who accepts the patient for admission.  In brief, Getachew Barcenas is a 52 year old male who initially presented to the ED for evaluation of joint pain, weight loss, and frequent falls. He states that his most recent fall was 1 week ago.         FINAL IMPRESSION    1. Body aches    2. Acute on chronic intracranial subdural  hematoma (H)        ED MEDS  Medications   0.9% sodium chloride BOLUS (0 mLs Intravenous Stopped 2/2/23 0744)     Followed by   sodium chloride 0.9% infusion ( Intravenous Rate/Dose Verify 2/2/23 1045)   HYDROcodone-acetaminophen (NORCO) 5-325 MG per tablet 2 tablet (2 tablets Oral Given 2/2/23 0744)       LAB  Labs Ordered and Resulted from Time of ED Arrival to Time of ED Departure   LIPASE - Abnormal       Result Value    Lipase 109 (*)    COMPREHENSIVE METABOLIC PANEL - Abnormal    Sodium 139      Potassium 3.8      Chloride 103      Carbon Dioxide (CO2) 26      Anion Gap 10      Urea Nitrogen 11.7      Creatinine 0.74      Calcium 9.7      Glucose 92      Alkaline Phosphatase 120      AST 44      ALT 52 (*)     Protein Total 7.1      Albumin 4.0      Bilirubin Total 0.4      GFR Estimate >90     CK TOTAL - Abnormal    CK 32 (*)    ROUTINE UA WITH MICROSCOPIC REFLEX TO CULTURE - Abnormal    Color Urine Yellow      Appearance Urine Clear      Glucose Urine Negative      Bilirubin Urine Negative      Ketones Urine Negative      Specific Gravity Urine 1.023      Blood Urine Negative      pH Urine 5.0      Protein Albumin Urine Negative      Urobilinogen Urine <2.0      Nitrite Urine Negative      Leukocyte Esterase Urine 25 Nash/uL (*)     Mucus Urine Present (*)     RBC Urine <1      WBC Urine 3      Squamous Epithelials Urine <1     INR - Normal    INR 1.07     LACTIC ACID WHOLE BLOOD - Normal    Lactic Acid 1.1     MAGNESIUM - Normal    Magnesium 1.8     AMMONIA - Normal    Ammonia 23     TSH WITH FREE T4 REFLEX - Normal    TSH 1.76     CRP INFLAMMATION - Normal    CRP Inflammation <3.00     COVID-19 VIRUS (CORONAVIRUS) BY PCR - Normal    SARS CoV2 PCR Negative     ETHYL ALCOHOL LEVEL - Normal    Alcohol ethyl <0.01     PLATELET FUNCTION CLOSURE WITH REFLEX - Normal    PFA-Col/Epi 89     INFLUENZA A/B ANTIGEN - Normal    Influenza A antigen Negative      Influenza B antigen Negative     CBC WITH PLATELETS AND  DIFFERENTIAL    WBC Count 6.4      RBC Count 4.63      Hemoglobin 14.1      Hematocrit 42.6      MCV 92      MCH 30.5      MCHC 33.1      RDW 13.3      Platelet Count 266      % Neutrophils 48      % Lymphocytes 34      % Monocytes 10      % Eosinophils 6      % Basophils 1      % Immature Granulocytes 1      NRBCs per 100 WBC 0      Absolute Neutrophils 3.2      Absolute Lymphocytes 2.1      Absolute Monocytes 0.6      Absolute Eosinophils 0.4      Absolute Basophils 0.1      Absolute Immature Granulocytes 0.0      Absolute NRBCs 0.0           RADIOLOGY    CT Head w/o Contrast   Final Result   IMPRESSION:   1.  Stable bihemispheric acute on chronic subdural hemorrhages. There is slight, stable left to right midline shift.      CT Head w/o Contrast   Final Result   IMPRESSION:   1.  Stable volume of the mixed density left hemispheric subdural hemorrhage with increased hyperdense blood products layering posteriorly, suspicious for an acute component.   2.  Decreased volume of the mixed density subdural hemorrhage along the right cerebral convexity.   3.  Mild associated mass effect with approximately 2 mm left-to-right midline shift.        Exams were discussed with Dr. Tran at 6:28 AM on 02/02/2023.          DISCHARGE MEDS  New Prescriptions    No medications on file         Dr. Iverson  Emergency Medicine  [facility] EMERGENCY DEPARTMENT  [address] [facility] [id number] [phone]       Stan Iverson DO  02/02/23 4101

## 2023-02-02 NOTE — PHARMACY-ADMISSION MEDICATION HISTORY
Pharmacy Note - Admission Medication History    Pertinent Provider Information:      ______________________________________________________________________    Prior To Admission (PTA) med list completed and updated in EMR.       PTA Med List   Medication Sig Note Last Dose     acamprosate (CAMPRAL) 333 MG EC tablet Take 666 mg by mouth 3 times daily  2/1/2023     acetaminophen (TYLENOL) 500 MG tablet [ACETAMINOPHEN (TYLENOL) 500 MG TABLET] Take 1-2 tablets (500-1,000 mg total) by mouth every 6 (six) hours as needed for pain.  2/1/2023     folic acid (FOLVITE) 1 MG tablet Take 1 tablet (1 mg) by mouth daily  More than a month     gabapentin (NEURONTIN) 300 MG capsule Take 1 capsule (300 mg) by mouth 3 times daily  2/1/2023     hydrOXYzine (VISTARIL) 25 MG capsule Take 25 mg by mouth 3 times daily as needed for anxiety  2/1/2023     hydrOXYzine (VISTARIL) 25 MG capsule Take 75 mg by mouth At Bedtime  2/1/2023     levothyroxine (SYNTHROID/LEVOTHROID) 175 MCG tablet Take 175 mcg by mouth daily  2/2/2023     magnesium oxide (MAG-OX) 400 MG tablet Take 800 mg by mouth 2 times daily  2/1/2023     multivitamin w/minerals (THERA-VIT-M) tablet Take 1 tablet by mouth daily  2/1/2023     thiamine (B-1) 100 MG tablet 1 tablet (100 mg) by Oral or Feeding Tube route daily (Patient taking differently: Take 100 mg by mouth daily)  2/1/2023     traZODone (DESYREL) 50 MG tablet Take 100 mg by mouth nightly as needed for sleep 2/2/2023: Dose just increased to 100mg. Patient stated 50mg was not effective. He has not tried taking 100mg yet.  Past Month     vitamin B complex with vitamin C (VITAMIN  B COMPLEX) tablet Take 1 tablet by mouth daily  2/1/2023       Information source(s): Patient and CareEverywhere/SureScripts  Method of interview communication: in-person    Summary of Changes to PTA Med List  New: acamprosate, magnesium, hydroxyzine, b complex  Discontinued: naltrexone, olanzapine, metamucil  Changed: trazodone    Patient was  asked about OTC/herbal products specifically.  PTA med list reflects this.    In the past week, patient estimated taking medication this percent of the time:  greater than 90%.    Medication Affordability:       Allergies were reviewed, assessed, and updated with the patient.      Patient does not use any multi-dose medications prior to admission.    The information provided in this note is only as accurate as the sources available at the time of the update(s).    Thank you for the opportunity to participate in the care of this patient.    Chasity Ward [facility]  2/2/2023 7:49 AM

## 2023-02-02 NOTE — ED NOTES
Bed: JNED-32  Expected date: 2/2/23  Expected time: 3:48 PM  Means of arrival:   Comments:  BOARDSUHAS

## 2023-02-03 ENCOUNTER — APPOINTMENT (OUTPATIENT)
Dept: PHYSICAL THERAPY | Facility: HOSPITAL | Age: 53
End: 2023-02-03
Attending: STUDENT IN AN ORGANIZED HEALTH CARE EDUCATION/TRAINING PROGRAM
Payer: COMMERCIAL

## 2023-02-03 ENCOUNTER — APPOINTMENT (OUTPATIENT)
Dept: CT IMAGING | Facility: HOSPITAL | Age: 53
End: 2023-02-03
Attending: STUDENT IN AN ORGANIZED HEALTH CARE EDUCATION/TRAINING PROGRAM
Payer: COMMERCIAL

## 2023-02-03 VITALS
DIASTOLIC BLOOD PRESSURE: 77 MMHG | WEIGHT: 217.2 LBS | OXYGEN SATURATION: 97 % | BODY MASS INDEX: 27.87 KG/M2 | TEMPERATURE: 98.1 F | HEART RATE: 76 BPM | RESPIRATION RATE: 18 BRPM | SYSTOLIC BLOOD PRESSURE: 121 MMHG | HEIGHT: 74 IN

## 2023-02-03 DIAGNOSIS — F10.21 ALCOHOL DEPENDENCE IN REMISSION (H): Primary | ICD-10-CM

## 2023-02-03 PROCEDURE — 97116 GAIT TRAINING THERAPY: CPT | Mod: GP

## 2023-02-03 PROCEDURE — 99238 HOSP IP/OBS DSCHRG MGMT 30/<: CPT | Mod: GC | Performed by: STUDENT IN AN ORGANIZED HEALTH CARE EDUCATION/TRAINING PROGRAM

## 2023-02-03 PROCEDURE — 250N000013 HC RX MED GY IP 250 OP 250 PS 637: Performed by: STUDENT IN AN ORGANIZED HEALTH CARE EDUCATION/TRAINING PROGRAM

## 2023-02-03 PROCEDURE — G0378 HOSPITAL OBSERVATION PER HR: HCPCS

## 2023-02-03 PROCEDURE — 97161 PT EVAL LOW COMPLEX 20 MIN: CPT | Mod: GP

## 2023-02-03 PROCEDURE — 70450 CT HEAD/BRAIN W/O DYE: CPT

## 2023-02-03 RX ORDER — ACAMPROSATE CALCIUM 333 MG/1
666 TABLET, DELAYED RELEASE ORAL 3 TIMES DAILY
Qty: 180 TABLET | Refills: 0 | Status: SHIPPED | OUTPATIENT
Start: 2023-02-03 | End: 2023-03-05

## 2023-02-03 RX ORDER — LANOLIN ALCOHOL/MO/W.PET/CERES
100 CREAM (GRAM) TOPICAL DAILY
COMMUNITY
Start: 2023-02-03 | End: 2023-03-17

## 2023-02-03 RX ADMIN — THIAMINE HCL TAB 100 MG 100 MG: 100 TAB at 09:29

## 2023-02-03 RX ADMIN — LEVOTHYROXINE SODIUM 175 MCG: 0.12 TABLET ORAL at 09:29

## 2023-02-03 RX ADMIN — FOLIC ACID 1 MG: 1 TABLET ORAL at 09:29

## 2023-02-03 RX ADMIN — GABAPENTIN 300 MG: 300 CAPSULE ORAL at 09:28

## 2023-02-03 RX ADMIN — MAGNESIUM OXIDE TAB 400 MG (241.3 MG ELEMENTAL MG) 800 MG: 400 (241.3 MG) TAB at 09:30

## 2023-02-03 RX ADMIN — ACAMPROSATE CALCIUM 666 MG: 333 TABLET, DELAYED RELEASE ORAL at 09:32

## 2023-02-03 ASSESSMENT — ACTIVITIES OF DAILY LIVING (ADL)
ADLS_ACUITY_SCORE: 33

## 2023-02-03 NOTE — PLAN OF CARE
PRIMARY DIAGNOSIS: Fall  OUTPATIENT/OBSERVATION GOALS TO BE MET BEFORE DISCHARGE:  ADLs back to baseline: Yes    Activity and level of assistance: Up with standby assistance.    Pain status: Pain free.    Return to near baseline physical activity: Yes     Discharge Planner Nurse   Safe discharge environment identified: Yes  Barriers to discharge: Yes       Entered by: Li Gill RN 02/03/2023 5:56 AM     Please review provider order for any additional goals.   Nurse to notify provider when observation goals have been met and patient is ready for discharge.Goal Outcome Evaluation:       BP soft over night, 92/50 and 96/52.  Patient denied dizziness.  Ambulates stand by assistance.  He reports feeling at baseline.  Pupils equal and reactive to light.  Numbness in fingers and feet chronic and at baseline.  Staff grouped cares to promote rest.  Neuro assessment unchanged.  Will continue to monitor.

## 2023-02-03 NOTE — PROGRESS NOTES
Patient reports he's actually out of acamprosate and worried he won't get it from PCP until later.    -Sent script for acamprosate    Pablo Coleman MD

## 2023-02-03 NOTE — PLAN OF CARE
Occupational Therapy    Orders received and chart reviewed. Per PT, pt is independent with functional mobility and ADLs. Occupational therapy services are not indicated at this time. Will complete orders.    Naty Blair, OTR/L 2/3/23

## 2023-02-03 NOTE — PLAN OF CARE
"PRIMARY DIAGNOSIS: \"GENERIC\" NURSING  OUTPATIENT/OBSERVATION GOALS TO BE MET BEFORE DISCHARGE:  ADLs back to baseline: Yes    Activity and level of assistance: Up with standby assistance.    Pain status: Pain free.    Return to near baseline physical activity: Yes     Discharge Planner Nurse   Safe discharge environment identified: Yes  Barriers to discharge: Yes       Entered by: Li Gill RN 02/03/2023 1:52 AM     Please review provider order for any additional goals.   Nurse to notify provider when observation goals have been met and patient is ready for discharge.Goal Outcome Evaluation:         Patient denied pain.  Neuro assessment done at midnight, no changes noted.  CT scan to be completed in am.  Group cares to promote rest.  Will continue to monitor               "

## 2023-02-03 NOTE — DISCHARGE INSTRUCTIONS
Ordering Provider    02/03/23 1056  Other Discharge Instructions  Start:  02/03/23 1053,   EFFECTIVE NOW,   Routine        Comments: *No lifting, pushing or pulling greater than 5-10 pounds (this is about a gallon of milk).   *No repetitive bending, twisting, or jarring activities   *No overhead work   *No aerobic or strenuous activity   *No activities with increased risk of falls   *You may move about your home as tolerated   *You may walk up and down stairs as tolerated     * WALKING PROGRAM: As you can tolerate, walk daily-start with 5-10 minutes of continuous walking. This is in addition to the walking that you do as part of your daily activities. Increase the time that you walk by 5 minutes every couple of days. Do not exceed 30-45 minutes of continuous walking until seen in follow-up.   **Listen to your body, if you find that you are more painful or fatigued, you may need to proceed more slowly.     **Do not smoke or expose yourself to second hand smoke. Cigarette smoke can delay healing and cause complications.     DRIVING: Do not drive. Plan to discuss your return to driving at your  follow-up appointment.     WORK: If you plan to return to work before your follow up  appointment, call and discuss with one of the nurses in the neurosurgery office.       BOWEL MOVEMENT: If you did not have a bowel movement (pooped) before you were discharged from the hospital, and do not have a bowel movement within 2 days of discharge. Please call our office and request to speak to a nurse.     Your insurance may not cover medications to treat or prevent  constipation.       There are many  over the counter medications for constipation including: Colace, Senakot, Dulcolax, and Miralax. In addition to medications eat a high fiber diet and drink plenty of water.     If you are taking narcotics, you should being taking medication daily for constipation.       If you develop loose or runny stools, stop taking the medications  for constipation.       Call  the neurosurgery clinic at [phone] with the following symptoms:     *Worsening pain not relieved by the  prescription given   *Worsening headache not relieved by the prescription given   *A headache that gets better or worse when you stand up or lie down   *Seizures   *Persistent nausea and vomiting   *Increased sleepiness or difficulty being awakened   *Change in ability to walk, see, think, or talk   *Worsening or new onset of weakness, or numbness and tingling   *Loss or change in your ability to control bowel or bladder function   * If you did not have a bowel movement before leaving the hospital and your bowels have not moved within 48 hours of your discharge       !!!! IF YOU HAVE A SERIOUS OR THREATENING EMERGENCY, CALL 911 OR COME TO THE EMERGENCY ROOM.     QUESTIONS OR CONCERNS:   OUR OFFICE HOURS ARE FROM 9:00 A.M -4:30 P.M. MONDAY-FRIDAY.  AFTER OFFICE HOURS, CALLS ARE ANSWERED BY THE ON-CALL PROVIDER. PLEASE CALL WITH ROUTINE QUESTIONS DURING OFFICE HOURS. THE ON-CALL PROVIDER WILL NOT REFILL PRESCRIPTIONS.     DO NOT TAKE ANY MEDICATIONS THAT COULD THIN YOUR BLOOD including over the counter IBUPROFEN, NAPROXEN, ALEVE, MOTRIN, ASPIRIN, DICLOFENAC, AND OTHER PRESCRIPTION BLOODTHINNERS. If you are unsure whether a medication Is a bloodthinner, please call our office     WE WILL CALL THE [facility] TO ARRANGE FOLLOW UP SINCE THEIR TEAM HAS BEEN FOLLOWING YOU SINCE November. If you do not hear about a follow up appt with their neurosurgery department, please call OUR office above   Phase of Care: Discharge-NON Med

## 2023-02-03 NOTE — PROGRESS NOTES
Physical Therapy Discharge Summary    Reason for therapy discharge:    All goals and outcomes met, no further needs identified.    Progress towards therapy goal(s). See goals on Care Plan in [facility] electronic health record for goal details.  Goals met    Therapy recommendation(s):    Continued therapy is recommended.  Rationale/Recommendations:  OP PT to address higher level balance deficits.

## 2023-02-03 NOTE — PLAN OF CARE
Problem: Fall Injury Risk  Goal: Absence of Fall and Fall-Related Injury  2/2/2023 2202 by Stephanie Brantley RN  Outcome: Progressing  2/2/2023 2202 by Stephanie Brantley RN  Outcome: Progressing  Intervention: Identify and Manage Contributors  Recent Flowsheet Documentation  Taken 2/2/2023 2130 by Stephanie Brantley RN  Medication Review/Management: medications reviewed  Intervention: Promote Injury-Free Environment  Recent Flowsheet Documentation  Taken 2/2/2023 2130 by Stephanie Brantley RN  Safety Promotion/Fall Prevention:   activity supervised   bed alarm on   clutter free environment maintained   fall prevention program maintained   increased rounding and observation   increase visualization of patient   nonskid shoes/slippers when out of bed   patient and family education   safety round/check completed     Problem: Pain Acute  Goal: Optimal Pain Control and Function  Outcome: Progressing  Intervention: Prevent or Manage Pain  Recent Flowsheet Documentation  Taken 2/2/2023 2130 by Stephanie Brantley RN  Medication Review/Management: medications reviewed   Goal Outcome Evaluation:    Evaluated for pain upon admission- denies any discomfort for now. States that he just had tylenol and hydroxyzine and he just wanted to go to sleep and rest. Patient noted unsteady during transfer and felt a little dizzy during sudden position change and movement. Instructed patient to use the call light and not to get up by himself. Patient was also informed that he is on bed alarm and he acknowledged.  Patient is alert and oriented x 4. Neuro is intact. Patient was also notified that neuro assessment needs to be done every 4 hours. Patient was not very happy about the plan of care about neuro check every 4 hours but I also explained to him the importance of it. Patient agreed,

## 2023-02-03 NOTE — PROGRESS NOTES
[facility]  OUTPATIENT PHYSICAL THERAPY EVALUATION  PLAN OF TREATMENT FOR OUTPATIENT REHABILITATION  (COMPLETE FOR INITIAL CLAIMS ONLY)  Patient's Last Name, First Name, M.I.  YOB: 1970  SwapnaGetachew  MICHAEL                        Provider's Name  [facility] Medical Record No.  [id number]                             Onset Date:  02/02/23   Start of Care Date:  02/03/23   Type:     _X_PT   ___OT   ___SLP Medical Diagnosis:  (P) Recurrent falls              PT Diagnosis:  Impaired gait Visits from SOC:  1     See note for plan of treatment, functional goals and certification details    I CERTIFY THE NEED FOR THESE SERVICES FURNISHED UNDER        THIS PLAN OF TREATMENT AND WHILE UNDER MY CARE     (Physician co-signature of this document indicates review and certification of the therapy plan).                02/03/23 0900   Appointment Info   Signing Clinician's Name / Credentials (PT) Elizabeth Deleon, PT, DPT   Quick Adds   Quick Adds Certification   Living Environment   People in Home alone   Current Living Arrangements house   Home Accessibility no concerns   Transportation Anticipated family or friend will provide   Living Environment Comments Patient lives alone in a house. No stairs. Patient reports he has many family members and friends who live nearby.   Self-Care   Usual Activity Tolerance good   Current Activity Tolerance good   Equipment Currently Used at Home none   Fall history within last six months yes   Number of times patient has fallen within last six months 1   Activity/Exercise/Self-Care Comment Patient reports 1 fall about 2 weeks ago while he was taking out the recycling. He does not remeber what happened but did hit his head. He was scheduled to start OP PT yesterday but cancelled due to being in the ED.   General Information   Onset of Illness/Injury or Date of Surgery 02/02/23   Referring Physician Pablo Coleman MD  "  Patient/Family Therapy Goals Statement (PT) Return home   Pertinent History of Current Problem (include personal factors and/or comorbidities that impact the POC) Per chart, \"Getachew Barcenas is a 52 year old male with PMH significant for chronic subdural hematoma, alcohol abuse in remission, frequent falls, peripheral neuropathy, hypothyroidism s/p radiation who presented to the ED for \"not feeling right,\" found to have acute on chronic subdural hematoma.\"   Existing Precautions/Restrictions fall   Weight-Bearing Status - LLE full weight-bearing   Weight-Bearing Status - RLE full weight-bearing   Posture    Posture Not impaired   Range of Motion (ROM)   Range of Motion ROM is WFL   Strength (Manual Muscle Testing)   Strength (Manual Muscle Testing) strength is WFL   Bed Mobility   Bed Mobility supine-sit;sit-supine   Supine-Sit [place] (Bed Mobility) independent   Sit-Supine [place] (Bed Mobility) independent   Transfers   Transfers sit-stand transfer   Sit-Stand Transfer   Sit-Stand [place] (Transfers) independent   Gait/Stairs (Locomotion)   [place] Level (Gait) supervision;verbal cues   Distance in Feet (Gait) >500'   Pattern (Gait) step-through   Balance   Balance no deficits were identified   Sensory Examination   Sensory Perception patient reports no sensory changes   Sensory Perception Comments Peripheral neuropathy at baseline   Coordination   Coordination no deficits were identified   Muscle Tone   Muscle Tone no deficits were identified   Clinical Impression   Criteria for Skilled Therapeutic Intervention Yes, treatment indicated   PT Diagnosis (PT) Impaired gait   Influenced by the following impairments Impaired balance   Functional limitations due to impairments Gait   Clinical Presentation (PT Evaluation Complexity) Stable/Uncomplicated   Clinical Presentation Rationale Patient presents as medically diagnosed.   Clinical Decision Making (Complexity) low complexity   Planned Therapy " Interventions (PT) balance training;gait training;patient/family education   Risk & Benefits of therapy have been explained evaluation/treatment results reviewed;care plan/treatment goals reviewed;patient   PT Total Evaluation Time   PT Eval, Low Complexity Minutes (02589) 10   Therapy Certification   Start of care date 02/03/23   Certification date from 02/03/23   Certification date to 02/10/23   Medical Diagnosis Recurrent falls   Physical Therapy Goals   PT Frequency One time eval and treatment only   PT Predicted Duration/Target Date for Goal Attainment 02/03/23   PT Goals Gait   PT: Gait Independent;Greater than 200 feet;Goal Met   Interventions   Interventions Quick Adds Gait Training   Gait Training   Gait Training Minutes (17984) 10   Symptoms Noted During/After Treatment (Gait Training) none   Treatment Detail/Skilled Intervention Patient ambulated >500' in hallways with SBA, no AD. Normal sharon, gait speed, and stride length observed. Steady without LOB. Patient was able to complete head turns while ambulating without LOB.   [place] Level (Gait Training) independent   Physical Assistance Level (Gait Training) supervision;verbal cues   Weight Bearing (Gait Training) full weight-bearing   PT Discharge Planning   PT Plan Discharge PT   PT Discharge Recommendation (DC Rec) (S)  home;home with outpatient physical therapy   PT Rationale for DC Rec Patient is independent with all functional mobility. Steady gait observed with PT. Recommend OP PT to address higher level balance deficits.   PT Brief overview of current status Independent with all functional mobility.

## 2023-02-03 NOTE — DISCHARGE SUMMARY
[facility]  DISCHARGE SUMMARY     Primary Care Physician: No Ref-Primary, Physician  Admission Date: 2/2/2023   Discharge Provider: Pablo Coleman MD Discharge Date: 2/3/2023   Diet: Orders Placed This Encounter      Combination Diet Regular Diet Adult      Diet     Code Status: Full Code   Activity: Regular        Condition at Discharge: Good     REASON FOR PRESENTATION(See Admission Note for Details)   Fall with acute on chronic subdural hematoma    PRINCIPAL & ACTIVE DISCHARGE DIAGNOSES     Subdural hematoma    SIGNIFICANT FINDINGS (Imaging, labs):     Repeat head CT on 2/3/23  Narrative & Impression   EXAM: CT HEAD W/O CONTRAST  LOCATION: [facility]  DATE/TIME: 2/3/2023 8:22 AM     INDICATION: SDH  COMPARISON: February 2, 2023  TECHNIQUE: Routine CT Head without IV contrast. Multiplanar reformats. Dose reduction techniques were used.     FINDINGS:  INTRACRANIAL CONTENTS: Subdural hematoma along the left cerebral convexity measuring 1.1 cm, unchanged. Right-sided subdural hemorrhage measuring 0.6 cm, unchanged. No other evidence of acute dural hemorrhage or mass effect. Few scattered foci of signal   abnormality within the cerebral hemispheric white matter which are nonspecific, though most commonly ascribed to chronic small vessel ischemic disease. The ventricles and sulci are prominent consistent with mild brain parenchymal volume loss. Gray-white   matter differentiation is maintained. The basilar cisterns are patent.     VISUALIZED ORBITS/SINUSES/MASTOIDS: The globes are unremarkable. The partially imaged paranasal sinuses, mastoid air cells and middle ear cavities are unremarkable.      BONES/SOFT TISSUES: The visualized skull base and calvarium are unremarkable.                                                                      IMPRESSION:    1.  Subdural hematoma along the left cerebral convexity measuring 1.1 cm, unchanged.   2.  Right-sided subdural hemorrhage  "measuring 0.6 cm, unchanged.  3.  No other evidence of acute intracranial hemorrhage or mass effect.  4.  Mild nonspecific white matter changes.  5.  Mild brain parenchymal volume loss.       PENDING LABS     None    PROCEDURES ( this hospitalization only)      None    RECOMMENDATIONS TO OUTPATIENT PROVIDER FOR F/U VISIT     -Continue PT  -Readdress gabapentin. Patient curious about potentially reducing dose. Wants to discuss with PCP further.  -Follow-up with primary NSGY in ~6 weeks    DISPOSITION     Home    SUMMARY OF HOSPITAL COURSE:      Getachew Barcenas is a 52 year old male with PMH significant for chronic subdural hematoma, alcohol abuse in remission, frequent falls, peripheral neuropathy, hypothyroidism s/p radiation who presented to the ED for \"not feeling right\" after fall 2 weeks prior. Found to have acute on chronic subdural hematoma. Patient underwent repeat head CT in AM that showed no changes and NSGY comfortable with patient discharge with follow-up with his primary nsgy as outpatient.    Discharge Medications with Med changes:        Review of your medicines      CONTINUE these medicines which have NOT CHANGED      Dose / Directions   acamprosate 333 MG EC tablet  Commonly known as: CAMPRAL      Dose: 666 mg  Take 666 mg by mouth 3 times daily  Refills: 0     acetaminophen 500 MG tablet  Commonly known as: TYLENOL  Used for: Contusion of head of pancreas, initial encounter      Dose: 500-1,000 mg  [ACETAMINOPHEN (TYLENOL) 500 MG TABLET] Take 1-2 tablets (500-1,000 mg total) by mouth every 6 (six) hours as needed for pain.  Refills: 0     folic acid 1 MG tablet  Commonly known as: FOLVITE  Used for: Alcohol use disorder, severe, dependence (H), Right forearm cellulitis      Dose: 1 mg  Take 1 tablet (1 mg) by mouth daily  Refills: 0     gabapentin 300 MG capsule  Commonly known as: NEURONTIN  Used for: Alcoholic intoxication with complication (H)      Dose: 300 mg  Take 1 capsule (300 mg) by mouth 3 " times daily  Quantity: 90 capsule  Refills: 3     * hydrOXYzine 25 MG capsule  Commonly known as: VISTARIL      Dose: 25 mg  Take 25 mg by mouth 3 times daily as needed for anxiety  Refills: 0     * hydrOXYzine 25 MG capsule  Commonly known as: VISTARIL      Dose: 75 mg  Take 75 mg by mouth At Bedtime  Refills: 0     levothyroxine 175 MCG tablet  Commonly known as: SYNTHROID/LEVOTHROID      Dose: 175 mcg  Take 175 mcg by mouth daily  Refills: 0     magnesium oxide 400 MG tablet  Commonly known as: MAG-OX      Dose: 800 mg  Take 800 mg by mouth 2 times daily  Refills: 0     multivitamin w/minerals tablet  Used for: Alcohol use disorder, severe, dependence (H), Right forearm cellulitis      Dose: 1 tablet  Take 1 tablet by mouth daily  Refills: 0     thiamine 100 MG tablet  Commonly known as: B-1  Used for: Thiamine deficiency      Dose: 100 mg  Take 1 tablet (100 mg) by mouth daily  Refills: 0     traZODone 50 MG tablet  Commonly known as: DESYREL      Dose: 100 mg  Take 100 mg by mouth nightly as needed for sleep  Refills: 0     vitamin B complex with vitamin C tablet      Dose: 1 tablet  Take 1 tablet by mouth daily  Refills: 0         * This list has 2 medication(s) that are the same as other medications prescribed for you. Read the directions carefully, and ask your doctor or other care provider to review them with you.                    Rationale for medication changes:      N/A        Consults   NSGY  PT/OT      Immunizations given this encounter     Most Recent Immunizations   Administered Date(s) Administered     HepA-Adult 11/16/2022     HepB-Adult 11/16/2022     Tdap (Adacel,Boostrix) 02/18/2022          Anticoagulation Information      Recent INR results:   Recent Labs   Lab 02/02/23  0600   INR 1.07     Warfarin doses (if applicable) or name of other anticoagulant: n/a      Discharge Orders     Discharge Procedure Orders   Follow Up Care   Order Comments: Our office will call you in 2-3 business days  after discharge to schedule your follow-up appointments. You should have a provider visit scheduled with neurosurgery at the [facility]. If you have not received a call in 2-3 business days, please call our office in [place] at [phone].     When to call - Reach out to Urgent Care   Order Comments: If you are not able to reach your Surgeon Team and you need immediate care, go to the Orthopedic Walk-in Clinic or Urgent Care at your Surgeon's office.  Do NOT go to the Emergency Room unless you have shortness of breath, chest pain, or other signs of a medical emergency.     When to call - Reasons to Call 911   Order Comments: Call 911 immediately if you experience sudden-onset chest pain, arm weakness/numbness, slurred speech, or shortness of breath     No VTE Prophylaxis     Reason for your hospital stay   Order Comments: Acute on chronic subdural hematoma     Follow-up and recommended labs and tests    Order Comments: Follow up with primary care provider, Physician No Ref-Primary, within 7 days for hospital follow- up.     Activity   Order Comments: Your activity upon discharge: activity as tolerated     Order Specific Question Answer Comments   Is discharge order? Yes      Diet   Order Comments: Follow this diet upon discharge:  Combination Diet Regular Diet Adult     Order Specific Question Answer Comments   Is discharge order? Yes        Examination     Vital Signs in last 24 hours:   B/P: 121/77, T: 98.1, P: 76, R: 18    General: Awake, alert and oriented. No acute distress.   Respiratory: No respiratory distress. Lungs CTA.  Cardiac: RRR, brisk cap refill. Warm and well perfused.  Abdominal: Abdomen is soft and non-tender.  Extremities: Grossly normal.  Skin: Warm and intact.   Neurological: The patient is fully oriented. CN II-XII in tact. Moves all extremities equally.      Pablo Coleman MD  [facility] Resident  Pager #: [phone]      Precepted patient with the esteemed   [facility]    CC:No Ref-Primary, Physician

## 2023-02-03 NOTE — PLAN OF CARE
"  Problem: Plan of Care - These are the overarching goals to be used throughout the patient stay.    Goal: Plan of Care Review  Description: The Plan of Care Review/Shift note should be completed every shift.  The Outcome Evaluation is a brief statement about your assessment that the patient is improving, declining, or no change.  This information will be displayed automatically on your shift note.  Outcome: Met  Flowsheets (Taken 2/3/2023 1251)  Plan of Care Reviewed With: patient  Goal: Patient-Specific Goal (Individualized)  Description: You can add care plan individualizations to a care plan. Examples of Individualization might be:  \"Parent requests to be called daily at 9am for status\", \"I have a hard time hearing out of my right ear\", or \"Do not touch me to wake me up as it startles me\".  Outcome: Met  Goal: Absence of Hospital-Acquired Illness or Injury  Outcome: Met  Intervention: Identify and Manage Fall Risk  Recent Flowsheet Documentation  Taken 2/3/2023 0900 by Marleny Lin RN  Safety Promotion/Fall Prevention:   activity supervised   bed alarm on   clutter free environment maintained   fall prevention program maintained   nonskid shoes/slippers when out of bed   safety round/check completed  Intervention: Prevent Skin Injury  Recent Flowsheet Documentation  Taken 2/3/2023 0900 by Marleny Lin RN  Body Position: position changed independently  Goal: Optimal Comfort and Wellbeing  Outcome: Met  Goal: Readiness for Transition of Care  Outcome: Met   Goal Outcome Evaluation:      Plan of Care Reviewed With: patient      Pt verbalized understanding of continue medication, follow-up appointments and when to call the surgery team.  Pt verbalized understanding restrictions.  Pt has belongings and vehicle is here.            "

## 2023-02-03 NOTE — PROGRESS NOTES
Neurosurgery Progress note     Date of Admission:  2/2/2023  Dr Saavedra attending neurosurgeon following    Assessment & Plan   Dav is a 51yo alcoholic in recovery with known denae SDH since Nov 2 (followed by the ) status post [facility] IR cerebral angiogram with bilateral middle meningeal artery embolization on 11/7/2022 who presented to [facility] ED 2/2/23 with complaints of body aches, joint pain, leg and back pain, with 10lb weight loss over the last week. Fell 1 wk ago. Notes frequent falls. Head CT done in ED revealed some increased hyperdense blood products layering posteriorly suspicious for an acute component of prior SDH on the left. Overall volume is stable at 15mm. Decreased volume mixed density R sided SDH 7mm on the right compared to prior scans.      Dav feels good today. His repeat head CT is stable. He has had two stable scans at this point. OK to dispo home. All dispo instructions reviewed from neurosurgery perspective. We will contact the  to recommend he has a follow up appt in 2-3 wks with new head CT with their team who has been following him since Nov. He is a reliable historian and we reviewed red flag symptoms of worsening SDH for which he should come back to the emergency room if occur.      Plan:   1. SBP <140, HOB >30 degrees  2. Hold anticoagulation including DVT ppx  3. No plans to repeat head CTs at this time unless worsening symptoms or change in exam while inpatient  4. OK to discharge from our perspective  5. Will arrange outpatient follow up with neurosurgery at the  in 2-3 wks with repeat head CT        History of Present Illness   Getachew Barcenas is a 52 year old male who presents with who presents with joint pain and weight loss. He has had frequent falls in the past with the last fall a week ago stating that he was walking in his garage when he fell backwards landing on his elbows. Unsure if he hit the back of his head. He denies LOC. He notes intermittent dull headaches since  November but denies any nausea, emesis, or visual changes. He is currently sober for the past month and following with rehab. He was noted to have bilateral SDH in November when he presented to [facility] ED and got transferred to [facility] of which has been followed by them with virtual visit yesterday. He is status post cerebral angiogram with bilateral middle meningeal artery embolization 11/7 with IR at [facility] and states that his most recent head CT on 1/3 was stable and planned to follow up in 8 weeks with repeat head CT at that time. His new head CT today demonstrates stable mixed density left SDH with some hyperdense layering posteriorly, suspicious for an acute component. Decreased volume of the mixed density SDH on the right. Mild assoc mass effect with 2mm left to right midline shift.  He states that over the past 4-5 days he has had progressive complaint of mid to low back pain that he describes as a tightness and stabbing pain that will radiate into his anterior thighs bilaterally (R>L). He notes chronic feet and hand neuropathy that remains unchanged and denies any radicular numbness or tingling. He also notes complaint of right shoulder pain and soreness that he states is worsened with shoulder movement. He denies any neck pain or radicular upper extremity pain, numbness, tingling or weakness. He denies any changes in bowel or bladder habits. He denies gait instability but notes limitation in activities secondary to his back pain.        SUBJECTIVE:  Dav feels much better. Very mild weakness in his left leg at this point. Walking wihtout difficulty. Very minor headache upon waking up this morning, which resolved after he ate and got ready for the day. He denies dizziness, n/v, change in vision, numbness, tingling, back, neck, arm or leg pain.    Physical Exam   Temp: 97.9  F (36.6  C) Temp src: Oral BP: 112/62 Pulse: 74   Resp: 18 SpO2: 97 % O2 Device: None (Room air)    Vital Signs with Ranges  Temp:  [97.7  " F (36.5  C)-98.5  F (36.9  C)] 97.9  F (36.6  C)  Pulse:  [] 74  Resp:  [13-18] 18  BP: ()/(50-62) 112/62  SpO2:  [92 %-100 %] 97 %  217 lbs 3.2 oz     , Blood pressure 112/62, pulse 74, temperature 97.9  F (36.6  C), temperature source Oral, resp. rate 18, height 6' 2\" (1.88 m), weight 217 lb 3.2 oz (98.5 kg), SpO2 97 %.  217 lbs 3.2 oz    NEUROLOGICAL EXAMINATION:   Mental status:  Alert and Oriented x 3, speech is fluent. Awake, very pleasant    Cranial nerves:  II-XII intact.     Motor:  Strength is 5/5 throughout the upper and lower extremities, all planes                  Sensation:  Intact to LT throughout upper and lower ext    Coordination:  Smooth finger to nose testing.   Negative pronator drift. Rapid alternating movements intact.    Gait:  Not tested          Images:   reviewed personally   Head CT from 2/3 am stable compared to prior    EXAM: CT HEAD W/O CONTRAST  LOCATION: [facility]  DATE/TIME: 2/3/2023 8:22 AM     INDICATION: SDH  COMPARISON: February 2, 2023  TECHNIQUE: Routine CT Head without IV contrast. Multiplanar reformats. Dose reduction techniques were used.     FINDINGS:  INTRACRANIAL CONTENTS: Subdural hematoma along the left cerebral convexity measuring 1.1 cm, unchanged. Right-sided subdural hemorrhage measuring 0.6 cm, unchanged. No other evidence of acute dural hemorrhage or mass effect. Few scattered foci of signal   abnormality within the cerebral hemispheric white matter which are nonspecific, though most commonly ascribed to chronic small vessel ischemic disease. The ventricles and sulci are prominent consistent with mild brain parenchymal volume loss. Gray-white   matter differentiation is maintained. The basilar cisterns are patent.     VISUALIZED ORBITS/SINUSES/MASTOIDS: The globes are unremarkable. The partially imaged paranasal sinuses, mastoid air cells and middle ear cavities are unremarkable.      BONES/SOFT TISSUES: The visualized " skull base and calvarium are unremarkable.                                                                      IMPRESSION:    1.  Subdural hematoma along the left cerebral convexity measuring 1.1 cm, unchanged.   2.  Right-sided subdural hemorrhage measuring 0.6 cm, unchanged.  3.  No other evidence of acute intracranial hemorrhage or mass effect.  4.  Mild nonspecific white matter changes.  5.  Mild brain parenchymal volume loss.      Carmen Ramsey FNP-C  [facility] Neurosurgery  O. [phone]

## 2023-02-06 ENCOUNTER — PATIENT OUTREACH (OUTPATIENT)
Dept: CARE COORDINATION | Facility: CLINIC | Age: 53
End: 2023-02-06
Payer: COMMERCIAL

## 2023-02-06 NOTE — PROGRESS NOTES
"Clinic Care Coordination Contact  Clinic Care Coordination Contact  [facility]: Post-Discharge Note  SITUATION                                                      Admission:    Admission Date: 02/02/23   Reason for Admission: fall  Discharge:   Discharge Date: 02/03/23  Discharge Diagnosis: Subdural hematoma    BACKGROUND                                                      Per hospital discharge summary and inpatient provider notes:Getachew Barcenas is a 52 year old male with PMH significant for chronic subdural hematoma, alcohol abuse in remission, frequent falls, peripheral neuropathy, hypothyroidism s/p radiation who presented to the ED for \"not feeling right\" after fall 2 weeks prior. Found to have acute on chronic subdural hematoma. Patient underwent repeat head CT in AM that showed no changes and NSGY comfortable with patient discharge with follow-up with his primary nsgy as outpatient      ASSESSMENT           Discharge Assessment  How are you doing now that you are home?: \" I am doing ok, just trying to coordinate my follow-up appointments\"  How are your symptoms? (Red Flag symptoms escalate to triage hotline per guidelines): Improved  Do you feel your condition is stable enough to be safe at home until your provider visit?: Yes  Does the patient have their discharge instructions? : Yes  Does the patient have questions regarding their discharge instructions? : No  Were you started on any new medications or were there changes to any of your previous medications? : No  Does the patient have all of their medications?: Yes  Do you have questions regarding any of your medications? : No  Do you have all of your needed medical supplies or equipment (DME)?  (i.e. oxygen tank, CPAP, cane, etc.): Yes  Discharge follow-up appointment scheduled within 14 calendar days? : Yes  Discharge Follow Up Appointment Date: 03/01/23  Discharge Follow Up Appointment Scheduled with?: Specialty Care Provider    Post-op ([facility] CTA " Only)  If the patient had a surgery or procedure, do they have any questions for a nurse?: No             PLAN                                                      Outpatient Plan:   Comments: Follow up with primary care provider, Physician No Ref-Primary, within 7 days for hospital follow- up         Future Appointments   Date Time Provider Department Center   3/1/2023  8:00 AM Naila Louie APRN CNP [facility]   3/13/2023  1:00 PM  LAB [facility]   3/13/2023  2:00 PM Renata Gutierrez PA-C [facility]   7/6/2023  9:00 AM Quinn Joe MD [facility]         For any urgent concerns, please contact our 24 hour nurse triage line: [phone] ([phone])         Armani Barreto

## 2023-02-07 ENCOUNTER — TELEPHONE (OUTPATIENT)
Dept: NEUROSURGERY | Facility: CLINIC | Age: 53
End: 2023-02-07
Payer: COMMERCIAL

## 2023-02-22 ENCOUNTER — HOSPITAL ENCOUNTER (OUTPATIENT)
Dept: CT IMAGING | Facility: CLINIC | Age: 53
Discharge: HOME | End: 2023-02-22
Attending: NURSE PRACTITIONER | Admitting: NURSE PRACTITIONER
Payer: COMMERCIAL

## 2023-02-22 DIAGNOSIS — S06.5XAA SUBDURAL HEMATOMA (H): ICD-10-CM

## 2023-02-22 PROCEDURE — 70450 CT HEAD/BRAIN W/O DYE: CPT

## 2023-03-01 ENCOUNTER — VIRTUAL VISIT (OUTPATIENT)
Dept: NEUROSURGERY | Facility: CLINIC | Age: 53
End: 2023-03-01
Payer: COMMERCIAL

## 2023-03-01 DIAGNOSIS — I62.03 CHRONIC SUBDURAL HEMATOMA (H): Primary | ICD-10-CM

## 2023-03-01 PROCEDURE — 99213 OFFICE O/P EST LOW 20 MIN: CPT | Mod: VID | Performed by: NURSE PRACTITIONER

## 2023-03-01 RX ORDER — DULOXETIN HYDROCHLORIDE 30 MG/1
30 CAPSULE, DELAYED RELEASE ORAL
COMMUNITY
Start: 2023-02-16 | End: 2023-12-07

## 2023-03-01 NOTE — LETTER
"3/1/2023       RE: Getachew Barcenas  [address] [place] [address]     Dear Colleague,    Thank you for referring your patient, Getachew Barcenas, to the [facility] NEUROSURGERY [facility]IC [facility]. Please see a copy of my visit note below.      [facility]  Department of Neurosurgery      Name: Getachew Barcenas  MRN: [id number]  Age: 52 year old  : 1970  Referring provider: Domonique Alcantara  2023      Chief Complaint:   Acute on chronic left subdural hematoma, chronic bilateral subdural hematoma 2022  S/p B/L MMA Embolization on 2022  Follow-up after imaging    History of Present Illness:   Getachew Barcenas is a 52 year old male with a history of alcohol use disorder, thiamine deficiency, frequent falls, peripheral neuropathy, hypothyroidism, former tobacco use, mild LULI, somnambulism, RLS, diverticulitis, and mood disorder who presented as transfer to [facility] on 2022 for higher level of cares in the setting of new diagnosis of acute on chronic L SDH and subacute R SDH. On 2022, patient underwent bilateral MMA embolization and was discharged on 2022.    Most recent visit with me on 2023.  His head CT prior to that showed improving subdural hemorrhage.  Recommended follow-up head CT in 8 weeks.  Today, I had a video visit with the patient.  He reports \"nagging headaches\" which has improved.  Dizziness continues to be the major issue, but he reports reduced fall frequency.  He uses a cane for ambulation.  He had some right hand weakness in the past and this has been improving.  He is in physical therapy once a week.  He has been sober since  and currently in second month of a 6-months outpatient rehab program through [facility].     Review of Systems:   Pertinent items are noted in HPI or as in patient entered ROS below, remainder of complete ROS is negative.   No flowsheet data found.     Physical Exam: "   There were no vitals taken for this visit.   General: No acute distress.    Neuro: The patient is fully oriented. Speech is normal.     Imagin2023 head CT:  IMPRESSION:  Interval improvement of bilateral cerebral convexity  subdural hematomas as above. No new hemorrhage. Mild mass effect with  sulcal effacement on the left but no significant shift in midline  structures.      Assessment:  Acute on chronic left subdural hematoma, chronic bilateral subdural hematoma 2022  S/p B/L MMA Embolization on 2022  Follow-up after imaging    Plan:  Overall, patient has been doing well since his last visit.  His head CT from  shows interval improvement of bilateral subdural hematomas.  We will plan for a follow-up head CT in 6 weeks from now.  Patient to follow-up with me after imaging.       I spent 20 minutes on patient care activities related to this encounter on the date of service, including time spent reviewing the chart, obtaining history and examination and in counseling the patient, and in documentation in the electronic medical record.      Naila VÁSQUEZ CNP  Department of Neurosurgery

## 2023-03-01 NOTE — PROGRESS NOTES
"Video-Visit Details    Type of service:  Video Visit    Video Start Time (time video started): 0805    Video End Time (time video stopped): 0821    Originating Location (pt. Location): Home    Distant Location (provider location):  Off-site    Mode of Communication:  Video Conference via [facility]  Department of Neurosurgery      Name: Getachew Barcenas  MRN: [id number]  Age: 52 year old  : 1970  Referring provider: Domonique Alcantara  2023      Chief Complaint:   Acute on chronic left subdural hematoma, chronic bilateral subdural hematoma 2022  S/p B/L MMA Embolization on 2022  Follow-up after imaging    History of Present Illness:   Getachew Barcenas is a 52 year old male with a history of alcohol use disorder, thiamine deficiency, frequent falls, peripheral neuropathy, hypothyroidism, former tobacco use, mild LULI, somnambulism, RLS, diverticulitis, and mood disorder who presented as transfer to [facility] on 2022 for higher level of cares in the setting of new diagnosis of acute on chronic L SDH and subacute R SDH. On 2022, patient underwent bilateral MMA embolization and was discharged on 2022.    Most recent visit with me on 2023.  His head CT prior to that showed improving subdural hemorrhage.  Recommended follow-up head CT in 8 weeks.  Today, I had a video visit with the patient.  He reports \"nagging headaches\" which has improved.  Dizziness continues to be the major issue, but he reports reduced fall frequency.  He uses a cane for ambulation.  He had some right hand weakness in the past and this has been improving.  He is in physical therapy once a week.  He has been sober since  and currently in second month of a 6-months outpatient rehab program through [facility].     Review of Systems:   Pertinent items are noted in HPI or as in patient entered ROS below, remainder of complete ROS is negative.   No flowsheet data found.     Physical Exam:   There " were no vitals taken for this visit.   General: No acute distress.    Neuro: The patient is fully oriented. Speech is normal.     Imagin2023 head CT:  IMPRESSION:  Interval improvement of bilateral cerebral convexity  subdural hematomas as above. No new hemorrhage. Mild mass effect with  sulcal effacement on the left but no significant shift in midline  structures.      Assessment:  Acute on chronic left subdural hematoma, chronic bilateral subdural hematoma 2022  S/p B/L MMA Embolization on 2022  Follow-up after imaging    Plan:  Overall, patient has been doing well since his last visit.  His head CT from  shows interval improvement of bilateral subdural hematomas.  We will plan for a follow-up head CT in 6 weeks from now.  Patient to follow-up with me after imaging.       I spent 20 minutes on patient care activities related to this encounter on the date of service, including time spent reviewing the chart, obtaining history and examination and in counseling the patient, and in documentation in the electronic medical record.      Naila VÁSQUEZ, CNP  Department of Neurosurgery

## 2023-03-01 NOTE — NURSING NOTE
Chief Complaint   Patient presents with     Video Visit     1 month follow up        Is the patient currently in the state of [place]? YES    Visit mode:VIDEO    If the visit is dropped, the patient can be reconnected by: VIDEO VISIT: Text to cell phone: [phone]    Will anyone else be joining the visit? NO      How would you like to obtain your AVS? MyChart    Are changes needed to the allergy or medication list? NO    Reason for visit: 1 month follow up

## 2023-03-06 DIAGNOSIS — K75.81 NASH (NONALCOHOLIC STEATOHEPATITIS): Primary | ICD-10-CM

## 2023-03-13 ENCOUNTER — PRE VISIT (OUTPATIENT)
Dept: GASTROENTEROLOGY | Facility: CLINIC | Age: 53
End: 2023-03-13

## 2023-03-17 ENCOUNTER — APPOINTMENT (OUTPATIENT)
Dept: RADIOLOGY | Facility: HOSPITAL | Age: 53
End: 2023-03-17
Attending: EMERGENCY MEDICINE
Payer: COMMERCIAL

## 2023-03-17 ENCOUNTER — HOSPITAL ENCOUNTER (INPATIENT)
Facility: HOSPITAL | Age: 53
LOS: 7 days | Discharge: LEFT BEFORE DISCHARGE | End: 2023-03-24
Attending: EMERGENCY MEDICINE | Admitting: FAMILY MEDICINE
Payer: COMMERCIAL

## 2023-03-17 ENCOUNTER — APPOINTMENT (OUTPATIENT)
Dept: CT IMAGING | Facility: HOSPITAL | Age: 53
End: 2023-03-17
Attending: EMERGENCY MEDICINE
Payer: COMMERCIAL

## 2023-03-17 DIAGNOSIS — I62.03 CHRONIC SUBDURAL HEMATOMA (H): ICD-10-CM

## 2023-03-17 DIAGNOSIS — F10.920 ALCOHOLIC INTOXICATION WITHOUT COMPLICATION (H): ICD-10-CM

## 2023-03-17 DIAGNOSIS — I47.29 VENTRICULAR TACHYCARDIA (PAROXYSMAL) (H): ICD-10-CM

## 2023-03-17 DIAGNOSIS — R79.0 LOW MAGNESIUM LEVEL: ICD-10-CM

## 2023-03-17 DIAGNOSIS — E87.20 LACTIC ACIDOSIS: ICD-10-CM

## 2023-03-17 PROBLEM — I47.20 VENTRICULAR TACHYCARDIA (PAROXYSMAL) (H): Status: ACTIVE | Noted: 2023-03-17

## 2023-03-17 LAB
ALBUMIN SERPL BCG-MCNC: 4.2 G/DL (ref 3.5–5.2)
ALBUMIN UR-MCNC: 10 MG/DL
ALP SERPL-CCNC: 136 U/L (ref 40–129)
ALT SERPL W P-5'-P-CCNC: 52 U/L (ref 10–50)
ANION GAP SERPL CALCULATED.3IONS-SCNC: 23 MMOL/L (ref 7–15)
APAP SERPL-MCNC: <5 UG/ML (ref 10–30)
APPEARANCE UR: CLEAR
AST SERPL W P-5'-P-CCNC: ABNORMAL U/L
BASOPHILS # BLD AUTO: 0 10E3/UL (ref 0–0.2)
BASOPHILS NFR BLD AUTO: 1 %
BILIRUB SERPL-MCNC: 1.3 MG/DL
BILIRUB UR QL STRIP: NEGATIVE
BUN SERPL-MCNC: 11.5 MG/DL (ref 6–20)
CALCIUM SERPL-MCNC: 9.3 MG/DL (ref 8.6–10)
CAOX CRY #/AREA URNS HPF: ABNORMAL /HPF
CHLORIDE SERPL-SCNC: 101 MMOL/L (ref 98–107)
COLOR UR AUTO: YELLOW
CREAT SERPL-MCNC: 0.65 MG/DL (ref 0.67–1.17)
CRP SERPL-MCNC: <3 MG/L
DEPRECATED HCO3 PLAS-SCNC: 18 MMOL/L (ref 22–29)
EOSINOPHIL # BLD AUTO: 0.1 10E3/UL (ref 0–0.7)
EOSINOPHIL NFR BLD AUTO: 1 %
ERYTHROCYTE [DISTWIDTH] IN BLOOD BY AUTOMATED COUNT: 12.6 % (ref 10–15)
ERYTHROCYTE [SEDIMENTATION RATE] IN BLOOD BY WESTERGREN METHOD: 7 MM/HR (ref 0–15)
ETHANOL SERPL-MCNC: 0.36 G/DL
GFR SERPL CREATININE-BSD FRML MDRD: >90 ML/MIN/1.73M2
GLUCOSE SERPL-MCNC: 100 MG/DL (ref 70–99)
GLUCOSE UR STRIP-MCNC: NEGATIVE MG/DL
HCT VFR BLD AUTO: 43.7 % (ref 40–53)
HGB BLD-MCNC: 15.2 G/DL (ref 13.3–17.7)
HGB UR QL STRIP: NEGATIVE
IMM GRANULOCYTES # BLD: 0 10E3/UL
IMM GRANULOCYTES NFR BLD: 0 %
KETONES UR STRIP-MCNC: 20 MG/DL
LACTATE SERPL-SCNC: 6 MMOL/L (ref 0.7–2)
LACTATE SERPL-SCNC: 7 MMOL/L (ref 0.7–2)
LEUKOCYTE ESTERASE UR QL STRIP: NEGATIVE
LIPASE SERPL-CCNC: 69 U/L (ref 13–60)
LYMPHOCYTES # BLD AUTO: 2.3 10E3/UL (ref 0.8–5.3)
LYMPHOCYTES NFR BLD AUTO: 38 %
MAGNESIUM SERPL-MCNC: 1.5 MG/DL (ref 1.7–2.3)
MCH RBC QN AUTO: 29.2 PG (ref 26.5–33)
MCHC RBC AUTO-ENTMCNC: 34.8 G/DL (ref 31.5–36.5)
MCV RBC AUTO: 84 FL (ref 78–100)
MONOCYTES # BLD AUTO: 0.2 10E3/UL (ref 0–1.3)
MONOCYTES NFR BLD AUTO: 4 %
MUCOUS THREADS #/AREA URNS LPF: PRESENT /LPF
NEUTROPHILS # BLD AUTO: 3.4 10E3/UL (ref 1.6–8.3)
NEUTROPHILS NFR BLD AUTO: 56 %
NITRATE UR QL: NEGATIVE
NRBC # BLD AUTO: 0 10E3/UL
NRBC BLD AUTO-RTO: 0 /100
PH UR STRIP: 5.5 [PH] (ref 5–7)
PLATELET # BLD AUTO: 118 10E3/UL (ref 150–450)
POTASSIUM SERPL-SCNC: 3.7 MMOL/L (ref 3.4–5.3)
PROT SERPL-MCNC: 7.6 G/DL (ref 6.4–8.3)
RBC # BLD AUTO: 5.2 10E6/UL (ref 4.4–5.9)
RBC URINE: 1 /HPF
SODIUM SERPL-SCNC: 142 MMOL/L (ref 136–145)
SP GR UR STRIP: 1.01 (ref 1–1.03)
SQUAMOUS EPITHELIAL: <1 /HPF
TROPONIN T SERPL HS-MCNC: 13 NG/L
TSH SERPL DL<=0.005 MIU/L-ACNC: 0.41 UIU/ML (ref 0.3–4.2)
UROBILINOGEN UR STRIP-MCNC: <2 MG/DL
WBC # BLD AUTO: 6 10E3/UL (ref 4–11)
WBC URINE: 1 /HPF

## 2023-03-17 PROCEDURE — 84484 ASSAY OF TROPONIN QUANT: CPT | Performed by: EMERGENCY MEDICINE

## 2023-03-17 PROCEDURE — 96365 THER/PROPH/DIAG IV INF INIT: CPT | Mod: 59

## 2023-03-17 PROCEDURE — 210N000001 HC R&B IMCU HEART CARE

## 2023-03-17 PROCEDURE — 85652 RBC SED RATE AUTOMATED: CPT | Performed by: EMERGENCY MEDICINE

## 2023-03-17 PROCEDURE — 250N000011 HC RX IP 250 OP 636: Performed by: EMERGENCY MEDICINE

## 2023-03-17 PROCEDURE — 86140 C-REACTIVE PROTEIN: CPT | Performed by: EMERGENCY MEDICINE

## 2023-03-17 PROCEDURE — 36415 COLL VENOUS BLD VENIPUNCTURE: CPT | Performed by: EMERGENCY MEDICINE

## 2023-03-17 PROCEDURE — 71045 X-RAY EXAM CHEST 1 VIEW: CPT

## 2023-03-17 PROCEDURE — 96366 THER/PROPH/DIAG IV INF ADDON: CPT

## 2023-03-17 PROCEDURE — 81001 URINALYSIS AUTO W/SCOPE: CPT | Performed by: EMERGENCY MEDICINE

## 2023-03-17 PROCEDURE — 83735 ASSAY OF MAGNESIUM: CPT | Performed by: EMERGENCY MEDICINE

## 2023-03-17 PROCEDURE — 83690 ASSAY OF LIPASE: CPT | Performed by: EMERGENCY MEDICINE

## 2023-03-17 PROCEDURE — 70450 CT HEAD/BRAIN W/O DYE: CPT

## 2023-03-17 PROCEDURE — 250N000009 HC RX 250: Performed by: EMERGENCY MEDICINE

## 2023-03-17 PROCEDURE — 84443 ASSAY THYROID STIM HORMONE: CPT | Performed by: EMERGENCY MEDICINE

## 2023-03-17 PROCEDURE — 84155 ASSAY OF PROTEIN SERUM: CPT | Performed by: EMERGENCY MEDICINE

## 2023-03-17 PROCEDURE — 258N000003 HC RX IP 258 OP 636: Performed by: EMERGENCY MEDICINE

## 2023-03-17 PROCEDURE — 85025 COMPLETE CBC W/AUTO DIFF WBC: CPT | Performed by: EMERGENCY MEDICINE

## 2023-03-17 PROCEDURE — 87040 BLOOD CULTURE FOR BACTERIA: CPT | Performed by: EMERGENCY MEDICINE

## 2023-03-17 PROCEDURE — 93005 ELECTROCARDIOGRAM TRACING: CPT | Performed by: STUDENT IN AN ORGANIZED HEALTH CARE EDUCATION/TRAINING PROGRAM

## 2023-03-17 PROCEDURE — 250N000011 HC RX IP 250 OP 636

## 2023-03-17 PROCEDURE — 74177 CT ABD & PELVIS W/CONTRAST: CPT

## 2023-03-17 PROCEDURE — 83605 ASSAY OF LACTIC ACID: CPT | Performed by: EMERGENCY MEDICINE

## 2023-03-17 PROCEDURE — 250N000013 HC RX MED GY IP 250 OP 250 PS 637: Performed by: EMERGENCY MEDICINE

## 2023-03-17 PROCEDURE — 82077 ASSAY SPEC XCP UR&BREATH IA: CPT | Performed by: EMERGENCY MEDICINE

## 2023-03-17 PROCEDURE — 99285 EMERGENCY DEPT VISIT HI MDM: CPT | Mod: 25

## 2023-03-17 PROCEDURE — 80143 DRUG ASSAY ACETAMINOPHEN: CPT | Performed by: EMERGENCY MEDICINE

## 2023-03-17 RX ORDER — GABAPENTIN 100 MG/1
100 CAPSULE ORAL EVERY 8 HOURS
Status: DISCONTINUED | OUTPATIENT
Start: 2023-03-27 | End: 2023-03-24 | Stop reason: HOSPADM

## 2023-03-17 RX ORDER — PIPERACILLIN SODIUM, TAZOBACTAM SODIUM 3; .375 G/15ML; G/15ML
3.38 INJECTION, POWDER, LYOPHILIZED, FOR SOLUTION INTRAVENOUS ONCE
Status: COMPLETED | OUTPATIENT
Start: 2023-03-17 | End: 2023-03-17

## 2023-03-17 RX ORDER — HYDROXYZINE PAMOATE 25 MG/1
75 CAPSULE ORAL AT BEDTIME
COMMUNITY
End: 2023-12-07

## 2023-03-17 RX ORDER — MAGNESIUM SULFATE HEPTAHYDRATE 40 MG/ML
2 INJECTION, SOLUTION INTRAVENOUS ONCE
Status: COMPLETED | OUTPATIENT
Start: 2023-03-17 | End: 2023-03-18

## 2023-03-17 RX ORDER — ACAMPROSATE CALCIUM 333 MG/1
666 TABLET, DELAYED RELEASE ORAL 3 TIMES DAILY
COMMUNITY
End: 2023-12-07

## 2023-03-17 RX ORDER — CLONIDINE HYDROCHLORIDE 0.1 MG/1
0.1 TABLET ORAL EVERY 8 HOURS
Status: DISCONTINUED | OUTPATIENT
Start: 2023-03-18 | End: 2023-03-24 | Stop reason: HOSPADM

## 2023-03-17 RX ORDER — GABAPENTIN 300 MG/1
600 CAPSULE ORAL EVERY 8 HOURS
Status: COMPLETED | OUTPATIENT
Start: 2023-03-18 | End: 2023-03-19

## 2023-03-17 RX ORDER — MULTIPLE VITAMINS W/ MINERALS TAB 9MG-400MCG
1 TAB ORAL DAILY
Status: DISCONTINUED | OUTPATIENT
Start: 2023-03-18 | End: 2023-03-24 | Stop reason: HOSPADM

## 2023-03-17 RX ORDER — LORAZEPAM 0.5 MG/1
1 TABLET ORAL ONCE
Status: COMPLETED | OUTPATIENT
Start: 2023-03-17 | End: 2023-03-17

## 2023-03-17 RX ORDER — SODIUM CHLORIDE 9 MG/ML
INJECTION, SOLUTION INTRAVENOUS CONTINUOUS
Status: DISCONTINUED | OUTPATIENT
Start: 2023-03-17 | End: 2023-03-18

## 2023-03-17 RX ORDER — LORAZEPAM 1 MG/1
1-2 TABLET ORAL EVERY 30 MIN PRN
Status: DISCONTINUED | OUTPATIENT
Start: 2023-03-17 | End: 2023-03-24 | Stop reason: HOSPADM

## 2023-03-17 RX ORDER — GABAPENTIN 300 MG/1
300 CAPSULE ORAL EVERY 8 HOURS
Status: DISCONTINUED | OUTPATIENT
Start: 2023-03-25 | End: 2023-03-24 | Stop reason: HOSPADM

## 2023-03-17 RX ORDER — FLUMAZENIL 0.1 MG/ML
0.2 INJECTION, SOLUTION INTRAVENOUS
Status: DISCONTINUED | OUTPATIENT
Start: 2023-03-17 | End: 2023-03-24 | Stop reason: HOSPADM

## 2023-03-17 RX ORDER — HALOPERIDOL 5 MG/ML
2.5-5 INJECTION INTRAMUSCULAR EVERY 6 HOURS PRN
Status: DISCONTINUED | OUTPATIENT
Start: 2023-03-17 | End: 2023-03-24 | Stop reason: HOSPADM

## 2023-03-17 RX ORDER — IOPAMIDOL 755 MG/ML
100 INJECTION, SOLUTION INTRAVASCULAR ONCE
Status: COMPLETED | OUTPATIENT
Start: 2023-03-17 | End: 2023-03-17

## 2023-03-17 RX ORDER — FOLIC ACID 1 MG/1
1 TABLET ORAL DAILY
Status: DISCONTINUED | OUTPATIENT
Start: 2023-03-18 | End: 2023-03-24 | Stop reason: HOSPADM

## 2023-03-17 RX ORDER — LORAZEPAM 2 MG/ML
1-2 INJECTION INTRAMUSCULAR EVERY 30 MIN PRN
Status: DISCONTINUED | OUTPATIENT
Start: 2023-03-17 | End: 2023-03-24 | Stop reason: HOSPADM

## 2023-03-17 RX ORDER — MAGNESIUM OXIDE 400 MG/1
400 TABLET ORAL DAILY PRN
COMMUNITY
End: 2023-12-07

## 2023-03-17 RX ORDER — OLANZAPINE 5 MG/1
5-10 TABLET, ORALLY DISINTEGRATING ORAL EVERY 6 HOURS PRN
Status: DISCONTINUED | OUTPATIENT
Start: 2023-03-17 | End: 2023-03-24 | Stop reason: HOSPADM

## 2023-03-17 RX ADMIN — SODIUM CHLORIDE 1000 ML: 9 INJECTION, SOLUTION INTRAVENOUS at 20:17

## 2023-03-17 RX ADMIN — MAGNESIUM SULFATE HEPTAHYDRATE 2 G: 40 INJECTION, SOLUTION INTRAVENOUS at 23:02

## 2023-03-17 RX ADMIN — LORAZEPAM 2 MG: 2 INJECTION INTRAMUSCULAR; INTRAVENOUS at 23:22

## 2023-03-17 RX ADMIN — FOLIC ACID: 5 INJECTION, SOLUTION INTRAMUSCULAR; INTRAVENOUS; SUBCUTANEOUS at 19:40

## 2023-03-17 RX ADMIN — IOPAMIDOL 100 ML: 755 INJECTION, SOLUTION INTRAVENOUS at 22:09

## 2023-03-17 RX ADMIN — PIPERACILLIN AND TAZOBACTAM 3.38 G: 3; .375 INJECTION, POWDER, LYOPHILIZED, FOR SOLUTION INTRAVENOUS at 20:45

## 2023-03-17 RX ADMIN — LORAZEPAM 1 MG: 0.5 TABLET ORAL at 20:26

## 2023-03-17 RX ADMIN — SODIUM CHLORIDE: 9 INJECTION, SOLUTION INTRAVENOUS at 22:21

## 2023-03-17 ASSESSMENT — ACTIVITIES OF DAILY LIVING (ADL)
ADLS_ACUITY_SCORE: 35
ADLS_ACUITY_SCORE: 35
ADLS_ACUITY_SCORE: 33

## 2023-03-17 NOTE — ED PROVIDER NOTES
EMERGENCY DEPARTMENT NOTE     Name: Getachew Barcenas    Age/Sex: 52 year old male   MRN: [id number]   Evaluation Date & Time:  No admission date for patient encounter.    PCP:    No Ref-Primary, Physician   ED Provider: Mikael Escobar D.O.       CHIEF COMPLAINT    Generalized Weakness       DIAGNOSIS & DISPOSITION     1. Alcoholic intoxication without complication (H)    2. Lactic acidosis    3. Low magnesium level    4. Ventricular tachycardia (paroxysmal)    5. Chronic subdural hematoma (H)      DISPOSITION: Admit to cardiac telemetry/PVS    At the conclusion of the encounter I discussed the results of all of the tests and the disposition. The questions were answered. The patient or family acknowledged understanding and was agreeable with the care plan.    TOTAL CRITICAL CARE TIME (EXCLUDING PROCEDURES): 30 minutes for treatment of electrolyte derangement/cardiac arrhythmia    PROCEDURES:   None    EMERGENCY DEPARTMENT COURSE/MEDICAL DECISION MAKING   5:59 PM I met with the patient to gather history and to perform my initial exam.  We discussed treatment options and the plan for care while in the Emergency Department.  8:17 PM I re-examined the patient. Patient is alert and oriented. Tachycardia has improved . Abdominal exam was non-tender. 5/5 strength to all extremities.  10:41 PM I rechecked and updated the patient     Getachew Barcenas is a 52 year old male with relevant past history of alcohol use disorder, chronic subdural hematoma, thyroidism who presents to the emergency department for evaluation after family members were concerned about patient's recurrent alcohol use and.  Today when he was complaining of pain.  Patient has had outpatient treatment for alcohol use disorder and had a period of sobriety but family reports over the last several weeks he has had recurrent alcohol use on a daily basis with the patient and admits to.  Patient had an episode today when he was crying and saying that he was in pain but  cannot specify, family was concerned and brought to the emergency department.  On initial evaluation he appeared intoxicated and was tachycardic but was denying headache, neck pain, chest pain, shortness of breath, abdominal pain.  Patient did endorse generalized weakness but denies any back pain, difficulty with bowel or bladder control.  Patient has neuropathy at baseline but denies any new or progressive numbness.    ED Course as of 03/18/23 0242   Fri Mar 17, 2023   2105 Chest x-ray reviewed and no acute process without infiltrate, pneumothorax.  Official radiology read pending       Triage note reviewed:Pt brought in by his sister as she went to visit him and he was crying in pain. Pt  Is rambling and not able to really say why he is here but he does say that he is weak and cant stand.  Pt is alcoholic and last drank at 1630.  Pts hr 142 in triage. He is a refusing an ekg now.he is belligerent in triage.        Differential  diagnosis  considered included but not limited to acute subdural or intracranial hemorrhage, sepsis from multiple sources including pneumonia, intra-abdominal source including appendicitis, cholecystitis, urinary tract infection, epidural abscess or discitis, hyper or hypothyroidism, DKA, electrolyte derangement    Diagnostic studies:  Imaging:  CT Abdomen Pelvis w Contrast   Final Result   IMPRESSION:    1.  Hepatic steatosis.   2.  Mildly distended gallbladder with cholelithiasis.   3.  Acute uncomplicated sigmoid diverticulitis.      XR Chest Port 1 View   Final Result   IMPRESSION: Single AP view of the chest was obtained. Cardiomediastinal silhouette is within normal limits. No suspicious focal pulmonary opacities. No significant pleural effusion or pneumothorax.      Head CT w/o contrast   Final Result   IMPRESSION:   1.  Bilateral subdural hematomas with mild mass effect, decreased in size compared to the prior.   2.  No new areas of hemorrhage.   3.  Recommend continued follow-up.          Lab:  Labs Ordered and Resulted from Time of ED Arrival to Time of ED Departure   COMPREHENSIVE METABOLIC PANEL - Abnormal       Result Value    Sodium 142      Potassium 3.7      Chloride 101      Carbon Dioxide (CO2) 18 (*)     Anion Gap 23 (*)     Urea Nitrogen 11.5      Creatinine 0.65 (*)     Calcium 9.3      Glucose 100 (*)     Alkaline Phosphatase 136 (*)     AST        ALT 52 (*)     Protein Total 7.6      Albumin 4.2      Bilirubin Total 1.3 (*)     GFR Estimate >90     MAGNESIUM - Abnormal    Magnesium 1.5 (*)    ROUTINE UA WITH MICROSCOPIC REFLEX TO CULTURE - Abnormal    Color Urine Yellow      Appearance Urine Clear      Glucose Urine Negative      Bilirubin Urine Negative      Ketones Urine 20 (*)     Specific Gravity Urine 1.015      Blood Urine Negative      pH Urine 5.5      Protein Albumin Urine 10 (*)     Urobilinogen Urine <2.0      Nitrite Urine Negative      Leukocyte Esterase Urine Negative      Mucus Urine Present (*)     Calcium Oxalate Crystals Urine Few (*)     RBC Urine 1      WBC Urine 1      Squamous Epithelials Urine <1     LACTIC ACID WHOLE BLOOD - Abnormal    Lactic Acid 7.0 (*)    ETHYL ALCOHOL LEVEL - Abnormal    Alcohol ethyl 0.36 (*)    ACETAMINOPHEN LEVEL - Abnormal    Acetaminophen <5.0 (*)    CBC WITH PLATELETS AND DIFFERENTIAL - Abnormal    WBC Count 6.0      RBC Count 5.20      Hemoglobin 15.2      Hematocrit 43.7      MCV 84      MCH 29.2      MCHC 34.8      RDW 12.6      Platelet Count 118 (*)     % Neutrophils 56      % Lymphocytes 38      % Monocytes 4      % Eosinophils 1      % Basophils 1      % Immature Granulocytes 0      NRBCs per 100 WBC 0      Absolute Neutrophils 3.4      Absolute Lymphocytes 2.3      Absolute Monocytes 0.2      Absolute Eosinophils 0.1      Absolute Basophils 0.0      Absolute Immature Granulocytes 0.0      Absolute NRBCs 0.0     LIPASE - Abnormal    Lipase 69 (*)    LACTIC ACID WHOLE BLOOD - Abnormal    Lactic Acid 6.0 (*)    LACTIC  "ACID WHOLE BLOOD - Abnormal    Lactic Acid 5.2 (*)    TROPONIN T, HIGH SENSITIVITY - Normal    Troponin T, High Sensitivity 13     TSH WITH FREE T4 REFLEX - Normal    TSH 0.41     CRP INFLAMMATION - Normal    CRP Inflammation <3.00     ERYTHROCYTE SEDIMENTATION RATE AUTO - Normal    Erythrocyte Sedimentation Rate 7     PHOSPHORUS - Normal    Phosphorus 4.1     LACTIC ACID WHOLE BLOOD   BLOOD CULTURE   BLOOD CULTURE    EKG:  Sinus tachycardia.  Interventions: IV fluids,  multivitamin, magnesium, lorazepam, Zosyn  Discharge Vital Signs:/82   Pulse 111   Temp 98.4  F (36.9  C) (Tympanic)   Resp 17   Ht 1.88 m (6' 2\")   Wt 99.8 kg (220 lb)   SpO2 96%   BMI 28.25 kg/m    Medical Decision Making  Patient's initial tachycardia with IV fluids.  Laboratory evaluation significant for lactic acidosis with initial lactic acid 7.  Patient had metabolic acidosis with CO2 18 and anion gap ion gap 23.  Glucose was 100.  Minimal elevation of liver function test with ALT 52 and total bilirubin 1.3.  WBC 6, sed rate and CRP nonelevated.  Patient complained of generalized weakness but does not have specific lower back pain or acute weakness in lower extremities and unlikely to represent to space infection and further evaluation with MRI was not pursued.  Patient initially received Zosyn pending work-up for infectious process.  Chest x-ray without evidence of pneumonia, CT of the abdomen and pelvis with cholelithiasis but no CT evidence of cholecystitis and on initial and subsequent exams no right upper quadrant tenderness.  Urinalysis negative for pyuria or bacteriuria.  Suspect lactic acidosis secondary to alcoholic ketoacidosis.  On repeat testing lactic acid is decreasing is currently 5.  CT of the head showed chronic subdural improving without acute hemorrhage or acute subdural.  Imaging was reviewed with neurosurgery who concurred.  Initial tachycardia improved with IV fluids but the patient had short runs of " nonsustained ventricular tachycardia on the monitor when he was up urinating, magnesium was 1.5 and was replaced.  Patient has remained alert and oriented x4 without delirium but is tremulous and has been started onCIWA recall with IV lorazepam.  I discussed current findings with the patient and he is agreeable for hospital admission.  I discussed the case with the [place] residency service and patient will be admitted to cardiac telemetry for further care.    History:  Supplemental history from: Family Member/Significant Other  External Record(s) reviewed: Admission November 2022, February 2003,Tele visit March 1, 2023 neurosurgery, office visit January 2023    Work Up:  Chart documentation includes differential considered and any EKGs or imaging independently interpreted by provider, where specified.  In additional to work up documented, I considered the following work up: Ultrasound right upper quadrant currently without right upper quadrant tenderness to suggest cholecystitis or cholangitis.  MRI of the thoracic or lumbar spine but currently low suspicion for discitis or epidural abscess  External consultation:  Discussion of management with another provider: Neurosurgery, hospitalist    Complicating factors:  Care impacted by chronic illness: Mental Health/alcoholism  Care affected by social determinants of health: Alcohol Abuse and/or Recreational Drug Use    Disposition considerations: Admit.      @@@    ED INTERVENTIONS     Medications   0.9% sodium chloride BOLUS (0 mLs Intravenous Stopped 3/17/23 2212)     Followed by   sodium chloride 0.9% infusion ( Intravenous Rate/Dose Verify 3/18/23 0023)   cloNIDine (CATAPRES) tablet 0.1 mg (0.1 mg Oral $Given 3/18/23 0021)   OLANZapine zydis (zyPREXA) ODT tab 5-10 mg (has no administration in time range)     Or   haloperidol lactate (HALDOL) injection 2.5-5 mg (has no administration in time range)   flumazenil (ROMAZICON) injection 0.2 mg (has no  administration in time range)   melatonin tablet 5 mg (has no administration in time range)   gabapentin (NEURONTIN) capsule 600 mg (600 mg Oral $Given 3/18/23 0020)   gabapentin (NEURONTIN) capsule 300 mg (has no administration in time range)   gabapentin (NEURONTIN) capsule 100 mg (has no administration in time range)   LORazepam (ATIVAN) tablet 1-2 mg ( Oral See Alternative 3/18/23 0058)     Or   LORazepam (ATIVAN) injection 1-2 mg (2 mg Intravenous $Given 3/18/23 0058)   thiamine (B-1) tablet 100 mg (has no administration in time range)   folic acid (FOLVITE) tablet 1 mg (has no administration in time range)   multivitamin w/minerals (THERA-VIT-M) tablet 1 tablet (has no administration in time range)   sodium chloride 0.9 % 1,000 mL with Infuvite Adult 10 mL, thiamine 100 mg, folic acid 1 mg infusion ( Intravenous Stopped 3/17/23 2027)   piperacillin-tazobactam (ZOSYN) 3.375 g vial to attach to  mL bag (0 g Intravenous Stopped 3/17/23 2212)   LORazepam (ATIVAN) tablet 1 mg (1 mg Oral $Given 3/17/23 2026)   iopamidol (ISOVUE-370) solution 100 mL (100 mLs Intravenous $Given 3/17/23 2209)   magnesium sulfate 2 g in 50 mL sterile water intermittent infusion (0 g Intravenous Stopped 3/18/23 0005)   sodium chloride 0.9 % 1,000 mL with Infuvite Adult 10 mL, thiamine 100 mg, folic acid 1 mg infusion ( Intravenous Rate/Dose Verify 3/18/23 0022)       DISCHARGE MEDICATIONS        Review of your medicines      UNREVIEWED medicines. Ask your doctor about these medicines      Dose / Directions   acamprosate 333 MG EC tablet  Commonly known as: CAMPRAL      Dose: 666 mg  Take 666 mg by mouth 3 times daily  Refills: 0     acetaminophen 500 MG tablet  Commonly known as: TYLENOL  Used for: Contusion of head of pancreas, initial encounter      Dose: 500-1,000 mg  [ACETAMINOPHEN (TYLENOL) 500 MG TABLET] Take 1-2 tablets (500-1,000 mg total) by mouth every 6 (six) hours as needed for pain.  Refills: 0     DULoxetine 30 MG  capsule  Commonly known as: CYMBALTA      Dose: 30 mg  Take 30 mg by mouth  Refills: 0     * hydrOXYzine 25 MG capsule  Commonly known as: VISTARIL  Ask about: Which instructions should I use?      Dose: 25 mg  Take 25 mg by mouth 3 times daily as needed for anxiety  Refills: 0     * hydrOXYzine 25 MG capsule  Commonly known as: VISTARIL  Ask about: Which instructions should I use?      Dose: 75 mg  Take 75 mg by mouth At Bedtime  Refills: 0     levothyroxine 175 MCG tablet  Commonly known as: SYNTHROID/LEVOTHROID      Dose: 175 mcg  Take 175 mcg by mouth daily  Refills: 0     magnesium oxide 400 MG tablet  Commonly known as: MAG-OX  Indication: Disorder with Low Magnesium Levels  Ask about: Which instructions should I use?      Dose: 400 mg  Take 400 mg by mouth daily as needed  Refills: 0     ONE DAILY COMPLETE FOR MEN PO  Ask about: Which instructions should I use?      Dose: 1 tablet  Take 1 tablet by mouth daily  Refills: 0     traZODone 50 MG tablet  Commonly known as: DESYREL      Dose: 100 mg  Take 100 mg by mouth nightly as needed for sleep  Refills: 0         * This list has 2 medication(s) that are the same as other medications prescribed for you. Read the directions carefully, and ask your doctor or other care provider to review them with you.                  INFORMATION SOURCE AND LIMITATIONS    History/Exam limitations: limited, in general alert and oriented x3, but appears to be intoxicated  Patient information was obtained from: Patient and family  Use of : N/A     HISTORY OF PRESENT ILLNESS   Getachew Barcenas is a 52 year old year old male with a relevant past history of alcohol use disorder (severe, dependence), subdural hematoma, thrombocytopenia, and acute liver failure without hepatic coma, who presents to this ED via wheelchair for evaluation of inability to walk.    Patient reports his presents today because he cannot walk, and has not been able to for 25 years. He denies pain,  stating he just can't walk. He states he can't walk from the couch to the bathroom without falling. Patient reports he won't stop drinking. He notes a history of neuropathy in his fee and hands and a 20 year history of vertigo. He states he has had 100 concussions in his lifetime. No headache, chest pain, shortness of breath, or abdominal pain. He notes that if he quits drinking, he gets a knot-like pain in his mid-abdomen. He drinks 1.75 every 3 days. Last drank at 1630.    Per family member talking away from the patient, there is concern that the patient is not telling everything going on. He has a history of brain trauma with a brain bleed and had surgery in December 2022. She notes he has a history of alcoholism. Family wanted to share a video where the patient appeared to be in pain at home and reports the patient was screaming in pain due to abdominal pain and headache. She states the patient can't walk.    Per father talking away from the patient, patient went to [facility] with physical PT 2.5 years ago and was doing well for 3 weeks, but then relapsed. They are hoping to get him into a 6 month program. He states the patient has not had 100 concussions and the patient makes up stories. No other current complaints.    Per chart review: Patient had a virtual visit with neurosurgery on 3/1/2023 for nagging headaches and dizziness with reduced fall frequency. He uses a cane for ambulation.  At the time, he was in physical therapy once a week.  He had been sober since January 1 and in the second month of a 6-months outpatient rehab program through [facility]. CT prior to 2/1/2023 showed improving subdural hemorrhage. Plan for follow-up head CT in 6 weeks.    REVIEW OF SYSTEMS:   Constitutional: Negative for  fever.   HENT: Negative for URI symptoms or sore throat.    Cardiac: Negative for  chest pain,palpitations, near syncope or syncope  Respiratory: Negative for cough and shortness of breath.    Gastrointestinal:  Negative for abdominal pain, nausea, vomiting, constipation, diarrhea, rectal bleeding or melena.  Genitourinary: Negative for dysuria, flank pain and hematuria.   Musculoskeletal: Negative for back pain.   Skin: Negative for  rash  Neurological: Negative for headache, syncope, speech difficulty. Positive for dizziness, general weakness and imbalance with walking.  Hematological: Negative for anticoagulation use.  Does not bruise/bleed easily.   Psychiatric/Behavioral: Negative for confusion.     PATIENT HISTORY     Past Medical History:   Diagnosis Date     Alcohol use disorder, severe, dependence (H) 2/24/2022     Patient Active Problem List   Diagnosis     Impingement syndrome, shoulder, left     Hypoglycemia     Hypothyroidism     Falls frequently     LFT elevation     Hypophosphatemia     Hypomagnesemia     Hypokalemia     Lactic acid acidosis     Mood disorder (H)     Right forearm cellulitis     Cat bite     Alcohol use disorder, severe, dependence (H)     Abdominal wall pain in left upper quadrant     Abnormal TSH     Alcohol abuse     Anemia due to unknown mechanism     Anxiety     Blunt abdominal trauma, initial encounter     Constipation     Contusion of head of pancreas, initial encounter     Diverticulosis     Hepatic steatosis     Hypertriglyceridemia     Hypoxemia associated with sleep     Insomnia     Nonalcoholic fatty liver disease     Obstructive sleep apnea syndrome     Parasomnia     Periodic limb movement disorder     Peripheral neuropathy     PVC's (premature ventricular contractions)     Sinus arrhythmia seen on electrocardiogram     Inadequate sleep hygiene     Thiamine deficiency     Toxic diffuse goiter     Tremor     Vertigo     Subdural hematoma     Other neutropenia (H)     Metabolic acidosis, increased anion gap     Thrombocytopenia (H)     Alcoholic intoxication with complication (H)     Lactic acidosis     Ventricular tachycardia (paroxysmal)     Chronic subdural hematoma (H)      Alcoholic intoxication without complication (H)     Low magnesium level     Past Surgical History:   Procedure Laterality Date     IR CAROTID CEREBRAL ANGIOGRAM BILATERAL  11/7/2022     PICC TRIPLE LUMEN PLACEMENT  11/3/2022            No Known Allergies    OUTPATIENT MEDICATIONS     New Prescriptions    No medications on file      Vitals:    03/17/23 2045 03/17/23 2115 03/17/23 2215 03/18/23 0021   BP: (!) 131/97 134/83 (!) 140/85 130/82   Pulse: 120 93 111    Resp:   17    Temp:       TempSrc:       SpO2: 93% 98% 96%    Weight:       Height:           Physical Exam   Constitutional: Oriented to person, place, and time. Appears well-developed and well-nourished.   HEENT:    Head: Atraumatic.   Neck: Normal range of motion. Neck supple.   Cardiovascular: Regular rhythm and normal heart sounds.  Tachycardic.  Pulmonary/Chest: Normal effort  and breath sounds normal.   Abdominal: Soft. Bowel sounds are normal.   Musculoskeletal: Normal range of motion of all major joints.  No tenderness of the cervical, thoracic or lumbar spine  Neurological: Alert and oriented to person, place, and kristina.but appears to be intoxicated. Normal strength in all extremities including 5 out of 5 motor strength of the lower extremities.. No sensory deficit. No cranial nerve deficit.   Skin: Skin is warm and dry.   Psychiatric: Breast mood and affect. Behavior is normal. Thought content normal.     DIAGNOSTICS    LABORATORY FINDINGS (REVIEWED AND INTERPRETED):  Labs Ordered and Resulted from Time of ED Arrival to Time of ED Departure   COMPREHENSIVE METABOLIC PANEL - Abnormal       Result Value    Sodium 142      Potassium 3.7      Chloride 101      Carbon Dioxide (CO2) 18 (*)     Anion Gap 23 (*)     Urea Nitrogen 11.5      Creatinine 0.65 (*)     Calcium 9.3      Glucose 100 (*)     Alkaline Phosphatase 136 (*)     AST        ALT 52 (*)     Protein Total 7.6      Albumin 4.2      Bilirubin Total 1.3 (*)     GFR Estimate >90     MAGNESIUM -  Abnormal    Magnesium 1.5 (*)    ROUTINE UA WITH MICROSCOPIC REFLEX TO CULTURE - Abnormal    Color Urine Yellow      Appearance Urine Clear      Glucose Urine Negative      Bilirubin Urine Negative      Ketones Urine 20 (*)     Specific Gravity Urine 1.015      Blood Urine Negative      pH Urine 5.5      Protein Albumin Urine 10 (*)     Urobilinogen Urine <2.0      Nitrite Urine Negative      Leukocyte Esterase Urine Negative      Mucus Urine Present (*)     Calcium Oxalate Crystals Urine Few (*)     RBC Urine 1      WBC Urine 1      Squamous Epithelials Urine <1     LACTIC ACID WHOLE BLOOD - Abnormal    Lactic Acid 7.0 (*)    ETHYL ALCOHOL LEVEL - Abnormal    Alcohol ethyl 0.36 (*)    ACETAMINOPHEN LEVEL - Abnormal    Acetaminophen <5.0 (*)    CBC WITH PLATELETS AND DIFFERENTIAL - Abnormal    WBC Count 6.0      RBC Count 5.20      Hemoglobin 15.2      Hematocrit 43.7      MCV 84      MCH 29.2      MCHC 34.8      RDW 12.6      Platelet Count 118 (*)     % Neutrophils 56      % Lymphocytes 38      % Monocytes 4      % Eosinophils 1      % Basophils 1      % Immature Granulocytes 0      NRBCs per 100 WBC 0      Absolute Neutrophils 3.4      Absolute Lymphocytes 2.3      Absolute Monocytes 0.2      Absolute Eosinophils 0.1      Absolute Basophils 0.0      Absolute Immature Granulocytes 0.0      Absolute NRBCs 0.0     LIPASE - Abnormal    Lipase 69 (*)    LACTIC ACID WHOLE BLOOD - Abnormal    Lactic Acid 6.0 (*)    LACTIC ACID WHOLE BLOOD - Abnormal    Lactic Acid 5.2 (*)    TROPONIN T, HIGH SENSITIVITY - Normal    Troponin T, High Sensitivity 13     TSH WITH FREE T4 REFLEX - Normal    TSH 0.41     CRP INFLAMMATION - Normal    CRP Inflammation <3.00     ERYTHROCYTE SEDIMENTATION RATE AUTO - Normal    Erythrocyte Sedimentation Rate 7     PHOSPHORUS - Normal    Phosphorus 4.1     LACTIC ACID WHOLE BLOOD   BLOOD CULTURE   BLOOD CULTURE         IMAGING (REVIEWED AND INTERPRETED):  CT Abdomen Pelvis w Contrast   Final Result    IMPRESSION:    1.  Hepatic steatosis.   2.  Mildly distended gallbladder with cholelithiasis.   3.  Acute uncomplicated sigmoid diverticulitis.      XR Chest Port 1 View   Final Result   IMPRESSION: Single AP view of the chest was obtained. Cardiomediastinal silhouette is within normal limits. No suspicious focal pulmonary opacities. No significant pleural effusion or pneumothorax.      Head CT w/o contrast   Final Result   IMPRESSION:   1.  Bilateral subdural hematomas with mild mass effect, decreased in size compared to the prior.   2.  No new areas of hemorrhage.   3.  Recommend continued follow-up.            ECG (REVIEWED AND INTERPRETED):   ECG:   Performed at: 17:54  HR:  123 bpm  Rhythm: Sinus  Axis: -65  QRS duration: 94 ms  QTC: 472 ms  ST changes: No ST segment elevation or depression, no T wave inversion,No Q wave  Interpretation: Sinus tachycardia  Compared to most recent ECG from: November 2022 heart rate is increased by 66 bpm    I have reviewed the patient's ECG, with comments made as listed above. Please see scanned image for full interpretation.         I, Mery Gillis, am serving as a scribe to document services personally performed by Mikael Escobar D.O., based on my observation and the provider s statements to me.    I, Mikael Escobar D.O., attest that Mery Gillis is acting in a scribe capacity, has observed my performance of the services and has documented them in accordance with my direction.    Mikael Escobar D.O.  EMERGENCY MEDICINE   03/17/23  [facility] EMERGENCY DEPARTMENT  [address] [place] [id number] [phone]  Dept: [phone]     Mikael Escobar DO  03/18/23 0308

## 2023-03-17 NOTE — ED TRIAGE NOTES
Pt brought in by his sister as she went to visit him and he was crying in pain. Pt  Is rambling and not able to really say why he is here but he does say that he is weak and cant stand.  Pt is alcoholic and last drank at 1630.  Pts hr 142 in triage. He is a refusing an ekg now.he is belligerent in triage.      
[employer] dose 1 and 2

## 2023-03-18 ENCOUNTER — APPOINTMENT (OUTPATIENT)
Dept: PHYSICAL THERAPY | Facility: HOSPITAL | Age: 53
End: 2023-03-18
Payer: COMMERCIAL

## 2023-03-18 LAB
ANION GAP SERPL CALCULATED.3IONS-SCNC: 11 MMOL/L (ref 7–15)
ATRIAL RATE - MUSE: 135 BPM
BUN SERPL-MCNC: 7.9 MG/DL (ref 6–20)
CALCIUM SERPL-MCNC: 7.7 MG/DL (ref 8.6–10)
CHLORIDE SERPL-SCNC: 106 MMOL/L (ref 98–107)
CREAT SERPL-MCNC: 0.55 MG/DL (ref 0.67–1.17)
DEPRECATED HCO3 PLAS-SCNC: 23 MMOL/L (ref 22–29)
DIASTOLIC BLOOD PRESSURE - MUSE: NORMAL MMHG
ERYTHROCYTE [DISTWIDTH] IN BLOOD BY AUTOMATED COUNT: 12.8 % (ref 10–15)
GFR SERPL CREATININE-BSD FRML MDRD: >90 ML/MIN/1.73M2
GLUCOSE BLDC GLUCOMTR-MCNC: 123 MG/DL (ref 70–99)
GLUCOSE SERPL-MCNC: 83 MG/DL (ref 70–99)
HCT VFR BLD AUTO: 34.6 % (ref 40–53)
HGB BLD-MCNC: 11.9 G/DL (ref 13.3–17.7)
INTERPRETATION ECG - MUSE: NORMAL
LACTATE SERPL-SCNC: 2.5 MMOL/L (ref 0.7–2)
LACTATE SERPL-SCNC: 5.2 MMOL/L (ref 0.7–2)
MAGNESIUM SERPL-MCNC: 1.6 MG/DL (ref 1.7–2.3)
MCH RBC QN AUTO: 29.7 PG (ref 26.5–33)
MCHC RBC AUTO-ENTMCNC: 34.4 G/DL (ref 31.5–36.5)
MCV RBC AUTO: 86 FL (ref 78–100)
P AXIS - MUSE: 50 DEGREES
PHOSPHATE SERPL-MCNC: 4.1 MG/DL (ref 2.5–4.5)
PLATELET # BLD AUTO: 82 10E3/UL (ref 150–450)
POTASSIUM SERPL-SCNC: 3.6 MMOL/L (ref 3.4–5.3)
PR INTERVAL - MUSE: 160 MS
QRS DURATION - MUSE: 94 MS
QT - MUSE: 330 MS
QTC - MUSE: 472 MS
R AXIS - MUSE: -65 DEGREES
RBC # BLD AUTO: 4.01 10E6/UL (ref 4.4–5.9)
SODIUM SERPL-SCNC: 140 MMOL/L (ref 136–145)
SYSTOLIC BLOOD PRESSURE - MUSE: NORMAL MMHG
T AXIS - MUSE: 32 DEGREES
VENTRICULAR RATE- MUSE: 123 BPM
WBC # BLD AUTO: 4.9 10E3/UL (ref 4–11)

## 2023-03-18 PROCEDURE — 97116 GAIT TRAINING THERAPY: CPT | Mod: GP

## 2023-03-18 PROCEDURE — 99222 1ST HOSP IP/OBS MODERATE 55: CPT | Performed by: INTERNAL MEDICINE

## 2023-03-18 PROCEDURE — 250N000011 HC RX IP 250 OP 636

## 2023-03-18 PROCEDURE — 210N000001 HC R&B IMCU HEART CARE

## 2023-03-18 PROCEDURE — 36415 COLL VENOUS BLD VENIPUNCTURE: CPT | Performed by: STUDENT IN AN ORGANIZED HEALTH CARE EDUCATION/TRAINING PROGRAM

## 2023-03-18 PROCEDURE — 36415 COLL VENOUS BLD VENIPUNCTURE: CPT

## 2023-03-18 PROCEDURE — 258N000003 HC RX IP 258 OP 636

## 2023-03-18 PROCEDURE — 85027 COMPLETE CBC AUTOMATED: CPT

## 2023-03-18 PROCEDURE — 97162 PT EVAL MOD COMPLEX 30 MIN: CPT | Mod: GP

## 2023-03-18 PROCEDURE — 97530 THERAPEUTIC ACTIVITIES: CPT | Mod: GP

## 2023-03-18 PROCEDURE — 82962 GLUCOSE BLOOD TEST: CPT

## 2023-03-18 PROCEDURE — 250N000009 HC RX 250

## 2023-03-18 PROCEDURE — 83735 ASSAY OF MAGNESIUM: CPT | Performed by: FAMILY MEDICINE

## 2023-03-18 PROCEDURE — 250N000013 HC RX MED GY IP 250 OP 250 PS 637

## 2023-03-18 PROCEDURE — 84100 ASSAY OF PHOSPHORUS: CPT | Performed by: STUDENT IN AN ORGANIZED HEALTH CARE EDUCATION/TRAINING PROGRAM

## 2023-03-18 PROCEDURE — 80048 BASIC METABOLIC PNL TOTAL CA: CPT

## 2023-03-18 PROCEDURE — 83605 ASSAY OF LACTIC ACID: CPT | Performed by: STUDENT IN AN ORGANIZED HEALTH CARE EDUCATION/TRAINING PROGRAM

## 2023-03-18 PROCEDURE — 99222 1ST HOSP IP/OBS MODERATE 55: CPT | Mod: AI

## 2023-03-18 RX ORDER — ACETAMINOPHEN 325 MG/1
650 TABLET ORAL EVERY 6 HOURS PRN
Status: DISCONTINUED | OUTPATIENT
Start: 2023-03-18 | End: 2023-03-24 | Stop reason: HOSPADM

## 2023-03-18 RX ORDER — DULOXETIN HYDROCHLORIDE 30 MG/1
30 CAPSULE, DELAYED RELEASE ORAL DAILY
Status: DISCONTINUED | OUTPATIENT
Start: 2023-03-18 | End: 2023-03-24 | Stop reason: HOSPADM

## 2023-03-18 RX ORDER — ACETAMINOPHEN 650 MG/1
650 SUPPOSITORY RECTAL EVERY 6 HOURS PRN
Status: DISCONTINUED | OUTPATIENT
Start: 2023-03-18 | End: 2023-03-24 | Stop reason: HOSPADM

## 2023-03-18 RX ORDER — LIDOCAINE 40 MG/G
CREAM TOPICAL
Status: DISCONTINUED | OUTPATIENT
Start: 2023-03-18 | End: 2023-03-24 | Stop reason: HOSPADM

## 2023-03-18 RX ORDER — ONDANSETRON 4 MG/1
4 TABLET, ORALLY DISINTEGRATING ORAL EVERY 6 HOURS PRN
Status: DISCONTINUED | OUTPATIENT
Start: 2023-03-18 | End: 2023-03-24 | Stop reason: HOSPADM

## 2023-03-18 RX ORDER — ONDANSETRON 2 MG/ML
4 INJECTION INTRAMUSCULAR; INTRAVENOUS EVERY 6 HOURS PRN
Status: DISCONTINUED | OUTPATIENT
Start: 2023-03-18 | End: 2023-03-24 | Stop reason: HOSPADM

## 2023-03-18 RX ADMIN — DULOXETINE 30 MG: 30 CAPSULE, DELAYED RELEASE ORAL at 08:13

## 2023-03-18 RX ADMIN — GABAPENTIN 600 MG: 300 CAPSULE ORAL at 15:23

## 2023-03-18 RX ADMIN — LORAZEPAM 1 MG: 0.5 TABLET ORAL at 13:09

## 2023-03-18 RX ADMIN — FOLIC ACID: 5 INJECTION, SOLUTION INTRAMUSCULAR; INTRAVENOUS; SUBCUTANEOUS at 00:16

## 2023-03-18 RX ADMIN — GABAPENTIN 600 MG: 300 CAPSULE ORAL at 00:20

## 2023-03-18 RX ADMIN — Medication 1 TABLET: at 13:13

## 2023-03-18 RX ADMIN — CLONIDINE HYDROCHLORIDE 0.1 MG: 0.1 TABLET ORAL at 00:21

## 2023-03-18 RX ADMIN — ACETAMINOPHEN 650 MG: 325 TABLET ORAL at 18:41

## 2023-03-18 RX ADMIN — CLONIDINE HYDROCHLORIDE 0.1 MG: 0.1 TABLET ORAL at 08:11

## 2023-03-18 RX ADMIN — MAGNESIUM SULFATE HEPTAHYDRATE 1 G: 500 INJECTION, SOLUTION INTRAMUSCULAR; INTRAVENOUS at 16:18

## 2023-03-18 RX ADMIN — LORAZEPAM 2 MG: 0.5 TABLET ORAL at 21:22

## 2023-03-18 RX ADMIN — LORAZEPAM 2 MG: 2 INJECTION INTRAMUSCULAR; INTRAVENOUS at 06:47

## 2023-03-18 RX ADMIN — CLONIDINE HYDROCHLORIDE 0.1 MG: 0.1 TABLET ORAL at 15:23

## 2023-03-18 RX ADMIN — Medication 5 MG: at 21:16

## 2023-03-18 RX ADMIN — THIAMINE HCL TAB 100 MG 100 MG: 100 TAB at 08:11

## 2023-03-18 RX ADMIN — GABAPENTIN 600 MG: 300 CAPSULE ORAL at 08:12

## 2023-03-18 RX ADMIN — LEVOTHYROXINE SODIUM 175 MCG: 0.03 TABLET ORAL at 08:13

## 2023-03-18 RX ADMIN — LORAZEPAM 2 MG: 2 INJECTION INTRAMUSCULAR; INTRAVENOUS at 00:58

## 2023-03-18 RX ADMIN — FOLIC ACID 1 MG: 1 TABLET ORAL at 08:11

## 2023-03-18 ASSESSMENT — ACTIVITIES OF DAILY LIVING (ADL)
ADLS_ACUITY_SCORE: 35
CONCENTRATING,_REMEMBERING_OR_MAKING_DECISIONS_DIFFICULTY: NO
ADLS_ACUITY_SCORE: 35
DOING_ERRANDS_INDEPENDENTLY_DIFFICULTY: NO
ADLS_ACUITY_SCORE: 35
DEPENDENT_IADLS:: INDEPENDENT
ADLS_ACUITY_SCORE: 35
ADLS_ACUITY_SCORE: 22
ADLS_ACUITY_SCORE: 35
ADLS_ACUITY_SCORE: 20
WALKING_OR_CLIMBING_STAIRS_DIFFICULTY: NO
CHANGE_IN_FUNCTIONAL_STATUS_SINCE_ONSET_OF_CURRENT_ILLNESS/INJURY: NO
ADLS_ACUITY_SCORE: 35
DIFFICULTY_EATING/SWALLOWING: NO
ADLS_ACUITY_SCORE: 22
ADLS_ACUITY_SCORE: 35
TOILETING_ISSUES: NO
ADLS_ACUITY_SCORE: 35
FALL_HISTORY_WITHIN_LAST_SIX_MONTHS: YES
ADLS_ACUITY_SCORE: 35
WEAR_GLASSES_OR_BLIND: YES
VISION_MANAGEMENT: GLASSES
DRESSING/BATHING_DIFFICULTY: NO
EQUIPMENT_CURRENTLY_USED_AT_HOME: WALKER, ROLLING;GRAB BAR, TUB/SHOWER;GRAB BAR, TOILET

## 2023-03-18 NOTE — PROGRESS NOTES
Neurosurgery Treatment Plan:   Reviewed patients chart  52 year old alcoholic male presented to ED with status post MMA embolization 11/2022 at [facility] for bilateral SDH, readmission in February at [place] with acute on chronic appearance of SDH, presents today ED after found him intoxicated screaming that he hurt all over and brought him in. Per provider patient is neurologically intact with good strength in all extremities and currently has no complaints      Images: reviewed personally and noted interval decrease in bilateral SDH   FINDINGS:  Right convexity subdural hematoma has decreased in size compared to the prior exam measuring approximately 5 mm in thickness, previously 7 mm. Left convexity subdural hematoma is also decreased in size compared to the prior exam measuring approximately   12 mm in thickness, previously 14 mm. Mild mass effect is present with subtle left to right midline shift, similar compared to the prior. No new areas of hemorrhage. Volume loss and white matter hypoattenuation likely represents chronic small vessel   ischemic change. No acute osseous abnormality. Small-volume fluid attenuation within the right mastoid tip.                                                                   IMPRESSION:  1.  Bilateral subdural hematomas with mild mass effect, decreased in size compared to the prior.  2.  No new areas of hemorrhage.  3.  Recommend continued follow-up.     Plan:   Patient discussed with Dr. Ray   No neurosurgical intervention presently indicated nor is consultation needed   Would continue to hold all anticoagulants as previously advised   No activity restrictions from neurosurgical standpoint  No admission required for neurosurgery or repeat images indicated   Would recommend he continue to follow outpatient as advised with [facility] neurosurgery (note from 3/1 stating 6 week follow up with head CT at that time)   Please call with any questions or if neurologically changes      Mariela  CHAVA Rosado  [facility]  O: [phone]

## 2023-03-18 NOTE — ED NOTES
Dr Wolfe and Dr Murphy notified of pt having 17 beats of V tach while sitting in bed. Rhythm strip shown and this writer clarifies current orders for Magnesium replacement based on Protocol. Mg 1.6 at AM draw today with no replacement indicated at this time

## 2023-03-18 NOTE — ED NOTES
11 beats V tach on cardiac monitor, pt denies chest pain/ dizziness/lightheadedness/ palpitations/shortness of breath.

## 2023-03-18 NOTE — PHARMACY-ADMISSION MEDICATION HISTORY
Pharmacy Note - Admission Medication History    Pertinent Provider Information: story changes during conversation but not really taking any meds at home but keeps asking for more Vistaril during conversation.     ______________________________________________________________________    Prior To Admission (PTA) med list completed and updated in EMR.       PTA Med List   Medication Sig Note Last Dose     acamprosate (CAMPRAL) 333 MG EC tablet Take 666 mg by mouth 3 times daily 3/17/2023: Filled for 30 days 3/1/23 but pt is not sure if taking  Unknown     acetaminophen (TYLENOL) 500 MG tablet [ACETAMINOPHEN (TYLENOL) 500 MG TABLET] Take 1-2 tablets (500-1,000 mg total) by mouth every 6 (six) hours as needed for pain.  Unknown     DULoxetine (CYMBALTA) 30 MG capsule Take 30 mg by mouth 3/17/2023: Filled 2/16/23 for 30 days - pt can't recall but reports willing to take if needs Unknown     hydrOXYzine (VISTARIL) 25 MG capsule Take 75 mg by mouth At Bedtime  gone - needs more     hydrOXYzine (VISTARIL) 25 MG capsule Take 25 mg by mouth 3 times daily as needed for anxiety  Unknown at ran out     levothyroxine (SYNTHROID/LEVOTHROID) 175 MCG tablet Take 175 mcg by mouth daily  Past Week     magnesium oxide (MAG-OX) 400 MG tablet Take 400 mg by mouth daily as needed  Unknown     Multiple Vitamins-Minerals (ONE DAILY COMPLETE FOR MEN PO) Take 1 tablet by mouth daily  Past Week     traZODone (DESYREL) 50 MG tablet Take 100 mg by mouth nightly as needed for sleep 3/17/2023: Doesn't like taking  Unknown       Information source(s): Patient, Hospital records and CareEverywhere/[facility]  Method of interview communication: in-person    Summary of Changes to PTA Med List  New: none  Discontinued: only vit is men on a day, gabapentin  Changed: none    Patient was asked about OTC/herbal products specifically.  PTA med list reflects this.    In the past week, patient estimated taking medication this percent of the time:  less than  50% due to illness.    Medication Affordability:  Not including over the counter (OTC) medications, was there a time in the past 12 months when you did not take your medications as prescribed because of cost?: No    Allergies were reviewed, assessed, and updated with the patient.      Patient does not use any multi-dose medications prior to admission.    The information provided in this note is only as accurate as the sources available at the time of the update(s).    Thank you for the opportunity to participate in the care of this patient.    Ninoska Oropeza [facility]  3/17/2023 11:26 PM

## 2023-03-18 NOTE — H&P
"    [facility]    History and Physical - Hospitalist Service       Date of Admission:  3/17/2023    Assessment & Plan      Getachew Barcenas is a 52 year old male who has a history of severe EtOH use disorder with relapse, frequent falls 2/2 vertigo, chronic subdural hematoma, hypothyroidism s/p radiation, mood disorder and is admitted for generalized weakness 2/2 EtOH intoxication.     EtOH intoxication, risk of withdrawal  H/o severe EtOH use disorder with relapse  Generalized weakness  Lactic acidosis  Presents after found in home by family crying and unable to stand/walk. Has had progressively worsening generalized weakness in setting of increased EtOH intake. Affirms currently drinking 1.75L liquor every 2-3 days. Last drink at 1630 on day of admission. Has history of withdrawal sxs but no reported seizures. Presented in ED rambling and belligerent. Patient appears intoxicated and drowsy upon encounter. Tachycardic and mildly hypertensive but afebrile and satting fine on RA. Ethyl EtOH level 0.36, lactate 7, bicarb 18, anion gap 23. Lactate improved to 6, and then to 5.2 with fluid resuscitation. Has been to treatment several days with some success but continues to relapse. Hoping to go to [facility] to a rehab center \"where I get to hunt and fish.\" Motivated to quit.   - scheduled gabapentin and clonidine   - WA protocol: prn ativan, zyprexa or haldol  - supp folate, thiamine, thera-vit-m  - fall precautions  - telemetry  - pulse ox, supp O2 as needed  - PT/OT/CM consulted, appreciate recs and support    Hepatic steatosis  Abnormal LFTs  Thrombocytopenia  Confirmed on CTAP. Alk Phos 136, ALT 52, Plt 118 on admission. FIB-4 index suggests advanced fibrosis is likely. Would help to explain persistently elevated lactate after fluid resuscitation.     Unsustained ventricular tachycardia  Hypomagnesemia  Mg 1.5 on admission, received supplementation in ED. Electrolytes otherwise fine. " "However, continues to have intermittent unsustained ventricular tachycardia on telemetry. Asymptomatic. May just be SVT. EKG showing sinus tachycardia with new PVCs and incomplete RBBB. Will continue to monitor closely.   - telemetry  - daily BMP  - Mg, Phos replacement protocols    Incidental finding: Acute uncomplicated sigmoid diverticulitis? Mildly distended gallbladder with cholelithiasis  Noted on CTAP. Patient has negative abdominal exam. Has had diverticulitis in remote past, in which patient thought \"I was going to die,\" is not experiencing anything like this now. No N/V/D. Afebrile and WBC wnl. Received dose of Zosyn in ED - do not think it is necessary to continue antibiotics at this point. Will continue to monitor closely.   - daily CBC   - hold further abx for now    Chronic conditions  Chronic subdural hematoma: reduced bilateral subdural hematomas on CT head. NSG curbsided, no formal consult necessary. Hold off anticoagulation. No activity restrictions.   Frequent falls 2/2 vertigo: very dehabilitating for patient. Fall precautions.   Hypothyroidism s/p radiation: continue PTA levothyroxine.  Mood disorder/anxiety/risk of violent behavior: Per chart, also appears to have paranoid schizophrenia. Hard to differentiate if this or early stage withdrawal causing hallucinations. Will continue PTA duloxetine. Holding PTA hydroxyzine.        Diet: Regular Diet Adult    DVT Prophylaxis: Pneumatic Compression Devices - no anticoagulation in context of subdural hematomas!  Triana Catheter: Not present  Fluids: PO  Lines: None     Cardiac Monitoring: ACTIVE order. Indication: Drug overdose (24 hours)  Code Status: Full Code    Clinically Significant Risk Factors Present on Admission            # Hypomagnesemia: Lowest Mg = 1.5 mg/dL in last 2 days, will replace as needed  # Anion Gap Metabolic Acidosis: Highest Anion Gap = 23 mmol/L in last 2 days, will monitor and treat as appropriate    # Thrombocytopenia: " "Lowest platelets = 118 in last 2 days, will monitor for bleeding        # Overweight: Estimated body mass index is 28.25 kg/m  as calculated from the following:    Height as of this encounter: 1.88 m (6' 2\").    Weight as of this encounter: 99.8 kg (220 lb).           Disposition Plan      Expected Discharge Date: 03/19/2023                The patient's care was discussed with Dr. Murphy.       Ezequiel Holliday MD  Hospitalist Service  [facility]  Securely message with [employer] (more info)  Text page via [facility] Paging/Directory   ______________________________________________________________________    Chief Complaint   EtOH intoxication with worsening generalized weakness in setting of severe dependence with relapse.    History is obtained from the patient and ED provider.     History of Present Illness   Getachew Barcenas is a 52 year old male who has a history of severe EtOH use disorder with relapse, frequent falls 2/2 vertigo, chronic subdural hematoma, hypothyroidism s/p radiation, mood disorder and is admitted for generalized weakness 2/2 EtOH intoxication.     Sister went to see patient at his home, where he was found crying and unable to walk/stand. Has had worsening generalized weakness in setting of increased EtOH intake. Currently drinking 1.75L liquor every 2-3 days. Has history of severe EtOH use withdrawal sxs but denies seizures. Has been to rehab several times with some success. Last drink at 1630 on day of admission. Presented rambling and belligerent, refusing certain cares.     Patient denies HA, CP, SOB, abd pain, bowel changes, N/V.     Has history of brain trauma and bleed requiring surgery in 12/2022. Has associated vertigo so severe it causes him frequent falls, greatly inhibiting quality of life.     Patient hoping to go to rehab at a facility in [facility] where \"he can hunt and fish.\" Plans to look into more with his daughter, thinks its \"the way I'm going to kick this.\" " No smoking or other substance use. Mentioned to nursing that has been experiencing hallucinations as well.       Past Medical History    Past Medical History:   Diagnosis Date     Alcohol use disorder, severe, dependence (H) 2/24/2022       Past Surgical History   Past Surgical History:   Procedure Laterality Date     IR CAROTID CEREBRAL ANGIOGRAM BILATERAL  11/7/2022     PICC TRIPLE LUMEN PLACEMENT  11/3/2022            Prior to Admission Medications   Prior to Admission Medications   Prescriptions Last Dose Informant Patient Reported? Taking?   DULoxetine (CYMBALTA) 30 MG capsule Unknown  Yes Yes   Sig: Take 30 mg by mouth   Multiple Vitamins-Minerals (ONE DAILY COMPLETE FOR MEN PO) Past Week  Yes Yes   Sig: Take 1 tablet by mouth daily   acamprosate (CAMPRAL) 333 MG EC tablet Unknown  Yes Yes   Sig: Take 666 mg by mouth 3 times daily   acetaminophen (TYLENOL) 500 MG tablet Unknown  No Yes   Sig: [ACETAMINOPHEN (TYLENOL) 500 MG TABLET] Take 1-2 tablets (500-1,000 mg total) by mouth every 6 (six) hours as needed for pain.   hydrOXYzine (VISTARIL) 25 MG capsule Unknown at ran out  Yes Yes   Sig: Take 25 mg by mouth 3 times daily as needed for anxiety   hydrOXYzine (VISTARIL) 25 MG capsule gone - needs more  Yes Yes   Sig: Take 75 mg by mouth At Bedtime   levothyroxine (SYNTHROID/LEVOTHROID) 175 MCG tablet Past Week  Yes Yes   Sig: Take 175 mcg by mouth daily   magnesium oxide (MAG-OX) 400 MG tablet Unknown  Yes Yes   Sig: Take 400 mg by mouth daily as needed   traZODone (DESYREL) 50 MG tablet Unknown  Yes Yes   Sig: Take 100 mg by mouth nightly as needed for sleep      Facility-Administered Medications: None        Physical Exam   Vital Signs: Temp: 98.4  F (36.9  C) Temp src: Tympanic BP: 129/81 Pulse: 106   Resp: 17 SpO2: 96 %      Weight: 220 lbs 0 oz    General Appearance: drowsy, appears chronically ill, alert, cooperative, NAD.  HEENT: Normocephalic, atraumatic. EOMi. Mucous membranes appear dry.    Respiratory: No increased work of breathing on RA. CTAB- no wheezing, crackles, rhonchi.   Cardiovascular: Tachycardia, occasional unsustained V tach on monitor, regular rhythm. Strong peripheral pulses, no edema.   GI: Soft, nondistended, nontender. No masses, rebound tenderness, guarding.   Skin: Warm, dry. No rashes, jaundice, bruising.   Musculoskeletal: Moving extremities spontaneously.   Neurologic: No focal deficits.   Psychiatric: A&Ox3. Thought process linear and logical. Depressed mood, normal affect.     Medical Decision Making       Please see A&P for additional details of medical decision making.      Data   ------------------------- PAST 24 HR DATA REVIEWED -----------------------------------------------

## 2023-03-18 NOTE — ED NOTES
Pt's sister, Kelly, updated on plan of care including PT, iv fluids, pain control and withdrawal assesment/ treatment.   high school

## 2023-03-18 NOTE — CONSULTS
"Care Management Initial Consult    General Information  Assessment completed with: PatientDav  Type of CM/SW Visit: Initial Assessment    Primary Care Provider verified and updated as needed: Yes (Domonique Alcantara Yuliana)   Readmission within the last 30 days: no previous admission in last 30 days      Reason for Consult: discharge planning  Advance Care Planning: Advance Care Planning Reviewed: present on chart          Communication Assessment  Patient's communication style: spoken language (English or Bilingual)         Living Environment:   People in home: child(caleb), adult     Current living Arrangements: house      Able to return to prior arrangements: yes       Family/Social Support:  Care provided by: self  Provides care for: no one, unable/limited ability to care for self  Marital Status:   Children          Description of Support System: Supportive, Involved    Support Assessment: Adequate family and caregiver support, Adequate social supports, Patient communicates needs well met    Current Resources:   Patient receiving home care services: No     Community Resources: Chemical Dependency Services, Other (see comment) (\"Goes to [facility] once a week\".)  Equipment currently used at home: walker, rolling, grab bar, tub/shower, grab bar, toilet (\"I only use the walker very occasionally\")  Supplies currently used at home: None    Employment/Financial:  Employment Status: unemployed        Financial Concerns:     Referral to Financial Worker: No       Lifestyle & Psychosocial Needs:  Social Determinants of Health     Tobacco Use: High Risk     Smoking Tobacco Use: Never     Smokeless Tobacco Use: Current     Passive Exposure: Not on file   Alcohol Use: Not on file   Financial Resource Strain: Not on file   Food Insecurity: Not on file   Transportation Needs: Not on file   Physical Activity: Not on file   Stress: Not on file   Social Connections: Not on file   Intimate Partner Violence: Not on file " "  Depression: Not at risk     PHQ-2 Score: 0   Housing Stability: Not on file       Functional Status:  Prior to admission patient needed assistance:   Dependent ADLs:: Ambulation-walker, Independent  Dependent IADLs:: Independent  Assesssment of Functional Status: Not at baseline with ADL Functioning, Not at baseline with mobility, Not at  functional baseline    Mental Health Status:  Mental Health Status: Past Concern  Mental Health Management: Medication, Individual Therapy, Psychiatrist    Chemical Dependency Status:  Chemical Dependency Status: Current Concern  Chemical Dependency Management: Previous treatment, Other (see comment) (\"I want to go to rehab at a facility in [facility] where I can hunt and fish\". Currently in an outpatient rehab through [facility]. It is a 6 month program and he was in month 3.)          Values/Beliefs:  Spiritual, Cultural Beliefs, Anabaptism Practices, Values that affect care:                 Additional Information:  aDv lives in a house with adult daughter. He is independent with ADLs.    He may need additional CD resources. He goes to an Outpatient Rehab program through [facility] and in month 3 of 6. \"I want to go to rehab at a facility in [facility] where I can hunt and fish\".    He also goes to [facility] once a week. He uses a \"walker occasionally\".    Family to transport at discharge.    CM to follow for medical progression of care, discharge recommendations, and final discharge plan.    Tracy Vallejo RN      "

## 2023-03-18 NOTE — ED NOTES
Pt cardiac monitor shows 17 beats V tach. Pt just got done ambulating to bathroom. Pt denies chest pain, dizziness, lightheadedness, shortness of breath nor palpitations.

## 2023-03-18 NOTE — PROGRESS NOTES
"    [facility]    Progress Note - Hospitalist Service       Date of Admission:  3/17/2023    Assessment & Plan     Getachew Barcenas is a 52 year old male who has a history of severe EtOH use disorder with relapse, frequent falls 2/2 vertigo, chronic subdural hematoma, hypothyroidism s/p radiation, mood disorder and is admitted for generalized weakness 2/2 EtOH intoxication.      EtOH intoxication, risk of withdrawal  H/o severe EtOH use disorder with relapse  Generalized weakness  Lactic acidosis - improving   Patient presents after found in home by family crying and unable to stand/walk. Has had progressively worsening generalized weakness in setting of increased EtOH intake. Affirms currently drinking 1.75L liquor every 2-3 days. Last drink at 1630 on day of admission. Has history of withdrawal sxs but no reported seizures. Presented in ED rambling and belligerent; appeard intoxicated and drowsy upon encounter. Tachycardic and mildly hypertensive on arrival. Ethyl EtOH level 0.36, lactate 7, bicarb 18, anion gap 23. Lactate improved to 2.5 with fluid resuscitation. Patient has been to treatment several days with some success but continues to relapse. Hoping to go to [facility] to a rehab center \"where I get to hunt and fish.\" Motivated to quit.   - Scheduled gabapentin and clonidine   - [facility] protocol: prn ativan, zyprexa or haldol  - Supp folate, thiamine, thera-vit-m  - Fall precautions  - Telemetry  - PT/OT/CM consulted, appreciate recs and support     Hepatic steatosis  Abnormal LFTs  Thrombocytopenia  Confirmed on CTAP. Alk Phos 136, ALT 52, Plt 118 on admission. FIB-4 index suggests advanced fibrosis is likely. Would help to explain persistently elevated lactate after fluid resuscitation.      Unsustained ventricular tachycardia  Hypomagnesemia  Mg 1.5 on admission. Electrolytes otherwise fine. However, continues to have intermittent unsustained ventricular tachycardia on telemetry, " in particular after activity. Longest run of 20 beats. Asymptomatic during these episodes.  - Cardiology consult   - Continue telemetry  - Daily BMP  - Mg, Phos replacement protocols  - Additional 1 gram magnesium IV given to improve repletion considering arrhythmias      Incidental finding: Acute uncomplicated sigmoid diverticulitis Mildly distended gallbladder with cholelithiasis  Noted on CTAP. Patient has negative abdominal exam. Has had diverticulitis in remote past. No N/V/D. Afebrile and WBC wnl.   - Daily CBC   - Hold further abx for now     Chronic conditions  Chronic subdural hematoma: reduced bilateral subdural hematomas on CT head. NSG curbsided, no formal consult necessary. Hold off anticoagulation. No activity restrictions.   Frequent falls 2/2 vertigo: very debilitating for patient. Fall precautions.   Hypothyroidism s/p radiation: continue PTA levothyroxine.  Mood disorder/anxiety/risk of violent behavior: Per chart, also appears to have paranoid schizophrenia. Hard to differentiate if this or early stage withdrawal causing hallucinations. Will continue PTA duloxetine. Holding PTA hydroxyzine.      Diet: Regular Diet Adult    DVT Prophylaxis: Pneumatic Compression Devices - no anticoagulation in context of subdural hematomas  Triana Catheter: Not present  Fluids: PO  Lines: None     Cardiac Monitoring: ACTIVE order. Indication: Drug overdose (24 hours)  Code Status: Full Code      Clinically Significant Risk Factors Present on Admission          # Hypocalcemia: Lowest Ca = 7.7 mg/dL in last 2 days, will monitor and replace as appropriate   # Hypomagnesemia: Lowest Mg = 1.5 mg/dL in last 2 days, will replace as needed  # Anion Gap Metabolic Acidosis: Highest Anion Gap = 23 mmol/L in last 2 days, will monitor and treat as appropriate    # Thrombocytopenia: Lowest platelets = 82 in last 2 days, will monitor for bleeding        # Overweight: Estimated body mass index is 28.25 kg/m  as calculated from the  "following:    Height as of this encounter: 1.88 m (6' 2\").    Weight as of this encounter: 99.8 kg (220 lb).         The patient's care was discussed with the Attending Physician, Dr. Murphy.    Mareila Wolfe MD  Hospitalist Service  [facility]  ______________________________________________________________________    Interval History   Patient had multiple runs of VTACH overnight, all asymptomatic. Patient denies chest pain or shortness of breath. Also denies abdominal pain. Notes that he is slightly tremulous, but states that he has had much worse. Has a good appetite and has been drinking plenty of water.     Physical Exam   Vital Signs: Temp: 98.4  F (36.9  C) Temp src: Tympanic BP: (!) 158/87 Pulse: 113   Resp: 23 SpO2: 94 % O2 Device: Nasal cannula Oxygen Delivery: 2 LPM  Weight: 220 lbs 0 oz    General Appearance: drowsy, appears chronically ill, cooperative, NAD.  HEENT: Normocephalic, atraumatic. EOMi.   Respiratory: No increased work of breathing on RA. CTAB- no wheezing, crackles, rhonchi.   Cardiovascular: Tachycardic, regular rhythm. Strong peripheral pulses, no edema.   GI: Soft, nondistended, nontender.   Skin: Warm, dry. No rashes, jaundice, bruising.   Musculoskeletal: Moving extremities spontaneously.   Neurologic: No focal deficits.   Psychiatric: A&Ox3. Thought process linear and logical. Normal affect.     Data     I have personally reviewed the following data over the past 24 hrs:    4.9  \   11.9 (L)   / 82 (L)     140 106 7.9 /  123 (H)   3.6 23 0.55 (L) \       ALT: 52 (H) AST: N/A AP: 136 (H) TBILI: 1.3 (H)   ALB: 4.2 TOT PROTEIN: 7.6 LIPASE: 69 (H)       Trop: 13 BNP: N/A       TSH: 0.41 T4: N/A A1C: N/A       Procal: N/A CRP: <3.00 Lactic Acid: 2.5 (H)         Imaging results reviewed over the past 24 hrs:   Recent Results (from the past 24 hour(s))   Head CT w/o contrast    Narrative    EXAM: CT HEAD W/O CONTRAST  LOCATION: [facility]" HOSPITAL  DATE/TIME: 3/17/2023 8:10 PM    INDICATION: AMS  COMPARISON: Head CT 02/03/2023  TECHNIQUE: Routine CT Head without IV contrast. Multiplanar reformats. Dose reduction techniques were used.    FINDINGS:  Right convexity subdural hematoma has decreased in size compared to the prior exam measuring approximately 5 mm in thickness, previously 7 mm. Left convexity subdural hematoma is also decreased in size compared to the prior exam measuring approximately   12 mm in thickness, previously 14 mm. Mild mass effect is present with subtle left to right midline shift, similar compared to the prior. No new areas of hemorrhage. Volume loss and white matter hypoattenuation likely represents chronic small vessel   ischemic change. No acute osseous abnormality. Small-volume fluid attenuation within the right mastoid tip.      Impression    IMPRESSION:  1.  Bilateral subdural hematomas with mild mass effect, decreased in size compared to the prior.  2.  No new areas of hemorrhage.  3.  Recommend continued follow-up.   XR Chest Port 1 View    Narrative    EXAM: XR CHEST PORT 1 VIEW  LOCATION: [facility]  DATE/TIME: 3/17/2023 8:54 PM    INDICATION: Elevated lactic acid  COMPARISON: Chest x-ray on 02/18/2022      Impression    IMPRESSION: Single AP view of the chest was obtained. Cardiomediastinal silhouette is within normal limits. No suspicious focal pulmonary opacities. No significant pleural effusion or pneumothorax.   CT Abdomen Pelvis w Contrast    Narrative    EXAM: CT ABDOMEN PELVIS W CONTRAST  LOCATION: [facility]  DATE/TIME: 3/17/2023 10:09 PM    INDICATION: abdominal pain  COMPARISON: 11/02/2022  TECHNIQUE: CT scan of the abdomen and pelvis was performed following injection of IV contrast. Multiplanar reformats were obtained. Dose reduction techniques were used.  CONTRAST: isovue 370  100ml    FINDINGS:   LOWER CHEST: Normal.    HEPATOBILIARY: Hepatic steatosis.  Mildly distended gallbladder containing stones.    PANCREAS: Normal.    SPLEEN: Normal.    ADRENAL GLANDS: Normal.    KIDNEYS/BLADDER: Normal.    BOWEL: No bowel obstruction. There is mild thickening and stranding involving a short segment of sigmoid colon which also has diverticulosis. Findings are compatible with acute diverticulitis without perforation or abscess formation. No evidence of   appendicitis.    LYMPH NODES: Normal.    VASCULATURE: Unremarkable.    PELVIC ORGANS: Normal.    MUSCULOSKELETAL: Normal.      Impression    IMPRESSION:   1.  Hepatic steatosis.  2.  Mildly distended gallbladder with cholelithiasis.  3.  Acute uncomplicated sigmoid diverticulitis.

## 2023-03-18 NOTE — CONSULTS
"  HEART CARE CONSULTATON NOTE        Assessment/Recommendations   Assessment:  1.  NSVT: Runs of nonsustained ventricular tachycardia in the setting of electrolyte imbalances.  Recommend repletion and continuing to monitor.  Echocardiogram to evaluate for structural heart disease.  2.  Alcohol abuse  3.  Liver disease  4.  Mood disorder  5.  Chronic subdural hematoma    Plan:  1.  Echocardiogram to evaluate for structural heart disease  2.  Replete magnesium  3.  Alcohol cessation       History of Present Illness/Subjective    HPI: Getachew Barcenas is a 52 year old male with history of alcohol abuse, liver disease, chronic subdural hematoma, mood disorder admitted with alcohol intoxication and generalized weakness.  Drinking 1.75 L of liquor every couple days.  Has history of withdrawal symptoms and issues with agitated behavior.  No complaints of chest pain or breathing difficulty.  Cardiology asked to see patient for runs of nonsustained VT not polymorphic.  Asymptomatic.  Magnesium level low at 1.5 and has not yet been repleted.    EKG shows sinus tachycardia, normal QTc, PVCs  Echocardiogram 7/5/2017  1. Normal left ventricular size and systolic performance with a visually estimated ejection fraction of 55-60%.   2. No significant valvular heart disease is identified on this study.   3. Normal right ventricular size and systolic performance.      Physical Examination  Review of Systems   VITALS: BP (!) 143/86   Pulse 104   Temp 98.4  F (36.9  C) (Tympanic)   Resp 21   Ht 1.88 m (6' 2\")   Wt 99.8 kg (220 lb)   SpO2 94%   BMI 28.25 kg/m    BMI: Body mass index is 28.25 kg/m .  Wt Readings from Last 3 Encounters:   03/17/23 99.8 kg (220 lb)   02/03/23 98.5 kg (217 lb 3.2 oz)   11/16/22 88.6 kg (195 lb 6.4 oz)     No intake or output data in the 24 hours ending 03/18/23 1730  General Appearance:   tremulous, normal body habitus   ENT/Mouth: membranes moist, no oral lesions or bleeding gums.      EYES:  no " scleral icterus, normal conjunctivae   Neck: no carotid bruits or thyromegaly   Chest/Lungs:   lungs are clear to auscultation   Cardiovascular:   Regular. Normal first and second heart sounds with no murmurs  no edema bilaterally    Abdomen:   bowel sounds are present   Extremities: no cyanosis or clubbing   Skin: no xanthelasma, warm.    Neurologic: normal  bilateral, no tremors     Psychiatric: alert and oriented x3, calm     Review Of Systems  Skin: negative  Eyes: negative  Ears/Nose/Throat: negative  Respiratory: No shortness of breath, dyspnea on exertion, cough, or hemoptysis  Cardiovascular: negative  Gastrointestinal: negative  Genitourinary: negative  Musculoskeletal: negative  Neurologic: negative  Psychiatric: negative  Hematologic/Lymphatic/Immunologic: negative  Endocrine: negative          Lab Results    Chemistry/lipid CBC Cardiac Enzymes/BNP/TSH/INR   Recent Labs   Lab Test 07/04/17  1659   CHOL 645*   TRIG 2,744*     No results for input(s): LDL in the last 81133 hours.  Recent Labs   Lab Test 03/18/23  1240 03/18/23  0600   NA  --  140   POTASSIUM  --  3.6   CHLORIDE  --  106   CO2  --  23   * 83   BUN  --  7.9   CR  --  0.55*   GFRESTIMATED  --  >90   ABDULKADIR  --  7.7*     Recent Labs   Lab Test 03/18/23  0600 03/17/23  1942 02/02/23  0600   CR 0.55* 0.65* 0.74     No results for input(s): A1C in the last 55086 hours.       Recent Labs   Lab Test 03/18/23  0600   WBC 4.9   HGB 11.9*   HCT 34.6*   MCV 86   PLT 82*     Recent Labs   Lab Test 03/18/23  0600 03/17/23  1942 02/02/23  0600   HGB 11.9* 15.2 14.1    Recent Labs   Lab Test 02/18/22  1622   TROPONINI <0.01     No results for input(s): BNP, NTBNPI, NTBNP in the last 32995 hours.  Recent Labs   Lab Test 03/17/23  1942   TSH 0.41     Recent Labs   Lab Test 02/02/23  0600 11/16/22  0616 11/15/22  0550   INR 1.07 1.08 1.17*        Medical History  Surgical History Family History Social History   Past Medical History:   Diagnosis Date      Alcohol use disorder, severe, dependence (H) 2/24/2022     Past Surgical History:   Procedure Laterality Date     IR CAROTID CEREBRAL ANGIOGRAM BILATERAL  11/7/2022     PICC TRIPLE LUMEN PLACEMENT  11/3/2022          No family history of heart disease   Social History     Socioeconomic History     Marital status:      Spouse name: Not on file     Number of children: Not on file     Years of education: Not on file     Highest education level: Not on file   Occupational History     Not on file   Tobacco Use     Smoking status: Never     Smokeless tobacco: Current     Types: Chew   Substance and Sexual Activity     Alcohol use: Yes     Drug use: No     Sexual activity: Not on file   Other Topics Concern     Not on file   Social History Narrative     Not on file     Social Determinants of Health     Financial Resource Strain: Not on file   Food Insecurity: Not on file   Transportation Needs: Not on file   Physical Activity: Not on file   Stress: Not on file   Social Connections: Not on file   Intimate Partner Violence: Not on file   Housing Stability: Not on file         Medications  Allergies   Current Outpatient Medications   Medication Sig Dispense Refill     acamprosate (CAMPRAL) 333 MG EC tablet Take 666 mg by mouth 3 times daily       acetaminophen (TYLENOL) 500 MG tablet [ACETAMINOPHEN (TYLENOL) 500 MG TABLET] Take 1-2 tablets (500-1,000 mg total) by mouth every 6 (six) hours as needed for pain.  0     DULoxetine (CYMBALTA) 30 MG capsule Take 30 mg by mouth       hydrOXYzine (VISTARIL) 25 MG capsule Take 75 mg by mouth At Bedtime       hydrOXYzine (VISTARIL) 25 MG capsule Take 25 mg by mouth 3 times daily as needed for anxiety       levothyroxine (SYNTHROID/LEVOTHROID) 175 MCG tablet Take 175 mcg by mouth daily       magnesium oxide (MAG-OX) 400 MG tablet Take 400 mg by mouth daily as needed       Multiple Vitamins-Minerals (ONE DAILY COMPLETE FOR MEN PO) Take 1 tablet by mouth daily       traZODone  (DESYREL) 50 MG tablet Take 100 mg by mouth nightly as needed for sleep        No Known Allergies      Holly Wiggins MD

## 2023-03-19 ENCOUNTER — APPOINTMENT (OUTPATIENT)
Dept: PHYSICAL THERAPY | Facility: HOSPITAL | Age: 53
End: 2023-03-19
Payer: COMMERCIAL

## 2023-03-19 LAB
ANION GAP SERPL CALCULATED.3IONS-SCNC: 8 MMOL/L (ref 7–15)
BUN SERPL-MCNC: 8.5 MG/DL (ref 6–20)
CALCIUM SERPL-MCNC: 8.4 MG/DL (ref 8.6–10)
CHLORIDE SERPL-SCNC: 102 MMOL/L (ref 98–107)
CREAT SERPL-MCNC: 0.55 MG/DL (ref 0.67–1.17)
DEPRECATED HCO3 PLAS-SCNC: 25 MMOL/L (ref 22–29)
ERYTHROCYTE [DISTWIDTH] IN BLOOD BY AUTOMATED COUNT: 12.3 % (ref 10–15)
GFR SERPL CREATININE-BSD FRML MDRD: >90 ML/MIN/1.73M2
GLUCOSE SERPL-MCNC: 111 MG/DL (ref 70–99)
HCT VFR BLD AUTO: 33.4 % (ref 40–53)
HGB BLD-MCNC: 11.2 G/DL (ref 13.3–17.7)
MAGNESIUM SERPL-MCNC: 1.5 MG/DL (ref 1.7–2.3)
MAGNESIUM SERPL-MCNC: 2 MG/DL (ref 1.7–2.3)
MCH RBC QN AUTO: 29.2 PG (ref 26.5–33)
MCHC RBC AUTO-ENTMCNC: 33.5 G/DL (ref 31.5–36.5)
MCV RBC AUTO: 87 FL (ref 78–100)
PHOSPHATE SERPL-MCNC: 2.3 MG/DL (ref 2.5–4.5)
PLATELET # BLD AUTO: 88 10E3/UL (ref 150–450)
POTASSIUM SERPL-SCNC: 3.3 MMOL/L (ref 3.4–5.3)
POTASSIUM SERPL-SCNC: 3.6 MMOL/L (ref 3.4–5.3)
RBC # BLD AUTO: 3.84 10E6/UL (ref 4.4–5.9)
SODIUM SERPL-SCNC: 135 MMOL/L (ref 136–145)
WBC # BLD AUTO: 4.5 10E3/UL (ref 4–11)

## 2023-03-19 PROCEDURE — 84132 ASSAY OF SERUM POTASSIUM: CPT | Performed by: MASSAGE THERAPIST

## 2023-03-19 PROCEDURE — 36415 COLL VENOUS BLD VENIPUNCTURE: CPT | Performed by: MASSAGE THERAPIST

## 2023-03-19 PROCEDURE — 83735 ASSAY OF MAGNESIUM: CPT | Performed by: FAMILY MEDICINE

## 2023-03-19 PROCEDURE — 250N000013 HC RX MED GY IP 250 OP 250 PS 637: Performed by: MASSAGE THERAPIST

## 2023-03-19 PROCEDURE — 97116 GAIT TRAINING THERAPY: CPT | Mod: GP

## 2023-03-19 PROCEDURE — 250N000013 HC RX MED GY IP 250 OP 250 PS 637: Performed by: INTERNAL MEDICINE

## 2023-03-19 PROCEDURE — 97112 NEUROMUSCULAR REEDUCATION: CPT | Mod: GP

## 2023-03-19 PROCEDURE — 250N000013 HC RX MED GY IP 250 OP 250 PS 637

## 2023-03-19 PROCEDURE — 250N000011 HC RX IP 250 OP 636

## 2023-03-19 PROCEDURE — 250N000011 HC RX IP 250 OP 636: Performed by: FAMILY MEDICINE

## 2023-03-19 PROCEDURE — 250N000013 HC RX MED GY IP 250 OP 250 PS 637: Performed by: FAMILY MEDICINE

## 2023-03-19 PROCEDURE — 250N000011 HC RX IP 250 OP 636: Performed by: MASSAGE THERAPIST

## 2023-03-19 PROCEDURE — 84100 ASSAY OF PHOSPHORUS: CPT | Performed by: STUDENT IN AN ORGANIZED HEALTH CARE EDUCATION/TRAINING PROGRAM

## 2023-03-19 PROCEDURE — 210N000001 HC R&B IMCU HEART CARE

## 2023-03-19 PROCEDURE — 99232 SBSQ HOSP IP/OBS MODERATE 35: CPT | Performed by: INTERNAL MEDICINE

## 2023-03-19 PROCEDURE — 99232 SBSQ HOSP IP/OBS MODERATE 35: CPT | Mod: GC | Performed by: MASSAGE THERAPIST

## 2023-03-19 PROCEDURE — 36415 COLL VENOUS BLD VENIPUNCTURE: CPT | Performed by: FAMILY MEDICINE

## 2023-03-19 PROCEDURE — 97530 THERAPEUTIC ACTIVITIES: CPT | Mod: GP

## 2023-03-19 PROCEDURE — 80048 BASIC METABOLIC PNL TOTAL CA: CPT

## 2023-03-19 PROCEDURE — 36415 COLL VENOUS BLD VENIPUNCTURE: CPT

## 2023-03-19 PROCEDURE — 85027 COMPLETE CBC AUTOMATED: CPT

## 2023-03-19 RX ORDER — POTASSIUM CHLORIDE 1500 MG/1
20 TABLET, EXTENDED RELEASE ORAL ONCE
Status: COMPLETED | OUTPATIENT
Start: 2023-03-19 | End: 2023-03-19

## 2023-03-19 RX ORDER — NICOTINE 21 MG/24HR
1 PATCH, TRANSDERMAL 24 HOURS TRANSDERMAL DAILY
Status: DISCONTINUED | OUTPATIENT
Start: 2023-03-19 | End: 2023-03-24 | Stop reason: HOSPADM

## 2023-03-19 RX ORDER — MAGNESIUM SULFATE HEPTAHYDRATE 40 MG/ML
2 INJECTION, SOLUTION INTRAVENOUS ONCE
Status: COMPLETED | OUTPATIENT
Start: 2023-03-19 | End: 2023-03-19

## 2023-03-19 RX ORDER — MAGNESIUM SULFATE 4 G/50ML
4 INJECTION INTRAVENOUS ONCE
Status: COMPLETED | OUTPATIENT
Start: 2023-03-19 | End: 2023-03-19

## 2023-03-19 RX ORDER — POTASSIUM CHLORIDE 1500 MG/1
40 TABLET, EXTENDED RELEASE ORAL ONCE
Status: COMPLETED | OUTPATIENT
Start: 2023-03-19 | End: 2023-03-19

## 2023-03-19 RX ADMIN — POTASSIUM & SODIUM PHOSPHATES POWDER PACK 280-160-250 MG 1 PACKET: 280-160-250 PACK at 10:47

## 2023-03-19 RX ADMIN — OLANZAPINE 10 MG: 5 TABLET, ORALLY DISINTEGRATING ORAL at 17:27

## 2023-03-19 RX ADMIN — LORAZEPAM 2 MG: 0.5 TABLET ORAL at 00:12

## 2023-03-19 RX ADMIN — LORAZEPAM 1 MG: 0.5 TABLET ORAL at 09:41

## 2023-03-19 RX ADMIN — GABAPENTIN 600 MG: 300 CAPSULE ORAL at 15:58

## 2023-03-19 RX ADMIN — OLANZAPINE 5 MG: 5 TABLET, ORALLY DISINTEGRATING ORAL at 03:06

## 2023-03-19 RX ADMIN — POTASSIUM CHLORIDE 40 MEQ: 1500 TABLET, EXTENDED RELEASE ORAL at 11:31

## 2023-03-19 RX ADMIN — LORAZEPAM 1 MG: 0.5 TABLET ORAL at 00:47

## 2023-03-19 RX ADMIN — LORAZEPAM 1 MG: 0.5 TABLET ORAL at 05:48

## 2023-03-19 RX ADMIN — CLONIDINE HYDROCHLORIDE 0.1 MG: 0.1 TABLET ORAL at 00:02

## 2023-03-19 RX ADMIN — CLONIDINE HYDROCHLORIDE 0.1 MG: 0.1 TABLET ORAL at 08:52

## 2023-03-19 RX ADMIN — GABAPENTIN 600 MG: 300 CAPSULE ORAL at 07:31

## 2023-03-19 RX ADMIN — OLANZAPINE 10 MG: 5 TABLET, ORALLY DISINTEGRATING ORAL at 09:41

## 2023-03-19 RX ADMIN — CLONIDINE HYDROCHLORIDE 0.1 MG: 0.1 TABLET ORAL at 15:58

## 2023-03-19 RX ADMIN — MAGNESIUM SULFATE HEPTAHYDRATE 2 G: 40 INJECTION, SOLUTION INTRAVENOUS at 13:10

## 2023-03-19 RX ADMIN — POTASSIUM CHLORIDE 20 MEQ: 1500 TABLET, EXTENDED RELEASE ORAL at 20:21

## 2023-03-19 RX ADMIN — LEVOTHYROXINE SODIUM 175 MCG: 0.03 TABLET ORAL at 08:51

## 2023-03-19 RX ADMIN — POTASSIUM & SODIUM PHOSPHATES POWDER PACK 280-160-250 MG 1 PACKET: 280-160-250 PACK at 06:13

## 2023-03-19 RX ADMIN — DULOXETINE 30 MG: 30 CAPSULE, DELAYED RELEASE ORAL at 08:56

## 2023-03-19 RX ADMIN — LORAZEPAM 1 MG: 0.5 TABLET ORAL at 07:31

## 2023-03-19 RX ADMIN — FOLIC ACID 1 MG: 1 TABLET ORAL at 08:51

## 2023-03-19 RX ADMIN — LORAZEPAM 2 MG: 0.5 TABLET ORAL at 11:40

## 2023-03-19 RX ADMIN — POTASSIUM & SODIUM PHOSPHATES POWDER PACK 280-160-250 MG 1 PACKET: 280-160-250 PACK at 15:58

## 2023-03-19 RX ADMIN — Medication 1 TABLET: at 11:31

## 2023-03-19 RX ADMIN — HALOPERIDOL LACTATE 5 MG: 5 INJECTION, SOLUTION INTRAMUSCULAR at 10:13

## 2023-03-19 RX ADMIN — MAGNESIUM SULFATE HEPTAHYDRATE 4 G: 80 INJECTION, SOLUTION INTRAVENOUS at 05:54

## 2023-03-19 RX ADMIN — GABAPENTIN 600 MG: 300 CAPSULE ORAL at 00:02

## 2023-03-19 RX ADMIN — THIAMINE HCL TAB 100 MG 100 MG: 100 TAB at 08:51

## 2023-03-19 RX ADMIN — LORAZEPAM 1 MG: 0.5 TABLET ORAL at 03:05

## 2023-03-19 RX ADMIN — NICOTINE 1 PATCH: 14 PATCH, EXTENDED RELEASE TRANSDERMAL at 12:00

## 2023-03-19 ASSESSMENT — ACTIVITIES OF DAILY LIVING (ADL)
ADLS_ACUITY_SCORE: 22
ADLS_ACUITY_SCORE: 26
ADLS_ACUITY_SCORE: 26
ADLS_ACUITY_SCORE: 34
ADLS_ACUITY_SCORE: 26
ADLS_ACUITY_SCORE: 34
ADLS_ACUITY_SCORE: 26

## 2023-03-19 NOTE — PROGRESS NOTES
"  HEART CARE CONSULTATON NOTE        Assessment/Recommendations   Assessment:  1.  NSVT: Runs of nonsustained ventricular tachycardia in the setting of electrolyte imbalances.  Recommend repletion and continuing to monitor.  Echocardiogram to evaluate for structural heart disease.  2.  Alcohol abuse  3.  Liver disease  4.  Mood disorder  5.  Chronic subdural hematoma     Plan:  1.  Echocardiogram pending  2.  Replete magnesium  3.  Alcohol cessation  If echocardiogram unremarkable no further recommendations at this time.     History of Present Illness/Subjective    Very agitated this morning requiring increased sedation per [facility] protocol.  Ventricular ectopy improved since magnesium repletion     Physical Examination  Review of Systems   VITALS: /73 (BP Location: Right arm, Patient Position: Semi-Salazar's, Cuff Size: Adult Regular)   Pulse 80   Temp 97.9  F (36.6  C) (Oral)   Resp 20   Ht 1.88 m (6' 2\")   Wt 100.2 kg (220 lb 12.8 oz)   SpO2 96%   BMI 28.35 kg/m    BMI: Body mass index is 28.35 kg/m .  Wt Readings from Last 3 Encounters:   03/19/23 100.2 kg (220 lb 12.8 oz)   02/03/23 98.5 kg (217 lb 3.2 oz)   11/16/22 88.6 kg (195 lb 6.4 oz)       Intake/Output Summary (Last 24 hours) at 3/19/2023 1156  Last data filed at 3/19/2023 1136  Gross per 24 hour   Intake 820 ml   Output 550 ml   Net 270 ml     General Appearance:    Somnolent after receiving Haldol   ENT/Mouth: membranes moist, no oral lesions or bleeding gums.      EYES:  no scleral icterus, normal conjunctivae       Chest/Lungs:   lungs are clear to auscultation   Cardiovascular:   Regular. Normal first and second heart sounds with no murmurs , no edema bilaterally        Extremities: no cyanosis or clubbing   Skin: no xanthelasma, warm.    Neurologic: + tremors     Psychiatric:  Agitated    Review Of Systems  Skin: negative  Eyes: negative  Ears/Nose/Throat: negative  Respiratory: No shortness of breath, dyspnea on exertion, cough, or " hemoptysis  Cardiovascular: negative  Gastrointestinal: negative  Genitourinary: negative  Musculoskeletal: negative  Neurologic: negative  Psychiatric: negative  Hematologic/Lymphatic/Immunologic: negative  Endocrine: negative          Lab Results    Chemistry/lipid CBC Cardiac Enzymes/BNP/TSH/INR   Recent Labs   Lab Test 07/04/17  1659   CHOL 645*   TRIG 2,744*     No results for input(s): LDL in the last 26565 hours.  Recent Labs   Lab Test 03/19/23 0436   *   POTASSIUM 3.3*   CHLORIDE 102   CO2 25   *   BUN 8.5   CR 0.55*   GFRESTIMATED >90   ABDULKADIR 8.4*     Recent Labs   Lab Test 03/19/23  0436 03/18/23  0600 03/17/23 1942   CR 0.55* 0.55* 0.65*     No results for input(s): A1C in the last 97821 hours.       Recent Labs   Lab Test 03/19/23 0436   WBC 4.5   HGB 11.2*   HCT 33.4*   MCV 87   PLT 88*     Recent Labs   Lab Test 03/19/23 0436 03/18/23  0600 03/17/23 1942   HGB 11.2* 11.9* 15.2    Recent Labs   Lab Test 02/18/22  1622   TROPONINI <0.01     No results for input(s): BNP, NTBNPI, NTBNP in the last 83501 hours.  Recent Labs   Lab Test 03/17/23  1942   TSH 0.41     Recent Labs   Lab Test 02/02/23  0600 11/16/22  0616 11/15/22  0550   INR 1.07 1.08 1.17*        Medical History  Surgical History Family History Social History   Past Medical History:   Diagnosis Date     Alcohol use disorder, severe, dependence (H) 2/24/2022     Past Surgical History:   Procedure Laterality Date     IR CAROTID CEREBRAL ANGIOGRAM BILATERAL  11/7/2022     PICC TRIPLE LUMEN PLACEMENT  11/3/2022          No family history on file.     Social History     Socioeconomic History     Marital status:      Spouse name: Not on file     Number of children: Not on file     Years of education: Not on file     Highest education level: Not on file   Occupational History     Not on file   Tobacco Use     Smoking status: Never     Smokeless tobacco: Current     Types: Chew   Substance and Sexual Activity     Alcohol use:  Yes     Drug use: No     Sexual activity: Not on file   Other Topics Concern     Not on file   Social History Narrative     Not on file     Social Determinants of Health     Financial Resource Strain: Not on file   Food Insecurity: Not on file   Transportation Needs: Not on file   Physical Activity: Not on file   Stress: Not on file   Social Connections: Not on file   Intimate Partner Violence: Not on file   Housing Stability: Not on file         Medications  Allergies   No current outpatient medications on file.      No Known Allergies      Holly Wiggins MD

## 2023-03-19 NOTE — PLAN OF CARE
Problem: Alcohol Withdrawal  Goal: Alcohol Withdrawal Symptom Control  Outcome: Progressing   Goal Outcome Evaluation:  Patient denying pain or SOB. FUENTES noted.   Patient scoring > 7 on CIWA protocol requiring lorazepam overnight. Overnight received 5mg lorazepam and 5mg zyprexa for visual hallucinations. Patient reported seeing people and animal in his room. Assured patient that there is no one in his room. He state he is trying to take a picture for me on his cell phone so I can see.   Patient otherwise cooperative.   Replacing electrolytes per protocols.

## 2023-03-19 NOTE — PLAN OF CARE
"  Problem: Plan of Care - These are the overarching goals to be used throughout the patient stay.    Goal: Plan of Care Review  Description: The Plan of Care Review/Shift note should be completed every shift.  The Outcome Evaluation is a brief statement about your assessment that the patient is improving, declining, or no change.  This information will be displayed automatically on your shift note.  Outcome: Progressing  Flowsheets (Taken 3/19/2023 1341)  Plan of Care Reviewed With: patient  Goal: Patient-Specific Goal (Individualized)  Description: You can add care plan individualizations to a care plan. Examples of Individualization might be:  \"Parent requests to be called daily at 9am for status\", \"I have a hard time hearing out of my right ear\", or \"Do not touch me to wake me up as it startles me\".  Outcome: Progressing  Goal: Absence of Hospital-Acquired Illness or Injury  Outcome: Progressing  Intervention: Identify and Manage Fall Risk  Recent Flowsheet Documentation  Taken 3/19/2023 0830 by Marleny Lin RN  Safety Promotion/Fall Prevention:   bed alarm on   chair alarm on   clutter free environment maintained   increased rounding and observation   lighting adjusted   increase visualization of patient   nonskid shoes/slippers when out of bed   patient and family education   room door open   room organization consistent   safety round/check completed  Intervention: Prevent Skin Injury  Recent Flowsheet Documentation  Taken 3/19/2023 1306 by Marleny Lin RN  Body Position: position changed independently  Taken 3/19/2023 1136 by Marleny Lin RN  Body Position: position changed independently  Goal: Optimal Comfort and Wellbeing  Outcome: Progressing  Goal: Readiness for Transition of Care  Outcome: Progressing     Problem: Fall Injury Risk  Goal: Absence of Fall and Fall-Related Injury  Outcome: Progressing  Intervention: Identify and Manage Contributors  Recent Flowsheet Documentation  Taken 3/19/2023 0830 by " Marleny Lin, RN  Medication Review/Management: medications reviewed  Intervention: Promote Injury-Free Environment  Recent Flowsheet Documentation  Taken 3/19/2023 0230 by Marleny Lin, RN  Safety Promotion/Fall Prevention:   bed alarm on   chair alarm on   clutter free environment maintained   increased rounding and observation   lighting adjusted   increase visualization of patient   nonskid shoes/slippers when out of bed   patient and family education   room door open   room organization consistent   safety round/check completed     Problem: Alcohol Withdrawal  Goal: Alcohol Withdrawal Symptom Control  Outcome: Progressing  Goal: Optimal Neurologic Function  Outcome: Progressing  Goal: Readiness for Change Identified  Outcome: Progressing     Problem: Dysrhythmia  Goal: Normalized Cardiac Rhythm  Outcome: Progressing   Goal Outcome Evaluation:      Plan of Care Reviewed With: patient  CIWA protocol and falls precautions have been priority this shift.  Dr Cifuentes has been updated.  Writer will continue to monitor pt closely.  Pt is a heavy assist two with walker and gait belt. Activity has been minimized this shift for  higher risk of falls during withdrawing. Naty RN/Charge is aware of possibly moving pt closer to the nursing station when bed available.

## 2023-03-19 NOTE — PROGRESS NOTES
"    [facility]    Progress Note - Hospitalist Service       Date of Admission:  3/17/2023    Assessment & Plan     Getachew Barcenas is a 52 year old male who has a history of severe EtOH use disorder with relapse, frequent falls 2/2 vertigo, chronic subdural hematoma, hypothyroidism s/p radiation, mood disorder and is admitted for alcohol withdrawal.      EtOH intoxication, risk of withdrawal  H/o severe EtOH use disorder with relapse  Generalized weakness  Lactic acidosis - improving   Patient presents after found in home by family crying and unable to stand/walk. Has had progressively worsening generalized weakness in setting of increased EtOH intake. Affirms currently drinking 1.75L liquor every 2-3 days. Last drink at 1630 on day of admission. Has history of withdrawal sxs but no reported seizures. Presented in ED rambling and belligerent; appeard intoxicated and drowsy upon encounter. Tachycardic and mildly hypertensive on arrival. Ethyl EtOH level 0.36, lactate 7, bicarb 18, anion gap 23. Lactate improved to 2.5 with fluid resuscitation. Patient has been to treatment several times with some success but continues to relapse. Hoping to go to [facility] to a rehab center \"where I get to hunt and fish.\" Motivated to quit. Continues to have visual hallucinations and incongruous speech.   - Scheduled gabapentin and clonidine   - CIWA protocol: prn ativan, zyprexa or haldol  - Supp folate, thiamine, thera-vit-m  - Fall precautions  - Telemetry  - PT/OT/CM consulted, appreciate recs and support     Hepatic steatosis  Abnormal LFTs  Thrombocytopenia  Confirmed on CTAP. Alk Phos 136, ALT 52, Plt 118 on admission. FIB-4 index suggests advanced fibrosis is likely. Would help to explain persistently elevated lactate after fluid resuscitation.      Unsustained ventricular tachycardia  Hypomagnesemia  Mg 1.5 on admission. Electrolytes otherwise fine. However, continues to have intermittent " unsustained ventricular tachycardia on telemetry, in particular after activity. Longest run of 20 beats. Asymptomatic during these episodes.  - Cardiology consult, appreciate recs   -Echo  - Continue telemetry  - Daily BMP  - Mg- change to high replacement protocol  - Phos replacement protocol  -Potassium replacement protocol     Incidental finding: Acute uncomplicated sigmoid diverticulitis Mildly distended gallbladder with cholelithiasis  Noted on CTAP. Patient has negative abdominal exam. Has had diverticulitis in remote past. No N/V/D. Afebrile and WBC wnl.   - Daily CBC   - Hold further abx for now     Chronic conditions  Chronic subdural hematoma: reduced bilateral subdural hematomas on CT head. NSG curbsided, no formal consult necessary. Hold off anticoagulation. No activity restrictions.   Frequent falls 2/2 vertigo: very debilitating for patient. Fall precautions.   Hypothyroidism s/p radiation: continue PTA levothyroxine.  Mood disorder/anxiety/risk of violent behavior: Per chart, also appears to have paranoid schizophrenia. Hard to differentiate if this or early stage withdrawal causing hallucinations. Will continue PTA duloxetine. Holding PTA hydroxyzine.      Diet: Regular Diet Adult    DVT Prophylaxis: Pneumatic Compression Devices - no anticoagulation in context of subdural hematomas  Triana Catheter: Not present  Fluids: PO  Lines: None     Cardiac Monitoring: None  Code Status: Full Code      Clinically Significant Risk Factors        # Hypokalemia: Lowest K = 3.3 mmol/L in last 2 days, will replace as needed   # Hypocalcemia: Lowest Ca = 7.7 mg/dL in last 2 days, will monitor and replace as appropriate   # Hypomagnesemia: Lowest Mg = 1.5 mg/dL in last 2 days, will replace as needed  # Anion Gap Metabolic Acidosis: Highest Anion Gap = 23 mmol/L in last 2 days, will monitor and treat as appropriate    # Thrombocytopenia: Lowest platelets = 82 in last 2 days, will monitor for bleeding          #  "Overweight: Estimated body mass index is 28.35 kg/m  as calculated from the following:    Height as of this encounter: 1.88 m (6' 2\").    Weight as of this encounter: 100.2 kg (220 lb 12.8 oz)., PRESENT ON ADMISSION       The patient's care was discussed with the Attending Physician, Dr. Murphy.    Edson Cifuentes MD  Hospitalist Service  [facility]  ______________________________________________________________________    Interval History   Denies any pain, nausea,sob. Per nursing patient was reporting seeing dogs in his room. Patient is answering questions, but then speaking about things not happening/in the room.    Has a good appetite and has been drinking plenty of water.     Physical Exam   Vital Signs: Temp: 98  F (36.7  C) Temp src: Oral BP: 111/73 Pulse: 93   Resp: 20 SpO2: 97 % O2 Device: None (Room air) Oxygen Delivery: 2 LPM  Weight: 220 lbs 12.8 oz    General Appearance: alert, appears chronically ill, cooperative, NAD.  HEENT: Normocephalic, atraumatic. EOMi.   Respiratory: No increased work of breathing on RA. CTAB- no wheezing, crackles, rhonchi.   Cardiovascular: Tachycardic, regular rhythm. Strong peripheral pulses, no edema.   GI: Soft, nondistended, nontender.   Skin: Warm, dry. No rashes, jaundice, bruising.   Musculoskeletal: Moving extremities spontaneously.   Neurologic: No focal deficits.   Psychiatric: A&Ox3.  Normal affect. Thought process incongruous to events, reporting visual hallucinations.     Data     I have personally reviewed the following data over the past 24 hrs:    4.5  \   11.2 (L)   / 88 (L)     135 (L) 102 8.5 /  111 (H)   3.3 (L) 25 0.55 (L) \       Imaging results reviewed over the past 24 hrs:   No results found for this or any previous visit (from the past 24 hour(s)).  "

## 2023-03-19 NOTE — PLAN OF CARE
Dr Cifuentes has been updated, Pt CIWAs scores are fluctuating significantly, see charting. Pt is now disorientated to place, situation.  CIWA medications as ordered, Protocols as ordered. Pt is a heavy assist two. Charge nurse aware of potential need for 1:1. Bed alarm is on. Increased rounding is in progress

## 2023-03-19 NOTE — PLAN OF CARE
Problem: Fall Injury Risk  Goal: Absence of Fall and Fall-Related Injury  Intervention: Identify and Manage Contributors  Recent Flowsheet Documentation  Taken 3/18/2023 1829 by Gordo Hobbs RN  Medication Review/Management: medications reviewed  Intervention: Promote Injury-Free Environment  Recent Flowsheet Documentation  Taken 3/18/2023 1829 by Gordo Hobbs RN  Safety Promotion/Fall Prevention:    activity supervised    bed alarm on    clutter free environment maintained    fall prevention program maintained    nonskid shoes/slippers when out of bed    safety round/check completed     Problem: Plan of Care - These are the overarching goals to be used throughout the patient stay.    Goal: Absence of Hospital-Acquired Illness or Injury  Intervention: Identify and Manage Fall Risk  Recent Flowsheet Documentation  Taken 3/18/2023 1829 by Gordo Hobbs RN  Safety Promotion/Fall Prevention:    activity supervised    bed alarm on    clutter free environment maintained    fall prevention program maintained    nonskid shoes/slippers when out of bed    safety round/check completed  Goal: Readiness for Transition of Care  Intervention: Mutually Develop Transition Plan  Recent Flowsheet Documentation  Taken 3/18/2023 1800 by Gordo Hobbs RN  Equipment Currently Used at Home:    walker, rolling    grab bar, tub/shower    grab bar, toilet   Goal Outcome Evaluation:       Pt tells writer he drinks 1.75 L every other day.  Pt states he has fallen more than 10 time in last 6 months.    Pt is having some slight to moderate tremors.  Pt has hallucinations and states he sees people in his room and states his dog is sitting next to bed.  Pt was sleeping lightly in room between cares.    Pt was weaned of oxygen.  Pt ate dinner and has been snacking and drinking.

## 2023-03-19 NOTE — PROGRESS NOTES
"Per charge nurse, pt is withdrawing \"super hard\" and is actively hallucinating.  She recommends that SW should not try to meet with pt today due to his current state.  SW to try tomorrow to address CD consult with pt.      HEATHER Joseph, LGSW 03/19/23 4:54 PM        "

## 2023-03-20 ENCOUNTER — APPOINTMENT (OUTPATIENT)
Dept: OCCUPATIONAL THERAPY | Facility: HOSPITAL | Age: 53
End: 2023-03-20
Payer: COMMERCIAL

## 2023-03-20 ENCOUNTER — APPOINTMENT (OUTPATIENT)
Dept: PHYSICAL THERAPY | Facility: HOSPITAL | Age: 53
End: 2023-03-20
Payer: COMMERCIAL

## 2023-03-20 ENCOUNTER — APPOINTMENT (OUTPATIENT)
Dept: CARDIOLOGY | Facility: HOSPITAL | Age: 53
End: 2023-03-20
Attending: INTERNAL MEDICINE
Payer: COMMERCIAL

## 2023-03-20 LAB
ANION GAP SERPL CALCULATED.3IONS-SCNC: 7 MMOL/L (ref 7–15)
BUN SERPL-MCNC: 5.7 MG/DL (ref 6–20)
CALCIUM SERPL-MCNC: 8.6 MG/DL (ref 8.6–10)
CHLORIDE SERPL-SCNC: 105 MMOL/L (ref 98–107)
CREAT SERPL-MCNC: 0.6 MG/DL (ref 0.67–1.17)
DEPRECATED HCO3 PLAS-SCNC: 28 MMOL/L (ref 22–29)
ERYTHROCYTE [DISTWIDTH] IN BLOOD BY AUTOMATED COUNT: 12.3 % (ref 10–15)
GFR SERPL CREATININE-BSD FRML MDRD: >90 ML/MIN/1.73M2
GLUCOSE SERPL-MCNC: 101 MG/DL (ref 70–99)
HCT VFR BLD AUTO: 31.5 % (ref 40–53)
HGB BLD-MCNC: 10.7 G/DL (ref 13.3–17.7)
LVEF ECHO: NORMAL
MAGNESIUM SERPL-MCNC: 1.8 MG/DL (ref 1.7–2.3)
MCH RBC QN AUTO: 29.9 PG (ref 26.5–33)
MCHC RBC AUTO-ENTMCNC: 34 G/DL (ref 31.5–36.5)
MCV RBC AUTO: 88 FL (ref 78–100)
PHOSPHATE SERPL-MCNC: 4 MG/DL (ref 2.5–4.5)
PLATELET # BLD AUTO: 67 10E3/UL (ref 150–450)
POTASSIUM SERPL-SCNC: 3.3 MMOL/L (ref 3.4–5.3)
POTASSIUM SERPL-SCNC: 4 MMOL/L (ref 3.4–5.3)
RBC # BLD AUTO: 3.58 10E6/UL (ref 4.4–5.9)
SODIUM SERPL-SCNC: 140 MMOL/L (ref 136–145)
WBC # BLD AUTO: 3.7 10E3/UL (ref 4–11)

## 2023-03-20 PROCEDURE — 93306 TTE W/DOPPLER COMPLETE: CPT | Mod: 26 | Performed by: INTERNAL MEDICINE

## 2023-03-20 PROCEDURE — 36415 COLL VENOUS BLD VENIPUNCTURE: CPT

## 2023-03-20 PROCEDURE — 99232 SBSQ HOSP IP/OBS MODERATE 35: CPT | Performed by: INTERNAL MEDICINE

## 2023-03-20 PROCEDURE — 83735 ASSAY OF MAGNESIUM: CPT | Performed by: MASSAGE THERAPIST

## 2023-03-20 PROCEDURE — 99232 SBSQ HOSP IP/OBS MODERATE 35: CPT | Mod: GC | Performed by: STUDENT IN AN ORGANIZED HEALTH CARE EDUCATION/TRAINING PROGRAM

## 2023-03-20 PROCEDURE — 84132 ASSAY OF SERUM POTASSIUM: CPT | Performed by: INTERNAL MEDICINE

## 2023-03-20 PROCEDURE — 80048 BASIC METABOLIC PNL TOTAL CA: CPT

## 2023-03-20 PROCEDURE — 97165 OT EVAL LOW COMPLEX 30 MIN: CPT | Mod: GO

## 2023-03-20 PROCEDURE — 36415 COLL VENOUS BLD VENIPUNCTURE: CPT | Performed by: INTERNAL MEDICINE

## 2023-03-20 PROCEDURE — 250N000013 HC RX MED GY IP 250 OP 250 PS 637

## 2023-03-20 PROCEDURE — 250N000013 HC RX MED GY IP 250 OP 250 PS 637: Performed by: MASSAGE THERAPIST

## 2023-03-20 PROCEDURE — 210N000001 HC R&B IMCU HEART CARE

## 2023-03-20 PROCEDURE — 84100 ASSAY OF PHOSPHORUS: CPT | Performed by: FAMILY MEDICINE

## 2023-03-20 PROCEDURE — 97535 SELF CARE MNGMENT TRAINING: CPT | Mod: GO

## 2023-03-20 PROCEDURE — 250N000013 HC RX MED GY IP 250 OP 250 PS 637: Performed by: INTERNAL MEDICINE

## 2023-03-20 PROCEDURE — 85027 COMPLETE CBC AUTOMATED: CPT

## 2023-03-20 PROCEDURE — 97116 GAIT TRAINING THERAPY: CPT | Mod: GP

## 2023-03-20 PROCEDURE — 97530 THERAPEUTIC ACTIVITIES: CPT | Mod: GP

## 2023-03-20 PROCEDURE — 250N000011 HC RX IP 250 OP 636: Performed by: INTERNAL MEDICINE

## 2023-03-20 PROCEDURE — 93306 TTE W/DOPPLER COMPLETE: CPT

## 2023-03-20 RX ORDER — MAGNESIUM SULFATE HEPTAHYDRATE 40 MG/ML
2 INJECTION, SOLUTION INTRAVENOUS ONCE
Status: COMPLETED | OUTPATIENT
Start: 2023-03-20 | End: 2023-03-20

## 2023-03-20 RX ORDER — POTASSIUM CHLORIDE 1500 MG/1
40 TABLET, EXTENDED RELEASE ORAL ONCE
Status: COMPLETED | OUTPATIENT
Start: 2023-03-20 | End: 2023-03-20

## 2023-03-20 RX ADMIN — OLANZAPINE 5 MG: 5 TABLET, ORALLY DISINTEGRATING ORAL at 15:39

## 2023-03-20 RX ADMIN — THIAMINE HCL TAB 100 MG 100 MG: 100 TAB at 08:32

## 2023-03-20 RX ADMIN — MAGNESIUM SULFATE HEPTAHYDRATE 2 G: 40 INJECTION, SOLUTION INTRAVENOUS at 09:23

## 2023-03-20 RX ADMIN — CLONIDINE HYDROCHLORIDE 0.1 MG: 0.1 TABLET ORAL at 00:26

## 2023-03-20 RX ADMIN — NICOTINE 1 PATCH: 14 PATCH, EXTENDED RELEASE TRANSDERMAL at 08:35

## 2023-03-20 RX ADMIN — CLONIDINE HYDROCHLORIDE 0.1 MG: 0.1 TABLET ORAL at 23:29

## 2023-03-20 RX ADMIN — CLONIDINE HYDROCHLORIDE 0.1 MG: 0.1 TABLET ORAL at 08:32

## 2023-03-20 RX ADMIN — Medication 5 MG: at 23:33

## 2023-03-20 RX ADMIN — CLONIDINE HYDROCHLORIDE 0.1 MG: 0.1 TABLET ORAL at 16:20

## 2023-03-20 RX ADMIN — FOLIC ACID 1 MG: 1 TABLET ORAL at 08:32

## 2023-03-20 RX ADMIN — LORAZEPAM 2 MG: 0.5 TABLET ORAL at 21:43

## 2023-03-20 RX ADMIN — POTASSIUM CHLORIDE 40 MEQ: 1500 TABLET, EXTENDED RELEASE ORAL at 08:32

## 2023-03-20 RX ADMIN — OLANZAPINE 5 MG: 5 TABLET, ORALLY DISINTEGRATING ORAL at 21:43

## 2023-03-20 RX ADMIN — Medication 1 TABLET: at 12:32

## 2023-03-20 RX ADMIN — DULOXETINE 30 MG: 30 CAPSULE, DELAYED RELEASE ORAL at 09:22

## 2023-03-20 RX ADMIN — LEVOTHYROXINE SODIUM 175 MCG: 0.03 TABLET ORAL at 08:32

## 2023-03-20 ASSESSMENT — ACTIVITIES OF DAILY LIVING (ADL)
ADLS_ACUITY_SCORE: 34
ADLS_ACUITY_SCORE: 30
ADLS_ACUITY_SCORE: 34
ADLS_ACUITY_SCORE: 34
ADLS_ACUITY_SCORE: 30
ADLS_ACUITY_SCORE: 34
ADLS_ACUITY_SCORE: 30
ADLS_ACUITY_SCORE: 34
ADLS_ACUITY_SCORE: 34
ADLS_ACUITY_SCORE: 30
ADLS_ACUITY_SCORE: 34
ADLS_ACUITY_SCORE: 30

## 2023-03-20 NOTE — PROGRESS NOTES
CARDIOLOGY PROGRESS NOTE      Assessment/Plan:  1.  Ventricular tachycardia (paroxysmal)-asymptomatic, nonsustained, most likely secondary to electrolyte abnormalities and resolved.  2.  Chronic subdural hematoma (H)-stable, bihemispheric, acute on chronic subdural hemorrhages with slight stable left to right midline shift.  No anticoagulation.  3.  Lactic acidosis-so noted defer to primary.  4.  Alcoholic intoxication without complication (H)-level elevated to 0.36, most likely intoxication.  5.  Minimal abnormal echo-visually ejection fraction by 55% as well as aortic root enlargement.  Most likely secondary to alcohol level.  Would recheck echo in about 2 to 3 months.    Plan:  1.  Cardiology has nothing further inpatient to add, we will sign off, available as needed.    Discharge Plannin.  Barrier to discharge is noncardiac.  2.  Would arrange for outpatient echo in about 2 months.  3.  No cardiology follow-up needed at this point in time.     LOS: 3 days     Subjective:  Interval History:    52-year-old white gentleman being seen on fourth day of hospitalization.  He feels a little shaky but denies any chest pain, palpitations, shortness breath, PND, orthopnea.    Medications    cloNIDine  0.1 mg Oral Q8H     DULoxetine  30 mg Oral Daily     folic acid  1 mg Oral Daily     [START ON 3/27/2023] gabapentin  100 mg Oral Q8H     [START ON 3/25/2023] gabapentin  300 mg Oral Q8H     levothyroxine  175 mcg Oral Daily     multivitamin w/minerals  1 tablet Oral Daily     nicotine  1 patch Transdermal Daily     nicotine   Transdermal Q8H     sodium chloride (PF)  3 mL Intracatheter Q8H     thiamine  100 mg Oral Daily     Objective:   Vital signs in last 24 hours:  Temp:  [97.6  F (36.4  C)-98.2  F (36.8  C)] 97.7  F (36.5  C)  Pulse:  [] 129  Resp:  [18-20] 18  BP: ()/(63-79) 118/79  SpO2:  [92 %-98 %] 92 %    Physical Exam:  /79 (BP Location: Right arm)   Pulse (!) 129   Temp 97.7  F (36.5  " C) (Oral)   Resp 18   Ht 1.88 m (6' 2\")   Wt 100.2 kg (220 lb 12.8 oz)   SpO2 92%   BMI 28.35 kg/m      General Appearance:    Alert, cooperative, no distress, appears stated age   Head:    Normocephalic, without obvious abnormality, atraumatic   Throat:   Lips, mucosa, and tongue normal; teeth and gums normal   Neck:   Supple, symmetrical, trachea midline, no adenopathy;        thyroid:  No enlargement/tenderness/nodules; no carotid    bruit or JVD   Back:     Symmetric, no curvature, ROM normal, no CVA tenderness   Lungs:     Clear to auscultation bilaterally, respirations unlabored   Chest wall:    No tenderness or deformity   Heart:    Regular rate and rhythm, S1 and S2 normal, no murmur, rub   or gallop   Abdomen:     Soft, non-tender, bowel sounds active all four quadrants,     no masses, no organomegaly   Extremities:   Normal, atraumatic, no cyanosis or edema   Pulses:   2+ and symmetric all extremities   Skin:   Skin color, texture, turgor normal, no rashes or lesions     Cardiographics:      ECG: Personally reviewed by myself shows sinus rhythm, occasional PVC, incomplete right bundle branch block pattern.    Echocardiogram:   Left ventricular size, wall motion and function are normal. The ejection fraction is 60-65%.  The right ventricle is mildly dilated.  The right ventricular systolic function is normal.  There is borderline aortic root enlargement.      Lab Results:   Recent Labs   Lab 03/20/23 0425   WBC 3.7*   HGB 10.7*   HCT 31.5*   PLT 67*     Recent Labs   Lab 03/20/23 0425      CO2 28   BUN 5.7*   .       Lab Results   Component Value Date    TROPONINI <0.01 02/18/2022           "

## 2023-03-20 NOTE — PROGRESS NOTES
"[facility]    Progress Note - Hospitalist Service       Date of Admission:  3/17/2023    Assessment & Plan     Getachew Barcenas is a 52 year old male who has a history of severe EtOH use disorder with relapse, frequent falls 2/2 vertigo, chronic subdural hematoma, hypothyroidism s/p radiation, mood disorder and is admitted for alcohol withdrawal.      EtOH withdrawal  H/o severe EtOH use disorder with relapse  Patient presented after being found in home by family crying and unable to stand/walk. Reported drinking 1.75L liquor every 2-3 days. Last drink at 1630 on day of admission. Has history of withdrawal sxs but no reported seizures. Initially had lactic acidosis that improved with fluids, did not completely normalize, suspect 2/2 liver disease. Patient has been to treatment several times with some success but continues to relapse. Hoping to go to [facility] to a rehab center \"where I get to hunt and fish.\" Motivated to quit. Continues to have visual hallucinations and incongruous speech.   - Scheduled gabapentin and clonidine   - [facility] protocol: prn ativan, zyprexa or haldol  - Supp folate, thiamine, thera-vit-m  - Fall precautions  - Telemetry  - PT/OT/CM consulted, appreciate recs and support     Hepatic steatosis  Abnormal LFTs  Thrombocytopenia  Alk Phos 136, ALT 52, Plt 118 on admission. FIB-4 index suggests advanced fibrosis is likely. Would explain persistently elevated lactate after fluid resuscitation. Platelets downtrending, patient has not received heparin this hospital stay. Has had significant thrombocytopenia in the recent past.   - Daily CBC     Unsustained ventricular tachycardia  Hypomagnesemia  Mg 1.5 on admission. Electrolytes otherwise fine. However, continues to have intermittent unsustained ventricular tachycardia on telemetry, in particular after activity. Longest run of 20 beats. Asymptomatic during these episodes.  - Cardiology consult  - Echo  - Continue " "telemetry  - Daily BMP  - Mg- change to high replacement protocol  - Phos replacement protocol  - Potassium replacement protocol     Incidental finding: Acute uncomplicated sigmoid diverticulitis Mildly distended gallbladder with cholelithiasis. Patient has negative abdominal exam. Has had diverticulitis in remote past. No N/V/D. Afebrile and WBC wnl.   - Daily CBC   - Hold further abx for now     Chronic conditions  Chronic subdural hematoma: reduced bilateral subdural hematomas on CT head. NSG curbsided, no formal consult necessary. Hold off anticoagulation. No activity restrictions.   Frequent falls 2/2 vertigo: very debilitating for patient. Fall precautions.   Hypothyroidism s/p radiation: continue PTA levothyroxine.  Mood disorder/anxiety/risk of violent behavior: Per chart, also appears to have paranoid schizophrenia. Hard to differentiate if this or early stage withdrawal causing hallucinations. Will continue PTA duloxetine. Holding PTA hydroxyzine.      Diet: Regular Diet Adult    DVT Prophylaxis: Pneumatic Compression Devices - no anticoagulation in context of subdural hematomas  Triana Catheter: Not present  Fluids: PO  Lines: None     Cardiac Monitoring: None  Code Status: Full Code      Clinically Significant Risk Factors        # Hypokalemia: Lowest K = 3.3 mmol/L in last 2 days, will replace as needed     # Hypomagnesemia: Lowest Mg = 1.5 mg/dL in last 2 days, will replace as needed     # Thrombocytopenia: Lowest platelets = 67 in last 2 days, will monitor for bleeding          # Overweight: Estimated body mass index is 28.35 kg/m  as calculated from the following:    Height as of this encounter: 1.88 m (6' 2\").    Weight as of this encounter: 100.2 kg (220 lb 12.8 oz)., PRESENT ON ADMISSION       The patient's care was discussed with the Attending Physician, Dr. Rasmussen.    Marleny Oliveira MD  Hospitalist Service  [facility]  ______________________________________________________________________    Interval History   Patient reports feeling tired today, he does endorse having visual hallucinations last night but none since waking up this morning. He is motivated to quit drinking.     Physical Exam   Vital Signs: Temp: 97.8  F (36.6  C) Temp src: Oral BP: 113/79 Pulse: 78   Resp: 20 SpO2: 92 % O2 Device: None (Room air) Oxygen Delivery: 1 LPM  Weight: 220 lbs 12.8 oz    General Appearance: Sleepy, cooperative, NAD.  HEENT: Normocephalic, atraumatic. EOMi.   Respiratory: No increased work of breathing on RA. CTAB- no wheezing, crackles, rhonchi.   Cardiovascular: RRR, Strong peripheral pulses, no edema.   GI: Soft, nondistended, nontender.   Skin: Warm, dry. No rashes, jaundice, bruising.   Musculoskeletal: Moving extremities spontaneously.   Neurologic: No focal deficits.   Psychiatric: A&Ox3.  Normal affect. Thought process incongruous to events, reporting visual hallucinations, last was yesterday evening, none since waking up this morning    Data     I have personally reviewed the following data over the past 24 hrs:    3.7 (L)  \   10.7 (L)   / 67 (L)     140 105 5.7 (L) /  101 (H)   3.3 (L) 28 0.60 (L) \       Imaging results reviewed over the past 24 hrs:   No results found for this or any previous visit (from the past 24 hour(s)).

## 2023-03-20 NOTE — PLAN OF CARE
Problem: Fall Injury Risk  Goal: Absence of Fall and Fall-Related Injury  Outcome: Progressing     Problem: Alcohol Withdrawal  Goal: Alcohol Withdrawal Symptom Control  Outcome: Progressing  Goal: Optimal Neurologic Function  Outcome: Progressing  Goal: Readiness for Change Identified  Outcome: Progressing   Goal Outcome Evaluation:       Pt was having hallucinations, talking and interacting using hands with people not in room.    PT states there are people and animals in the room.  Pt states a dog is sitting next to bed.    Zyprexa was given for hallucinations.      Pt is drowsy and lethargic.  Pt follows commands and is cooperative with staff.     Pt sister called and states she would like to talk with  on Monday to discuss plan at discharge.           Pt went for medium length walk in hallway with gait belt and walker.  Pt has disturbed gait and needs strong standby assist of 1.     PT slept between cares waking up periodically. Pt is redirectable.

## 2023-03-20 NOTE — PLAN OF CARE
Problem: Fall Injury Risk  Goal: Absence of Fall and Fall-Related Injury  Outcome: Progressing  Intervention: Identify and Manage Contributors  Recent Flowsheet Documentation  Taken 3/20/2023 0800 by Brad Cope RN  Medication Review/Management: medications reviewed  Intervention: Promote Injury-Free Environment  Recent Flowsheet Documentation  Taken 3/20/2023 0800 by Brad Cope RN  Safety Promotion/Fall Prevention: sitter at bedside     Problem: Alcohol Withdrawal  Goal: Alcohol Withdrawal Symptom Control  Outcome: Progressing  Goal: Optimal Neurologic Function  Outcome: Progressing  Goal: Readiness for Change Identified  Outcome: Progressing   Goal Outcome Evaluation:    Pt is alert and disoriented to time, place and situation. Vitals checked and WNL.  Pt denied SOB and chest pain. Hand tremors noted (2 at CIWA).  No hallucinations noted.  Pt called his brother and stated that he should be  today; reorientation done to pt, verbalized understanding.    Pt stated that he is seeing rats on the floor. PRN zyprexa 5 mg PO given per order. Will continue to monitor.

## 2023-03-20 NOTE — PROGRESS NOTES
"   03/20/23 0800   Appointment Info   Signing Clinician's Name / Credentials (OT) Marilou Soto, OTD, OTR/L   Living Environment   People in Home alone   Current Living Arrangements house   Home Accessibility no concerns   Living Environment Comments walk in shower, reports family lives within 5 miles and someone comes to check in every day for 1-3 hours   Self-Care   Usual Activity Tolerance good   Current Activity Tolerance moderate   Equipment Currently Used at Home walker, rolling;grab bar, tub/shower;grab bar, toilet   Fall history within last six months yes   Number of times patient has fallen within last six months 12  (\"at least 12\")   Activity/Exercise/Self-Care Comment I with ADLs at baseline   Instrumental Activities of Daily Living (IADL)   Previous Responsibilities meal prep;housekeeping;driving;laundry;medication management   General Information   Onset of Illness/Injury or Date of Surgery 03/17/23   Referring Physician Ezequiel Holliday MD   Patient/Family Therapy Goal Statement (OT) return home   Additional Occupational Profile Info/Pertinent History of Current Problem per chart - \"Getachew Barcenas is a 52 year old male who has a history of severe EtOH use disorder with relapse, frequent falls 2/2 vertigo, chronic subdural hematoma, hypothyroidism s/p radiation, mood disorder and is admitted for generalized weakness 2/2 EtOH intoxication.\"   Cognitive Status Examination   Orientation Status person;place  (one day off of date)   Cognitive Status Comments impulsivity and some confusion noted, continue to monitor   Visual Perception   Visual Impairment/Limitations corrective lenses for reading   Sensory   Sensory Quick Adds sensation intact   Pain Assessment   Patient Currently in Pain No   Range of Motion Comprehensive   General Range of Motion no range of motion deficits identified   Strength Comprehensive (MMT)   General Manual Muscle Testing (MMT) Assessment other (see comments)  (5/5 shoulder flexion - " some shakiness noted)   Coordination   Upper Extremity Coordination   (WFL with some shakiness)   Bed Mobility   Bed Mobility No deficits identified   Comment (Bed Mobility) SBA for safety on this date   Transfers   Transfers sit-stand transfer   Sit-Stand Transfer   Sit-Stand [place] (Transfers) contact guard   Assistive Device (Sit-Stand Transfers) walker, front-wheeled   Sit/Stand Transfer Comments VC for safety   Balance   Balance Assessment standing balance: static;standing balance: dynamic   Activities of Daily Living   BADL Assessment/Intervention   (pt requiring assist of 1 for ADLs at this time)   Clinical Impression   Criteria for Skilled Therapeutic Interventions Met (OT) Yes, treatment indicated   OT Diagnosis Decreased ADL independence   Influenced by the following impairments poor balance and cognition   OT Problem List-Impairments impacting ADL balance;cognition;problems related to;activity tolerance impaired   ADL comments/analysis pt requiring assist of 1 for ADLs at this time d/t poor balance and impulsivity   Assessment of Occupational Performance 1-3 Performance Deficits   Identified Performance Deficits toileting, grooming, fxl mobility   Planned Therapy Interventions (OT) ADL retraining;balance training;cognition;transfer training;risk factor education   Clinical Decision Making Complexity (OT) low complexity   Anticipated Equipment Needs Upon Discharge (OT)   (no new needs idedntified on this date)   Risk & Benefits of therapy have been explained evaluation/treatment results reviewed;care plan/treatment goals reviewed;risks/benefits reviewed;current/potential barriers reviewed;participants included;participants voiced agreement with care plan;patient   Clinical Impression Comments Pt is a good candidate for continued skilled OT services to promote return to baseline ADL status and safe d/c following hospitalization with complications of poor balance and fluctuating cognition.   OT Total  Evaluation Time   OT Eval, Low Complexity Minutes (10261) 15   OT Goals   Therapy Frequency (OT) Daily   OT Predicted Duration/Target Date for Goal Attainment 03/27/23   OT Goals Hygiene/Grooming;Toilet Transfer/Toileting;Lower Body Dressing   OT: Hygiene/Grooming supervision/stand-by assist;while standing   OT: Lower Body Dressing Supervision/stand-by assist   OT: Toilet Transfer/Toileting Supervision/stand-by assist   Interventions   Interventions Quick Adds Self-Care/Home Management   Self-Care/Home Management   Self-Care/Home Mgmt/ADL, Compensatory, Meal Prep Minutes (36953) 15   Symptoms Noted During/After Treatment (Meal Preparation/Planning Training) fatigue;dizziness   Treatment Detail/Skilled Intervention Pt had some diziness upon sitting EOB, but quickly resolved. Pt ambulated to bathroom with CGA and FWW. Demonstrated decreased awareness for need for assist and attempted to aquire shoes from closet without assist or AD. CGA for toileting run through poor balance with mobility. Pt then brushed teeth and washed face with Set up and SBA from EOB. Pt then ambulated 75' in weber with CGA, FWW, and VC for walker management. Was unsteady, but no full LOB with mobility. Pt was left in bed with 1 on 1 in room. RN updated.   OT Discharge Planning   OT Plan monitor cognition, standing grooming, fxl mobility   OT Discharge Recommendation (DC Rec) home with assist   OT Rationale for DC Rec Pt has numerous family members in the area that pt reports could briefly stay with him as needed. 24/7 assist required at this time d/t variable cognition and poor balance. If this level of care not availible and condition does not improve, may need short TCU stay.   OT Brief overview of current status CGA for mobility, poor balance and variable cognition, Set up and SBA for seated grooming.   Total Session Time   Timed Code Treatment Minutes 15   Total Session Time (sum of timed and untimed services) 30

## 2023-03-20 NOTE — CONSULTS
"Care Management Follow Up    Length of Stay (days): 3    Expected Discharge Date: 03/21/2023     Concerns to be Addressed:       Patient plan of care discussed at interdisciplinary rounds: Yes    Anticipated Discharge Disposition:       Anticipated Discharge Services:    Anticipated Discharge DME:      Patient/family educated on Medicare website which has current facility and service quality ratings:    Education Provided on the Discharge Plan:    Patient/Family in Agreement with the Plan:      Referrals Placed by CM/SW:    Private pay costs discussed: Not applicable    Additional Information:  Patient lives in a house with his adult daughter, he is independent with ADLs. He goes to Outpatient rehab through [facility]. He states that he wants to go to a rehab facility in [place]. CM received VM from patient's sister Kelly ([phone]) stating that she is helping patient with coordination of getting into this facility. CM called Kelly and left VM asking for call back.      Therapy currently recommending TCU or home with 24/7 assist.    CM met with patient in room to discuss CD resources. Patient confirms that he wants to go to a facility in [place] or \"anywhere outside of here ([place])\". He states that he is okay with CM working with Kelly or James to coordinate discharge planning. Patient states that he wants to go home first \"get everything figured out and then go to treatment\". He states that he would like to get his house in order and \"not rush\" getting in to treatment as he wants to research the facility and make sure it would be a good fit before \"spending thousands of dollars for another program that doesn't help me\".     11:12 AM  CM received call from Kelly who states that the facility that patient wanted to go to in [place] does not accept his insurance. Kelly and patient's daughter James reached out to [facility] who states that they have openings and could like accept patient. Kelly will " talk to patient about this facility as she is unsure if patient will be agreeable to go.     11:32 AM  CM received VM from patient's daughter James [phone] who also stated that [facility] has an opening and could likely accept patient.     12:55 PM  CM met with patient and asked if patient was agreeable to CM sending information to [facility]. Patient states he does not want info sent to [facility] as he wants to vet the facility himself. Patient states that he doesn't want CM to assist with finding a facility and he will find a facility himself after discharge.       Josie Mcdonald, BSW

## 2023-03-21 ENCOUNTER — APPOINTMENT (OUTPATIENT)
Dept: OCCUPATIONAL THERAPY | Facility: HOSPITAL | Age: 53
End: 2023-03-21
Payer: COMMERCIAL

## 2023-03-21 LAB
ANION GAP SERPL CALCULATED.3IONS-SCNC: 5 MMOL/L (ref 7–15)
BUN SERPL-MCNC: 8 MG/DL (ref 6–20)
CALCIUM SERPL-MCNC: 9 MG/DL (ref 8.6–10)
CHLORIDE SERPL-SCNC: 100 MMOL/L (ref 98–107)
CREAT SERPL-MCNC: 0.64 MG/DL (ref 0.67–1.17)
DEPRECATED HCO3 PLAS-SCNC: 29 MMOL/L (ref 22–29)
ERYTHROCYTE [DISTWIDTH] IN BLOOD BY AUTOMATED COUNT: 12.3 % (ref 10–15)
GFR SERPL CREATININE-BSD FRML MDRD: >90 ML/MIN/1.73M2
GLUCOSE SERPL-MCNC: 95 MG/DL (ref 70–99)
HCT VFR BLD AUTO: 31.9 % (ref 40–53)
HGB BLD-MCNC: 10.6 G/DL (ref 13.3–17.7)
MAGNESIUM SERPL-MCNC: 1.3 MG/DL (ref 1.7–2.3)
MAGNESIUM SERPL-MCNC: 1.5 MG/DL (ref 1.7–2.3)
MAGNESIUM SERPL-MCNC: 2.2 MG/DL (ref 1.7–2.3)
MCH RBC QN AUTO: 29.4 PG (ref 26.5–33)
MCHC RBC AUTO-ENTMCNC: 33.2 G/DL (ref 31.5–36.5)
MCV RBC AUTO: 89 FL (ref 78–100)
PHOSPHATE SERPL-MCNC: 3.6 MG/DL (ref 2.5–4.5)
PLATELET # BLD AUTO: 92 10E3/UL (ref 150–450)
POTASSIUM SERPL-SCNC: 3.8 MMOL/L (ref 3.4–5.3)
RBC # BLD AUTO: 3.6 10E6/UL (ref 4.4–5.9)
SODIUM SERPL-SCNC: 134 MMOL/L (ref 136–145)
WBC # BLD AUTO: 5.3 10E3/UL (ref 4–11)

## 2023-03-21 PROCEDURE — 99232 SBSQ HOSP IP/OBS MODERATE 35: CPT | Mod: GC | Performed by: STUDENT IN AN ORGANIZED HEALTH CARE EDUCATION/TRAINING PROGRAM

## 2023-03-21 PROCEDURE — 84100 ASSAY OF PHOSPHORUS: CPT | Performed by: INTERNAL MEDICINE

## 2023-03-21 PROCEDURE — 83735 ASSAY OF MAGNESIUM: CPT | Performed by: INTERNAL MEDICINE

## 2023-03-21 PROCEDURE — 99232 SBSQ HOSP IP/OBS MODERATE 35: CPT | Mod: 25 | Performed by: NURSE PRACTITIONER

## 2023-03-21 PROCEDURE — 250N000013 HC RX MED GY IP 250 OP 250 PS 637: Performed by: INTERNAL MEDICINE

## 2023-03-21 PROCEDURE — HZ2ZZZZ DETOXIFICATION SERVICES FOR SUBSTANCE ABUSE TREATMENT: ICD-10-PCS | Performed by: INTERNAL MEDICINE

## 2023-03-21 PROCEDURE — 250N000013 HC RX MED GY IP 250 OP 250 PS 637

## 2023-03-21 PROCEDURE — 250N000009 HC RX 250: Performed by: INTERNAL MEDICINE

## 2023-03-21 PROCEDURE — 85018 HEMOGLOBIN: CPT

## 2023-03-21 PROCEDURE — 99291 CRITICAL CARE FIRST HOUR: CPT | Performed by: INTERNAL MEDICINE

## 2023-03-21 PROCEDURE — 250N000013 HC RX MED GY IP 250 OP 250 PS 637: Performed by: MASSAGE THERAPIST

## 2023-03-21 PROCEDURE — 250N000011 HC RX IP 250 OP 636: Performed by: STUDENT IN AN ORGANIZED HEALTH CARE EDUCATION/TRAINING PROGRAM

## 2023-03-21 PROCEDURE — 36415 COLL VENOUS BLD VENIPUNCTURE: CPT

## 2023-03-21 PROCEDURE — 250N000011 HC RX IP 250 OP 636: Performed by: INTERNAL MEDICINE

## 2023-03-21 PROCEDURE — 250N000011 HC RX IP 250 OP 636: Performed by: FAMILY MEDICINE

## 2023-03-21 PROCEDURE — 36415 COLL VENOUS BLD VENIPUNCTURE: CPT | Performed by: FAMILY MEDICINE

## 2023-03-21 PROCEDURE — 36415 COLL VENOUS BLD VENIPUNCTURE: CPT | Performed by: INTERNAL MEDICINE

## 2023-03-21 PROCEDURE — 200N000001 HC R&B ICU

## 2023-03-21 PROCEDURE — 83735 ASSAY OF MAGNESIUM: CPT | Performed by: FAMILY MEDICINE

## 2023-03-21 PROCEDURE — 82310 ASSAY OF CALCIUM: CPT

## 2023-03-21 PROCEDURE — 250N000013 HC RX MED GY IP 250 OP 250 PS 637: Performed by: FAMILY MEDICINE

## 2023-03-21 PROCEDURE — 97535 SELF CARE MNGMENT TRAINING: CPT | Mod: GO

## 2023-03-21 RX ORDER — POTASSIUM CHLORIDE 7.45 MG/ML
10 INJECTION INTRAVENOUS
Status: COMPLETED | OUTPATIENT
Start: 2023-03-21 | End: 2023-03-22

## 2023-03-21 RX ORDER — POTASSIUM CHLORIDE 1500 MG/1
20 TABLET, EXTENDED RELEASE ORAL ONCE
Status: COMPLETED | OUTPATIENT
Start: 2023-03-21 | End: 2023-03-21

## 2023-03-21 RX ORDER — DIAZEPAM 5 MG
10 TABLET ORAL EVERY 8 HOURS
Status: COMPLETED | OUTPATIENT
Start: 2023-03-21 | End: 2023-03-22

## 2023-03-21 RX ORDER — MAGNESIUM SULFATE 4 G/50ML
4 INJECTION INTRAVENOUS ONCE
Status: COMPLETED | OUTPATIENT
Start: 2023-03-21 | End: 2023-03-21

## 2023-03-21 RX ORDER — DIAZEPAM 5 MG
5 TABLET ORAL EVERY 8 HOURS
Status: DISCONTINUED | OUTPATIENT
Start: 2023-03-22 | End: 2023-03-22

## 2023-03-21 RX ORDER — DEXMEDETOMIDINE HYDROCHLORIDE 4 UG/ML
.1-1.2 INJECTION, SOLUTION INTRAVENOUS CONTINUOUS
Status: DISCONTINUED | OUTPATIENT
Start: 2023-03-21 | End: 2023-03-23

## 2023-03-21 RX ORDER — OLANZAPINE 10 MG/2ML
5 INJECTION, POWDER, FOR SOLUTION INTRAMUSCULAR DAILY PRN
Status: DISCONTINUED | OUTPATIENT
Start: 2023-03-21 | End: 2023-03-24 | Stop reason: HOSPADM

## 2023-03-21 RX ADMIN — DEXMEDETOMIDINE HYDROCHLORIDE 0.3 MCG/KG/HR: 400 INJECTION INTRAVENOUS at 21:41

## 2023-03-21 RX ADMIN — CLONIDINE HYDROCHLORIDE 0.1 MG: 0.1 TABLET ORAL at 16:19

## 2023-03-21 RX ADMIN — MAGNESIUM SULFATE HEPTAHYDRATE 4 G: 80 INJECTION, SOLUTION INTRAVENOUS at 16:01

## 2023-03-21 RX ADMIN — NICOTINE 1 PATCH: 14 PATCH, EXTENDED RELEASE TRANSDERMAL at 08:12

## 2023-03-21 RX ADMIN — LORAZEPAM 2 MG: 0.5 TABLET ORAL at 11:36

## 2023-03-21 RX ADMIN — POTASSIUM CHLORIDE 10 MEQ: 7.46 INJECTION, SOLUTION INTRAVENOUS at 22:13

## 2023-03-21 RX ADMIN — LORAZEPAM 1 MG: 0.5 TABLET ORAL at 03:50

## 2023-03-21 RX ADMIN — OLANZAPINE 5 MG: 5 TABLET, ORALLY DISINTEGRATING ORAL at 03:51

## 2023-03-21 RX ADMIN — Medication 1 TABLET: at 09:41

## 2023-03-21 RX ADMIN — LORAZEPAM 2 MG: 0.5 TABLET ORAL at 07:39

## 2023-03-21 RX ADMIN — POTASSIUM CHLORIDE 20 MEQ: 1500 TABLET, EXTENDED RELEASE ORAL at 07:32

## 2023-03-21 RX ADMIN — CLONIDINE HYDROCHLORIDE 0.1 MG: 0.1 TABLET ORAL at 07:31

## 2023-03-21 RX ADMIN — LORAZEPAM 2 MG: 0.5 TABLET ORAL at 09:17

## 2023-03-21 RX ADMIN — DEXMEDETOMIDINE HYDROCHLORIDE 0.2 MCG/KG/HR: 400 INJECTION INTRAVENOUS at 15:57

## 2023-03-21 RX ADMIN — DIAZEPAM 10 MG: 5 TABLET ORAL at 16:19

## 2023-03-21 RX ADMIN — FOLIC ACID 1 MG: 1 TABLET ORAL at 07:32

## 2023-03-21 RX ADMIN — MAGNESIUM SULFATE HEPTAHYDRATE 4 G: 80 INJECTION, SOLUTION INTRAVENOUS at 06:03

## 2023-03-21 RX ADMIN — POTASSIUM CHLORIDE 10 MEQ: 7.46 INJECTION, SOLUTION INTRAVENOUS at 23:19

## 2023-03-21 RX ADMIN — DULOXETINE 30 MG: 30 CAPSULE, DELAYED RELEASE ORAL at 07:40

## 2023-03-21 RX ADMIN — LORAZEPAM 1 MG: 0.5 TABLET ORAL at 11:12

## 2023-03-21 RX ADMIN — LORAZEPAM 2 MG: 0.5 TABLET ORAL at 13:45

## 2023-03-21 RX ADMIN — LEVOTHYROXINE SODIUM 175 MCG: 0.03 TABLET ORAL at 07:31

## 2023-03-21 RX ADMIN — LORAZEPAM 2 MG: 0.5 TABLET ORAL at 02:39

## 2023-03-21 RX ADMIN — LORAZEPAM 2 MG: 2 INJECTION INTRAMUSCULAR; INTRAVENOUS at 17:16

## 2023-03-21 RX ADMIN — OLANZAPINE 5 MG: 10 INJECTION, POWDER, FOR SOLUTION INTRAMUSCULAR at 14:01

## 2023-03-21 RX ADMIN — OLANZAPINE 10 MG: 5 TABLET, ORALLY DISINTEGRATING ORAL at 10:08

## 2023-03-21 RX ADMIN — LORAZEPAM 1 MG: 0.5 TABLET ORAL at 03:10

## 2023-03-21 RX ADMIN — THIAMINE HCL TAB 100 MG 100 MG: 100 TAB at 07:32

## 2023-03-21 ASSESSMENT — ACTIVITIES OF DAILY LIVING (ADL)
ADLS_ACUITY_SCORE: 31
ADLS_ACUITY_SCORE: 30
ADLS_ACUITY_SCORE: 31
ADLS_ACUITY_SCORE: 30
ADLS_ACUITY_SCORE: 30
ADLS_ACUITY_SCORE: 31
ADLS_ACUITY_SCORE: 31
ADLS_ACUITY_SCORE: 30
ADLS_ACUITY_SCORE: 31
ADLS_ACUITY_SCORE: 31

## 2023-03-21 NOTE — CONSULTS
INITIAL PSYCHIATRIC CONSULTATION                  REASON FOR REQUEST: EtOH withdrawal and questionable history of shcizophrenia, visual hallucinations      ASSESSMENT/RECOMMENDATIONS/PLAN :   Acute alcohol withdrawal delirium with perceptual disturbance  Mood disorder due to medical condition, history of brain trauma and bleed surgery in 12/2022  Severe alcohol use disorder.  Visual hallucinations in the setting of above.    Recommendations:    Patient is confused, disoriented, paranoid, having well-formed visual hallucinations, distracted and unstable due to medical issues, alcohol withdrawal.  Blood alcohol level 0.36 on 3/17/2023.  Despite repeated Ativan, Neurontin and olanzapine patient is impulsive, hallucinating, tremulous and unsafe without supervision.  Patient is not safe to leave hospital at this time, thus holdable.   Judicious use of Benzodiazepine on medical floor. If Patient requires additional Benzodiazepine for acute alcohol withdrawal, he may requite ICU level of care and supervision to avoid iatrogenic complications. I discussed my concerns with bedside RN and charge RN.     Olanzapine 5 mg Q6H PRN for hallucinations.       MENTAL STATUS EXAMINATION:   Appearance: Stated age, Awake, fluctuating levels of consciousness.  Constantly moving around bending down to  items from the floor, fidgety, easily startled  Speech: Devoid of content.   Thought Process: Confused, disoriented  Thought content: Well-formed visual hallucinations, patient is seeing small animals, especially bunnies, items that he is trying to  from his bed, paranoid, attempting to call police however not able to unlock his own phone, tremulous.  Thought Formation:  loosening of association   Judgment: Impaired  Insight : Impaired  Attention : Distracted  Memory: Depressed  Fund Of Knowledge: Depressed  Affect: Blunted  Mood: Confused  Alert and Oriented: Fluctuating. O x 0-1  Comprehension: Depressed   Generative  "thought content: Reduced  Language: Intact  Gait and Ambulation: With assist of one.   Problem solving: Impaired.   Cognitive set Shifting: Impaired  /62 (BP Location: Right arm, Patient Position: Supine, Cuff Size: Adult Regular)   Pulse 94   Temp 97.3  F (36.3  C) (Oral)   Resp 20   Ht 1.88 m (6' 2\")   Wt 100.4 kg (221 lb 6.4 oz)   SpO2 94%   BMI 28.43 kg/m        HISTORY OF PRESENT ILLNESS:   Presenting history to include: Per [facility]/Specialists:   Getachew Barcenas is a 52 year old male who has a history of severe EtOH use disorder with relapse, frequent falls 2/2 vertigo, chronic subdural hematoma, hypothyroidism s/p radiation, mood disorder and is admitted for generalized weakness 2/2 EtOH intoxication.   Sister went to see patient at his home, where he was found crying and unable to walk/stand. Has had worsening generalized weakness in setting of increased EtOH intake. Currently drinking 1.75L liquor every 2-3 days. Has history of severe EtOH use withdrawal sxs but denies seizures. Has been to rehab several times with some success. Last drink at 1630 on day of admission. Presented rambling and belligerent, refusing certain cares.    Patient denies HA, CP, SOB, abd pain, bowel changes, N/V.    Has history of brain trauma and bleed requiring surgery in 12/2022. Has associated vertigo so severe it causes him frequent falls, greatly inhibiting quality of life.    Patient hoping to go to rehab at [place] in [place] where \"he can hunt and fish.\" Plans to look into more with his daughter, thinks its \"the way I'm going to kick this.\" No smoking or other substance use. Mentioned to nursing that has been experiencing hallucinations as well.      Upon assessment patient was noted to be tremulous, confused, disoriented, slurred speech and unstable on his feet.  He was sitting on his bed bending down to  items from the floor, fidgeting, looking for things to  on his bed and feet.  In between " "he was trying to dial police on his phone but unable to unlock the phone.  He was making some reference to court date however not provided information.  He is fixating on going fishing a cabin, fluctuates between aggressive, hallucinating, exhibiting bizarre behaviors, growling, starting to shake and flailing arms, attempting to stand up quickly while losing his balance.  Patient is impulsive, aggressive at times, unstable on his feet.  During my visit he suddenly threw himself back down on his bed, sat up quickly and attempting to stand up while pushing his walker away from him.  Patient is very unsafe.  Patient has been receiving Ativan throughout the day despite any relief.  He had received olanzapine and Ativan 20 minutes prior to visit to no avail.  He is restless and tremulous and actively hallucinating.  Tremulous affective fine motor coordinations. He loses balance while seated in bed or chair, impulsive behaviors placing him at risk for falls.     Review of Systems:As per HPI. Remainders of 12 point review of systems negative.  Psychiatric ROS:  Patient is not a reliable historian at present due to poor cognition.       PFSH reviewed  and not pertinent to chief complaint/reason for visit  /62 (BP Location: Right arm, Patient Position: Supine, Cuff Size: Adult Regular)   Pulse 94   Temp 97.3  F (36.3  C) (Oral)   Resp 20   Ht 1.88 m (6' 2\")   Wt 100.4 kg (221 lb 6.4 oz)   SpO2 94%   BMI 28.43 kg/m    Alcohol ethyl (g/dL)   Date Value   03/17/2023 0.36 (HH)     Alcohol, Blood (mg/dL)   Date Value   02/18/2022 176 (H)     @24HOURRESULTS@  Recent Results (from the past 72 hour(s))   Phosphorus    Collection Time: 03/19/23  4:36 AM   Result Value Ref Range    Phosphorus 2.3 (L) 2.5 - 4.5 mg/dL   Basic metabolic panel    Collection Time: 03/19/23  4:36 AM   Result Value Ref Range    Sodium 135 (L) 136 - 145 mmol/L    Potassium 3.3 (L) 3.4 - 5.3 mmol/L    Chloride 102 98 - 107 mmol/L    Carbon Dioxide " (CO2) 25 22 - 29 mmol/L    Anion Gap 8 7 - 15 mmol/L    Urea Nitrogen 8.5 6.0 - 20.0 mg/dL    Creatinine 0.55 (L) 0.67 - 1.17 mg/dL    Calcium 8.4 (L) 8.6 - 10.0 mg/dL    Glucose 111 (H) 70 - 99 mg/dL    GFR Estimate >90 >60 mL/min/1.73m2   CBC with platelets    Collection Time: 03/19/23  4:36 AM   Result Value Ref Range    WBC Count 4.5 4.0 - 11.0 10e3/uL    RBC Count 3.84 (L) 4.40 - 5.90 10e6/uL    Hemoglobin 11.2 (L) 13.3 - 17.7 g/dL    Hematocrit 33.4 (L) 40.0 - 53.0 %    MCV 87 78 - 100 fL    MCH 29.2 26.5 - 33.0 pg    MCHC 33.5 31.5 - 36.5 g/dL    RDW 12.3 10.0 - 15.0 %    Platelet Count 88 (L) 150 - 450 10e3/uL   Magnesium    Collection Time: 03/19/23  4:36 AM   Result Value Ref Range    Magnesium 1.5 (L) 1.7 - 2.3 mg/dL   Magnesium    Collection Time: 03/19/23 11:31 AM   Result Value Ref Range    Magnesium 2.0 1.7 - 2.3 mg/dL   Potassium    Collection Time: 03/19/23  4:15 PM   Result Value Ref Range    Potassium 3.6 3.4 - 5.3 mmol/L   Basic metabolic panel    Collection Time: 03/20/23  4:25 AM   Result Value Ref Range    Sodium 140 136 - 145 mmol/L    Potassium 3.3 (L) 3.4 - 5.3 mmol/L    Chloride 105 98 - 107 mmol/L    Carbon Dioxide (CO2) 28 22 - 29 mmol/L    Anion Gap 7 7 - 15 mmol/L    Urea Nitrogen 5.7 (L) 6.0 - 20.0 mg/dL    Creatinine 0.60 (L) 0.67 - 1.17 mg/dL    Calcium 8.6 8.6 - 10.0 mg/dL    Glucose 101 (H) 70 - 99 mg/dL    GFR Estimate >90 >60 mL/min/1.73m2   CBC with platelets    Collection Time: 03/20/23  4:25 AM   Result Value Ref Range    WBC Count 3.7 (L) 4.0 - 11.0 10e3/uL    RBC Count 3.58 (L) 4.40 - 5.90 10e6/uL    Hemoglobin 10.7 (L) 13.3 - 17.7 g/dL    Hematocrit 31.5 (L) 40.0 - 53.0 %    MCV 88 78 - 100 fL    MCH 29.9 26.5 - 33.0 pg    MCHC 34.0 31.5 - 36.5 g/dL    RDW 12.3 10.0 - 15.0 %    Platelet Count 67 (L) 150 - 450 10e3/uL   Phosphorus    Collection Time: 03/20/23  4:25 AM   Result Value Ref Range    Phosphorus 4.0 2.5 - 4.5 mg/dL   Magnesium    Collection Time: 03/20/23  4:25  AM   Result Value Ref Range    Magnesium 1.8 1.7 - 2.3 mg/dL   Echocardiogram Complete    Collection Time: 03/20/23 10:35 AM   Result Value Ref Range    LVEF  60-65%    Potassium    Collection Time: 03/20/23 12:18 PM   Result Value Ref Range    Potassium 4.0 3.4 - 5.3 mmol/L   Basic metabolic panel    Collection Time: 03/21/23  4:29 AM   Result Value Ref Range    Sodium 134 (L) 136 - 145 mmol/L    Potassium 3.8 3.4 - 5.3 mmol/L    Chloride 100 98 - 107 mmol/L    Carbon Dioxide (CO2) 29 22 - 29 mmol/L    Anion Gap 5 (L) 7 - 15 mmol/L    Urea Nitrogen 8.0 6.0 - 20.0 mg/dL    Creatinine 0.64 (L) 0.67 - 1.17 mg/dL    Calcium 9.0 8.6 - 10.0 mg/dL    Glucose 95 70 - 99 mg/dL    GFR Estimate >90 >60 mL/min/1.73m2   CBC with platelets    Collection Time: 03/21/23  4:29 AM   Result Value Ref Range    WBC Count 5.3 4.0 - 11.0 10e3/uL    RBC Count 3.60 (L) 4.40 - 5.90 10e6/uL    Hemoglobin 10.6 (L) 13.3 - 17.7 g/dL    Hematocrit 31.9 (L) 40.0 - 53.0 %    MCV 89 78 - 100 fL    MCH 29.4 26.5 - 33.0 pg    MCHC 33.2 31.5 - 36.5 g/dL    RDW 12.3 10.0 - 15.0 %    Platelet Count 92 (L) 150 - 450 10e3/uL   Phosphorus    Collection Time: 03/21/23  4:29 AM   Result Value Ref Range    Phosphorus 3.6 2.5 - 4.5 mg/dL   Magnesium    Collection Time: 03/21/23  4:29 AM   Result Value Ref Range    Magnesium 1.5 (L) 1.7 - 2.3 mg/dL       PMH:   Past Medical History:   Diagnosis Date     Alcohol use disorder, severe, dependence (H) 2/24/2022           Current Medications:Scheduled Meds:    cloNIDine  0.1 mg Oral Q8H     DULoxetine  30 mg Oral Daily     folic acid  1 mg Oral Daily     [START ON 3/27/2023] gabapentin  100 mg Oral Q8H     [START ON 3/25/2023] gabapentin  300 mg Oral Q8H     levothyroxine  175 mcg Oral Daily     multivitamin w/minerals  1 tablet Oral Daily     nicotine  1 patch Transdermal Daily     nicotine   Transdermal Q8H     sodium chloride (PF)  3 mL Intracatheter Q8H     Continuous Infusions:  PRN Meds:.acetaminophen **OR**  acetaminophen, flumazenil, OLANZapine zydis **OR** haloperidol lactate, lidocaine 4%, lidocaine (buffered or not buffered), LORazepam **OR** LORazepam, melatonin, OLANZapine, ondansetron **OR** ondansetron, sodium chloride (PF)                Family History: PERSONALLY REVIEWED.No family history on file.  Pertinent Family hx not pertinent to Chief Complaint or reason for visit.     Social History:  PERSONALLY REVIEWED.  Social History     Socioeconomic History     Marital status:      Spouse name: Not on file     Number of children: Not on file     Years of education: Not on file     Highest education level: Not on file   Occupational History     Not on file   Tobacco Use     Smoking status: Never     Smokeless tobacco: Current     Types: Chew   Substance and Sexual Activity     Alcohol use: Yes     Drug use: No     Sexual activity: Not on file   Other Topics Concern     Not on file   Social History Narrative     Not on file     Social Determinants of Health     Financial Resource Strain: Not on file   Food Insecurity: Not on file   Transportation Needs: Not on file   Physical Activity: Not on file   Stress: Not on file   Social Connections: Not on file   Intimate Partner Violence: Not on file   Housing Stability: Not on file    not pertinent to Chief Complaint or reason for visit.             Allergies as of 06/01/2014 Reviewed     Review of Systems:As per HPI. Remainders of 12 point review of systems negative.    Review of Pertinent Laboratory:      PERSONALLY REVIEWED.    Physical Exam: Temp:  [97.3  F (36.3  C)-98.5  F (36.9  C)] 97.3  F (36.3  C)  Pulse:  [] 94  Resp:  [16-22] 20  BP: ()/(57-89) 119/62  SpO2:  [92 %-97 %] 94 %   Vitals: reviewed in chart     Physical exam as per medical team: reviewed in chart      diagnoses, risk and benefits of medications discussed with staff. Care coordination with care management team.   Thank you for this consultation.       Pricilla Michaud; NP  Mental  [facility] Addiction Services        This note was created with the help of Dragon dictation system. Grammatical and typing errors are not intentional.

## 2023-03-21 NOTE — H&P
CRITICAL CARE CONSULT:    Assessment/Plan:  52M w/ hx of chronic SDH, etoh abuse with relapses, frequent falls, hypothyroidism s/p radiation, mood disturbance, admitted with weakness and +etoh level with intoxication. Last drink was evening of admission on 3/17. Transferred to ICU PM on 3/21 for worsening delirium, agitation, escalating CIWA scores and benzo needs.     RESP:  No issues, on room air protecting airway.    Keep SpO2 94-96%, avoid hyperoxia    Encourage OOB, PT/OT, push IS    CV:  Tachycardia, hypertension likely 2/2 etoh withdrawal. Echo 3/20: normal LVEF 60-65%, mildly dilated RV, normal RV systolic function, borderline aortic root enlargement. Had non-sustained Vtach due to electrolyte derangements this admission; resolved.     MAP >65, not requiring pressors    Cardiology was following, now signed off - recommended repeat echo in 2-3 months    Lactate downtrended, no need to recheck    NEURO:  Acute etoh w/d delirium, at risk for DTs. Hx of chronic bilateral SDH from falls. SDH stable/reduced on CT head this admission    precedex drip, RASS goal 0 to -1    Discussed with psych -> will start po diazepam 10mg q8h x3 doses, then reduce to 5mg q8h (ordered)    Continue gabapentin, clonidine as adjunctive treatments    Continue nicotine patch    Continue thiamine, folate, MVI daily    Tylenol prn pain    Cautious use of narcotics    Continue zyprexa, haldol prn per psych    GI:  No issues, LFTs OK. CT a/p showed hepatic steatosis, mildly distended gallbladder with cholelithiasis, and acute uncomplicated sigmoid diverticulitis. Non acute abdominal exam.     ADAT    Bowel regimen prn    Monitor abdominal exam    RENAL:  Mild hypoNa, hypoMg and hypoK - typical electrolyte derangements seen in etoh abuse.     Aggressive repletions for K, Mg    Avoid nephrotoxins    External urinary catheter in place    Follow BUN/SCr, lytes    Start mIVF if not tolerating PO intake consistently    ID:  ? Of sigmoid  diverticulitis on CT a/p, but appears to be asymptomatic.     Monitor off abx    Follow culture data    HEMATOLOGIC:  Mild anemia, thrombocytopenia due to BM suppression from etoh. plt initially 118 -> down to 92 today. plt count was 266 last month.     Follow counts    hgb >7    Avoid heparin products for now    ENDOCRINE:  Hx of hypthyroidism    Cont PTA synthroid  FSBG checks, insulin sliding scale/drip per ICU protocol    ICU PROPHYLAXIS:    SCDs, no heparin products due to presence of SDH and worsening thrombocytopenia. Briefly discussed with NSGY (nat) and HSQ would be OK from their perspective. However, given worsening thrombocytopenia, will hold off on starting pharmacologic DVT ppx until plt count stabilizes.     No indication or GI ppx at this time.     Lines/Drains/Tubes:  PIV  External urinary catheter day 2    CODE STATUS, DISPOSITION, FAMILY COMMUNICATION: full code    Warner (Brayden) MD Pablo  [facility]  Pager [phone]  Clinic [phone]  Fax [phone]     Restraints  Not needed        CCx: etoh withdrawal with delirium, agitation    HPI: 52M w/ hx of chronic SDH, etoh abuse with relapses, frequent falls, hypothyroidism s/p radiation, mood disturbance, admitted with weakness and +etoh level with intoxication. Last drink was evening of admission on 3/17. This afternoon the floor was becoming agitated and borderline aggressive/violent, with escalating CIWA score and 13mg lorazepam administered since midnight. Also on gabapentin, clonidine. Also receiving zyprexa prn. Psych following and recommended txfer to ICU for higher level of care. He was transferred to ICU PM 3/21 for precedex drip and closer monitoring.    On my assessment, he was protecting his airway, mumbling somewhat incoherently. Appeared to be hallucinating.     Clinically Significant Risk Factors        # Hypokalemia: Lowest K = 3.3 mmol/L in last 2 days, will replace as needed  "    # Hypomagnesemia: Lowest Mg = 1.3 mg/dL in last 2 days, will replace as needed     # Thrombocytopenia: Lowest platelets = 67 in last 2 days, will monitor for bleeding         # Overweight: Estimated body mass index is 28.43 kg/m  as calculated from the following:    Height as of this encounter: 1.88 m (6' 2\").    Weight as of this encounter: 100.4 kg (221 lb 6.4 oz)., PRESENT ON ADMISSION                 Past Medical History:  Past Medical History:   Diagnosis Date     Alcohol use disorder, severe, dependence (H) 2/24/2022       Past Surgical History:  Past Surgical History:   Procedure Laterality Date     IR CAROTID CEREBRAL ANGIOGRAM BILATERAL  11/7/2022     PICC TRIPLE LUMEN PLACEMENT  11/3/2022            Social History:  Social History     Socioeconomic History     Marital status:      Spouse name: Not on file     Number of children: Not on file     Years of education: Not on file     Highest education level: Not on file   Occupational History     Not on file   Tobacco Use     Smoking status: Never     Smokeless tobacco: Current     Types: Chew   Substance and Sexual Activity     Alcohol use: Yes     Drug use: No     Sexual activity: Not on file   Other Topics Concern     Not on file   Social History Narrative     Not on file     Social Determinants of Health     Financial Resource Strain: Not on file   Food Insecurity: Not on file   Transportation Needs: Not on file   Physical Activity: Not on file   Stress: Not on file   Social Connections: Not on file   Intimate Partner Violence: Not on file   Housing Stability: Not on file       Family History:  No family history on file.    Allergies:  No Known Allergies    MAR Reviewed      Physical Exam:  Vent settings for last 24 hours:     /62 (BP Location: Right arm, Patient Position: Supine, Cuff Size: Adult Regular)   Pulse 94   Temp 97.3  F (36.3  C) (Oral)   Resp 20   Ht 1.88 m (6' 2\")   Wt 100.4 kg (221 lb 6.4 oz)   SpO2 94%   BMI 28.43 " kg/m      Intake/output data:    Intake/Output Summary (Last 24 hours) at 3/21/2023 1550  Last data filed at 3/21/2023 0619  Gross per 24 hour   Intake 1440 ml   Output 3850 ml   Net -2410 ml       Physical Exam  Gen: awake, disoriented, confused.  HEENT: NT, no DELBERT  CV: RRR, no m/g/r  Resp: CTAB  Abd: soft, nontender, BS+  Skin: no rashes or lesions  Ext: no edema  Neuro: PERRL, nonfocal exam    LAB:  @24HOURRESULTS@    IMAGING:  [unfilled]    Critical care attestation: 40 minutes spent managing the following issues: severe etoh withdrawal delirium with developing DT's, encephalopathy. autonomic instability due to etoh withdrawal with sympathetic hyperactivity. High risk for organ deterioration and death requiring ICU level care.

## 2023-03-21 NOTE — PROGRESS NOTES
"[facility]    Progress Note - Hospitalist Service       Date of Admission:  3/17/2023    Assessment & Plan     Getachew Barcenas is a 52 year old male who has a history of severe EtOH use disorder with relapse, frequent falls 2/2 vertigo, chronic subdural hematoma, hypothyroidism s/p radiation, mood disorder and is admitted for alcohol withdrawal continues to score on CIWA.     EtOH withdrawal  H/o severe EtOH use disorder with relapse  Patient presented after being found in home by family crying and unable to stand/walk. Reported drinking 1.75L liquor every 2-3 days. Last drink at 1630 on day of admission. Has history of withdrawal sxs but no reported seizures. Initially had lactic acidosis that improved with fluids, did not completely normalize, suspect 2/2 liver disease. Patient has been to treatment several times with some success but continues to relapse. Hoping to go to [facility] to a rehab center \"where I get to hunt and fish.\" Motivated to quit. Continues to score very high on CIWA, more impulsive and agitated today. Per chart review has questionable history of schizophrenia so will consult psych in the setting of his ongoing visual hallucinations.   - Psych consult  - Scheduled gabapentin and clonidine   - CIWA protocol: prn ativan, zyprexa or haldol  - IM Zyprexa available  - Supp folate, thiamine, thera-vit-m  - Fall precautions  - Telemetry  - PT/OT/CM consulted, appreciate recs and support     Hepatic steatosis  Abnormal LFTs  Thrombocytopenia  Alk Phos 136, ALT 52, Plt 118 on admission. FIB-4 index suggests advanced fibrosis is likely. Would explain persistently elevated lactate after fluid resuscitation. Has had significant thrombocytopenia in the recent past. Plts improved today.   - Daily CBC     Unsustained ventricular tachycardia  Hypomagnesemia  Mg 1.5 on admission. Electrolytes otherwise fine. However, continues to have intermittent unsustained ventricular " "tachycardia on telemetry, in particular after activity. Longest run of 20 beats. Asymptomatic during these episodes. ECHO without abnormality.   - Continue telemetry  - Daily BMP  - Mg- change to high replacement protocol  - Phos replacement protocol  - Potassium replacement protocol     Incidental finding: Acute uncomplicated sigmoid diverticulitis Mildly distended gallbladder with cholelithiasis. Patient has negative abdominal exam. Has had diverticulitis in remote past. No N/V/D. Afebrile and WBC wnl.   - Daily CBC   - Hold further abx for now     Chronic conditions  Chronic subdural hematoma: reduced bilateral subdural hematomas on CT head. NSG curbsided, no formal consult necessary. Hold off anticoagulation. No activity restrictions.   Frequent falls 2/2 vertigo: very debilitating for patient. Fall precautions.   Hypothyroidism s/p radiation: continue PTA levothyroxine.  Mood disorder/anxiety/risk of violent behavior: Per chart, also appears to have paranoid schizophrenia. Hard to differentiate if this or early stage withdrawal causing hallucinations. Will continue PTA duloxetine. Holding PTA hydroxyzine.      Diet: Regular Diet Adult    DVT Prophylaxis: Pneumatic Compression Devices - no anticoagulation in context of subdural hematomas  Triana Catheter: Not present  Fluids: PO  Lines: None     Cardiac Monitoring: None  Code Status: Full Code      Clinically Significant Risk Factors        # Hypokalemia: Lowest K = 3.3 mmol/L in last 2 days, will replace as needed     # Hypomagnesemia: Lowest Mg = 1.5 mg/dL in last 2 days, will replace as needed     # Thrombocytopenia: Lowest platelets = 67 in last 2 days, will monitor for bleeding          # Overweight: Estimated body mass index is 28.43 kg/m  as calculated from the following:    Height as of this encounter: 1.88 m (6' 2\").    Weight as of this encounter: 100.4 kg (221 lb 6.4 oz)., PRESENT ON ADMISSION       The patient's care was discussed with the Attending " Physician, Dr. Rasmussen.    Marleny Oliveira MD  Hospitalist Service  [facility]  ______________________________________________________________________    Interval History   Patient reports feeling tired today, he does endorse having visual hallucinations last night but none since waking up this morning. He is motivated to quit drinking.     Physical Exam   Vital Signs: Temp: 97.3  F (36.3  C) Temp src: Oral BP: 97/75 Pulse: (!) 124   Resp: 22 SpO2: 94 % O2 Device: None (Room air)    Weight: 221 lbs 6.4 oz    General Appearance: Awake, sitting in chair   HEENT: Normocephalic, atraumatic. EOMi.   Respiratory: No increased work of breathing on RA.  Cardiovascular: Well perfused  GI: Soft, nondistended, nontender.   Skin: Warm, dry. No rashes, jaundice, bruising.   Musculoskeletal: Moving extremities spontaneously.   Neurologic: No focal deficits.   Psychiatric: A&Ox2, endorses visual hallucinations, impulsive     Data     I have personally reviewed the following data over the past 24 hrs:    5.3  \   10.6 (L)   / 92 (L)     134 (L) 100 8.0 /  95   3.8 29 0.64 (L) \       Imaging results reviewed over the past 24 hrs:   No results found for this or any previous visit (from the past 24 hour(s)).

## 2023-03-21 NOTE — PLAN OF CARE
"  Problem: Fall Injury Risk  Goal: Absence of Fall and Fall-Related Injury  Outcome: Progressing     Problem: Alcohol Withdrawal  Goal: Alcohol Withdrawal Symptom Control  Outcome: Progressing  Goal: Optimal Neurologic Function  Outcome: Progressing  Goal: Readiness for Change Identified  Outcome: Progressing   Goal Outcome Evaluation:         PT having impulsive behaviors and quickly getting up stumbling around room.   PT showing signs of agitation.  Pt using aggressive language at times when staff redirecting.  Pt having hallucinations of animals and people in room.   PT made inappropriate comments about  female staff.   Pt has conversations with people who are not in room.  Pt changes subjects repeatedly and doesn't have logical conversations.     PT is fixating on going fishing at [employer] and frustrated with not being ready for a trip to [employer].       Pt aggression and hallucination increasing late morning.  Pt thought he saw a \"white rabbit with claws and teeth\" under his chair.   PT growled and started shaking and flailing arms and stood up quickly and walked into writer.   Pt made aggressive statement then quickly sat back down.      Pt ripped off telemetry patches and wires.  Pt refuses to given them back . Pt hid them in blanket and is clutching blanket tightly.      Pt repeatedly tell staff they need a search warrant.  PT attempts to call 911 but is unable to put glasses on and unable to type pass code to open phone.    PT cannot lift glass of water to drink.      Pt tries to walk into hallway and yells loudly when staff touch pt or try to redirect to bed or chair.           "

## 2023-03-21 NOTE — PLAN OF CARE
Problem: Alcohol Withdrawal  Goal: Alcohol Withdrawal Symptom Control  Outcome: Progressing     Problem: Dysrhythmia  Goal: Normalized Cardiac Rhythm  Outcome: Progressing   Goal Outcome Evaluation:  Patient denied pain or nausea.  Patient scoring >7 on CIWA protocol. Requiring several doses of Lorazepam and zyprexa overnight. Patient with off and on confusion. Continues to have visual hallucinations--sees animals, people in his room, sees artwork moving, etc. Dr. Clarke , was notified and came up to evaluate patient. No new orders, continue to monitor. Patient restless with no sleep overnight.    HR up to 150s with activity otherwise 80s. Patient with frequent PVCs. Patient asymptomatic.    1:1 sitter in room for safety as patient is unsteady and quick to get on his feet and at times expresses desire to leave--looking outside his window and sitting outside his room earlier in the evening.    Before daughter went home for the evening she expressed concern about visual hallucinations. She is concern about his schizophrenia, wondering if he is on his psych meds. She reported that patient's mom is schizophrenic and was in group home. She would like a psychiatric evaluation. This was reported to Dr. Clarke.

## 2023-03-21 NOTE — PROGRESS NOTES
Care Management Follow Up    Length of Stay (days): 4    Expected Discharge Date: 03/22/2023     Concerns to be Addressed:  Alcohol withdrawl  Patient plan of care discussed at interdisciplinary rounds: Yes    Anticipated Discharge Disposition:  TCU/CD treatment     Anticipated Discharge Services:   TCU  Anticipated Discharge DME:  unknown    Patient/family educated on Medicare website which has current facility and service quality ratings:  yes  Education Provided on the Discharge Plan:  yes  Patient/Family in Agreement with the Plan:  Family is, unclear about pateint    Referrals Placed by CM/SW:  None at thistime  Private pay costs discussed: NA    Additional Information:  Received call from Pt's sister Kelly that he was interested in a facility for chemical dependency and mental health in [place] for rehab.   through [facility].      Kelly also stated [facility] Pt also agreed to le let daughter James Barcenas receive information about his hospitalization.  Advised the sister we would follow up with the patient.     Adriana Cruz RN

## 2023-03-22 LAB
ANION GAP SERPL CALCULATED.3IONS-SCNC: 8 MMOL/L (ref 7–15)
BUN SERPL-MCNC: 7.5 MG/DL (ref 6–20)
CALCIUM SERPL-MCNC: 8.9 MG/DL (ref 8.6–10)
CHLORIDE SERPL-SCNC: 99 MMOL/L (ref 98–107)
CREAT SERPL-MCNC: 0.62 MG/DL (ref 0.67–1.17)
DEPRECATED HCO3 PLAS-SCNC: 27 MMOL/L (ref 22–29)
ERYTHROCYTE [DISTWIDTH] IN BLOOD BY AUTOMATED COUNT: 12.3 % (ref 10–15)
GFR SERPL CREATININE-BSD FRML MDRD: >90 ML/MIN/1.73M2
GLUCOSE SERPL-MCNC: 109 MG/DL (ref 70–99)
HCT VFR BLD AUTO: 35.1 % (ref 40–53)
HGB BLD-MCNC: 11.9 G/DL (ref 13.3–17.7)
MAGNESIUM SERPL-MCNC: 1.9 MG/DL (ref 1.7–2.3)
MCH RBC QN AUTO: 30.1 PG (ref 26.5–33)
MCHC RBC AUTO-ENTMCNC: 33.9 G/DL (ref 31.5–36.5)
MCV RBC AUTO: 89 FL (ref 78–100)
PHOSPHATE SERPL-MCNC: 4.8 MG/DL (ref 2.5–4.5)
PLATELET # BLD AUTO: 107 10E3/UL (ref 150–450)
POTASSIUM SERPL-SCNC: 3.8 MMOL/L (ref 3.4–5.3)
RBC # BLD AUTO: 3.95 10E6/UL (ref 4.4–5.9)
SODIUM SERPL-SCNC: 134 MMOL/L (ref 136–145)
WBC # BLD AUTO: 5.8 10E3/UL (ref 4–11)

## 2023-03-22 PROCEDURE — 80048 BASIC METABOLIC PNL TOTAL CA: CPT | Performed by: STUDENT IN AN ORGANIZED HEALTH CARE EDUCATION/TRAINING PROGRAM

## 2023-03-22 PROCEDURE — 250N000009 HC RX 250: Performed by: INTERNAL MEDICINE

## 2023-03-22 PROCEDURE — 83735 ASSAY OF MAGNESIUM: CPT | Performed by: INTERNAL MEDICINE

## 2023-03-22 PROCEDURE — 250N000011 HC RX IP 250 OP 636: Performed by: NURSE PRACTITIONER

## 2023-03-22 PROCEDURE — 250N000011 HC RX IP 250 OP 636: Performed by: INTERNAL MEDICINE

## 2023-03-22 PROCEDURE — 36415 COLL VENOUS BLD VENIPUNCTURE: CPT | Performed by: STUDENT IN AN ORGANIZED HEALTH CARE EDUCATION/TRAINING PROGRAM

## 2023-03-22 PROCEDURE — 250N000011 HC RX IP 250 OP 636: Performed by: STUDENT IN AN ORGANIZED HEALTH CARE EDUCATION/TRAINING PROGRAM

## 2023-03-22 PROCEDURE — 250N000013 HC RX MED GY IP 250 OP 250 PS 637: Performed by: STUDENT IN AN ORGANIZED HEALTH CARE EDUCATION/TRAINING PROGRAM

## 2023-03-22 PROCEDURE — 99291 CRITICAL CARE FIRST HOUR: CPT | Performed by: INTERNAL MEDICINE

## 2023-03-22 PROCEDURE — 250N000013 HC RX MED GY IP 250 OP 250 PS 637: Performed by: INTERNAL MEDICINE

## 2023-03-22 PROCEDURE — 250N000013 HC RX MED GY IP 250 OP 250 PS 637: Performed by: NURSE PRACTITIONER

## 2023-03-22 PROCEDURE — 200N000001 HC R&B ICU

## 2023-03-22 PROCEDURE — 85027 COMPLETE CBC AUTOMATED: CPT | Performed by: STUDENT IN AN ORGANIZED HEALTH CARE EDUCATION/TRAINING PROGRAM

## 2023-03-22 PROCEDURE — 84100 ASSAY OF PHOSPHORUS: CPT | Performed by: STUDENT IN AN ORGANIZED HEALTH CARE EDUCATION/TRAINING PROGRAM

## 2023-03-22 PROCEDURE — 99231 SBSQ HOSP IP/OBS SF/LOW 25: CPT | Mod: GC | Performed by: STUDENT IN AN ORGANIZED HEALTH CARE EDUCATION/TRAINING PROGRAM

## 2023-03-22 RX ORDER — DIAZEPAM 10 MG/2ML
5 INJECTION, SOLUTION INTRAMUSCULAR; INTRAVENOUS 3 TIMES DAILY
Status: DISCONTINUED | OUTPATIENT
Start: 2023-03-22 | End: 2023-03-23

## 2023-03-22 RX ORDER — MAGNESIUM SULFATE HEPTAHYDRATE 40 MG/ML
2 INJECTION, SOLUTION INTRAVENOUS ONCE
Status: COMPLETED | OUTPATIENT
Start: 2023-03-22 | End: 2023-03-22

## 2023-03-22 RX ORDER — POTASSIUM CHLORIDE 7.45 MG/ML
10 INJECTION INTRAVENOUS
Status: COMPLETED | OUTPATIENT
Start: 2023-03-22 | End: 2023-03-22

## 2023-03-22 RX ORDER — DIAZEPAM 5 MG
5 TABLET ORAL 3 TIMES DAILY
Status: DISCONTINUED | OUTPATIENT
Start: 2023-03-22 | End: 2023-03-23

## 2023-03-22 RX ORDER — HEPARIN SODIUM 5000 [USP'U]/.5ML
5000 INJECTION, SOLUTION INTRAVENOUS; SUBCUTANEOUS EVERY 8 HOURS
Status: DISCONTINUED | OUTPATIENT
Start: 2023-03-22 | End: 2023-03-24 | Stop reason: HOSPADM

## 2023-03-22 RX ADMIN — OLANZAPINE 5 MG: 5 TABLET, ORALLY DISINTEGRATING ORAL at 19:07

## 2023-03-22 RX ADMIN — DIAZEPAM 5 MG: 5 TABLET ORAL at 21:55

## 2023-03-22 RX ADMIN — MAGNESIUM SULFATE HEPTAHYDRATE 2 G: 40 INJECTION, SOLUTION INTRAVENOUS at 06:27

## 2023-03-22 RX ADMIN — POTASSIUM CHLORIDE 10 MEQ: 7.46 INJECTION, SOLUTION INTRAVENOUS at 08:26

## 2023-03-22 RX ADMIN — POTASSIUM CHLORIDE 10 MEQ: 7.46 INJECTION, SOLUTION INTRAVENOUS at 06:13

## 2023-03-22 RX ADMIN — HEPARIN SODIUM 5000 UNITS: 10000 INJECTION, SOLUTION INTRAVENOUS; SUBCUTANEOUS at 08:32

## 2023-03-22 RX ADMIN — DIAZEPAM 5 MG: 5 INJECTION, SOLUTION INTRAMUSCULAR; INTRAVENOUS at 10:56

## 2023-03-22 RX ADMIN — CLONIDINE HYDROCHLORIDE 0.1 MG: 0.1 TABLET ORAL at 17:20

## 2023-03-22 RX ADMIN — DEXMEDETOMIDINE HYDROCHLORIDE 0.7 MCG/KG/HR: 400 INJECTION INTRAVENOUS at 10:24

## 2023-03-22 RX ADMIN — HALOPERIDOL LACTATE 5 MG: 5 INJECTION, SOLUTION INTRAMUSCULAR at 01:06

## 2023-03-22 RX ADMIN — DEXMEDETOMIDINE HYDROCHLORIDE 1 MCG/KG/HR: 400 INJECTION INTRAVENOUS at 06:07

## 2023-03-22 RX ADMIN — NICOTINE 1 PATCH: 14 PATCH, EXTENDED RELEASE TRANSDERMAL at 10:20

## 2023-03-22 RX ADMIN — HEPARIN SODIUM 5000 UNITS: 10000 INJECTION, SOLUTION INTRAVENOUS; SUBCUTANEOUS at 23:04

## 2023-03-22 RX ADMIN — CLONIDINE HYDROCHLORIDE 0.1 MG: 0.1 TABLET ORAL at 23:04

## 2023-03-22 RX ADMIN — HEPARIN SODIUM 5000 UNITS: 10000 INJECTION, SOLUTION INTRAVENOUS; SUBCUTANEOUS at 17:20

## 2023-03-22 RX ADMIN — DIAZEPAM 10 MG: 5 TABLET ORAL at 00:19

## 2023-03-22 RX ADMIN — CLONIDINE HYDROCHLORIDE 0.1 MG: 0.1 TABLET ORAL at 00:20

## 2023-03-22 RX ADMIN — DIAZEPAM 5 MG: 5 INJECTION, SOLUTION INTRAMUSCULAR; INTRAVENOUS at 14:02

## 2023-03-22 RX ADMIN — DEXMEDETOMIDINE HYDROCHLORIDE 1.2 MCG/KG/HR: 400 INJECTION INTRAVENOUS at 02:02

## 2023-03-22 RX ADMIN — DEXMEDETOMIDINE HYDROCHLORIDE 0.4 MCG/KG/HR: 400 INJECTION INTRAVENOUS at 18:05

## 2023-03-22 ASSESSMENT — ACTIVITIES OF DAILY LIVING (ADL)
ADLS_ACUITY_SCORE: 38
ADLS_ACUITY_SCORE: 38
ADLS_ACUITY_SCORE: 37
ADLS_ACUITY_SCORE: 37
ADLS_ACUITY_SCORE: 38
ADLS_ACUITY_SCORE: 31
ADLS_ACUITY_SCORE: 37
ADLS_ACUITY_SCORE: 38
ADLS_ACUITY_SCORE: 37
ADLS_ACUITY_SCORE: 38

## 2023-03-22 NOTE — PLAN OF CARE
Problem: Alcohol Withdrawal  Goal: Alcohol Withdrawal Symptom Control  Outcome: Progressing   Goal Outcome Evaluation:      [facility] - ICU    RN Progress Note:            Pertinent Assessments:      Please refer to flowsheet rows for full assessment     Pt transferred to ICU to place on precedex gtt d/t worsening aggression, impulsiveness,agitation, and visual hallucinations. Pt continues scored high on the CIWA scale, and multiple doses of ativan was ineffective. RASS goal: 0 to -1.         Mobility Level:     2 - Tilt      Barriers to Progression: Precedex gtt         Key Events - This Shift:     -Titrated down  precedex gtt 0.2mcg to maintain RASS goal 0 to -1. Pt current -1.           Barriers to Discharge / Downgrade:     On Precedex gtt and confusion         Point of Contact Update YES-OR-NO: Yes  If No, reason: Yes  Name:Kelly (sister)  Phone Number:  Summary of Conversation: Update on how pt is doing and POC.

## 2023-03-22 NOTE — PROGRESS NOTES
[facility]    Progress Note - Hospitalist Service       Date of Admission:  3/17/2023    Assessment & Plan     Getachew Barcenas is a 52 year old male who has a history of severe EtOH use disorder with relapse, frequent falls 2/2 vertigo, chronic subdural hematoma, hypothyroidism s/p radiation, mood disorder and is admitted for alcohol withdrawal. Was continuing to score high on CIWA, with escalating aggression and agitation, was transferred to the ICU on 03/21 for Precedex gtt.      EtOH withdrawal  H/o severe EtOH use disorder with relapse  Patient reported drinking 1.75L liquor every 2-3 days. Last drink at 1630 on day of admission. Has history of withdrawal sxs but no reported seizures. Patient has been to treatment several times with some success but continues to relapse. Continued to score very high on CIWA, had mounting aggression and impulsivity so was transferred to ICU for precedex gtt.   - ICU consult, Precedex started on 03/21  - Psych consult  - Scheduled gabapentin and clonidine   - CIWA protocol: prn ativan, zyprexa or haldol  - IM Zyprexa available  - Supp folate, thiamine, thera-vit-m  - Fall precautions  - Telemetry  - PT/OT/CM consulted     Hepatic steatosis  Abnormal LFTs  Thrombocytopenia  Alk Phos 136, ALT 52, Plt 118 on admission. FIB-4 index suggests advanced fibrosis is likely. Would explain persistently elevated lactate after fluid resuscitation. Has had significant thrombocytopenia in the recent past. Plts improved.   - Daily CBC     Unsustained ventricular tachycardia  Hypomagnesemia  Mg 1.5 on admission. Electrolytes otherwise fine. However, continues to have intermittent unsustained ventricular tachycardia on telemetry, in particular after activity. Longest run of 20 beats. Asymptomatic during these episodes. ECHO without abnormality.   - Continue telemetry  - Daily BMP  - Mg- change to high replacement protocol  - Phos replacement protocol  - Potassium  "replacement protocol     Incidental finding: Acute uncomplicated sigmoid diverticulitis Mildly distended gallbladder with cholelithiasis. Patient has negative abdominal exam. Has had diverticulitis in remote past. No N/V/D. Afebrile and WBC wnl.   - Daily CBC   - Hold further abx for now     Chronic conditions  Chronic subdural hematoma: reduced bilateral subdural hematomas on CT head. NSG curbsided, no formal consult necessary.   Frequent falls 2/2 vertigo: very debilitating for patient. Fall precautions.   Hypothyroidism s/p radiation: continue PTA levothyroxine.  Mood disorder/anxiety/risk of violent behavior: Per chart, also appears to have paranoid schizophrenia. Hard to differentiate if this or early stage withdrawal causing hallucinations. Will continue PTA duloxetine. Holding PTA hydroxyzine.      Diet: Regular Diet Adult    DVT Prophylaxis: Heparin  Triana Catheter: Not present  Fluids: PO  Lines: None     Cardiac Monitoring: None  Code Status: Full Code      Clinically Significant Risk Factors            # Hypomagnesemia: Lowest Mg = 1.3 mg/dL in last 2 days, will replace as needed     # Thrombocytopenia: Lowest platelets = 92 in last 2 days, will monitor for bleeding          # Overweight: Estimated body mass index is 28.13 kg/m  as calculated from the following:    Height as of this encounter: 1.88 m (6' 2\").    Weight as of this encounter: 99.4 kg (219 lb 1.6 oz).        The patient's care was discussed with the Attending Physician, Dr. Rasmussen.    Marleny Oliveira MD  Hospitalist Service  [facility]  ______________________________________________________________________    Interval History   Patient aroused slightly to voice. Sedated.    Physical Exam   Vital Signs: Temp: 97.5  F (36.4  C) Temp src: Axillary BP: 107/68 Pulse: 66   Resp: 16 SpO2: 94 % O2 Device: None (Room air) Oxygen Delivery: 1 LPM  Weight: 219 lbs 1.6 oz    General Appearance: Sedated   HEENT: " Normocephalic, atraumatic.   Respiratory: No increased work of breathing on RA.  Cardiovascular: Well perfused  GI: Soft, nondistended, nontender.   Skin: Warm, dry. No rashes, jaundice, bruising.   Musculoskeletal: No swelling appreciated.    Neurologic: Sedated.  Psychiatric: Sedated.     Data     I have personally reviewed the following data over the past 24 hrs:    5.8  \   11.9 (L)   / 107 (L)     134 (L) 99 7.5 /  109 (H)   3.8 27 0.62 (L) \       Imaging results reviewed over the past 24 hrs:   No results found for this or any previous visit (from the past 24 hour(s)).

## 2023-03-22 NOTE — PROGRESS NOTES
Care Management Follow Up    Length of Stay (days): 5    Expected Discharge Date: 03/25/2023     Concerns to be Addressed:     Medical Progression/ discharge planning  Patient plan of care discussed at interdisciplinary rounds: Yes    Anticipated Discharge Disposition:  TBD     Anticipated Discharge Services:  NA  Anticipated Discharge DME:  NA    Patient/family educated on Medicare website which has current facility and service quality ratings:  NA  Education Provided on the Discharge Plan:  NA  Patient/Family in Agreement with the Plan:  NA    Referrals Placed by CM/SW:  NA  Private pay costs discussed: Not applicable    Additional Information:  Discussed patient in ICU rounds. Transferred to ICU overnight due to increased agitation, started on Precedex gtt. Patient is agitated. On a 1:1.    RNCM discussed patient with SWCM per psych note, patient is holdable. SWCM to follow.    Tarsha Plummer RN

## 2023-03-22 NOTE — PROGRESS NOTES
CRITICAL CARE PROGRESS NOTE:    Assessment/Plan:  52M w/ hx of chronic SDH, etoh abuse with relapses, frequent falls, hypothyroidism s/p radiation, mood disturbance, admitted with weakness and +etoh level with intoxication. Last drink was evening of admission on 3/17. Transferred to ICU PM on 3/21 for worsening delirium, agitation, escalating CIWA scores and benzo needs.      RESP:  No issues, on minimal supplemental O2, SpO2 94%, protecting airway.    Keep SpO2 94-96%, avoid hyperoxia    Encourage OOB, PT/OT, push IS     CV:  Tachycardia, hypertension likely 2/2 etoh withdrawal. Improved. Echo 3/20: normal LVEF 60-65%, mildly dilated RV, normal RV systolic function, borderline aortic root enlargement. Had non-sustained Vtach due to electrolyte derangements this admission; resolved.     MAP >65, not requiring pressors    Cardiology was following, now signed off - recommended repeat echo in 2-3 months    Lactate downtrended, no need to recheck     NEURO:  Acute etoh w/d delirium, at risk for DTs. Hx of chronic bilateral SDH from falls. SDH stable/reduced on CT head this admission    precedex drip, RASS goal 0 to -1    Discussed with psych 3/21 -> started po diazepam 10mg q8h x3 doses, then reduce to 5mg q8h    Continue gabapentin, clonidine as adjunctive treatments    Continue nicotine patch    Continue thiamine, folate, MVI daily    Tylenol prn pain    Cautious use of narcotics    Continue zyprexa, haldol prn per psych    Appreciate psych input, will follow up with their service today.     GI:  No issues, LFTs OK. CT a/p showed hepatic steatosis, mildly distended gallbladder with cholelithiasis, and acute uncomplicated sigmoid diverticulitis. Non acute abdominal exam.     ADAT    Bowel regimen prn    Monitor abdominal exam     RENAL:  Mild hypoNa, hypoMg and hypoK - typical electrolyte derangements seen in etoh abuse.     Continue for K, Mg repletions per RN driven protocol.     Avoid nephrotoxins    External  "urinary catheter in place    Follow BUN/SCr, lytes     ID:  Possible sigmoid diverticulitis on CT a/p, but appears to be asymptomatic.     Monitor off abx    Follow culture data     HEMATOLOGIC:  Mild anemia, thrombocytopenia due to BM suppression from etoh. plt initially 118 -> down to 92 today. plt count was 266 last month. plt improved today.    Follow counts    hgb >7    Will start HSQ for DVT ppx per section below     ENDOCRINE:  Hx of hypthyroidism    Cont PTA synthroid  FSBG checks, insulin sliding scale/drip per ICU protocol     ICU PROPHYLAXIS:    Start HSQ today. Briefly discussed with NATASHA (nat) on 3/21 and HSQ would be OK from their perspective. Monitor plt count closely and for any signs of bleeding.     Lines/Drains/Tubes:  PIV  External urinary catheter day 2     CODE STATUS, DISPOSITION, FAMILY COMMUNICATION: full code     Warner (Shawn Shah MD  [facility]  Pager [phone]  Clinic [phone]  Fax [phone]      Restraints  Not needed  Clinically Significant Risk Factors            # Hypomagnesemia: Lowest Mg = 1.3 mg/dL in last 2 days, will replace as needed     # Thrombocytopenia: Lowest platelets = 92 in last 2 days, will monitor for bleeding         # Overweight: Estimated body mass index is 28.13 kg/m  as calculated from the following:    Height as of this encounter: 1.88 m (6' 2\").    Weight as of this encounter: 99.4 kg (219 lb 1.6 oz).                  Overnight events:  Barely responsive today - woke up with sternal rub, groaned a bit. Says he's OK.  On precedex @0.8.  No major events overnight. Dose take pills per RN.    Subjective:  Denies pain.     Objective:  Physical Exam:  Vent settings for last 24 hours:  Resp: 16      /68 (BP Location: Right arm)   Pulse 66   Temp 97.5  F (36.4  C) (Axillary)   Resp 16   Ht 1.88 m (6' 2\")   Wt 99.4 kg (219 lb 1.6 oz)   SpO2 94%   BMI 28.13 kg/m      Intake/Output last 3 " shifts:  I/O last 3 completed shifts:  In: 297.2 [P.O.:100; I.V.:197.2]  Out: 400 [Urine:400]  Intake/Output this shift:  No intake/output data recorded.    Physical Exam  Gen: lethargic, snoring but arousable with sternal rub.  HEENT: no OP lesions, no DELBERT  CV: RRR, no m/g/r  Resp: clear ant no w/r/r   Abd: soft, nontender, BS+  Neuro: PERRL, nonfocal  Ext: no edema    LAB:  Recent Labs   Lab 03/22/23  0408   WBC 5.8   HGB 11.9*   HCT 35.1*   *     Recent Labs   Lab 03/22/23  0408 03/21/23  0429 03/20/23  0425 03/18/23  0600 03/17/23  1942   * 134* 140   < > 142   CO2 27 29 28   < > 18*   BUN 7.5 8.0 5.7*   < > 11.5   ALKPHOS  --   --   --   --  136*   ALT  --   --   --   --  52*    < > = values in this interval not displayed.     Micro  Blood 3/17 NGTD    No current outpatient medications on file.       Critical care attestation: 40 minutes spent managing the following issues: severe etoh withdrawal delirium with developing DT's, encephalopathy. autonomic instability due to etoh withdrawal with sympathetic hyperactivity. High risk for organ deterioration and death requiring ICU level care.

## 2023-03-22 NOTE — PROGRESS NOTES
Attempted to administer morning medications without success.  Pt spit all medications onto the floor.  Refusing to accept anything by mouth including breakfast this am.    Remains confused and uncooperative.  1:1 sitter at bedside.  Will monitor.

## 2023-03-23 ENCOUNTER — APPOINTMENT (OUTPATIENT)
Dept: PHYSICAL THERAPY | Facility: HOSPITAL | Age: 53
End: 2023-03-23
Payer: COMMERCIAL

## 2023-03-23 ENCOUNTER — APPOINTMENT (OUTPATIENT)
Dept: OCCUPATIONAL THERAPY | Facility: HOSPITAL | Age: 53
End: 2023-03-23
Payer: COMMERCIAL

## 2023-03-23 LAB
ANION GAP SERPL CALCULATED.3IONS-SCNC: 9 MMOL/L (ref 7–15)
BACTERIA BLD CULT: NO GROWTH
BACTERIA BLD CULT: NO GROWTH
BUN SERPL-MCNC: 9.1 MG/DL (ref 6–20)
CALCIUM SERPL-MCNC: 9.1 MG/DL (ref 8.6–10)
CHLORIDE SERPL-SCNC: 100 MMOL/L (ref 98–107)
CREAT SERPL-MCNC: 0.71 MG/DL (ref 0.67–1.17)
DEPRECATED HCO3 PLAS-SCNC: 27 MMOL/L (ref 22–29)
ERYTHROCYTE [DISTWIDTH] IN BLOOD BY AUTOMATED COUNT: 12.7 % (ref 10–15)
GFR SERPL CREATININE-BSD FRML MDRD: >90 ML/MIN/1.73M2
GLUCOSE SERPL-MCNC: 97 MG/DL (ref 70–99)
HCT VFR BLD AUTO: 36.8 % (ref 40–53)
HGB BLD-MCNC: 12.1 G/DL (ref 13.3–17.7)
MAGNESIUM SERPL-MCNC: 1.8 MG/DL (ref 1.7–2.3)
MCH RBC QN AUTO: 30 PG (ref 26.5–33)
MCHC RBC AUTO-ENTMCNC: 32.9 G/DL (ref 31.5–36.5)
MCV RBC AUTO: 91 FL (ref 78–100)
PHOSPHATE SERPL-MCNC: 5.3 MG/DL (ref 2.5–4.5)
PLATELET # BLD AUTO: 112 10E3/UL (ref 150–450)
POTASSIUM SERPL-SCNC: 4.1 MMOL/L (ref 3.4–5.3)
RBC # BLD AUTO: 4.04 10E6/UL (ref 4.4–5.9)
SODIUM SERPL-SCNC: 136 MMOL/L (ref 136–145)
WBC # BLD AUTO: 5.1 10E3/UL (ref 4–11)

## 2023-03-23 PROCEDURE — 250N000013 HC RX MED GY IP 250 OP 250 PS 637: Performed by: NURSE PRACTITIONER

## 2023-03-23 PROCEDURE — 250N000013 HC RX MED GY IP 250 OP 250 PS 637: Performed by: STUDENT IN AN ORGANIZED HEALTH CARE EDUCATION/TRAINING PROGRAM

## 2023-03-23 PROCEDURE — 258N000003 HC RX IP 258 OP 636: Performed by: INTERNAL MEDICINE

## 2023-03-23 PROCEDURE — 97535 SELF CARE MNGMENT TRAINING: CPT | Mod: GO

## 2023-03-23 PROCEDURE — 36415 COLL VENOUS BLD VENIPUNCTURE: CPT | Performed by: INTERNAL MEDICINE

## 2023-03-23 PROCEDURE — 99291 CRITICAL CARE FIRST HOUR: CPT | Performed by: INTERNAL MEDICINE

## 2023-03-23 PROCEDURE — 85027 COMPLETE CBC AUTOMATED: CPT | Performed by: INTERNAL MEDICINE

## 2023-03-23 PROCEDURE — 97530 THERAPEUTIC ACTIVITIES: CPT | Mod: GP

## 2023-03-23 PROCEDURE — 200N000001 HC R&B ICU

## 2023-03-23 PROCEDURE — 84100 ASSAY OF PHOSPHORUS: CPT | Performed by: INTERNAL MEDICINE

## 2023-03-23 PROCEDURE — 250N000009 HC RX 250: Performed by: INTERNAL MEDICINE

## 2023-03-23 PROCEDURE — 99231 SBSQ HOSP IP/OBS SF/LOW 25: CPT | Mod: GC | Performed by: STUDENT IN AN ORGANIZED HEALTH CARE EDUCATION/TRAINING PROGRAM

## 2023-03-23 PROCEDURE — 250N000013 HC RX MED GY IP 250 OP 250 PS 637: Performed by: INTERNAL MEDICINE

## 2023-03-23 PROCEDURE — 99232 SBSQ HOSP IP/OBS MODERATE 35: CPT | Performed by: NURSE PRACTITIONER

## 2023-03-23 PROCEDURE — 97116 GAIT TRAINING THERAPY: CPT | Mod: GP

## 2023-03-23 PROCEDURE — 83735 ASSAY OF MAGNESIUM: CPT | Performed by: INTERNAL MEDICINE

## 2023-03-23 PROCEDURE — 80048 BASIC METABOLIC PNL TOTAL CA: CPT | Performed by: INTERNAL MEDICINE

## 2023-03-23 RX ORDER — DIAZEPAM 10 MG/2ML
7.5 INJECTION, SOLUTION INTRAMUSCULAR; INTRAVENOUS 3 TIMES DAILY
Status: COMPLETED | OUTPATIENT
Start: 2023-03-23 | End: 2023-03-23

## 2023-03-23 RX ORDER — DOCUSATE SODIUM 100 MG/1
100 CAPSULE, LIQUID FILLED ORAL 2 TIMES DAILY
Status: DISCONTINUED | OUTPATIENT
Start: 2023-03-23 | End: 2023-03-24 | Stop reason: HOSPADM

## 2023-03-23 RX ORDER — DIAZEPAM 10 MG/2ML
5 INJECTION, SOLUTION INTRAMUSCULAR; INTRAVENOUS 3 TIMES DAILY
Status: DISCONTINUED | OUTPATIENT
Start: 2023-03-24 | End: 2023-03-24 | Stop reason: HOSPADM

## 2023-03-23 RX ORDER — NICOTINE POLACRILEX 4 MG
15-30 LOZENGE BUCCAL
Status: DISCONTINUED | OUTPATIENT
Start: 2023-03-23 | End: 2023-03-24 | Stop reason: HOSPADM

## 2023-03-23 RX ORDER — MAGNESIUM OXIDE 400 MG/1
400 TABLET ORAL EVERY 4 HOURS
Status: COMPLETED | OUTPATIENT
Start: 2023-03-23 | End: 2023-03-23

## 2023-03-23 RX ORDER — DIAZEPAM 5 MG
5 TABLET ORAL 2 TIMES DAILY
Status: DISCONTINUED | OUTPATIENT
Start: 2023-03-25 | End: 2023-03-24 | Stop reason: HOSPADM

## 2023-03-23 RX ORDER — DIAZEPAM 5 MG
5 TABLET ORAL 3 TIMES DAILY
Status: DISCONTINUED | OUTPATIENT
Start: 2023-03-24 | End: 2023-03-24 | Stop reason: HOSPADM

## 2023-03-23 RX ORDER — DEXTROSE MONOHYDRATE 25 G/50ML
25-50 INJECTION, SOLUTION INTRAVENOUS
Status: DISCONTINUED | OUTPATIENT
Start: 2023-03-23 | End: 2023-03-24 | Stop reason: HOSPADM

## 2023-03-23 RX ADMIN — DEXMEDETOMIDINE HYDROCHLORIDE 0.3 MCG/KG/HR: 400 INJECTION INTRAVENOUS at 04:20

## 2023-03-23 RX ADMIN — SODIUM CHLORIDE 500 ML: 9 INJECTION, SOLUTION INTRAVENOUS at 08:22

## 2023-03-23 RX ADMIN — MAGNESIUM OXIDE TAB 400 MG (241.3 MG ELEMENTAL MG) 400 MG: 400 (241.3 MG) TAB at 11:35

## 2023-03-23 RX ADMIN — ACETAMINOPHEN 650 MG: 325 TABLET ORAL at 13:59

## 2023-03-23 RX ADMIN — Medication 5 MG: at 00:15

## 2023-03-23 RX ADMIN — Medication 1 TABLET: at 08:02

## 2023-03-23 RX ADMIN — LEVOTHYROXINE SODIUM 175 MCG: 0.03 TABLET ORAL at 07:51

## 2023-03-23 RX ADMIN — FOLIC ACID 1 MG: 1 TABLET ORAL at 07:52

## 2023-03-23 RX ADMIN — DULOXETINE 30 MG: 30 CAPSULE, DELAYED RELEASE ORAL at 07:52

## 2023-03-23 RX ADMIN — DIAZEPAM 5 MG: 5 TABLET ORAL at 08:02

## 2023-03-23 RX ADMIN — DIAZEPAM 7.5 MG: 5 TABLET ORAL at 17:16

## 2023-03-23 RX ADMIN — DIAZEPAM 7.5 MG: 5 TABLET ORAL at 21:19

## 2023-03-23 RX ADMIN — MAGNESIUM OXIDE TAB 400 MG (241.3 MG ELEMENTAL MG) 400 MG: 400 (241.3 MG) TAB at 06:36

## 2023-03-23 RX ADMIN — THIAMINE HCL TAB 100 MG 100 MG: 100 TAB at 08:02

## 2023-03-23 RX ADMIN — DIAZEPAM 7.5 MG: 5 TABLET ORAL at 13:04

## 2023-03-23 RX ADMIN — NICOTINE 1 PATCH: 14 PATCH, EXTENDED RELEASE TRANSDERMAL at 08:01

## 2023-03-23 ASSESSMENT — ACTIVITIES OF DAILY LIVING (ADL)
ADLS_ACUITY_SCORE: 38
ADLS_ACUITY_SCORE: 38
ADLS_ACUITY_SCORE: 35
ADLS_ACUITY_SCORE: 31
ADLS_ACUITY_SCORE: 38
ADLS_ACUITY_SCORE: 31
ADLS_ACUITY_SCORE: 35
ADLS_ACUITY_SCORE: 38
ADLS_ACUITY_SCORE: 31
ADLS_ACUITY_SCORE: 31
ADLS_ACUITY_SCORE: 35
ADLS_ACUITY_SCORE: 35

## 2023-03-23 NOTE — PROGRESS NOTES
Psychiatric Progress Note  Acute alcohol withdrawal delirium: Resolving  Cognitive changes due to above.  Mood disorder due to medical condition.  Visual hallucinations, intermittent likely exacerbated due to #1.  Severe alcohol use disorder      PLAN/RECOMMENDATIONS:  Valium 7.5 mg 3 times a day x3 doses.  Decrease Valium to 5 mg 3 times a day x3 doses.  Decrease Valium to 5 mg twice daily x4 doses.  Continue gabapentin 300 mg 3 times a day.  Rule 25 assessment if needed.   to send referral to chemical dependency treatment programs, coordinate with patient.    SUBJECTIVE:  Patient is doing well on tapering Valium.  Continues to have intermittent hallucinations however easily redirectable.  He is conversing more coherently today compared to yesterday.  He is planning to go to a chemical dependency treatment program however does not know where.  He is open to getting some assistance while waiting in the hospital.  He is requiring Precedex, lower dose however.  MENTAL STATUS EXAMINATION:   Appears comfortable, no tremors.  Intermittent visual hallucinations, nonthreatening.  No auditory hallucinations.  Thought process is with increased latency, slow.  Thought content does not show paranoia or delusions.  Judgment, insight, memory, attention, comprehension, fund of knowledge are depressed, but improving.  Gait and ambulation not tested.  Patient is needing assist, minimally.  Awake, orientation x1-2.  Affect is neutral mood congruent.  Suicidality: Absent  Vital signs in last 24 hours  Temp:  [97  F (36.1  C)-97.5  F (36.4  C)] 97  F (36.1  C)  Pulse:  [43-84] 64  Resp:  [12-29] 19  BP: ()/(48-76) 97/61  SpO2:  [93 %-99 %] 97 %

## 2023-03-23 NOTE — PROGRESS NOTES
[facility]:  CRITICAL CARE PROGRESS NOTE     Date / Time of Admission:  3/17/2023  6:45 PM    ID: Getachew Barcenas is a 52 year old male with history of chronic subdural hematoma, alcohol abuse with recurrent lapses, frequent falls, hypothyroidism status post radiation, and mood disturbance who was admitted to [facility] on 3/17/2023 with alcohol intoxication and weakness, subsequently transferred to the ICU on 3/21/2023 due to EtOH withdrawal with delirium requiring dexmedetomidine infusion.         Changes for today: 1.   Waxing/waning mentation. Increased oral valium.  2. Advanced activity - PT/OT, OOB to chair with support as tolerates.  3. Discussed alcohol management, patient interested in inpatient programs if available.  4. Last BM recorded 3/18.  Start colace BID.  5. BP low-normal this AM, given  mL bolus.        Assessment/Plan:   Neurologic: Chronic alcohol abuse with alcohol withdrawal and delirium.  Has known history of recurrent drinking, lives alone.  Last drink 3/17.  Escalating agitation and CIWA scores, requiring ICU transfer 3/21.  Started on dexmedetomidine gtt. on 3/21.  History of chronic bilateral subdural hematomas.  Related to recurrent falls.  Gait instability.  Uses walker at baseline.    Appreciate psychiatry recommendations.    Dexmedetomidine gtt. as needed for agitation, RASS goal 0/-1.    Increase Valium back to 10 mg every 8 hours.    Continue gabapentin, clonidine per withdrawal protocol.    Monitor CIWA score.  Thiamine/folic acid/MVI daily.    Continue Zyprexa, Haldol as needed per psychiatry.     Pulmonary: History of tobacco use. On minimal supplemental O2, SpO2 94%, protecting airway.    Wean supplemental O2 as tolerated; goal O2 sat > 92%.  HOB > 30 degrees to limit aspiration risk.      Continue nicotine patch.  Encourage tobacco cessation.     Encourage pulmonary hygiene: OOB to chair, PT/OT, IS as can tolerate.      Cardiovascular: Essential  hypertension and tachycardia, improved.  likely 2/2 etoh withdrawal. Improved. Echo 3/20: normal LVEF 60-65%, mildly dilated RV, normal RV systolic function, borderline aortic root enlargement. Transient non-sustained Vtach. Due to electrolyte derangements this admission; resolved.     Cardiac monitoring.  SBP >= 90 mmHg, MAP >= 65 mmHg.  EKG PRN.    Cardiology was following, now signed off - recommended repeat echo in 2-3 months    Lactate downtrended, no need to recheck     GI/: Hepatic steatosis, diverticulitis, gallbladder distention..  LFTs okay.  CT abdomen/pelvis showed hepatic steatosis, mildly distended gallbladder with cholelithiasis, and acute uncomplicated sigmoid diverticulitis.  Nonacute abdominal exam.    Nutrition: Regular diet.     Last BM: Last recorded 3/18.  Meds: Initiate Colace BID.    GI prophylaxis: Not indicated.     Renal: Hyperphosphatemia.  Sodium improved. Hypokalemia resolved.    Monitor I/O's.  Electrolyte repletion PRN.  Avoid/limit nephrotoxic agents.    IVFs: Saline lock.      Heme/Onc: Normocytic anemia, thrombocytopenia.  Of note, is not thrombocytopenic at baseline. Findings likely related to BM suppression from EtOH, monitor trends with time. DVT prophylaxis.  Dr. Shah spoke with NSGY team re: heparin subcutaneous with chronic SDH, would be okay but monitor platelets closely and for any signs of bleeding.    Monitor platelets and mental status with heparin subcutaneous given baseline h/o chronic SDH.    DVT prophylaxis: heparin subcutaneous, SCDs.     Endocrine: Acquired hypothyroidism.  TSH 0.41 on 3/17.     FSBG PRN, Aspart insulin SS (not required).  Hypoglycemia protocol.    Continue home PTA levothyroxine daily     Infectious diseases: Possible sigmoid diverticulitis.  Noted on CT a/p, but appears to be asymptomatic.  Blood cultures no growth.    Follow off antibiotics.    Rheum/Musculoskeletal:     Activity: OOB to chair, PT/OT    Uses walker at baseline per  "report.      RISK FACTORS PRESENT ON ADMISSION:  Clinically Significant Risk Factors            # Hypomagnesemia: Lowest Mg = 1.3 mg/dL in last 2 days, will replace as needed     # Thrombocytopenia: Lowest platelets = 107 in last 2 days, will monitor for bleeding         # Overweight: Estimated body mass index is 28.13 kg/m  as calculated from the following:    Height as of this encounter: 1.88 m (6' 2\").    Weight as of this encounter: 99.4 kg (219 lb 1.6 oz).             Lines/Drains/Tubes:  PIV x1     Code Status:  Full Code     The patient and/or the family was educated about the above plan of care and indicated understanding.  Patient had requested that I not contact his family for a follow-up/update, as he already spoke with them.     This patient is considered critically ill and requires ICU level of care due to alcohol withdrawals requiring Precedex infusion.     Total Critical Care time, not including separate billable procedure time: 45 minutes.    Shivani Lopez MD, MPH  Pulmonary/Critical Care Medicine  03/23/2023   1:07 PM         ICU DAILY CHECKLIST:   ICU DAILY CHECKLIST           Can patient transfer out of MICU? no    FAST HUG:    Feeding:  yes.  Patient is receiving ORAL    Triana: no  Analgesia/Sedation: yes; Dexmedetomidine   Thromboembolic prophylaxis: yes; Mode:  Heparin and SCDs  HOB>30:  yes  Stress Ulcer Protocol Active: yes; Mode: Not Indicated  Glycemic Control: Any glucose > 180 no; Mode of Insulin Therapy: Not indicated    INTUBATED:  Can patient have daily waking:  n/a  Can patient have spontaneous breathing trial:  n/a    Restraints? no    PHYSICAL THERAPY AND MOBILITY:  Can patient have PT and mobility trial: yes  Activity: OOB to Chair and PT/OT        Subjective/Interval History:   States that he is feeling okay.  Fatigued.  Denies any chest pain/chest pressure.    Says he has anger issues but has never hit anybody.  Uses alcohol as a crutch as it is easy to obtain and " "legal.  Interested in going home tomorrow as he feels cooped up in the room.    Review of Systems:  The Review of Systems is negative other than noted in the HPI        Allergies/Medications:   Allergies:   No Known Allergies    Continuous Infusions:    dexmedetomidine 0.3 mcg/kg/hr (03/23/23 0420)     Scheduled Medications:    cloNIDine  0.1 mg Oral Q8H     diazepam  7.5 mg Intravenous TID    Or     diazepam  7.5 mg Oral TID     [START ON 3/24/2023] diazepam  5 mg Intravenous TID    Or     [START ON 3/24/2023] diazepam  5 mg Oral TID     [START ON 3/25/2023] diazepam  5 mg Oral BID     DULoxetine  30 mg Oral Daily     folic acid  1 mg Oral Daily     [START ON 3/27/2023] gabapentin  100 mg Oral Q8H     [START ON 3/25/2023] gabapentin  300 mg Oral Q8H     heparin ANTICOAGULANT  5,000 Units Subcutaneous Q8H     influenza recomb quadrivalent PF  0.5 mL Intramuscular Prior to discharge     levothyroxine  175 mcg Oral Daily     multivitamin w/minerals  1 tablet Oral Daily     nicotine  1 patch Transdermal Daily     nicotine   Transdermal Q8H     sodium chloride (PF)  3 mL Intracatheter Q8H     thiamine  100 mg Oral Daily           Objective:   Vitals:  BP (!) 85/58   Pulse 66   Temp 97.3  F (36.3  C) (Axillary)   Resp 22   Ht 1.88 m (6' 2\")   Wt 99.4 kg (219 lb 1.6 oz)   SpO2 94%   BMI 28.13 kg/m    Vent: Resp: 22    GEN: Pleasant male, slightly confused, slow to reply  HEENT: Normocephalic, atraumatic.  Extraoccular eye movements intact, anicteric sclera. Moist mucous membranes.   NECK: Supple.    PULM: Non-labored breathing.  No use of accessory muscles.  Clear to ausculation bilaterally.   CVS: Regular rate and rhythm.  Normal S1, S2.  No rubs, murmurs, or gallops.    ABDOMEN: Normoactive bowel sounds.  Non-tender to palpation.  Non-distended.    EXTREMITES:  No clubbing, cyanosis, or edema.    NEURO:  Awake.  Oriented to person, place, situation.  Cranial nerves 2-12 grossly intact.  Moving all extremities.  "     Intake/Output: I/O last 3 completed shifts:  In: 242.43 [I.V.:242.43]  Out: 2500 [Urine:2500]        Pertinent Studies:   All laboratory data reviewed  Serum Glucose range:   Recent Labs   Lab 03/23/23 0413 03/22/23 0408 03/21/23 0429 03/20/23 0425   GLC 97 109* 95 101*     ABG: No lab results found in last 7 days.  CBC:   Recent Labs   Lab 03/23/23 0413 03/22/23 0408 03/21/23 0429 03/18/23 0600 03/17/23 1942   WBC 5.1 5.8 5.3   < > 6.0   HGB 12.1* 11.9* 10.6*   < > 15.2   HCT 36.8* 35.1* 31.9*   < > 43.7   MCV 91 89 89   < > 84   * 107* 92*   < > 118*   NEUTROPHIL  --   --   --   --  56   LYMPH  --   --   --   --  38   MONOCYTE  --   --   --   --  4   EOSINOPHIL  --   --   --   --  1    < > = values in this interval not displayed.     Chemistry:   Recent Labs   Lab 03/23/23 0413 03/22/23 0408 03/21/23 1942 03/21/23  1406 03/21/23 0429 03/18/23 0600 03/17/23 1942    134*  --   --  134*   < > 142   POTASSIUM 4.1 3.8  --   --  3.8   < > 3.7   CHLORIDE 100 99  --   --  100   < > 101   CO2 27 27  --   --  29   < > 18*   BUN 9.1 7.5  --   --  8.0   < > 11.5   CR 0.71 0.62*  --   --  0.64*   < > 0.65*   GFRESTIMATED >90 >90  --   --  >90   < > >90   ABDULKADIR 9.1 8.9  --   --  9.0   < > 9.3   MAG 1.8 1.9 2.2   < > 1.5*   < > 1.5*   PROTTOTAL  --   --   --   --   --   --  7.6   ALBUMIN  --   --   --   --   --   --  4.2   ALT  --   --   --   --   --   --  52*   ALKPHOS  --   --   --   --   --   --  136*   BILITOTAL  --   --   --   --   --   --  1.3*    < > = values in this interval not displayed.     Coags:  No results for input(s): INR, PROTIME, PTT in the last 168 hours.    Invalid input(s): APTT  Cardiac Markers:  No results for input(s): CKTOTAL, TROPONINI in the last 168 hours.     Microbiology:  Blood cultures x2, 3/17: No growth        Cardiology/Radiology:   Cardiac: All cardiac studies reviewed by me.    EKG:  Reviewed    TTE, 3/20/23:  Summary:  Left ventricular size, wall motion and  function are normal. The ejection fraction is 60-65%. The right ventricle is mildly dilated. The right ventricular systolic function is normal. There is borderline aortic root enlargement.    Radiology:  All imaging studies reviewed by me.    Chest X-Ray, 3/17/23:  Single AP view of the chest was obtained. Cardiomediastinal silhouette is within normal limits. No suspicious focal pulmonary opacities. No significant pleural effusion or pneumothorax.    CT abd/pelvis with IV contrast, 3/17/23:  FINDINGS: LOWER CHEST: Normal. HEPATOBILIARY: Hepatic steatosis. Mildly distended gallbladder containing stones. PANCREAS: Normal. SPLEEN: Normal. ADRENAL GLANDS: Normal. KIDNEYS/BLADDER: Normal. BOWEL: No bowel obstruction. There is mild thickening and stranding involving a short segment of sigmoid colon which also has diverticulosis. Findings are compatible with acute diverticulitis without perforation or abscess formation. No evidence of appendicitis. LYMPH NODES: Normal. VASCULATURE: Unremarkable. PELVIC ORGANS: Normal. MUSCULOSKELETAL: Normal. IMPRESSION:  1.  Hepatic steatosis. 2.  Mildly distended gallbladder with cholelithiasis. 3.  Acute uncomplicated sigmoid diverticulitis.

## 2023-03-23 NOTE — PROGRESS NOTES
[facility]    Progress Note - Hospitalist Service       Date of Admission:  3/17/2023    Assessment & Plan     Getachew Barcenas is a 52 year old male who has a history of severe EtOH use disorder with relapse, frequent falls 2/2 vertigo, chronic subdural hematoma, hypothyroidism s/p radiation, mood disorder and is admitted for alcohol withdrawal. Was continuing to score high on CIWA, with escalating aggression and agitation, was transferred to the ICU on 03/21 for Precedex gtt. In process of weaning off gtt.      EtOH withdrawal  H/o severe EtOH use disorder with relapse  Patient reported drinking 1.75L liquor every 2-3 days. Last drink at 1630 on day of admission. Has history of withdrawal sxs but no reported seizures. Patient has been to treatment several times with some success but continues to relapse. Continued to score very high on CIWA, had mounting aggression and impulsivity so was transferred to ICU for precedex gtt.   - ICU consult, Precedex started on 03/21, in process of being weaned off   - Psych consult  - Scheduled gabapentin and clonidine   - CIWA protocol: prn ativan, zyprexa or haldol  - IM Zyprexa available  - Supp folate, thiamine, thera-vit-m  - Fall precautions  - Telemetry  - PT/OT/CM consulted     Hepatic steatosis  Abnormal LFTs  Thrombocytopenia  Alk Phos 136, ALT 52, Plt 118 on admission. FIB-4 index suggests advanced fibrosis is likely. Would explain persistently elevated lactate after fluid resuscitation. Has had significant thrombocytopenia in the recent past. Plts improving.   - Daily CBC     Unsustained ventricular tachycardia  Hypomagnesemia  Mg 1.5 on admission. Electrolytes otherwise fine. However, continues to have intermittent unsustained ventricular tachycardia on telemetry, in particular after activity. Longest run of 20 beats. Asymptomatic during these episodes. ECHO without abnormality.   - Continue telemetry  - Daily BMP  - Mg- change to high  "replacement protocol  - Phos replacement protocol  - Potassium replacement protocol     Incidental finding: Acute uncomplicated sigmoid diverticulitis Mildly distended gallbladder with cholelithiasis. Patient has negative abdominal exam. Has had diverticulitis in remote past. No N/V/D. Afebrile and WBC wnl.   - Daily CBC   - Hold further abx for now     Chronic conditions  Chronic subdural hematoma: reduced bilateral subdural hematomas on CT head. NSG curbsided, no formal consult necessary.   Frequent falls 2/2 vertigo: very debilitating for patient. Fall precautions.   Hypothyroidism s/p radiation: continue PTA levothyroxine.  Mood disorder/anxiety/risk of violent behavior: Per chart, also appears to have paranoid schizophrenia. Hard to differentiate if this or early stage withdrawal causing hallucinations. Will continue PTA duloxetine. Holding PTA hydroxyzine.      Diet: Regular Diet Adult    DVT Prophylaxis: Heparin  Triana Catheter: Not present  Fluids: PO  Lines: None     Cardiac Monitoring: None  Code Status: Full Code      Clinically Significant Risk Factors            # Hypomagnesemia: Lowest Mg = 1.3 mg/dL in last 2 days, will replace as needed     # Thrombocytopenia: Lowest platelets = 107 in last 2 days, will monitor for bleeding          # Overweight: Estimated body mass index is 28.13 kg/m  as calculated from the following:    Height as of this encounter: 1.88 m (6' 2\").    Weight as of this encounter: 99.4 kg (219 lb 1.6 oz).        The patient's care was discussed with the Attending Physician, Dr. Rasmussen.    Marleny Oliveira MD  Hospitalist Service  [facility]  ______________________________________________________________________    Interval History   Patient more awake today. Endorsed having a mouse crawl across his hand    Physical Exam   Vital Signs: Temp: 97.3  F (36.3  C) Temp src: Axillary BP: 97/61 Pulse: 64   Resp: 19 SpO2: 97 % O2 Device: None (Room air)  "   Weight: 219 lbs 1.6 oz    General Appearance: Sleepy, awakes to voice  HEENT: Normocephalic, atraumatic.   Respiratory: No increased work of breathing on RA.  Cardiovascular: Well perfused  GI: Soft, nondistended, nontender.   Skin: Warm, dry. No rashes, jaundice, bruising.   Musculoskeletal: No swelling appreciated.    Neurologic: Sleepy, awakes to voice  Psychiatric: Oriented to place and year    Data     I have personally reviewed the following data over the past 24 hrs:    5.1  \   12.1 (L)   / 112 (L)     136 100 9.1 /  97   4.1 27 0.71 \       Imaging results reviewed over the past 24 hrs:   No results found for this or any previous visit (from the past 24 hour(s)).

## 2023-03-23 NOTE — PROGRESS NOTES
Care Management Follow Up    Length of Stay (days): 6    Expected Discharge Date: 03/27/2023     Concerns to be Addressed:     Patient is a 1:1. Medical progression/discharge planning  Patient plan of care discussed at interdisciplinary rounds: Yes    Anticipated Discharge Disposition:  TBD. Family hoping for patient to discharge to a chemical dependency treatment center, patient agreeable. PT and OT to work with patient today.     Anticipated Discharge Services:  Chemical dependency treatment if patient agreeable  Anticipated Discharge DME:  As per therapy recommendations    Patient/family educated on Medicare website which has current facility and service quality ratings:  N/A  Education Provided on the Discharge Plan:    Patient/Family in Agreement with the Plan:      Referrals Placed by CM/SW:  None at present  Private pay costs discussed: Not applicable    Additional Information:  1330 Patient is more alert today and was willing to meet with SW. Patient was alert to self, place and situation. Patient did share conflicting information throughout visit but mostly did stay on track cognitively. Patient states that he foresees going to CD treatment on discharge. He is hopeful to go to a facility in [place]. He gives permission for this writer to speak with sister Kelly and daughter James to see if they have secured a facility for him to go to. This writer spoke via phone with Kelly. She states James is working on placement. This writer called James, phone. [phone] and left a message. Kelly believes James has been in contact with a facility in [facility] called [facility]. SW will await a return call from James to continue discharge planning and coordination.    1430 Received call back from daughter James. She states family is interested in [place] [facility] called [facility]. She has spoke with Evita there at [phone]. This writer left message for Andreia to call to discuss  process for considering patient for their services.    1530 Call back from Andreia. She states she had received some information on this patient in February. She requested we fax H&P, psych notes, physician progress note and med list for review. Fax sent.    KARLA Tirado

## 2023-03-24 ENCOUNTER — APPOINTMENT (OUTPATIENT)
Dept: OCCUPATIONAL THERAPY | Facility: HOSPITAL | Age: 53
End: 2023-03-24
Payer: COMMERCIAL

## 2023-03-24 VITALS
OXYGEN SATURATION: 92 % | TEMPERATURE: 98.2 F | SYSTOLIC BLOOD PRESSURE: 120 MMHG | BODY MASS INDEX: 28.12 KG/M2 | DIASTOLIC BLOOD PRESSURE: 78 MMHG | HEIGHT: 74 IN | HEART RATE: 101 BPM | WEIGHT: 219.1 LBS | RESPIRATION RATE: 15 BRPM

## 2023-03-24 LAB
MAGNESIUM SERPL-MCNC: 1.7 MG/DL (ref 1.7–2.3)
PHOSPHATE SERPL-MCNC: 4.6 MG/DL (ref 2.5–4.5)
POTASSIUM SERPL-SCNC: 4 MMOL/L (ref 3.4–5.3)

## 2023-03-24 PROCEDURE — 250N000011 HC RX IP 250 OP 636: Performed by: INTERNAL MEDICINE

## 2023-03-24 PROCEDURE — 36415 COLL VENOUS BLD VENIPUNCTURE: CPT | Performed by: INTERNAL MEDICINE

## 2023-03-24 PROCEDURE — 83735 ASSAY OF MAGNESIUM: CPT | Performed by: INTERNAL MEDICINE

## 2023-03-24 PROCEDURE — 250N000013 HC RX MED GY IP 250 OP 250 PS 637: Performed by: INTERNAL MEDICINE

## 2023-03-24 PROCEDURE — 84100 ASSAY OF PHOSPHORUS: CPT | Performed by: INTERNAL MEDICINE

## 2023-03-24 PROCEDURE — 99233 SBSQ HOSP IP/OBS HIGH 50: CPT | Mod: GC | Performed by: INTERNAL MEDICINE

## 2023-03-24 PROCEDURE — 97535 SELF CARE MNGMENT TRAINING: CPT | Mod: GO

## 2023-03-24 PROCEDURE — 84132 ASSAY OF SERUM POTASSIUM: CPT | Performed by: INTERNAL MEDICINE

## 2023-03-24 PROCEDURE — 250N000013 HC RX MED GY IP 250 OP 250 PS 637: Performed by: NURSE PRACTITIONER

## 2023-03-24 PROCEDURE — 99238 HOSP IP/OBS DSCHRG MGMT 30/<: CPT | Mod: GC | Performed by: STUDENT IN AN ORGANIZED HEALTH CARE EDUCATION/TRAINING PROGRAM

## 2023-03-24 PROCEDURE — 250N000013 HC RX MED GY IP 250 OP 250 PS 637: Performed by: STUDENT IN AN ORGANIZED HEALTH CARE EDUCATION/TRAINING PROGRAM

## 2023-03-24 RX ORDER — MAGNESIUM OXIDE 400 MG/1
400 TABLET ORAL EVERY 4 HOURS
Status: COMPLETED | OUTPATIENT
Start: 2023-03-24 | End: 2023-03-24

## 2023-03-24 RX ADMIN — THIAMINE HCL TAB 100 MG 100 MG: 100 TAB at 08:19

## 2023-03-24 RX ADMIN — DIAZEPAM 5 MG: 5 TABLET ORAL at 08:18

## 2023-03-24 RX ADMIN — MAGNESIUM OXIDE TAB 400 MG (241.3 MG ELEMENTAL MG) 400 MG: 400 (241.3 MG) TAB at 11:03

## 2023-03-24 RX ADMIN — DOCUSATE SODIUM 100 MG: 100 CAPSULE, LIQUID FILLED ORAL at 08:18

## 2023-03-24 RX ADMIN — FOLIC ACID 1 MG: 1 TABLET ORAL at 08:18

## 2023-03-24 RX ADMIN — CLONIDINE HYDROCHLORIDE 0.1 MG: 0.1 TABLET ORAL at 08:26

## 2023-03-24 RX ADMIN — MAGNESIUM OXIDE TAB 400 MG (241.3 MG ELEMENTAL MG) 400 MG: 400 (241.3 MG) TAB at 08:18

## 2023-03-24 RX ADMIN — CLONIDINE HYDROCHLORIDE 0.1 MG: 0.1 TABLET ORAL at 00:32

## 2023-03-24 RX ADMIN — DULOXETINE 30 MG: 30 CAPSULE, DELAYED RELEASE ORAL at 08:33

## 2023-03-24 RX ADMIN — HEPARIN SODIUM 5000 UNITS: 10000 INJECTION, SOLUTION INTRAVENOUS; SUBCUTANEOUS at 00:34

## 2023-03-24 RX ADMIN — Medication 5 MG: at 00:32

## 2023-03-24 RX ADMIN — Medication 1 TABLET: at 08:18

## 2023-03-24 RX ADMIN — LEVOTHYROXINE SODIUM 175 MCG: 0.03 TABLET ORAL at 08:18

## 2023-03-24 ASSESSMENT — ACTIVITIES OF DAILY LIVING (ADL)
ADLS_ACUITY_SCORE: 31
ADLS_ACUITY_SCORE: 23
ADLS_ACUITY_SCORE: 31
ADLS_ACUITY_SCORE: 23
ADLS_ACUITY_SCORE: 31

## 2023-03-24 NOTE — PROGRESS NOTES
"Care Management Follow Up    Length of Stay (days): 7    Expected Discharge Date: 03/27/2023     Concerns to be Addressed: discharge planning      Patient plan of care discussed at interdisciplinary rounds: Yes    Anticipated Discharge Disposition:  TBD- chemical dependency treatment vs TCU pending progression and needs    Additional Information:  PRICE reviewed chart and reached out to Evita at [facility] [place]) [phone]. She reports that she does not see that pt information that was faxed by previous SW has been added to their system yet, but will look for these documents and update PRICE. She confirms that once these are received that pt will have to be reviewed to see if he can be accepted. She reports that [facility] has some potential openings next week, so pt placement could be a possibility if he is accepted.    PRICE met with pt to discuss. SW introduced self and role. Pt reports that he is feeling \"like a million bucks.\" He reports that he has been able to eat well and overall is feeling the best sober currently than he has in a long time. He reports that he has done treatment programs and has plans to do another residential or inpatient program. He reports that he is looking into options possibly in [place] or another state where he can spend a lot of time outdoors along with the treatment component. He reports a plan to look into multiple programs to review for the ones he feels would be most successful for him and then [facility]. He reports that he wants a program with other \"professional\" people as opposed to only people who are court ordered to be there. PRICE provided supportive listening and recapped his conversation with PRICE yesterday. Discussed that previous SW had reached out to family who have been working with the [facility] in [place]. Discussed that previous SW had reached out to [facility] to see about their ability to accept the pt. Pt reports " understanding and agreement with this, though notes that he considers this one option in terms of his next treatment facility. He expresses gratitude for his family staying with him through his blevins with addiction and continuing to provide support. PRICE validated this. PRICE discussed plan for SW to follow up with Clarisse (who run [facility]) about pts ability to go there. Pt agreeable- signed release of information form. Discussed that SW uncertain if pts [facility] [employer] would have a benefit for out of state programs since it is [place] Medicaid. Pt reports understanding but reports that he wants to check into  It. SW will follow and keep pt updated on any information from [facility]. Pt reports understanding.      Madiha Velez, [facility]    Addendum: PRICE received VM from pts sister, Kelly, requesting call back with update. PRICE returned to pt room to discuss discharge planning and PT/OT recommendations. Attending present and reports to PRICE that pt is leaving AMA and has list of CD treatment facility options. Pt informs SW that [facility] is in network with his insurance. SW encouraged pt to follow up. SW informed pt that Kelly was requesting update from PRICE. Pt declines to have PRICE call her and reports that he will update her.   1:31 PM

## 2023-03-24 NOTE — PROGRESS NOTES
Major Shift Events:  Pt requesting wanting to leave. Dr. Oliveira notifed, and Dr. Murphy at bedside and educated the pt on the significance of leaving AMA. Pt continues to be adamant of leaving. Dr. Murphy provided pt with AMA form which pt signed and kept a copy. At this time pt is waiting for his daughter to come pick him up. Umair Sewell RN on 3/24/2023 at 1:24 PM    Discharged to: home at 1330  Belongings: Sent with patient. Patient took with him his duffle bag with all of his belongings.

## 2023-03-24 NOTE — PLAN OF CARE
[facility] - ICU    RN Progress Note:            Pertinent Assessments:      Please refer to flowsheet rows for full assessment     Mentation clear.         Mobility Level:     5 - Move       Barriers to Progression         Key Events - This Shift:     Pt Oriented x4, alert. Able to hold conversation about various topics. Able to control behavior, no hallucinations noted. Precedex off entire shift.             Barriers to Discharge / Downgrade:     None noted.

## 2023-03-24 NOTE — PROGRESS NOTES
03/24/23 1100   SLUMS Examination   What day of the week is it? 1   What year is it? 1   What state are we in? 1   Please Remember these 5 objects. I will ask you what they are later.  0   How much did you spend? 1   How much do you have left? 2   Please name as many animals as you can in one minute. 3   What were the five objects I asked you to remember? 4   87 Correct Answer   649 1   8537 0   Hour markers okay 2   Time Correct 0   Please place an X in the triangle. 1   Which of the above figures is largest? 1   What was the female's name? 2   When did she go back to work? 0   What work did she do? 0   What stated did she live in? 2   Total Score 22     Recommend A with IADLs including meds, finances, no driving

## 2023-03-24 NOTE — SIGNIFICANT EVENT
2332 - Patient with twenty beat run of V-Tach. Asymptomatic and VSS immediately following run.     2343 - Patient with twenty-seven beat run of V-Tach. Asymptomatic and VSS immediately following run.     Provider notified. No new orders placed at this time.   
Between 0226 and 0230 patient with seven separate unsustained runs of V-Tach (see paper chart for EKG strips). Patient asymptomatic during episodes and VSS immediately after.   Provider (Dr. Holliady) notified. Provider okay with patient status at this time d/t patient being asymptomatic during episodes. Orders given to notify provider only if patient symptomatic during episode and/or has sustainable V-Tach.   
Significant Event Note    Time of event: 1:17 PM March 24, 2023    Description of event:  Patient wanting to leave AMA    Plan:  Discussed at length my recommendation that he should stay. Has significant alcohol history and just completed intensive care for his withdrawals. Still using benzos to treat his remaining symptoms. Significant risk of relapse. Encouraged him to stay. He thanked me for the care here and has plans to contact the inpatient treatment locations that work with his insurance.    AMA paperwork completed.    Juarez Murphy III, MD, FAAFP  [facility]  03/24/23 2:06 PM    
Significant Event Note    Time of event: 2:55 PM March 21, 2023    Description of event:  Patient continues to have extreme impulsivity and bursts of anger. When he is angry he will show aggression. He has received a significant amount of lorazepam. Also getting clonidine, gabapentin and Zyprexa. Psych came to evaluate and recommended transfer to ICU    Plan:  Severe EtOH withdrawal  Unable to manage patient on medicine floor, continues to show impulsivity, aggression and visual hallucinations. Discussed case with Dr. Shah in ICU who agreed to take patient for Precedex gtt.     Attempted to call daughter for status change update, no answer x2.     Discussed with: supervising physician, Dr. Valery Oliveira MD    
Writer in room with patient when patient had eight beat run of V-Tach. Asymptomatic to event and VSS immediately following event.   Providers (Luz and Chano) notified. Orders placed for Mg replacement to be given.     Will continue to monitor.   
VOMITING/NAUSEA

## 2023-03-24 NOTE — PROGRESS NOTES
PULMONARY / CRITICAL CARE PROGRESS NOTE    Date / Time of Admission:  3/17/2023  6:45 PM    Assessment:     Getachew Barcenas is a 52 year old male with history of alcohol abuse with recurrent lapses, frequent falls, chronic subdural hematoma diagnosed on 11/2022, hypothyroidism, and mood disturbance who was admitted to [facility] on 3/17/2023 with alcohol intoxication and weakness, subsequently transferred to the ICU on 3/21/2023 due to EtOH withdrawal with delirium requiring dexmedetomidine infusion.    1. Alcohol withdrawal syndrome  2. Alcohol liver disease   Hepatic steatosis noted in CT scan   3. Alcohol abuse  4. Chronic subdural hematoma, diagnosed on Nov 2022  5. Hypothyroidism    Advance Directives:  Full code  Plan:   1. Off precedex drip over 24 hours  2. CIWA protocol   3. Continue diazepam, clonidine, gabapentin  4. Thiamine and folic acid  5. Advance diet as tolerated  6. DVT prophylaxis SCDsm heparin SQ  7. PT, OT evaluation     Transfer patient to general medical floor  ICU team will sign off  Please contact me if you have any questions.    Elvis Bishop  Pulmonary / Critical Care  03/24/2023  9:32 AM    ICU DAILY CHECKLIST                           Can patient transfer out of MICU? yes    FAST HUG:    Feeding:  Feeding: yes.  Patient is receiving ORAL    Triana: no  Analgesia/Sedation:yes  Diazepam  Thromboembolic prophylaxis: Yes; Mode:  Heparin and SCDs  HOB>30:  Yes  Stress Ulcer Protocol Active: No; Mode: Not Indicated  Glycemic Control: Any glucose > 180 no; Mode of Insulin Therapy: Sliding Scale Insulin    INTUBATED:  Can patient have daily waking:  No  Can patient have spontaneous breathing trial:  No    Restraints? no    PHYSICAL THERAPY AND MOBILITY:  Can patient have PT and mobility trial: yes  Activity: PT    Subjective:   HPI:  Getachew Barcenas is a 52 year old male with history of alcohol abuse with recurrent lapses, frequent falls, chronic subdural hematoma diagnosed on  11/2022, hypothyroidism status post radiation, and mood disturbance who was admitted to [facility] on 3/17/2023 with alcohol intoxication and weakness, subsequently transferred to the ICU on 3/21/2023 due to EtOH withdrawal with delirium requiring dexmedetomidine infusion.    Events overnight  - Off precedex  - Improvement of metal status, hemodynamically stable    Allergies: Patient has no known allergies.     MEDS:  Current Facility-Administered Medications   Medication     acetaminophen (TYLENOL) tablet 650 mg    Or     acetaminophen (TYLENOL) Suppository 650 mg     cloNIDine (CATAPRES) tablet 0.1 mg     glucose gel 15-30 g    Or     dextrose 50 % injection 25-50 mL    Or     glucagon injection 1 mg     diazepam (VALIUM) injection 5 mg    Or     diazepam (VALIUM) tablet 5 mg     [START ON 3/25/2023] diazepam (VALIUM) tablet 5 mg     docusate sodium (COLACE) capsule 100 mg     DULoxetine (CYMBALTA) DR capsule 30 mg     flumazenil (ROMAZICON) injection 0.2 mg     folic acid (FOLVITE) tablet 1 mg     [START ON 3/27/2023] gabapentin (NEURONTIN) capsule 100 mg     [START ON 3/25/2023] gabapentin (NEURONTIN) capsule 300 mg     OLANZapine zydis (zyPREXA) ODT tab 5-10 mg    Or     haloperidol lactate (HALDOL) injection 2.5-5 mg     heparin ANTICOAGULANT injection 5,000 Units     influenza recomb quadrivalent PF (FLUBLOK) injection 0.5 mL     levothyroxine (SYNTHROID/LEVOTHROID) tablet 175 mcg     lidocaine (LMX4) cream     lidocaine 1 % 0.1-1 mL     [Held by provider] LORazepam (ATIVAN) tablet 1-2 mg    Or     [Held by provider] LORazepam (ATIVAN) injection 1-2 mg     magnesium oxide (MAG-OX) tablet 400 mg     melatonin tablet 5 mg     multivitamin w/minerals (THERA-VIT-M) tablet 1 tablet     nicotine (NICODERM CQ) 14 MG/24HR 24 hr patch 1 patch     nicotine Patch in Place     OLANZapine (zyPREXA) injection 5 mg     ondansetron (ZOFRAN ODT) ODT tab 4 mg    Or     ondansetron (ZOFRAN) injection 4 mg     sodium  "chloride (PF) 0.9% PF flush 3 mL     sodium chloride (PF) 0.9% PF flush 3 mL     thiamine (B-1) tablet 100 mg     Objective:   VITALS:  /77   Pulse 87   Temp 98.7  F (37.1  C) (Axillary)   Resp 14   Ht 1.88 m (6' 2\")   Wt 99.4 kg (219 lb 1.6 oz)   SpO2 96%   BMI 28.13 kg/m    VENT:  Resp: 14    EXAM:   Gen: awake, alert, no distress  HEENT: pink conjunctiva, moist mucosa, Mallampati II/IV  Neck: no thyromegaly, masses or JVD  Lungs: clear  CV: regular, no murmurs or gallops appreciated  Abdomen: soft, NT, BS wnl  Ext: no edema  Neuro: CN II-XII intact, non focal       Data Review:  Recent Labs   Lab 03/23/23  0413 03/22/23  0408 03/21/23  0429 03/20/23  0425 03/19/23  0436 03/18/23  1240   GLC 97 109* 95 101* 111* 123*      03/23/23 04:13   WBC 5.1   Hemoglobin 12.1 (L)   Hematocrit 36.8 (L)   Platelet Count 112 (L)   RBC Count 4.04 (L)   MCV 91   MCH 30.0   MCHC 32.9   RDW 12.7      03/23/23 04:13 03/24/23 04:31   Sodium 136    Potassium 4.1 4.0   Chloride 100    Carbon Dioxide (CO2) 27    Urea Nitrogen 9.1    Creatinine 0.71    GFR Estimate >90    Calcium 9.1    Anion Gap 9    Magnesium 1.8 1.7   Phosphorus 5.3 (H) 4.6 (H)     XR CHEST PORT 1 VIEW  LOCATION: [facility]  DATE/TIME: 3/17/2023 8:54 PM   INDICATION: Elevated lactic acid  COMPARISON: Chest x-ray on 02/18/2022  IMPRESSION: Single AP view of the chest was obtained. Cardiomediastinal silhouette is within normal limits. No suspicious focal pulmonary opacities. No significant pleural effusion or pneumothorax.    CT ABDOMEN PELVIS W CONTRAST  LOCATION: [facility]  DATE/TIME: 3/17/2023 10:09 PM  INDICATION: abdominal pain  COMPARISON: 11/02/2022  FINDINGS:   LOWER CHEST: Normal.  HEPATOBILIARY: Hepatic steatosis. Mildly distended gallbladder containing stones.  PANCREAS: Normal.  SPLEEN: Normal.  ADRENAL GLANDS: Normal.  KIDNEYS/BLADDER: Normal.  BOWEL: No bowel obstruction. There is mild " thickening and stranding involving a short segment of sigmoid colon which also has diverticulosis. Findings are compatible with acute diverticulitis without perforation or abscess formation. No evidence of appendicitis.  LYMPH NODES: Normal.  VASCULATURE: Unremarkable.  PELVIC ORGANS: Normal.   MUSCULOSKELETAL: Normal.  IMPRESSION:   1.  Hepatic steatosis.  2.  Mildly distended gallbladder with cholelithiasis.  3.  Acute uncomplicated sigmoid diverticulitis.      By:  Elvis Bishop MD, 03/24/2023  9:32 AM    Primary Care Physician:  No Ref-Primary, Physician

## 2023-03-24 NOTE — PLAN OF CARE
Physical Therapy Discharge Summary    Reason for therapy discharge:    Discharged to patient left AMA.    Progress towards therapy goal(s). See goals on Care Plan in [facility] electronic health record for goal details.  Goals not met.  Barriers to achieving goals:   leaving hospital AMA..    Therapy recommendation(s):    Continued therapy is recommended.  Rationale/Recommendations:  Continue PT at pt is progressing towards goals..

## 2023-03-24 NOTE — DISCHARGE SUMMARY
[facility]  Discharge Summary - Medicine & Pediatrics       Date of Admission:  3/17/2023  Date of Discharge:  3/24/2023  1:49 PM  Discharging Provider: Rosario  Discharge Service: Hospitalist Service    Discharge Diagnoses   (F10.920) Alcoholic intoxication without complication (H)  (E87.20) Lactic acidosis  (R79.0) Low magnesium level  (I47.29) Ventricular tachycardia (paroxysmal)  (I62.03) Chronic subdural hematoma (H)    Follow-ups Needed After Discharge     Unresulted Labs Ordered in the Past 30 Days of this Admission     No orders found from 2/15/2023 to 3/18/2023.      These results will be followed up by PCP    Discharge Disposition   Left AMA  Condition at discharge: Stable    Hospital Course   Getachew Barcenas was admitted on 3/17/2023 for alcohol withdrawal. Patient presented found to be acutely intoxicated. He was monitored on CIWA and had increasing benzodiazepine requirement so he was transferred to the ICU for Precedex gtt. Patient was eventually weaned off Precedex gtt and was working with social work to find treatment facility. Patient ultimately left AMA, stated he had things he needed to do at home and would find a treatment facility on his own.     Consultations This Hospital Stay   CARE MANAGEMENT / SOCIAL WORK IP CONSULT  OCCUPATIONAL THERAPY ADULT IP CONSULT  PHYSICAL THERAPY ADULT IP CONSULT  CARE MANAGEMENT / SOCIAL WORK IP CONSULT  CARE MANAGEMENT / SOCIAL WORK IP CONSULT  CARDIOLOGY IP CONSULT  PSYCHIATRY IP CONSULT    Code Status   Prior       The patient was discussed with Dr. Katherine Oliveira MD  Resident Service  [address] [address]  Phone: [phone]  Fax: [phone]  ______________________________________________________________________    Physical Exam   Vital Signs: Temp: 98.2  F (36.8  C) Temp src: Axillary BP: 120/78 Pulse: 101   Resp: 15 SpO2: 92 % O2 Device: None  (Room air)    Weight: 219 lbs 1.6 oz     General Appearance: Awake and more alert today  HEENT: Normocephalic, atraumatic.   Respiratory: No increased work of breathing on RA.  Cardiovascular: Well perfused  GI: Soft, nondistended, nontender.   Skin: Warm, dry. No rashes, jaundice, bruising.   Musculoskeletal: No swelling appreciated.    Neurologic: Awake, moving all extremities  Psychiatric: Oriented to place and year, more coherent then yesterday      Primary Care Physician   Physician No Ref-Primary    Discharge Orders   No discharge procedures on file.    Significant Results and Procedures   Most Recent 3 CBC's:Recent Labs   Lab Test 03/23/23 0413 03/22/23  0408 03/21/23 0429   WBC 5.1 5.8 5.3   HGB 12.1* 11.9* 10.6*   MCV 91 89 89   * 107* 92*     Most Recent 3 BMP's:Recent Labs   Lab Test 03/24/23  0431 03/23/23 0413 03/22/23 0408 03/21/23 0429   NA  --  136 134* 134*   POTASSIUM 4.0 4.1 3.8 3.8   CHLORIDE  --  100 99 100   CO2  --  27 27 29   BUN  --  9.1 7.5 8.0   CR  --  0.71 0.62* 0.64*   ANIONGAP  --  9 8 5*   ABDULKADIR  --  9.1 8.9 9.0   GLC  --  97 109* 95       Discharge Medications   Discharge Medication List as of 3/24/2023  1:49 PM      CONTINUE these medications which have NOT CHANGED    Details   acamprosate (CAMPRAL) 333 MG EC tablet Take 666 mg by mouth 3 times daily, Historical      acetaminophen (TYLENOL) 500 MG tablet [ACETAMINOPHEN (TYLENOL) 500 MG TABLET] Take 1-2 tablets (500-1,000 mg total) by mouth every 6 (six) hours as needed for pain., R-0, OTC      DULoxetine (CYMBALTA) 30 MG capsule Take 30 mg by mouth, Historical      !! hydrOXYzine (VISTARIL) 25 MG capsule Take 75 mg by mouth At Bedtime, Historical      !! hydrOXYzine (VISTARIL) 25 MG capsule Take 25 mg by mouth 3 times daily as needed for anxiety, Historical      levothyroxine (SYNTHROID/LEVOTHROID) 175 MCG tablet Take 175 mcg by mouth daily, Historical      magnesium oxide (MAG-OX) 400 MG tablet Take 400 mg by mouth daily  as needed, Historical      Multiple Vitamins-Minerals (ONE DAILY COMPLETE FOR MEN PO) Take 1 tablet by mouth daily, Historical      traZODone (DESYREL) 50 MG tablet Take 100 mg by mouth nightly as needed for sleep, Historical       !! - Potential duplicate medications found. Please discuss with provider.        Allergies   No Known Allergies

## 2023-03-24 NOTE — PROGRESS NOTES
CLINICAL NUTRITION SERVICES    Reviewed nutrition risk factors due to LOS.   Patient reports he is eating well in the hospital.  At home patient watches his sugar and carbohydrate intake.  He reports that when he was drinking he was not eating so well.  Patient takes a multivitamin daily.   No nutrition issues identified. RD will follow via rounds at this time, unless consulted.

## 2023-03-24 NOTE — DISCHARGE SUMMARY
Occupational Therapy Discharge Summary    Reason for therapy discharge:    Discharged to AMA/ home    Progress towards therapy goal(s). See goals on Care Plan in [facility] electronic health record for goal details.  Goals not met.  Barriers to achieving goals:   D/C AMA.    Therapy recommendation(s):    Continued therapy is recommended.  Rationale/Recommendations:  would benefit from continued OT.

## 2023-03-25 NOTE — PLAN OF CARE
Occupational Therapy Discharge Summary    Reason for therapy discharge:    Discharged to:  discharge home.     Progress towards therapy goal(s). See goals on Care Plan in [facility] electronic health record for goal details.  Goals not met.  Barriers to achieving goals:   discharge from facility.    Therapy recommendation(s):    Continued therapy is recommended.  Rationale/Recommendations:  decreased ADLs.    Goal Outcome Evaluation:  Mary Reyes, OTR/ALVARO  3/25/2023

## 2023-04-13 NOTE — PLAN OF CARE
Physical Therapy: Orders received. Chart reviewed and discussed with care team.? Pt at his functional baseline, IND mobility with 4ww. Pt endorsed feeling more fatigued since last admission and reduced attendance to weekly fitness classes. Edu on regular activity both in acute setting and at home. Physical Therapy not indicated due to lack of mobility deficits.? Defer discharge recommendations to medical team.? Will complete orders.  Total time with Pt: 13 minutes, 0 units billed     Yes

## 2023-04-22 ENCOUNTER — HEALTH MAINTENANCE LETTER (OUTPATIENT)
Age: 53
End: 2023-04-22

## 2023-07-12 ENCOUNTER — TELEPHONE (OUTPATIENT)
Dept: NEUROSURGERY | Facility: CLINIC | Age: 53
End: 2023-07-12
Payer: COMMERCIAL

## 2023-07-12 NOTE — TELEPHONE ENCOUNTER
Writer returned call to patient family friend who asked for appointment on behalf of patient. Patient needs follow up appointment from March with CT prior. Writer scheduled with Naila Louie CNP with CT prior.     Radha Burntet LPN  Neurosurgery

## 2023-07-12 NOTE — TELEPHONE ENCOUNTER
Writer routed to Naila Louie CNP   *Patient friend reports change in patient     Radha Burnett LPN  Neurosurgery

## 2023-07-12 NOTE — TELEPHONE ENCOUNTER
[facility] Call Center    Phone Message    May a detailed message be left on voicemail: yes     Reason for Call: Symptoms or Concerns     If patient has red-flag symptoms, warm transfer to triage line    Current symptom or concern: Pt friend Ray is calling with concerns of Pt. Pt is having dizziness and balance issus Pt has not fallen.     Symptoms have been present for:  6 month(s)    Has patient previously been seen for this? Yes    By Naila Louie        Are there any new or worsening symptoms? Yes: Pt stated that they are getting worse.       Action Taken: Message routed to:  [facility] ([facility] Neurosurgery    Travel Screening: Not Applicable

## 2023-07-13 DIAGNOSIS — I62.03 CHRONIC SUBDURAL HEMATOMA (H): Primary | ICD-10-CM

## 2023-07-13 NOTE — TELEPHONE ENCOUNTER
Action 7/13/23 MV 10.49am   Action Taken Imaging request faxed to [facility]     Action 7/18/23 MV 2.50pm   Action Taken Images resolved in PACS       RECORDS RECEIVED FROM: internal   REASON FOR VISIT: New Neuropathy   Date of Appt: 9/21/23   NOTES (FOR ALL VISITS) STATUS DETAILS   OFFICE NOTE from referring provider Care Everywhere Domonique KHANNA @ [facility]:  1/24/23   OFFICE NOTE from other specialist Received Dr Xavier Whitman @ [facility]:  1/19/22   EMG Received [facility]:  11/8/21   MEDICATION LIST Care Everywhere    IMAGING  (FOR ALL VISITS)     MRI (HEAD, NECK, SPINE) PACS [facility]:  MRI Brain 9/9/21

## 2023-07-18 ENCOUNTER — CARE COORDINATION (OUTPATIENT)
Dept: NEUROSURGERY | Facility: CLINIC | Age: 53
End: 2023-07-18

## 2023-07-18 ENCOUNTER — ANCILLARY PROCEDURE (OUTPATIENT)
Dept: CT IMAGING | Facility: CLINIC | Age: 53
End: 2023-07-18
Attending: NURSE PRACTITIONER
Payer: COMMERCIAL

## 2023-07-18 ENCOUNTER — OFFICE VISIT (OUTPATIENT)
Dept: NEUROSURGERY | Facility: CLINIC | Age: 53
End: 2023-07-18
Payer: COMMERCIAL

## 2023-07-18 VITALS
OXYGEN SATURATION: 99 % | SYSTOLIC BLOOD PRESSURE: 118 MMHG | WEIGHT: 239 LBS | BODY MASS INDEX: 30.69 KG/M2 | DIASTOLIC BLOOD PRESSURE: 83 MMHG | HEART RATE: 90 BPM | RESPIRATION RATE: 16 BRPM

## 2023-07-18 DIAGNOSIS — I62.03 CHRONIC SUBDURAL HEMATOMA (H): Primary | ICD-10-CM

## 2023-07-18 DIAGNOSIS — I62.03 CHRONIC SUBDURAL HEMATOMA (H): ICD-10-CM

## 2023-07-18 PROCEDURE — 70450 CT HEAD/BRAIN W/O DYE: CPT | Performed by: RADIOLOGY

## 2023-07-18 PROCEDURE — 99213 OFFICE O/P EST LOW 20 MIN: CPT | Performed by: NURSE PRACTITIONER

## 2023-07-18 RX ORDER — LANOLIN ALCOHOL/MO/W.PET/CERES
1 CREAM (GRAM) TOPICAL DAILY
COMMUNITY
Start: 2023-04-11 | End: 2024-05-19

## 2023-07-18 NOTE — PROGRESS NOTES
Writer scheduled appointment for patient to have CT prior to appointment. Left voice mail for patient with imaging information.     Radha Burnett LPN  Neurosurgery

## 2023-07-18 NOTE — PROGRESS NOTES
"[facility]  Department of Neurosurgery      Name: Getachew Barcenas  MRN: [id number]  Age: 53 year old  : 1970  Referring provider: Domonique Alcantara  2023      Chief Complaint:   Acute on chronic left subdural hematoma, chronic bilateral subdural hematoma 2022  S/p B/L MMA Embolization on 2022  Follow-up after imaging    History of Present Illness:   Getachew Barcenas is a 53 year old male with a history of alcohol use disorder, thiamine deficiency, frequent falls, peripheral neuropathy, hypothyroidism, former tobacco use, mild LULI, somnambulism, RLS, diverticulitis, and mood disorder who presented as transfer to [facility] on 2022 for higher level of cares in the setting of new diagnosis of acute on chronic L SDH and subacute R SDH. On 2022, patient underwent bilateral MMA embolization and was discharged on 2022.    Patient had a few follow-ups with me since hospital discharge. Most recent visit on 3/1/2023. He reported dizziness as an ongoing symptoms, but with reduced fall frequency. His head CT from 2023 showed Interval improvement of bilateral cerebral convexity subdural hematomas. Follow-up in 6 weeks with head CT was recommended at that time.     Today, patient presents for a follow-up. He is accompanied by his daughter, James. He had a head CT prior to this appointment. Please see the results below.     Patient reports dizziness as an ongoing issue, but no recent falls at least within the past few months. He ambulates independently. He denies any new neurological symptoms. Unfortunately, he restarted alcohol use since 2023. Reports that he feels angry all the time and alcohol helps him to \"take the edge off\". He follows up with [facility] at [facility] regularly. No recent psychiatrist visits.       Review of Systems:   Pertinent items are noted in HPI or as in patient entered ROS below, remainder of complete ROS is negative.        No data to display             "     Physical Exam:   There were no vitals taken for this visit.   General: No acute distress.    Neuro: The patient is fully oriented. Speech is normal.   Psych: Normal mood and affect. Behavior is normal.        Imagin2023 Head CT:  Impression: Decreased left cerebral subdural hematoma compared to  2023. Resolution of the right frontal subdural hematoma. No new  intracranial hemorrhage.      Assessment:  Acute on chronic left subdural hematoma, chronic bilateral subdural hematoma 2022  S/p B/L MMA Embolization on 2022  Follow-up after imaging    Plan:  Today's head CT showed Decreased left cerebral subdural hematoma compared to  2023. Resolution of the right frontal subdural hematoma. No new intracranial hemorrhage.     We will plan for a follow-up head CT in 6 weeks. Patient to follow-up with me after imaging.          I spent 25 minutes on patient care activities related to this encounter on the date of service, including time spent reviewing the chart, obtaining history and examination and in counseling the patient, and in documentation in the electronic medical record.      Naila VÁSQUEZ CNP  Department of Neurosurgery

## 2023-07-18 NOTE — LETTER
2023       RE: Getachew Barcenas  [address] [place] [address]     Dear Colleague,    Thank you for referring your patient, Getachew Barcenas, to the [facility] NEUROSURGERY CLINIC [place] [facility]. Please see a copy of my visit note below.    [facility]  Department of Neurosurgery      Name: Getachew Barcenas  MRN: [id number]  Age: 53 year old  : 1970  Referring provider: Domonique Alcantara  2023      Chief Complaint:   Acute on chronic left subdural hematoma, chronic bilateral subdural hematoma 2022  S/p B/L MMA Embolization on 2022  Follow-up after imaging    History of Present Illness:   Getachew Barcenas is a 53 year old male with a history of alcohol use disorder, thiamine deficiency, frequent falls, peripheral neuropathy, hypothyroidism, former tobacco use, mild LULI, somnambulism, RLS, diverticulitis, and mood disorder who presented as transfer to [facility] on 2022 for higher level of cares in the setting of new diagnosis of acute on chronic L SDH and subacute R SDH. On 2022, patient underwent bilateral MMA embolization and was discharged on 2022.    Patient had a few follow-ups with me since hospital discharge. Most recent visit on 3/1/2023. He reported dizziness as an ongoing symptoms, but with reduced fall frequency. His head CT from 2023 showed Interval improvement of bilateral cerebral convexity subdural hematomas. Follow-up in 6 weeks with head CT was recommended at that time.     Today, patient presents for a follow-up. He is accompanied by his daughter, James. He had a head CT prior to this appointment. Please see the results below.     Patient reports dizziness as an ongoing issue, but no recent falls at least within the past few months. He ambulates independently. He denies any new neurological symptoms. Unfortunately, he restarted alcohol use since 2023. Reports that he feels angry all the time  "and alcohol helps him to \"take the edge off\". He follows up with [facility] at [facility] regularly. No recent psychiatrist visits.       Review of Systems:   Pertinent items are noted in HPI or as in patient entered ROS below, remainder of complete ROS is negative.        No data to display                 Physical Exam:   There were no vitals taken for this visit.   General: No acute distress.    Neuro: The patient is fully oriented. Speech is normal.   Psych: Normal mood and affect. Behavior is normal.        Imagin2023 Head CT:  Impression: Decreased left cerebral subdural hematoma compared to  2023. Resolution of the right frontal subdural hematoma. No new  intracranial hemorrhage.      Assessment:  Acute on chronic left subdural hematoma, chronic bilateral subdural hematoma 2022  S/p B/L MMA Embolization on 2022  Follow-up after imaging    Plan:  Today's head CT showed Decreased left cerebral subdural hematoma compared to  2023. Resolution of the right frontal subdural hematoma. No new intracranial hemorrhage.     We will plan for a follow-up head CT in 6 weeks. Patient to follow-up with me after imaging.          I spent 25 minutes on patient care activities related to this encounter on the date of service, including time spent reviewing the chart, obtaining history and examination and in counseling the patient, and in documentation in the electronic medical record.        Again, thank you for allowing me to participate in the care of your patient.      Sincerely,    THALIA Xie CNP      "

## 2023-08-03 ENCOUNTER — TELEPHONE (OUTPATIENT)
Dept: NEUROLOGY | Facility: CLINIC | Age: 53
End: 2023-08-03
Payer: COMMERCIAL

## 2023-08-03 NOTE — TELEPHONE ENCOUNTER
Per Dr. Joe/clinical team - ok to offer openings with Dr. Francisco if patient would like to come in sooner.    Left Voicemail (1st Attempt) for the patient to call back and schedule the following:    Appointment type: New Subspecialty Neuropathy  Provider: MORENITA Francisco or Bautista  Return date: First available  Specialty phone number: [phone]  Additional appointment(s) needed: N/A  Additonal Notes: N/A    Parth Perera on 8/3/2023 at 12:53 PM

## 2023-09-06 ENCOUNTER — TELEPHONE (OUTPATIENT)
Dept: NEUROLOGY | Facility: CLINIC | Age: 53
End: 2023-09-06
Payer: COMMERCIAL

## 2023-09-06 NOTE — TELEPHONE ENCOUNTER
Called waitlist patient and offered an appointment with Dr. Joe on this date 9/7/2023 & time 11:00am at this location [facility].     Please note there is no guarantee this appointment will be available as it is first come first serve.      Parth Perera on 9/6/2023 at 5:12 PM

## 2023-09-13 ENCOUNTER — OFFICE VISIT (OUTPATIENT)
Dept: NEUROSURGERY | Facility: CLINIC | Age: 53
End: 2023-09-13
Payer: COMMERCIAL

## 2023-09-13 ENCOUNTER — ANCILLARY PROCEDURE (OUTPATIENT)
Dept: CT IMAGING | Facility: CLINIC | Age: 53
End: 2023-09-13
Attending: NURSE PRACTITIONER
Payer: COMMERCIAL

## 2023-09-13 VITALS — SYSTOLIC BLOOD PRESSURE: 104 MMHG | DIASTOLIC BLOOD PRESSURE: 72 MMHG | OXYGEN SATURATION: 97 % | HEART RATE: 68 BPM

## 2023-09-13 DIAGNOSIS — I62.03 CHRONIC SUBDURAL HEMATOMA (H): ICD-10-CM

## 2023-09-13 DIAGNOSIS — S06.5XAA SUBDURAL HEMATOMA (H): Primary | ICD-10-CM

## 2023-09-13 PROCEDURE — 70450 CT HEAD/BRAIN W/O DYE: CPT | Mod: GC | Performed by: STUDENT IN AN ORGANIZED HEALTH CARE EDUCATION/TRAINING PROGRAM

## 2023-09-13 PROCEDURE — 99213 OFFICE O/P EST LOW 20 MIN: CPT | Performed by: NURSE PRACTITIONER

## 2023-09-13 ASSESSMENT — PAIN SCALES - GENERAL: PAINLEVEL: NO PAIN (0)

## 2023-09-13 NOTE — LETTER
"2023       RE: Getachew Barcenas  [address] [place] [address]     Dear Colleague,    Thank you for referring your patient, Getachew Barcenas, to the [facility] NEUROSURGERY CLINIC [place] [facility]. Please see a copy of my visit note below.    [facility]  Department of Neurosurgery      Name: Getachew Barcenas  MRN: [id number]  Age: 53 year old  : 1970  Referring provider: No ref. provider found  2023      Chief Complaint:   Acute on chronic left subdural hematoma, chronic bilateral subdural hematoma 2022  S/p B/L MMA Embolization on 2022  Follow-up after imaging    History of Present Illness:   Getachew Barcenas is a 53 year old male with a history of alcohol use disorder, thiamine deficiency, frequent falls, peripheral neuropathy, hypothyroidism, former tobacco use, mild LULI, somnambulism, RLS, diverticulitis, and mood disorder who presented as transfer to [facility] on 2022 for higher level of cares in the setting of new diagnosis of acute on chronic L SDH and subacute R SDH. On 2022, patient underwent bilateral MMA embolization and was discharged on 2022.     Patient had a few follow-ups with me since hospital discharge. Most recent visit on 2023. He reported ongoing dizziness and vertigo. His head CT that day showed Decreased left cerebral subdural hematoma and resolved right frontal SDH. Recommended a follow-up head CT in 6 weeks.     Today, patient presents for a follow-up after head CT. He is accompanied by his daughter. He reports dizziness and vertigo as ongoing issues. He is not falling at home, but had some \"near falls\" and was able to catch himself. He is in a wheelchair today. No new neurological concerns. Unfortunately, he has been drinking alcohol on/off recently.     Review of Systems:   Pertinent items are noted in HPI or as in patient entered ROS below, remainder of complete ROS is negative. "        No data to display                 Physical Exam:   /72   Pulse 68   SpO2 97%    General: No acute distress.    Neuro: oriented to self, place, month and year. Speech is normal. Extraocular movements are intact without nystagmus. Facial sensation is intact in V1, V2, V3 distributions. Facial nerve function is normal, rated as a House Brackmann 1. Shoulders are full strength. There is no pronator drift. Full strength in all extremities. Sensation intact throughout. No dysmetria with finger-nose-finger testing. Gait: Did not ambulate him today.     Imagin2023 Head CT:  Impression:     1. Chronic left subdural hemorrhage, stable compared with 2023,  improved compared with 3/17/2023 head CTs  2. Stable, minimal degree of left lateral ventricle partial  effacement.  3. No identifiable right subdural hemorrhage. No new intracranial  hemorrhage      Assessment:  Acute on chronic left subdural hematoma, chronic bilateral subdural hematoma 2022  S/p B/L MMA Embolization on 2022  Follow-up after imaging    Plan:  Today's head CT shows no new intracranial hemorrhage.  Overall stable to some reduced left subdural hemorrhage.  He continues to have dizziness and vertigo.  We will plan for a follow-up head CT in 8 weeks from now.  Patient to follow-up with me after head CT.    I spent 25 minutes on patient care activities related to this encounter on the date of service, including time spent reviewing the chart, obtaining history and examination and in counseling the patient, and in documentation in the electronic medical record.        Again, thank you for allowing me to participate in the care of your patient.      Sincerely,    THALIA Xie CNP

## 2023-09-13 NOTE — PROGRESS NOTES
"[facility]  Department of Neurosurgery      Name: Getachew Barcenas  MRN: [id number]  Age: 53 year old  : 1970  Referring provider: No ref. provider found  2023      Chief Complaint:   Acute on chronic left subdural hematoma, chronic bilateral subdural hematoma 2022  S/p B/L MMA Embolization on 2022  Follow-up after imaging    History of Present Illness:   Getachew Barcenas is a 53 year old male with a history of alcohol use disorder, thiamine deficiency, frequent falls, peripheral neuropathy, hypothyroidism, former tobacco use, mild LULI, somnambulism, RLS, diverticulitis, and mood disorder who presented as transfer to [facility] on 2022 for higher level of cares in the setting of new diagnosis of acute on chronic L SDH and subacute R SDH. On 2022, patient underwent bilateral MMA embolization and was discharged on 2022.     Patient had a few follow-ups with me since hospital discharge. Most recent visit on 2023. He reported ongoing dizziness and vertigo. His head CT that day showed Decreased left cerebral subdural hematoma and resolved right frontal SDH. Recommended a follow-up head CT in 6 weeks.     Today, patient presents for a follow-up after head CT. He is accompanied by his daughter. He reports dizziness and vertigo as ongoing issues. He is not falling at home, but had some \"near falls\" and was able to catch himself. He is in a wheelchair today. No new neurological concerns. Unfortunately, he has been drinking alcohol on/off recently.     Review of Systems:   Pertinent items are noted in HPI or as in patient entered ROS below, remainder of complete ROS is negative.        No data to display                 Physical Exam:   /72   Pulse 68   SpO2 97%    General: No acute distress.    Neuro: oriented to self, place, month and year. Speech is normal. Extraocular movements are intact without nystagmus. Facial sensation is intact in V1, V2, V3 distributions. Facial " nerve function is normal, rated as a House Brackmann 1. Shoulders are full strength. There is no pronator drift. Full strength in all extremities. Sensation intact throughout. No dysmetria with finger-nose-finger testing. Gait: Did not ambulate him today.     Imagin2023 Head CT:  Impression:     1. Chronic left subdural hemorrhage, stable compared with 2023,  improved compared with 3/17/2023 head CTs  2. Stable, minimal degree of left lateral ventricle partial  effacement.  3. No identifiable right subdural hemorrhage. No new intracranial  hemorrhage      Assessment:  Acute on chronic left subdural hematoma, chronic bilateral subdural hematoma 2022  S/p B/L MMA Embolization on 2022  Follow-up after imaging    Plan:  Today's head CT shows no new intracranial hemorrhage.  Overall stable to some reduced left subdural hemorrhage.  He continues to have dizziness and vertigo.  We will plan for a follow-up head CT in 8 weeks from now.  Patient to follow-up with me after head CT.    I spent 25 minutes on patient care activities related to this encounter on the date of service, including time spent reviewing the chart, obtaining history and examination and in counseling the patient, and in documentation in the electronic medical record.      Naila VÁSQUEZ CNP  Department of Neurosurgery

## 2023-09-13 NOTE — NURSING NOTE
Chief Complaint   Patient presents with    RECHECK     CT results   /72   Pulse 68   SpO2 97%     Michelle Sewell CMA at 9:56 AM on 9/13/2023.

## 2023-09-21 ENCOUNTER — PRE VISIT (OUTPATIENT)
Dept: NEUROLOGY | Facility: CLINIC | Age: 53
End: 2023-09-21

## 2023-09-21 ENCOUNTER — OFFICE VISIT (OUTPATIENT)
Dept: NEUROLOGY | Facility: CLINIC | Age: 53
End: 2023-09-21
Attending: PHYSICIAN ASSISTANT
Payer: COMMERCIAL

## 2023-09-21 VITALS
WEIGHT: 227.2 LBS | OXYGEN SATURATION: 97 % | DIASTOLIC BLOOD PRESSURE: 60 MMHG | BODY MASS INDEX: 29.16 KG/M2 | HEART RATE: 103 BPM | SYSTOLIC BLOOD PRESSURE: 88 MMHG | HEIGHT: 74 IN

## 2023-09-21 DIAGNOSIS — G62.1 ALCOHOLIC PERIPHERAL NEUROPATHY (H): ICD-10-CM

## 2023-09-21 DIAGNOSIS — S06.5XAA SUBDURAL HEMATOMA (H): Primary | ICD-10-CM

## 2023-09-21 PROCEDURE — 99204 OFFICE O/P NEW MOD 45 MIN: CPT | Mod: GC | Performed by: PSYCHIATRY & NEUROLOGY

## 2023-09-21 RX ORDER — GABAPENTIN 300 MG/1
300 CAPSULE ORAL 2 TIMES DAILY
COMMUNITY
Start: 2023-04-11 | End: 2023-12-07

## 2023-09-21 RX ORDER — MULTIVITAMIN WITH FOLIC ACID 400 MCG
1 TABLET ORAL DAILY
Status: ON HOLD | COMMUNITY
Start: 2023-04-19 | End: 2024-05-26

## 2023-09-21 RX ORDER — NALTREXONE HYDROCHLORIDE 50 MG/1
50 TABLET, FILM COATED ORAL DAILY
COMMUNITY
Start: 2023-05-31 | End: 2023-12-07

## 2023-09-21 ASSESSMENT — PAIN SCALES - GENERAL: PAINLEVEL: EXTREME PAIN (8)

## 2023-09-21 NOTE — PROGRESS NOTES
[facility]  Neurology Consultation Note - Neuromuscular Division    Patient Name:  Getachew Barcenas    MRN:  [id number]   :  1970  Date of Service:  2023  Primary care provider:  No Ref-Primary, Physician      Consult requested by: Domonique Alcantara PA-C  [address]  [address]  [place] [address]    CC: Paresthesias of bilateral feet    History of Present Illness:   Getachew Barcenas is a 53 year old man with PMH significant for chronic marked alcohol use disorder with a few relapses in the last few years, currently 10 days sober.  He also has acute on chronic bilateral subdural hematomas in 2022 s/p bilateral MMA embolization 2022 and follows with neurosurgery.  He presents to the neuromuscular department for consultation of bilateral feet paresthesias from [facility].    He reports feeling numbness and paresthesias of the toes and a little bit of the distal feet for about 3 years now.  Can fluctuate a little bit, but it never truly abates.  It is equal on both sides and not more intense in either side.  He has tried gabapentin 600 mg 3 times daily which helps a little bit.  He previously described a little bit of weakness in the feet in previous years, but thinks this is improved in the last months.  He endorses some hip involvement last year when he was drinking heavily, but since the subdural evacuation he has more episodes of remission from alcohol and feels disputed in order involved.  He does continue to have paresthesia in bilateral fifth digits, especially when waking up, but this is mild compared to fingertip paresthesias that were present previously.  He endorses previously having severe alcohol use and having nutritional deficiencies because he would have gastritis, unable to keep most foods down and sometimes, and having low vitamin levels.  As above, he is not 10 days sober and he can already tell that his  dizziness, balance, and paresthesias of the feet are already slightly improved during this time.  He is also supplementing a multivitamin daily, magnesium daily, phosphorus several times a week, and thinks he is also taking independent B1 vitamins daily.  He says his diet is also improved in the last few months.    Last EMG performed at [facility] in early 2022.  It shows a length-dependent axonal sensorimotor peripheral neuropathy with slight asymmetry of the right side, although not meeting criteria for greater than 50% change in laterality.  This was except for the EDB which was NR on the right and low on the left side, although this can be present due to local trauma of the foot.  It does show mild slowing of conduction velocity, but this is probably a treatable due to axonal loss.      ROS: A 10-point ROS was performed as per HPI.    PMH:  Past Medical History:   Diagnosis Date    Alcohol use disorder, severe, dependence (H) 2/24/2022     Past Surgical History:   Procedure Laterality Date    IR CAROTID CEREBRAL ANGIOGRAM BILATERAL  11/7/2022    PICC TRIPLE LUMEN PLACEMENT  11/3/2022            Allergies:  No Known Allergies    Medications:      Current Outpatient Medications:     acamprosate (CAMPRAL) 333 MG EC tablet, Take 666 mg by mouth 3 times daily, Disp: , Rfl:     acetaminophen (TYLENOL) 500 MG tablet, [ACETAMINOPHEN (TYLENOL) 500 MG TABLET] Take 1-2 tablets (500-1,000 mg total) by mouth every 6 (six) hours as needed for pain., Disp: , Rfl: 0    hydrOXYzine (VISTARIL) 25 MG capsule, Take 75 mg by mouth At Bedtime, Disp: , Rfl:     hydrOXYzine (VISTARIL) 25 MG capsule, Take 25 mg by mouth 3 times daily as needed for anxiety, Disp: , Rfl:     levothyroxine (SYNTHROID/LEVOTHROID) 175 MCG tablet, Take 175 mcg by mouth daily, Disp: , Rfl:     magnesium oxide (MAG-OX) 400 MG tablet, Take 400 mg by mouth daily as needed, Disp: , Rfl:     Multiple Vitamins-Minerals (ONE DAILY COMPLETE FOR MEN PO), Take 1  "tablet by mouth daily, Disp: , Rfl:     thiamine (B-1) 100 MG tablet, Take 1 tablet by mouth daily, Disp: , Rfl:     traZODone (DESYREL) 50 MG tablet, Take 100 mg by mouth nightly as needed for sleep, Disp: , Rfl:     DULoxetine (CYMBALTA) 30 MG capsule, Take 30 mg by mouth, Disp: , Rfl:     Social History:  Social History     Tobacco Use    Smoking status: Never    Smokeless tobacco: Current     Types: Chew   Substance Use Topics    Alcohol use: Yes       Family History:    No family history on file.      Physical Examination  Vitals: BP (!) 88/60   Pulse 103   Ht 1.88 m (6' 2\")   Wt 103.1 kg (227 lb 3.2 oz)   SpO2 97%   BMI 29.17 kg/m      General: Alert, interactive; NAD, cooperative  HEENT: Normocephalic, atraumatic, nares patent, no oral lesions  Pulm: No increased work of breathing  Extremities: Warm and well perfused, peripheral pulses present, mild pedal edema  Musculoskeletal: No deformities of feet, hands, or limbs  Skin: Not jaundiced, no rash, no ecchymoses  Psych: Normal mood and affect    Neurologic:   Mental status: Attentive, interactive; Recalls remote and recent history with accuracy  Speech/Language: Fluent speech without paraphasic errors; No dysarthria  Cranial nerves: Visual fields intact to confrontation, Eyes conjugate on neutral gaze, Pupils 4mm and brisk, EOMI w/ normal and smooth pursuit, face expression symmetric, facial sensation intact and symmetric, hearing intact to conversation, shoulder shrug strong, palate rise symmetric, tongue/uvula midline.    Motor:   Bulk: Appropriate, no atrophy or hypertrophy appreciated  Tone: Appropriate, occasional paratonia  Spontaneous movements: No myoclonus, asterixis, or fasciculations  Power: *All strength assessments based on MRC grading  Pronator drift absent.    Right Left   Arm abduction 5 5   Elbow Flex 5 5   Elbow Extension 5 5   Wrist Extension 5 5   FDI  4+ 4   APB 5 5   Fingertip Flexion 5 5     5 5   Hip Flexion 5 5   Knee Flexion " 5 5   Knee Extension 5 5   Dorsiflexion  5 5   Plantarflexion 5 5   Inversion 5 5   Eversion 5 5     Reflexes:    Right Left   Triceps 2 2   Biceps 2 2   BR 2 2   Patella 1 1   Achilles  1 1   Plantar down down   No crossed adductors or spread. No clonus    Sensory:   General sensation: General touch intact throughout arms, mildly reduced in big toes bilaterally  Vibratory (timed): Normal in fingertips, 4s at MM bilaterally  Proprioception: Normal in big toes bilaterally  Pinprick: allodynia to the mid shin bilaterally    Coordination:   FNF intact bilaterally without dysmetria  Romberg shows prominent truncal ataxia and unable to take tandem steps.  Finger tapping with normal amplitude and frequency    Gait/Station:   Able to rise unassisted from a seated position.   Gait is slightly wide-based and ataxic, but can ambulate without assistance.      IMPRESSION/PLAN:    Getachew Bacrenas is a 53 year old man with PMH significant for chronic alcohol abuse with a few relapses in the last few years, currently 10 days sober.  He also has acute on chronic bilateral subdural hematomas in November 2022 s/p bilateral MMA embolization November 7, 2022 and follows with neurosurgery.  He presents with prominent paresthesias and numbness of bilateral toes, although this symptom, as well as imbalance and dizziness are all resolving in the setting of days of sobriety.  On independent review of the EMG, it does not specifically meet criteria for asymmetry in multiple nerves, and the asymmetry in the peroneal motor responses from the EDB  (right NR, left 2.9 mV), may be attributable to local trauma of the foot.  Additionally, he is clinically improving in the setting of abstinence and vitamin repletion.  It is very unlikely that his neuropathy is due to an alternative (inflammatory/mononeuropathy multiplex) etiology based on his clinical course, recent resolution, and exam.  Finally, he does have probable bilateral ulnar neuropathy  without any functional change at this time.      - Encouraged continued abstinence and had a long discussion about how alcohol abuse and nutritional deficiencies are likely contributing to his clinical picture.  - Continue vitamin supplementation, including B vitamins especially B1  - No further neurodiagnostic tests are indicated at this time. No need for nerve biopsy  - He had several questions about prognosis and optimizing his current condition which we discussed at length, mostly revolving around sobriety.  - No follow-up needed with our clinic at this time, but he is encouraged to call us if his symptoms change or worsen, including his hand function in the setting of probable ulnar neuropathy.      Patient seen and discussed with attending Dr. Tatyana Ochoa MD  Neuromuscular Medicine Fellow, PGY-5  [facility]    ATTENDING ADDENDUM: Patient seen and examined with fellow Dr Ochoa at the [facility] today. Agree with his impression and plan. 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 He is improving with abstinence and nutritional supplementation. Clinically his neuropathy is predominantly sensory length dependent, and symmetric. We do not believe he has mononeuropathy multiplex therefore we don't recommend biopsy. Workup by Dr Rivera in [facility] was complete; no further blood tests to add. Recommendations as per Dr Ochoa's note. Follow-up prn.     Billing MDM level 4 (moderate) based on 1) Problems assessed: Two or more stable chronic conditions (subdural hematoma from fall, alcoholic neuropathy) and 2) Amount/Complexity: Review of the results of 3 or more unique tests, as above    Quinn Joe MD, FAAN

## 2023-09-21 NOTE — LETTER
2023       RE: Getachew Barcenas  [address] [place] [address]       Dear Colleague,    Thank you for referring your patient, Getachew Barcenas, to the [facility]. Please see a copy of my visit note below.    [facility]  Neurology Consultation Note - Neuromuscular Division    Patient Name:  Getachew Barcenas    MRN:  [id number]   :  1970  Date of Service:  2023  Primary care provider:  No Ref-Primary, Physician      Consult requested by: Domonique Alcantara PA-C  [address]  [address]  [place] [address]    CC: Paresthesias of bilateral feet    History of Present Illness:   Getachew Barcenas is a 53 year old man with PMH significant for chronic marked alcohol use disorder with a few relapses in the last few years, currently 10 days sober.  He also has acute on chronic bilateral subdural hematomas in 2022 s/p bilateral MMA embolization 2022 and follows with neurosurgery.  He presents to the neuromuscular department for consultation of bilateral feet paresthesias from [facility].    He reports feeling numbness and paresthesias of the toes and a little bit of the distal feet for about 3 years now.  Can fluctuate a little bit, but it never truly abates.  It is equal on both sides and not more intense in either side.  He has tried gabapentin 600 mg 3 times daily which helps a little bit.  He previously described a little bit of weakness in the feet in previous years, but thinks this is improved in the last months.  He endorses some hip involvement last year when he was drinking heavily, but since the subdural evacuation he has more episodes of remission from alcohol and feels disputed in order involved.  He does continue to have paresthesia in bilateral fifth digits, especially when waking up, but this is mild compared to fingertip  paresthesias that were present previously.  He endorses previously having severe alcohol use and having nutritional deficiencies because he would have gastritis, unable to keep most foods down and sometimes, and having low vitamin levels.  As above, he is not 10 days sober and he can already tell that his dizziness, balance, and paresthesias of the feet are already slightly improved during this time.  He is also supplementing a multivitamin daily, magnesium daily, phosphorus several times a week, and thinks he is also taking independent B1 vitamins daily.  He says his diet is also improved in the last few months.    Last EMG performed at [facility] in early 2022.  It shows a length-dependent axonal sensorimotor peripheral neuropathy with slight asymmetry of the right side, although not meeting criteria for greater than 50% change in laterality.  This was except for the EDB which was NR on the right and low on the left side, although this can be present due to local trauma of the foot.  It does show mild slowing of conduction velocity, but this is probably a treatable due to axonal loss.      ROS: A 10-point ROS was performed as per HPI.    PMH:  Past Medical History:   Diagnosis Date    Alcohol use disorder, severe, dependence (H) 2/24/2022     Past Surgical History:   Procedure Laterality Date    IR CAROTID CEREBRAL ANGIOGRAM BILATERAL  11/7/2022    PICC TRIPLE LUMEN PLACEMENT  11/3/2022            Allergies:  No Known Allergies    Medications:      Current Outpatient Medications:     acamprosate (CAMPRAL) 333 MG EC tablet, Take 666 mg by mouth 3 times daily, Disp: , Rfl:     acetaminophen (TYLENOL) 500 MG tablet, [ACETAMINOPHEN (TYLENOL) 500 MG TABLET] Take 1-2 tablets (500-1,000 mg total) by mouth every 6 (six) hours as needed for pain., Disp: , Rfl: 0    hydrOXYzine (VISTARIL) 25 MG capsule, Take 75 mg by mouth At Bedtime, Disp: , Rfl:     hydrOXYzine (VISTARIL) 25 MG capsule, Take 25 mg by mouth 3 times daily  "as needed for anxiety, Disp: , Rfl:     levothyroxine (SYNTHROID/LEVOTHROID) 175 MCG tablet, Take 175 mcg by mouth daily, Disp: , Rfl:     magnesium oxide (MAG-OX) 400 MG tablet, Take 400 mg by mouth daily as needed, Disp: , Rfl:     Multiple Vitamins-Minerals (ONE DAILY COMPLETE FOR MEN PO), Take 1 tablet by mouth daily, Disp: , Rfl:     thiamine (B-1) 100 MG tablet, Take 1 tablet by mouth daily, Disp: , Rfl:     traZODone (DESYREL) 50 MG tablet, Take 100 mg by mouth nightly as needed for sleep, Disp: , Rfl:     DULoxetine (CYMBALTA) 30 MG capsule, Take 30 mg by mouth, Disp: , Rfl:     Social History:  Social History     Tobacco Use    Smoking status: Never    Smokeless tobacco: Current     Types: Chew   Substance Use Topics    Alcohol use: Yes       Family History:    No family history on file.      Physical Examination  Vitals: BP (!) 88/60   Pulse 103   Ht 1.88 m (6' 2\")   Wt 103.1 kg (227 lb 3.2 oz)   SpO2 97%   BMI 29.17 kg/m      General: Alert, interactive; NAD, cooperative  HEENT: Normocephalic, atraumatic, nares patent, no oral lesions  Pulm: No increased work of breathing  Extremities: Warm and well perfused, peripheral pulses present, mild pedal edema  Musculoskeletal: No deformities of feet, hands, or limbs  Skin: Not jaundiced, no rash, no ecchymoses  Psych: Normal mood and affect    Neurologic:   Mental status: Attentive, interactive; Recalls remote and recent history with accuracy  Speech/Language: Fluent speech without paraphasic errors; No dysarthria  Cranial nerves: Visual fields intact to confrontation, Eyes conjugate on neutral gaze, Pupils 4mm and brisk, EOMI w/ normal and smooth pursuit, face expression symmetric, facial sensation intact and symmetric, hearing intact to conversation, shoulder shrug strong, palate rise symmetric, tongue/uvula midline.    Motor:   Bulk: Appropriate, no atrophy or hypertrophy appreciated  Tone: Appropriate, occasional paratonia  Spontaneous movements: No " myoclonus, asterixis, or fasciculations  Power: *All strength assessments based on MRC grading  Pronator drift absent.    Right Left   Arm abduction 5 5   Elbow Flex 5 5   Elbow Extension 5 5   Wrist Extension 5 5   FDI  4+ 4   APB 5 5   Fingertip Flexion 5 5     5 5   Hip Flexion 5 5   Knee Flexion 5 5   Knee Extension 5 5   Dorsiflexion  5 5   Plantarflexion 5 5   Inversion 5 5   Eversion 5 5     Reflexes:    Right Left   Triceps 2 2   Biceps 2 2   BR 2 2   Patella 1 1   Achilles  1 1   Plantar down down   No crossed adductors or spread. No clonus    Sensory:   General sensation: General touch intact throughout arms, mildly reduced in big toes bilaterally  Vibratory (timed): Normal in fingertips, 4s at MM bilaterally  Proprioception: Normal in big toes bilaterally  Pinprick: allodynia to the mid shin bilaterally    Coordination:   FNF intact bilaterally without dysmetria  Romberg shows prominent truncal ataxia and unable to take tandem steps.  Finger tapping with normal amplitude and frequency    Gait/Station:   Able to rise unassisted from a seated position.   Gait is slightly wide-based and ataxic, but can ambulate without assistance.      IMPRESSION/PLAN:    Getachew Barcenas is a 53 year old man with PMH significant for chronic alcohol abuse with a few relapses in the last few years, currently 10 days sober.  He also has acute on chronic bilateral subdural hematomas in November 2022 s/p bilateral MMA embolization November 7, 2022 and follows with neurosurgery.  He presents with prominent paresthesias and numbness of bilateral toes, although this symptom, as well as imbalance and dizziness are all resolving in the setting of days of sobriety.  On independent review of the EMG, it does not specifically meet criteria for asymmetry in multiple nerves, and the asymmetry in the peroneal motor responses from the EDB  (right NR, left 2.9 mV), may be attributable to local trauma of the foot.  Additionally, he is  clinically improving in the setting of abstinence and vitamin repletion.  It is very unlikely that his neuropathy is due to an alternative (inflammatory/mononeuropathy multiplex) etiology based on his clinical course, recent resolution, and exam.  Finally, he does have probable bilateral ulnar neuropathy without any functional change at this time.      - Encouraged continued abstinence and had a long discussion about how alcohol abuse and nutritional deficiencies are likely contributing to his clinical picture.  - Continue vitamin supplementation, including B vitamins especially B1  - No further neurodiagnostic tests are indicated at this time. No need for nerve biopsy  - He had several questions about prognosis and optimizing his current condition which we discussed at length, mostly revolving around sobriety.  - No follow-up needed with our clinic at this time, but he is encouraged to call us if his symptoms change or worsen, including his hand function in the setting of probable ulnar neuropathy.      Patient seen and discussed with attending Dr. Tatyana Ochoa MD  Neuromuscular Medicine Fellow, PGY-5  [facility]    ATTENDING ADDENDUM: Patient seen and examined with fellow Dr Ochoa at the [facility]Neuromuscular Clinic today. Agree with his impression and plan. 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 He is improving with abstinence and nutritional supplementation. Clinically his neuropathy is predominantly sensory length dependent, and symmetric. We do not believe he has mononeuropathy multiplex therefore we don't recommend biopsy. Workup by Dr Rivera in [facility] was complete; no further blood tests to add. Recommendations as per Dr Ochoa's note. Follow-up prn.     Billing MDM level 4 (moderate) based on 1) Problems assessed: Two or more stable chronic conditions (subdural hematoma from  fall, alcoholic neuropathy) and 2) Amount/Complexity: Review of the results of 3 or more unique tests, as above            Again, thank you for allowing me to participate in the care of your patient.      Sincerely,    Quinn Joe MD

## 2023-09-21 NOTE — NURSING NOTE
Chief Complaint   Patient presents with    New Patient       Vitals were taken and medications were reconciled.      Carmine Vega, Technician  9:01 AM  September 21, 2023

## 2023-11-08 ENCOUNTER — ANCILLARY PROCEDURE (OUTPATIENT)
Dept: CT IMAGING | Facility: CLINIC | Age: 53
End: 2023-11-08
Attending: NURSE PRACTITIONER
Payer: COMMERCIAL

## 2023-11-08 ENCOUNTER — OFFICE VISIT (OUTPATIENT)
Dept: NEUROSURGERY | Facility: CLINIC | Age: 53
End: 2023-11-08
Payer: COMMERCIAL

## 2023-11-08 VITALS
SYSTOLIC BLOOD PRESSURE: 120 MMHG | DIASTOLIC BLOOD PRESSURE: 84 MMHG | BODY MASS INDEX: 29.9 KG/M2 | RESPIRATION RATE: 16 BRPM | WEIGHT: 233 LBS | HEIGHT: 74 IN | HEART RATE: 110 BPM | OXYGEN SATURATION: 98 %

## 2023-11-08 DIAGNOSIS — S06.5XAA SUBDURAL HEMATOMA (H): Primary | ICD-10-CM

## 2023-11-08 DIAGNOSIS — S06.5XAA SUBDURAL HEMATOMA (H): ICD-10-CM

## 2023-11-08 PROCEDURE — 70450 CT HEAD/BRAIN W/O DYE: CPT | Mod: GC | Performed by: RADIOLOGY

## 2023-11-08 PROCEDURE — 99213 OFFICE O/P EST LOW 20 MIN: CPT | Performed by: NURSE PRACTITIONER

## 2023-11-08 RX ORDER — SILDENAFIL 25 MG/1
25 TABLET, FILM COATED ORAL DAILY PRN
COMMUNITY
Start: 2023-05-02 | End: 2024-05-19

## 2023-11-08 NOTE — PROGRESS NOTES
"[facility]  Department of Neurosurgery      Name: Getachew Barcenas  MRN: [id number]  Age: 53 year old  : 1970  Referring provider: No ref. provider found  2023      Chief Complaint:   Acute on chronic left subdural hematoma, chronic bilateral subdural hematoma 2022  S/p B/L MMA Embolization on 2022  Chronic Left SDH  Follow-up after imaging    History of Present Illness:   Getachew Barcenas is a 53 year old male with a history of alcohol use disorder, thiamine deficiency, frequent falls, peripheral neuropathy, hypothyroidism, former tobacco use, mild LULI, somnambulism, RLS, diverticulitis, and mood disorder who presented as transfer to [facility] on 2022 for higher level of cares in the setting of new diagnosis of acute on chronic L SDH and subacute R SDH. On 2022, patient underwent bilateral MMA embolization and was discharged on 2022.     He had a head CT on 1/3/2023 after a fall which showed a large left chronic subdural hematoma and he has been following up in clinic since then. Most recent visit on 2023 and his imaging at that time showed overall stable left SDH. We recommended a follow-up CT in 8 weeks.     Today, patient presents for the evaluation with his daughter. He reports occasional headaches, dizziness. He has a cane and a walker at home. Reports 2 falls within the past 2 weeks when he was not using cane/ walker. He stopped using alcohol for a month or so and unfortunately restarted it a week ago.     Review of Systems:   Pertinent items are noted in HPI or as in patient entered ROS below, remainder of complete ROS is negative.        No data to display                 Physical Exam:   /84 (BP Location: Right arm, Patient Position: Sitting)   Pulse 110   Resp 16   Ht 1.88 m (6' 2\")   Wt 105.7 kg (233 lb)   SpO2 98%   BMI 29.92 kg/m     General: No acute distress.    Neuro: The patient is oriented to self, place, month and year. Speech is normal. " Gait: Ambulates independently.   Psych: Normal mood and affect. Behavior is normal.        Imagin2023 Head CT:  Impression:  Decreased size of left convexity subdural hematoma. No acute  intracranial hemorrhage.      Assessment:  Acute on chronic left subdural hematoma, chronic bilateral subdural hematoma 2022  S/p B/L MMA Embolization on 2022  Chronic Left SDH  Follow-up after imaging    Plan:  Today's head CT shows decreased size of left convexity subdural hematoma. No acute intracranial hemorrhage. Clinically, patient is doing better. No new neurological concerns. Will discuss with Dr. Bernard and will contact the patient with the recommendation. Encouraged him about alcohol abstinence.        I spent 25 minutes on patient care activities related to this encounter on the date of service, including time spent reviewing the chart, obtaining history and examination and in counseling the patient, and in documentation in the electronic medical record.      Naila VÁSQUEZ, CNP  Department of Neurosurgery

## 2023-11-08 NOTE — LETTER
2023       RE: Getachew Barcenas  [address] [place] [address]     Dear Colleague,    Thank you for referring your patient, Getcahew Barcenas, to the [facility] NEUROSURGERY CLINIC [place] at [facility]. Please see a copy of my visit note below.    [facility]  Department of Neurosurgery      Name: Getachew Barcenas  MRN: [id number]  Age: 53 year old  : 1970  Referring provider: No ref. provider found  2023      Chief Complaint:   Acute on chronic left subdural hematoma, chronic bilateral subdural hematoma 2022  S/p B/L MMA Embolization on 2022  Chronic Left SDH  Follow-up after imaging    History of Present Illness:   Getachew Barcenas is a 53 year old male with a history of alcohol use disorder, thiamine deficiency, frequent falls, peripheral neuropathy, hypothyroidism, former tobacco use, mild LULI, somnambulism, RLS, diverticulitis, and mood disorder who presented as transfer to [facility] on 2022 for higher level of cares in the setting of new diagnosis of acute on chronic L SDH and subacute R SDH. On 2022, patient underwent bilateral MMA embolization and was discharged on 2022.     He had a head CT on 1/3/2023 after a fall which showed a large left chronic subdural hematoma and he has been following up in clinic since then. Most recent visit on 2023 and his imaging at that time showed overall stable left SDH. We recommended a follow-up CT in 8 weeks.     Today, patient presents for the evaluation with his daughter. He reports occasional headaches, dizziness. He has a cane and a walker at home. Reports 2 falls within the past 2 weeks when he was not using cane/ walker. He stopped using alcohol for a month or so and unfortunately restarted it a week ago.     Review of Systems:   Pertinent items are noted in HPI or as in patient entered ROS below, remainder of complete ROS is negative.        No data to display     "             Physical Exam:   /84 (BP Location: Right arm, Patient Position: Sitting)   Pulse 110   Resp 16   Ht 1.88 m (6' 2\")   Wt 105.7 kg (233 lb)   SpO2 98%   BMI 29.92 kg/m     General: No acute distress.    Neuro: The patient is oriented to self, place, month and year. Speech is normal. Gait: Ambulates independently.   Psych: Normal mood and affect. Behavior is normal.        Imagin2023 Head CT:  Impression:  Decreased size of left convexity subdural hematoma. No acute  intracranial hemorrhage.      Assessment:  Acute on chronic left subdural hematoma, chronic bilateral subdural hematoma 2022  S/p B/L MMA Embolization on 2022  Chronic Left SDH  Follow-up after imaging    Plan:  Today's head CT shows decreased size of left convexity subdural hematoma. No acute intracranial hemorrhage. Clinically, patient is doing better. No new neurological concerns. Will discuss with Dr. Bernard and will contact the patient with the recommendation. Encouraged him about alcohol abstinence.        I spent 25 minutes on patient care activities related to this encounter on the date of service, including time spent reviewing the chart, obtaining history and examination and in counseling the patient, and in documentation in the electronic medical record.          Again, thank you for allowing me to participate in the care of your patient.      Sincerely,    THALIA Xie CNP    "

## 2023-12-06 ENCOUNTER — HOSPITAL ENCOUNTER (EMERGENCY)
Facility: CLINIC | Age: 53
Discharge: OTHER INSTITUTION | End: 2023-12-08
Attending: EMERGENCY MEDICINE | Admitting: EMERGENCY MEDICINE
Payer: COMMERCIAL

## 2023-12-06 ENCOUNTER — APPOINTMENT (OUTPATIENT)
Dept: CT IMAGING | Facility: CLINIC | Age: 53
End: 2023-12-06
Attending: EMERGENCY MEDICINE
Payer: COMMERCIAL

## 2023-12-06 ENCOUNTER — HOSPITAL ENCOUNTER (EMERGENCY)
Facility: HOSPITAL | Age: 53
Discharge: HOME | End: 2023-12-06
Payer: COMMERCIAL

## 2023-12-06 VITALS
TEMPERATURE: 97.8 F | SYSTOLIC BLOOD PRESSURE: 105 MMHG | RESPIRATION RATE: 20 BRPM | HEIGHT: 74 IN | DIASTOLIC BLOOD PRESSURE: 65 MMHG | OXYGEN SATURATION: 97 % | WEIGHT: 233.7 LBS | BODY MASS INDEX: 29.99 KG/M2 | HEART RATE: 60 BPM

## 2023-12-06 DIAGNOSIS — K80.43 CHOLEDOCHOLITHIASIS WITH ACUTE CHOLECYSTITIS WITH OBSTRUCTION: ICD-10-CM

## 2023-12-06 DIAGNOSIS — K83.1 BILIARY OBSTRUCTION (H): Primary | ICD-10-CM

## 2023-12-06 LAB
ALBUMIN SERPL BCG-MCNC: 4.4 G/DL (ref 3.5–5.2)
ALP SERPL-CCNC: 149 U/L (ref 40–150)
ALT SERPL W P-5'-P-CCNC: 89 U/L (ref 0–70)
ANION GAP SERPL CALCULATED.3IONS-SCNC: 14 MMOL/L (ref 7–15)
AST SERPL W P-5'-P-CCNC: 87 U/L (ref 0–45)
BASOPHILS # BLD AUTO: 0.1 10E3/UL (ref 0–0.2)
BASOPHILS NFR BLD AUTO: 1 %
BILIRUB SERPL-MCNC: 3 MG/DL
BUN SERPL-MCNC: 6.4 MG/DL (ref 6–20)
CALCIUM SERPL-MCNC: 9.9 MG/DL (ref 8.6–10)
CHLORIDE SERPL-SCNC: 98 MMOL/L (ref 98–107)
CREAT SERPL-MCNC: 0.81 MG/DL (ref 0.67–1.17)
DEPRECATED HCO3 PLAS-SCNC: 22 MMOL/L (ref 22–29)
EGFRCR SERPLBLD CKD-EPI 2021: >90 ML/MIN/1.73M2
EOSINOPHIL # BLD AUTO: 0 10E3/UL (ref 0–0.7)
EOSINOPHIL NFR BLD AUTO: 0 %
ERYTHROCYTE [DISTWIDTH] IN BLOOD BY AUTOMATED COUNT: 14.1 % (ref 10–15)
GLUCOSE SERPL-MCNC: 112 MG/DL (ref 70–99)
HCT VFR BLD AUTO: 43.7 % (ref 40–53)
HGB BLD-MCNC: 14.9 G/DL (ref 13.3–17.7)
IMM GRANULOCYTES # BLD: 0.1 10E3/UL
IMM GRANULOCYTES NFR BLD: 1 %
LIPASE SERPL-CCNC: 77 U/L (ref 13–60)
LYMPHOCYTES # BLD AUTO: 3.4 10E3/UL (ref 0.8–5.3)
LYMPHOCYTES NFR BLD AUTO: 28 %
MCH RBC QN AUTO: 32.8 PG (ref 26.5–33)
MCHC RBC AUTO-ENTMCNC: 34.1 G/DL (ref 31.5–36.5)
MCV RBC AUTO: 96 FL (ref 78–100)
MONOCYTES # BLD AUTO: 1.3 10E3/UL (ref 0–1.3)
MONOCYTES NFR BLD AUTO: 10 %
NEUTROPHILS # BLD AUTO: 7.5 10E3/UL (ref 1.6–8.3)
NEUTROPHILS NFR BLD AUTO: 60 %
NRBC # BLD AUTO: 0 10E3/UL
NRBC BLD AUTO-RTO: 0 /100
PLATELET # BLD AUTO: 391 10E3/UL (ref 150–450)
POTASSIUM SERPL-SCNC: 3.5 MMOL/L (ref 3.4–5.3)
PROT SERPL-MCNC: 8 G/DL (ref 6.4–8.3)
RBC # BLD AUTO: 4.54 10E6/UL (ref 4.4–5.9)
SODIUM SERPL-SCNC: 134 MMOL/L (ref 135–145)
WBC # BLD AUTO: 12.4 10E3/UL (ref 4–11)

## 2023-12-06 PROCEDURE — 96376 TX/PRO/DX INJ SAME DRUG ADON: CPT | Mod: 59 | Performed by: EMERGENCY MEDICINE

## 2023-12-06 PROCEDURE — 250N000011 HC RX IP 250 OP 636: Performed by: EMERGENCY MEDICINE

## 2023-12-06 PROCEDURE — 76705 ECHO EXAM OF ABDOMEN: CPT | Mod: 26 | Performed by: EMERGENCY MEDICINE

## 2023-12-06 PROCEDURE — 250N000011 HC RX IP 250 OP 636: Mod: JZ | Performed by: EMERGENCY MEDICINE

## 2023-12-06 PROCEDURE — 74177 CT ABD & PELVIS W/CONTRAST: CPT

## 2023-12-06 PROCEDURE — 250N000009 HC RX 250: Performed by: EMERGENCY MEDICINE

## 2023-12-06 PROCEDURE — 99285 EMERGENCY DEPT VISIT HI MDM: CPT | Performed by: EMERGENCY MEDICINE

## 2023-12-06 PROCEDURE — 85014 HEMATOCRIT: CPT | Performed by: EMERGENCY MEDICINE

## 2023-12-06 PROCEDURE — 83690 ASSAY OF LIPASE: CPT | Performed by: EMERGENCY MEDICINE

## 2023-12-06 PROCEDURE — 258N000003 HC RX IP 258 OP 636: Performed by: EMERGENCY MEDICINE

## 2023-12-06 PROCEDURE — 96375 TX/PRO/DX INJ NEW DRUG ADDON: CPT | Mod: 59 | Performed by: EMERGENCY MEDICINE

## 2023-12-06 PROCEDURE — 36415 COLL VENOUS BLD VENIPUNCTURE: CPT | Performed by: EMERGENCY MEDICINE

## 2023-12-06 PROCEDURE — 76705 ECHO EXAM OF ABDOMEN: CPT | Performed by: EMERGENCY MEDICINE

## 2023-12-06 PROCEDURE — 99285 EMERGENCY DEPT VISIT HI MDM: CPT | Mod: 25 | Performed by: EMERGENCY MEDICINE

## 2023-12-06 PROCEDURE — 96361 HYDRATE IV INFUSION ADD-ON: CPT | Mod: 59 | Performed by: EMERGENCY MEDICINE

## 2023-12-06 PROCEDURE — 80053 COMPREHEN METABOLIC PANEL: CPT | Performed by: EMERGENCY MEDICINE

## 2023-12-06 RX ORDER — IOPAMIDOL 755 MG/ML
100 INJECTION, SOLUTION INTRAVASCULAR ONCE
Status: COMPLETED | OUTPATIENT
Start: 2023-12-06 | End: 2023-12-06

## 2023-12-06 RX ORDER — HYDROMORPHONE HYDROCHLORIDE 1 MG/ML
0.5 INJECTION, SOLUTION INTRAMUSCULAR; INTRAVENOUS; SUBCUTANEOUS
Status: COMPLETED | OUTPATIENT
Start: 2023-12-06 | End: 2023-12-06

## 2023-12-06 RX ORDER — ONDANSETRON 2 MG/ML
4 INJECTION INTRAMUSCULAR; INTRAVENOUS ONCE
Status: COMPLETED | OUTPATIENT
Start: 2023-12-06 | End: 2023-12-06

## 2023-12-06 RX ADMIN — SODIUM CHLORIDE 69 ML: 9 INJECTION, SOLUTION INTRAVENOUS at 23:21

## 2023-12-06 RX ADMIN — HYDROMORPHONE HYDROCHLORIDE 0.5 MG: 1 INJECTION, SOLUTION INTRAMUSCULAR; INTRAVENOUS; SUBCUTANEOUS at 23:37

## 2023-12-06 RX ADMIN — ONDANSETRON 4 MG: 2 INJECTION INTRAMUSCULAR; INTRAVENOUS at 22:57

## 2023-12-06 RX ADMIN — IOPAMIDOL 100 ML: 755 INJECTION, SOLUTION INTRAVENOUS at 23:22

## 2023-12-06 RX ADMIN — SODIUM CHLORIDE, POTASSIUM CHLORIDE, SODIUM LACTATE AND CALCIUM CHLORIDE 1000 ML: 600; 310; 30; 20 INJECTION, SOLUTION INTRAVENOUS at 23:34

## 2023-12-06 RX ADMIN — HYDROMORPHONE HYDROCHLORIDE 0.5 MG: 1 INJECTION, SOLUTION INTRAMUSCULAR; INTRAVENOUS; SUBCUTANEOUS at 23:00

## 2023-12-06 RX ADMIN — HYDROMORPHONE HYDROCHLORIDE 0.5 MG: 1 INJECTION, SOLUTION INTRAMUSCULAR; INTRAVENOUS; SUBCUTANEOUS at 23:18

## 2023-12-06 ASSESSMENT — ACTIVITIES OF DAILY LIVING (ADL): ADLS_ACUITY_SCORE: 35

## 2023-12-06 NOTE — ED TRIAGE NOTES
Pt presents with abd pain, vomiting during the night. Pt states he ate 1 cup of pumpkins seed and stomach began to hurt. Pt took a dose of miralax thinking he may be backed up and vomited that up. Pt states it feels like the seeds are poking into his stomach all over. Pt is in alcohol recovery and has been sober 30 days.      Triage Assessment (Adult)       Row Name 12/06/23 1421          Triage Assessment    Airway WDL WDL        Respiratory WDL    Respiratory WDL WDL        Skin Circulation/Temperature WDL    Skin Circulation/Temperature WDL WDL        Cardiac WDL    Cardiac WDL WDL        Peripheral/Neurovascular WDL    Peripheral Neurovascular WDL WDL        Cognitive/Neuro/Behavioral WDL    Cognitive/Neuro/Behavioral WDL WDL

## 2023-12-07 ENCOUNTER — ANCILLARY PROCEDURE (OUTPATIENT)
Dept: ULTRASOUND IMAGING | Facility: CLINIC | Age: 53
End: 2023-12-07
Attending: EMERGENCY MEDICINE
Payer: COMMERCIAL

## 2023-12-07 ENCOUNTER — APPOINTMENT (OUTPATIENT)
Dept: MRI IMAGING | Facility: CLINIC | Age: 53
End: 2023-12-07
Attending: EMERGENCY MEDICINE
Payer: COMMERCIAL

## 2023-12-07 LAB
ALBUMIN SERPL BCG-MCNC: 3.2 G/DL (ref 3.5–5.2)
ALBUMIN SERPL BCG-MCNC: 3.5 G/DL (ref 3.5–5.2)
ALBUMIN UR-MCNC: NEGATIVE MG/DL
ALP SERPL-CCNC: 144 U/L (ref 40–150)
ALP SERPL-CCNC: 147 U/L (ref 40–150)
ALT SERPL W P-5'-P-CCNC: 110 U/L (ref 0–70)
ALT SERPL W P-5'-P-CCNC: 126 U/L (ref 0–70)
ANION GAP SERPL CALCULATED.3IONS-SCNC: 10 MMOL/L (ref 7–15)
ANION GAP SERPL CALCULATED.3IONS-SCNC: 11 MMOL/L (ref 7–15)
APPEARANCE UR: CLEAR
AST SERPL W P-5'-P-CCNC: 138 U/L (ref 0–45)
AST SERPL W P-5'-P-CCNC: 146 U/L (ref 0–45)
BASOPHILS # BLD AUTO: 0 10E3/UL (ref 0–0.2)
BASOPHILS # BLD AUTO: 0 10E3/UL (ref 0–0.2)
BASOPHILS NFR BLD AUTO: 0 %
BASOPHILS NFR BLD AUTO: 1 %
BILIRUB DIRECT SERPL-MCNC: 3.26 MG/DL (ref 0–0.3)
BILIRUB SERPL-MCNC: 3.5 MG/DL
BILIRUB SERPL-MCNC: 4.3 MG/DL
BILIRUB UR QL STRIP: NEGATIVE
BUN SERPL-MCNC: 4.6 MG/DL (ref 6–20)
BUN SERPL-MCNC: 5.4 MG/DL (ref 6–20)
CALCIUM SERPL-MCNC: 8.4 MG/DL (ref 8.6–10)
CALCIUM SERPL-MCNC: 8.4 MG/DL (ref 8.6–10)
CHLORIDE SERPL-SCNC: 102 MMOL/L (ref 98–107)
CHLORIDE SERPL-SCNC: 105 MMOL/L (ref 98–107)
COLOR UR AUTO: YELLOW
CREAT SERPL-MCNC: 0.67 MG/DL (ref 0.67–1.17)
CREAT SERPL-MCNC: 0.74 MG/DL (ref 0.67–1.17)
DEPRECATED HCO3 PLAS-SCNC: 22 MMOL/L (ref 22–29)
DEPRECATED HCO3 PLAS-SCNC: 22 MMOL/L (ref 22–29)
EGFRCR SERPLBLD CKD-EPI 2021: >90 ML/MIN/1.73M2
EGFRCR SERPLBLD CKD-EPI 2021: >90 ML/MIN/1.73M2
EOSINOPHIL # BLD AUTO: 0 10E3/UL (ref 0–0.7)
EOSINOPHIL # BLD AUTO: 0.1 10E3/UL (ref 0–0.7)
EOSINOPHIL NFR BLD AUTO: 0 %
EOSINOPHIL NFR BLD AUTO: 1 %
ERYTHROCYTE [DISTWIDTH] IN BLOOD BY AUTOMATED COUNT: 14.1 % (ref 10–15)
ERYTHROCYTE [DISTWIDTH] IN BLOOD BY AUTOMATED COUNT: 14.1 % (ref 10–15)
GLUCOSE SERPL-MCNC: 106 MG/DL (ref 70–99)
GLUCOSE SERPL-MCNC: 125 MG/DL (ref 70–99)
GLUCOSE UR STRIP-MCNC: NEGATIVE MG/DL
HCT VFR BLD AUTO: 36 % (ref 40–53)
HCT VFR BLD AUTO: 37.2 % (ref 40–53)
HGB BLD-MCNC: 12.1 G/DL (ref 13.3–17.7)
HGB BLD-MCNC: 12.4 G/DL (ref 13.3–17.7)
HGB UR QL STRIP: NEGATIVE
IMM GRANULOCYTES # BLD: 0 10E3/UL
IMM GRANULOCYTES # BLD: 0.1 10E3/UL
IMM GRANULOCYTES NFR BLD: 0 %
IMM GRANULOCYTES NFR BLD: 1 %
KETONES UR STRIP-MCNC: NEGATIVE MG/DL
LEUKOCYTE ESTERASE UR QL STRIP: NEGATIVE
LIPASE SERPL-CCNC: 27 U/L (ref 13–60)
LIPASE SERPL-CCNC: 39 U/L (ref 13–60)
LYMPHOCYTES # BLD AUTO: 1 10E3/UL (ref 0.8–5.3)
LYMPHOCYTES # BLD AUTO: 1.2 10E3/UL (ref 0.8–5.3)
LYMPHOCYTES NFR BLD AUTO: 12 %
LYMPHOCYTES NFR BLD AUTO: 14 %
MCH RBC QN AUTO: 32.7 PG (ref 26.5–33)
MCH RBC QN AUTO: 32.8 PG (ref 26.5–33)
MCHC RBC AUTO-ENTMCNC: 33.3 G/DL (ref 31.5–36.5)
MCHC RBC AUTO-ENTMCNC: 33.6 G/DL (ref 31.5–36.5)
MCV RBC AUTO: 98 FL (ref 78–100)
MCV RBC AUTO: 98 FL (ref 78–100)
MONOCYTES # BLD AUTO: 0.8 10E3/UL (ref 0–1.3)
MONOCYTES # BLD AUTO: 0.9 10E3/UL (ref 0–1.3)
MONOCYTES NFR BLD AUTO: 10 %
MONOCYTES NFR BLD AUTO: 11 %
NEUTROPHILS # BLD AUTO: 6.4 10E3/UL (ref 1.6–8.3)
NEUTROPHILS # BLD AUTO: 6.6 10E3/UL (ref 1.6–8.3)
NEUTROPHILS NFR BLD AUTO: 74 %
NEUTROPHILS NFR BLD AUTO: 76 %
NITRATE UR QL: NEGATIVE
NRBC # BLD AUTO: 0 10E3/UL
NRBC # BLD AUTO: 0 10E3/UL
NRBC BLD AUTO-RTO: 0 /100
NRBC BLD AUTO-RTO: 0 /100
PH UR STRIP: 8 [PH] (ref 5–7)
PLATELET # BLD AUTO: 259 10E3/UL (ref 150–450)
PLATELET # BLD AUTO: 288 10E3/UL (ref 150–450)
POTASSIUM SERPL-SCNC: 3.4 MMOL/L (ref 3.4–5.3)
POTASSIUM SERPL-SCNC: 3.4 MMOL/L (ref 3.4–5.3)
PROT SERPL-MCNC: 6.1 G/DL (ref 6.4–8.3)
PROT SERPL-MCNC: 6.2 G/DL (ref 6.4–8.3)
RBC # BLD AUTO: 3.69 10E6/UL (ref 4.4–5.9)
RBC # BLD AUTO: 3.79 10E6/UL (ref 4.4–5.9)
SODIUM SERPL-SCNC: 134 MMOL/L (ref 135–145)
SODIUM SERPL-SCNC: 138 MMOL/L (ref 135–145)
SP GR UR STRIP: 1 (ref 1–1.03)
UROBILINOGEN UR STRIP-MCNC: NORMAL MG/DL
WBC # BLD AUTO: 8.5 10E3/UL (ref 4–11)
WBC # BLD AUTO: 8.8 10E3/UL (ref 4–11)

## 2023-12-07 PROCEDURE — 83690 ASSAY OF LIPASE: CPT | Performed by: EMERGENCY MEDICINE

## 2023-12-07 PROCEDURE — 36415 COLL VENOUS BLD VENIPUNCTURE: CPT | Performed by: EMERGENCY MEDICINE

## 2023-12-07 PROCEDURE — 85025 COMPLETE CBC W/AUTO DIFF WBC: CPT | Performed by: EMERGENCY MEDICINE

## 2023-12-07 PROCEDURE — 74183 MRI ABD W/O CNTR FLWD CNTR: CPT

## 2023-12-07 PROCEDURE — 258N000003 HC RX IP 258 OP 636: Performed by: EMERGENCY MEDICINE

## 2023-12-07 PROCEDURE — 250N000011 HC RX IP 250 OP 636: Mod: JZ | Performed by: EMERGENCY MEDICINE

## 2023-12-07 PROCEDURE — 96375 TX/PRO/DX INJ NEW DRUG ADDON: CPT | Performed by: EMERGENCY MEDICINE

## 2023-12-07 PROCEDURE — 255N000002 HC RX 255 OP 636: Performed by: EMERGENCY MEDICINE

## 2023-12-07 PROCEDURE — 81003 URINALYSIS AUTO W/O SCOPE: CPT | Performed by: EMERGENCY MEDICINE

## 2023-12-07 PROCEDURE — 82248 BILIRUBIN DIRECT: CPT | Performed by: EMERGENCY MEDICINE

## 2023-12-07 PROCEDURE — 250N000011 HC RX IP 250 OP 636: Performed by: EMERGENCY MEDICINE

## 2023-12-07 PROCEDURE — 96365 THER/PROPH/DIAG IV INF INIT: CPT | Mod: 59 | Performed by: EMERGENCY MEDICINE

## 2023-12-07 PROCEDURE — A9585 GADOBUTROL INJECTION: HCPCS | Performed by: EMERGENCY MEDICINE

## 2023-12-07 PROCEDURE — 80048 BASIC METABOLIC PNL TOTAL CA: CPT | Performed by: EMERGENCY MEDICINE

## 2023-12-07 PROCEDURE — 96376 TX/PRO/DX INJ SAME DRUG ADON: CPT | Mod: 59 | Performed by: EMERGENCY MEDICINE

## 2023-12-07 PROCEDURE — 250N000013 HC RX MED GY IP 250 OP 250 PS 637: Performed by: EMERGENCY MEDICINE

## 2023-12-07 PROCEDURE — 96361 HYDRATE IV INFUSION ADD-ON: CPT | Performed by: EMERGENCY MEDICINE

## 2023-12-07 PROCEDURE — 80053 COMPREHEN METABOLIC PANEL: CPT | Performed by: EMERGENCY MEDICINE

## 2023-12-07 RX ORDER — LORAZEPAM 2 MG/ML
1 INJECTION INTRAMUSCULAR ONCE
Status: DISCONTINUED | OUTPATIENT
Start: 2023-12-07 | End: 2023-12-07

## 2023-12-07 RX ORDER — GADOBUTROL 604.72 MG/ML
10.5 INJECTION INTRAVENOUS ONCE
Status: COMPLETED | OUTPATIENT
Start: 2023-12-07 | End: 2023-12-07

## 2023-12-07 RX ORDER — TRAZODONE HYDROCHLORIDE 50 MG/1
200 TABLET, FILM COATED ORAL
Status: DISCONTINUED | OUTPATIENT
Start: 2023-12-07 | End: 2023-12-08 | Stop reason: HOSPADM

## 2023-12-07 RX ORDER — TRAZODONE HYDROCHLORIDE 50 MG/1
200 TABLET, FILM COATED ORAL AT BEDTIME
Status: DISCONTINUED | OUTPATIENT
Start: 2023-12-07 | End: 2023-12-08 | Stop reason: HOSPADM

## 2023-12-07 RX ORDER — CEFOTETAN DISODIUM 1 G/10ML
1 INJECTION, POWDER, FOR SOLUTION INTRAMUSCULAR; INTRAVENOUS EVERY 12 HOURS
Status: DISCONTINUED | OUTPATIENT
Start: 2023-12-07 | End: 2023-12-07

## 2023-12-07 RX ORDER — LORAZEPAM 2 MG/ML
1 INJECTION INTRAMUSCULAR
Status: DISCONTINUED | OUTPATIENT
Start: 2023-12-07 | End: 2023-12-08 | Stop reason: HOSPADM

## 2023-12-07 RX ORDER — DROPERIDOL 2.5 MG/ML
2.5 INJECTION, SOLUTION INTRAMUSCULAR; INTRAVENOUS ONCE
Status: COMPLETED | OUTPATIENT
Start: 2023-12-07 | End: 2023-12-07

## 2023-12-07 RX ORDER — CEFTRIAXONE 2 G/1
2 INJECTION, POWDER, FOR SOLUTION INTRAMUSCULAR; INTRAVENOUS EVERY 24 HOURS
Status: DISCONTINUED | OUTPATIENT
Start: 2023-12-07 | End: 2023-12-08 | Stop reason: HOSPADM

## 2023-12-07 RX ORDER — LEVOTHYROXINE SODIUM 175 UG/1
175 TABLET ORAL DAILY
Status: DISCONTINUED | OUTPATIENT
Start: 2023-12-07 | End: 2023-12-08 | Stop reason: HOSPADM

## 2023-12-07 RX ORDER — NALTREXONE HYDROCHLORIDE 50 MG/1
50 TABLET, FILM COATED ORAL DAILY
Status: DISCONTINUED | OUTPATIENT
Start: 2023-12-07 | End: 2023-12-07

## 2023-12-07 RX ORDER — HYDROMORPHONE HYDROCHLORIDE 1 MG/ML
0.5 INJECTION, SOLUTION INTRAMUSCULAR; INTRAVENOUS; SUBCUTANEOUS
Status: COMPLETED | OUTPATIENT
Start: 2023-12-07 | End: 2023-12-07

## 2023-12-07 RX ORDER — TRAZODONE HYDROCHLORIDE 50 MG/1
200 TABLET, FILM COATED ORAL AT BEDTIME
Status: DISCONTINUED | OUTPATIENT
Start: 2023-12-07 | End: 2023-12-07

## 2023-12-07 RX ORDER — HYDROMORPHONE HYDROCHLORIDE 1 MG/ML
0.5 INJECTION, SOLUTION INTRAMUSCULAR; INTRAVENOUS; SUBCUTANEOUS
Status: DISCONTINUED | OUTPATIENT
Start: 2023-12-07 | End: 2023-12-08 | Stop reason: HOSPADM

## 2023-12-07 RX ORDER — ACAMPROSATE CALCIUM 333 MG/1
666 TABLET, DELAYED RELEASE ORAL 3 TIMES DAILY
Status: DISCONTINUED | OUTPATIENT
Start: 2023-12-07 | End: 2023-12-07

## 2023-12-07 RX ORDER — HYDROMORPHONE HYDROCHLORIDE 1 MG/ML
0.5 INJECTION, SOLUTION INTRAMUSCULAR; INTRAVENOUS; SUBCUTANEOUS EVERY 30 MIN PRN
Status: COMPLETED | OUTPATIENT
Start: 2023-12-07 | End: 2023-12-07

## 2023-12-07 RX ORDER — KETOROLAC TROMETHAMINE 15 MG/ML
15 INJECTION, SOLUTION INTRAMUSCULAR; INTRAVENOUS ONCE
Status: COMPLETED | OUTPATIENT
Start: 2023-12-07 | End: 2023-12-07

## 2023-12-07 RX ORDER — HYDROXYZINE HYDROCHLORIDE 25 MG/1
25 TABLET, FILM COATED ORAL 3 TIMES DAILY PRN
Status: DISCONTINUED | OUTPATIENT
Start: 2023-12-07 | End: 2023-12-08 | Stop reason: HOSPADM

## 2023-12-07 RX ORDER — GABAPENTIN 300 MG/1
300 CAPSULE ORAL 2 TIMES DAILY
Status: DISCONTINUED | OUTPATIENT
Start: 2023-12-07 | End: 2023-12-07

## 2023-12-07 RX ADMIN — TRAZODONE HYDROCHLORIDE 200 MG: 50 TABLET ORAL at 01:53

## 2023-12-07 RX ADMIN — HYDROMORPHONE HYDROCHLORIDE 0.5 MG: 1 INJECTION, SOLUTION INTRAMUSCULAR; INTRAVENOUS; SUBCUTANEOUS at 23:17

## 2023-12-07 RX ADMIN — HYDROMORPHONE HYDROCHLORIDE 0.5 MG: 1 INJECTION, SOLUTION INTRAMUSCULAR; INTRAVENOUS; SUBCUTANEOUS at 14:01

## 2023-12-07 RX ADMIN — GADOBUTROL 10.5 ML: 604.72 INJECTION INTRAVENOUS at 08:47

## 2023-12-07 RX ADMIN — DEXTROSE AND SODIUM CHLORIDE: 5; 900 INJECTION, SOLUTION INTRAVENOUS at 13:05

## 2023-12-07 RX ADMIN — HYDROMORPHONE HYDROCHLORIDE 0.5 MG: 1 INJECTION, SOLUTION INTRAMUSCULAR; INTRAVENOUS; SUBCUTANEOUS at 21:13

## 2023-12-07 RX ADMIN — HYDROMORPHONE HYDROCHLORIDE 0.5 MG: 1 INJECTION, SOLUTION INTRAMUSCULAR; INTRAVENOUS; SUBCUTANEOUS at 16:28

## 2023-12-07 RX ADMIN — DROPERIDOL 2.5 MG: 2.5 INJECTION, SOLUTION INTRAMUSCULAR; INTRAVENOUS at 21:57

## 2023-12-07 RX ADMIN — DEXTROSE AND SODIUM CHLORIDE: 5; 900 INJECTION, SOLUTION INTRAVENOUS at 01:15

## 2023-12-07 RX ADMIN — SODIUM CHLORIDE 50 ML: 9 INJECTION, SOLUTION INTRAVENOUS at 08:46

## 2023-12-07 RX ADMIN — SODIUM CHLORIDE 1000 ML: 9 INJECTION, SOLUTION INTRAVENOUS at 01:21

## 2023-12-07 RX ADMIN — CEFTRIAXONE SODIUM 2 G: 2 INJECTION, POWDER, FOR SOLUTION INTRAMUSCULAR; INTRAVENOUS at 01:16

## 2023-12-07 RX ADMIN — TRAZODONE HYDROCHLORIDE 200 MG: 50 TABLET ORAL at 21:08

## 2023-12-07 RX ADMIN — HYDROMORPHONE HYDROCHLORIDE 0.5 MG: 1 INJECTION, SOLUTION INTRAMUSCULAR; INTRAVENOUS; SUBCUTANEOUS at 08:48

## 2023-12-07 RX ADMIN — KETOROLAC TROMETHAMINE 15 MG: 15 INJECTION, SOLUTION INTRAMUSCULAR; INTRAVENOUS at 22:01

## 2023-12-07 RX ADMIN — HYDROMORPHONE HYDROCHLORIDE 0.5 MG: 1 INJECTION, SOLUTION INTRAMUSCULAR; INTRAVENOUS; SUBCUTANEOUS at 14:40

## 2023-12-07 RX ADMIN — HYDROMORPHONE HYDROCHLORIDE 0.5 MG: 1 INJECTION, SOLUTION INTRAMUSCULAR; INTRAVENOUS; SUBCUTANEOUS at 12:25

## 2023-12-07 RX ADMIN — HYDROMORPHONE HYDROCHLORIDE 0.5 MG: 1 INJECTION, SOLUTION INTRAMUSCULAR; INTRAVENOUS; SUBCUTANEOUS at 08:39

## 2023-12-07 ASSESSMENT — ACTIVITIES OF DAILY LIVING (ADL)
ADLS_ACUITY_SCORE: 35

## 2023-12-07 NOTE — PLAN OF CARE
12/7/2023 [facility] GI Brief Note      Getachew Barcenas is a 53 year old male who presented to [facility] with abdominal pain, found to have acute cholecystitis and choledocholithiasis on imaging (MRCP). Liver tests elevated including Tbili 3.5. Clinically stable. Provider at [facility] reached out to Dr. Witt with GI Adv Endoscopy and plan is to transfer the patient for ERCP tomorrow and transfer back to [facility] for surgery consultation after the case.     Recommendations:  -- Plan for ERCP tomorrow, 12/8/2023   -- Procedure currently scheduled at 1115 at [facility] OR    -- Primary team to arrange transfer to [facility] OR - [address] by 0915   -- Plan for patient to return to [facility] after the procedure  -- Diet as tolerated today  -- NPO after midnight  -- Anticoagulation to be held: Per chart review it does not appear that the patient is receiving chemical VTE prophylaxis. Please ensure that this is held prior to the procedure planned (Enoxaparin/SubQ heparin hold for 24hrs). Anticipate holding antithrombotics (including VTE prophylaxis) ideally for 72 hours post procedure as well.   -- Please obtain CBC including platelets, CMP, INR, tomorrow morning prior to transfer    The patient was not seen. This note is a product of chart review and discussion with primary team.     Above in collaboration/discussed with Dr. Witt, GI attending    Elizabeth Lockhart PA-C  Advanced Endoscopy/Pancreaticobiliary GI Service  [facility]  [place]

## 2023-12-07 NOTE — ED NOTES
MD notified that ERCP will be tomorrow, scheduled for 1115 at [facility], cleared for pt to eat now. Pt will be NPO after midnight

## 2023-12-07 NOTE — ED PROVIDER NOTES
Emergency Department Patient Sign-out       Brief HPI:  This is a 53 year old male signed out to me by Dr. Sharpe at the end of his shift at 3:07 PM.  See initial ED Provider note for details of the presentation.     Significant Events prior to my assuming care: Mildly elevated transaminases, total bili 3.5, lipase was 77 yesterday and decreased to 27 this morning.  MRCP showed findings consistent with acute cholecystitis with layering debris, likely sludge and/or small stones, in the distal common bile duct which was mildly dilated and measures up to 1 cm.  Gallbladder is mildly distended containing small gallstones and layering sludge with mild wall thickening and pericholecystic fluid which extends along the proximal duodenum, and mild inflammation surrounding the cystic duct.  WBC was 12.4 yesterday and decreased to 8.8 this morning.  Afebrile.  He was started on IV ceftriaxone, GI was consulted and plan is to transfer him to [facility] in the a.m. tomorrow for ERCP by the GI service.  No beds are currently available for his transfer for admission.      Exam:   Patient Vitals for the past 24 hrs:   BP Temp Temp src Pulse Resp SpO2   12/07/23 2000 108/73 -- -- 70 -- 94 %   12/07/23 1900 114/75 -- -- 74 -- 96 %   12/07/23 1700 101/61 -- -- 70 13 95 %   12/07/23 1646 -- -- -- 61 19 94 %   12/07/23 1642 -- -- -- 72 -- (!) 80 %   12/07/23 1600 137/83 -- -- 76 18 93 %   12/07/23 1300 122/72 -- -- 80 13 91 %   12/07/23 1200 127/77 -- -- 78 12 94 %   12/07/23 1000 121/67 -- -- 83 19 96 %   12/07/23 0800 (!) 157/79 -- -- 72 -- 95 %   12/07/23 0600 (!) 163/103 -- -- 68 -- 99 %   12/07/23 0400 109/79 -- -- 65 -- 98 %   12/07/23 0130 112/78 -- -- 82 -- 96 %   12/06/23 2302 -- -- -- -- -- 99 %   12/06/23 2300 (!) 129/92 -- -- -- -- --   12/06/23 2245 -- 97.7  F (36.5  C) Tympanic 62 18 97 %           ED RESULTS:   Results for orders placed or performed during the hospital encounter of 12/06/23 (from the  past 24 hour(s))   Comprehensive metabolic panel     Status: Abnormal    Collection Time: 12/06/23 10:56 PM   Result Value Ref Range    Sodium 134 (L) 135 - 145 mmol/L    Potassium 3.5 3.4 - 5.3 mmol/L    Carbon Dioxide (CO2) 22 22 - 29 mmol/L    Anion Gap 14 7 - 15 mmol/L    Urea Nitrogen 6.4 6.0 - 20.0 mg/dL    Creatinine 0.81 0.67 - 1.17 mg/dL    GFR Estimate >90 >60 mL/min/1.73m2    Calcium 9.9 8.6 - 10.0 mg/dL    Chloride 98 98 - 107 mmol/L    Glucose 112 (H) 70 - 99 mg/dL    Alkaline Phosphatase 149 40 - 150 U/L    AST 87 (H) 0 - 45 U/L    ALT 89 (H) 0 - 70 U/L    Protein Total 8.0 6.4 - 8.3 g/dL    Albumin 4.4 3.5 - 5.2 g/dL    Bilirubin Total 3.0 (H) <=1.2 mg/dL   CBC with platelets, differential     Status: Abnormal    Collection Time: 12/06/23 10:56 PM    Narrative    The following orders were created for panel order CBC with platelets, differential.  Procedure                               Abnormality         Status                     ---------                               -----------         ------                     CBC with platelets and d...[[id number]]  Abnormal            Final result                 Please view results for these tests on the individual orders.   Lipase     Status: Abnormal    Collection Time: 12/06/23 10:56 PM   Result Value Ref Range    Lipase 77 (H) 13 - 60 U/L   CBC with platelets and differential     Status: Abnormal    Collection Time: 12/06/23 10:56 PM   Result Value Ref Range    WBC Count 12.4 (H) 4.0 - 11.0 10e3/uL    RBC Count 4.54 4.40 - 5.90 10e6/uL    Hemoglobin 14.9 13.3 - 17.7 g/dL    Hematocrit 43.7 40.0 - 53.0 %    MCV 96 78 - 100 fL    MCH 32.8 26.5 - 33.0 pg    MCHC 34.1 31.5 - 36.5 g/dL    RDW 14.1 10.0 - 15.0 %    Platelet Count 391 150 - 450 10e3/uL    % Neutrophils 60 %    % Lymphocytes 28 %    % Monocytes 10 %    % Eosinophils 0 %    % Basophils 1 %    % Immature Granulocytes 1 %    NRBCs per 100 WBC 0 <1 /100    Absolute Neutrophils 7.5 1.6 - 8.3 10e3/uL     Absolute Lymphocytes 3.4 0.8 - 5.3 10e3/uL    Absolute Monocytes 1.3 0.0 - 1.3 10e3/uL    Absolute Eosinophils 0.0 0.0 - 0.7 10e3/uL    Absolute Basophils 0.1 0.0 - 0.2 10e3/uL    Absolute Immature Granulocytes 0.1 <=0.4 10e3/uL    Absolute NRBCs 0.0 10e3/uL   CT Abdomen Pelvis w Contrast     Status: None    Collection Time: 12/06/23 11:36 PM    Narrative    EXAM: CT ABDOMEN PELVIS W CONTRAST  LOCATION: [facility]  DATE: 12/6/2023    INDICATION: diffuse abd pain, nausea and vomiting  COMPARISON: 03/17/2023.  TECHNIQUE: CT scan of the abdomen and pelvis was performed following injection of IV contrast. Multiplanar reformats were obtained. Dose reduction techniques were used.  CONTRAST: 100 mL Isovue 370    FINDINGS:   LOWER CHEST: There are trace bilateral pleural effusions. The heart size is normal.    HEPATOBILIARY: There are stones in the gallbladder. The gallbladder wall is thickened and there is mild surrounding fluid or edema. There is no calcified common bile duct stone evident. Fatty infiltration of the liver.    PANCREAS: Normal.    SPLEEN: Normal.    ADRENAL GLANDS: Normal.    KIDNEYS/BLADDER: Normal.    BOWEL: There are colonic diverticula without acute diverticulitis. There is no bowel obstruction or inflammation. The appendix is normal. There is no free intraperitoneal gas or fluid.    LYMPH NODES: Normal.    VASCULATURE: Atherosclerotic calcification of the aorta and its branches. No aneurysm.    PELVIC ORGANS: Normal.    MUSCULOSKELETAL: Normal.      Impression    IMPRESSION:   1.  Cholelithiasis and findings suggesting acute cholecystitis. Ultrasound or hepatobiliary scan suggested for further evaluation.  2.  Fatty infiltration of the liver.  3.  Colonic diverticula without acute diverticulitis. There is no bowel obstruction or inflammation.   Urine Macroscopic with reflex to Microscopic     Status: Abnormal    Collection Time: 12/07/23 12:01 AM   Result Value Ref Range     Color Urine Yellow Colorless, Straw, Light Yellow, Yellow    Appearance Urine Clear Clear    Glucose Urine Negative Negative mg/dL    Bilirubin Urine Negative Negative    Ketones Urine Negative Negative mg/dL    Specific Gravity Urine 1.005 1.003 - 1.035    Blood Urine Negative Negative    pH Urine 8.0 (H) 5.0 - 7.0    Protein Albumin Urine Negative Negative mg/dL    Urobilinogen Urine Normal Normal, 2.0 mg/dL    Nitrite Urine Negative Negative    Leukocyte Esterase Urine Negative Negative    Narrative    Microscopic not indicated   POC US ABDOMEN LIMITED     Status: None    Collection Time: 12/07/23 12:15 AM    [facility] Procedure Note      Limited Bedside ED Gallbladder  Ultrasound:    PROCEDURE: PERFORMED BY: Dr. Serafin Mata MD  INDICATIONS:  Abdominal Pain  PROBE:  Low frequency convex probe  BODY LOCATION: Abdomen  FINDINGS:   An ultrasound of the gallbladder was performed using longitudinal and transverse views.  Gallstone(s):  Absent  Gallbladder sludge:  Present  Sonographic Kwan's sign:  Absent  Gallbladder wall thickening (greater than 4 mm):  Present  Pericholecystic fluid: Present  Common bile duct (dilated if internal diameter greater than 6 mm): Unable to visualize   INTERPRETATION:  sludge is present, the GB wall is thickened, there is pericholecystic fluid, and suggestive of cholecystitis.  IMAGE DOCUMENTATION: Images were archived to PACs system.      Comprehensive metabolic panel     Status: Abnormal    Collection Time: 12/07/23  6:26 AM   Result Value Ref Range    Sodium 138 135 - 145 mmol/L    Potassium 3.4 3.4 - 5.3 mmol/L    Carbon Dioxide (CO2) 22 22 - 29 mmol/L    Anion Gap 11 7 - 15 mmol/L    Urea Nitrogen 5.4 (L) 6.0 - 20.0 mg/dL    Creatinine 0.67 0.67 - 1.17 mg/dL    GFR Estimate >90 >60 mL/min/1.73m2    Calcium 8.4 (L) 8.6 - 10.0 mg/dL    Chloride 105 98 - 107 mmol/L    Glucose 106 (H) 70 - 99 mg/dL    Alkaline Phosphatase 147 40 - 150 U/L      (H) 0 - 45 U/L     (H) 0 - 70 U/L    Protein Total 6.2 (L) 6.4 - 8.3 g/dL    Albumin 3.5 3.5 - 5.2 g/dL    Bilirubin Total 3.5 (H) <=1.2 mg/dL   CBC with platelets, differential     Status: Abnormal    Collection Time: 12/07/23  6:26 AM    Narrative    The following orders were created for panel order CBC with platelets, differential.  Procedure                               Abnormality         Status                     ---------                               -----------         ------                     CBC with platelets and d...[[id number]]  Abnormal            Final result                 Please view results for these tests on the individual orders.   Bilirubin direct     Status: Abnormal    Collection Time: 12/07/23  6:26 AM   Result Value Ref Range    Bilirubin Direct 3.26 (H) 0.00 - 0.30 mg/dL   CBC with platelets and differential     Status: Abnormal    Collection Time: 12/07/23  6:26 AM   Result Value Ref Range    WBC Count 8.8 4.0 - 11.0 10e3/uL    RBC Count 3.79 (L) 4.40 - 5.90 10e6/uL    Hemoglobin 12.4 (L) 13.3 - 17.7 g/dL    Hematocrit 37.2 (L) 40.0 - 53.0 %    MCV 98 78 - 100 fL    MCH 32.7 26.5 - 33.0 pg    MCHC 33.3 31.5 - 36.5 g/dL    RDW 14.1 10.0 - 15.0 %    Platelet Count 288 150 - 450 10e3/uL    % Neutrophils 74 %    % Lymphocytes 14 %    % Monocytes 10 %    % Eosinophils 0 %    % Basophils 1 %    % Immature Granulocytes 1 %    NRBCs per 100 WBC 0 <1 /100    Absolute Neutrophils 6.6 1.6 - 8.3 10e3/uL    Absolute Lymphocytes 1.2 0.8 - 5.3 10e3/uL    Absolute Monocytes 0.8 0.0 - 1.3 10e3/uL    Absolute Eosinophils 0.0 0.0 - 0.7 10e3/uL    Absolute Basophils 0.0 0.0 - 0.2 10e3/uL    Absolute Immature Granulocytes 0.1 <=0.4 10e3/uL    Absolute NRBCs 0.0 10e3/uL   Lipase     Status: Normal    Collection Time: 12/07/23  6:26 AM   Result Value Ref Range    Lipase 39 13 - 60 U/L   Abdomen MRI w & w/o contrast mrcp     Status: None    Collection Time: 12/07/23  9:34 AM    Narrative    MR ABDOMEN  MRCP WITHOUT AND WITH CONTRAST 12/7/2023 9:34 AM    INDICATION: Abdominal pain, vomiting, elevated bilirubin,  cholecystitis.     COMPARISON: CT abdomen and pelvis 12/6/2023. Point of care abdominal  ultrasound 12/6/2023.    TECHNIQUE: Multiplanar multisequence images of the abdomen acquired  before and after administration of 10.5 mL Gadavist intravenous  contrast. MRCP imaging was also performed. 3-D MRCP was performed  using maximum intensity projection reconstruction on the same  workstation.    FINDINGS:     LIVER: No overt features of cirrhosis. Hepatic steatosis. No  suspicious liver lesion.    BILIARY: Gallbladder is mildly distended containing small gallstones  and layering sludge. Mild wall thickening and pericholecystic fluid,  which extends along the proximal duodenum. Mild inflammation  surrounding the cystic duct, which appears to insert low into the  common bile duct.    Layering debris/small stones within the distal common bile duct  (6/17-21), which measures up to 1 cm. No significant intrahepatic  biliary duct dilatation.     Probable tiny anterior wall gallbladder polyps (6/10).    SPLEEN: Not enlarged    PANCREAS: Pancreatic duct is slightly ectatic measuring up to 5 mm in  the head. No discrete mass. No evidence of active inflammation.     ADRENAL GLANDS: Unremarkable.    KIDNEYS: Unremarkable.    BOWEL: No bowel dilatation. Colonic diverticula.    LYMPH NODE: Few scattered lymph nodes surrounding the gallbladder and  travon hepatis are likely reactive.    VASCULATURE: Nonaneurysmal abdominal aorta. Portal veins, hepatic  veins, SMV and splenic vein are patent.    MUSCULOSKELETAL: No aggressive osseous lesion.    LUNG BASES: Bilateral trace pleural effusions.      Impression    IMPRESSION:     1. Findings suggestive of acute cholecystitis.    2. Layering debris (likely sludge and/or small stones) in the distal  common bile duct, which is mildly dilated.    3. Mildly ectatic pancreatic duct,  possibly from prior pancreatitis.  No evidence of acute pancreatitis or discrete mass.    4. Hepatic steatosis.    5. Trace bilateral pleural effusions.     REYNA LEAL MD         SYSTEM ID:  [id number]   Lipase     Status: Normal    Collection Time: 12/07/23  1:42 PM   Result Value Ref Range    Lipase 27 13 - 60 U/L   Comprehensive metabolic panel     Status: Abnormal    Collection Time: 12/07/23  1:42 PM   Result Value Ref Range    Sodium 134 (L) 135 - 145 mmol/L    Potassium 3.4 3.4 - 5.3 mmol/L    Carbon Dioxide (CO2) 22 22 - 29 mmol/L    Anion Gap 10 7 - 15 mmol/L    Urea Nitrogen 4.6 (L) 6.0 - 20.0 mg/dL    Creatinine 0.74 0.67 - 1.17 mg/dL    GFR Estimate >90 >60 mL/min/1.73m2    Calcium 8.4 (L) 8.6 - 10.0 mg/dL    Chloride 102 98 - 107 mmol/L    Glucose 125 (H) 70 - 99 mg/dL    Alkaline Phosphatase 144 40 - 150 U/L     (H) 0 - 45 U/L     (H) 0 - 70 U/L    Protein Total 6.1 (L) 6.4 - 8.3 g/dL    Albumin 3.2 (L) 3.5 - 5.2 g/dL    Bilirubin Total 4.3 (H) <=1.2 mg/dL   CBC with platelets differential     Status: Abnormal    Collection Time: 12/07/23  6:08 PM    Narrative    The following orders were created for panel order CBC with platelets differential.  Procedure                               Abnormality         Status                     ---------                               -----------         ------                     CBC with platelets and d...[[id number]]  Abnormal            Final result                 Please view results for these tests on the individual orders.   CBC with platelets and differential     Status: Abnormal    Collection Time: 12/07/23  6:08 PM   Result Value Ref Range    WBC Count 8.5 4.0 - 11.0 10e3/uL    RBC Count 3.69 (L) 4.40 - 5.90 10e6/uL    Hemoglobin 12.1 (L) 13.3 - 17.7 g/dL    Hematocrit 36.0 (L) 40.0 - 53.0 %    MCV 98 78 - 100 fL    MCH 32.8 26.5 - 33.0 pg    MCHC 33.6 31.5 - 36.5 g/dL    RDW 14.1 10.0 - 15.0 %    Platelet Count 259 150 - 450 10e3/uL     % Neutrophils 76 %    % Lymphocytes 12 %    % Monocytes 11 %    % Eosinophils 1 %    % Basophils 0 %    % Immature Granulocytes 0 %    NRBCs per 100 WBC 0 <1 /100    Absolute Neutrophils 6.4 1.6 - 8.3 10e3/uL    Absolute Lymphocytes 1.0 0.8 - 5.3 10e3/uL    Absolute Monocytes 0.9 0.0 - 1.3 10e3/uL    Absolute Eosinophils 0.1 0.0 - 0.7 10e3/uL    Absolute Basophils 0.0 0.0 - 0.2 10e3/uL    Absolute Immature Granulocytes 0.0 <=0.4 10e3/uL    Absolute NRBCs 0.0 10e3/uL       ED MEDICATIONS:   Medications   dextrose 5% and 0.9% NaCl infusion ( Intravenous Rate/Dose Verify 12/7/23 2032)   cefTRIAXone (ROCEPHIN) 2 g vial to attach to  ml bag for ADULTS or NS 50 ml bag for PEDS (0 g Intravenous Stopped 12/7/23 0145)   hydrOXYzine HCl (ATARAX) tablet 25 mg (has no administration in time range)   levothyroxine (SYNTHROID/LEVOTHROID) tablet 175 mcg (0 mcg Oral Hold 12/7/23 1007)   traZODone (DESYREL) tablet 200 mg (200 mg Oral $Given 12/7/23 0153)   LORazepam (ATIVAN) injection 1 mg (has no administration in time range)   HYDROmorphone (PF) (DILAUDID) injection 0.5 mg (0.5 mg Intravenous $Given 12/7/23 2113)   traZODone (DESYREL) tablet 200 mg (200 mg Oral $Given 12/7/23 2108)   droPERidol (INAPSINE) injection 2.5 mg (has no administration in time range)   ketorolac (TORADOL) injection 15 mg (has no administration in time range)   ondansetron (ZOFRAN) injection 4 mg (4 mg Intravenous $Given 12/6/23 2257)   HYDROmorphone (PF) (DILAUDID) injection 0.5 mg (0.5 mg Intravenous $Given 12/6/23 2337)   lactated ringers BOLUS 1,000 mL (0 mLs Intravenous Stopped 12/7/23 0100)   iopamidol (ISOVUE-370) solution 100 mL (100 mLs Intravenous $Given 12/6/23 2322)   sodium chloride 0.9 % bag 500mL for CT scan flush use (69 mLs Intravenous $Given 12/6/23 2321)   sodium chloride 0.9% BOLUS 1,000 mL (0 mLs Intravenous Stopped 12/7/23 0155)   HYDROmorphone (PF) (DILAUDID) injection 0.5 mg (0.5 mg Intravenous $Given 12/7/23 1225)    gadobutrol (GADAVIST) injection 10.5 mL (10.5 mLs Intravenous $Given 12/7/23 0847)   sodium chloride 0.9% BOLUS 50 mL (0 mLs Intravenous Stopped 12/7/23 0900)   HYDROmorphone (PF) (DILAUDID) injection 0.5 mg (0.5 mg Intravenous $Given 12/7/23 1628)         Impression:    ICD-10-CM    1. Biliary obstruction (H28)  K83.1 Case Request: ENDOSCOPIC RETROGRADE CHOLANGIOPANCREATOGRAPHY     Case Request: ENDOSCOPIC RETROGRADE CHOLANGIOPANCREATOGRAPHY      2. Choledocholithiasis with acute cholecystitis with obstruction  K80.43           Plan:    Pending studiesd: LFTs and lipase.    N.P.O. after midnight.  Transfer to [facility] at tomorrow morning for ERCP at 11:15 AM.  Patient to be transferred back here to [facility] after ERCP tomorrow a.m.    9:51 PM -frequently requesting pain medication.  Dilaudid every 2 hours was ordered.  Toradol added, Inapsine added.    Friday, 12/8/2023 at 12:57 AM - he is resting comfortably and care of the patient was signed out to Dr. Stevens at the end of my shift.  Plan to transfer him to [facility] for ERCP scheduled for 11:15 AM this morning, with plan for him to return here after the procedure as they have no beds available for his admission.         Arturo Thorne MD  12/08/23 0059

## 2023-12-07 NOTE — ED PROVIDER NOTES
Signout received from Dr. Cifuentes at change of shift please see his note for details.    In brief 53-year-old male with 2 days of intermittent abdominal pain with nausea and vomiting.  Has signs of cholecystitis on POCUS and no visualization of common bile duct.  No leukocytosis.  Elevation of AST ALT and bilirubin.  MRCP pending at time of signout      Narrative & Impression   MR ABDOMEN MRCP WITHOUT AND WITH CONTRAST 12/7/2023 9:34 AM     INDICATION: Abdominal pain, vomiting, elevated bilirubin,  cholecystitis.      COMPARISON: CT abdomen and pelvis 12/6/2023. Point of care abdominal  ultrasound 12/6/2023.     TECHNIQUE: Multiplanar multisequence images of the abdomen acquired  before and after administration of 10.5 mL Gadavist intravenous  contrast. MRCP imaging was also performed. 3-D MRCP was performed  using maximum intensity projection reconstruction on the same  workstation.     FINDINGS:      LIVER: No overt features of cirrhosis. Hepatic steatosis. No  suspicious liver lesion.     BILIARY: Gallbladder is mildly distended containing small gallstones  and layering sludge. Mild wall thickening and pericholecystic fluid,  which extends along the proximal duodenum. Mild inflammation  surrounding the cystic duct. Layering debris/small stones within the  distal common bile duct (6/17-21), which measures up to 1 cm. No  significant intrahepatic biliary duct dilatation.     Probable tiny anterior wall gallbladder polyps (6/10).     SPLEEN: Not enlarged     PANCREAS: Pancreatic duct is slightly ectatic measuring up to 5 mm in  the head. No discrete mass. No evidence of active inflammation.      ADRENAL GLANDS: Unremarkable.     KIDNEYS: Unremarkable.     BOWEL: No bowel dilatation. Colonic diverticula.     LYMPH NODE: Few scattered lymph nodes surrounding the gallbladder and  travon hepatis are likely reactive.     VASCULATURE: Nonaneurysmal abdominal aorta. Portal veins, hepatic  veins, SMV and splenic vein are  patent.     MUSCULOSKELETAL: No aggressive osseous lesion.     LUNG BASES: Bilateral trace pleural effusions.                                                                      IMPRESSION:      1. Findings consistent with acute cholecystitis.     2. Layering debris (likely sludge and/or small stones) in the distal  common bile duct, which is mildly dilated.     3. Mildly ectatic pancreatic duct, possibly from prior pancreatitis.  No evidence of acute pancreatitis or discrete mass.     4. Hepatic steatosis.     5. Trace bilateral pleural effusions.        Case discussed briefly with Dr. Street general surgery who recommends ERCP prior to cholecystectomy.  Discussed the case with [facility] [place] Dr. Witt who agrees with plan for ERCP.  Unfortunate no beds available at [facility] at this time.  Plan for ERCP first thing in morning and patient will board in our department until that time.  He will recover at the [place] after procedure be transferred back [facility] for admission for further evaluation and management of acute cholecystitis.  Able to eat and n.p.o. at midnight.  Received ceftriaxone last evening and dosing is for every 24 hours.  Discussed plan with patient and he is in agreement.    It is the end of my shift and care of patient signed out to Dr. Thorne for further cares while in the department.       Pranav Sharpe MD    12/7/2023 2:50 PM             Pranav Sharpe MD  12/07/23 5736

## 2023-12-07 NOTE — ED PROVIDER NOTES
History     Chief Complaint   Patient presents with    Abdominal Pain     HPI  Getachew Barcenas is a 53 year old male who presents for abdominal pain.  Pain started yesterday and has been waxing and waning since then.  It is severe now, extremely uncomfortable.  He has nausea and vomiting.  No diarrhea.  Normal bowel movement yesterday, none today.  He denies any dysuria, hematuria, or urinary frequency.  No prior abdominal surgeries.  He reports a history of alcohol use disorder but has been sober for the past month.  History of prior subdural hematoma.    Allergies:  No Known Allergies    Problem List:    Patient Active Problem List    Diagnosis Date Noted    Lactic acidosis 03/17/2023     Priority: Medium    Ventricular tachycardia (paroxysmal) (H) 03/17/2023     Priority: Medium    Chronic subdural hematoma (H) 03/17/2023     Priority: Medium    Alcoholic intoxication without complication (H24) 03/17/2023     Priority: Medium    Low magnesium level 03/17/2023     Priority: Medium    Subdural hematoma (H) 11/03/2022     Priority: Medium    Other neutropenia (H24) 11/03/2022     Priority: Medium    Metabolic acidosis, increased anion gap 11/03/2022     Priority: Medium    Thrombocytopenia (H24) 11/03/2022     Priority: Medium    Alcoholic intoxication with complication (H24) 11/03/2022     Priority: Medium    Abdominal wall pain in left upper quadrant 11/02/2022     Priority: Medium    Abnormal TSH 11/02/2022     Priority: Medium    Anxiety 11/02/2022     Priority: Medium    Blunt abdominal trauma, initial encounter 11/02/2022     Priority: Medium    Constipation 11/02/2022     Priority: Medium    PVC's (premature ventricular contractions) 11/02/2022     Priority: Medium    Anemia due to unknown mechanism 09/14/2022     Priority: Medium    Nonalcoholic fatty liver disease 09/14/2022     Priority: Medium    Hepatic steatosis 09/06/2022     Priority: Medium    Alcohol abuse 08/24/2022     Priority: Medium      Formatting of this note might be different from the original.  Relapse in 2022.      Alcohol use disorder, severe, dependence (H) 02/24/2022     Priority: Medium    Right forearm cellulitis 02/23/2022     Priority: Medium    Cat bite 02/23/2022     Priority: Medium    Hypokalemia 02/20/2022     Priority: Medium    Lactic acid acidosis 02/20/2022     Priority: Medium    Mood disorder (H24) 02/20/2022     Priority: Medium    LFT elevation 02/19/2022     Priority: Medium    Hypophosphatemia 02/19/2022     Priority: Medium    Hypomagnesemia 02/19/2022     Priority: Medium    Hypoglycemia 02/18/2022     Priority: Medium    Hypothyroidism 02/18/2022     Priority: Medium    Falls frequently 02/18/2022     Priority: Medium    Hypoxemia associated with sleep 12/21/2021     Priority: Medium    Obstructive sleep apnea syndrome 12/21/2021     Priority: Medium    Periodic limb movement disorder 12/21/2021     Priority: Medium    Insomnia 11/10/2021     Priority: Medium    Parasomnia 11/10/2021     Priority: Medium    Inadequate sleep hygiene 11/10/2021     Priority: Medium    Tremor 11/10/2021     Priority: Medium    Thiamine deficiency 10/11/2021     Priority: Medium    Peripheral neuropathy 09/02/2021     Priority: Medium    Vertigo 09/02/2021     Priority: Medium    Hypertriglyceridemia 10/22/2017     Priority: Medium    Contusion of head of pancreas, initial encounter 07/04/2017     Priority: Medium    Impingement syndrome, shoulder, left 07/15/2016     Priority: Medium    Sinus arrhythmia seen on electrocardiogram 07/25/2014     Priority: Medium    Diverticulosis 05/29/2009     Priority: Medium    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  Graves' Disease          Past Medical History:    Past Medical History:   Diagnosis Date    Alcohol use disorder, severe, dependence (H) 2/24/2022       Past Surgical History:    Past Surgical History:    Procedure Laterality Date    IR CAROTID CEREBRAL ANGIOGRAM BILATERAL  11/7/2022    PICC TRIPLE LUMEN PLACEMENT  11/3/2022            Family History:    No family history on file.    Social History:  Marital Status:   [4]  Social History     Tobacco Use    Smoking status: Never    Smokeless tobacco: Current     Types: Chew   Substance Use Topics    Alcohol use: Yes    Drug use: No        Medications:    Cyanocobalamin (B-12) 1000 MCG TBCR  levothyroxine (SYNTHROID/LEVOTHROID) 175 MCG tablet  Multiple Vitamin (TAB-A-MARYJO) TABS  sildenafil (VIAGRA) 25 MG tablet  thiamine (B-1) 100 MG tablet  traZODone (DESYREL) 50 MG tablet  acetaminophen (TYLENOL) 500 MG tablet          Review of Systems    Physical Exam   BP:  (unable to obtian due to pt movement)  Pulse: 62  Temp: 97.7  F (36.5  C)  Resp: 18  SpO2: 97 %      Physical Exam  Vitals and nursing note reviewed.   Constitutional:       General: He is in acute distress.      Appearance: He is well-developed. He is not diaphoretic.   HENT:      Head: Normocephalic and atraumatic.      Right Ear: External ear normal.      Left Ear: External ear normal.      Nose: Nose normal.   Eyes:      General: No scleral icterus.     Conjunctiva/sclera: Conjunctivae normal.   Cardiovascular:      Rate and Rhythm: Normal rate and regular rhythm.   Pulmonary:      Effort: Pulmonary effort is normal. No respiratory distress.      Breath sounds: No stridor.   Abdominal:      General: There is no distension.      Palpations: Abdomen is soft.      Tenderness: There is generalized abdominal tenderness. There is no guarding or rebound.   Musculoskeletal:      Cervical back: Normal range of motion.   Skin:     General: Skin is warm and dry.   Neurological:      Mental Status: He is alert and oriented to person, place, and time.   Psychiatric:         Behavior: Behavior normal.         ED Course                 Procedures    Results for orders placed during the hospital encounter of  12/06/23    POC US ABDOMEN LIMITED    Impression  [facility] Procedure Note    Limited Bedside ED Gallbladder  Ultrasound:    PROCEDURE: PERFORMED BY: Dr. Serafin Mata MD  INDICATIONS:  Abdominal Pain  PROBE:  Low frequency convex probe  BODY LOCATION: Abdomen  FINDINGS:   An ultrasound of the gallbladder was performed using longitudinal and transverse views.  Gallstone(s):  Absent  Gallbladder sludge:  Present  Sonographic Kwan's sign:  Absent  Gallbladder wall thickening (greater than 4 mm):  Present  Pericholecystic fluid: Present  Common bile duct (dilated if internal diameter greater than 6 mm): Unable to visualize  INTERPRETATION:  sludge is present, the GB wall is thickened, there is pericholecystic fluid, and suggestive of cholecystitis.  IMAGE DOCUMENTATION: Images were archived to PACs system.            Critical Care time:  none               Results for orders placed or performed during the hospital encounter of 12/06/23 (from the past 24 hour(s))   Comprehensive metabolic panel   Result Value Ref Range    Sodium 134 (L) 135 - 145 mmol/L    Potassium 3.5 3.4 - 5.3 mmol/L    Carbon Dioxide (CO2) 22 22 - 29 mmol/L    Anion Gap 14 7 - 15 mmol/L    Urea Nitrogen 6.4 6.0 - 20.0 mg/dL    Creatinine 0.81 0.67 - 1.17 mg/dL    GFR Estimate >90 >60 mL/min/1.73m2    Calcium 9.9 8.6 - 10.0 mg/dL    Chloride 98 98 - 107 mmol/L    Glucose 112 (H) 70 - 99 mg/dL    Alkaline Phosphatase 149 40 - 150 U/L    AST 87 (H) 0 - 45 U/L    ALT 89 (H) 0 - 70 U/L    Protein Total 8.0 6.4 - 8.3 g/dL    Albumin 4.4 3.5 - 5.2 g/dL    Bilirubin Total 3.0 (H) <=1.2 mg/dL   CBC with platelets, differential    Narrative    The following orders were created for panel order CBC with platelets, differential.  Procedure                               Abnormality         Status                     ---------                               -----------         ------                     CBC with platelets and d...[[id number]]   Abnormal            Final result                 Please view results for these tests on the individual orders.   Lipase   Result Value Ref Range    Lipase 77 (H) 13 - 60 U/L   CBC with platelets and differential   Result Value Ref Range    WBC Count 12.4 (H) 4.0 - 11.0 10e3/uL    RBC Count 4.54 4.40 - 5.90 10e6/uL    Hemoglobin 14.9 13.3 - 17.7 g/dL    Hematocrit 43.7 40.0 - 53.0 %    MCV 96 78 - 100 fL    MCH 32.8 26.5 - 33.0 pg    MCHC 34.1 31.5 - 36.5 g/dL    RDW 14.1 10.0 - 15.0 %    Platelet Count 391 150 - 450 10e3/uL    % Neutrophils 60 %    % Lymphocytes 28 %    % Monocytes 10 %    % Eosinophils 0 %    % Basophils 1 %    % Immature Granulocytes 1 %    NRBCs per 100 WBC 0 <1 /100    Absolute Neutrophils 7.5 1.6 - 8.3 10e3/uL    Absolute Lymphocytes 3.4 0.8 - 5.3 10e3/uL    Absolute Monocytes 1.3 0.0 - 1.3 10e3/uL    Absolute Eosinophils 0.0 0.0 - 0.7 10e3/uL    Absolute Basophils 0.1 0.0 - 0.2 10e3/uL    Absolute Immature Granulocytes 0.1 <=0.4 10e3/uL    Absolute NRBCs 0.0 10e3/uL   CT Abdomen Pelvis w Contrast    Narrative    EXAM: CT ABDOMEN PELVIS W CONTRAST  LOCATION: [facility]  DATE: 12/6/2023    INDICATION: diffuse abd pain, nausea and vomiting  COMPARISON: 03/17/2023.  TECHNIQUE: CT scan of the abdomen and pelvis was performed following injection of IV contrast. Multiplanar reformats were obtained. Dose reduction techniques were used.  CONTRAST: 100 mL Isovue 370    FINDINGS:   LOWER CHEST: There are trace bilateral pleural effusions. The heart size is normal.    HEPATOBILIARY: There are stones in the gallbladder. The gallbladder wall is thickened and there is mild surrounding fluid or edema. There is no calcified common bile duct stone evident. Fatty infiltration of the liver.    PANCREAS: Normal.    SPLEEN: Normal.    ADRENAL GLANDS: Normal.    KIDNEYS/BLADDER: Normal.    BOWEL: There are colonic diverticula without acute diverticulitis. There is no bowel obstruction or  inflammation. The appendix is normal. There is no free intraperitoneal gas or fluid.    LYMPH NODES: Normal.    VASCULATURE: Atherosclerotic calcification of the aorta and its branches. No aneurysm.    PELVIC ORGANS: Normal.    MUSCULOSKELETAL: Normal.      Impression    IMPRESSION:   1.  Cholelithiasis and findings suggesting acute cholecystitis. Ultrasound or hepatobiliary scan suggested for further evaluation.  2.  Fatty infiltration of the liver.  3.  Colonic diverticula without acute diverticulitis. There is no bowel obstruction or inflammation.   Urine Macroscopic with reflex to Microscopic   Result Value Ref Range    Color Urine Yellow Colorless, Straw, Light Yellow, Yellow    Appearance Urine Clear Clear    Glucose Urine Negative Negative mg/dL    Bilirubin Urine Negative Negative    Ketones Urine Negative Negative mg/dL    Specific Gravity Urine 1.005 1.003 - 1.035    Blood Urine Negative Negative    pH Urine 8.0 (H) 5.0 - 7.0    Protein Albumin Urine Negative Negative mg/dL    Urobilinogen Urine Normal Normal, 2.0 mg/dL    Nitrite Urine Negative Negative    Leukocyte Esterase Urine Negative Negative    Narrative    Microscopic not indicated   POC US ABDOMEN LIMITED    Impression    [facility] Procedure Note      Limited Bedside ED Gallbladder  Ultrasound:    PROCEDURE: PERFORMED BY: Dr. Serafin Mata MD  INDICATIONS:  Abdominal Pain  PROBE:  Low frequency convex probe  BODY LOCATION: Abdomen  FINDINGS:   An ultrasound of the gallbladder was performed using longitudinal and transverse views.  Gallstone(s):  Absent  Gallbladder sludge:  Present  Sonographic Kwan's sign:  Absent  Gallbladder wall thickening (greater than 4 mm):  Present  Pericholecystic fluid: Present  Common bile duct (dilated if internal diameter greater than 6 mm): Unable to visualize   INTERPRETATION:  sludge is present, the GB wall is thickened, there is pericholecystic fluid, and suggestive of cholecystitis.  IMAGE  DOCUMENTATION: Images were archived to PACs system.          Medications   dextrose 5% and 0.9% NaCl infusion ( Intravenous $New Bag 12/7/23 0115)   cefTRIAXone (ROCEPHIN) 2 g vial to attach to  ml bag for ADULTS or NS 50 ml bag for PEDS (2 g Intravenous $New Bag 12/7/23 0116)   hydrOXYzine HCl (ATARAX) tablet 25 mg (has no administration in time range)   levothyroxine (SYNTHROID/LEVOTHROID) tablet 175 mcg (has no administration in time range)   traZODone (DESYREL) tablet 200 mg (200 mg Oral $Given 12/7/23 0153)   LORazepam (ATIVAN) injection 1 mg (has no administration in time range)   ondansetron (ZOFRAN) injection 4 mg (4 mg Intravenous $Given 12/6/23 2257)   HYDROmorphone (PF) (DILAUDID) injection 0.5 mg (0.5 mg Intravenous $Given 12/6/23 2337)   lactated ringers BOLUS 1,000 mL (0 mLs Intravenous Stopped 12/7/23 0100)   iopamidol (ISOVUE-370) solution 100 mL (100 mLs Intravenous $Given 12/6/23 2322)   sodium chloride 0.9 % bag 500mL for CT scan flush use (69 mLs Intravenous $Given 12/6/23 2321)   sodium chloride 0.9% BOLUS 1,000 mL (1,000 mLs Intravenous $New Bag 12/7/23 0121)       Assessments & Plan (with Medical Decision Making)   53-year-old male who presents for abdominal pain and nausea and vomiting.  He is given IV hydromorphone and ondansetron for symptoms.  White blood cell count is mildly elevated at 12.4.  Hemoglobin is 14.9, no signs of anemia.  His bilirubin is elevated at 3 and LFTs are mildly elevated, these are higher than prior levels.  Lipase is very mildly elevated at 77.  CT of the abdomen and pelvis obtained, images interpreted independently as well as radiology read reviewed, positive for inflammation around the gallbladder.  Bedside ultrasound shows sludge within the gallbladder with pericholecystic fluid and thickened gallbladder wall concerning for acute cholecystitis.  Given his elevated bilirubin and these findings the patient is started on IV ceftriaxone for possible  cholecystitis.  I will observe the patient in the emergency department overnight for MRCP in the morning and final disposition planning.  The patient is signed out to Dr. Sharpe at change of shift to follow-up on the MRCP.    I have reviewed the nursing notes.    I have reviewed the findings, diagnosis, plan and need for follow up with the patient.             New Prescriptions    No medications on file       Final diagnoses:   Acute cholecystitis       12/6/2023   [facility] EMERGENCY DEPT       Serafin Mata MD  12/07/23 0603

## 2023-12-07 NOTE — ED TRIAGE NOTES
Pt c/o generalized abd pain with N/V since yesterday. LBM 12/6, Unable to BP due to pt movement. Was seen at [facility] but left after pain went away. Pain returned tonight.      Triage Assessment (Adult)       Row Name 12/06/23 2244          Triage Assessment    Airway WDL WDL        Respiratory WDL    Respiratory WDL WDL        Skin Circulation/Temperature WDL    Skin Circulation/Temperature WDL WDL        Cardiac WDL    Cardiac WDL WDL        Peripheral/Neurovascular WDL    Peripheral Neurovascular WDL WDL        Cognitive/Neuro/Behavioral WDL    Cognitive/Neuro/Behavioral WDL WDL

## 2023-12-08 ENCOUNTER — APPOINTMENT (OUTPATIENT)
Dept: GENERAL RADIOLOGY | Facility: CLINIC | Age: 53
End: 2023-12-08
Attending: INTERNAL MEDICINE
Payer: COMMERCIAL

## 2023-12-08 ENCOUNTER — ANESTHESIA EVENT (OUTPATIENT)
Dept: SURGERY | Facility: CLINIC | Age: 53
End: 2023-12-08
Payer: COMMERCIAL

## 2023-12-08 ENCOUNTER — ANESTHESIA (OUTPATIENT)
Dept: SURGERY | Facility: CLINIC | Age: 53
End: 2023-12-08
Payer: COMMERCIAL

## 2023-12-08 ENCOUNTER — HOSPITAL ENCOUNTER (OUTPATIENT)
Facility: CLINIC | Age: 53
Discharge: HOME | End: 2023-12-08
Attending: INTERNAL MEDICINE | Admitting: INTERNAL MEDICINE
Payer: COMMERCIAL

## 2023-12-08 VITALS
OXYGEN SATURATION: 92 % | DIASTOLIC BLOOD PRESSURE: 84 MMHG | TEMPERATURE: 97.5 F | HEART RATE: 63 BPM | RESPIRATION RATE: 16 BRPM | SYSTOLIC BLOOD PRESSURE: 138 MMHG

## 2023-12-08 VITALS
DIASTOLIC BLOOD PRESSURE: 90 MMHG | SYSTOLIC BLOOD PRESSURE: 137 MMHG | RESPIRATION RATE: 13 BRPM | TEMPERATURE: 97.7 F | HEART RATE: 75 BPM | OXYGEN SATURATION: 94 %

## 2023-12-08 DIAGNOSIS — R79.89 LFT ELEVATION: Primary | ICD-10-CM

## 2023-12-08 PROCEDURE — C2617 STENT, NON-COR, TEM W/O DEL: HCPCS | Performed by: INTERNAL MEDICINE

## 2023-12-08 PROCEDURE — C1769 GUIDE WIRE: HCPCS | Performed by: INTERNAL MEDICINE

## 2023-12-08 PROCEDURE — 250N000013 HC RX MED GY IP 250 OP 250 PS 637: Performed by: EMERGENCY MEDICINE

## 2023-12-08 PROCEDURE — 710N000010 HC RECOVERY PHASE 1, LEVEL 2, PER MIN: Performed by: INTERNAL MEDICINE

## 2023-12-08 PROCEDURE — 360N000082 HC SURGERY LEVEL 2 W/ FLUORO, PER MIN: Performed by: INTERNAL MEDICINE

## 2023-12-08 PROCEDURE — 370N000017 HC ANESTHESIA TECHNICAL FEE, PER MIN: Performed by: INTERNAL MEDICINE

## 2023-12-08 PROCEDURE — 96366 THER/PROPH/DIAG IV INF ADDON: CPT | Mod: 59 | Performed by: EMERGENCY MEDICINE

## 2023-12-08 PROCEDURE — 250N000011 HC RX IP 250 OP 636: Performed by: EMERGENCY MEDICINE

## 2023-12-08 PROCEDURE — 250N000011 HC RX IP 250 OP 636: Mod: JZ | Performed by: INTERNAL MEDICINE

## 2023-12-08 PROCEDURE — 250N000009 HC RX 250: Performed by: INTERNAL MEDICINE

## 2023-12-08 PROCEDURE — 258N000003 HC RX IP 258 OP 636

## 2023-12-08 PROCEDURE — 999N000141 HC STATISTIC PRE-PROCEDURE NURSING ASSESSMENT: Performed by: INTERNAL MEDICINE

## 2023-12-08 PROCEDURE — 999N000181 XR SURGERY CARM FLUORO GREATER THAN 5 MIN W STILLS: Mod: TC

## 2023-12-08 PROCEDURE — 258N000003 HC RX IP 258 OP 636: Performed by: INTERNAL MEDICINE

## 2023-12-08 PROCEDURE — 272N000001 HC OR GENERAL SUPPLY STERILE: Performed by: INTERNAL MEDICINE

## 2023-12-08 PROCEDURE — 250N000025 HC SEVOFLURANE, PER MIN: Performed by: INTERNAL MEDICINE

## 2023-12-08 PROCEDURE — 250N000011 HC RX IP 250 OP 636: Mod: JZ | Performed by: EMERGENCY MEDICINE

## 2023-12-08 PROCEDURE — 250N000009 HC RX 250

## 2023-12-08 PROCEDURE — 250N000011 HC RX IP 250 OP 636

## 2023-12-08 PROCEDURE — C1726 CATH, BAL DIL, NON-VASCULAR: HCPCS | Performed by: INTERNAL MEDICINE

## 2023-12-08 PROCEDURE — 96361 HYDRATE IV INFUSION ADD-ON: CPT | Performed by: EMERGENCY MEDICINE

## 2023-12-08 PROCEDURE — 250N000011 HC RX IP 250 OP 636: Mod: JZ | Performed by: ANESTHESIOLOGY

## 2023-12-08 PROCEDURE — 258N000003 HC RX IP 258 OP 636: Performed by: EMERGENCY MEDICINE

## 2023-12-08 PROCEDURE — 255N000002 HC RX 255 OP 636: Mod: JZ | Performed by: INTERNAL MEDICINE

## 2023-12-08 PROCEDURE — 710N000012 HC RECOVERY PHASE 2, PER MINUTE: Performed by: INTERNAL MEDICINE

## 2023-12-08 PROCEDURE — 96376 TX/PRO/DX INJ SAME DRUG ADON: CPT | Mod: 59 | Performed by: EMERGENCY MEDICINE

## 2023-12-08 DEVICE — IMPLANTABLE DEVICE
Type: IMPLANTABLE DEVICE | Site: BILE DUCT | Status: NON-FUNCTIONAL
Removed: 2024-01-25

## 2023-12-08 RX ORDER — LEVOFLOXACIN 5 MG/ML
INJECTION, SOLUTION INTRAVENOUS PRN
Status: DISCONTINUED | OUTPATIENT
Start: 2023-12-08 | End: 2023-12-08

## 2023-12-08 RX ORDER — NALOXONE HYDROCHLORIDE 0.4 MG/ML
0.2 INJECTION, SOLUTION INTRAMUSCULAR; INTRAVENOUS; SUBCUTANEOUS
Status: CANCELLED | OUTPATIENT
Start: 2023-12-08

## 2023-12-08 RX ORDER — HYDROMORPHONE HCL IN WATER/PF 6 MG/30 ML
0.4 PATIENT CONTROLLED ANALGESIA SYRINGE INTRAVENOUS EVERY 5 MIN PRN
Status: DISCONTINUED | OUTPATIENT
Start: 2023-12-08 | End: 2023-12-08 | Stop reason: HOSPADM

## 2023-12-08 RX ORDER — ONDANSETRON 4 MG/1
4 TABLET, ORALLY DISINTEGRATING ORAL EVERY 30 MIN PRN
Status: CANCELLED | OUTPATIENT
Start: 2023-12-08

## 2023-12-08 RX ORDER — SODIUM CHLORIDE, SODIUM LACTATE, POTASSIUM CHLORIDE, CALCIUM CHLORIDE 600; 310; 30; 20 MG/100ML; MG/100ML; MG/100ML; MG/100ML
INJECTION, SOLUTION INTRAVENOUS CONTINUOUS
Status: DISCONTINUED | OUTPATIENT
Start: 2023-12-08 | End: 2023-12-08 | Stop reason: HOSPADM

## 2023-12-08 RX ORDER — EPINEPHRINE 1 MG/ML
INJECTION, SOLUTION, CONCENTRATE INTRAVENOUS PRN
Status: DISCONTINUED | OUTPATIENT
Start: 2023-12-08 | End: 2023-12-08 | Stop reason: HOSPADM

## 2023-12-08 RX ORDER — ONDANSETRON 2 MG/ML
4 INJECTION INTRAMUSCULAR; INTRAVENOUS ONCE
Status: COMPLETED | OUTPATIENT
Start: 2023-12-08 | End: 2023-12-08

## 2023-12-08 RX ORDER — NALOXONE HYDROCHLORIDE 0.4 MG/ML
0.4 INJECTION, SOLUTION INTRAMUSCULAR; INTRAVENOUS; SUBCUTANEOUS
Status: CANCELLED | OUTPATIENT
Start: 2023-12-08

## 2023-12-08 RX ORDER — IOPAMIDOL 510 MG/ML
INJECTION, SOLUTION INTRAVASCULAR PRN
Status: DISCONTINUED | OUTPATIENT
Start: 2023-12-08 | End: 2023-12-08 | Stop reason: HOSPADM

## 2023-12-08 RX ORDER — METOPROLOL TARTRATE 1 MG/ML
1-2 INJECTION, SOLUTION INTRAVENOUS EVERY 5 MIN PRN
Status: DISCONTINUED | OUTPATIENT
Start: 2023-12-08 | End: 2023-12-08 | Stop reason: HOSPADM

## 2023-12-08 RX ORDER — ONDANSETRON 2 MG/ML
4 INJECTION INTRAMUSCULAR; INTRAVENOUS EVERY 6 HOURS PRN
Status: CANCELLED | OUTPATIENT
Start: 2023-12-08

## 2023-12-08 RX ORDER — ONDANSETRON 2 MG/ML
INJECTION INTRAMUSCULAR; INTRAVENOUS PRN
Status: DISCONTINUED | OUTPATIENT
Start: 2023-12-08 | End: 2023-12-08

## 2023-12-08 RX ORDER — LIDOCAINE HYDROCHLORIDE 20 MG/ML
INJECTION, SOLUTION INFILTRATION; PERINEURAL PRN
Status: DISCONTINUED | OUTPATIENT
Start: 2023-12-08 | End: 2023-12-08

## 2023-12-08 RX ORDER — ONDANSETRON 4 MG/1
4 TABLET, ORALLY DISINTEGRATING ORAL EVERY 6 HOURS PRN
Status: CANCELLED | OUTPATIENT
Start: 2023-12-08

## 2023-12-08 RX ORDER — VASOPRESSIN IN 0.9 % NACL 2 UNIT/2ML
SYRINGE (ML) INTRAVENOUS PRN
Status: DISCONTINUED | OUTPATIENT
Start: 2023-12-08 | End: 2023-12-08

## 2023-12-08 RX ORDER — PROPOFOL 10 MG/ML
INJECTION, EMULSION INTRAVENOUS PRN
Status: DISCONTINUED | OUTPATIENT
Start: 2023-12-08 | End: 2023-12-08

## 2023-12-08 RX ORDER — FENTANYL CITRATE 50 UG/ML
INJECTION, SOLUTION INTRAMUSCULAR; INTRAVENOUS PRN
Status: DISCONTINUED | OUTPATIENT
Start: 2023-12-08 | End: 2023-12-08

## 2023-12-08 RX ORDER — EPHEDRINE SULFATE 50 MG/ML
INJECTION, SOLUTION INTRAMUSCULAR; INTRAVENOUS; SUBCUTANEOUS PRN
Status: DISCONTINUED | OUTPATIENT
Start: 2023-12-08 | End: 2023-12-08

## 2023-12-08 RX ORDER — HYDRALAZINE HYDROCHLORIDE 20 MG/ML
2.5-5 INJECTION INTRAMUSCULAR; INTRAVENOUS EVERY 10 MIN PRN
Status: DISCONTINUED | OUTPATIENT
Start: 2023-12-08 | End: 2023-12-08 | Stop reason: HOSPADM

## 2023-12-08 RX ORDER — OXYCODONE HYDROCHLORIDE 5 MG/1
5 TABLET ORAL
Status: CANCELLED | OUTPATIENT
Start: 2023-12-08

## 2023-12-08 RX ORDER — HYDROMORPHONE HYDROCHLORIDE 1 MG/ML
0.5 INJECTION, SOLUTION INTRAMUSCULAR; INTRAVENOUS; SUBCUTANEOUS ONCE
Status: COMPLETED | OUTPATIENT
Start: 2023-12-08 | End: 2023-12-08

## 2023-12-08 RX ORDER — HYDROMORPHONE HCL IN WATER/PF 6 MG/30 ML
0.2 PATIENT CONTROLLED ANALGESIA SYRINGE INTRAVENOUS EVERY 5 MIN PRN
Status: DISCONTINUED | OUTPATIENT
Start: 2023-12-08 | End: 2023-12-08 | Stop reason: HOSPADM

## 2023-12-08 RX ORDER — INDOMETHACIN 50 MG/1
SUPPOSITORY RECTAL PRN
Status: DISCONTINUED | OUTPATIENT
Start: 2023-12-08 | End: 2023-12-08 | Stop reason: HOSPADM

## 2023-12-08 RX ORDER — LEVOFLOXACIN 500 MG/1
500 TABLET, FILM COATED ORAL DAILY
Qty: 5 TABLET | Refills: 0 | Status: SHIPPED | OUTPATIENT
Start: 2023-12-08 | End: 2023-12-13

## 2023-12-08 RX ORDER — ACETAMINOPHEN 325 MG/1
975 TABLET ORAL ONCE
Status: CANCELLED | OUTPATIENT
Start: 2023-12-08 | End: 2023-12-08

## 2023-12-08 RX ORDER — ONDANSETRON 2 MG/ML
4 INJECTION INTRAMUSCULAR; INTRAVENOUS EVERY 30 MIN PRN
Status: CANCELLED | OUTPATIENT
Start: 2023-12-08

## 2023-12-08 RX ORDER — HALOPERIDOL 5 MG/ML
1 INJECTION INTRAMUSCULAR
Status: DISCONTINUED | OUTPATIENT
Start: 2023-12-08 | End: 2023-12-08 | Stop reason: HOSPADM

## 2023-12-08 RX ORDER — FLUMAZENIL 0.1 MG/ML
0.2 INJECTION, SOLUTION INTRAVENOUS
Status: CANCELLED | OUTPATIENT
Start: 2023-12-08 | End: 2023-12-09

## 2023-12-08 RX ORDER — INDOMETHACIN 50 MG/1
100 SUPPOSITORY RECTAL
Status: DISCONTINUED | OUTPATIENT
Start: 2023-12-08 | End: 2023-12-08 | Stop reason: HOSPADM

## 2023-12-08 RX ORDER — DEXAMETHASONE SODIUM PHOSPHATE 4 MG/ML
INJECTION, SOLUTION INTRA-ARTICULAR; INTRALESIONAL; INTRAMUSCULAR; INTRAVENOUS; SOFT TISSUE PRN
Status: DISCONTINUED | OUTPATIENT
Start: 2023-12-08 | End: 2023-12-08

## 2023-12-08 RX ORDER — LIDOCAINE 40 MG/G
CREAM TOPICAL
Status: DISCONTINUED | OUTPATIENT
Start: 2023-12-08 | End: 2023-12-08 | Stop reason: HOSPADM

## 2023-12-08 RX ADMIN — ONDANSETRON 4 MG: 2 INJECTION INTRAMUSCULAR; INTRAVENOUS at 14:13

## 2023-12-08 RX ADMIN — GLUCAGON 0.4 MG: KIT at 13:59

## 2023-12-08 RX ADMIN — SUGAMMADEX 200 MG: 100 INJECTION, SOLUTION INTRAVENOUS at 14:18

## 2023-12-08 RX ADMIN — DEXTROSE AND SODIUM CHLORIDE: 5; 900 INJECTION, SOLUTION INTRAVENOUS at 01:28

## 2023-12-08 RX ADMIN — FENTANYL CITRATE 50 MCG: 50 INJECTION INTRAMUSCULAR; INTRAVENOUS at 14:02

## 2023-12-08 RX ADMIN — PROPOFOL 50 MG: 10 INJECTION, EMULSION INTRAVENOUS at 13:01

## 2023-12-08 RX ADMIN — DEXAMETHASONE SODIUM PHOSPHATE 4 MG: 4 INJECTION, SOLUTION INTRA-ARTICULAR; INTRALESIONAL; INTRAMUSCULAR; INTRAVENOUS; SOFT TISSUE at 13:22

## 2023-12-08 RX ADMIN — Medication 80 MG: at 12:59

## 2023-12-08 RX ADMIN — EPHEDRINE SULFATE 5 MG: 5 INJECTION INTRAVENOUS at 13:36

## 2023-12-08 RX ADMIN — HYDROMORPHONE HYDROCHLORIDE 0.5 MG: 1 INJECTION, SOLUTION INTRAMUSCULAR; INTRAVENOUS; SUBCUTANEOUS at 10:41

## 2023-12-08 RX ADMIN — PHENYLEPHRINE HYDROCHLORIDE 200 MCG: 10 INJECTION INTRAVENOUS at 13:29

## 2023-12-08 RX ADMIN — ONDANSETRON 4 MG: 2 INJECTION INTRAMUSCULAR; INTRAVENOUS at 10:40

## 2023-12-08 RX ADMIN — Medication 0.5 UNITS: at 13:41

## 2023-12-08 RX ADMIN — PHENYLEPHRINE HYDROCHLORIDE 200 MCG: 10 INJECTION INTRAVENOUS at 13:20

## 2023-12-08 RX ADMIN — PROPOFOL 200 MG: 10 INJECTION, EMULSION INTRAVENOUS at 12:59

## 2023-12-08 RX ADMIN — FENTANYL CITRATE 50 MCG: 50 INJECTION INTRAMUSCULAR; INTRAVENOUS at 14:11

## 2023-12-08 RX ADMIN — HYDROMORPHONE HYDROCHLORIDE 0.5 MG: 1 INJECTION, SOLUTION INTRAMUSCULAR; INTRAVENOUS; SUBCUTANEOUS at 07:32

## 2023-12-08 RX ADMIN — PHENYLEPHRINE HYDROCHLORIDE 100 MCG: 10 INJECTION INTRAVENOUS at 13:36

## 2023-12-08 RX ADMIN — SODIUM CHLORIDE, POTASSIUM CHLORIDE, SODIUM LACTATE AND CALCIUM CHLORIDE 250 ML: 600; 310; 30; 20 INJECTION, SOLUTION INTRAVENOUS at 10:49

## 2023-12-08 RX ADMIN — CEFTRIAXONE SODIUM 2 G: 2 INJECTION, POWDER, FOR SOLUTION INTRAMUSCULAR; INTRAVENOUS at 01:28

## 2023-12-08 RX ADMIN — LIDOCAINE HYDROCHLORIDE 100 MG: 20 INJECTION, SOLUTION INFILTRATION; PERINEURAL at 12:59

## 2023-12-08 RX ADMIN — PROPOFOL 40 MG: 10 INJECTION, EMULSION INTRAVENOUS at 13:50

## 2023-12-08 RX ADMIN — EPHEDRINE SULFATE 5 MG: 5 INJECTION INTRAVENOUS at 13:39

## 2023-12-08 RX ADMIN — SODIUM CHLORIDE, POTASSIUM CHLORIDE, SODIUM LACTATE AND CALCIUM CHLORIDE: 600; 310; 30; 20 INJECTION, SOLUTION INTRAVENOUS at 13:25

## 2023-12-08 RX ADMIN — LEVOFLOXACIN 500 MG: 5 INJECTION, SOLUTION INTRAVENOUS at 14:03

## 2023-12-08 RX ADMIN — FENTANYL CITRATE 100 MCG: 50 INJECTION INTRAMUSCULAR; INTRAVENOUS at 12:59

## 2023-12-08 RX ADMIN — MIDAZOLAM 2 MG: 1 INJECTION INTRAMUSCULAR; INTRAVENOUS at 12:44

## 2023-12-08 RX ADMIN — LEVOTHYROXINE SODIUM 175 MCG: 0.17 TABLET ORAL at 07:36

## 2023-12-08 ASSESSMENT — ACTIVITIES OF DAILY LIVING (ADL)
ADLS_ACUITY_SCORE: 35

## 2023-12-08 ASSESSMENT — LIFESTYLE VARIABLES: TOBACCO_USE: 1

## 2023-12-08 NOTE — ED NOTES
Called [facility] Pre-op ([phone]) for ERCP. Arrival Time 0915. Procedure Time 1115. Approximate pickup for return to [facility] ~ 1400. Called [facility] to schedule ambulance. Pickup 12/8/23 @ [facility] ~ 0623.

## 2023-12-08 NOTE — BRIEF OP NOTE
[facility]    Brief Operative Note    Pre-operative diagnosis: Biliary obstruction (H28) [K83.1]  Post-operative diagnosis Same as pre-operative diagnosis    Procedure: ENDOSCOPIC RETROGRADE CHOLANGIOPANCREATOGRAPHY, WITH biliary sphincterotomy, stone extraction, and stent placement, N/A - Mouth    Surgeon: Surgeon(s) and Role:     * Brian Witt MD - Primary  Anesthesia: General   Estimated Blood Loss: None    Drains: None  Specimens: * No specimens in log *  Findings:   None.  Complications: None.  Implants:   Implant Name Type Inv. Item Serial No.  Lot No. LRB No. Used Action   STENT COT[employer] ([place] SANDRA SOF-FLEX [id number] - [id number] Stent STENT COTTON-DIXON ([place] SANDRA SOF-FLEX [id number]  Telephone [employer] [id number] N/A 1 Implanted       Retracted papilla.  Mod bile duct dilation with smooth extrinsic narrowing upper duct  OBstructed cystic duct  Multiple pigmented stones of various sizes removed through full sphincterotomy.  10F 9cm stent placed throuigh stenosis.  Some pus draining intermittently from duct after clearance, suggesting gallbladder source.    PLAN  Discharge home if feeling well after 3 hours  Virtual clinic w Dr Getachew Torres Wednesday.   Will likely need stent removed intraoperatively b y ERCP when has cholecystectomy. Anyone can do it, 10 mins scope time

## 2023-12-08 NOTE — ANESTHESIA PREPROCEDURE EVALUATION
Anesthesia Pre-Procedure Evaluation    Patient: Getachew Barcenas   MRN: [id number] : 1970        Procedure : Procedure(s):  ANESTHESIA OUT OF OR Cerebral Emolization @1000          Past Medical History:   Diagnosis Date    Alcohol use disorder, severe, dependence (H) 2022      Past Surgical History:   Procedure Laterality Date    IR CAROTID CEREBRAL ANGIOGRAM BILATERAL  2022    PICC TRIPLE LUMEN PLACEMENT  11/3/2022           No Known Allergies   Social History     Tobacco Use    Smoking status: Never    Smokeless tobacco: Current     Types: Chew   Substance Use Topics    Alcohol use: Yes      Wt Readings from Last 1 Encounters:   23 105.7 kg (233 lb)        Anesthesia Evaluation   Pt has had prior anesthetic. Type: General.    No history of anesthetic complications       ROS/MED HX  ENT/Pulmonary:     (+) sleep apnea, doesn't use CPAP,              tobacco use (smokeless), Past use,                      Neurologic: Comment: Chronic bilateral subdural hematoma  Peripheral neuropathy likely 2/2 chronic alcohol abuse  Mild cerebellar atrophy      Cardiovascular:     (+)  - -   -  - -                                 Previous cardiac testing   Echo: Date:  Results:  1. Normal left ventricular size and systolic performance with a visually estimated ejection fraction of 55-60%.   2. No significant valvular heart disease is identified on this study.   3. Normal right ventricular size and systolic performance.   Stress Test:  Date: Results:    ECG Reviewed:  Date: Results:    Cath:  Date: Results:   (-) hypertension, irregular heartbeat/palpitations and dyslipidemia   METS/Exercise Tolerance:     Hematologic:     (+)      anemia,          Musculoskeletal:  - neg musculoskeletal ROS     GI/Hepatic: Comment: Biliary obstruction  Cholelithiasis   Hepatic steatosis  Thiamine Deficiency  Malnutrition    (+)             liver disease,    (-) GERD   Renal/Genitourinary:  - neg Renal ROS  (-) renal disease  "  Endo:     (+)          thyroid problem, hypothyroidism,           Psychiatric/Substance Use:     (+)   alcohol abuse      Infectious Disease:  - neg infectious disease ROS     Malignancy:  - neg malignancy ROS     Other:            Physical Exam    Airway        Mallampati: II   TM distance: > 3 FB   Neck ROM: limited   Mouth opening: > 3 cm    Respiratory Devices and Support         Dental       (+) Minor Abnormalities - some fillings, tiny chips      Cardiovascular          Rhythm and rate: regular and normal     Pulmonary           breath sounds clear to auscultation           OUTSIDE LABS:  CBC:   Lab Results   Component Value Date    WBC 8.5 12/07/2023    WBC 8.8 12/07/2023    HGB 12.1 (L) 12/07/2023    HGB 12.4 (L) 12/07/2023    HCT 36.0 (L) 12/07/2023    HCT 37.2 (L) 12/07/2023     12/07/2023     12/07/2023     BMP:   Lab Results   Component Value Date     (L) 12/07/2023     12/07/2023    POTASSIUM 3.4 12/07/2023    POTASSIUM 3.4 12/07/2023    CHLORIDE 102 12/07/2023    CHLORIDE 105 12/07/2023    CO2 22 12/07/2023    CO2 22 12/07/2023    BUN 4.6 (L) 12/07/2023    BUN 5.4 (L) 12/07/2023    CR 0.74 12/07/2023    CR 0.67 12/07/2023     (H) 12/07/2023     (H) 12/07/2023     COAGS:   Lab Results   Component Value Date    PTT 32 11/03/2022    INR 1.07 02/02/2023     POC: No results found for: \"BGM\", \"HCG\", \"HCGS\"  HEPATIC:   Lab Results   Component Value Date    ALBUMIN 3.2 (L) 12/07/2023    PROTTOTAL 6.1 (L) 12/07/2023     (H) 12/07/2023     (H) 12/07/2023    ALKPHOS 144 12/07/2023    BILITOTAL 4.3 (H) 12/07/2023    EVERARDO 23 02/02/2023     OTHER:   Lab Results   Component Value Date    LACT 2.5 (H) 03/18/2023    ABDULKADIR 8.4 (L) 12/07/2023    PHOS 4.6 (H) 03/24/2023    MAG 1.7 03/24/2023    LIPASE 27 12/07/2023    TSH 0.41 03/17/2023    T4 0.81 (L) 11/02/2022    CRP 2.6 02/23/2022    SED 7 03/17/2023       Anesthesia Plan    ASA Status:  3    NPO Status:  NPO " Appropriate    Anesthesia Type: General.     - Airway: ETT   Induction: Intravenous, RSI.   Maintenance: Balanced.   Techniques and Equipment:     - Lines/Monitors: 2nd IV     Consents    Anesthesia Plan(s) and associated risks, benefits, and realistic alternatives discussed. Questions answered and patient/representative(s) expressed understanding.     - Discussed:     - Discussed with:  Patient            Postoperative Care    Pain management: IV analgesics, Oral pain medications, Multi-modal analgesia.   PONV prophylaxis: Ondansetron (or other 5HT-3), Dexamethasone or Solumedrol     Comments:                NITHIN JONES MD

## 2023-12-08 NOTE — ED PROVIDER NOTES
Plan for ERCP later this morning.     In brief, 53-year-old male with 2 days of abdominal pain found to have cholecystitis and likely choledocholithiasis.  Increasing LFTs and total bili.  Initially had white count which improved and being treated with ceftriaxone.  2 [facility] for ERCP and plans for return to [facility] after procedure.  Discussed case with Dr. Figueroa with the hospitalist service with plans for direct admission. Case also discussed  w Dr. Hattie salcedo/ general surgery.     Plan for direct admit once returns from [facility].     Pranav Sharpe MD    12/8/2023 10:28 AM             Pranav Sharpe MD  12/08/23 1024

## 2023-12-08 NOTE — ANESTHESIA PROCEDURE NOTES
Airway       Patient location during procedure: OR       Procedure Start/Stop Times: 12/8/2023 1:02 PM  Staff -        CRNA: Piper Graff APRN CRNA       Performed By: other anesthesia staffIndications and Patient Condition       Indications for airway management: franck-procedural       Induction type:RSI       Mask difficulty assessment: 0 - not attempted    Final Airway Details       Final airway type: endotracheal airway       Successful airway: ETT - single and Oral  Endotracheal Airway Details        ETT size (mm): 8.0       Cuffed: yes       Cuff volume (mL): 8       Successful intubation technique: direct laryngoscopy       DL Blade Type: MAC 3       Grade View of Cords: 1       Adjucts: stylet       Position: Right       Measured from: lips       Secured at (cm): 23       Bite Block used: procedural bite block.    Post intubation assessment        Placement verified by: capnometry, equal breath sounds and chest rise        Number of attempts at approach: 1       Secured with: tape       Ease of procedure: easy       Dentition: Intact and Unchanged    Medication(s) Administered   Medication Administration Time: 12/8/2023 1:02 PM

## 2023-12-08 NOTE — ANESTHESIA POSTPROCEDURE EVALUATION
Patient: Getachew Barcenas    Procedure: Procedure(s):  ENDOSCOPIC RETROGRADE CHOLANGIOPANCREATOGRAPHY, WITH biliary sphincterotomy, stone extraction, and stent placement       Anesthesia Type:  General    Note:  Disposition: Outpatient   Postop Pain Control: Uneventful            Sign Out: Well controlled pain   PONV: No   Neuro/Psych: Uneventful            Sign Out: Acceptable/Baseline neuro status   Airway/Respiratory: Uneventful            Sign Out: Acceptable/Baseline resp. status   CV/Hemodynamics: Uneventful            Sign Out: Acceptable CV status; No obvious hypovolemia; No obvious fluid overload   Other NRE: NONE   DID A NON-ROUTINE EVENT OCCUR? No           Last vitals:  Vitals Value Taken Time   /89 12/08/23 1445   Temp     Pulse 66 12/08/23 1458   Resp 17 12/08/23 1448   SpO2 85 % 12/08/23 1512   Vitals shown include unfiled device data.    Electronically Signed By: NITHIN JONES MD  December 8, 2023  3:13 PM

## 2023-12-08 NOTE — ANESTHESIA CARE TRANSFER NOTE
Patient: Getachew Barcenas    Procedure: Procedure(s):  ENDOSCOPIC RETROGRADE CHOLANGIOPANCREATOGRAPHY, WITH biliary sphincterotomy, stone extraction, and stent placement       Diagnosis: Biliary obstruction (H28) [K83.1]  Diagnosis Additional Information: No value filed.    Anesthesia Type:   General     Note:    Oropharynx: oropharynx clear of all foreign objects and spontaneously breathing  Level of Consciousness: awake  Oxygen Supplementation: face mask  Level of Supplemental Oxygen (L/min / FiO2): 6 LPM  Independent Airway: airway patency satisfactory and stable    Vital Signs Stable: post-procedure vital signs reviewed and stable  Report to RN Given: handoff report given  Patient transferred to: PACU    Handoff Report: Identifed the Patient, Identified the Reponsible Provider, Reviewed the pertinent medical history, Discussed the surgical course, Reviewed Intra-OP anesthesia mangement and issues during anesthesia, Set expectations for post-procedure period and Allowed opportunity for questions and acknowledgement of understanding      Vitals:  Vitals Value Taken Time   /88 12/08/23 1436   Temp     Pulse 70 12/08/23 1438   Resp 27 12/08/23 1439   SpO2 99 % 12/08/23 1439   Vitals shown include unfiled device data.    Electronically Signed By: THALIA Johnson CRNA  December 8, 2023  2:40 PM

## 2023-12-08 NOTE — ED PROVIDER NOTES
Emergency Department Patient Sign-out       Brief HPI:  This is a 53 year old male signed out to me by Dr. Thorne .  See initial ED Provider note for details of the presentation.            Significant Events prior to my assuming care: labs improving      Exam:   Patient Vitals for the past 24 hrs:   BP Pulse Resp SpO2   12/07/23 2301 -- -- -- 95 %   12/07/23 2300 133/87 70 -- --   12/07/23 2200 (!) 145/90 76 -- 99 %   12/07/23 2000 108/73 70 -- 94 %   12/07/23 1900 114/75 74 -- 96 %   12/07/23 1700 101/61 70 13 95 %   12/07/23 1646 -- 61 19 94 %   12/07/23 1642 -- 72 -- (!) 80 %   12/07/23 1600 137/83 76 18 93 %   12/07/23 1300 122/72 80 13 91 %   12/07/23 1200 127/77 78 12 94 %   12/07/23 1000 121/67 83 19 96 %   12/07/23 0800 (!) 157/79 72 -- 95 %   12/07/23 0600 (!) 163/103 68 -- 99 %   12/07/23 0400 109/79 65 -- 98 %   12/07/23 0130 112/78 82 -- 96 %           ED RESULTS:   Results for orders placed or performed during the hospital encounter of 12/06/23 (from the past 24 hour(s))   Comprehensive metabolic panel     Status: Abnormal    Collection Time: 12/07/23  6:26 AM   Result Value Ref Range    Sodium 138 135 - 145 mmol/L    Potassium 3.4 3.4 - 5.3 mmol/L    Carbon Dioxide (CO2) 22 22 - 29 mmol/L    Anion Gap 11 7 - 15 mmol/L    Urea Nitrogen 5.4 (L) 6.0 - 20.0 mg/dL    Creatinine 0.67 0.67 - 1.17 mg/dL    GFR Estimate >90 >60 mL/min/1.73m2    Calcium 8.4 (L) 8.6 - 10.0 mg/dL    Chloride 105 98 - 107 mmol/L    Glucose 106 (H) 70 - 99 mg/dL    Alkaline Phosphatase 147 40 - 150 U/L     (H) 0 - 45 U/L     (H) 0 - 70 U/L    Protein Total 6.2 (L) 6.4 - 8.3 g/dL    Albumin 3.5 3.5 - 5.2 g/dL    Bilirubin Total 3.5 (H) <=1.2 mg/dL   CBC with platelets, differential     Status: Abnormal    Collection Time: 12/07/23  6:26 AM    Narrative    The following orders were created for panel order CBC with platelets, differential.  Procedure                               Abnormality         Status                      ---------                               -----------         ------                     CBC with platelets and d...[[id number]]  Abnormal            Final result                 Please view results for these tests on the individual orders.   Bilirubin direct     Status: Abnormal    Collection Time: 12/07/23  6:26 AM   Result Value Ref Range    Bilirubin Direct 3.26 (H) 0.00 - 0.30 mg/dL   CBC with platelets and differential     Status: Abnormal    Collection Time: 12/07/23  6:26 AM   Result Value Ref Range    WBC Count 8.8 4.0 - 11.0 10e3/uL    RBC Count 3.79 (L) 4.40 - 5.90 10e6/uL    Hemoglobin 12.4 (L) 13.3 - 17.7 g/dL    Hematocrit 37.2 (L) 40.0 - 53.0 %    MCV 98 78 - 100 fL    MCH 32.7 26.5 - 33.0 pg    MCHC 33.3 31.5 - 36.5 g/dL    RDW 14.1 10.0 - 15.0 %    Platelet Count 288 150 - 450 10e3/uL    % Neutrophils 74 %    % Lymphocytes 14 %    % Monocytes 10 %    % Eosinophils 0 %    % Basophils 1 %    % Immature Granulocytes 1 %    NRBCs per 100 WBC 0 <1 /100    Absolute Neutrophils 6.6 1.6 - 8.3 10e3/uL    Absolute Lymphocytes 1.2 0.8 - 5.3 10e3/uL    Absolute Monocytes 0.8 0.0 - 1.3 10e3/uL    Absolute Eosinophils 0.0 0.0 - 0.7 10e3/uL    Absolute Basophils 0.0 0.0 - 0.2 10e3/uL    Absolute Immature Granulocytes 0.1 <=0.4 10e3/uL    Absolute NRBCs 0.0 10e3/uL   Lipase     Status: Normal    Collection Time: 12/07/23  6:26 AM   Result Value Ref Range    Lipase 39 13 - 60 U/L   Abdomen MRI w & w/o contrast mrcp     Status: None    Collection Time: 12/07/23  9:34 AM    Narrative    MR ABDOMEN MRCP WITHOUT AND WITH CONTRAST 12/7/2023 9:34 AM    INDICATION: Abdominal pain, vomiting, elevated bilirubin,  cholecystitis.     COMPARISON: CT abdomen and pelvis 12/6/2023. Point of care abdominal  ultrasound 12/6/2023.    TECHNIQUE: Multiplanar multisequence images of the abdomen acquired  before and after administration of 10.5 mL Gadavist intravenous  contrast. MRCP imaging was also performed. 3-D MRCP was  performed  using maximum intensity projection reconstruction on the same  workstation.    FINDINGS:     LIVER: No overt features of cirrhosis. Hepatic steatosis. No  suspicious liver lesion.    BILIARY: Gallbladder is mildly distended containing small gallstones  and layering sludge. Mild wall thickening and pericholecystic fluid,  which extends along the proximal duodenum. Mild inflammation  surrounding the cystic duct, which appears to insert low into the  common bile duct.    Layering debris/small stones within the distal common bile duct  (6/17-21), which measures up to 1 cm. No significant intrahepatic  biliary duct dilatation.     Probable tiny anterior wall gallbladder polyps (6/10).    SPLEEN: Not enlarged    PANCREAS: Pancreatic duct is slightly ectatic measuring up to 5 mm in  the head. No discrete mass. No evidence of active inflammation.     ADRENAL GLANDS: Unremarkable.    KIDNEYS: Unremarkable.    BOWEL: No bowel dilatation. Colonic diverticula.    LYMPH NODE: Few scattered lymph nodes surrounding the gallbladder and  travon hepatis are likely reactive.    VASCULATURE: Nonaneurysmal abdominal aorta. Portal veins, hepatic  veins, SMV and splenic vein are patent.    MUSCULOSKELETAL: No aggressive osseous lesion.    LUNG BASES: Bilateral trace pleural effusions.      Impression    IMPRESSION:     1. Findings suggestive of acute cholecystitis.    2. Layering debris (likely sludge and/or small stones) in the distal  common bile duct, which is mildly dilated.    3. Mildly ectatic pancreatic duct, possibly from prior pancreatitis.  No evidence of acute pancreatitis or discrete mass.    4. Hepatic steatosis.    5. Trace bilateral pleural effusions.     REYNA LEAL MD         SYSTEM ID:  [id number]   Lipase     Status: Normal    Collection Time: 12/07/23  1:42 PM   Result Value Ref Range    Lipase 27 13 - 60 U/L   Comprehensive metabolic panel     Status: Abnormal    Collection Time: 12/07/23  1:42 PM    Result Value Ref Range    Sodium 134 (L) 135 - 145 mmol/L    Potassium 3.4 3.4 - 5.3 mmol/L    Carbon Dioxide (CO2) 22 22 - 29 mmol/L    Anion Gap 10 7 - 15 mmol/L    Urea Nitrogen 4.6 (L) 6.0 - 20.0 mg/dL    Creatinine 0.74 0.67 - 1.17 mg/dL    GFR Estimate >90 >60 mL/min/1.73m2    Calcium 8.4 (L) 8.6 - 10.0 mg/dL    Chloride 102 98 - 107 mmol/L    Glucose 125 (H) 70 - 99 mg/dL    Alkaline Phosphatase 144 40 - 150 U/L     (H) 0 - 45 U/L     (H) 0 - 70 U/L    Protein Total 6.1 (L) 6.4 - 8.3 g/dL    Albumin 3.2 (L) 3.5 - 5.2 g/dL    Bilirubin Total 4.3 (H) <=1.2 mg/dL   CBC with platelets differential     Status: Abnormal    Collection Time: 12/07/23  6:08 PM    Narrative    The following orders were created for panel order CBC with platelets differential.  Procedure                               Abnormality         Status                     ---------                               -----------         ------                     CBC with platelets and d...[[id number]]  Abnormal            Final result                 Please view results for these tests on the individual orders.   CBC with platelets and differential     Status: Abnormal    Collection Time: 12/07/23  6:08 PM   Result Value Ref Range    WBC Count 8.5 4.0 - 11.0 10e3/uL    RBC Count 3.69 (L) 4.40 - 5.90 10e6/uL    Hemoglobin 12.1 (L) 13.3 - 17.7 g/dL    Hematocrit 36.0 (L) 40.0 - 53.0 %    MCV 98 78 - 100 fL    MCH 32.8 26.5 - 33.0 pg    MCHC 33.6 31.5 - 36.5 g/dL    RDW 14.1 10.0 - 15.0 %    Platelet Count 259 150 - 450 10e3/uL    % Neutrophils 76 %    % Lymphocytes 12 %    % Monocytes 11 %    % Eosinophils 1 %    % Basophils 0 %    % Immature Granulocytes 0 %    NRBCs per 100 WBC 0 <1 /100    Absolute Neutrophils 6.4 1.6 - 8.3 10e3/uL    Absolute Lymphocytes 1.0 0.8 - 5.3 10e3/uL    Absolute Monocytes 0.9 0.0 - 1.3 10e3/uL    Absolute Eosinophils 0.1 0.0 - 0.7 10e3/uL    Absolute Basophils 0.0 0.0 - 0.2 10e3/uL    Absolute Immature  Granulocytes 0.0 <=0.4 10e3/uL    Absolute NRBCs 0.0 10e3/uL       ED MEDICATIONS:   Medications   dextrose 5% and 0.9% NaCl infusion ( Intravenous Rate/Dose Verify 12/7/23 2032)   cefTRIAXone (ROCEPHIN) 2 g vial to attach to  ml bag for ADULTS or NS 50 ml bag for PEDS (0 g Intravenous Stopped 12/7/23 0145)   hydrOXYzine HCl (ATARAX) tablet 25 mg (has no administration in time range)   levothyroxine (SYNTHROID/LEVOTHROID) tablet 175 mcg (0 mcg Oral Hold 12/7/23 1007)   traZODone (DESYREL) tablet 200 mg (200 mg Oral $Given 12/7/23 0153)   LORazepam (ATIVAN) injection 1 mg (has no administration in time range)   HYDROmorphone (PF) (DILAUDID) injection 0.5 mg (0.5 mg Intravenous $Given 12/7/23 2317)   traZODone (DESYREL) tablet 200 mg (200 mg Oral $Given 12/7/23 2108)   ondansetron (ZOFRAN) injection 4 mg (4 mg Intravenous $Given 12/6/23 2257)   HYDROmorphone (PF) (DILAUDID) injection 0.5 mg (0.5 mg Intravenous $Given 12/6/23 2337)   lactated ringers BOLUS 1,000 mL (0 mLs Intravenous Stopped 12/7/23 0100)   iopamidol (ISOVUE-370) solution 100 mL (100 mLs Intravenous $Given 12/6/23 2322)   sodium chloride 0.9 % bag 500mL for CT scan flush use (69 mLs Intravenous $Given 12/6/23 2321)   sodium chloride 0.9% BOLUS 1,000 mL (0 mLs Intravenous Stopped 12/7/23 0155)   HYDROmorphone (PF) (DILAUDID) injection 0.5 mg (0.5 mg Intravenous $Given 12/7/23 1225)   gadobutrol (GADAVIST) injection 10.5 mL (10.5 mLs Intravenous $Given 12/7/23 0847)   sodium chloride 0.9% BOLUS 50 mL (0 mLs Intravenous Stopped 12/7/23 0900)   HYDROmorphone (PF) (DILAUDID) injection 0.5 mg (0.5 mg Intravenous $Given 12/7/23 1628)   droPERidol (INAPSINE) injection 2.5 mg (2.5 mg Intravenous $Given 12/7/23 2157)   ketorolac (TORADOL) injection 15 mg (15 mg Intravenous $Given 12/7/23 2201)         Impression:    ICD-10-CM    1. Biliary obstruction (H28)  K83.1 Case Request: ENDOSCOPIC RETROGRADE CHOLANGIOPANCREATOGRAPHY     Case Request: ENDOSCOPIC  RETROGRADE CHOLANGIOPANCREATOGRAPHY      2. Choledocholithiasis with acute cholecystitis with obstruction  K80.43           Plan:    Pending studies include Transfer for ERCP in the AM.      6:01 AM: Patient was signed out at shift change to MD Rodney Ash Christopher James, MD  12/08/23 0601

## 2023-12-08 NOTE — TELEPHONE ENCOUNTER
REFERRAL INFORMATION:  Referring Provider:    Referring Clinic: [facility] - Admission  Reason for Visit/Diagnosis: Consult for cholecystectomy       FUTURE VISIT INFORMATION:  Appointment Date: 2023  Appointment Time: 10:15 AM     NOTES RECORD STATUS  DETAILS   OFFICE NOTE from Referring Provider N/A    [facility]  [facility] - GI OV with Dr. Pinon   HOSPITAL DISCHARGE SUMMARY/ ED VISITS  Internal [facility]:  23 - Admission with Dr. Stevens   OPERATIVE REPORT [facility]:  23 - OP Note for ENDOSCOPIC RETROGRADE CHOLANGIOPANCREATOGRAPHY, WITH biliary sphincterotomy, stone extraction, and stent placement  with Dr. Witt   PERTINENT LABS Care Everywhere / Internal    IMAGING (CT, MRI, US, XR)  [facility]:  23 - MRI Abdomen MRCP  23 - US Abdomen  23, 3/17/23 - CT Abd/Pelvis

## 2023-12-09 ENCOUNTER — NURSE TRIAGE (OUTPATIENT)
Dept: NURSING | Facility: CLINIC | Age: 53
End: 2023-12-09
Payer: COMMERCIAL

## 2023-12-09 LAB — ERCP: NORMAL

## 2023-12-09 NOTE — TELEPHONE ENCOUNTER
Pt is newly post-op (gall bladder stent placement).  Was prescribed Levofloxacin.  States concern about potentially severe side effects of this antibiotic, in light of his current co-morbidities.  Would like to request an alternate antibiotic.    Called hospital  [facility] ([phone]) who reports Dr Brian Witt is the on-call provider.   now pages Dr Witt to call back directly to the patient.  Patient is instructed to wait 20 mins for provider's call, then if no call received, to call again to the  @ [phone].    Pt verbalizes clear understanding.  Will await provider's callback now.    Angie HERNANDEZ Health Nurse Advisor     Reason for Disposition   [1] Caller has URGENT medicine question about med that PCP or specialist prescribed AND [2] triager unable to answer question    Protocols used: Medication Question Call-A-

## 2023-12-09 NOTE — TELEPHONE ENCOUNTER
Scheduled gall bladder surgery next week. Taking antibiotics. Levaquin. Patient reading side effects of Levaquin became concerned about some of the risks. We discussed how these side effects become listed, how doctors decide the best antibiotic and how changing antibiotics is not without its own risks. A message is out to his provider to call him back about this but the patient felt comfortable taking the medication after our conversation.   Reason for Disposition    [1] Follow-up call to recent contact AND [2] information only call, no triage required    Protocols used: Information Only Call - No Triage-A-

## 2023-12-10 NOTE — TELEPHONE ENCOUNTER
Patient reports rumbling in his stomach like gas. No pain no vomiting no diarrhea. Patient reports possibly yellow  eyes. This is a known finding from a recent hospitalization but this was discussed with the patient as a potential need for reassessment and I encourage him to be seen [facility] tomorrow. Patient states he will either go to ED tomorrow or his PCP clinic next week depending on how he feels tomorrow.     Reason for Disposition   White of the eyes have turned yellow (i.e., jaundice)    Additional Information   Negative: [1] MODERATE-SEVERE SWELLING of abdomen (e.g., looks very distended or swollen) AND [2] NEW-onset or much worse AND [3] vomiting   Negative: Blood in bowel movements  (Exception: Blood on surface of BM with constipation.)   Negative: Black or tarry bowel movements  (Exception: Chronic-unchanged black-grey BMs AND is taking iron pills or Pepto-Bismol.)   Negative: [1] MODERATE-SEVERE SWELLING of abdomen (e.g., looks very distended or swollen) AND [2] NEW-onset or much worse   Negative: [1] MILD SWELLING of abdomen (e.g., looks distended or swollen) AND [2] new-onset or getting worse AND [3] fever   Negative: [1] MILD-MODERATE abdomen pain AND [2] constant AND [3] present > 2 hours   Negative: [1] Vomiting AND [2] contains bile (green color)   Negative: [1] MILD abdomen pain (e.g., does not interfere with normal activities) AND [2] pain comes and goes (cramps) AND [3] present > 48 hours  (Exception: Mild abdomen pain is a chronic symptom, recurrent or ongoing AND present > 4 weeks.)    Protocols used: Abdomen Bloating and Swelling-A-AH

## 2023-12-13 ENCOUNTER — PRE VISIT (OUTPATIENT)
Dept: SURGERY | Facility: CLINIC | Age: 53
End: 2023-12-13

## 2023-12-13 ENCOUNTER — VIRTUAL VISIT (OUTPATIENT)
Dept: SURGERY | Facility: CLINIC | Age: 53
End: 2023-12-13
Payer: COMMERCIAL

## 2023-12-13 VITALS — BODY MASS INDEX: 29.95 KG/M2 | WEIGHT: 233.4 LBS | HEIGHT: 74 IN

## 2023-12-13 DIAGNOSIS — K81.0 ACUTE CHOLECYSTITIS: ICD-10-CM

## 2023-12-13 DIAGNOSIS — K80.50 COMMON BILE DUCT STONE: Primary | ICD-10-CM

## 2023-12-13 PROCEDURE — 99203 OFFICE O/P NEW LOW 30 MIN: CPT | Mod: 95 | Performed by: STUDENT IN AN ORGANIZED HEALTH CARE EDUCATION/TRAINING PROGRAM

## 2023-12-13 RX ORDER — POTASSIUM CHLORIDE 1500 MG/1
20 TABLET, EXTENDED RELEASE ORAL 2 TIMES DAILY
COMMUNITY
End: 2024-05-19

## 2023-12-13 RX ORDER — CEFAZOLIN SODIUM 2 G/50ML
2 SOLUTION INTRAVENOUS
Status: CANCELLED | OUTPATIENT
Start: 2023-12-13

## 2023-12-13 RX ORDER — INDOCYANINE GREEN AND WATER 25 MG
2.5 KIT INJECTION ONCE
Status: CANCELLED | OUTPATIENT
Start: 2023-12-13 | End: 2023-12-13

## 2023-12-13 RX ORDER — CEFAZOLIN SODIUM 2 G/50ML
2 SOLUTION INTRAVENOUS SEE ADMIN INSTRUCTIONS
Status: CANCELLED | OUTPATIENT
Start: 2023-12-13

## 2023-12-13 ASSESSMENT — PAIN SCALES - GENERAL: PAINLEVEL: NO PAIN (0)

## 2023-12-13 NOTE — PATIENT INSTRUCTIONS
You met with Dr. Sebas Torres.      Today's visit instructions:    Reminder:  Surgery Requirements  Your surgery will be at [address] [place] [address].  You will need to arrive 2 hours early.  You will need someone to drive you home (over 18 years old) and stay with you for 24 hours after the procedure.  You will need a preop physical with [facility] ([facility]) within 30 days of surgery- closer is always better.  Stop any blood thinners, vitamins, minerals, or herbal supplements 5 days before surgery.  If you are taking a prescribed blood thinner or weight loss medication please let us know for specific instructions.  Fasting- a nurse from [facility] will call you 1-2 days before surgery to confirm your procedure and tell you when to stop eating and drinking.   Wash with the soap (Antibacterial Dial Foaming Complete , Hibiclense, or soap given/mailed from the clinic) the night before surgery and morning of surgery. See instructions in the Surgery Packet.  If you would like a procedure estimate please call Cost of Care at [phone] during the hours of 8 AM - 3 PM (option #2 for on-line request and option #3 for a representative).        If you have questions please contact Varsha RN or Fatmata RN during regular clinic hours, Monday through Friday 7:30 AM - 4:00 PM, or you can contact us via Jumptap at anytime.       If you have urgent needs after-hours, weekends, or holidays please call the hospital at [phone] and ask to speak with our on-call General Surgery Team.    Appointment [phone]  Nurse Advice (Varsha or Fatmata): [phone]   Surgery Scheduler (Brina): [phone]  Fax: [phone]    After your Laparoscopic Cholecystectomy          Incision care   You may take a shower the day after surgery. Carefully wash your incision with soap and water. Do not submerge yourself in water (bath, whirlpool, hot tub, pool, lake) for 14 days after surgery.   Remove the bandage 1-2 days  after surgery but leave the medical tape (Steri-Strips) or glue in place. These will loosen and fall off on their own 1-2 weeks after surgery.     Always wash your hands before touching your incisions or removing bandages.   It is not unusual to form a collection of fluid or blood under your incision that may feel firm or squishy- it can take several weeks to months for your body to reabsorb it.  At times, it may even drain.  If that should happen keep the area clean with soap, water,  and cover with a clean gauze dressing. You can change this daily or as needed.       Other medicines   Wait to start aspirin or blood thinners until the day after surgery. You can continue your regular medicines at your normal time the day after surgery.    Your pain medicine may cause constipation (hard, dry stools). To help with this, take the stool softener your doctor gave you or an over-the-counter stool softener or laxative. You can stop taking this when you are no longer taking pain medicine and your bowel movements are back to normal.      For pain or discomfort   Take the narcotic pain medicine your doctor gave you as needed and as instructed on the bottle. If you prefer to use over-the-counter medication, use acetaminophen (Tylenol) or ibuprofen (Advil, Motrin) as instructed on the box. Do not take Tylenol if it is in your narcotic pain medication.   Use an ice pack on your abdomen (belly) for 20 minutes at a time as needed for the first 24 hours. Be sure to protect your skin by putting a cloth between the ice pack and your skin.   After 24 hours you can switch to heat for 20 minutes as needed. Be sure to protect your skin by putting a cloth between the heat pack and your skin.   You may experience right shoulder pain after surgery which will go away 1-4 days after your procedure.  This is related to the gas that was used to inflate your abdomen, it gets trapped between your liver and diaphragm.  Walk frequently and apply a  heating pad (protecting your skin from the heating pad with a barrier such as a towel).       Activities   No driving until you feel it s safe to do so. Don t drive while taking narcotic pain medicine.   Don t lift anything heavier than 20 pounds for 3 to 4 weeks after surgery.      Special equipment   None     Diet   You can eat your regular meals after surgery.      When to call the doctor   Call your doctor if you have:   A fever above 101 F (38.3 C) (taken under the tongue), or a fever or chills lasting more than a day.   Redness at the incision site.   Any fluid or blood draining from the incision, especially if it smells bad.    Severe pain that doesn t improve with pain medicine.        We will call you 2 to 4 days after surgery to review this handout, answer questions and help arrange after-surgery care. If you have questions or concerns, please call [phone] during regular office hours. If you need to call after business hours, call [phone] and ask to page the surgeon on-call.     IMPORTANT:  Prior to your surgical procedure, a nurse will be contacting you to obtain a health history.   If they do not reach you by noon the day prior to your surgery, your surgery will be cancelled. If you have your Pre-Op Surgical Physical (H&P) with the Anesthesia Team ([facility]), you are exempt from this call. Phone:  [phone] ([place]) or [phone] ([place]).        Transversus Abdominis Plane (TAP) Pain Block      What is a TAP block?   A TAP block can help you manage your pain after surgery. TAP stands for transversus abdominis plane, which is a muscle layer in your abdomen (belly). The TAP block uses numbing medicine similar to Novocaine to block pain near the site of your surgery.       Why get a TAP block?   To better manage your pain after surgery. A tap block will help keep the pain from getting severe and out of control.   To block pain signals from the nerve, which helps decrease pain after surgery.   To  help you sleep, easily breathe deeply, walk and visit with others.      How is it done?   You will lie still on a table. We will use an ultrasound machine to help us see the correct muscle layer of your abdomen. Then, we ll use a needle to inject the medicine. We may also give you some sleep medicine to lessen the pain of the injection.       The procedure takes between 5 and 15 minutes. It is usually done right before surgery, but will sometimes be after. It depends on your surgery and care needs.      What can I expect?   You may feel numbness, tingling or a heaviness in your abdomen.    You may have pain control up to 72 hours after surgery.   The TAP block may not lessen all of your surgery pain. But most patients feel 50 to 75 percent less pain than without the block.       Tell your nurse if you have:   Numbness or tingling in areas other than where the injection was   Blurry vision   Ringing in your ears   A metallic taste in your mouth

## 2023-12-13 NOTE — NURSING NOTE
"Chief Complaint   Patient presents with    New Patient     Cholecystectomy consult       Vitals:    12/13/23 0925   Weight: 105.9 kg (233 lb 6.4 oz)   Height: 1.88 m (6' 2\")       Body mass index is 29.97 kg/m .                          Tr Odom, EMT    "

## 2023-12-13 NOTE — PROGRESS NOTES
"Getachew Barcenas is a 53 year old who is being evaluated via a billable video visit.  On 12/8/23 he underwent ERCP with sphincterotomy and stent placement for biliary obstruction and cholecystitis, with successful clearance of his duct. He was discharged from  on antibiotics and is being referred to general surgery for cholecystectomy. I have reviewed his imaging and confirmed GB wall thickening and pericholecystic fluid consistnt with cholecystitis at the time of his initial presentation last week. Since his ERCP he has completed antibiotics and his RUQ pain, fevers, postprandial nausea and bloating have completely resolved. He reports that for years he had had upper abdominal pain intermittently and that since the procedure \"this is the best I have felt in years\".     He has a history of substance abuse related to alcohol, but says he has abstained for the past several weeks. He denies history of easy bruising or bleeding. He chews tobacco. He has previously had measured vitamin deficiencies in association with his alcohol consumption and for which he takes MVI supplements.    Plan:  We recommend laparoscopic cholecystectomy, and will coordinate with GI for endoscopioc biliary stent removal at the time of surgery. We discussed the risks, benefits, and alternatives to surgery including CBD injury; possible conversion to open cholecystectomy and/or need for subtotal cholecystectomy, possible temporary drain placement. He wants surgery completed as soon as possible \"to be done with this\".    How would you like to obtain your AVS? MyChart  If the video visit is dropped, the invitation should be resent by: Text to cell phone: [phone]  Will anyone else be joining your video visit? No  If patient encounters technical issues they should call [phone]    During this virtual visit the patient is located in [place], patient verifies this as the location during the entirety of this visit.     Video-Visit Details  Video " Start Time: 10:21 AM    Type of service:  Video Visit    Video End Time:10:38 AM    Originating Location (pt. Location): Home    Distant Location (provider location):  On-site    Platform used for Video Visit: [employer]

## 2023-12-13 NOTE — LETTER
"12/13/2023       RE: Getachew Barcenas  [address] [place] [address]     Dear Colleague,    Thank you for referring your patient, Getachew Barcenas, to the [facility] [place] at [facility]. Please see a copy of my visit note below.    Getachew Barcenas is a 53 year old who is being evaluated via a billable video visit.  On 12/8/23 he underwent ERCP with sphincterotomy and stent placement for biliary obstruction and cholecystitis, with successful clearance of his duct. He was discharged from GI on antibiotics and is being referred to general surgery for cholecystectomy. I have reviewed his imaging and confirmed GB wall thickening and pericholecystic fluid consistnt with cholecystitis at the time of his initial presentation last week. Since his ERCP he has completed antibiotics and his RUQ pain, fevers, postprandial nausea and bloating have completely resolved. He reports that for years he had had upper abdominal pain intermittently and that since the procedure \"this is the best I have felt in years\".     He has a history of substance abuse related to alcohol, but says he has abstained for the past several weeks. He denies history of easy bruising or bleeding. He chews tobacco. He has previously had measured vitamin deficiencies in association with his alcohol consumption and for which he takes MVI supplements.    Plan:  We recommend laparoscopic cholecystectomy, and will coordinate with GI for endoscopioc biliary stent removal at the time of surgery. We discussed the risks, benefits, and alternatives to surgery including CBD injury; possible conversion to open cholecystectomy and/or need for subtotal cholecystectomy, possible temporary drain placement. He wants surgery completed as soon as possible \"to be done with this\".    How would you like to obtain your AVS? MyChart  If the video visit is dropped, the invitation should be resent by: Text " to cell phone: [phone]  Will anyone else be joining your video visit? No  If patient encounters technical issues they should call [phone]    During this virtual visit the patient is located in [place], patient verifies this as the location during the entirety of this visit.     Attestation signed by Getachew Torres MD at 12/13/2023  1:46 PM:  I was present throughout this clinic visit. Reviewed the ERCP notes.  Will plan for combined case (deny and ERCP).  Discussed his increased risk due to the inflammation present, stenting and purulence in the bile duct.      Again, thank you for allowing me to participate in the care of your patient.      Sincerely,    TISHA TORRES MD

## 2023-12-19 ENCOUNTER — TELEPHONE (OUTPATIENT)
Dept: SURGERY | Facility: CLINIC | Age: 53
End: 2023-12-19
Payer: COMMERCIAL

## 2023-12-19 NOTE — TELEPHONE ENCOUNTER
Patient is scheduled for surgery with Dr. Getachew Torres, Sebas Torres and Dr. Witt    Spoke with: Dav    Date of Surgery: 1/25/2024    Location: [place]    Informed patient they will need an adult : Yes    Pre op with Provider n/a    H&P: Scheduled with PAC on 1/15/2024 at 8:00am via video    Additional imaging/appointments: n/a    Surgery packet: To be sent via [employer] and the [place] mail     Additional comments: n/a        Brina Sewell on 12/19/2023 at 2:08 PM

## 2023-12-19 NOTE — TELEPHONE ENCOUNTER
Left voicemail for patient regarding scheduling surgery with Dr. Torres.    Provided contact number to discuss.    P: [phone]    __    Brina Sewell, on 12/19/2023 at 10:00 AM

## 2023-12-20 ENCOUNTER — PREP FOR PROCEDURE (OUTPATIENT)
Dept: GASTROENTEROLOGY | Facility: CLINIC | Age: 53
End: 2023-12-20
Payer: COMMERCIAL

## 2023-12-20 NOTE — TELEPHONE ENCOUNTER
FUTURE VISIT INFORMATION      SURGERY INFORMATION:  Date: 1/25/24  Location: [facility]  Surgeon:  Getachew Torres MD Martin, John, MD Freeman, Brian Bravo MD   Anesthesia Type:  general  Procedure: CHOLECYSTECTOMY, LAPAROSCOPIC Endoscopic retrograde cholangiopancreatogram   Consult: virtual visit 12/13/23    RECORDS REQUESTED FROM:        Primary Care Provider: Domonique Alcantara PA - [facility]    Pertinent Medical History: LULI. PVC's    Most recent EKG+ Tracing: 3/17/23    Most recent ECHO: 3/17/23

## 2023-12-22 ENCOUNTER — PREP FOR PROCEDURE (OUTPATIENT)
Dept: GASTROENTEROLOGY | Facility: CLINIC | Age: 53
End: 2023-12-22
Payer: COMMERCIAL

## 2023-12-28 ENCOUNTER — TELEPHONE (OUTPATIENT)
Dept: GASTROENTEROLOGY | Facility: CLINIC | Age: 53
End: 2023-12-28
Payer: COMMERCIAL

## 2023-12-28 NOTE — TELEPHONE ENCOUNTER
Pt calling in - he ended up getting transferred to this writer - he states he was transferred like 8 times so I told him I will get his sent for him.    Pt states he had a ERCP back on 12/8/23 and since that procedure he has been having headaches and Right shoulder pain. He has been taking ibuprofen for this and is it helps some but he states that it is happening more frequently and pain gets more intense.     Pt states he just wants his team to know that as he has a upcoming procedure scheduled with you on 1/25/24.    Message was sent to Dr. Witt and Dr. Torres.    Ciera Alexander, CHARLIE  Endoscopy Procedure Pre Assessment RN  [phone] option 4

## 2024-01-02 ENCOUNTER — PATIENT OUTREACH (OUTPATIENT)
Dept: GASTROENTEROLOGY | Facility: CLINIC | Age: 54
End: 2024-01-02
Payer: COMMERCIAL

## 2024-01-02 NOTE — TELEPHONE ENCOUNTER
"Called patient back related to symptoms noted last week but sent to incorrect Dr Witt.   Left message  Pt called back, says he's had a \"rough 3 days\", upper right shoulder pain is better. Had some diarrhea for the first time in 6 months. Appetite is not good. Balance not \"the best\", having nausea, vomiting yesterday and today. Just taking liquids for now. Starting to feel a little better. Hot and cold flashes, seem to be better now. Has been getting hand cramps, likely due to neuropathy. Is taking multiple vitamins and  vitamin water. Having some headaches over the last few days, getting better now. No temp, but hasn't take it. No belly pain. No jaundice. Symptoms are improving.    Taking Vitamin water, and Sunkist to get some calories/sugar.     Biliary stent placed 12/8/23, plans for deny with ERCP on 12/8/23. Has preop at [facility] on 1/9/24 and visit with PAC for preop on 1/25/24. Encouraged continued gentle diet and to not take vitamins on an empty stomach, or to double up on vitamins without oversight as he's on a multivitamin and several other supplements. Encouraged follow up with PCP as needed on 1/9/24 but stated that he doesn't need that as a preop, encouraged patient to use this visit for vitamin refills or symptom assessment of symptoms if not better. Questions answered. Discussed numerous viruses circulating currently.  No panc bili concerns at this time, pt will monitor his temperature and reach out again with fever or worsened symptoms.    ML  "

## 2024-01-15 ENCOUNTER — PRE VISIT (OUTPATIENT)
Dept: SURGERY | Facility: CLINIC | Age: 54
End: 2024-01-15

## 2024-01-15 ENCOUNTER — VIRTUAL VISIT (OUTPATIENT)
Dept: SURGERY | Facility: CLINIC | Age: 54
End: 2024-01-15
Payer: COMMERCIAL

## 2024-01-15 ENCOUNTER — ANESTHESIA EVENT (OUTPATIENT)
Dept: SURGERY | Facility: CLINIC | Age: 54
End: 2024-01-15
Payer: COMMERCIAL

## 2024-01-15 DIAGNOSIS — Z01.818 PRE-OP EVALUATION: Primary | ICD-10-CM

## 2024-01-15 DIAGNOSIS — K81.0 ACUTE CHOLECYSTITIS: ICD-10-CM

## 2024-01-15 PROCEDURE — 99204 OFFICE O/P NEW MOD 45 MIN: CPT | Mod: 95 | Performed by: NURSE PRACTITIONER

## 2024-01-15 RX ORDER — GABAPENTIN 300 MG/1
300 CAPSULE ORAL 3 TIMES DAILY
COMMUNITY
End: 2024-05-19

## 2024-01-15 RX ORDER — ACAMPROSATE CALCIUM 333 MG/1
333 TABLET, DELAYED RELEASE ORAL PRN
COMMUNITY
End: 2024-05-19

## 2024-01-15 ASSESSMENT — PAIN SCALES - GENERAL: PAINLEVEL: MODERATE PAIN (4)

## 2024-01-15 ASSESSMENT — ENCOUNTER SYMPTOMS: SEIZURES: 0

## 2024-01-15 ASSESSMENT — LIFESTYLE VARIABLES: TOBACCO_USE: 0

## 2024-01-15 NOTE — PATIENT INSTRUCTIONS
Preparing for Your Surgery      Name:  Getachew Barcenas   MRN:  [id number]   :  1970   Today's Date:  1/15/2024       Arriving for surgery:  Surgery date:  24  Arrival time:  5:30 am   Surgery time: 7:30 am    Please come to:     Please come to:      [facility] [address]  [address] [place]  [address]      The [facility] Patient /Visitor Ramp is located at [address]. Patients and visitors who self-park will receive the reduced hospital parking rate. If the Patient /Visitor Ramp is full, please follow the signs to the  parking located at the main hospital entrance.     parking is available ( 24 hours/ 7 days a week)    Discounted parking pass options are available for patients and visitors. They can be purchased at the [employer] desk at the main hospital entrance.    -    Stop at the security desk and they will direct surgery patients to the 3rd floor Surgery Waiting Room. [phone]     -  If you are in need of directions, wheelchair or escort please stop at the Information/security desk in the lobby.       What can I eat or drink?  -  You may eat and drink normally up to 8 hours prior to arrival time. (Until 9:30 pm on 24)  -  You may have clear liquids until 2 hours prior to arrival time. (Until 3:30 am on 24)    Examples of clear liquids:  Water  Clear broth  Juices (apple, white grape, white cranberry  and cider) without pulp  Noncarbonated, powder based beverages  (lemonade and Chip-Aid)  Sodas (Sprite, 7-Up, ginger ale and seltzer)  Coffee or tea (without milk or cream)  Gatorade    -  No Alcohol or cannabis products for at least 24 hours before surgery.     Which medicines can I take?    Hold Ibuprofen (Advil, Motrin) for 1 day(s) before surgery--unless otherwise directed by surgeon.  Hold Naproxen (Aleve) for 4 days before surgery.    -  DO NOT take these medications the day of  surgery:   Potassium    Ferrous sulfate     -  PLEASE TAKE these medications the day of surgery:   Acamprosate (Campral)   Acetaminophen (tylenol) as needed   Gabapentin (Neurontin)   Levothyroxine (synthroid)      How do I prepare myself?  - Please take 2 showers (one the night prior to surgery and one the morning of surgery) using Scrubcare or Hibiclens soap.    Use this soap only from the neck to your toes.     Leave the soap on your skin for one minute--then rinse thoroughly.      You may use your own shampoo and conditioner. No other hair products.   - Please remove all jewelry and body piercings.  - No lotions, deodorants or fragrance.  - Bring your ID and insurance card.    -If you use a CPAP machine, please bring the CPAP machine, tubing, and mask to hospital.    -If you have a Deep Brain Stimulator, Spinal Cord Stimulator, or any Neuro Stimulator device---you must bring the remote control to the hospital.      ALL PATIENTS GOING HOME THE SAME DAY OF SURGERY ARE REQUIRED TO HAVE A RESPONSIBLE ADULT TO DRIVE AND BE IN ATTENDANCE WITH THEM FOR 24 HOURS FOLLOWING SURGERY.    Covid testing policy as of 12/06/2022  Your surgeon will notify and schedule you for a COVID test if one is needed before surgery--please direct any questions or COVID symptoms to your surgeon      Questions or Concerns:    - For any questions regarding the day of surgery or your hospital stay, please contact the Pre Admission Nursing Office at [phone].       - If you have health changes between today and your surgery, please call your surgeon.       - For questions after surgery, please call your surgeons office.           Current Visitor Guidelines    You may have 2 visitors in the pre op area.    Visiting hours: 8 a.m. to 8:30 p.m.    Patients confirmed or suspected to have symptoms of COVID 19 or flu:     No visitors allowed for adult patients.   Children (under age 18) can have 1 named visitor.     People who are sick or showing  symptoms of COVID 19 or flu:    Are not allowed to visit patients--we can only make exceptions in special situations.       Please follow these guidelines for your visit:          Please maintain social distance          Masking is optional--however at times you may be asked to wear a mask for the safety of yourself and others     Clean your hands with alcohol hand . Do this when you arrive at and leave the building and patient room,    And again after you touch your mask or anything in the room.     Go directly to and from the room you are visiting.     Stay in the patient s room during your visit. Limit going to other places in the hospital as much as possible     Leave bags and jackets at home or in the car.     For everyone s health, please don t come and go during your visit. That includes for smoking   during your visit.

## 2024-01-15 NOTE — H&P
Pre-Operative H & P     CC:  Preoperative exam to assess for increased cardiopulmonary risk while undergoing surgery and anesthesia.    Date of Encounter: 1/15/2024  Primary Care Physician:  No Ref-Primary, Physician     Reason for visit:   Encounter Diagnoses   Name Primary?    Pre-op evaluation Yes    Acute cholecystitis        HPI  Getachew Barcenas is a 53 year old male who presents for pre-operative H & P in preparation for  Procedure Information       Case: [id number] Date/Time: 01/25/24 0730    Procedures:       Endoscopic retrograde cholangiopancreatogram (Mouth)      Laparoscopic cholecystectomy (Abdomen)    Anesthesia type: General    Diagnosis: Acute cholecystitis [K81.0]    Pre-op diagnosis: Acute cholecystitis [K81.0]    Location:  OR  /  OR    Providers: Brian Witt MD; Getachew Torres MD          The patient has a h/o biliary obstruction  and cholecystitis.  He is s/p ERCP with biliary sphincterotomy, stone extraction and stent placement on 12/8/23 with Dr. Witt. He was referred to Dr. Torres in general surgery to discuss treatment options for his cholecystitis.  The above procedure has now been scheduled.     The patient presents to the PAC virtually today in preparation for the above scheduled procedure with comorbid conditions including  alcohol use disorder, subdural hematoma, neuropathy LULI, hepatic steatosis, hypothyroid, anemia and h/o thrombocytopenia.      History is obtained from the patient and chart review    Hx of abnormal bleeding or anti-platelet use: denies      Past Medical History  Past Medical History:   Diagnosis Date    Alcohol use disorder, severe, dependence (H) 2/24/2022       Past Surgical History  Past Surgical History:   Procedure Laterality Date    COLONOSCOPY  11/01/2023    ENDOSCOPIC RETROGRADE CHOLANGIOPANCREATOGRAM N/A 12/8/2023    Procedure: ENDOSCOPIC RETROGRADE CHOLANGIOPANCREATOGRAPHY, WITH biliary sphincterotomy, stone extraction, and stent  placement;  Surgeon: Brian Witt MD;  Location: [facility] OR    IR CAROTID CEREBRAL ANGIOGRAM BILATERAL  11/07/2022    PICC TRIPLE LUMEN PLACEMENT  11/03/2022            Prior to Admission Medications  Current Outpatient Medications   Medication Sig Dispense Refill    acamprosate (CAMPRAL) 333 MG EC tablet Take 333 mg by mouth as needed Take up to 6 per day, 2 at a time      acetaminophen (TYLENOL) 500 MG tablet [ACETAMINOPHEN (TYLENOL) 500 MG TABLET] Take 1-2 tablets (500-1,000 mg total) by mouth every 6 (six) hours as needed for pain.  0    Ferrous Sulfate (IRON) 28 MG TABS       gabapentin (NEURONTIN) 300 MG capsule Take 300 mg by mouth 3 times daily      levothyroxine (SYNTHROID/LEVOTHROID) 175 MCG tablet Take 175 mcg by mouth every morning      potassium chloride ER (KLOR-CON M) 20 MEQ CR tablet Take 20 mEq by mouth 2 times daily      traZODone (DESYREL) 50 MG tablet Take 100 mg by mouth nightly as needed for sleep      Cyanocobalamin (B-12) 1000 MCG TBCR Take 1,000 mcg by mouth daily B-6 as well (Patient not taking: Reported on 1/15/2024)      MAGNESIUM-CALCIUM-FOLIC ACID PO  (Patient not taking: Reported on 1/15/2024)      Multiple Vitamin (TAB-A-MARYJO) TABS Take 1 tablet by mouth daily (Patient not taking: Reported on 1/15/2024)      sildenafil (VIAGRA) 25 MG tablet Take 25 mg by mouth daily as needed (Patient not taking: Reported on 1/15/2024)      thiamine (B-1) 100 MG tablet Take 1 tablet by mouth daily (Patient not taking: Reported on 1/15/2024)         Allergies  No Known Allergies    Social History  Social History     Socioeconomic History    Marital status:      Spouse name: Not on file    Number of children: Not on file    Years of education: Not on file    Highest education level: Not on file   Occupational History    Not on file   Tobacco Use    Smoking status: Never    Smokeless tobacco: Current     Types: Chew   Substance and Sexual Activity    Alcohol use: Not Currently    Drug use:  No    Sexual activity: Not on file   Other Topics Concern    Not on file   Social History Narrative    Not on file     Social Determinants of Health     Financial Resource Strain: Not on file   Food Insecurity: Not on file   Transportation Needs: Not on file   Physical Activity: Not on file   Stress: Not on file   Social Connections: Not on file   Interpersonal Safety: Not on file   Housing Stability: Not on file       Family History  No family history on file.    Review of Systems  The complete review of systems is negative other than noted in the HPI or here.   Anesthesia Evaluation   Pt has had prior anesthetic. Type: General and MAC.    History of anesthetic complications (LULI)  - .      ROS/MED HX  ENT/Pulmonary:     (+) sleep apnea, mild, doesn't use CPAP,                                   (-) tobacco use   Neurologic: Comment: - H/O L SDH and subacute R SDH s/p emobolization 11/2022>>intermittent falls and dizziness.         (+)    peripheral neuropathy, - Fingers and feet/toes.                        (-) no seizures, no CVA and no TIA   Cardiovascular: Comment: Denies cardiac symptoms including chest pain, SOB, palpitations, syncope, FUENTES, orthopnea, or PND.        (+)  - -   -  - -                                 Previous cardiac testing  (-) taking anticoagulants/antiplatelets   METS/Exercise Tolerance: 3 - Able to walk 1-2 blocks without stopping    Hematologic: Comments: Anemia    H/o Thrombocytopenia     (+)      anemia, history of blood transfusion, no previous transfusion reaction, Known PRBC Anitbodies:No    (-) history of blood clots   Musculoskeletal: Comment: Left ankle with torn ligaments    Left knee arthroscopy          GI/Hepatic:     (+)          cholecystitis/cholelithiasis,   liver disease (LOO),    (-) GERD   Renal/Genitourinary:  - neg Renal ROS     Endo:     (+)          thyroid problem, hypothyroidism,        (-) Type I DM and Type II DM   Psychiatric/Substance Use:     (+) psychiatric  "history (Insomnia) other (comment) alcohol abuse (Alcohol use disorder>>currently in remission.)   (-) chronic opioid use history   Infectious Disease:  - neg infectious disease ROS     Malignancy:  - neg malignancy ROS     Other:  - neg other ROS          Virtual visit -  No vitals were obtained    Physical Exam  Constitutional: Awake, alert, cooperative, no apparent distress, and appears stated age.  Eyes: Pupils equal  HENT: Normocephalic  Respiratory: non labored breathing   Neurologic: Awake, alert, oriented to name, place and time.   Neuropsychiatric: Calm, cooperative. Normal affect.      Prior Labs/Diagnostic Studies   All labs and imaging personally reviewed   Last Comprehensive Metabolic Panel:  Lab Results   Component Value Date     (L) 12/07/2023    POTASSIUM 3.4 12/07/2023    CHLORIDE 102 12/07/2023    CO2 22 12/07/2023    ANIONGAP 10 12/07/2023     (H) 12/07/2023    BUN 4.6 (L) 12/07/2023    CR 0.74 12/07/2023    GFRESTIMATED >90 12/07/2023    ABDULKADIR 8.4 (L) 12/07/2023         Lab Results   Component Value Date     12/07/2023     Lab Results   Component Value Date     12/07/2023     No results found for: \"BILICONJ\"   Lab Results   Component Value Date    BILITOTAL 4.3 12/07/2023     Lab Results   Component Value Date    ALBUMIN 3.2 12/07/2023    ALBUMIN 3.6 02/22/2022     Lab Results   Component Value Date    PROTTOTAL 6.1 12/07/2023      Lab Results   Component Value Date    ALKPHOS 144 12/07/2023     Lab Results   Component Value Date    WBC 8.5 12/07/2023     Lab Results   Component Value Date    RBC 3.69 12/07/2023     Lab Results   Component Value Date    HGB 12.1 12/07/2023     Lab Results   Component Value Date    HCT 36.0 12/07/2023     Lab Results   Component Value Date    MCV 98 12/07/2023     Lab Results   Component Value Date    MCH 32.8 12/07/2023     Lab Results   Component Value Date    MCHC 33.6 12/07/2023     Lab Results   Component Value Date    RDW 14.1 " 12/07/2023     Lab Results   Component Value Date     12/07/2023           PROCEDURES  Complete Portable Echo Adult.3/20/2023  Interpretation Summary  Left ventricular size, wall motion and function are normal. The ejection  fraction is 60-65%.  The right ventricle is mildly dilated.  The right ventricular systolic function is normal.  There is borderline aortic root enlargement.    EKG 3/2023    Sinus tachycardia with frequent Premature ventricular complexes  Left axis deviation  Incomplete right bundle branch block  Abnormal ECG  When compared with ECG of 04-NOV-2022 04:17,  Premature ventricular complexes are now Present  Vent. rate has increased BY  56 BPM  Incomplete right bundle branch block is now Present  Confirmed by SEE ED PROVIDER NOTE FOR, ECG INTERPRETATION (4000),  WELLINGTON MILLER (2023) on 3/18/2023 3:41:15 PM        HEAD -- Computed Tomography   BRAIN -- --   1/3/2023  Impression    1.  No acute findings.  2.  Large left chronic subdural hematoma is similar in size and density.  3.  Smaller right chronic subdural hematoma has decreased in volume relative to prior.  4.  Left to right midline shift 1 mm.    Carotid Cerebral angiogram 11/7/2022                                                                  Impression:  Successful endovascular embolization of bilateral middle meningeal  arteries using 16% n-BCA glue.     LUIS Ridley  Endovascular Surgical Neuroradiology Fellow  Pager: [phone]     I was present and scrubbed in for the entire procedure     I have personally reviewed the examination and initial interpretation  and I agree with the findings.     HEIDE RAMIREZ MD    The patient's records and results personally reviewed by this provider.     Outside records reviewed from: Care Everywhere      Assessment    Getachew Barcenas is a 53 year old male seen as a PAC referral for risk assessment and optimization for anesthesia.    Plan/Recommendations  Pt will be  optimized for the proposed procedure.  See below for details on the assessment, risk, and preoperative recommendations  OTHER  - patient reports a 4 day h/o flu symptoms including nausea, vomiting, body aches, and chills.  Last 24 hours, patient reports being symptom free and able to keep fluid and select solids down.  Encouraged to notify Dr. Witt and Dr. Torres's team if symptms don't resolve prior to above procedure.     NEUROLOGY  - H/O acute on chronic L SDH and chronic b/l subacute b/l SDH s/p emobolization 11/2022   ~ chronic left SDH   ~ ongoing dizziness with h/o frequent falls   ~ follows with [facility]surgery at the [facility] with serial imaging.     - Peripheral neuropathy   ~ gabapentin and vitamin supplementation including B vitamins.     - RLS     - No history of TIA, CVA or seizure      -Post Op delirium risk factors:  High co-morbid index    ENT  - No current airway concerns.  Will need to be reassessed day of surgery.  Mallampati: Unable to assess  TM: Unable to assess    CARDIAC  - No history of CAD, Hypertension, and Afib    -  Denies cardiac symptoms.    - hypokalemia   ~ continue K+ replacement as prescribed.     - METS (Metabolic Equivalents)~3 without cardiac symptoms    - RCRI-Very low risk: Class 1 0.4% complication rate            Total Score: 0        PULMONARY  - Obstructive Sleep Apnea  LULI without home CPAP use.  LULI Low Risk            Total Score: 2    LULI: Over 50 ys old    LULI: Male      - Asthma, intermittent   ~ Well controlled      - Tobacco History    History   Smoking Status    Never   Smokeless Tobacco    Current    Types: Chew       GI  - PONV Low Risk  Total Score: 1           1 AN PONV: Patient is not a current smoker      - Denies h/o GERD    - LOO   ~ LFTs above     /RENAL  - Baseline Creatinine  see above     - Erectile dysfunction   ~ hold sildenafil per [facility] protocol.     ENDOCRINE      - BMI: Estimated body mass index is 29.97 kg/m  as calculated from  "the following:    Height as of 12/13/23: 1.88 m (6' 2\").    Weight as of 12/13/23: 105.9 kg (233 lb 6.4 oz).  Overweight (BMI 25.0-29.9)    - Thyroid disorder   ~ Continue home replacement while hospitalized.    HEME  VTE Low Risk 0.5%            Total Score: 2    VTE: Male      - No history of abnormal bleeding or antiplatelet use.    - B12 deficit   ~ on B12 replacement    - Chronic iron deficient  anemia   ~ on iron replacement     - h/o previous blood/platelet transfusion without reaction     MSK  - multiple orthopedic injuries from youth athletics    PSYCH  - alcohol use disorder, currently sober   ~ congratulated on sobriety   ~ takes acamprosate as needed for cravings    - insomnia   ~ followed by [facility]    ~ currently using trazodone   ~ reports sleep hygiene improves the longer he abstains form alcohol.     Different anesthesia methods/types have been discussed with the patient, but they are aware that the final plan will be decided by the assigned anesthesia provider on the date of service.    The patient is optimized for their procedure. AVS with information on surgery time/arrival time, meds and NPO status given by nursing staff. No further diagnostic testing indicated.    Please refer to the physical examination documented by the anesthesiologist in the anesthesia record on the day of surgery.    Video-Visit Details    Type of service:  Video Visit    Provider received verbal consent for a Video Visit from the patient? Yes   Video Start Time: 8:00   Video End Time: 8:20    Originating Location (pt. Location): Home    Distant Location (provider location):  Off-site  Mode of Communication:  Video Conference via Humouno  On the day of service:     Prep time: 7 minutes  Visit time: 20 minutes  Documentation time: 20 minutes  ------------------------------------------  Total time: 47 minutes      THALIA Trevizo CNP  Preoperative Assessment Center  [facility]  Clinic and Surgery " [place]  Phone: [phone]  Fax: [phone]

## 2024-01-15 NOTE — PROGRESS NOTES
Dav is a 53 year old who is being evaluated via a billable video visit.      How would you like to obtain your AVS? MyChart  If the video visit is dropped, the invitation should be resent by: Text to cell phone: [phone]    HPI           Review of Systems         Objective    Vitals - Patient Reported  Pain Score: Moderate Pain (4)  Pain Loc: Other - see comment (Neuropathy in feet)        Physical Exam

## 2024-01-24 ASSESSMENT — ENCOUNTER SYMPTOMS: SEIZURES: 0

## 2024-01-24 ASSESSMENT — LIFESTYLE VARIABLES: TOBACCO_USE: 1

## 2024-01-24 NOTE — ANESTHESIA PREPROCEDURE EVALUATION
Anesthesia Pre-Procedure Evaluation    Patient: Getachew Barcenas   MRN: [id number] : 1970        Procedure : Procedure(s):  Endoscopic retrograde cholangiopancreatogram  Laparoscopic cholecystectomy          Past Medical History:   Diagnosis Date    Alcohol use disorder, severe, dependence (H) 2022      Past Surgical History:   Procedure Laterality Date    COLONOSCOPY  2023    ENDOSCOPIC RETROGRADE CHOLANGIOPANCREATOGRAM N/A 2023    Procedure: ENDOSCOPIC RETROGRADE CHOLANGIOPANCREATOGRAPHY, WITH biliary sphincterotomy, stone extraction, and stent placement;  Surgeon: Brian Witt MD;  Location: [facility] OR    IR CAROTID CEREBRAL ANGIOGRAM BILATERAL  2022    PICC TRIPLE LUMEN PLACEMENT  2022           No Known Allergies   Social History     Tobacco Use    Smoking status: Never    Smokeless tobacco: Current     Types: Chew   Substance Use Topics    Alcohol use: Not Currently      Wt Readings from Last 1 Encounters:   23 105.9 kg (233 lb 6.4 oz)        Anesthesia Evaluation   Pt has had prior anesthetic. Type: General and MAC.    History of anesthetic complications (LULI)  - .      ROS/MED HX  ENT/Pulmonary:     (+) sleep apnea, mild, doesn't use CPAP,              tobacco use (chews. Never smoked.),     Intermittent, asthma                  Neurologic: Comment: - H/O L SDH and subacute R SDH s/p emobolization 2022>>intermittent falls and dizziness.         (+)    peripheral neuropathy, - Fingers and feet/toes.                        (-) no seizures, no CVA and no TIA   Cardiovascular: Comment: Denies cardiac symptoms including chest pain, SOB, palpitations, syncope, FUENTES, orthopnea, or PND.        (+)  - -   -  - -                                 Previous cardiac testing   Echo: Date: 3/20/23 Results:  Left ventricular size, wall motion and function are normal. The ejection fraction is 60-65%.  The right ventricle is mildly dilated.  The right ventricular systolic function  is normal.  There is borderline aortic root enlargement.  Stress Test:  Date: Results:    ECG Reviewed:  Date: 3/17/23 Results:  Sinus tachycardia w/ frequent PVC, left axis deviation, incomplete RBBB.   Cath:  Date: Results:   (-) taking anticoagulants/antiplatelets   METS/Exercise Tolerance: 3 - Able to walk 1-2 blocks without stopping    Hematologic: Comments: Anemia    H/o Thrombocytopenia     (+)      anemia, history of blood transfusion, no previous transfusion reaction, Known PRBC Anitbodies:No    (-) history of blood clots   Musculoskeletal: Comment: Left ankle with torn ligaments    Left knee arthroscopy          GI/Hepatic: Comment: ETOH abuse and secondary liver disease.    (+)          cholecystitis/cholelithiasis,   liver disease (LOO),    (-) GERD   Renal/Genitourinary:  - neg Renal ROS     Endo:     (+)          thyroid problem, hypothyroidism,        (-) Type I DM and Type II DM   Psychiatric/Substance Use:     (+) psychiatric history (Insomnia) other (comment) alcohol abuse (Alcohol use disorder>>currently in remission.)   (-) chronic opioid use history   Infectious Disease:  - neg infectious disease ROS     Malignancy:  - neg malignancy ROS     Other:  - neg other ROS          Physical Exam    Airway        Mallampati: II   TM distance: > 3 FB   Neck ROM: full   Mouth opening: > 3 cm    Respiratory Devices and Support         Dental     Comment: Poor dentition    (+) Multiple visibly decayed, broken teeth      Cardiovascular          Rhythm and rate: regular and normal     Pulmonary           breath sounds clear to auscultation           OUTSIDE LABS:  CBC:   Lab Results   Component Value Date    WBC 8.5 12/07/2023    WBC 8.8 12/07/2023    HGB 12.1 (L) 12/07/2023    HGB 12.4 (L) 12/07/2023    HCT 36.0 (L) 12/07/2023    HCT 37.2 (L) 12/07/2023     12/07/2023     12/07/2023     BMP:   Lab Results   Component Value Date     (L) 12/07/2023     12/07/2023    POTASSIUM 3.4  "12/07/2023    POTASSIUM 3.4 12/07/2023    CHLORIDE 102 12/07/2023    CHLORIDE 105 12/07/2023    CO2 22 12/07/2023    CO2 22 12/07/2023    BUN 4.6 (L) 12/07/2023    BUN 5.4 (L) 12/07/2023    CR 0.74 12/07/2023    CR 0.67 12/07/2023     (H) 12/07/2023     (H) 12/07/2023     COAGS:   Lab Results   Component Value Date    PTT 32 11/03/2022    INR 1.07 02/02/2023     POC: No results found for: \"BGM\", \"HCG\", \"HCGS\"  HEPATIC:   Lab Results   Component Value Date    ALBUMIN 3.2 (L) 12/07/2023    PROTTOTAL 6.1 (L) 12/07/2023     (H) 12/07/2023     (H) 12/07/2023    ALKPHOS 144 12/07/2023    BILITOTAL 4.3 (H) 12/07/2023    EVERARDO 23 02/02/2023     OTHER:   Lab Results   Component Value Date    LACT 2.5 (H) 03/18/2023    ABDULKADIR 8.4 (L) 12/07/2023    PHOS 4.6 (H) 03/24/2023    MAG 1.7 03/24/2023    LIPASE 27 12/07/2023    TSH 0.41 03/17/2023    T4 0.81 (L) 11/02/2022    CRP 2.6 02/23/2022    SED 7 03/17/2023       Anesthesia Plan    ASA Status:  3    NPO Status:  Will be NPO Appropriate at ...    Anesthesia Type: General.     - Airway: ETT   Induction: Intravenous.   Maintenance: Balanced.   Techniques and Equipment:     - Lines/Monitors: 2nd IV, BIS     Consents    Anesthesia Plan(s) and associated risks, benefits, and realistic alternatives discussed. Questions answered and patient/representative(s) expressed understanding.     - Discussed:     - Discussed with:  Patient      - Extended Intubation/Ventilatory Support Discussed: No.      - Patient is DNR/DNI Status: No     Use of blood products discussed: No .     Postoperative Care    Pain management: Multi-modal analgesia, IV analgesics, Oral pain medications.   PONV prophylaxis: Ondansetron (or other 5HT-3), Dexamethasone or Solumedrol     Comments:               Antoinette Skelton MD    I have reviewed the pertinent notes and labs in the chart from the past 30 days and (re)examined the patient.  Any updates or changes from those notes are reflected " in this note.

## 2024-01-25 ENCOUNTER — APPOINTMENT (OUTPATIENT)
Dept: GENERAL RADIOLOGY | Facility: CLINIC | Age: 54
End: 2024-01-25
Attending: INTERNAL MEDICINE
Payer: COMMERCIAL

## 2024-01-25 ENCOUNTER — ANESTHESIA (OUTPATIENT)
Dept: SURGERY | Facility: CLINIC | Age: 54
End: 2024-01-25
Payer: COMMERCIAL

## 2024-01-25 ENCOUNTER — HOSPITAL ENCOUNTER (OUTPATIENT)
Facility: CLINIC | Age: 54
Discharge: HOME | End: 2024-01-25
Attending: INTERNAL MEDICINE | Admitting: INTERNAL MEDICINE
Payer: COMMERCIAL

## 2024-01-25 VITALS
HEART RATE: 86 BPM | DIASTOLIC BLOOD PRESSURE: 88 MMHG | RESPIRATION RATE: 14 BRPM | BODY MASS INDEX: 29.45 KG/M2 | HEIGHT: 74 IN | TEMPERATURE: 98.2 F | SYSTOLIC BLOOD PRESSURE: 124 MMHG | OXYGEN SATURATION: 94 % | WEIGHT: 229.5 LBS

## 2024-01-25 DIAGNOSIS — K80.21 GALLSTONES WITH BILIARY OBSTRUCTION: Primary | ICD-10-CM

## 2024-01-25 DIAGNOSIS — G89.18 ACUTE POST-OPERATIVE PAIN: ICD-10-CM

## 2024-01-25 PROCEDURE — 360N000083 HC SURGERY LEVEL 3 W/ FLUORO, PER MIN: Performed by: INTERNAL MEDICINE

## 2024-01-25 PROCEDURE — 255N000002 HC RX 255 OP 636: Performed by: INTERNAL MEDICINE

## 2024-01-25 PROCEDURE — 999N000141 HC STATISTIC PRE-PROCEDURE NURSING ASSESSMENT: Performed by: INTERNAL MEDICINE

## 2024-01-25 PROCEDURE — 999N000181 XR SURGERY CARM FLUORO GREATER THAN 5 MIN W STILLS: Mod: TC

## 2024-01-25 PROCEDURE — 250N000011 HC RX IP 250 OP 636

## 2024-01-25 PROCEDURE — 250N000025 HC SEVOFLURANE, PER MIN: Performed by: INTERNAL MEDICINE

## 2024-01-25 PROCEDURE — 250N000011 HC RX IP 250 OP 636: Performed by: STUDENT IN AN ORGANIZED HEALTH CARE EDUCATION/TRAINING PROGRAM

## 2024-01-25 PROCEDURE — 710N000010 HC RECOVERY PHASE 1, LEVEL 2, PER MIN: Performed by: INTERNAL MEDICINE

## 2024-01-25 PROCEDURE — 272N000001 HC OR GENERAL SUPPLY STERILE: Performed by: INTERNAL MEDICINE

## 2024-01-25 PROCEDURE — 250N000009 HC RX 250

## 2024-01-25 PROCEDURE — 47562 LAPAROSCOPIC CHOLECYSTECTOMY: CPT | Mod: GC | Performed by: SURGERY

## 2024-01-25 PROCEDURE — C1726 CATH, BAL DIL, NON-VASCULAR: HCPCS | Performed by: INTERNAL MEDICINE

## 2024-01-25 PROCEDURE — 250N000009 HC RX 250: Performed by: STUDENT IN AN ORGANIZED HEALTH CARE EDUCATION/TRAINING PROGRAM

## 2024-01-25 PROCEDURE — 710N000012 HC RECOVERY PHASE 2, PER MINUTE: Performed by: INTERNAL MEDICINE

## 2024-01-25 PROCEDURE — 258N000003 HC RX IP 258 OP 636

## 2024-01-25 PROCEDURE — 370N000017 HC ANESTHESIA TECHNICAL FEE, PER MIN: Performed by: INTERNAL MEDICINE

## 2024-01-25 PROCEDURE — 250N000009 HC RX 250: Performed by: SURGERY

## 2024-01-25 PROCEDURE — 250N000013 HC RX MED GY IP 250 OP 250 PS 637

## 2024-01-25 PROCEDURE — C1769 GUIDE WIRE: HCPCS | Performed by: INTERNAL MEDICINE

## 2024-01-25 PROCEDURE — 88304 TISSUE EXAM BY PATHOLOGIST: CPT | Mod: TC | Performed by: SURGERY

## 2024-01-25 PROCEDURE — 88304 TISSUE EXAM BY PATHOLOGIST: CPT | Mod: 26 | Performed by: PATHOLOGY

## 2024-01-25 RX ORDER — LIDOCAINE HYDROCHLORIDE 20 MG/ML
INJECTION, SOLUTION INFILTRATION; PERINEURAL PRN
Status: DISCONTINUED | OUTPATIENT
Start: 2024-01-25 | End: 2024-01-25

## 2024-01-25 RX ORDER — FENTANYL CITRATE 50 UG/ML
INJECTION, SOLUTION INTRAMUSCULAR; INTRAVENOUS PRN
Status: DISCONTINUED | OUTPATIENT
Start: 2024-01-25 | End: 2024-01-25

## 2024-01-25 RX ORDER — ACETAMINOPHEN 325 MG/1
975 TABLET ORAL ONCE
Status: DISCONTINUED | OUTPATIENT
Start: 2024-01-25 | End: 2024-01-25 | Stop reason: HOSPADM

## 2024-01-25 RX ORDER — IOPAMIDOL 510 MG/ML
INJECTION, SOLUTION INTRAVASCULAR PRN
Status: DISCONTINUED | OUTPATIENT
Start: 2024-01-25 | End: 2024-01-25 | Stop reason: HOSPADM

## 2024-01-25 RX ORDER — CEFAZOLIN SODIUM/WATER 2 G/20 ML
2 SYRINGE (ML) INTRAVENOUS
Status: COMPLETED | OUTPATIENT
Start: 2024-01-25 | End: 2024-01-25

## 2024-01-25 RX ORDER — SODIUM CHLORIDE, SODIUM LACTATE, POTASSIUM CHLORIDE, CALCIUM CHLORIDE 600; 310; 30; 20 MG/100ML; MG/100ML; MG/100ML; MG/100ML
INJECTION, SOLUTION INTRAVENOUS CONTINUOUS
Status: DISCONTINUED | OUTPATIENT
Start: 2024-01-25 | End: 2024-01-25 | Stop reason: HOSPADM

## 2024-01-25 RX ORDER — SODIUM CHLORIDE, SODIUM LACTATE, POTASSIUM CHLORIDE, CALCIUM CHLORIDE 600; 310; 30; 20 MG/100ML; MG/100ML; MG/100ML; MG/100ML
INJECTION, SOLUTION INTRAVENOUS CONTINUOUS PRN
Status: DISCONTINUED | OUTPATIENT
Start: 2024-01-25 | End: 2024-01-25

## 2024-01-25 RX ORDER — FENTANYL CITRATE 50 UG/ML
25 INJECTION, SOLUTION INTRAMUSCULAR; INTRAVENOUS EVERY 5 MIN PRN
Status: DISCONTINUED | OUTPATIENT
Start: 2024-01-25 | End: 2024-01-25 | Stop reason: HOSPADM

## 2024-01-25 RX ORDER — AMOXICILLIN 250 MG
1-2 CAPSULE ORAL 2 TIMES DAILY
Qty: 30 TABLET | Refills: 0 | Status: SHIPPED | OUTPATIENT
Start: 2024-01-25 | End: 2024-05-19

## 2024-01-25 RX ORDER — ONDANSETRON 2 MG/ML
INJECTION INTRAMUSCULAR; INTRAVENOUS PRN
Status: DISCONTINUED | OUTPATIENT
Start: 2024-01-25 | End: 2024-01-25

## 2024-01-25 RX ORDER — OXYCODONE HYDROCHLORIDE 5 MG/1
5 TABLET ORAL EVERY 6 HOURS PRN
Qty: 12 TABLET | Refills: 0 | Status: SHIPPED | OUTPATIENT
Start: 2024-01-25 | End: 2024-01-28

## 2024-01-25 RX ORDER — BUPIVACAINE HYDROCHLORIDE AND EPINEPHRINE 2.5; 5 MG/ML; UG/ML
INJECTION, SOLUTION EPIDURAL; INFILTRATION; INTRACAUDAL; PERINEURAL PRN
Status: DISCONTINUED | OUTPATIENT
Start: 2024-01-25 | End: 2024-01-25 | Stop reason: HOSPADM

## 2024-01-25 RX ORDER — HALOPERIDOL 5 MG/ML
1 INJECTION INTRAMUSCULAR ONCE
Status: DISCONTINUED | OUTPATIENT
Start: 2024-01-25 | End: 2024-01-25 | Stop reason: HOSPADM

## 2024-01-25 RX ORDER — OXYCODONE HYDROCHLORIDE 10 MG/1
10 TABLET ORAL
Status: COMPLETED | OUTPATIENT
Start: 2024-01-25 | End: 2024-01-25

## 2024-01-25 RX ORDER — DEXAMETHASONE SODIUM PHOSPHATE 4 MG/ML
INJECTION, SOLUTION INTRA-ARTICULAR; INTRALESIONAL; INTRAMUSCULAR; INTRAVENOUS; SOFT TISSUE PRN
Status: DISCONTINUED | OUTPATIENT
Start: 2024-01-25 | End: 2024-01-25

## 2024-01-25 RX ORDER — INDOCYANINE GREEN AND WATER 25 MG
2.5 KIT INJECTION ONCE
Status: COMPLETED | OUTPATIENT
Start: 2024-01-25 | End: 2024-01-25

## 2024-01-25 RX ORDER — PROPOFOL 10 MG/ML
INJECTION, EMULSION INTRAVENOUS PRN
Status: DISCONTINUED | OUTPATIENT
Start: 2024-01-25 | End: 2024-01-25

## 2024-01-25 RX ORDER — ONDANSETRON 4 MG/1
4 TABLET, ORALLY DISINTEGRATING ORAL EVERY 30 MIN PRN
Status: CANCELLED | OUTPATIENT
Start: 2024-01-25

## 2024-01-25 RX ORDER — CEFAZOLIN SODIUM/WATER 2 G/20 ML
2 SYRINGE (ML) INTRAVENOUS SEE ADMIN INSTRUCTIONS
Status: DISCONTINUED | OUTPATIENT
Start: 2024-01-25 | End: 2024-01-25 | Stop reason: HOSPADM

## 2024-01-25 RX ORDER — HYDROMORPHONE HCL IN WATER/PF 6 MG/30 ML
0.4 PATIENT CONTROLLED ANALGESIA SYRINGE INTRAVENOUS EVERY 5 MIN PRN
Status: DISCONTINUED | OUTPATIENT
Start: 2024-01-25 | End: 2024-01-25 | Stop reason: HOSPADM

## 2024-01-25 RX ORDER — FENTANYL CITRATE 50 UG/ML
50 INJECTION, SOLUTION INTRAMUSCULAR; INTRAVENOUS EVERY 5 MIN PRN
Status: DISCONTINUED | OUTPATIENT
Start: 2024-01-25 | End: 2024-01-25 | Stop reason: HOSPADM

## 2024-01-25 RX ORDER — OXYCODONE HYDROCHLORIDE 5 MG/1
5 TABLET ORAL ONCE
Status: DISCONTINUED | OUTPATIENT
Start: 2024-01-25 | End: 2024-01-25 | Stop reason: HOSPADM

## 2024-01-25 RX ORDER — HYDROMORPHONE HCL IN WATER/PF 6 MG/30 ML
0.2 PATIENT CONTROLLED ANALGESIA SYRINGE INTRAVENOUS EVERY 5 MIN PRN
Status: DISCONTINUED | OUTPATIENT
Start: 2024-01-25 | End: 2024-01-25 | Stop reason: HOSPADM

## 2024-01-25 RX ORDER — OXYCODONE HYDROCHLORIDE 5 MG/1
5 TABLET ORAL
Status: DISCONTINUED | OUTPATIENT
Start: 2024-01-25 | End: 2024-01-25 | Stop reason: HOSPADM

## 2024-01-25 RX ORDER — LIDOCAINE 40 MG/G
CREAM TOPICAL
Status: DISCONTINUED | OUTPATIENT
Start: 2024-01-25 | End: 2024-01-25 | Stop reason: HOSPADM

## 2024-01-25 RX ORDER — ONDANSETRON 2 MG/ML
4 INJECTION INTRAMUSCULAR; INTRAVENOUS EVERY 30 MIN PRN
Status: DISCONTINUED | OUTPATIENT
Start: 2024-01-25 | End: 2024-01-25 | Stop reason: HOSPADM

## 2024-01-25 RX ORDER — ONDANSETRON 2 MG/ML
4 INJECTION INTRAMUSCULAR; INTRAVENOUS EVERY 30 MIN PRN
Status: CANCELLED | OUTPATIENT
Start: 2024-01-25

## 2024-01-25 RX ORDER — ONDANSETRON 4 MG/1
4 TABLET, ORALLY DISINTEGRATING ORAL EVERY 30 MIN PRN
Status: DISCONTINUED | OUTPATIENT
Start: 2024-01-25 | End: 2024-01-25 | Stop reason: HOSPADM

## 2024-01-25 RX ADMIN — MIDAZOLAM 2 MG: 1 INJECTION INTRAMUSCULAR; INTRAVENOUS at 07:29

## 2024-01-25 RX ADMIN — PHENYLEPHRINE HYDROCHLORIDE 300 MCG: 10 INJECTION INTRAVENOUS at 09:10

## 2024-01-25 RX ADMIN — Medication 20 MG: at 10:29

## 2024-01-25 RX ADMIN — PHENYLEPHRINE HYDROCHLORIDE 100 MCG: 10 INJECTION INTRAVENOUS at 08:15

## 2024-01-25 RX ADMIN — ONDANSETRON 4 MG: 2 INJECTION INTRAMUSCULAR; INTRAVENOUS at 11:04

## 2024-01-25 RX ADMIN — SUGAMMADEX 400 MG: 100 INJECTION, SOLUTION INTRAVENOUS at 11:07

## 2024-01-25 RX ADMIN — SODIUM CHLORIDE, POTASSIUM CHLORIDE, SODIUM LACTATE AND CALCIUM CHLORIDE: 600; 310; 30; 20 INJECTION, SOLUTION INTRAVENOUS at 08:15

## 2024-01-25 RX ADMIN — PROPOFOL 200 MG: 10 INJECTION, EMULSION INTRAVENOUS at 07:43

## 2024-01-25 RX ADMIN — FENTANYL CITRATE 50 MCG: 50 INJECTION INTRAMUSCULAR; INTRAVENOUS at 09:42

## 2024-01-25 RX ADMIN — SODIUM CHLORIDE, POTASSIUM CHLORIDE, SODIUM LACTATE AND CALCIUM CHLORIDE: 600; 310; 30; 20 INJECTION, SOLUTION INTRAVENOUS at 07:10

## 2024-01-25 RX ADMIN — MIDAZOLAM 2 MG: 1 INJECTION INTRAMUSCULAR; INTRAVENOUS at 08:04

## 2024-01-25 RX ADMIN — PHENYLEPHRINE HYDROCHLORIDE 100 MCG: 10 INJECTION INTRAVENOUS at 10:37

## 2024-01-25 RX ADMIN — SODIUM CHLORIDE, POTASSIUM CHLORIDE, SODIUM LACTATE AND CALCIUM CHLORIDE: 600; 310; 30; 20 INJECTION, SOLUTION INTRAVENOUS at 10:19

## 2024-01-25 RX ADMIN — PROCHLORPERAZINE EDISYLATE 5 MG: 5 INJECTION INTRAMUSCULAR; INTRAVENOUS at 11:45

## 2024-01-25 RX ADMIN — FENTANYL CITRATE 100 MCG: 50 INJECTION INTRAMUSCULAR; INTRAVENOUS at 08:04

## 2024-01-25 RX ADMIN — OXYCODONE HYDROCHLORIDE 10 MG: 5 TABLET ORAL at 13:32

## 2024-01-25 RX ADMIN — PROPOFOL 50 MG: 10 INJECTION, EMULSION INTRAVENOUS at 07:49

## 2024-01-25 RX ADMIN — DEXMEDETOMIDINE HYDROCHLORIDE 0.2 MCG/KG/HR: 100 INJECTION, SOLUTION INTRAVENOUS at 10:41

## 2024-01-25 RX ADMIN — DEXMEDETOMIDINE HYDROCHLORIDE 8 MCG: 100 INJECTION, SOLUTION INTRAVENOUS at 10:05

## 2024-01-25 RX ADMIN — Medication 20 MG: at 10:02

## 2024-01-25 RX ADMIN — Medication 30 MG: at 08:53

## 2024-01-25 RX ADMIN — LIDOCAINE HYDROCHLORIDE 100 MG: 20 INJECTION, SOLUTION INFILTRATION; PERINEURAL at 07:43

## 2024-01-25 RX ADMIN — DEXMEDETOMIDINE HYDROCHLORIDE 8 MCG: 100 INJECTION, SOLUTION INTRAVENOUS at 10:42

## 2024-01-25 RX ADMIN — FENTANYL CITRATE 50 MCG: 50 INJECTION, SOLUTION INTRAMUSCULAR; INTRAVENOUS at 12:33

## 2024-01-25 RX ADMIN — Medication 50 MG: at 07:45

## 2024-01-25 RX ADMIN — PROPOFOL 100 MG: 10 INJECTION, EMULSION INTRAVENOUS at 07:47

## 2024-01-25 RX ADMIN — FENTANYL CITRATE 50 MCG: 50 INJECTION, SOLUTION INTRAMUSCULAR; INTRAVENOUS at 11:47

## 2024-01-25 RX ADMIN — FENTANYL CITRATE 100 MCG: 50 INJECTION INTRAMUSCULAR; INTRAVENOUS at 07:43

## 2024-01-25 RX ADMIN — Medication 2 G: at 08:00

## 2024-01-25 RX ADMIN — PHENYLEPHRINE HYDROCHLORIDE 100 MCG: 10 INJECTION INTRAVENOUS at 10:29

## 2024-01-25 RX ADMIN — PHENYLEPHRINE HYDROCHLORIDE 200 MCG: 10 INJECTION INTRAVENOUS at 09:08

## 2024-01-25 RX ADMIN — DEXMEDETOMIDINE HYDROCHLORIDE 4 MCG: 100 INJECTION, SOLUTION INTRAVENOUS at 10:09

## 2024-01-25 RX ADMIN — FENTANYL CITRATE 50 MCG: 50 INJECTION INTRAMUSCULAR; INTRAVENOUS at 11:23

## 2024-01-25 RX ADMIN — FENTANYL CITRATE 50 MCG: 50 INJECTION, SOLUTION INTRAMUSCULAR; INTRAVENOUS at 12:03

## 2024-01-25 RX ADMIN — SODIUM CHLORIDE, POTASSIUM CHLORIDE, SODIUM LACTATE AND CALCIUM CHLORIDE: 600; 310; 30; 20 INJECTION, SOLUTION INTRAVENOUS at 12:13

## 2024-01-25 RX ADMIN — DEXAMETHASONE SODIUM PHOSPHATE 8 MG: 4 INJECTION, SOLUTION INTRA-ARTICULAR; INTRALESIONAL; INTRAMUSCULAR; INTRAVENOUS; SOFT TISSUE at 08:10

## 2024-01-25 RX ADMIN — PHENYLEPHRINE HYDROCHLORIDE 100 MCG: 10 INJECTION INTRAVENOUS at 10:17

## 2024-01-25 RX ADMIN — FENTANYL CITRATE 50 MCG: 50 INJECTION INTRAMUSCULAR; INTRAVENOUS at 09:05

## 2024-01-25 RX ADMIN — INDOCYANINE GREEN AND WATER 2.5 MG: KIT at 07:10

## 2024-01-25 RX ADMIN — FENTANYL CITRATE 50 MCG: 50 INJECTION INTRAMUSCULAR; INTRAVENOUS at 09:56

## 2024-01-25 ASSESSMENT — ACTIVITIES OF DAILY LIVING (ADL)
ADLS_ACUITY_SCORE: 33

## 2024-01-25 NOTE — DISCHARGE INSTRUCTIONS
Gallbladder Removal Surgery: What to Expect at Home  Your Recovery  After your surgery, you will likely feel weak and tired for several days after you return home. Your belly may be swollen. If you had laparoscopic surgery, it's normal to also have some shoulder pain. This is caused by the air that your doctor put in your belly to help see your organs better.  You may have gas or need to burp a lot at first. A few people get diarrhea. The diarrhea usually goes away in 2 to 4 weeks, but it may last longer.  How quickly you recover depends on whether you had a laparoscopic or open surgery.  For a laparoscopic surgery, most people can go back to work or their normal routine in 1 to 2 weeks. But it may take longer, depending on the type of work you do.  For an open surgery, it will probably take 4 to 6 weeks before you get back to your normal routine.  This care sheet gives you a general idea about how long it will take for you to recover. However, each person recovers at a different pace. Follow the steps below to get better as quickly as possible.  How can you care for yourself at home?  Activity    Rest when you feel tired. Getting enough sleep will help you recover.     Try to walk each day. Start out by walking a little more than you did the day before. Walking helps prevent blood clots in your legs and pneumonia.     For about 2 to 4 weeks, avoid lifting anything that would make you strain. This may include a child, heavy grocery bags and milk containers, a heavy briefcase or backpack, cat litter or dog food bags, or a vacuum .     Avoid strenuous activities, such as biking, jogging, weightlifting, and aerobic exercise, until your doctor says it is okay.     Ask your doctor when you can drive again.     For a laparoscopic surgery, most people can go back to work or their normal routine in 1 to 2 weeks, but it may take longer. For an open surgery, it will probably take 4 to 6 weeks before you get back to  your normal routine.     Your doctor will tell you when you can have sex again.   Diet    When you feel like eating, start with small amounts of food. You may want to avoid fatty foods like fried foods or cheese for a while. They can cause symptoms, such as diarrhea or bloating.     Drink plenty of fluids (unless your doctor tells you not to).     You may notice that your bowel movements are not regular right after your surgery. This is common. Try to avoid constipation and straining with bowel movements. You may want to take a fiber supplement every day. If you have not had a bowel movement after a couple of days, ask your doctor about taking a mild laxative.   Medicines    Your doctor will tell you if and when you can restart your medicines. You will also be given instructions about taking any new medicines.     If you stopped taking aspirin or some other blood thinner, your doctor will tell you when to start taking it again.     Be safe with medicines. Read and follow all instructions on the label.  If the doctor gave you a prescription medicine for pain, take it as prescribed.  If you are not taking a prescription pain medicine, ask your doctor if you can take an over-the-counter medicine.  Do not take two or more pain medicines at the same time unless the doctor told you to. Many pain medicines contain acetaminophen, which is Tylenol. Too much Tylenol can be harmful.     If you think your pain medicine is making you sick to your stomach:  Take your medicine after meals (unless your doctor tells you not to).  Ask your doctor for a different pain medicine.     If your doctor prescribed antibiotics, take them as directed. Do not stop taking them just because you feel better. You need to take the full course of antibiotics.   Incision care    If you have strips of tape on the cut (incision) the doctor made, leave the tape on for a week or until it falls off.     You may shower 24 to 48 hours after surgery, if your  doctor okays it. Pat the incision dry. Do not take a bath for the first 2 weeks, or until your doctor tells you it's okay.     You may have staples to hold the cut together. Keep them dry until your doctor takes them out. This is usually in 7 to 10 days.     Keep the area clean and dry. You may cover it with a gauze bandage if it oozes fluid or rubs against clothing. Change the bandage every day.   Ice    To reduce swelling and pain, put ice or a cold pack on your belly for 10 to 20 minutes at a time. Do this every 1 to 2 hours. Put a thin cloth between the ice and your skin.   Follow-up care is a key part of your treatment and safety. Be sure to make and go to all appointments, and call your doctor if you are having problems. It's also a good idea to know your test results and keep a list of the medicines you take.  When should you call for help?   Call 911 anytime you think you may need emergency care. For example, call if:    You passed out (lost consciousness).     You are short of breath.   Call your doctor now or seek immediate medical care if:    You are sick to your stomach and cannot drink fluids.     You have pain that does not get better when you take your pain medicine.     You cannot pass stools or gas.     You have signs of infection, such as:  Increased pain, swelling, warmth, or redness.  Red streaks leading from the incision.  Pus draining from the incision.  A fever.     Bright red blood has soaked through the bandage over your incision.     You have loose stitches, or your incision comes open.     You have signs of a blood clot in your leg (called a deep vein thrombosis), such as:  Pain in your calf, back of knee, thigh, or groin.  Redness and swelling in your leg or groin.   Watch closely for any changes in your health, and be sure to contact your doctor if you have any problems.  Where can you learn more?  Go to https://www.healthwise.net/patiented  Enter F357 in the search box to learn more  "about \"Gallbladder Removal Surgery: What to Expect at Home.\"  Current as of: November 29, 2022               Content Version: 13.8    3525-1949 [email or web address].   Care instructions adapted under license by your healthcare professional. If you have questions about a medical condition or this instruction, always ask your healthcare professional. [email or web address] disclaims any warranty or liability for your use of this information.    Contacting your Doctor -   To contact a doctor, call Dr Witt's clinic at [phone] or [phone] and ask for the resident on call for Gastroenterology (answered 24 hours a day)   Emergency Department:  [place]: [phone]  [place]: [phone] 911 if you are in need of immediate or emergent help  "

## 2024-01-25 NOTE — ANESTHESIA PROCEDURE NOTES
Airway       Patient location during procedure: OR       Procedure Start/Stop Times: 1/25/2024 7:48 AM  Staff -        Anesthesiologist:  Azra Johnson MD       Resident/Fellow: Antoinette Skelton MD       Performed By: resident  Consent for Airway        Urgency: elective  Indications and Patient Condition       Indications for airway management: franck-procedural       Induction type:intravenous       Mask difficulty assessment: 1 - vent by mask    Final Airway Details       Final airway type: endotracheal airway       Successful airway: ETT - single and Oral  Endotracheal Airway Details        ETT size (mm): 8.0       Cuffed: yes       Successful intubation technique: video laryngoscopy       VL Blade Size: MAC 4       Grade View of Cords: 1       Adjucts: stylet       Position: Right       Measured from: gums/teeth       Secured at (cm): 22       Bite block used: None    Post intubation assessment        Number of attempts at approach: 1       Number of other approaches attempted: 0       Secured with: tape       Ease of procedure: easy       Dentition: Intact and Unchanged    Medication(s) Administered   Medication Administration Time: 1/25/2024 7:48 AM

## 2024-01-25 NOTE — ANESTHESIA POSTPROCEDURE EVALUATION
Patient: Getachew Barcenas    Procedure: Procedure(s):  Endoscopic retrograde cholangiopancreatography with biliary stent removal and debris removal  Laparoscopic cholecystectomy       Anesthesia Type:  General    Note:  Disposition: Outpatient   Postop Pain Control: Uneventful            Sign Out: Well controlled pain   PONV: No   Neuro/Psych: Uneventful            Sign Out: Acceptable/Baseline neuro status   Airway/Respiratory: Uneventful            Sign Out: Acceptable/Baseline resp. status   CV/Hemodynamics: Uneventful            Sign Out: Acceptable CV status; No obvious hypovolemia; No obvious fluid overload   Other NRE: NONE   DID A NON-ROUTINE EVENT OCCUR? No           Last vitals:  Vitals Value Taken Time   /90 01/25/24 1130   Temp 36.7  C (98.06  F) 01/25/24 1130   Pulse 94 01/25/24 1130   Resp     SpO2 96 % 01/25/24 1130   Vitals shown include unfiled device data.    Electronically Signed By: Kar Burgos MD  January 25, 2024  11:31 AM

## 2024-01-25 NOTE — OR NURSING
Daughter James here with her father Dav. She had planned to stay, transport him home, and stay with him for 24 hours. However, the closing on her new home was rescheduled for today at 1pm. There is a possibility that the patient's father could pick the patient up from phase 2. The daughter, James will contact him. If the patient's father is unable to pick the patient up, James will come after the closing and pick him up this afternoon.Both phone numbers are in the chart.

## 2024-01-25 NOTE — ANESTHESIA CARE TRANSFER NOTE
Patient: Getachew Barcenas    Procedure: Procedure(s):  Endoscopic retrograde cholangiopancreatography with biliary stent removal and debris removal  Laparoscopic cholecystectomy       Diagnosis: Acute cholecystitis [K81.0]  Diagnosis Additional Information: No value filed.    Anesthesia Type:   General     Note:    Oropharynx: oropharynx clear of all foreign objects and spontaneously breathing  Level of Consciousness: awake and drowsy  Oxygen Supplementation: face mask  Level of Supplemental Oxygen (L/min / FiO2): 6  Independent Airway: airway patency satisfactory and stable  Dentition: dentition unchanged  Vital Signs Stable: post-procedure vital signs reviewed and stable  Report to RN Given: handoff report given  Patient transferred to: PACU    Handoff Report: Identifed the Patient, Identified the Reponsible Provider, Reviewed the pertinent medical history, Discussed the surgical course, Reviewed Intra-OP anesthesia mangement and issues during anesthesia, Set expectations for post-procedure period and Allowed opportunity for questions and acknowledgement of understanding      Vitals:  Vitals Value Taken Time   /90 01/25/24 1130   Temp 36.6  C (97.88  F) 01/25/24 1131   Pulse 94 01/25/24 1131   Resp     SpO2 96 % 01/25/24 1131   Vitals shown include unfiled device data.    Electronically Signed By: Antoinette Skelton MD  January 25, 2024  11:32 AM

## 2024-01-25 NOTE — BRIEF OP NOTE
[facility]    Brief Operative Note    Pre-operative diagnosis: Acute cholecystitis [K81.0]  Post-operative diagnosis Same as pre-operative diagnosis    Procedure: Endoscopic retrograde cholangiopancreatography with biliary stent removal and debris removal, N/A - Mouth  Laparoscopic cholecystectomy, N/A - Abdomen    Surgeon: Surgeon(s) and Role:  Panel 1:     * Brian Witt MD - Primary     * Lukas Zapata MD - Fellow - Assisting  Panel 2:     * Getachew Torres MD - Primary     * Sebas Torres MD - Assisting  Anesthesia: General   Estimated Blood Loss: None    Drains: None  Specimens: * No specimens in log *  Findings:   None.  Complications: None.  Implants:   Implant Name Type Inv. Item Serial No.  Lot No. LRB No. Used Action   STENT [employer] ([place] SANDRA SOF-FLEX [id number] - [id number] Stent STENT [employer] SANDRA SOF-FLEX [id number]  [facility] [id number] N/A 1 Explanted       Findings: Biliary stent seen on  film. 10Fr x 9cm SF stent seen emerging from the major papilla, removed with a snare.  Bile duct swept with an 11.5mm balloon with removal of pus and debris.  Occlusion cholangiogram demonstrated low cystic duct takeoff.      Recommendations:  -proceed with cholecystectomy

## 2024-01-26 LAB — ERCP: NORMAL

## 2024-01-29 ENCOUNTER — PATIENT OUTREACH (OUTPATIENT)
Dept: SURGERY | Facility: CLINIC | Age: 54
End: 2024-01-29
Payer: COMMERCIAL

## 2024-01-29 NOTE — PROGRESS NOTES
RN Post-Op/Post-Discharge Care Coordination Note    Mr. Getachew Barcenas is a 53 year old male who underwent laparoscopic cholecystectomy on 1/25 with  Dr. Getachew Torres. Spoke with Patient.    Support  Patient able to care for self independently.     Health Status  Nausea/Vomiting: Patient reports nausea and vomiting.  Eating/drinking: Patient is able to eat and drink without any complaints.  Bowel habits: Patient reports no bowel movement since surgery. Is passing gas. Taking Senokot per bottle instructions among other OTC laxatives he has at home. Advised on use of OTC Fleets Enema per package instructions. He will have his daughter get these and try them at home.   Fevers/chills: Patient denies any fever or chills.  Incisions: Patient denies any signs and symptoms of infection..  Wound closure: Skin Sealant  Pain: had severe pain yesterday, began taking OTC Tylenol per package instructions which is working better for him. Also discussed use of ice or heat for 20min intervals protecting skin from injury. He has been having nightmares and trouble sleeping since taking oxycodone, advised to try to not take this medication if possible.   New Medications:  oxycodone, Senokot S    Activity/Restrictions  No lifting in excess of 15-20 pounds for 3-4 weeks    Equipment  None    Pathology reviewed with patient:  No, not yet available    Forms/Letters  No    All of his questions were answered including reviewing restrictions and wound care. He will call this office if he has any further questions and/or concerns.      In lieu of a post-op clinic patient that patient would like to be contacted in approximately 7-10 days via telephone.    A copy of this note was routed to the primary surgeon.      Whom and When to Call  Patient acknowledges understanding of how to manage any medication changes and when to seek medical care.     Patient advised that if after hour medical concerns arise to please call [phone] and choose  option 4 to speak to the physician on call.

## 2024-01-30 LAB
PATH REPORT.COMMENTS IMP SPEC: NORMAL
PATH REPORT.COMMENTS IMP SPEC: NORMAL
PATH REPORT.FINAL DX SPEC: NORMAL
PATH REPORT.GROSS SPEC: NORMAL
PATH REPORT.MICROSCOPIC SPEC OTHER STN: NORMAL
PATH REPORT.RELEVANT HX SPEC: NORMAL
PHOTO IMAGE: NORMAL

## 2024-02-06 ENCOUNTER — PATIENT OUTREACH (OUTPATIENT)
Dept: SURGERY | Facility: CLINIC | Age: 54
End: 2024-02-06
Payer: COMMERCIAL

## 2024-02-06 NOTE — PROGRESS NOTES
02/06/24    11:07 AM     Getachew Barcenas was contacted several days post procedure via telephone for a status update and elected to have a telephone follow -up call approximately 7-10 days after original contact in lieu of a clinic visit with Dr. Getachew Torres.  He continues to do well and the skin glue  has peeled off.  The patients wounds are healed and the area is C/D/I.  He is afebrile, pain free (reports that the first 4 days were rough), and carla po; the patient is slowly resuming his normal activity.   Discussed restrictions including no lifting in excess of 15-20 pounds for 3 weeks.    Oxycodone added to allergy list as it caused hallucinations.     Pathology was reviewed with the patient: Yes    All of Getachew Barcenas question's were answered and  he would like to return to the clinic on a PRN basis.  The patient is aware that  he may schedule a post op appointment at any time.    A copy of this note was routed to the patients surgeon.

## 2024-02-06 NOTE — PROGRESS NOTES
02/06/24    9:25 AM      Getachew MICHAEL Swapna is a patient of Dr. Getachew Torres that recently underwent a surgical procedure (laparoscopic cholecystectomy).  The patient was contacted via telephone approximately 7-10 days ago for a status update and post-op teaching.  In lieu of a clinic visit, the patient requested to be contacted at a later date by an RN for an assessment.    Attempted to contact patient via telephone for a status update.  LM on VM to call office.    Pathology:  Final Diagnosis   A. GALLBLADDER, CHOLECYSTECTOMY:  - Chronic cholecystitis with cholelithiasis

## 2024-05-19 ENCOUNTER — APPOINTMENT (OUTPATIENT)
Dept: CT IMAGING | Facility: CLINIC | Age: 54
End: 2024-05-19
Attending: FAMILY MEDICINE
Payer: COMMERCIAL

## 2024-05-19 ENCOUNTER — APPOINTMENT (OUTPATIENT)
Dept: GENERAL RADIOLOGY | Facility: CLINIC | Age: 54
End: 2024-05-19
Attending: FAMILY MEDICINE
Payer: COMMERCIAL

## 2024-05-19 ENCOUNTER — HOSPITAL ENCOUNTER (INPATIENT)
Facility: CLINIC | Age: 54
LOS: 6 days | Discharge: HOME | End: 2024-05-26
Attending: FAMILY MEDICINE | Admitting: INTERNAL MEDICINE
Payer: COMMERCIAL

## 2024-05-19 DIAGNOSIS — R00.0 TACHYCARDIA: Primary | ICD-10-CM
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
## 2024-05-19 DIAGNOSIS — Z72.0 TOBACCO ABUSE: ICD-10-CM

## 2024-05-19 DIAGNOSIS — R29.6 FALLS FREQUENTLY: ICD-10-CM

## 2024-05-19 DIAGNOSIS — E87.6 HYPOKALEMIA: ICD-10-CM

## 2024-05-19 DIAGNOSIS — F10.930 ALCOHOL WITHDRAWAL, UNCOMPLICATED (H): ICD-10-CM

## 2024-05-19 DIAGNOSIS — S00.83XA CONTUSION OF FOREHEAD, INITIAL ENCOUNTER: ICD-10-CM

## 2024-05-19 DIAGNOSIS — F10.920 ALCOHOLIC INTOXICATION WITHOUT COMPLICATION (H): ICD-10-CM

## 2024-05-19 DIAGNOSIS — E83.42 HYPOMAGNESEMIA: ICD-10-CM

## 2024-05-19 PROBLEM — K76.0 NONALCOHOLIC FATTY LIVER DISEASE: Status: RESOLVED | Noted: 2022-09-14 | Resolved: 2024-05-19

## 2024-05-19 PROBLEM — R79.89 ABNORMAL TSH: Status: RESOLVED | Noted: 2022-11-02 | Resolved: 2024-05-19

## 2024-05-19 PROBLEM — K76.0 HEPATIC STEATOSIS: Status: ACTIVE | Noted: 2022-09-06

## 2024-05-19 PROBLEM — F10.10 ALCOHOL ABUSE: Status: RESOLVED | Noted: 2022-08-24 | Resolved: 2024-05-19

## 2024-05-19 PROBLEM — F41.9 ANXIETY: Status: ACTIVE | Noted: 2022-11-02

## 2024-05-19 PROBLEM — E78.1 HYPERTRIGLYCERIDEMIA: Status: ACTIVE | Noted: 2017-10-22

## 2024-05-19 PROBLEM — R42 VERTIGO: Status: ACTIVE | Noted: 2021-09-02

## 2024-05-19 PROBLEM — E87.29 METABOLIC ACIDOSIS, INCREASED ANION GAP: Status: ACTIVE | Noted: 2022-11-03

## 2024-05-19 PROBLEM — G62.9 PERIPHERAL NEUROPATHY: Status: ACTIVE | Noted: 2021-09-02

## 2024-05-19 PROBLEM — E87.20 LACTIC ACID ACIDOSIS: Status: RESOLVED | Noted: 2022-02-20 | Resolved: 2024-05-19

## 2024-05-19 PROBLEM — S06.5XAA SUBDURAL HEMATOMA (H): Status: RESOLVED | Noted: 2022-11-03 | Resolved: 2024-05-19

## 2024-05-19 PROBLEM — S06.5XAA SUBDURAL HEMATOMA (H): Status: ACTIVE | Noted: 2022-11-03

## 2024-05-19 PROBLEM — D69.6 THROMBOCYTOPENIA (H): Status: ACTIVE | Noted: 2022-11-03

## 2024-05-19 PROBLEM — G47.33 OBSTRUCTIVE SLEEP APNEA SYNDROME: Status: ACTIVE | Noted: 2021-12-21

## 2024-05-19 PROBLEM — R25.1 TREMOR: Status: ACTIVE | Noted: 2021-11-10

## 2024-05-19 LAB
ALBUMIN SERPL BCG-MCNC: 4.7 G/DL (ref 3.5–5.2)
ALBUMIN UR-MCNC: 100 MG/DL
ALP SERPL-CCNC: 145 U/L (ref 40–150)
ALT SERPL W P-5'-P-CCNC: 107 U/L (ref 0–70)
ANION GAP SERPL CALCULATED.3IONS-SCNC: 28 MMOL/L (ref 7–15)
APAP SERPL-MCNC: <5 UG/ML (ref 10–30)
APPEARANCE UR: CLEAR
AST SERPL W P-5'-P-CCNC: 337 U/L (ref 0–45)
B-OH-BUTYR SERPL-SCNC: 0.6 MMOL/L
B-OH-BUTYR SERPL-SCNC: 2.2 MMOL/L
BASE EXCESS BLDV CALC-SCNC: 5.3 MMOL/L (ref -3–3)
BASOPHILS # BLD AUTO: 0 10E3/UL (ref 0–0.2)
BASOPHILS NFR BLD AUTO: 0 %
BILIRUB DIRECT SERPL-MCNC: 4.43 MG/DL (ref 0–0.3)
BILIRUB SERPL-MCNC: 9.3 MG/DL
BILIRUB UR QL STRIP: ABNORMAL
BUN SERPL-MCNC: 9.6 MG/DL (ref 6–20)
CALCIUM SERPL-MCNC: 9.4 MG/DL (ref 8.6–10)
CHLORIDE SERPL-SCNC: 81 MMOL/L (ref 98–107)
CK SERPL-CCNC: 399 U/L (ref 39–308)
COLOR UR AUTO: ABNORMAL
CREAT SERPL-MCNC: 1.03 MG/DL (ref 0.67–1.17)
DEPRECATED HCO3 PLAS-SCNC: 20 MMOL/L (ref 22–29)
EGFRCR SERPLBLD CKD-EPI 2021: 86 ML/MIN/1.73M2
EOSINOPHIL # BLD AUTO: 0 10E3/UL (ref 0–0.7)
EOSINOPHIL NFR BLD AUTO: 0 %
ERYTHROCYTE [DISTWIDTH] IN BLOOD BY AUTOMATED COUNT: 12.2 % (ref 10–15)
ETHANOL SERPL-MCNC: <0.01 G/DL
GLUCOSE SERPL-MCNC: 163 MG/DL (ref 70–99)
GLUCOSE UR STRIP-MCNC: >499 MG/DL
HCO3 BLDV-SCNC: 28 MMOL/L (ref 21–28)
HCT VFR BLD AUTO: 39.5 % (ref 40–53)
HGB BLD-MCNC: 14.2 G/DL (ref 13.3–17.7)
HGB UR QL STRIP: NEGATIVE
HOLD SPECIMEN: NORMAL
HYALINE CASTS: 8 /LPF
IMM GRANULOCYTES # BLD: 0.1 10E3/UL
IMM GRANULOCYTES NFR BLD: 1 %
INR PPP: 1.12 (ref 0.85–1.15)
KETONES UR STRIP-MCNC: 5 MG/DL
LACTATE SERPL-SCNC: 1.4 MMOL/L (ref 0.7–2)
LACTATE SERPL-SCNC: 3.3 MMOL/L (ref 0.7–2)
LACTATE SERPL-SCNC: 4.2 MMOL/L (ref 0.7–2)
LACTATE SERPL-SCNC: 4.2 MMOL/L (ref 0.7–2)
LEUKOCYTE ESTERASE UR QL STRIP: NEGATIVE
LIPASE SERPL-CCNC: 219 U/L (ref 13–60)
LYMPHOCYTES # BLD AUTO: 0.8 10E3/UL (ref 0.8–5.3)
LYMPHOCYTES NFR BLD AUTO: 10 %
MAGNESIUM SERPL-MCNC: 1.4 MG/DL (ref 1.7–2.3)
MCH RBC QN AUTO: 34.7 PG (ref 26.5–33)
MCHC RBC AUTO-ENTMCNC: 35.9 G/DL (ref 31.5–36.5)
MCV RBC AUTO: 97 FL (ref 78–100)
MONOCYTES # BLD AUTO: 0.8 10E3/UL (ref 0–1.3)
MONOCYTES NFR BLD AUTO: 10 %
MUCOUS THREADS #/AREA URNS LPF: PRESENT /LPF
NEUTROPHILS # BLD AUTO: 6.1 10E3/UL (ref 1.6–8.3)
NEUTROPHILS NFR BLD AUTO: 78 %
NITRATE UR QL: NEGATIVE
NRBC # BLD AUTO: 0.1 10E3/UL
NRBC BLD AUTO-RTO: 1 /100
O2/TOTAL GAS SETTING VFR VENT: 0 %
OSMOLALITY SERPL: 284 MMOL/KG (ref 275–295)
OXYHGB MFR BLDV: 69 % (ref 70–75)
PCO2 BLDV: 35 MM HG (ref 40–50)
PH BLDV: 7.51 [PH] (ref 7.32–7.43)
PH UR STRIP: 6 [PH] (ref 5–7)
PHOSPHATE SERPL-MCNC: 0.5 MG/DL (ref 2.5–4.5)
PLATELET # BLD AUTO: 118 10E3/UL (ref 150–450)
PO2 BLDV: 35 MM HG (ref 25–47)
POTASSIUM SERPL-SCNC: 3.2 MMOL/L (ref 3.4–5.3)
PROT SERPL-MCNC: 7.8 G/DL (ref 6.4–8.3)
RBC # BLD AUTO: 4.09 10E6/UL (ref 4.4–5.9)
RBC URINE: 7 /HPF
SAO2 % BLDV: 70.9 % (ref 70–75)
SODIUM SERPL-SCNC: 129 MMOL/L (ref 135–145)
SP GR UR STRIP: 1.03 (ref 1–1.03)
SQUAMOUS EPITHELIAL: <1 /HPF
T4 FREE SERPL-MCNC: 1 NG/DL (ref 0.9–1.7)
TSH SERPL DL<=0.005 MIU/L-ACNC: 18.81 UIU/ML (ref 0.3–4.2)
UROBILINOGEN UR STRIP-MCNC: 4 MG/DL
VIT B12 SERPL-MCNC: 927 PG/ML (ref 232–1245)
WBC # BLD AUTO: 7.7 10E3/UL (ref 4–11)
WBC URINE: 1 /HPF

## 2024-05-19 PROCEDURE — 93005 ELECTROCARDIOGRAM TRACING: CPT | Performed by: FAMILY MEDICINE

## 2024-05-19 PROCEDURE — 85025 COMPLETE CBC W/AUTO DIFF WBC: CPT | Performed by: FAMILY MEDICINE

## 2024-05-19 PROCEDURE — 80143 DRUG ASSAY ACETAMINOPHEN: CPT

## 2024-05-19 PROCEDURE — 96368 THER/DIAG CONCURRENT INF: CPT | Performed by: FAMILY MEDICINE

## 2024-05-19 PROCEDURE — 87040 BLOOD CULTURE FOR BACTERIA: CPT | Performed by: FAMILY MEDICINE

## 2024-05-19 PROCEDURE — 83930 ASSAY OF BLOOD OSMOLALITY: CPT | Performed by: FAMILY MEDICINE

## 2024-05-19 PROCEDURE — 96361 HYDRATE IV INFUSION ADD-ON: CPT

## 2024-05-19 PROCEDURE — 250N000011 HC RX IP 250 OP 636: Performed by: FAMILY MEDICINE

## 2024-05-19 PROCEDURE — 83735 ASSAY OF MAGNESIUM: CPT | Performed by: FAMILY MEDICINE

## 2024-05-19 PROCEDURE — 250N000009 HC RX 250: Performed by: FAMILY MEDICINE

## 2024-05-19 PROCEDURE — 250N000013 HC RX MED GY IP 250 OP 250 PS 637

## 2024-05-19 PROCEDURE — 250N000011 HC RX IP 250 OP 636: Performed by: INTERNAL MEDICINE

## 2024-05-19 PROCEDURE — 36415 COLL VENOUS BLD VENIPUNCTURE: CPT | Performed by: FAMILY MEDICINE

## 2024-05-19 PROCEDURE — 72125 CT NECK SPINE W/O DYE: CPT

## 2024-05-19 PROCEDURE — 99222 1ST HOSP IP/OBS MODERATE 55: CPT

## 2024-05-19 PROCEDURE — 70450 CT HEAD/BRAIN W/O DYE: CPT

## 2024-05-19 PROCEDURE — 96361 HYDRATE IV INFUSION ADD-ON: CPT | Performed by: FAMILY MEDICINE

## 2024-05-19 PROCEDURE — 99285 EMERGENCY DEPT VISIT HI MDM: CPT | Mod: 25 | Performed by: FAMILY MEDICINE

## 2024-05-19 PROCEDURE — 84100 ASSAY OF PHOSPHORUS: CPT

## 2024-05-19 PROCEDURE — 83605 ASSAY OF LACTIC ACID: CPT

## 2024-05-19 PROCEDURE — 81001 URINALYSIS AUTO W/SCOPE: CPT | Performed by: FAMILY MEDICINE

## 2024-05-19 PROCEDURE — 36415 COLL VENOUS BLD VENIPUNCTURE: CPT

## 2024-05-19 PROCEDURE — 250N000013 HC RX MED GY IP 250 OP 250 PS 637: Performed by: INTERNAL MEDICINE

## 2024-05-19 PROCEDURE — 71046 X-RAY EXAM CHEST 2 VIEWS: CPT

## 2024-05-19 PROCEDURE — HZ2ZZZZ DETOXIFICATION SERVICES FOR SUBSTANCE ABUSE TREATMENT: ICD-10-PCS

## 2024-05-19 PROCEDURE — 99285 EMERGENCY DEPT VISIT HI MDM: CPT | Performed by: FAMILY MEDICINE

## 2024-05-19 PROCEDURE — 82248 BILIRUBIN DIRECT: CPT

## 2024-05-19 PROCEDURE — 93010 ELECTROCARDIOGRAM REPORT: CPT | Performed by: FAMILY MEDICINE

## 2024-05-19 PROCEDURE — 96375 TX/PRO/DX INJ NEW DRUG ADDON: CPT | Performed by: FAMILY MEDICINE

## 2024-05-19 PROCEDURE — 83605 ASSAY OF LACTIC ACID: CPT | Performed by: FAMILY MEDICINE

## 2024-05-19 PROCEDURE — 82805 BLOOD GASES W/O2 SATURATION: CPT | Performed by: FAMILY MEDICINE

## 2024-05-19 PROCEDURE — 82550 ASSAY OF CK (CPK): CPT | Performed by: FAMILY MEDICINE

## 2024-05-19 PROCEDURE — G0378 HOSPITAL OBSERVATION PER HR: HCPCS

## 2024-05-19 PROCEDURE — 83690 ASSAY OF LIPASE: CPT | Performed by: FAMILY MEDICINE

## 2024-05-19 PROCEDURE — 82607 VITAMIN B-12: CPT

## 2024-05-19 PROCEDURE — 82040 ASSAY OF SERUM ALBUMIN: CPT | Performed by: FAMILY MEDICINE

## 2024-05-19 PROCEDURE — 82077 ASSAY SPEC XCP UR&BREATH IA: CPT | Performed by: FAMILY MEDICINE

## 2024-05-19 PROCEDURE — 85610 PROTHROMBIN TIME: CPT

## 2024-05-19 PROCEDURE — 258N000003 HC RX IP 258 OP 636: Performed by: FAMILY MEDICINE

## 2024-05-19 PROCEDURE — 72072 X-RAY EXAM THORAC SPINE 3VWS: CPT

## 2024-05-19 PROCEDURE — 84443 ASSAY THYROID STIM HORMONE: CPT | Performed by: FAMILY MEDICINE

## 2024-05-19 PROCEDURE — 82010 KETONE BODYS QUAN: CPT | Performed by: FAMILY MEDICINE

## 2024-05-19 PROCEDURE — 96365 THER/PROPH/DIAG IV INF INIT: CPT | Performed by: FAMILY MEDICINE

## 2024-05-19 PROCEDURE — 250N000013 HC RX MED GY IP 250 OP 250 PS 637: Performed by: FAMILY MEDICINE

## 2024-05-19 PROCEDURE — 258N000003 HC RX IP 258 OP 636

## 2024-05-19 PROCEDURE — 84439 ASSAY OF FREE THYROXINE: CPT | Performed by: FAMILY MEDICINE

## 2024-05-19 RX ORDER — AMOXICILLIN 250 MG
1 CAPSULE ORAL 2 TIMES DAILY PRN
Status: DISCONTINUED | OUTPATIENT
Start: 2024-05-19 | End: 2024-05-26 | Stop reason: HOSPADM

## 2024-05-19 RX ORDER — LORAZEPAM 2 MG/ML
1-2 INJECTION INTRAMUSCULAR EVERY 30 MIN PRN
Status: DISCONTINUED | OUTPATIENT
Start: 2024-05-19 | End: 2024-05-23

## 2024-05-19 RX ORDER — MULTIPLE VITAMINS W/ MINERALS TAB 9MG-400MCG
1 TAB ORAL DAILY
Status: DISCONTINUED | OUTPATIENT
Start: 2024-05-20 | End: 2024-05-26 | Stop reason: HOSPADM

## 2024-05-19 RX ORDER — ONDANSETRON 2 MG/ML
4 INJECTION INTRAMUSCULAR; INTRAVENOUS EVERY 6 HOURS PRN
Status: DISCONTINUED | OUTPATIENT
Start: 2024-05-19 | End: 2024-05-23

## 2024-05-19 RX ORDER — CALCIUM CARBONATE 500 MG/1
1000 TABLET, CHEWABLE ORAL 4 TIMES DAILY PRN
Status: DISCONTINUED | OUTPATIENT
Start: 2024-05-19 | End: 2024-05-26 | Stop reason: HOSPADM

## 2024-05-19 RX ORDER — GABAPENTIN 300 MG/1
600 CAPSULE ORAL EVERY 8 HOURS
Status: DISCONTINUED | OUTPATIENT
Start: 2024-05-23 | End: 2024-05-24

## 2024-05-19 RX ORDER — GABAPENTIN 300 MG/1
900 CAPSULE ORAL EVERY 8 HOURS
Status: COMPLETED | OUTPATIENT
Start: 2024-05-20 | End: 2024-05-22

## 2024-05-19 RX ORDER — CLONIDINE HYDROCHLORIDE 0.1 MG/1
0.1 TABLET ORAL EVERY 8 HOURS
Status: DISCONTINUED | OUTPATIENT
Start: 2024-05-19 | End: 2024-05-21

## 2024-05-19 RX ORDER — DEXTROSE, SODIUM CHLORIDE, SODIUM LACTATE, POTASSIUM CHLORIDE, AND CALCIUM CHLORIDE 5; .6; .31; .03; .02 G/100ML; G/100ML; G/100ML; G/100ML; G/100ML
1000 INJECTION, SOLUTION INTRAVENOUS ONCE
Status: COMPLETED | OUTPATIENT
Start: 2024-05-19 | End: 2024-05-19

## 2024-05-19 RX ORDER — HALOPERIDOL 5 MG/ML
2.5-5 INJECTION INTRAMUSCULAR EVERY 6 HOURS PRN
Status: DISCONTINUED | OUTPATIENT
Start: 2024-05-19 | End: 2024-05-26 | Stop reason: HOSPADM

## 2024-05-19 RX ORDER — MAGNESIUM OXIDE 400 MG/1
800 TABLET ORAL ONCE
Status: COMPLETED | OUTPATIENT
Start: 2024-05-19 | End: 2024-05-19

## 2024-05-19 RX ORDER — AMOXICILLIN 250 MG
2 CAPSULE ORAL 2 TIMES DAILY PRN
Status: DISCONTINUED | OUTPATIENT
Start: 2024-05-19 | End: 2024-05-26 | Stop reason: HOSPADM

## 2024-05-19 RX ORDER — ONDANSETRON 4 MG/1
4 TABLET, ORALLY DISINTEGRATING ORAL EVERY 6 HOURS PRN
Status: DISCONTINUED | OUTPATIENT
Start: 2024-05-19 | End: 2024-05-23

## 2024-05-19 RX ORDER — POTASSIUM CHLORIDE 7.45 MG/ML
10 INJECTION INTRAVENOUS
Status: COMPLETED | OUTPATIENT
Start: 2024-05-19 | End: 2024-05-20

## 2024-05-19 RX ORDER — TRAZODONE HYDROCHLORIDE 100 MG/1
100 TABLET ORAL AT BEDTIME
Status: ON HOLD | COMMUNITY
Start: 2024-02-06 | End: 2024-05-26

## 2024-05-19 RX ORDER — OLANZAPINE 5 MG/1
5-10 TABLET, ORALLY DISINTEGRATING ORAL EVERY 6 HOURS PRN
Status: DISCONTINUED | OUTPATIENT
Start: 2024-05-19 | End: 2024-05-26 | Stop reason: HOSPADM

## 2024-05-19 RX ORDER — TRAZODONE HYDROCHLORIDE 100 MG/1
100 TABLET ORAL AT BEDTIME
Status: DISCONTINUED | OUTPATIENT
Start: 2024-05-19 | End: 2024-05-19 | Stop reason: DRUGHIGH

## 2024-05-19 RX ORDER — ONDANSETRON 2 MG/ML
4 INJECTION INTRAMUSCULAR; INTRAVENOUS ONCE
Status: COMPLETED | OUTPATIENT
Start: 2024-05-19 | End: 2024-05-19

## 2024-05-19 RX ORDER — DEXTROSE, SODIUM CHLORIDE, SODIUM LACTATE, POTASSIUM CHLORIDE, AND CALCIUM CHLORIDE 5; .6; .31; .03; .02 G/100ML; G/100ML; G/100ML; G/100ML; G/100ML
1000 INJECTION, SOLUTION INTRAVENOUS ONCE
Status: DISCONTINUED | OUTPATIENT
Start: 2024-05-19 | End: 2024-05-19

## 2024-05-19 RX ORDER — TRAZODONE HYDROCHLORIDE 50 MG/1
50 TABLET, FILM COATED ORAL AT BEDTIME
Status: DISCONTINUED | OUTPATIENT
Start: 2024-05-19 | End: 2024-05-19 | Stop reason: DRUGHIGH

## 2024-05-19 RX ORDER — MULTIVITAMIN,THERAPEUTIC
1 TABLET ORAL ONCE
Status: COMPLETED | OUTPATIENT
Start: 2024-05-19 | End: 2024-05-19

## 2024-05-19 RX ORDER — POTASSIUM CHLORIDE 1500 MG/1
40 TABLET, EXTENDED RELEASE ORAL
Status: DISCONTINUED | OUTPATIENT
Start: 2024-05-19 | End: 2024-05-19

## 2024-05-19 RX ORDER — GABAPENTIN 300 MG/1
300 CAPSULE ORAL EVERY 8 HOURS
Status: DISCONTINUED | OUTPATIENT
Start: 2024-05-25 | End: 2024-05-24

## 2024-05-19 RX ORDER — MAGNESIUM SULFATE HEPTAHYDRATE 500 MG/ML
2 INJECTION, SOLUTION INTRAMUSCULAR; INTRAVENOUS ONCE
Status: DISCONTINUED | OUTPATIENT
Start: 2024-05-19 | End: 2024-05-19

## 2024-05-19 RX ORDER — FOLIC ACID 1 MG/1
1 TABLET ORAL ONCE
Status: COMPLETED | OUTPATIENT
Start: 2024-05-19 | End: 2024-05-19

## 2024-05-19 RX ORDER — DIAZEPAM 5 MG
10 TABLET ORAL
Status: DISCONTINUED | OUTPATIENT
Start: 2024-05-19 | End: 2024-05-19

## 2024-05-19 RX ORDER — TRAZODONE HYDROCHLORIDE 100 MG/1
200 TABLET ORAL AT BEDTIME
Status: DISCONTINUED | OUTPATIENT
Start: 2024-05-19 | End: 2024-05-26 | Stop reason: HOSPADM

## 2024-05-19 RX ORDER — TRAZODONE HYDROCHLORIDE 100 MG/1
300 TABLET ORAL ONCE
Status: DISCONTINUED | OUTPATIENT
Start: 2024-05-19 | End: 2024-05-19 | Stop reason: DRUGHIGH

## 2024-05-19 RX ORDER — GABAPENTIN 100 MG/1
100 CAPSULE ORAL EVERY 8 HOURS
Status: DISCONTINUED | OUTPATIENT
Start: 2024-05-27 | End: 2024-05-23

## 2024-05-19 RX ORDER — CEFTRIAXONE 2 G/1
2 INJECTION, POWDER, FOR SOLUTION INTRAMUSCULAR; INTRAVENOUS ONCE
Status: DISCONTINUED | OUTPATIENT
Start: 2024-05-19 | End: 2024-05-19

## 2024-05-19 RX ORDER — IBUPROFEN 600 MG/1
600 TABLET, FILM COATED ORAL EVERY 6 HOURS PRN
Status: DISCONTINUED | OUTPATIENT
Start: 2024-05-19 | End: 2024-05-26 | Stop reason: HOSPADM

## 2024-05-19 RX ORDER — SODIUM CHLORIDE 9 MG/ML
1000 INJECTION, SOLUTION INTRAVENOUS CONTINUOUS
Status: DISCONTINUED | OUTPATIENT
Start: 2024-05-19 | End: 2024-05-19

## 2024-05-19 RX ORDER — LORAZEPAM 1 MG/1
1-2 TABLET ORAL EVERY 30 MIN PRN
Status: DISCONTINUED | OUTPATIENT
Start: 2024-05-19 | End: 2024-05-23

## 2024-05-19 RX ORDER — POTASSIUM CHLORIDE 1.5 G/1.58G
40 POWDER, FOR SOLUTION ORAL
Qty: 4 PACKET | Refills: 0 | Status: COMPLETED | OUTPATIENT
Start: 2024-05-19 | End: 2024-05-19

## 2024-05-19 RX ORDER — MECLIZINE HYDROCHLORIDE 25 MG/1
25 TABLET ORAL 3 TIMES DAILY PRN
Status: DISCONTINUED | OUTPATIENT
Start: 2024-05-19 | End: 2024-05-26 | Stop reason: HOSPADM

## 2024-05-19 RX ORDER — GABAPENTIN 600 MG/1
1200 TABLET ORAL ONCE
Status: COMPLETED | OUTPATIENT
Start: 2024-05-19 | End: 2024-05-19

## 2024-05-19 RX ORDER — MAGNESIUM SULFATE HEPTAHYDRATE 40 MG/ML
2 INJECTION, SOLUTION INTRAVENOUS ONCE
Status: COMPLETED | OUTPATIENT
Start: 2024-05-19 | End: 2024-05-19

## 2024-05-19 RX ORDER — SODIUM CHLORIDE, SODIUM LACTATE, POTASSIUM CHLORIDE, CALCIUM CHLORIDE 600; 310; 30; 20 MG/100ML; MG/100ML; MG/100ML; MG/100ML
INJECTION, SOLUTION INTRAVENOUS CONTINUOUS
Status: DISCONTINUED | OUTPATIENT
Start: 2024-05-19 | End: 2024-05-21

## 2024-05-19 RX ORDER — FOLIC ACID 1 MG/1
1 TABLET ORAL DAILY
Status: DISCONTINUED | OUTPATIENT
Start: 2024-05-20 | End: 2024-05-26 | Stop reason: HOSPADM

## 2024-05-19 RX ORDER — FLUMAZENIL 0.1 MG/ML
0.2 INJECTION, SOLUTION INTRAVENOUS
Status: DISCONTINUED | OUTPATIENT
Start: 2024-05-19 | End: 2024-05-26 | Stop reason: HOSPADM

## 2024-05-19 RX ADMIN — SODIUM CHLORIDE, SODIUM LACTATE, POTASSIUM CHLORIDE, CALCIUM CHLORIDE AND DEXTROSE MONOHYDRATE 1000 ML: 5; 600; 310; 30; 20 INJECTION, SOLUTION INTRAVENOUS at 15:28

## 2024-05-19 RX ADMIN — Medication 800 MG: at 13:31

## 2024-05-19 RX ADMIN — FOLIC ACID 1 MG: 1 TABLET ORAL at 13:36

## 2024-05-19 RX ADMIN — ONDANSETRON 4 MG: 2 INJECTION INTRAMUSCULAR; INTRAVENOUS at 15:20

## 2024-05-19 RX ADMIN — POTASSIUM CHLORIDE 40 MEQ: 1.5 POWDER, FOR SOLUTION ORAL at 14:44

## 2024-05-19 RX ADMIN — THIAMINE HCL TAB 100 MG 100 MG: 100 TAB at 13:37

## 2024-05-19 RX ADMIN — SODIUM PHOSPHATE, MONOBASIC, MONOHYDRATE AND SODIUM PHOSPHATE, DIBASIC, ANHYDROUS 15 MMOL: 142; 276 INJECTION, SOLUTION INTRAVENOUS at 21:04

## 2024-05-19 RX ADMIN — TRAZODONE HYDROCHLORIDE 200 MG: 100 TABLET ORAL at 22:13

## 2024-05-19 RX ADMIN — POTASSIUM CHLORIDE 10 MEQ: 7.46 INJECTION, SOLUTION INTRAVENOUS at 21:04

## 2024-05-19 RX ADMIN — POTASSIUM CHLORIDE 10 MEQ: 7.46 INJECTION, SOLUTION INTRAVENOUS at 19:47

## 2024-05-19 RX ADMIN — CEFTRIAXONE SODIUM 2 G: 2 INJECTION, POWDER, FOR SOLUTION INTRAMUSCULAR; INTRAVENOUS at 15:20

## 2024-05-19 RX ADMIN — POTASSIUM CHLORIDE 10 MEQ: 7.46 INJECTION, SOLUTION INTRAVENOUS at 23:23

## 2024-05-19 RX ADMIN — SODIUM PHOSPHATE, MONOBASIC, MONOHYDRATE AND SODIUM PHOSPHATE, DIBASIC, ANHYDROUS 15 MMOL: 142; 276 INJECTION, SOLUTION INTRAVENOUS at 18:55

## 2024-05-19 RX ADMIN — SODIUM CHLORIDE, SODIUM LACTATE, POTASSIUM CHLORIDE, CALCIUM CHLORIDE AND DEXTROSE MONOHYDRATE 1000 ML: 5; 600; 310; 30; 20 INJECTION, SOLUTION INTRAVENOUS at 13:40

## 2024-05-19 RX ADMIN — THERA TABS 1 TABLET: TAB at 13:33

## 2024-05-19 RX ADMIN — GABAPENTIN 1200 MG: 600 TABLET, FILM COATED ORAL at 19:50

## 2024-05-19 RX ADMIN — MAGNESIUM SULFATE HEPTAHYDRATE 2 G: 2 INJECTION, SOLUTION INTRAVENOUS at 15:19

## 2024-05-19 RX ADMIN — CLONIDINE HYDROCHLORIDE 0.1 MG: 0.1 TABLET ORAL at 19:50

## 2024-05-19 RX ADMIN — POTASSIUM CHLORIDE 10 MEQ: 7.46 INJECTION, SOLUTION INTRAVENOUS at 22:13

## 2024-05-19 RX ADMIN — SODIUM CHLORIDE, POTASSIUM CHLORIDE, SODIUM LACTATE AND CALCIUM CHLORIDE: 600; 310; 30; 20 INJECTION, SOLUTION INTRAVENOUS at 19:47

## 2024-05-19 ASSESSMENT — ENCOUNTER SYMPTOMS
BLOOD IN STOOL: 0
WHEEZING: 0
TREMORS: 1
HEADACHES: 0
SORE THROAT: 0
FREQUENCY: 0
SINUS PRESSURE: 0
SHORTNESS OF BREATH: 0
NAUSEA: 0
DIAPHORESIS: 0
VOMITING: 0
CONSTIPATION: 0
FEVER: 0
COUGH: 0
PALPITATIONS: 0
CHILLS: 0
ABDOMINAL PAIN: 0
DIARRHEA: 0
DIZZINESS: 1
DYSURIA: 0

## 2024-05-19 ASSESSMENT — ACTIVITIES OF DAILY LIVING (ADL)
USE_OF_HEARING_ASSISTIVE_DEVICES: BILATERAL HEARING AIDS
ADLS_ACUITY_SCORE: 37
ADLS_ACUITY_SCORE: 24
DRESSING/BATHING_DIFFICULTY: NO
WALKING_OR_CLIMBING_STAIRS_DIFFICULTY: NO
ADLS_ACUITY_SCORE: 37
ADLS_ACUITY_SCORE: 37
HEARING_DIFFICULTY_OR_DEAF: YES
THE_FOLLOWING_AIDS_WERE_PROVIDED;: PATIENT DECLINED OFFER OF AUXILIARY AIDS
ADLS_ACUITY_SCORE: 24
ADLS_ACUITY_SCORE: 37
FALL_HISTORY_WITHIN_LAST_SIX_MONTHS: YES
HEARING_MANAGEMENT: HEARING AIDS
ADLS_ACUITY_SCORE: 37
ADLS_ACUITY_SCORE: 37
DIFFICULTY_COMMUNICATING: NO
WERE_AUXILIARY_AIDS_OFFERED?: YES
WEAR_GLASSES_OR_BLIND: YES
DIFFICULTY_EATING/SWALLOWING: NO
ADLS_ACUITY_SCORE: 37
CHANGE_IN_FUNCTIONAL_STATUS_SINCE_ONSET_OF_CURRENT_ILLNESS/INJURY: NO
NUMBER_OF_TIMES_PATIENT_HAS_FALLEN_WITHIN_LAST_SIX_MONTHS: 6
DESCRIBE_HEARING_LOSS: BILATERAL HEARING LOSS
VISION_MANAGEMENT: GLASSES
PATIENT'S_PREFERRED_MEANS_OF_COMMUNICATION: VERBAL
TOILETING_ISSUES: NO
CONCENTRATING,_REMEMBERING_OR_MAKING_DECISIONS_DIFFICULTY: NO
EQUIPMENT_CURRENTLY_USED_AT_HOME: WALKER, STANDARD
ADLS_ACUITY_SCORE: 24
ADLS_ACUITY_SCORE: 37
ADLS_ACUITY_SCORE: 37
DOING_ERRANDS_INDEPENDENTLY_DIFFICULTY: NO

## 2024-05-19 NOTE — ED NOTES
[facility]   Admission Handoff    The patient is Getachew Barcenas, 54 year old who arrived in the ED by CAR from home with a complaint of Fall (Pt states he has had dizziness for decades and has had 3 falls in last 2 weeks and has hit head all 3 times. Pt has abrasions and bruising noted to head and arms. )  . The patient's current symptoms are new and during this time the symptoms have remained the same. In the ED, patient was diagnosed with   Final diagnoses:   Alcohol withdrawal, uncomplicated (H)   Hypomagnesemia   Falls frequently   Hypokalemia         Needed?: No    Allergies:    Allergies   Allergen Reactions    Oxycodone Hallucination       Past Medical Hx:   Past Medical History:   Diagnosis Date    Abnormal TSH 11/02/2022    Alcohol abuse 08/24/2022    Formatting of this note might be different from the original.  Relapse in 2022.      Alcohol use disorder, severe, dependence (H) 02/24/2022    Lactic acid acidosis 02/20/2022    Nonalcoholic fatty liver disease 09/14/2022    Subdural hematoma (H) 11/03/2022       Initial vitals were: BP: 99/68  Pulse: (!) 142  Temp: 100.1  F (37.8  C)  Resp: 24  Weight: 102.1 kg (225 lb)  SpO2: 98 %   Recent vital Signs: BP (!) 159/38   Pulse 115   Temp 100.1  F (37.8  C) (Tympanic)   Resp 12   Wt 102.1 kg (225 lb)   SpO2 92%   BMI 28.89 kg/m      Elimination Status: Continent: Yes     Activity Level: SBA w/ walker    Fall Status: Reason for falls risk:  Mobility, Reason for falls risk: Cognition, and Reason for falls risk: Other- ETOH  bed/chair alarm on, arm band in place, and patient and family education    Baseline Mental status: WDL  Current Mental Status changes: at basesline    Infection present or suspected this encounter: no  Sepsis suspected: No    Isolation type:     Bariatric equipment needed?: No    In the ED these meds were given:   Medications   potassium chloride (KLOR-CON) Packet 40 mEq (40 mEq Oral $Given 5/19/24  1444)   diazepam (VALIUM) tablet 10 mg (has no administration in time range)   multivitamin, therapeutic (THERA-VIT) tablet 1 tablet (1 tablet Oral $Given 5/19/24 1333)   folic acid (FOLVITE) tablet 1 mg (1 mg Oral $Given 5/19/24 1336)   thiamine (B-1) tablet 100 mg (100 mg Oral $Given 5/19/24 1337)   magnesium oxide (MAG-OX) tablet 800 mg (800 mg Oral $Given 5/19/24 1331)   dextrose 5% in lactated ringers infusion (0 mLs Intravenous Stopped 5/19/24 1524)   magnesium sulfate 2 g in 50 mL sterile water intermittent infusion (0 g Intravenous Stopped 5/19/24 1639)   ondansetron (ZOFRAN) injection 4 mg (4 mg Intravenous $Given 5/19/24 1520)   dextrose 5% in lactated ringers infusion (0 mLs Intravenous Stopped 5/19/24 1639)       Drips running?  No    Home pump  No    Current LDAs: Peripheral IV: Site left forearm and right forearm; Gauge 20's  none     Results:   Labs/Imaging  Ordered and Resulted from Time of ED Arrival Up to the Time of Departure from the ED  Results for orders placed or performed during the hospital encounter of 05/19/24 (from the past 24 hour(s))   [place] Draw    Narrative    The following orders were created for panel order [place] Draw.  Procedure                               Abnormality         Status                     ---------                               -----------         ------                     Extra Blue Top Tube[[id number]]                              Final result               Extra Green Top (Lithium...[[id number]]                      Final result               Extra Purple Top Tube[[id number]]                            Final result                 Please view results for these tests on the individual orders.   Extra Blue Top Tube   Result Value Ref Range    Hold Specimen JIC    Extra Green Top (Lithium Heparin) Tube   Result Value Ref Range    Hold Specimen JIC    Extra Purple Top Tube   Result Value Ref Range    Hold Specimen JIC    CBC with platelets differential    Narrative     The following orders were created for panel order CBC with platelets differential.  Procedure                               Abnormality         Status                     ---------                               -----------         ------                     CBC with platelets and d...[[id number]]  Abnormal            Final result               RBC and Platelet Morphology[[id number]]                                                   Please view results for these tests on the individual orders.   Comprehensive metabolic panel   Result Value Ref Range    Sodium 129 (L) 135 - 145 mmol/L    Potassium 3.2 (L) 3.4 - 5.3 mmol/L    Carbon Dioxide (CO2) 20 (L) 22 - 29 mmol/L    Anion Gap 28 (H) 7 - 15 mmol/L    Urea Nitrogen 9.6 6.0 - 20.0 mg/dL    Creatinine 1.03 0.67 - 1.17 mg/dL    GFR Estimate 86 >60 mL/min/1.73m2    Calcium 9.4 8.6 - 10.0 mg/dL    Chloride 81 (L) 98 - 107 mmol/L    Glucose 163 (H) 70 - 99 mg/dL    Alkaline Phosphatase 145 40 - 150 U/L     (H) 0 - 45 U/L     (H) 0 - 70 U/L    Protein Total 7.8 6.4 - 8.3 g/dL    Albumin 4.7 3.5 - 5.2 g/dL    Bilirubin Total 9.3 (H) <=1.2 mg/dL   Magnesium   Result Value Ref Range    Magnesium 1.4 (L) 1.7 - 2.3 mg/dL   Lipase   Result Value Ref Range    Lipase 219 (H) 13 - 60 U/L   Alcohol level blood   Result Value Ref Range    Alcohol ethyl <0.01 <=0.01 g/dL   CBC with platelets and differential   Result Value Ref Range    WBC Count 7.7 4.0 - 11.0 10e3/uL    RBC Count 4.09 (L) 4.40 - 5.90 10e6/uL    Hemoglobin 14.2 13.3 - 17.7 g/dL    Hematocrit 39.5 (L) 40.0 - 53.0 %    MCV 97 78 - 100 fL    MCH 34.7 (H) 26.5 - 33.0 pg    MCHC 35.9 31.5 - 36.5 g/dL    RDW 12.2 10.0 - 15.0 %    Platelet Count 118 (L) 150 - 450 10e3/uL    % Neutrophils 78 %    % Lymphocytes 10 %    % Monocytes 10 %    % Eosinophils 0 %    % Basophils 0 %    % Immature Granulocytes 1 %    NRBCs per 100 WBC 1 (H) <1 /100    Absolute Neutrophils 6.1 1.6 - 8.3 10e3/uL    Absolute Lymphocytes 0.8  0.8 - 5.3 10e3/uL    Absolute Monocytes 0.8 0.0 - 1.3 10e3/uL    Absolute Eosinophils 0.0 0.0 - 0.7 10e3/uL    Absolute Basophils 0.0 0.0 - 0.2 10e3/uL    Absolute Immature Granulocytes 0.1 <=0.4 10e3/uL    Absolute NRBCs 0.1 10e3/uL   TSH with free T4 reflex   Result Value Ref Range    TSH 18.81 (H) 0.30 - 4.20 uIU/mL   T4 free   Result Value Ref Range    Free T4 1.00 0.90 - 1.70 ng/dL   Ketone Beta-Hydroxybutyrate Quantitative   Result Value Ref Range    Ketone (Beta-Hydroxybutyrate) Quantitative 2.20 (HH) <=0.30 mmol/L   CK total   Result Value Ref Range     (H) 39 - 308 U/L   Head CT w/o contrast    Narrative    EXAM: CT HEAD WITHOUT CONTRAST  LOCATION: [facility]  DATE: 05/19/2024    INDICATION: Closed head injury.  COMPARISON: Head CT 11/08/2023.  TECHNIQUE: Routine CT Head without IV contrast. Multiplanar reformats. Dose reduction techniques were used.    FINDINGS:  INTRACRANIAL CONTENTS: There is a stable isodense subdural collection along the lateral convexity measuring 5 mm maximum thickness. No evidence for new or increasing intracranial hemorrhage. No CT evidence of acute infarct. Mild presumed chronic small   vessel ischemic changes. Mild generalized volume loss. No hydrocephalus.     VISUALIZED ORBITS/SINUSES/MASTOIDS: No intraorbital abnormality. No paranasal sinus mucosal disease. No middle ear or mastoid effusion.    BONES/SOFT TISSUES: No acute abnormality.      Impression    IMPRESSION:  1.  Stable thin isodense subdural collection along the left cerebral convexity measuring up to 5 mm maximum thickness. No evidence for new or increasing intracranial hemorrhage.  2.  No CT evidence for acute intracranial process.  3.  Brain atrophy and presumed chronic microvascular ischemic changes as above.     CT Cervical Spine w/o Contrast    Narrative    EXAM: CT CERVICAL SPINE WITHOUT CONTRAST  LOCATION: [facility]  DATE: 05/19/2024    INDICATION:  Closed head injury. Intoxicated. Evaluate neck; Neck pain; Trauma; Significant trauma.  COMPARISON: CT cervical spine 11/02/2022.  TECHNIQUE: Routine CT Cervical Spine without IV contrast. Multiplanar reformats. Dose reduction techniques were used.    FINDINGS:  VERTEBRA: There is normal alignment of the cervical vertebrae; however, there is straightening of normal cervical lordosis. Vertebral body heights of the cervical spine are normal. Craniocervical alignment is normal. No evidence for fracture.       Impression    IMPRESSION:  1.  No fracture or posttraumatic subluxation.  2.  No high-grade spinal canal or neural foraminal stenosis.     Chest XR,  PA & LAT    Narrative    EXAM: XR CHEST 2 VIEWS  LOCATION: [facility]  DATE: 5/19/2024    INDICATION: syncope  COMPARISON: None.      Impression    IMPRESSION: Negative chest.   Thoracic spine XR, 3 views    Narrative    EXAM: XR THORACIC SPINE 3 VIEWS  LOCATION: [facility]  DATE: 5/19/2024    INDICATION: midline thoracic back pain   T8  COMPARISON: None.      Impression    IMPRESSION: No fracture. Normal vertebral heights. Trace dextrocurvature. Mild disc height loss. Normal extraspinal structures.    Lactic acid whole blood   Result Value Ref Range    Lactic Acid 4.2 (HH) 0.7 - 2.0 mmol/L   Blood gas venous   Result Value Ref Range    pH Venous 7.51 (H) 7.32 - 7.43    pCO2 Venous 35 (L) 40 - 50 mm Hg    pO2 Venous 35 25 - 47 mm Hg    Bicarbonate Venous 28 21 - 28 mmol/L    Base Excess/Deficit Venous 5.3 (H) -3.0 - 3.0 mmol/L    FIO2 0     Oxyhemoglobin Venous 69 (L) 70 - 75 %    O2 Sat, Venous 70.9 70.0 - 75.0 %    Narrative    In healthy individuals, oxyhemoglobin (O2Hb) and oxygen saturation (SO2) are approximately equal. In the presence of dyshemoglobins, oxyhemoglobin can be considerably lower than oxygen saturation.   Ketone Beta-Hydroxybutyrate Quantitative   Result Value Ref Range    Ketone  (Beta-Hydroxybutyrate) Quantitative 0.60 (H) <=0.30 mmol/L   Lactic acid whole blood   Result Value Ref Range    Lactic Acid 4.2 (HH) 0.7 - 2.0 mmol/L   Phosphorus   Result Value Ref Range    Phosphorus 0.5 (LL) 2.5 - 4.5 mg/dL   Bilirubin direct   Result Value Ref Range    Bilirubin Direct 4.43 (H) 0.00 - 0.30 mg/dL       For the majority of the shift this patient's behavior was Green     Cardiac Rhythm: N/A  Pt needs tele? No  Skin/wound Issues: None    Code Status: Full Code    Pain control: pt had none    Nausea control: fair    Abnormal labs/tests/findings requiring intervention:     Patient tested for COVID 19 prior to admission: NO     OBS brochure/video discussed/provided to patient/family: Yes     Family present during ED course? No     Family Comments/Social Situation comments:     Tasks needing completion: None    Linh Horn RN

## 2024-05-19 NOTE — H&P
[facility]    History and Physical  Hospital Medicine       Date of Admission:  5/19/2024  Date of Service: 5/19/2024     Assessment & Plan   Getachew Barcenas is a 54 year old male with past medical hx of severe alcohol use disorder, frequent falls due to vertigo, paroxysmal Vtach, chronic subdural hematoma, LULI, anxiety who presents on 5/19/2024 with 3 falls in the last 2 weeks and alcohol withdrawal.    Frequent falls   Chronic vertigo  Peripheral neuropathy    Has severe alcohol use disorder as below. Hx of frequent falls while drinking. Has had 3 falls in last 2 weeks due to increased alcohol intake, chronic vertigo, and neuropathy in toes. Last fall 6-7d ago. Ht his head every time. CT head and c-spine only shows stable chronic subdural hematoma (see below). He has chronic vertigo due to the subdural hematoma, which he states nothing has worked for. He knows alcohol is contributing to his falls.     He arrived tachycardic to 120s. Tmax 100.1. WBC WNL. Does not meet SIRS criteria. Lactic acidosis as below.  B12 WNL. Lower suspicion for Wernickes, as he's had no neuro changes, no nystagmus, and CT head negative. Suspect falls are mainly due to intoxication.    In the ER he received Folate, Thiamine, 2L D5LR, Ceftriaxone. Low concern for infection.    - PT/OT/SW consulted  - Restart PTA Gabapentin to improve neuropathy and hopefully prevent falls. Pt stated he's no longer taking this.   - Start Meclizine 25 TID PRN for vertigo  - Blood cultures pending    Alcohol use disorder, severe, with acute withdrawal  Resting tremor, acute on chronic    Pt has been to IP tx twice, last time in summer 2023. He was admitted in 3/2023 for withdrawal and SW was arranging IP tx, but it took too long so he left AMA. He states after his IP tx he started drinking more. He used to drink a 1.75L bottle of vodka every 4d, but now drinks 1.75L every 1-2d.     He hasn't taken his PTA meds in 2 weeks due to being  fatigued and not being able to get out of bed. He attributes his fatigue to not being on his Levothyroxine (see below), and doesn't believe alcohol is contributing to his fatigue. He last drank 5/15. Alcohol WNL on admit.    In acute mild withdrawal on admission. Has a resting tremor at baseline, but it's worse on admission.    On admission he states he wants to stop drinking, but doesn't want IP tx, Acamprosate, or Naltrexone. He states Acamprosate made his chronic dizziness worse.     - CIWA protocol with IV vitamins  - Restart thiamine on discharge. Pt no longer taking these. May need education on necessity.  - Encourage cessation, reassess desire for medical tx in am    Lactic acidosis  Elevated anion gap with ketosis, likely alcoholic  Lactate 4.2 ?  4.2 ?  3.3. Ketones 2.2 ?  0.6.  Anion gap 28. VBG with pH 7.51, CO2 35, bicarb 28. Likely due to severe alcohol use and liver impairment.     - LR 125ml/hr    Chronic subdural hematoma (H)    Hx of chronic subdural hematoma due to frequent falls while drinking alcohol. S/p emobolization 11/2022. Follows with neurosurgery at [facility] with serial imaging, last visit 11/8/23. CT head on admission shows stable SDH.     Hepatic steatosis  Thrombocytopenia (H24)  Elevated LFTs, acute on chronic    Pt had choledocholithiasis in 12/2023. He was tx with ERCP and cholecystectomy. He had an ERCP in 1/2024 for stent removal.     On admission  (baseline ~100),  (baseline ~100), Tbili 9.3 (baseline ~4), direct bili 4.43 (baseline ~5). . INR 1.12.    Maddrey score 10. MELD 20.    - CMP in am    Hypothyroidism  Toxic diffuse goiter  On admission TSH 18.81 and T4 1. Pt stopped taking Levothyroxine 2 wks ago due to fatigue as above.     - Restart PTA Levothyroxine 175    Hypophosphatemia  Hypomagnesemia  Hypokalemia  Hyponatremia  On admission Mg 1.5. K 3.2. Phos 0.5. Na 129. Likely due to chronic alcohol use.   - RN repletion protocol  - Restart potassium and mag  replacement on discharge. Pt reported no longer taking these    Elevated lipase  Lipase 219 on admission. Pt without abdominal pain or chest pain. No n/v.     Ventricular tachycardia (paroxysmal) (H)  Had possible non-sustained VT vs SVT on admission in 3/2023. Was asymptomatic during it.   - Tele    Elevated CK  399 on admission. Denies myalgias. Likely due to alcohol use and several metabolic derangements.      Clinically Significant Risk Factors Present on Admission        # Hypokalemia: Lowest K = 3.2 mmol/L in last 2 days, will replace as needed  # Hyponatremia: Lowest Na = 129 mmol/L in last 2 days, will monitor as appropriate    # Hypomagnesemia: Lowest Mg = 1.4 mg/dL in last 2 days, will replace as needed  # Anion Gap Metabolic Acidosis: Highest Anion Gap = 28 mmol/L in last 2 days, will monitor and treat as appropriate    # Thrombocytopenia: Lowest platelets = 118 in last 2 days, will monitor for bleeding                    Diet: Regular Diet Adult    DVT Prophylaxis: Ambulate every shift  Triana Catheter: Not present  Code Status: Full Code  Lines: peripheral IV    Disposition Plan      Expected Discharge Date: 05/20/2024             Entered: Gerson Wu PA-C 05/19/2024, 6:59 PM     Status: Patient is appropriate for obs stay for electrolyte replacement and IVF. Suspect he will want to leave in am once he feels better. States he wants to stop drinking, but does not seem motivated. Does not want medical help.  Gerson Wu PA-C        The patient's care was discussed with the Attending Physician, Dr. Walter .    Primary Care Physician   No Ref-Primary, Physician None    History is obtained from the patient and review of old records via the EMR.    History of Present Illness   Getachew Barcenas is a 54 year old male with past medical hx of severe alcohol use disorder, frequent falls due to vertigo, paroxysmal Vtach, chronic subdural hematoma, LULI, anxiety who presents on 5/19/2024 with 3 falls in the last  2 weeks and alcohol withdrawal.    Patient has a history of severe alcohol use and dependence.  He has a chronic subdural hematoma due to frequent falls while drinking alcohol.  He was admitted in March 2023 for alcohol withdrawal and social work was attempting to find him an inpatient treatment facility.  However he left AMA because it was taking too long and stated he would find his own.  He states that he went to inpatient treatment at [place] in summer 2023.    He presents today stating that he is fallen 3 times in the last 2 weeks, with the last one 6 or 7 days ago.  States he has hit his head every single time.  He attributes his falls to chronic dizziness and peripheral neuropathy in his toes.  He states that he has chronic balance issues.    States he has been drinking a lot more alcohol since his last inpatient treatment.  Formerly he drank one 1.75L bottle of vodka every 4 days, but now he drinks 1.75 L every day.  States that he stopped drinking 4 days ago cold turkey.  He wants to quit drinking, but does not want inpatient treatment, naltrexone, acamprosate, medical help.  He states that acamprosate makes his chronic dizziness worse.    He states he has been more fatigued in the last 2 weeks, and he attributes this to not taking his levothyroxine.  He states that he was too tired to get out of bed and get his meds that were in the kitchen.  He is not sure how long he has been off his meds but thinks it is about a week.  He does not believe that alcohol and withdrawal is contributing to his fatigue.    He denies N/V/D/C, chest pain, shortness of breath, memory loss, confusion, hallucinations, fevers, chills, vision changes, stomach pain, headache.    Review of Systems   The 5 point Review of Systems is negative other than noted in the HPI or here.     Past Medical History    Past Medical History:   Diagnosis Date    Abnormal TSH 11/02/2022    Alcohol abuse 08/24/2022    Formatting of this note might be  different from the original.  Relapse in 2022.      Alcohol use disorder, severe, dependence (H) 02/24/2022    Lactic acid acidosis 02/20/2022    Nonalcoholic fatty liver disease 09/14/2022    Subdural hematoma (H) 11/03/2022     Patient Active Problem List    Diagnosis Date Noted    Alcohol withdrawal, uncomplicated (H) 05/19/2024     Priority: Medium    Lactic acidosis 03/17/2023     Priority: Medium    Ventricular tachycardia (paroxysmal) (H) 03/17/2023     Priority: Medium    Chronic subdural hematoma (H) 03/17/2023     Priority: Medium    Alcoholic intoxication without complication (H24) 03/17/2023     Priority: Medium    Low magnesium level 03/17/2023     Priority: Medium    Other neutropenia (H24) 11/03/2022     Priority: Medium    Metabolic acidosis, increased anion gap 11/03/2022     Priority: Medium    Thrombocytopenia (H24) 11/03/2022     Priority: Medium    Alcoholic intoxication with complication (H24) 11/03/2022     Priority: Medium    Abdominal wall pain in left upper quadrant 11/02/2022     Priority: Medium    Anxiety 11/02/2022     Priority: Medium    Blunt abdominal trauma, initial encounter 11/02/2022     Priority: Medium    Constipation 11/02/2022     Priority: Medium    PVC's (premature ventricular contractions) 11/02/2022     Priority: Medium    Anemia due to unknown mechanism 09/14/2022     Priority: Medium    Hepatic steatosis 09/06/2022     Priority: Medium    Alcohol use disorder, severe, dependence (H) 02/24/2022     Priority: Medium    Right forearm cellulitis 02/23/2022     Priority: Medium    Cat bite 02/23/2022     Priority: Medium    Hypokalemia 02/20/2022     Priority: Medium    Mood disorder (H24) 02/20/2022     Priority: Medium    LFT elevation 02/19/2022     Priority: Medium    Hypophosphatemia 02/19/2022     Priority: Medium    Hypomagnesemia 02/19/2022     Priority: Medium    Hypoglycemia 02/18/2022     Priority: Medium    Hypothyroidism 02/18/2022     Priority: Medium    Falls  frequently 02/18/2022     Priority: Medium    Hypoxemia associated with sleep 12/21/2021     Priority: Medium    Obstructive sleep apnea syndrome 12/21/2021     Priority: Medium    Periodic limb movement disorder 12/21/2021     Priority: Medium    Insomnia 11/10/2021     Priority: Medium    Parasomnia 11/10/2021     Priority: Medium    Inadequate sleep hygiene 11/10/2021     Priority: Medium    Tremor 11/10/2021     Priority: Medium    Thiamine deficiency 10/11/2021     Priority: Medium    Peripheral neuropathy 09/02/2021     Priority: Medium    Vertigo 09/02/2021     Priority: Medium    Hypertriglyceridemia 10/22/2017     Priority: Medium    Contusion of head of pancreas, initial encounter 07/04/2017     Priority: Medium    Impingement syndrome, shoulder, left 07/15/2016     Priority: Medium    Sinus arrhythmia seen on electrocardiogram 07/25/2014     Priority: Medium    Diverticulosis 05/29/2009     Priority: Medium    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  Graves' Disease          Past Surgical History   Past Surgical History:   Procedure Laterality Date    COLONOSCOPY  11/01/2023    ENDOSCOPIC RETROGRADE CHOLANGIOPANCREATOGRAM N/A 12/8/2023    Procedure: ENDOSCOPIC RETROGRADE CHOLANGIOPANCREATOGRAPHY, WITH biliary sphincterotomy, stone extraction, and stent placement;  Surgeon: Brian Witt MD;  Location: [facility] OR    ENDOSCOPIC RETROGRADE CHOLANGIOPANCREATOGRAM N/A 1/25/2024    Procedure: Endoscopic retrograde cholangiopancreatography with biliary stent removal and debris removal;  Surgeon: Brian Witt MD;  Location: [facility] OR    IR CAROTID CEREBRAL ANGIOGRAM BILATERAL  11/07/2022    LAPAROSCOPIC CHOLECYSTECTOMY N/A 1/25/2024    Procedure: Laparoscopic cholecystectomy;  Surgeon: Getachew Torres MD;  Location: [facility] OR    PICC TRIPLE LUMEN PLACEMENT  11/03/2022             Prior to Admission Medications   Prior to  Admission Medications   Prescriptions Last Dose Informant Patient Reported? Taking?   Multiple Vitamin (TAB-A-MARYJO) TABS Past Week Self Yes Yes   Sig: Take 1 tablet by mouth daily   acetaminophen (TYLENOL) 500 MG tablet Unknown at prn Self No Yes   Sig: [ACETAMINOPHEN (TYLENOL) 500 MG TABLET] Take 1-2 tablets (500-1,000 mg total) by mouth every 6 (six) hours as needed for pain.   levothyroxine (SYNTHROID/LEVOTHROID) 175 MCG tablet 5/19/2024 at am Self Yes Yes   Sig: Take 175 mcg by mouth every morning   traZODone (DESYREL) 100 MG tablet Past Week Self Yes Yes   Sig: Take 100 mg by mouth at bedtime      Facility-Administered Medications: None     Allergies   Allergies   Allergen Reactions    Oxycodone Hallucination       Family History    No family history on file.  Social History   Social History     Socioeconomic History    Marital status:      Spouse name: Not on file    Number of children: Not on file    Years of education: Not on file    Highest education level: Not on file   Occupational History    Not on file   Tobacco Use    Smoking status: Never    Smokeless tobacco: Current     Types: Chew   Substance and Sexual Activity    Alcohol use: Not Currently    Drug use: No    Sexual activity: Not on file   Other Topics Concern    Not on file   Social History Narrative    Not on file     Social Determinants of Health     Financial Resource Strain: Low Risk  (1/24/2023)    [facility] [employer] [facility]    Financial Resource Strain     Difficulty of Paying Living Expenses: 3     Difficulty of Paying Living Expenses: Not on file   Food Insecurity: No Food Insecurity (1/24/2023)    [facility]    Food Insecurity     Worried About Running Out of Food in the Last Year: 1   Transportation Needs: No Transportation Needs (1/24/2023)    [facility] from  [facility], [facility]    Transportation Needs     Lack of Transportation (Medical): 1   Physical Activity: Not on file   Stress: Not on file   Social Connections: Unknown (1/25/2024)    Received from [facility]    Social Connections     Frequency of Communication with Friends and Family: Not on file   Interpersonal Safety: Not on file   Housing Stability: Low Risk  (1/24/2023)    Received from [facility], [facility]    Housing Stability     Unable to Pay for Housing in the Last Year: 1     Physical Exam   /83   Pulse 115   Temp 97.8  F (36.6  C) (Oral)   Resp 12   Wt 108.7 kg (239 lb 10.2 oz)   SpO2 95%   BMI 30.77 kg/m       Weight: 239 lbs 10.24 oz Body mass index is 30.77 kg/m .     Constitutional: Lethargic, oriented, cooperative, in no acute distress, appears nontoxic.  HENT: Oropharynx is clear and moist. No evidence of cranial trauma. Normocephalic. Eyes jaundiced. EOM intact without nystagmus. PERRLA  Cardiovascular: Regular rate and rhythm, normal S1 and S2, and no murmur noted. No lower extremity edema.  Respiratory: Clear to auscultation bilaterally with no adventitious breath sounds.   GI: Distended, non-tender, normal bowel sounds, no hepatomegaly, no masses.  Musculoskeletal: Normal muscle bulk and tone. FROM in all extremities   Skin: Warm and dry. Several abrasions on left arm and forehead  Neurologic: Cranial nerves 2-12 are grossly intact. Strength 5/5 in all extremities. A&Ox4. Resting tremor. Slow responses.        Data   Data reviewed today:   Recent Labs   Lab 05/19/24  1118   WBC 7.7   HGB 14.2   MCV 97   *   INR 1.12   *   POTASSIUM 3.2*   CHLORIDE 81*   CO2 20*   BUN 9.6   CR 1.03   ANIONGAP 28*   ABDULKADIR 9.4   *   ALBUMIN 4.7   PROTTOTAL 7.8   BILITOTAL 9.3*   ALKPHOS 145   *   *    LIPASE 219*       Recent Results (from the past 24 hour(s))   Head CT w/o contrast    Narrative    EXAM: CT HEAD WITHOUT CONTRAST  LOCATION: [facility]  DATE: 05/19/2024    INDICATION: Closed head injury.  COMPARISON: Head CT 11/08/2023.  TECHNIQUE: Routine CT Head without IV contrast. Multiplanar reformats. Dose reduction techniques were used.    FINDINGS:  INTRACRANIAL CONTENTS: There is a stable isodense subdural collection along the lateral convexity measuring 5 mm maximum thickness. No evidence for new or increasing intracranial hemorrhage. No CT evidence of acute infarct. Mild presumed chronic small   vessel ischemic changes. Mild generalized volume loss. No hydrocephalus.     VISUALIZED ORBITS/SINUSES/MASTOIDS: No intraorbital abnormality. No paranasal sinus mucosal disease. No middle ear or mastoid effusion.    BONES/SOFT TISSUES: No acute abnormality.      Impression    IMPRESSION:  1.  Stable thin isodense subdural collection along the left cerebral convexity measuring up to 5 mm maximum thickness. No evidence for new or increasing intracranial hemorrhage.  2.  No CT evidence for acute intracranial process.  3.  Brain atrophy and presumed chronic microvascular ischemic changes as above.     CT Cervical Spine w/o Contrast    Narrative    EXAM: CT CERVICAL SPINE WITHOUT CONTRAST  LOCATION: [facility]  DATE: 05/19/2024    INDICATION: Closed head injury. Intoxicated. Evaluate neck; Neck pain; Trauma; Significant trauma.  COMPARISON: CT cervical spine 11/02/2022.  TECHNIQUE: Routine CT Cervical Spine without IV contrast. Multiplanar reformats. Dose reduction techniques were used.    FINDINGS:  VERTEBRA: There is normal alignment of the cervical vertebrae; however, there is straightening of normal cervical lordosis. Vertebral body heights of the cervical spine are normal. Craniocervical alignment is normal. No evidence for fracture.       Impression     IMPRESSION:  1.  No fracture or posttraumatic subluxation.  2.  No high-grade spinal canal or neural foraminal stenosis.     Chest XR,  PA & LAT    Narrative    EXAM: XR CHEST 2 VIEWS  LOCATION: [facility]  DATE: 5/19/2024    INDICATION: syncope  COMPARISON: None.      Impression    IMPRESSION: Negative chest.   Thoracic spine XR, 3 views    Narrative    EXAM: XR THORACIC SPINE 3 VIEWS  LOCATION: [facility]  DATE: 5/19/2024    INDICATION: midline thoracic back pain   T8  COMPARISON: None.      Impression    IMPRESSION: No fracture. Normal vertebral heights. Trace dextrocurvature. Mild disc height loss. Normal extraspinal structures.        I personally reviewed the chest x-ray image(s) showing negative and the head CT image(s) showing chronic subdural hematoma

## 2024-05-19 NOTE — ED NOTES
Pt's son went to pt's house for a well check and noticed pt very weak and having difficulty walking.  Hx of multiple falls over last several weeks.  Pt has not been to MD for the falls.  Pt admits to daily drinking of vodka and goes through 1.75 every 4 days.  Pt stopped drinking 4 days ago.  Pt tremulous and jaundice.

## 2024-05-19 NOTE — ED PROVIDER NOTES
History     Chief Complaint   Patient presents with    Fall     Pt states he has had dizziness for decades and has had 3 falls in last 2 weeks and has hit head all 3 times. Pt has abrasions and bruising noted to head and arms.      HPI  Getachew Barcenas is a 54 year old male who presents with a history of alcohol use disorder severe.  In the past he would drink a 1.75 L of vodka per day.  He tells me since retiring age 50 i has worsened .      History of an associated electrolyte disorders including hypoglycemia hypothyroidism, hypophosphatemia, hypomagnesemia, hypokalemia.  Anemia.  Chronic tremor.  Prior metabolic acidosis anion gap likely due to alcoholic ketoacidosis, thiamine deficiency.  Chronic tremor, fatty liver.    I reviewed at least 3 prior notes including his prior emergency department visits for choledocholithiasis as well as his hospital admission in March 2017 [facility] for alcohol intoxication.  December he was diagnosed with cholecystitis and choledocholithiasis with LFTs and total bili elevated he underwent ERCP.   In January he had a ERCP with biliary stent removal.    March 17 through 24 -he had an admission for alcoholic intoxication with hypomagnesemia history of paroxysmal V. tach and a chronic subdural hematoma.  He went through withdrawal protocol.  He was discharged home and ultimately did go through treatment programs.  He had left AMA from that hospital stay.    He presents today with his son.  He has had multiple falls.  He has been drinking heavily again since treatment program.  He tells me he stopped drinking about 4 days ago but I am suspicious this is not correct and I smell some alcohol in the room.  He describes his last fall as being about 6 to 7 days ago.  He struck his frontal forehead may have landed on his left shoulder.  The left shoulder has relatively normal range of motion and no significant current pain.  With the falls he had no associated chest pain shortness of  breath.  He has been able to ambulate but feels unbalanced.  He is not interested in going through treatment.  He tells me he wanted to quit on his own.  He understands the risks of alcohol withdrawal and continued alcohol complications.  Denies any history of cirrhosis.  He has a chronic tremor       Allergies:  Allergies   Allergen Reactions    Oxycodone Hallucination       Problem List:    Patient Active Problem List    Diagnosis Date Noted    Lactic acidosis 03/17/2023     Priority: Medium    Ventricular tachycardia (paroxysmal) (H) 03/17/2023     Priority: Medium    Chronic subdural hematoma (H) 03/17/2023     Priority: Medium    Alcoholic intoxication without complication (H24) 03/17/2023     Priority: Medium    Low magnesium level 03/17/2023     Priority: Medium    Subdural hematoma (H) 11/03/2022     Priority: Medium    Other neutropenia (H24) 11/03/2022     Priority: Medium    Metabolic acidosis, increased anion gap 11/03/2022     Priority: Medium    Thrombocytopenia (H24) 11/03/2022     Priority: Medium    Alcoholic intoxication with complication (H24) 11/03/2022     Priority: Medium    Abdominal wall pain in left upper quadrant 11/02/2022     Priority: Medium    Abnormal TSH 11/02/2022     Priority: Medium    Anxiety 11/02/2022     Priority: Medium    Blunt abdominal trauma, initial encounter 11/02/2022     Priority: Medium    Constipation 11/02/2022     Priority: Medium    PVC's (premature ventricular contractions) 11/02/2022     Priority: Medium    Anemia due to unknown mechanism 09/14/2022     Priority: Medium    Nonalcoholic fatty liver disease 09/14/2022     Priority: Medium    Hepatic steatosis 09/06/2022     Priority: Medium    Alcohol abuse 08/24/2022     Priority: Medium     Formatting of this note might be different from the original.  Relapse in 2022.      Alcohol use disorder, severe, dependence (H) 02/24/2022     Priority: Medium    Right forearm cellulitis 02/23/2022     Priority: Medium     Cat bite 02/23/2022     Priority: Medium    Hypokalemia 02/20/2022     Priority: Medium    Lactic acid acidosis 02/20/2022     Priority: Medium    Mood disorder (H24) 02/20/2022     Priority: Medium    LFT elevation 02/19/2022     Priority: Medium    Hypophosphatemia 02/19/2022     Priority: Medium    Hypomagnesemia 02/19/2022     Priority: Medium    Hypoglycemia 02/18/2022     Priority: Medium    Hypothyroidism 02/18/2022     Priority: Medium    Falls frequently 02/18/2022     Priority: Medium    Hypoxemia associated with sleep 12/21/2021     Priority: Medium    Obstructive sleep apnea syndrome 12/21/2021     Priority: Medium    Periodic limb movement disorder 12/21/2021     Priority: Medium    Insomnia 11/10/2021     Priority: Medium    Parasomnia 11/10/2021     Priority: Medium    Inadequate sleep hygiene 11/10/2021     Priority: Medium    Tremor 11/10/2021     Priority: Medium    Thiamine deficiency 10/11/2021     Priority: Medium    Peripheral neuropathy 09/02/2021     Priority: Medium    Vertigo 09/02/2021     Priority: Medium    Hypertriglyceridemia 10/22/2017     Priority: Medium    Contusion of head of pancreas, initial encounter 07/04/2017     Priority: Medium    Impingement syndrome, shoulder, left 07/15/2016     Priority: Medium    Sinus arrhythmia seen on electrocardiogram 07/25/2014     Priority: Medium    Diverticulosis 05/29/2009     Priority: Medium    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  Graves' Disease          Past Medical History:    Past Medical History:   Diagnosis Date    Alcohol use disorder, severe, dependence (H) 2/24/2022       Past Surgical History:    Past Surgical History:   Procedure Laterality Date    COLONOSCOPY  11/01/2023    ENDOSCOPIC RETROGRADE CHOLANGIOPANCREATOGRAM N/A 12/8/2023    Procedure: ENDOSCOPIC RETROGRADE CHOLANGIOPANCREATOGRAPHY, WITH biliary sphincterotomy, stone extraction, and stent  placement;  Surgeon: Brian Witt MD;  Location: [facility] OR    ENDOSCOPIC RETROGRADE CHOLANGIOPANCREATOGRAM N/A 1/25/2024    Procedure: Endoscopic retrograde cholangiopancreatography with biliary stent removal and debris removal;  Surgeon: Brian Witt MD;  Location: [facility] OR    IR CAROTID CEREBRAL ANGIOGRAM BILATERAL  11/07/2022    LAPAROSCOPIC CHOLECYSTECTOMY N/A 1/25/2024    Procedure: Laparoscopic cholecystectomy;  Surgeon: Getachew Torres MD;  Location: [facility] OR    PICC TRIPLE LUMEN PLACEMENT  11/03/2022            Family History:    No family history on file.    Social History:  Marital Status:   [4]  Social History     Tobacco Use    Smoking status: Never    Smokeless tobacco: Current     Types: Chew   Substance Use Topics    Alcohol use: Not Currently    Drug use: No        Medications:    acamprosate (CAMPRAL) 333 MG EC tablet  acetaminophen (TYLENOL) 500 MG tablet  Cyanocobalamin (B-12) 1000 MCG TBCR  Ferrous Sulfate (IRON) 28 MG TABS  gabapentin (NEURONTIN) 300 MG capsule  levothyroxine (SYNTHROID/LEVOTHROID) 175 MCG tablet  MAGNESIUM-CALCIUM-FOLIC ACID PO  Multiple Vitamin (TAB-A-MARYJO) TABS  potassium chloride ER (KLOR-CON M) 20 MEQ CR tablet  senna-docusate (SENOKOT-S/PERICOLACE) 8.6-50 MG tablet  sildenafil (VIAGRA) 25 MG tablet  thiamine (B-1) 100 MG tablet  traZODone (DESYREL) 50 MG tablet          Review of Systems   Constitutional:  Negative for chills, diaphoresis and fever.   HENT:  Negative for ear pain, sinus pressure and sore throat.    Eyes:  Negative for visual disturbance.   Respiratory:  Negative for cough, shortness of breath and wheezing.    Cardiovascular:  Negative for chest pain and palpitations.   Gastrointestinal:  Negative for abdominal pain, blood in stool, constipation, diarrhea, nausea and vomiting.   Genitourinary:  Negative for dysuria, frequency and urgency.   Skin:  Negative for rash.   Neurological:  Positive for dizziness and tremors. Negative for  headaches.   All other systems reviewed and are negative.      Physical Exam   BP: 99/68  Pulse: (!) 142  Temp: 100.1  F (37.8  C)  Resp: 24  Weight: 102.1 kg (225 lb)      Physical Exam  Constitutional:       General: He is in acute distress.      Appearance: He is not diaphoretic.   Eyes:      Conjunctiva/sclera: Conjunctivae normal.   Cardiovascular:      Rate and Rhythm: Normal rate and regular rhythm.      Heart sounds: No murmur heard.  Pulmonary:      Effort: Pulmonary effort is normal. No respiratory distress.      Breath sounds: Normal breath sounds. No stridor. No wheezing or rhonchi.   Abdominal:      General: Abdomen is flat. There is no distension.      Palpations: Abdomen is soft. There is no mass.      Tenderness: There is no abdominal tenderness. There is no guarding.   Musculoskeletal:      Cervical back: Neck supple.      Right lower leg: No edema.      Left lower leg: No edema.   Skin:     Coloration: Skin is not pale.      Findings: No rash.   Neurological:      General: No focal deficit present.      Mental Status: He is alert and oriented to person, place, and time.      Cranial Nerves: No cranial nerve deficit.      Sensory: No sensory deficit.      Motor: No weakness.      Coordination: Coordination normal.         Frontal forehead is with contusion right side.  No drainage from the ears or nose.  No epistaxis.  No raccoon's eyes or Mak sign.  Neck is relatively normal without obvious midline tenderness to palpation but his exam I believe is not reliable due to suspected alcohol use.  His midline rustic spine is tender to palpation around the T8 region.  He has normal upper extremity motor function no ulnar median radial nerve dysfunction motor or sensory.  Lower extremities with normal motor function.  He has no other findings of trauma.  Pelvic compression is negative.  Extremities show equal and symmetric range of motion and shoulder range of motion bilaterally appears to be full  without significant pain.  ED Course        Procedures                  EKG Interpretation:      Interpreted by Kamaljit Shultz MD  KG done at 1222 hrs. demonstrates a sinus tachycardia 116 bpm left axis.  There is marginal ST depression upsloping in V5 V6.  There is a normal R progression.  No Q waves.  Normal intervals.  Normal conduction.  No ectopy.  Impression sinus tachycardia 116 bpm with a strain pattern laterally.    Critical Care time:  none               Results for orders placed or performed during the hospital encounter of 05/19/24 (from the past 24 hour(s))   [place] Draw    Narrative    The following orders were created for panel order [place] Draw.  Procedure                               Abnormality         Status                     ---------                               -----------         ------                     Extra Blue Top Tube[[id number]]                              Final result               Extra Green Top (Lithium...[[id number]]                      Final result               Extra Purple Top Tube[[id number]]                            Final result                 Please view results for these tests on the individual orders.   Extra Blue Top Tube   Result Value Ref Range    Hold Specimen JIC    Extra Green Top (Lithium Heparin) Tube   Result Value Ref Range    Hold Specimen JIC    Extra Purple Top Tube   Result Value Ref Range    Hold Specimen JIC    CBC with platelets differential    Narrative    The following orders were created for panel order CBC with platelets differential.  Procedure                               Abnormality         Status                     ---------                               -----------         ------                     CBC with platelets and d...[[id number]]  Abnormal            Final result               RBC and Platelet Morphology[[id number]]                                                   Please view results for these tests on the individual orders.    Comprehensive metabolic panel   Result Value Ref Range    Sodium 129 (L) 135 - 145 mmol/L    Potassium 3.2 (L) 3.4 - 5.3 mmol/L    Carbon Dioxide (CO2) 20 (L) 22 - 29 mmol/L    Anion Gap 28 (H) 7 - 15 mmol/L    Urea Nitrogen 9.6 6.0 - 20.0 mg/dL    Creatinine 1.03 0.67 - 1.17 mg/dL    GFR Estimate 86 >60 mL/min/1.73m2    Calcium 9.4 8.6 - 10.0 mg/dL    Chloride 81 (L) 98 - 107 mmol/L    Glucose 163 (H) 70 - 99 mg/dL    Alkaline Phosphatase 145 40 - 150 U/L     (H) 0 - 45 U/L     (H) 0 - 70 U/L    Protein Total 7.8 6.4 - 8.3 g/dL    Albumin 4.7 3.5 - 5.2 g/dL    Bilirubin Total 9.3 (H) <=1.2 mg/dL   Magnesium   Result Value Ref Range    Magnesium 1.4 (L) 1.7 - 2.3 mg/dL   Lipase   Result Value Ref Range    Lipase 219 (H) 13 - 60 U/L   Alcohol level blood   Result Value Ref Range    Alcohol ethyl <0.01 <=0.01 g/dL   CBC with platelets and differential   Result Value Ref Range    WBC Count 7.7 4.0 - 11.0 10e3/uL    RBC Count 4.09 (L) 4.40 - 5.90 10e6/uL    Hemoglobin 14.2 13.3 - 17.7 g/dL    Hematocrit 39.5 (L) 40.0 - 53.0 %    MCV 97 78 - 100 fL    MCH 34.7 (H) 26.5 - 33.0 pg    MCHC 35.9 31.5 - 36.5 g/dL    RDW 12.2 10.0 - 15.0 %    Platelet Count 118 (L) 150 - 450 10e3/uL    % Neutrophils 78 %    % Lymphocytes 10 %    % Monocytes 10 %    % Eosinophils 0 %    % Basophils 0 %    % Immature Granulocytes 1 %    NRBCs per 100 WBC 1 (H) <1 /100    Absolute Neutrophils 6.1 1.6 - 8.3 10e3/uL    Absolute Lymphocytes 0.8 0.8 - 5.3 10e3/uL    Absolute Monocytes 0.8 0.0 - 1.3 10e3/uL    Absolute Eosinophils 0.0 0.0 - 0.7 10e3/uL    Absolute Basophils 0.0 0.0 - 0.2 10e3/uL    Absolute Immature Granulocytes 0.1 <=0.4 10e3/uL    Absolute NRBCs 0.1 10e3/uL   TSH with free T4 reflex   Result Value Ref Range    TSH 18.81 (H) 0.30 - 4.20 uIU/mL   T4 free   Result Value Ref Range    Free T4 1.00 0.90 - 1.70 ng/dL   Ketone Beta-Hydroxybutyrate Quantitative   Result Value Ref Range    Ketone (Beta-Hydroxybutyrate)  Quantitative 2.20 (HH) <=0.30 mmol/L   CK total   Result Value Ref Range     (H) 39 - 308 U/L   INR   Result Value Ref Range    INR 1.12 0.85 - 1.15   Head CT w/o contrast    Narrative    EXAM: CT HEAD WITHOUT CONTRAST  LOCATION: [facility]  DATE: 05/19/2024    INDICATION: Closed head injury.  COMPARISON: Head CT 11/08/2023.  TECHNIQUE: Routine CT Head without IV contrast. Multiplanar reformats. Dose reduction techniques were used.    FINDINGS:  INTRACRANIAL CONTENTS: There is a stable isodense subdural collection along the lateral convexity measuring 5 mm maximum thickness. No evidence for new or increasing intracranial hemorrhage. No CT evidence of acute infarct. Mild presumed chronic small   vessel ischemic changes. Mild generalized volume loss. No hydrocephalus.     VISUALIZED ORBITS/SINUSES/MASTOIDS: No intraorbital abnormality. No paranasal sinus mucosal disease. No middle ear or mastoid effusion.    BONES/SOFT TISSUES: No acute abnormality.      Impression    IMPRESSION:  1.  Stable thin isodense subdural collection along the left cerebral convexity measuring up to 5 mm maximum thickness. No evidence for new or increasing intracranial hemorrhage.  2.  No CT evidence for acute intracranial process.  3.  Brain atrophy and presumed chronic microvascular ischemic changes as above.     CT Cervical Spine w/o Contrast    Narrative    EXAM: CT CERVICAL SPINE WITHOUT CONTRAST  LOCATION: [facility]  DATE: 05/19/2024    INDICATION: Closed head injury. Intoxicated. Evaluate neck; Neck pain; Trauma; Significant trauma.  COMPARISON: CT cervical spine 11/02/2022.  TECHNIQUE: Routine CT Cervical Spine without IV contrast. Multiplanar reformats. Dose reduction techniques were used.    FINDINGS:  VERTEBRA: There is normal alignment of the cervical vertebrae; however, there is straightening of normal cervical lordosis. Vertebral body heights of the cervical spine are  normal. Craniocervical alignment is normal. No evidence for fracture.       Impression    IMPRESSION:  1.  No fracture or posttraumatic subluxation.  2.  No high-grade spinal canal or neural foraminal stenosis.     Chest XR,  PA & LAT    Narrative    EXAM: XR CHEST 2 VIEWS  LOCATION: [facility]  DATE: 5/19/2024    INDICATION: syncope  COMPARISON: None.      Impression    IMPRESSION: Negative chest.   Thoracic spine XR, 3 views    Narrative    EXAM: XR THORACIC SPINE 3 VIEWS  LOCATION: [facility]  DATE: 5/19/2024    INDICATION: midline thoracic back pain   T8  COMPARISON: None.      Impression    IMPRESSION: No fracture. Normal vertebral heights. Trace dextrocurvature. Mild disc height loss. Normal extraspinal structures.    Lactic acid whole blood   Result Value Ref Range    Lactic Acid 4.2 (HH) 0.7 - 2.0 mmol/L   Blood gas venous   Result Value Ref Range    pH Venous 7.51 (H) 7.32 - 7.43    pCO2 Venous 35 (L) 40 - 50 mm Hg    pO2 Venous 35 25 - 47 mm Hg    Bicarbonate Venous 28 21 - 28 mmol/L    Base Excess/Deficit Venous 5.3 (H) -3.0 - 3.0 mmol/L    FIO2 0     Oxyhemoglobin Venous 69 (L) 70 - 75 %    O2 Sat, Venous 70.9 70.0 - 75.0 %    Narrative    In healthy individuals, oxyhemoglobin (O2Hb) and oxygen saturation (SO2) are approximately equal. In the presence of dyshemoglobins, oxyhemoglobin can be considerably lower than oxygen saturation.   Ketone Beta-Hydroxybutyrate Quantitative   Result Value Ref Range    Ketone (Beta-Hydroxybutyrate) Quantitative 0.60 (H) <=0.30 mmol/L   Lactic acid whole blood   Result Value Ref Range    Lactic Acid 4.2 (HH) 0.7 - 2.0 mmol/L   Osmolality   Result Value Ref Range    Osmolality Blood 284 275 - 295 mmol/kg    Narrative    Greater than 385 mmol/kg relates to stupor in hyperglycemia   Greater than 400 mmol/kg can relate to seizures   Greater than 420 mmol/kg can be lethal    Serum Osmalar Gap:   Normal <10   Larger  suggest unmeasured substances present in serum (ethanol, methanol, isopropanol, mannitol, ethylene glycol).   Phosphorus   Result Value Ref Range    Phosphorus 0.5 (LL) 2.5 - 4.5 mg/dL   Bilirubin direct   Result Value Ref Range    Bilirubin Direct 4.43 (H) 0.00 - 0.30 mg/dL   Vitamin B12   Result Value Ref Range    Vitamin B12 927 232 - 1,245 pg/mL   UA with Microscopic reflex to Culture    Specimen: Urine, Clean Catch   Result Value Ref Range    Color Urine Anaya (A) Colorless, Straw, Light Yellow, Yellow    Appearance Urine Clear Clear    Glucose Urine >499 (A) Negative mg/dL    Bilirubin Urine Moderate (A) Negative    Ketones Urine 5 (A) Negative mg/dL    Specific Gravity Urine 1.030 1.003 - 1.035    Blood Urine Negative Negative    pH Urine 6.0 5.0 - 7.0    Protein Albumin Urine 100 (A) Negative mg/dL    Urobilinogen Urine 4.0 (A) Normal, 2.0 mg/dL    Nitrite Urine Negative Negative    Leukocyte Esterase Urine Negative Negative    Mucus Urine Present (A) None Seen /LPF    RBC Urine 7 (H) <=2 /HPF    WBC Urine 1 <=5 /HPF    Squamous Epithelials Urine <1 <=1 /HPF    Hyaline Casts Urine 8 (H) <=2 /LPF    Narrative    Urine Culture not indicated   Lactic Acid Whole Blood with 1X Repeat in 2 HR when >2   Result Value Ref Range    Lactic Acid, Initial 3.3 (H) 0.7 - 2.0 mmol/L   Acetaminophen level   Result Value Ref Range    Acetaminophen <5.0 (L) 10.0 - 30.0 ug/mL       Medications   sodium chloride 0.9% BOLUS 1,000 mL (has no administration in time range)   sodium chloride 0.9 % infusion (has no administration in time range)   multivitamin, therapeutic (THERA-VIT) tablet 1 tablet (has no administration in time range)   folic acid (FOLVITE) tablet 1 mg (has no administration in time range)   thiamine (B-1) tablet 100 mg (has no administration in time range)   magnesium oxide (MAG-OX) tablet 800 mg (has no administration in time range)       Assessments & Plan (with Medical Decision Making)     MDM: Getachew Barcenas is  a 54 year old male who presents with a history of alcohol abuse and recurrent treatment programs multiple falls continues to drink.  Tells me he has not drank in about 4 days and mildly tremulous here his heart rate is elevated but appears to be a sinus tachycardia.  Plan for IV fluids, broad laboratory testing CT head CT cervical spine chest x-ray thoracic spine x-ray.  EKG.  Rally pack with thiamine.  Once this is given could switch to D5 LR for likely alcoholic ketoacidosis.  Check an alcohol level as I suspect he is still using.  Discussed with patient and his son that I would strongly encourage treatment program detox.  He is not interested currently.  Asked him to reconsider during the evaluation    Started correction for hypomagnesemia hypokalemia hypophosphatemia.  Discussed staying at least on an observation to continue electrolyte replacement and correction of acid-base disorders.  This includes treatment of alcoholic ketoacidosis.  Other findings include hypothyroidism with noncompliance on Synthroid.  Patient agrees to stay on observation.  Discussed with [facility] who accepts for observation.  He is already received rally pack that includes thiamine.  He has been watched on CIWA protocol with as needed Valium      I have reviewed the nursing notes.    I have reviewed the findings, diagnosis, plan and need for follow up with the patient.           Medical Decision Making  The patient's presentation was of high complexity (a chronic illness severe exacerbation, progression, or side effect of treatment).    The patient's evaluation involved:  ordering and/or review of 3+ test(s) in this encounter (see separate area of note for details)    The patient's management necessitated high risk (a decision regarding hospitalization).        New Prescriptions    No medications on file       Final diagnoses:   Alcohol withdrawal, uncomplicated (H)   Hypomagnesemia   Falls frequently   Hypokalemia       5/19/2024   M  [facility] EMERGENCY DEPT       Kamaljit Shultz MD  05/19/24 9243

## 2024-05-19 NOTE — ED TRIAGE NOTES
Pt states he has had dizziness for decades and has had 3 falls in last 2 weeks and has hit head all 3 times. Pt has abrasions and bruising noted to head and arms. Pt was heavy drinker up until 4 days ago and quit cold turkey. Pt's eyes are jaundice.      Triage Assessment (Adult)       Row Name 05/19/24 1101          Triage Assessment    Airway WDL WDL        Respiratory WDL    Respiratory WDL WDL        Skin Circulation/Temperature WDL    Skin Circulation/Temperature WDL X  bruising        Peripheral/Neurovascular WDL    Peripheral Neurovascular WDL WDL        Cognitive/Neuro/Behavioral WDL    Cognitive/Neuro/Behavioral WDL WDL

## 2024-05-19 NOTE — MEDICATION SCRIBE - ADMISSION MEDICATION HISTORY
Medication Scribe Admission Medication History    Admission medication history is complete. The information provided in this note is only as accurate as the sources available at the time of the update.    Information Source(s): Patient via in-person    Pertinent Information: Patient has had trouble getting out of bed to take medications, only using Levothyroxine consistently currently    Changes made to PTA medication list:  Added: None  Deleted: Campral 333 mg, Vitamin B-12, Ferrous Sulfate, Gabapentin, Mag-Calcium-Folic Acid, Klor-Con 20 mEq, Senokot, Thiamine B-1  Changed: None    Allergies reviewed with patient and updates made in EHR: yes    Medication History Completed By: Litzy Cardenas 5/19/2024 5:33 PM    PTA Med List   Medication Sig Last Dose    acetaminophen (TYLENOL) 500 MG tablet [ACETAMINOPHEN (TYLENOL) 500 MG TABLET] Take 1-2 tablets (500-1,000 mg total) by mouth every 6 (six) hours as needed for pain. Unknown at prn    levothyroxine (SYNTHROID/LEVOTHROID) 175 MCG tablet Take 175 mcg by mouth every morning 5/19/2024 at am    Multiple Vitamin (TAB-A-MARYJO) TABS Take 1 tablet by mouth daily Past Week    traZODone (DESYREL) 100 MG tablet Take 100 mg by mouth at bedtime Past Week

## 2024-05-19 NOTE — ED TRIAGE NOTES
Triage Assessment (Adult)       Row Name 05/19/24 1101          Triage Assessment    Airway WDL WDL        Respiratory WDL    Respiratory WDL WDL        Skin Circulation/Temperature WDL    Skin Circulation/Temperature WDL X  bruising        Peripheral/Neurovascular WDL    Peripheral Neurovascular WDL WDL        Cognitive/Neuro/Behavioral WDL    Cognitive/Neuro/Behavioral WDL WDL

## 2024-05-20 ENCOUNTER — APPOINTMENT (OUTPATIENT)
Dept: PHYSICAL THERAPY | Facility: CLINIC | Age: 54
End: 2024-05-20
Payer: COMMERCIAL

## 2024-05-20 PROBLEM — R00.0 TACHYCARDIA: Status: ACTIVE | Noted: 2024-05-20

## 2024-05-20 PROBLEM — S00.83XA CONTUSION OF FOREHEAD, INITIAL ENCOUNTER: Status: ACTIVE | Noted: 2024-05-20
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
## 2024-05-20 LAB
ALBUMIN SERPL BCG-MCNC: 3.5 G/DL (ref 3.5–5.2)
ALP SERPL-CCNC: 102 U/L (ref 40–150)
ALT SERPL W P-5'-P-CCNC: 117 U/L (ref 0–70)
ANION GAP SERPL CALCULATED.3IONS-SCNC: 14 MMOL/L (ref 7–15)
AST SERPL W P-5'-P-CCNC: 218 U/L (ref 0–45)
BILIRUB SERPL-MCNC: 6.1 MG/DL
BUN SERPL-MCNC: 8.3 MG/DL (ref 6–20)
CALCIUM SERPL-MCNC: 8.3 MG/DL (ref 8.6–10)
CHLORIDE SERPL-SCNC: 89 MMOL/L (ref 98–107)
CREAT SERPL-MCNC: 0.92 MG/DL (ref 0.67–1.17)
DEPRECATED HCO3 PLAS-SCNC: 25 MMOL/L (ref 22–29)
EGFRCR SERPLBLD CKD-EPI 2021: >90 ML/MIN/1.73M2
ERYTHROCYTE [DISTWIDTH] IN BLOOD BY AUTOMATED COUNT: 12.1 % (ref 10–15)
GLUCOSE SERPL-MCNC: 112 MG/DL (ref 70–99)
HCT VFR BLD AUTO: 32.1 % (ref 40–53)
HGB BLD-MCNC: 11.4 G/DL (ref 13.3–17.7)
MAGNESIUM SERPL-MCNC: 1.8 MG/DL (ref 1.7–2.3)
MCH RBC QN AUTO: 34.5 PG (ref 26.5–33)
MCHC RBC AUTO-ENTMCNC: 35.5 G/DL (ref 31.5–36.5)
MCV RBC AUTO: 97 FL (ref 78–100)
PHOSPHATE SERPL-MCNC: 1 MG/DL (ref 2.5–4.5)
PHOSPHATE SERPL-MCNC: 1.9 MG/DL (ref 2.5–4.5)
PHOSPHATE SERPL-MCNC: 2.3 MG/DL (ref 2.5–4.5)
PLATELET # BLD AUTO: 87 10E3/UL (ref 150–450)
POTASSIUM SERPL-SCNC: 3.6 MMOL/L (ref 3.4–5.3)
PROT SERPL-MCNC: 6 G/DL (ref 6.4–8.3)
RBC # BLD AUTO: 3.3 10E6/UL (ref 4.4–5.9)
SODIUM SERPL-SCNC: 128 MMOL/L (ref 135–145)
WBC # BLD AUTO: 5.3 10E3/UL (ref 4–11)

## 2024-05-20 PROCEDURE — 84100 ASSAY OF PHOSPHORUS: CPT | Performed by: INTERNAL MEDICINE

## 2024-05-20 PROCEDURE — 250N000009 HC RX 250: Performed by: INTERNAL MEDICINE

## 2024-05-20 PROCEDURE — 250N000013 HC RX MED GY IP 250 OP 250 PS 637: Performed by: INTERNAL MEDICINE

## 2024-05-20 PROCEDURE — 36415 COLL VENOUS BLD VENIPUNCTURE: CPT

## 2024-05-20 PROCEDURE — G0378 HOSPITAL OBSERVATION PER HR: HCPCS

## 2024-05-20 PROCEDURE — 258N000003 HC RX IP 258 OP 636

## 2024-05-20 PROCEDURE — 85027 COMPLETE CBC AUTOMATED: CPT

## 2024-05-20 PROCEDURE — 250N000011 HC RX IP 250 OP 636

## 2024-05-20 PROCEDURE — 250N000013 HC RX MED GY IP 250 OP 250 PS 637

## 2024-05-20 PROCEDURE — 84100 ASSAY OF PHOSPHORUS: CPT | Performed by: FAMILY MEDICINE

## 2024-05-20 PROCEDURE — 97530 THERAPEUTIC ACTIVITIES: CPT | Mod: GP

## 2024-05-20 PROCEDURE — 120N000001 HC R&B MED SURG/OB

## 2024-05-20 PROCEDURE — 258N000003 HC RX IP 258 OP 636: Performed by: INTERNAL MEDICINE

## 2024-05-20 PROCEDURE — 36415 COLL VENOUS BLD VENIPUNCTURE: CPT | Performed by: INTERNAL MEDICINE

## 2024-05-20 PROCEDURE — 97161 PT EVAL LOW COMPLEX 20 MIN: CPT | Mod: GP

## 2024-05-20 PROCEDURE — 250N000009 HC RX 250

## 2024-05-20 PROCEDURE — 80053 COMPREHEN METABOLIC PANEL: CPT

## 2024-05-20 PROCEDURE — 99232 SBSQ HOSP IP/OBS MODERATE 35: CPT | Performed by: INTERNAL MEDICINE

## 2024-05-20 PROCEDURE — 83735 ASSAY OF MAGNESIUM: CPT | Performed by: INTERNAL MEDICINE

## 2024-05-20 RX ADMIN — THIAMINE HYDROCHLORIDE: 100 INJECTION, SOLUTION INTRAMUSCULAR; INTRAVENOUS at 00:27

## 2024-05-20 RX ADMIN — SODIUM PHOSPHATE, MONOBASIC, MONOHYDRATE AND SODIUM PHOSPHATE, DIBASIC, ANHYDROUS 15 MMOL: 142; 276 INJECTION, SOLUTION INTRAVENOUS at 23:34

## 2024-05-20 RX ADMIN — OLANZAPINE 5 MG: 5 TABLET, ORALLY DISINTEGRATING ORAL at 11:31

## 2024-05-20 RX ADMIN — TRAZODONE HYDROCHLORIDE 200 MG: 100 TABLET ORAL at 21:16

## 2024-05-20 RX ADMIN — GABAPENTIN 900 MG: 300 CAPSULE ORAL at 02:58

## 2024-05-20 RX ADMIN — LORAZEPAM 2 MG: 2 INJECTION INTRAMUSCULAR; INTRAVENOUS at 14:33

## 2024-05-20 RX ADMIN — LORAZEPAM 1 MG: 2 INJECTION INTRAMUSCULAR; INTRAVENOUS at 12:22

## 2024-05-20 RX ADMIN — Medication 1 TABLET: at 08:34

## 2024-05-20 RX ADMIN — OLANZAPINE 5 MG: 5 TABLET, ORALLY DISINTEGRATING ORAL at 11:35

## 2024-05-20 RX ADMIN — CLONIDINE HYDROCHLORIDE 0.1 MG: 0.1 TABLET ORAL at 20:33

## 2024-05-20 RX ADMIN — LEVOTHYROXINE SODIUM 175 MCG: 0.15 TABLET ORAL at 06:00

## 2024-05-20 RX ADMIN — MECLIZINE HYDROCHLORIDE 25 MG: 25 TABLET ORAL at 20:33

## 2024-05-20 RX ADMIN — CLONIDINE HYDROCHLORIDE 0.1 MG: 0.1 TABLET ORAL at 11:28

## 2024-05-20 RX ADMIN — THIAMINE HCL TAB 100 MG 100 MG: 100 TAB at 08:34

## 2024-05-20 RX ADMIN — GABAPENTIN 900 MG: 300 CAPSULE ORAL at 20:32

## 2024-05-20 RX ADMIN — LORAZEPAM 2 MG: 1 TABLET ORAL at 20:33

## 2024-05-20 RX ADMIN — LORAZEPAM 1 MG: 1 TABLET ORAL at 21:15

## 2024-05-20 RX ADMIN — SODIUM PHOSPHATE, MONOBASIC, MONOHYDRATE AND SODIUM PHOSPHATE, DIBASIC, ANHYDROUS 15 MMOL: 142; 276 INJECTION, SOLUTION INTRAVENOUS at 05:58

## 2024-05-20 RX ADMIN — CLONIDINE HYDROCHLORIDE 0.1 MG: 0.1 TABLET ORAL at 02:58

## 2024-05-20 RX ADMIN — SODIUM PHOSPHATE, MONOBASIC, MONOHYDRATE AND SODIUM PHOSPHATE, DIBASIC, ANHYDROUS 15 MMOL: 142; 276 INJECTION, SOLUTION INTRAVENOUS at 08:29

## 2024-05-20 RX ADMIN — Medication 1 TABLET: at 11:28

## 2024-05-20 RX ADMIN — GABAPENTIN 900 MG: 300 CAPSULE ORAL at 10:29

## 2024-05-20 RX ADMIN — FOLIC ACID 1 MG: 1 TABLET ORAL at 08:34

## 2024-05-20 RX ADMIN — LORAZEPAM 1 MG: 2 INJECTION INTRAMUSCULAR; INTRAVENOUS at 11:42

## 2024-05-20 ASSESSMENT — ACTIVITIES OF DAILY LIVING (ADL)
ADLS_ACUITY_SCORE: 34
ADLS_ACUITY_SCORE: 35
ADLS_ACUITY_SCORE: 33
ADLS_ACUITY_SCORE: 26
DEPENDENT_IADLS:: TRANSPORTATION;SHOPPING
ADLS_ACUITY_SCORE: 34
ADLS_ACUITY_SCORE: 34
ADLS_ACUITY_SCORE: 33
ADLS_ACUITY_SCORE: 33
ADLS_ACUITY_SCORE: 26
ADLS_ACUITY_SCORE: 26
ADLS_ACUITY_SCORE: 34
ADLS_ACUITY_SCORE: 33
ADLS_ACUITY_SCORE: 26
ADLS_ACUITY_SCORE: 26
ADLS_ACUITY_SCORE: 34
ADLS_ACUITY_SCORE: 26
ADLS_ACUITY_SCORE: 34
ADLS_ACUITY_SCORE: 24
ADLS_ACUITY_SCORE: 34
ADLS_ACUITY_SCORE: 33
ADLS_ACUITY_SCORE: 26

## 2024-05-20 NOTE — PROGRESS NOTES
[facility] ADMISSION NOTE    Patient admitted to room 2405 at approximately 1845 via cart from emergency room. Patient was accompanied by transport tech.     Verbal SBAR report received from Linh GUERRA prior to patient arrival.     Patient ambulated to bed with one assist. Patient alert and oriented X 3. The patient is not having any pain.  . Admission vital signs: Blood pressure 103/83, pulse 115, temperature 97.8  F (36.6  C), temperature source Oral, resp. rate 12, weight 108.7 kg (239 lb 10.2 oz), SpO2 95%. Patient was oriented to plan of care, call light, bed controls, tv, telephone, bathroom, and visiting hours.     Risk Assessment    The following safety risks were identified during admission: fall and skin. Yellow risk band applied: YES.     Skin Initial Assessment    This writer admitted this patient and completed a full skin assessment and Misael score in the Adult PCS flowsheet. Appropriate interventions initiated as needed.     Pt refused skin assessment.         Education    Patient has a [place] to Observation order: Yes  Observation education completed and documented: Yes      Brenda Borjas RN

## 2024-05-20 NOTE — PROGRESS NOTES
[facility] Medicine Progress Note  Date of Service (when I saw the patient): 05/20/2024    REASON FOR ADMISSION / INTERVAL HISTORY:  Getachew Barcenas is a 54 year old male with past medical hx of severe alcohol use disorder, frequent falls due to vertigo, paroxysmal Vtach, chronic subdural hematoma, LULI, anxiety who presents on 5/19/2024 with 3 falls in the last 2 weeks and alcohol withdrawal.       Assessment/ Plan     Frequent falls   Chronic vertigo  Peripheral neuropathy    Has severe alcohol use disorder as below. Hx of frequent falls while drinking. Has had 3 falls in last 2 weeks due to increased alcohol intake, chronic vertigo, and neuropathy in toes. Last fall 6-7d ago. Ht his head every time. CT head and c-spine only shows stable chronic subdural hematoma (see below). He has chronic vertigo due to the subdural hematoma, which he states nothing has worked for. He knows alcohol is contributing to his falls.     He arrived tachycardic to 120s. Tmax 100.1. WBC WNL. Does not meet SIRS criteria. Lactic acidosis as below.  B12 WNL. Lower suspicion for Wernickes, as he's had no neuro changes, no nystagmus, and CT head negative. Suspect falls are mainly due to intoxication.  In the ER he received Folate, Thiamine, 2L D5LR, Ceftriaxone. Low concern for infection.  Still tremulous. Eating well. No abd pain.   - PT/OT/SW consulted  - continue neurontin protocol for ETOH withdrawal(apparently was on neurontin in past-stopped taking)  -  Meclizine 25 TID PRN for vertigo  - Blood cultures pending     Alcohol use disorder, severe, with acute withdrawal  Resting tremor, acute on chronic    Pt has been to IP tx twice, last time in summer 2023. He was admitted in 3/2023 for withdrawal and SW was arranging IP tx, but it took too long so he left AMA. He states after his IP tx he started drinking more. He used to drink a 1.75L bottle of vodka every 4d, but now drinks 1.75L every 1-2d.     He  hasn't taken his PTA meds in 2 weeks due to being fatigued and not being able to get out of bed. He attributes his fatigue to not being on his Levothyroxine (see below), and doesn't believe alcohol is contributing to his fatigue. He last drank 5/15. Alcohol WNL on admit.    In acute mild withdrawal on admission. Has a resting tremor at baseline, but it's worse on admission.    On admission he states he wants to stop drinking, but doesn't want IP tx, Acamprosate, or Naltrexone. He states Acamprosate made his chronic dizziness worse.   LFTs improving.    - CIWA protocol with IV vitamins/ neurontin  - Restart thiamine on discharge. Pt no longer taking these.   - SW to see for rx options     Lactic acidosis  Elevated anion gap with ketosis, likely alcoholic  Lactate 4.2 ?  4.2 ?  3.3. Ketones 2.2 ?  0.6.  Anion gap 28. VBG with pH 7.51, CO2 35, bicarb 28. Likely due to severe alcohol use and liver impairment.    -continue  LR 50ml/hr    Low grade fever  ? Etiology. Had temp of 100.1 in ER. No sn/sx of infection. Cxr negative for infection. UA negative.  Has remained afebrile so far. Follow blood cx ordered in ED.     Chronic subdural hematoma (H)    Hx of chronic subdural hematoma due to frequent falls while drinking alcohol. S/p emobolization 11/2022. Follows with neurosurgery at [facility] with serial imaging, last visit 11/8/23. CT head on admission shows stable SDH.      Hepatic steatosis  Thrombocytopenia (H24)  Elevated LFTs, acute on chronic    Pt had choledocholithiasis in 12/2023. He was tx with ERCP and cholecystectomy. He had an ERCP in 1/2024 for stent removal.     On admission  (baseline ~100),  (baseline ~100), Tbili 9.3 (baseline ~4), direct bili 4.43 (baseline ~5). . INR 1.12. Maddrey score 10. MELD 20.   LFTS improving. No intervention     Hypothyroidism  Toxic diffuse goiter  On admission TSH 18.81 and T4 1. Pt stopped taking Levothyroxine 2 wks ago due to fatigue as above.   Restarted  PTA Levothyroxine 175     Hypophosphatemia  Hypomagnesemia  Hypokalemia  Hyponatremia  On admission Mg 1.5. K 3.2. Phos 0.5. Na 129. Likely due to chronic alcohol use.   - RN repletion protocol  - Restart potassium and mag replacement on discharge. Pt reported no longer taking these     Elevated lipase  Lipase 219 on admission. Pt without abdominal pain or chest pain. No n/v.      Ventricular tachycardia (paroxysmal) (H)  Had possible non-sustained VT vs SVT on admission in 3/2023. Was asymptomatic during it.   Stable. Stop tele     Elevated CK  399 on admission. Denies myalgias. Likely due to alcohol use and several metabolic derangements.    DISPO  Will be in hospital 1-2 days atleast.             Diet: Regular Diet Adult    DVT Prophylaxis: Low Risk/Ambulatory with no VTE prophylaxis indicated and Ambulate every shift  Triana Catheter: Not present  Code Status: Full Code        KRISHNA LIN MD   Pg [phone]        ROS:  As described in A/P and Exam.  Otherwise ALL are  negative.    PHYSICAL EXAM:  All vitals have been reviewed    Blood pressure 117/71, pulse 97, temperature 97.7  F (36.5  C), temperature source Oral, resp. rate 16, weight 108.7 kg (239 lb 10.2 oz), SpO2 92%.    No intake/output data recorded.    GENERAL APPEARANCE: healthy, alert and no distress  EYES: conjunctiva clear, eyes grossly normal  HENT: external ears and nose normal   RESP: lungs clear to auscultation - no rales, rhonchi or wheezes  CV: regular rate and rhythm, normal S1 S2, no S3 or S4 and no murmur, click or rub   ABDOMEN: soft, nontender, no HSM or masses and bowel sounds normal  MS: no clubbing, cyanosis; no edema  SKIN: clear without significant rashes or lesions  NEURO: -non-focal moves all 4 extr    ROUTINE  LABS (Last four results)  CMP  Recent Labs   Lab 05/20/24  0259 05/19/24  1636 05/19/24  1118   *  --  129*   POTASSIUM 3.6  --  3.2*   CHLORIDE 89*  --  81*   CO2 25  --  20*   ANIONGAP 14  --  28*   *  --   163*   BUN 8.3  --  9.6   CR 0.92  --  1.03   GFRESTIMATED >90  --  86   ABDULKADIR 8.3*  --  9.4   MAG 1.8  --  1.4*   PHOS 1.0* 0.5*  --    PROTTOTAL 6.0*  --  7.8   ALBUMIN 3.5  --  4.7   BILITOTAL 6.1*  --  9.3*   ALKPHOS 102  --  145   *  --  337*   *  --  107*     CBC  Recent Labs   Lab 05/20/24  0259 05/19/24  1118   WBC 5.3 7.7   RBC 3.30* 4.09*   HGB 11.4* 14.2   HCT 32.1* 39.5*   MCV 97 97   MCH 34.5* 34.7*   MCHC 35.5 35.9   RDW 12.1 12.2   PLT 87* 118*     INR  Recent Labs   Lab 05/19/24  1118   INR 1.12     Arterial Blood Gas  Recent Labs   Lab 05/19/24  1351   O2PER 0       Recent Results (from the past 24 hour(s))   Head CT w/o contrast    Narrative    EXAM: CT HEAD WITHOUT CONTRAST  LOCATION: [facility]  DATE: 05/19/2024    INDICATION: Closed head injury.  COMPARISON: Head CT 11/08/2023.  TECHNIQUE: Routine CT Head without IV contrast. Multiplanar reformats. Dose reduction techniques were used.    FINDINGS:  INTRACRANIAL CONTENTS: There is a stable isodense subdural collection along the lateral convexity measuring 5 mm maximum thickness. No evidence for new or increasing intracranial hemorrhage. No CT evidence of acute infarct. Mild presumed chronic small   vessel ischemic changes. Mild generalized volume loss. No hydrocephalus.     VISUALIZED ORBITS/SINUSES/MASTOIDS: No intraorbital abnormality. No paranasal sinus mucosal disease. No middle ear or mastoid effusion.    BONES/SOFT TISSUES: No acute abnormality.      Impression    IMPRESSION:  1.  Stable thin isodense subdural collection along the left cerebral convexity measuring up to 5 mm maximum thickness. No evidence for new or increasing intracranial hemorrhage.  2.  No CT evidence for acute intracranial process.  3.  Brain atrophy and presumed chronic microvascular ischemic changes as above.     CT Cervical Spine w/o Contrast    Narrative    EXAM: CT CERVICAL SPINE WITHOUT CONTRAST  LOCATION: [facility]  DATE: 05/19/2024    INDICATION: Closed head injury. Intoxicated. Evaluate neck; Neck pain; Trauma; Significant trauma.  COMPARISON: CT cervical spine 11/02/2022.  TECHNIQUE: Routine CT Cervical Spine without IV contrast. Multiplanar reformats. Dose reduction techniques were used.    FINDINGS:  VERTEBRA: There is normal alignment of the cervical vertebrae; however, there is straightening of normal cervical lordosis. Vertebral body heights of the cervical spine are normal. Craniocervical alignment is normal. No evidence for fracture.       Impression    IMPRESSION:  1.  No fracture or posttraumatic subluxation.  2.  No high-grade spinal canal or neural foraminal stenosis.     Chest XR,  PA & LAT    Narrative    EXAM: XR CHEST 2 VIEWS  LOCATION: [facility]  DATE: 5/19/2024    INDICATION: syncope  COMPARISON: None.      Impression    IMPRESSION: Negative chest.   Thoracic spine XR, 3 views    Narrative    EXAM: XR THORACIC SPINE 3 VIEWS  LOCATION: [facility]  DATE: 5/19/2024    INDICATION: midline thoracic back pain   T8  COMPARISON: None.      Impression    IMPRESSION: No fracture. Normal vertebral heights. Trace dextrocurvature. Mild disc height loss. Normal extraspinal structures.

## 2024-05-20 NOTE — PLAN OF CARE
"  Problem: Adult Inpatient Plan of Care  Goal: Plan of Care Review  Description: The Plan of Care Review/Shift note should be completed every shift.  The Outcome Evaluation is a brief statement about your assessment that the patient is improving, declining, or no change.  This information will be displayed automatically on your shift  note.  Outcome: Progressing  Goal: Patient-Specific Goal (Individualized)  Description: You can add care plan individualizations to a care plan. Examples of Individualization might be:  \"Parent requests to be called daily at 9am for status\", \"I have a hard time hearing out of my right ear\", or \"Do not touch me to wake me up as it startles  me\".  Outcome: Progressing  Goal: Absence of Hospital-Acquired Illness or Injury  Outcome: Progressing  Intervention: Identify and Manage Fall Risk  Recent Flowsheet Documentation  Taken 5/20/2024 1100 by Shai Colon RN  Safety Promotion/Fall Prevention: activity supervised  Intervention: Prevent Skin Injury  Recent Flowsheet Documentation  Taken 5/20/2024 1100 by Shai Colon RN  Body Position: position changed independently  Intervention: Prevent and Manage VTE (Venous Thromboembolism) Risk  Recent Flowsheet Documentation  Taken 5/20/2024 1100 by Shai Colon RN  VTE Prevention/Management: SCDs (sequential compression devices) off  Goal: Optimal Comfort and Wellbeing  Outcome: Progressing  Goal: Readiness for Transition of Care  Outcome: Progressing     Problem: Alcohol Withdrawal  Goal: Alcohol Withdrawal Symptom Control  Outcome: Progressing  Goal: Optimal Neurologic Function  Outcome: Progressing  Goal: Readiness for Change Identified  Outcome: Progressing     Problem: Fall Injury Risk  Goal: Absence of Fall and Fall-Related Injury  Outcome: Progressing  Intervention: Identify and Manage Contributors  Recent Flowsheet Documentation  Taken 5/20/2024 1100 by Shai Colon RN  Medication Review/Management: medications reviewed  Intervention: " Promote Injury-Free Environment  Recent Flowsheet Documentation  Taken 5/20/2024 1100 by Shai Colon RN  Safety Promotion/Fall Prevention: activity supervised   Goal Outcome Evaluation:         Alert and oriented to self  Disoriented to time, place, situation due to hallucinations and medications  Unable to understand to use call light  Strong and fast in getting out of bed  Unsteady on feet and unaware of own ability and having hallucinations  Patient was eventually placed on 1:1 monitoring to prevent him from having a fall    Having active visual, tactile, and auditory hallucinations, seeing a cat, a roommate, sometimes rodents in the bed, etc..  Becomes scared and anxious regarding his hallucinations but remains directable and cooperative  Was given anti psychotic medications per scale   Was given ativan per scale.  Was given ativan again at 1230.  From 1230 to 1430 patient wsa up and down every few minutes, having conversations with hallucinations, etc...  Writer discussed with hospitalist regarding more ativan vs more antipsychotic  Patient had Ativan again at 1430 per discussion with hospitalist  Respirations, vitals, all stable and good for patient    Shai GUERRA [facility]

## 2024-05-20 NOTE — CONSULTS
Care Management Initial Consult    General Information  Assessment completed with: Family, Sister Kelly  Type of CM/SW Visit: Initial Assessment    Primary Care Provider verified and updated as needed: Yes   Readmission within the last 30 days: no previous admission in last 30 days      Reason for Consult: discharge planning, substance use concerns  Advance Care Planning: Advance Care Planning Reviewed: present on chart          Communication Assessment  Patient's communication style: spoken language (English or Bilingual)    Hearing Difficulty or Deaf: yes   Wear Glasses or Blind: yes    Cognitive  Cognitive/Neuro/Behavioral: .WDL except, mood/behavior (patient is hallucinating rodents and frm animals in his bed and a roomate in his room)                      Living Environment:   People in home: alone     Current living Arrangements: house      Able to return to prior arrangements:         Family/Social Support:  Care provided by: self, child(caleb)  Provides care for: no one  Marital Status:   Children, Sibling(s)          Description of Support System: Supportive, Involved         Current Resources:   Patient receiving home care services: No     Community Resources: None  Equipment currently used at home: walker, standard  Supplies currently used at home: None    Employment/Financial:  Employment Status:          Financial Concerns:             Does the patient's insurance plan have a 3 day qualifying hospital stay waiver?  No    Lifestyle & Psychosocial Needs:  Social Determinants of Health     Food Insecurity: No Food Insecurity (1/24/2023)    [facility]    Food Insecurity     Worried About Running Out of Food in the Last Year: 1   Depression: Not at risk (1/15/2024)    PHQ-2     PHQ-2 Score: 2   Housing Stability: Low Risk  (1/24/2023)    [facility]    Housing Stability     Unable to Pay for Housing in the Last Year: 1   Tobacco Use: High Risk (1/15/2024)    Patient History     Smoking Tobacco Use: Never     Smokeless Tobacco Use: Current     Passive Exposure: Not on file   Financial Resource Strain: Low Risk  (1/24/2023)    [facility]    Financial Resource Strain     Difficulty of Paying Living Expenses: 3     Difficulty of Paying Living Expenses: Not on file   Alcohol Use: Not on file   Transportation Needs: No Transportation Needs (1/24/2023)    [facility], [facility]    Transportation Needs     Lack of Transportation (Medical): 1   Physical Activity: Not on file   Interpersonal Safety: Not on file   Stress: Not on file   Social Connections: Unknown (1/25/2024)    [facility]    Social Connections     Frequency of Communication with Friends and Family: Not on file   Health Literacy: Not on file       Functional Status:  Prior to admission patient needed assistance:   Dependent ADLs:: Ambulation-walker  Dependent IADLs:: Transportation, Shopping       Mental Health Status:          Chemical Dependency Status:                Values/Beliefs:  Spiritual, Cultural Beliefs, Jehovah's witness Practices, Values that affect care: no               Additional Information:  Per MD at IDT rounds pt is NOT medically stable for discharge today. CM consulted for discharge planning, pt also has substance abuse concerns noted.    CM attempted to meet with patient, pt currently withdrawing per bedside RN. Per Bedside RN spoke with sister Kelly chele, Kelly listed on facesheet and [facility].    CM placed call for initial assessment with Kelly, pt lives alone in a home in [place]. Patient has a walker at home but otherwise is  independent with ADLs. Per Kelly pts daughter and son help pt out a lot and provide transport and deliver groceries. Patients children both live close by and are supportive.    Per therapy notes, pt able to return home when medically ready from therapy standpoint.    CM to continue to follow during hospitalization for safe discharge plan, pt currently withdrawing, too early to plan for discharge.    PCP updated in chart, ext handoff to be sent to PCP upon discharge    Plan: TBD, home v.s treatment    Transport: TBD    VESTA Garcia  Care Transitions Registered Nurse  Tele: [phone]

## 2024-05-20 NOTE — PROGRESS NOTES
05/20/24 1118   Appointment Info   Signing Clinician's Name / Credentials (PT) Chelly Kurtz, PT   Living Environment   People in Home alone   Current Living Arrangements house   Home Accessibility no concerns   Living Environment Comments no stairs   Self-Care   Equipment Currently Used at Home walker, standard   Fall history within last six months yes   Number of times patient has fallen within last six months 6   Activity/Exercise/Self-Care Comment indep with mobility, has started using walker recently due to falls, indep with ADL's.   General Information   Onset of Illness/Injury or Date of Surgery 05/19/24   Referring Physician Gerson Wu PA-C   Patient/Family Therapy Goals Statement (PT) return home, stop drinking   Pertinent History of Current Problem (include personal factors and/or comorbidities that impact the POC) Getachew Barcenas is a 54 year old male with past medical hx of severe alcohol use disorder, frequent falls due to vertigo, paroxysmal Vtach, chronic subdural hematoma, LULI, anxiety who presents on 5/19/2024 with 3 falls in the last 2 weeks and alcohol withdrawal.   Existing Precautions/Restrictions fall   Cognition   Affect/Mental Status (Cognition) WFL   Pain Assessment   Patient Currently in Pain No   Range of Motion (ROM)   Range of Motion ROM is WFL   Strength (Manual Muscle Testing)   Strength (Manual Muscle Testing) Deficits observed during functional mobility   Strength Comments pt stating strength feels at 90% of baseline   Bed Mobility   Comment, (Bed Mobility) supine<>sit with Silviano   Transfers   Comment, (Transfers) sit>stand from EOB with 2WW and CGA, unsteady but pt stating is unsteady at baseline   Gait/Stairs (Locomotion)   [place] Level (Gait) contact guard   Assistive Device (Gait) walker, front-wheeled   Distance in Feet (Gait) 10   Pattern (Gait) step-through   Deviations/Abnormal Patterns (Gait) gait speed decreased;festinating/shuffling   Comment, (Gait/Stairs)  unsteady but pt states is near baseline, has tremors from drinking   Balance   Balance Comments hx of 6 falls in 6 months, uses walker at all times recently   Sensory Examination   Sensory Perception Comments neuropathy in bilateral toes, states he typically wears shoes/inserts at home due to flat foor on L side and arched foot on R side   Clinical Impression   Criteria for Skilled Therapeutic Intervention Yes, treatment indicated   PT Diagnosis (PT) impaired functional mobility   Influenced by the following impairments LE weakness, impaired balance/safety awareness   Functional limitations due to impairments impaired transfers, gait   Clinical Presentation (PT Evaluation Complexity) stable   Clinical Presentation Rationale clinical reasoning   Clinical Decision Making (Complexity) low complexity   Planned Therapy Interventions (PT) balance training;gait training;home exercise program;patient/family education;strengthening;transfer training;progressive activity/exercise;risk factor education;home program guidelines   Risk & Benefits of therapy have been explained evaluation/treatment results reviewed;care plan/treatment goals reviewed;risks/benefits reviewed;participants voiced agreement with care plan;current/potential barriers reviewed;participants included;patient   PT Total Evaluation Time   PT Eval, Low Complexity Minutes (04988) 10   Physical Therapy Goals   PT Frequency 5x/week   PT Predicted Duration/Target Date for Goal Attainment 05/27/24   PT Goals Transfers;Gait   PT: Transfers Supervision/stand-by assist;Bed to/from chair;Assistive device   PT: Gait Supervision/stand-by assist;Standard walker;100 feet   Interventions   Interventions Quick Adds Therapeutic Activity   Therapeutic Activity   Therapeutic Activities: dynamic activities to improve functional performance Minutes (23513) 10   Symptoms Noted During/After Treatment Fatigue   Treatment Detail/Skilled Intervention edu on role of PT to assess  mobility and assist in safe discharge plan. additional sit>stand from recliner with CGA/SBA and 2WW, amb ~30 feet to/from door with 2WW and CGA/SBA, slightly impulsive with mobility, lifting walker off ground when turning. no overt LOB. discussed rec for returning home once medically stable as pt stating he is 90% of baseline mobility level, pt in agreement and denies concerns with returning home. remains in bed at end of session, bed alarm on and call light in reach.   PT Discharge Planning   PT Plan Mon 1/5; gait with 2WW, LE strengthening, balance   PT Discharge Recommendation (DC Rec) home with assist   PT Rationale for DC Rec pt stating tremulous at baseline, feels he is 90% of baseline mobility so rec return home once medically stable, has walker for home use   PT Brief overview of current status supine<>sit with Silviano, sit>stand with CGA and 2WW, amb 30 feet with 2WW and CGA/SBA   PT Equipment Needed at Discharge   (has walker)   Total Session Time   Timed Code Treatment Minutes 10   Total Session Time (sum of timed and untimed services) 20

## 2024-05-20 NOTE — PLAN OF CARE
Problem: Adult Inpatient Plan of Care  Goal: Plan of Care Review  Description: The Plan of Care Review/Shift note should be completed every shift.  The Outcome Evaluation is a brief statement about your assessment that the patient is improving, declining, or no change.  This information will be displayed automatically on your shift  note.  Flowsheets (Taken 5/20/2024 0420)  Plan of Care Reviewed With: patient  Goal: Absence of Hospital-Acquired Illness or Injury  Intervention: Identify and Manage Fall Risk  Recent Flowsheet Documentation  Taken 5/20/2024 0030 by Lia Schwartz RN  Safety Promotion/Fall Prevention:   activity supervised   mobility aid in reach   nonskid shoes/slippers when out of bed   patient and family education   clutter free environment maintained  Taken 5/19/2024 2019 by Lia Schwartz RN  Safety Promotion/Fall Prevention:   activity supervised   mobility aid in reach   nonskid shoes/slippers when out of bed   patient and family education   clutter free environment maintained  Intervention: Prevent and Manage VTE (Venous Thromboembolism) Risk  Recent Flowsheet Documentation  Taken 5/20/2024 0030 by Lia Schwartz RN  VTE Prevention/Management: SCDs (sequential compression devices) off  Taken 5/19/2024 2013 by Lia Schwartz RN  VTE Prevention/Management: SCDs (sequential compression devices) off  Goal: Readiness for Transition of Care  Intervention: Mutually Develop Transition Plan  Recent Flowsheet Documentation  Taken 5/19/2024 2021 by Lia Schwartz RN  Equipment Currently Used at Home: walker, standard     Problem: Fall Injury Risk  Goal: Absence of Fall and Fall-Related Injury  Intervention: Identify and Manage Contributors  Recent Flowsheet Documentation  Taken 5/20/2024 0030 by Lia Schwartz RN  Medication Review/Management: medications reviewed  Taken 5/19/2024 2019 by Lia Schwartz RN  Medication Review/Management: medications reviewed  Intervention: Promote Injury-Free  Environment  Recent Flowsheet Documentation  Taken 5/20/2024 0030 by Lia Schwartz, RN  Safety Promotion/Fall Prevention:   activity supervised   mobility aid in reach   nonskid shoes/slippers when out of bed   patient and family education   clutter free environment maintained  Taken 5/19/2024 2019 by Lia Schwartz, RN  Safety Promotion/Fall Prevention:   activity supervised   mobility aid in reach   nonskid shoes/slippers when out of bed   patient and family education   clutter free environment maintained         Pt is A&O. Pt currently denies pain. Pt has been experiencing mild tremors to bilateral hands, scoring a 2 on the CIWA assessment. Pt declined a full skin assessment. Pt is able to utilize call light to make needs known. Will continue with current plan of care.

## 2024-05-21 ENCOUNTER — APPOINTMENT (OUTPATIENT)
Dept: OCCUPATIONAL THERAPY | Facility: CLINIC | Age: 54
End: 2024-05-21
Payer: COMMERCIAL

## 2024-05-21 LAB
ALBUMIN SERPL BCG-MCNC: 3.2 G/DL (ref 3.5–5.2)
ALP SERPL-CCNC: 101 U/L (ref 40–150)
ALT SERPL W P-5'-P-CCNC: 63 U/L (ref 0–70)
ANION GAP SERPL CALCULATED.3IONS-SCNC: 14 MMOL/L (ref 7–15)
AST SERPL W P-5'-P-CCNC: 155 U/L (ref 0–45)
BASOPHILS # BLD AUTO: 0 10E3/UL (ref 0–0.2)
BASOPHILS NFR BLD AUTO: 1 %
BILIRUB SERPL-MCNC: 3.7 MG/DL
BUN SERPL-MCNC: 7.1 MG/DL (ref 6–20)
CALCIUM SERPL-MCNC: 8.7 MG/DL (ref 8.6–10)
CHLORIDE SERPL-SCNC: 96 MMOL/L (ref 98–107)
CREAT SERPL-MCNC: 0.88 MG/DL (ref 0.67–1.17)
DEPRECATED HCO3 PLAS-SCNC: 26 MMOL/L (ref 22–29)
EGFRCR SERPLBLD CKD-EPI 2021: >90 ML/MIN/1.73M2
EOSINOPHIL # BLD AUTO: 0.1 10E3/UL (ref 0–0.7)
EOSINOPHIL NFR BLD AUTO: 2 %
ERYTHROCYTE [DISTWIDTH] IN BLOOD BY AUTOMATED COUNT: 12.4 % (ref 10–15)
GLUCOSE SERPL-MCNC: 89 MG/DL (ref 70–99)
HCT VFR BLD AUTO: 29.9 % (ref 40–53)
HGB BLD-MCNC: 10.3 G/DL (ref 13.3–17.7)
IMM GRANULOCYTES # BLD: 0.1 10E3/UL
IMM GRANULOCYTES NFR BLD: 2 %
LYMPHOCYTES # BLD AUTO: 1.1 10E3/UL (ref 0.8–5.3)
LYMPHOCYTES NFR BLD AUTO: 32 %
MAGNESIUM SERPL-MCNC: 1.8 MG/DL (ref 1.7–2.3)
MCH RBC QN AUTO: 34.8 PG (ref 26.5–33)
MCHC RBC AUTO-ENTMCNC: 34.4 G/DL (ref 31.5–36.5)
MCV RBC AUTO: 101 FL (ref 78–100)
MONOCYTES # BLD AUTO: 0.3 10E3/UL (ref 0–1.3)
MONOCYTES NFR BLD AUTO: 10 %
NEUTROPHILS # BLD AUTO: 1.7 10E3/UL (ref 1.6–8.3)
NEUTROPHILS NFR BLD AUTO: 54 %
NRBC # BLD AUTO: 0.1 10E3/UL
NRBC BLD AUTO-RTO: 2 /100
PHOSPHATE SERPL-MCNC: 4.2 MG/DL (ref 2.5–4.5)
PLATELET # BLD AUTO: 82 10E3/UL (ref 150–450)
POTASSIUM SERPL-SCNC: 3 MMOL/L (ref 3.4–5.3)
POTASSIUM SERPL-SCNC: 3.9 MMOL/L (ref 3.4–5.3)
PROT SERPL-MCNC: 5.4 G/DL (ref 6.4–8.3)
RBC # BLD AUTO: 2.96 10E6/UL (ref 4.4–5.9)
SODIUM SERPL-SCNC: 136 MMOL/L (ref 135–145)
WBC # BLD AUTO: 3.3 10E3/UL (ref 4–11)

## 2024-05-21 PROCEDURE — 83735 ASSAY OF MAGNESIUM: CPT | Performed by: INTERNAL MEDICINE

## 2024-05-21 PROCEDURE — 97165 OT EVAL LOW COMPLEX 30 MIN: CPT | Mod: GO | Performed by: OCCUPATIONAL THERAPIST

## 2024-05-21 PROCEDURE — 250N000011 HC RX IP 250 OP 636

## 2024-05-21 PROCEDURE — 85025 COMPLETE CBC W/AUTO DIFF WBC: CPT | Performed by: INTERNAL MEDICINE

## 2024-05-21 PROCEDURE — 36415 COLL VENOUS BLD VENIPUNCTURE: CPT | Performed by: STUDENT IN AN ORGANIZED HEALTH CARE EDUCATION/TRAINING PROGRAM

## 2024-05-21 PROCEDURE — 84100 ASSAY OF PHOSPHORUS: CPT | Performed by: INTERNAL MEDICINE

## 2024-05-21 PROCEDURE — 36415 COLL VENOUS BLD VENIPUNCTURE: CPT | Performed by: INTERNAL MEDICINE

## 2024-05-21 PROCEDURE — 250N000011 HC RX IP 250 OP 636: Performed by: STUDENT IN AN ORGANIZED HEALTH CARE EDUCATION/TRAINING PROGRAM

## 2024-05-21 PROCEDURE — 97530 THERAPEUTIC ACTIVITIES: CPT | Mod: GO | Performed by: OCCUPATIONAL THERAPIST

## 2024-05-21 PROCEDURE — 258N000003 HC RX IP 258 OP 636: Performed by: INTERNAL MEDICINE

## 2024-05-21 PROCEDURE — 250N000013 HC RX MED GY IP 250 OP 250 PS 637

## 2024-05-21 PROCEDURE — 250N000013 HC RX MED GY IP 250 OP 250 PS 637: Performed by: INTERNAL MEDICINE

## 2024-05-21 PROCEDURE — 99232 SBSQ HOSP IP/OBS MODERATE 35: CPT | Performed by: STUDENT IN AN ORGANIZED HEALTH CARE EDUCATION/TRAINING PROGRAM

## 2024-05-21 PROCEDURE — 120N000001 HC R&B MED SURG/OB

## 2024-05-21 PROCEDURE — 84132 ASSAY OF SERUM POTASSIUM: CPT | Performed by: STUDENT IN AN ORGANIZED HEALTH CARE EDUCATION/TRAINING PROGRAM

## 2024-05-21 PROCEDURE — 80053 COMPREHEN METABOLIC PANEL: CPT | Performed by: INTERNAL MEDICINE

## 2024-05-21 PROCEDURE — 250N000013 HC RX MED GY IP 250 OP 250 PS 637: Performed by: STUDENT IN AN ORGANIZED HEALTH CARE EDUCATION/TRAINING PROGRAM

## 2024-05-21 RX ORDER — DEXTROSE, SODIUM CHLORIDE, SODIUM LACTATE, POTASSIUM CHLORIDE, AND CALCIUM CHLORIDE 5; .6; .31; .03; .02 G/100ML; G/100ML; G/100ML; G/100ML; G/100ML
INJECTION, SOLUTION INTRAVENOUS CONTINUOUS
Status: DISCONTINUED | OUTPATIENT
Start: 2024-05-21 | End: 2024-05-25

## 2024-05-21 RX ORDER — THIAMINE HYDROCHLORIDE 100 MG/ML
500 INJECTION, SOLUTION INTRAMUSCULAR; INTRAVENOUS 3 TIMES DAILY
Status: COMPLETED | OUTPATIENT
Start: 2024-05-21 | End: 2024-05-23

## 2024-05-21 RX ORDER — POTASSIUM CHLORIDE 1.5 G/1.58G
20 POWDER, FOR SOLUTION ORAL ONCE
Status: COMPLETED | OUTPATIENT
Start: 2024-05-21 | End: 2024-05-21

## 2024-05-21 RX ORDER — NICOTINE 21 MG/24HR
1 PATCH, TRANSDERMAL 24 HOURS TRANSDERMAL DAILY
Status: DISCONTINUED | OUTPATIENT
Start: 2024-05-21 | End: 2024-05-26 | Stop reason: HOSPADM

## 2024-05-21 RX ORDER — POTASSIUM CHLORIDE 1.5 G/1.58G
40 POWDER, FOR SOLUTION ORAL ONCE
Status: COMPLETED | OUTPATIENT
Start: 2024-05-21 | End: 2024-05-21

## 2024-05-21 RX ADMIN — SODIUM CHLORIDE, POTASSIUM CHLORIDE, SODIUM LACTATE AND CALCIUM CHLORIDE: 600; 310; 30; 20 INJECTION, SOLUTION INTRAVENOUS at 09:09

## 2024-05-21 RX ADMIN — LORAZEPAM 2 MG: 2 INJECTION INTRAMUSCULAR; INTRAVENOUS at 14:28

## 2024-05-21 RX ADMIN — FOLIC ACID 1 MG: 1 TABLET ORAL at 08:53

## 2024-05-21 RX ADMIN — POTASSIUM CHLORIDE 40 MEQ: 1.5 POWDER, FOR SOLUTION ORAL at 06:17

## 2024-05-21 RX ADMIN — THIAMINE HYDROCHLORIDE 500 MG: 100 INJECTION, SOLUTION INTRAMUSCULAR; INTRAVENOUS at 19:05

## 2024-05-21 RX ADMIN — CLONIDINE HYDROCHLORIDE 0.1 MG: 0.1 TABLET ORAL at 05:20

## 2024-05-21 RX ADMIN — GABAPENTIN 900 MG: 300 CAPSULE ORAL at 12:46

## 2024-05-21 RX ADMIN — LORAZEPAM 1 MG: 1 TABLET ORAL at 09:09

## 2024-05-21 RX ADMIN — LEVOTHYROXINE SODIUM 175 MCG: 0.15 TABLET ORAL at 05:20

## 2024-05-21 RX ADMIN — LORAZEPAM 2 MG: 1 TABLET ORAL at 19:03

## 2024-05-21 RX ADMIN — SODIUM CHLORIDE, SODIUM LACTATE, POTASSIUM CHLORIDE, CALCIUM CHLORIDE AND DEXTROSE MONOHYDRATE: 5; 600; 310; 30; 20 INJECTION, SOLUTION INTRAVENOUS at 23:06

## 2024-05-21 RX ADMIN — LORAZEPAM 1 MG: 1 TABLET ORAL at 13:34

## 2024-05-21 RX ADMIN — THIAMINE HCL TAB 100 MG 100 MG: 100 TAB at 08:53

## 2024-05-21 RX ADMIN — THIAMINE HYDROCHLORIDE 500 MG: 100 INJECTION, SOLUTION INTRAMUSCULAR; INTRAVENOUS at 13:02

## 2024-05-21 RX ADMIN — NICOTINE 1 PATCH: 21 PATCH, EXTENDED RELEASE TRANSDERMAL at 11:35

## 2024-05-21 RX ADMIN — LORAZEPAM 1 MG: 1 TABLET ORAL at 07:11

## 2024-05-21 RX ADMIN — Medication 1 TABLET: at 11:35

## 2024-05-21 RX ADMIN — SODIUM CHLORIDE, SODIUM LACTATE, POTASSIUM CHLORIDE, CALCIUM CHLORIDE AND DEXTROSE MONOHYDRATE: 5; 600; 310; 30; 20 INJECTION, SOLUTION INTRAVENOUS at 12:47

## 2024-05-21 RX ADMIN — POTASSIUM CHLORIDE 20 MEQ: 1.5 POWDER, FOR SOLUTION ORAL at 08:53

## 2024-05-21 RX ADMIN — GABAPENTIN 900 MG: 300 CAPSULE ORAL at 05:19

## 2024-05-21 ASSESSMENT — ACTIVITIES OF DAILY LIVING (ADL)
ADLS_ACUITY_SCORE: 39
ADLS_ACUITY_SCORE: 39
ADLS_ACUITY_SCORE: 43
ADLS_ACUITY_SCORE: 37
ADLS_ACUITY_SCORE: 43
ADLS_ACUITY_SCORE: 37
ADLS_ACUITY_SCORE: 37
ADLS_ACUITY_SCORE: 39
ADLS_ACUITY_SCORE: 43
ADLS_ACUITY_SCORE: 38
ADLS_ACUITY_SCORE: 43
ADLS_ACUITY_SCORE: 39
ADLS_ACUITY_SCORE: 43
ADLS_ACUITY_SCORE: 43
ADLS_ACUITY_SCORE: 39
ADLS_ACUITY_SCORE: 37
PREVIOUS_RESPONSIBILITIES: MEAL PREP;HOUSEKEEPING;LAUNDRY
ADLS_ACUITY_SCORE: 37
ADLS_ACUITY_SCORE: 39
ADLS_ACUITY_SCORE: 43

## 2024-05-21 NOTE — PLAN OF CARE
"Goal Outcome Evaluation:      Plan of Care Reviewed With: patient    Overall Patient Progress: improving  Problem: Adult Inpatient Plan of Care  Goal: Absence of Hospital-Acquired Illness or Injury  Intervention: Identify and Manage Fall Risk  Recent Flowsheet Documentation  Taken 5/20/2024 2300 by Sneha Christianson RN  Safety Promotion/Fall Prevention: activity supervised     Problem: Adult Inpatient Plan of Care  Goal: Absence of Hospital-Acquired Illness or Injury  Intervention: Prevent Skin Injury  Recent Flowsheet Documentation  Taken 5/20/2024 2300 by Sneha Christianson RN  Body Position: position changed independently     Problem: Adult Inpatient Plan of Care  Goal: Optimal Comfort and Wellbeing  Outcome: Progressing     Problem: Alcohol Withdrawal  Goal: Alcohol Withdrawal Symptom Control  Outcome: Progressing     Problem: Fall Injury Risk  Goal: Absence of Fall and Fall-Related Injury  Intervention: Identify and Manage Contributors  Recent Flowsheet Documentation  Taken 5/20/2024 2300 by Sneha Christianson RN  Medication Review/Management: medications reviewed     Problem: Fall Injury Risk  Goal: Absence of Fall and Fall-Related Injury  Intervention: Promote Injury-Free Environment  Recent Flowsheet Documentation  Taken 5/20/2024 2300 by Sneha Christianson RN  Safety Promotion/Fall Prevention: activity supervised       Patient continues with monitoring  of alcohol withdrawal symptoms. CIWA scoring and interventions. Patient is denying hallucinations at present but states that he has been dreaming ever since he \"quit.\" Patient was asked what he meant by quit and stated \"quit drinking\"  Patient is calm at present. Continue to assess per plan of care and update care team as needed.     "

## 2024-05-21 NOTE — PROGRESS NOTES
05/21/24 1100   Appointment Info   Signing Clinician's Name / Credentials (OT) Kwan Hogan OTR/L   Living Environment   People in Home alone   Current Living Arrangements house   Home Accessibility no concerns   Transportation Anticipated family or friend will provide   Living Environment Comments no stairs   Self-Care   Usual Activity Tolerance good   Current Activity Tolerance poor   Equipment Currently Used at Home walker, standard   Fall history within last six months yes   Number of times patient has fallen within last six months 6   Activity/Exercise/Self-Care Comment indep with mobility, has started using walker recently due to falls, indep with ADL's.   Instrumental Activities of Daily Living (IADL)   Previous Responsibilities meal prep;housekeeping;laundry   IADL Comments was sedentary at baseline, has some family support available   General Information   Onset of Illness/Injury or Date of Surgery 05/19/24   Referring Physician Christian Pretty   Additional Occupational Profile Info/Pertinent History of Current Problem Getachew Barcenas is a 54 year old male with past medical hx of severe alcohol use disorder, frequent falls due to vertigo, paroxysmal Vtach, chronic subdural hematoma, LULI, anxiety who presents on 5/19/2024 with 3 falls in the last 2 weeks and alcohol withdrawal.   Existing Precautions/Restrictions fall   Left Upper Extremity (Weight-bearing Status) full weight-bearing (FWB)   Right Upper Extremity (Weight-bearing Status) full weight-bearing (FWB)   Left Lower Extremity (Weight-bearing Status) full weight-bearing (FWB)   Right Lower Extremity (Weight-bearing Status) full weight-bearing (FWB)   Cognitive Status Examination   Affect/Mental Status (Cognitive) confused   Follows Commands follows two-step commands;follows one-step commands;verbal cues/prompting required   Cognitive Status Comments Pt appearing confused at times, unsure of where he is at   Visual Perception   Impact of Vision  Impairment on Function (Vision) no acute changes   Range of Motion Comprehensive   General Range of Motion no range of motion deficits identified;bilateral upper extremity ROM WFL   Strength Comprehensive (MMT)   General Manual Muscle Testing (MMT) Assessment no strength deficits identified   Comment, General Manual Muscle Testing (MMT) Assessment gen weakness   Coordination   Coordination Comments B UE WFL   Bed Mobility   Comment (Bed Mobility) SBA, with extra time required   Transfers   Transfer Comments min A due to wobbliness, cues required   Clinical Impression   Criteria for Skilled Therapeutic Interventions Met (OT) Yes, treatment indicated   OT Diagnosis decreased ADL independence   OT Problem List-Impairments impacting ADL problems related to;activity tolerance impaired;strength;cognition   Assessment of Occupational Performance 3-5 Performance Deficits   Identified Performance Deficits dressing, bathing, toileting, grooming, cognition   Planned Therapy Interventions (OT) ADL retraining;IADL retraining;cognition;home program guidelines;transfer training;progressive activity/exercise   Clinical Decision Making Complexity (OT) problem focused assessment/low complexity   Risk & Benefits of therapy have been explained evaluation/treatment results reviewed;patient;sibling   Clinical Impression Comments Pt presents with decreased activity tolerance and confusion resulting in decreased independence with ADL.  Pt would benefit from skilled OT services to increase safety and independence with ADL   OT Total Evaluation Time   OT Eval, Low Complexity Minutes (23682) 8   OT Goals   Therapy Frequency (OT) 5 times/week   OT Predicted Duration/Target Date for Goal Attainment 05/29/24   OT Goals Hygiene/Grooming;Lower Body Dressing;Upper Body Dressing;Toilet Transfer/Toileting;Cognition   OT: Hygiene/Grooming modified independent   OT: Upper Body Dressing Modified independent   OT: Lower Body Dressing Modified independent    OT: Toilet Transfer/Toileting Modified independent;cleaning and garment management;toilet transfer   OT: Cognitive Patient/caregiver will verbalize understanding of cognitive assessment results/recommendations as needed for safe discharge planning   Therapeutic Activities   Therapeutic Activity Minutes (08835) 25   Symptoms noted during/after treatment fatigue   Treatment Detail/Skilled Intervention Therapist educaed Pt on role of OT during inpatient stay.  Therapist promoted education on compensatory strategies for ADL completion.  Therapsit providing education on EC/WS as well.  pt benefits from simple one step directions throughout.  Pt requiring CGA to get to EOB with assistance for managing bed sheets.  Pt standing with min A from therapist, requiring min to mod A to maintain standing as Pt appearing to be unbalanced at times.  Pt then able tosit down with SBA.  Pt transferring to chair with min A, cues required to try and control decent as he was sitting back down.  Pt left in chair with all needs in place.   OT Discharge Planning   OT Plan Mon 1/5; standing ADL, dressing, monitor cognition   OT Discharge Recommendation (DC Rec) Transitional Care Facility;home with home care occupational therapy   OT Rationale for DC Rec Pt unsteady on feet and requiring assistance with transfer to chair and bed mobility, Pt appearing confused at times, beenfits from simple one step directions throughout   Total Session Time   Timed Code Treatment Minutes 25   Total Session Time (sum of timed and untimed services) 33

## 2024-05-21 NOTE — PROGRESS NOTES
[facility]    Medicine Progress Note - Hospitalist Service    Date of Admission:  5/19/2024    Assessment & Plan   Getachew Barcenas is a 54 year old male with past medical hx of severe alcohol use disorder, frequent falls due to vertigo, paroxysmal Vtach, chronic subdural hematoma, LULI, anxiety who presents on 5/19/2024 with 3 falls in the last 2 weeks and alcohol withdrawal.     Frequent falls   Chronic vertigo  Peripheral neuropathy    Has severe alcohol use disorder as below. Hx of frequent falls while drinking. Has had 3 falls in last 2 weeks due to increased alcohol intake, chronic vertigo, and neuropathy in toes. Last fall 6-7d ago. Ht his head every time. CT head and c-spine only shows stable chronic subdural hematoma (see below). He has chronic vertigo due to the subdural hematoma, which he states nothing has worked for. He knows alcohol is contributing to his falls.     He arrived tachycardic to 120s. Tmax 100.1. WBC WNL. Does not meet SIRS criteria. Lactic acidosis as below.  B12 WNL. Lower suspicion for Wernickes, as he's had no neuro changes, no nystagmus, and CT head negative. Suspect falls are mainly due to intoxication.  In the ER he received Folate, Thiamine, 2L D5LR, Ceftriaxone. Low concern for infection.  Still tremulous. Eating well. No abd pain.   - PT/OT/SW consulted; PT home w/ assist  - continue neurontin protocol for ETOH withdrawal(apparently was on neurontin in past-stopped taking)  -  Meclizine 25 TID PRN for vertigo  - Blood cultures pending     Alcohol use disorder, severe, with acute withdrawal  Resting tremor, acute on chronic    Pt has been to IP tx twice, last time in summer 2023. He was admitted in 3/2023 for withdrawal and SW was arranging IP tx, but it took too long so he left AMA. He states after his IP tx he started drinking more. He used to drink a 1.75L bottle of vodka every 4d, but now drinks 1.75L every 1-2d.     He hasn't taken his PTA meds in 2  weeks due to being fatigued and not being able to get out of bed. He attributes his fatigue to not being on his Levothyroxine (see below), and doesn't believe alcohol is contributing to his fatigue. He last drank 5/15. Alcohol WNL on admit.    In acute mild withdrawal on admission. Has a resting tremor at baseline, but it's worse on admission.    On admission he states he wants to stop drinking, but doesn't want IP tx, Acamprosate, or Naltrexone. He states Acamprosate made his chronic dizziness worse.   LFTs improving.   - IV thiamine started 05/21, stopped clonidine  - mIVF d5lr   - CIWA protocol with IV vitamins/ neurontin  - Restart thiamine on discharge. Pt no longer taking these.   - SW to see for rx options     Lactic acidosis  Elevated anion gap with ketosis, likely alcoholic  Lactate 4.2 ?  4.2 ?  3.3. Ketones 2.2 ?  0.6.  Anion gap 28. VBG with pH 7.51, CO2 35, bicarb 28. Likely due to severe alcohol use and liver impairment.    -continue  LR 50ml/hr     Low grade fever  ? Etiology. Had temp of 100.1 in ER. No sn/sx of infection. Cxr negative for infection. UA negative.  Has remained afebrile so far. Follow blood cx ordered in ED.     Chronic subdural hematoma (H)    Hx of chronic subdural hematoma due to frequent falls while drinking alcohol. S/p emobolization 11/2022. Follows with neurosurgery at [facility] with serial imaging, last visit 11/8/23. CT head on admission shows stable SDH.      Hepatic steatosis  Thrombocytopenia (H24)  Elevated LFTs, acute on chronic    Pt had choledocholithiasis in 12/2023. He was tx with ERCP and cholecystectomy. He had an ERCP in 1/2024 for stent removal.     On admission  (baseline ~100),  (baseline ~100), Tbili 9.3 (baseline ~4), direct bili 4.43 (baseline ~5). . INR 1.12. Maddrey score 10. MELD 20.   LFTS improving. No intervention     Hypothyroidism  Toxic diffuse goiter  On admission TSH 18.81 and T4 1. Pt stopped taking Levothyroxine 2 wks ago due to  fatigue as above.   Restarted PTA Levothyroxine 175     Hypophosphatemia  Hypomagnesemia  Hypokalemia  Hyponatremia  On admission Mg 1.5. K 3.2. Phos 0.5. Na 129. Likely due to chronic alcohol use.   - RN repletion protocol  - Restart potassium and mag replacement on discharge. Pt reported no longer taking these     Elevated lipase  Lipase 219 on admission. Pt without abdominal pain or chest pain. No n/v.      Ventricular tachycardia (paroxysmal) (H)  Had possible non-sustained VT vs SVT on admission in 3/2023. Was asymptomatic during it.   Stable. Stop tele     Elevated CK  399 on admission. Denies myalgias. Likely due to alcohol use and several metabolic derangements.          Diet: Regular Diet Adult    DVT Prophylaxis: Ambulate every shift  Triana Catheter: Not present  Lines: None     Cardiac Monitoring: None  Code Status: Full Code      Clinically Significant Risk Factors        # Hypokalemia: Lowest K = 3 mmol/L in last 2 days, will replace as needed  # Hyponatremia: Lowest Na = 128 mmol/L in last 2 days, will monitor as appropriate  # Hypocalcemia: Lowest Ca = 8.3 mg/dL in last 2 days, will monitor and replace as appropriate     # Hypoalbuminemia: Lowest albumin = 3.2 g/dL at 5/21/2024  5:14 AM, will monitor as appropriate   # Thrombocytopenia: Lowest platelets = 82 in last 2 days, will monitor for bleeding                     Disposition Plan     Medically Ready for Discharge: Anticipated in 2-4 Days             Christian Pretty DO  Hospitalist Service  [facility]  Securely message with [employer] (more info)  Text page via [employer] Paging/Directory   ______________________________________________________________________    Interval History   NAEO. Will need to assess for chem dep consult when further out of acute withdrawal period    Physical Exam   Vital Signs: Temp: 98  F (36.7  C) Temp src: Oral BP: 116/76 Pulse: 112   Resp: 18 SpO2: 91 % O2 Device: None (Room air)    Weight: 239 lbs  10.24 oz    Physical Exam  HENT:      Head: Normocephalic and atraumatic.      Mouth/Throat:      Mouth: Mucous membranes are moist.   Eyes:      General: No scleral icterus.  Pulmonary:      Effort: Pulmonary effort is normal.   Abdominal:      General: Abdomen is flat.   Skin:     Findings: No rash.   Neurological:      Mental Status: He is alert. Mental status is at baseline.   Psychiatric:         Mood and Affect: Mood normal.           Medical Decision Making       45 MINUTES SPENT BY ME on the date of service doing chart review, history, exam, documentation & further activities per the note.      Data     I have personally reviewed the following data over the past 24 hrs:    3.3 (L)  \   10.3 (L)   / 82 (L)     136 96 (L) 7.1 /  89   3.0 (L) 26 0.88 \     ALT: 63 AST: 155 (H) AP: 101 TBILI: 3.7 (H)   ALB: 3.2 (L) TOT PROTEIN: 5.4 (L) LIPASE: N/A       Imaging results reviewed over the past 24 hrs:   No results found for this or any previous visit (from the past 24 hour(s)).

## 2024-05-21 NOTE — PLAN OF CARE
Problem: Alcohol Withdrawal  Goal: Alcohol Withdrawal Symptom Control  Outcome: Progressing     Problem: Fall Injury Risk  Goal: Absence of Fall and Fall-Related Injury  Outcome: Progressing  Intervention: Identify and Manage Contributors  Recent Flowsheet Documentation  Taken 5/21/2024 0840 by Kamilla Lee RN  Medication Review/Management: medications reviewed  Intervention: Promote Injury-Free Environment  Recent Flowsheet Documentation  Taken 5/21/2024 0840 by Kamilla Lee RN  Safety Promotion/Fall Prevention: activity supervised   Goal Outcome Evaluation:      Plan of Care Reviewed With: patient    Overall Patient Progress: improvingOverall Patient Progress: improving    Patient denied pain, CIWA 7-12 throughout shift, ativan given per protocol, patient continues to have intermittent hallucinations, up with assist x 1 with walker + gait belt, tele shows SR, bed and chair alarm on for safety     Addendum: Patient scored 13 on CIWA ~1422.  2 mg IV ativan given.  SpO2 89% on RA prior to administration so started on 2 lpm O2 via NC to increase to 94%.

## 2024-05-22 ENCOUNTER — APPOINTMENT (OUTPATIENT)
Dept: OCCUPATIONAL THERAPY | Facility: CLINIC | Age: 54
End: 2024-05-22
Payer: COMMERCIAL

## 2024-05-22 LAB
ALBUMIN SERPL BCG-MCNC: 3.4 G/DL (ref 3.5–5.2)
ALBUMIN SERPL BCG-MCNC: 3.4 G/DL (ref 3.5–5.2)
ALP SERPL-CCNC: 148 U/L (ref 40–150)
ALT SERPL W P-5'-P-CCNC: 68 U/L (ref 0–70)
ANION GAP SERPL CALCULATED.3IONS-SCNC: 12 MMOL/L (ref 7–15)
AST SERPL W P-5'-P-CCNC: 146 U/L (ref 0–45)
BASOPHILS # BLD AUTO: 0 10E3/UL (ref 0–0.2)
BASOPHILS NFR BLD AUTO: 1 %
BILIRUB DIRECT SERPL-MCNC: 1.74 MG/DL (ref 0–0.3)
BILIRUB SERPL-MCNC: 2.7 MG/DL
BUN SERPL-MCNC: 6.1 MG/DL (ref 6–20)
CALCIUM SERPL-MCNC: 9.6 MG/DL (ref 8.6–10)
CHLORIDE SERPL-SCNC: 98 MMOL/L (ref 98–107)
CREAT SERPL-MCNC: 0.9 MG/DL (ref 0.67–1.17)
DEPRECATED HCO3 PLAS-SCNC: 28 MMOL/L (ref 22–29)
EGFRCR SERPLBLD CKD-EPI 2021: >90 ML/MIN/1.73M2
EOSINOPHIL # BLD AUTO: 0.1 10E3/UL (ref 0–0.7)
EOSINOPHIL NFR BLD AUTO: 4 %
ERYTHROCYTE [DISTWIDTH] IN BLOOD BY AUTOMATED COUNT: 12.8 % (ref 10–15)
GLUCOSE SERPL-MCNC: 104 MG/DL (ref 70–99)
HCT VFR BLD AUTO: 32.7 % (ref 40–53)
HGB BLD-MCNC: 11 G/DL (ref 13.3–17.7)
IMM GRANULOCYTES # BLD: 0.1 10E3/UL
IMM GRANULOCYTES NFR BLD: 2 %
LYMPHOCYTES # BLD AUTO: 1.2 10E3/UL (ref 0.8–5.3)
LYMPHOCYTES NFR BLD AUTO: 35 %
MAGNESIUM SERPL-MCNC: 1.5 MG/DL (ref 1.7–2.3)
MAGNESIUM SERPL-MCNC: 2.3 MG/DL (ref 1.7–2.3)
MCH RBC QN AUTO: 34.7 PG (ref 26.5–33)
MCHC RBC AUTO-ENTMCNC: 33.6 G/DL (ref 31.5–36.5)
MCV RBC AUTO: 103 FL (ref 78–100)
MONOCYTES # BLD AUTO: 0.5 10E3/UL (ref 0–1.3)
MONOCYTES NFR BLD AUTO: 13 %
NEUTROPHILS # BLD AUTO: 1.7 10E3/UL (ref 1.6–8.3)
NEUTROPHILS NFR BLD AUTO: 46 %
NRBC # BLD AUTO: 0.1 10E3/UL
NRBC BLD AUTO-RTO: 3 /100
PHOSPHATE SERPL-MCNC: 2.2 MG/DL (ref 2.5–4.5)
PLATELET # BLD AUTO: 111 10E3/UL (ref 150–450)
POTASSIUM SERPL-SCNC: 3.5 MMOL/L (ref 3.4–5.3)
PROT SERPL-MCNC: 5.6 G/DL (ref 6.4–8.3)
RBC # BLD AUTO: 3.17 10E6/UL (ref 4.4–5.9)
SODIUM SERPL-SCNC: 138 MMOL/L (ref 135–145)
WBC # BLD AUTO: 3.6 10E3/UL (ref 4–11)

## 2024-05-22 PROCEDURE — 36415 COLL VENOUS BLD VENIPUNCTURE: CPT | Performed by: STUDENT IN AN ORGANIZED HEALTH CARE EDUCATION/TRAINING PROGRAM

## 2024-05-22 PROCEDURE — 99232 SBSQ HOSP IP/OBS MODERATE 35: CPT | Performed by: STUDENT IN AN ORGANIZED HEALTH CARE EDUCATION/TRAINING PROGRAM

## 2024-05-22 PROCEDURE — 120N000001 HC R&B MED SURG/OB

## 2024-05-22 PROCEDURE — 250N000009 HC RX 250: Performed by: STUDENT IN AN ORGANIZED HEALTH CARE EDUCATION/TRAINING PROGRAM

## 2024-05-22 PROCEDURE — 85025 COMPLETE CBC W/AUTO DIFF WBC: CPT | Performed by: STUDENT IN AN ORGANIZED HEALTH CARE EDUCATION/TRAINING PROGRAM

## 2024-05-22 PROCEDURE — 84100 ASSAY OF PHOSPHORUS: CPT | Performed by: STUDENT IN AN ORGANIZED HEALTH CARE EDUCATION/TRAINING PROGRAM

## 2024-05-22 PROCEDURE — 83735 ASSAY OF MAGNESIUM: CPT | Performed by: STUDENT IN AN ORGANIZED HEALTH CARE EDUCATION/TRAINING PROGRAM

## 2024-05-22 PROCEDURE — 250N000011 HC RX IP 250 OP 636

## 2024-05-22 PROCEDURE — 250N000013 HC RX MED GY IP 250 OP 250 PS 637

## 2024-05-22 PROCEDURE — 250N000013 HC RX MED GY IP 250 OP 250 PS 637: Performed by: INTERNAL MEDICINE

## 2024-05-22 PROCEDURE — 83735 ASSAY OF MAGNESIUM: CPT | Performed by: INTERNAL MEDICINE

## 2024-05-22 PROCEDURE — 250N000011 HC RX IP 250 OP 636: Performed by: STUDENT IN AN ORGANIZED HEALTH CARE EDUCATION/TRAINING PROGRAM

## 2024-05-22 PROCEDURE — 82248 BILIRUBIN DIRECT: CPT | Performed by: STUDENT IN AN ORGANIZED HEALTH CARE EDUCATION/TRAINING PROGRAM

## 2024-05-22 PROCEDURE — 258N000003 HC RX IP 258 OP 636: Performed by: STUDENT IN AN ORGANIZED HEALTH CARE EDUCATION/TRAINING PROGRAM

## 2024-05-22 PROCEDURE — 250N000013 HC RX MED GY IP 250 OP 250 PS 637: Performed by: STUDENT IN AN ORGANIZED HEALTH CARE EDUCATION/TRAINING PROGRAM

## 2024-05-22 RX ORDER — MAGNESIUM SULFATE HEPTAHYDRATE 40 MG/ML
4 INJECTION, SOLUTION INTRAVENOUS ONCE
Status: COMPLETED | OUTPATIENT
Start: 2024-05-22 | End: 2024-05-22

## 2024-05-22 RX ORDER — PHENOBARBITAL 16.2 MG/1
64.8 TABLET ORAL 3 TIMES DAILY
Status: CANCELLED | OUTPATIENT
Start: 2024-05-23 | End: 2024-05-24

## 2024-05-22 RX ORDER — PHENOBARBITAL 97.2 MG/1
97.2 TABLET ORAL 3 TIMES DAILY
Status: CANCELLED | OUTPATIENT
Start: 2024-05-22 | End: 2024-05-23

## 2024-05-22 RX ORDER — PHENOBARBITAL 16.2 MG/1
32.4 TABLET ORAL 2 TIMES DAILY
Status: CANCELLED | OUTPATIENT
Start: 2024-05-24 | End: 2024-05-25

## 2024-05-22 RX ORDER — GABAPENTIN 300 MG/1
900 CAPSULE ORAL EVERY 8 HOURS
Status: COMPLETED | OUTPATIENT
Start: 2024-05-22 | End: 2024-05-22

## 2024-05-22 RX ADMIN — LORAZEPAM 1 MG: 1 TABLET ORAL at 01:07

## 2024-05-22 RX ADMIN — GABAPENTIN 900 MG: 300 CAPSULE ORAL at 05:28

## 2024-05-22 RX ADMIN — OLANZAPINE 2.5 MG: 5 TABLET, ORALLY DISINTEGRATING ORAL at 09:11

## 2024-05-22 RX ADMIN — LORAZEPAM 1 MG: 1 TABLET ORAL at 09:11

## 2024-05-22 RX ADMIN — NICOTINE 1 PATCH: 21 PATCH, EXTENDED RELEASE TRANSDERMAL at 08:01

## 2024-05-22 RX ADMIN — Medication 1 TABLET: at 09:11

## 2024-05-22 RX ADMIN — THIAMINE HYDROCHLORIDE 500 MG: 100 INJECTION, SOLUTION INTRAMUSCULAR; INTRAVENOUS at 20:49

## 2024-05-22 RX ADMIN — LORAZEPAM 2 MG: 2 INJECTION INTRAMUSCULAR; INTRAVENOUS at 19:40

## 2024-05-22 RX ADMIN — LORAZEPAM 1 MG: 1 TABLET ORAL at 03:04

## 2024-05-22 RX ADMIN — GABAPENTIN 900 MG: 300 CAPSULE ORAL at 21:00

## 2024-05-22 RX ADMIN — THIAMINE HYDROCHLORIDE 500 MG: 100 INJECTION, SOLUTION INTRAMUSCULAR; INTRAVENOUS at 07:59

## 2024-05-22 RX ADMIN — GABAPENTIN 900 MG: 300 CAPSULE ORAL at 13:12

## 2024-05-22 RX ADMIN — THIAMINE HYDROCHLORIDE 500 MG: 100 INJECTION, SOLUTION INTRAMUSCULAR; INTRAVENOUS at 13:14

## 2024-05-22 RX ADMIN — LORAZEPAM 2 MG: 1 TABLET ORAL at 05:27

## 2024-05-22 RX ADMIN — LORAZEPAM 1 MG: 1 TABLET ORAL at 00:13

## 2024-05-22 RX ADMIN — GABAPENTIN 900 MG: 300 CAPSULE ORAL at 00:12

## 2024-05-22 RX ADMIN — LORAZEPAM 1 MG: 1 TABLET ORAL at 20:50

## 2024-05-22 RX ADMIN — SODIUM PHOSPHATE, MONOBASIC, MONOHYDRATE AND SODIUM PHOSPHATE, DIBASIC, ANHYDROUS 15 MMOL: 142; 276 INJECTION, SOLUTION INTRAVENOUS at 21:33

## 2024-05-22 RX ADMIN — TRAZODONE HYDROCHLORIDE 200 MG: 100 TABLET ORAL at 00:13

## 2024-05-22 RX ADMIN — SODIUM CHLORIDE, SODIUM LACTATE, POTASSIUM CHLORIDE, CALCIUM CHLORIDE AND DEXTROSE MONOHYDRATE: 5; 600; 310; 30; 20 INJECTION, SOLUTION INTRAVENOUS at 21:33

## 2024-05-22 RX ADMIN — MAGNESIUM SULFATE HEPTAHYDRATE 4 G: 4 INJECTION, SOLUTION INTRAVENOUS at 09:15

## 2024-05-22 RX ADMIN — LORAZEPAM 1 MG: 1 TABLET ORAL at 07:57

## 2024-05-22 RX ADMIN — LORAZEPAM 1 MG: 1 TABLET ORAL at 16:22

## 2024-05-22 RX ADMIN — FOLIC ACID 1 MG: 1 TABLET ORAL at 07:57

## 2024-05-22 RX ADMIN — LORAZEPAM 2 MG: 1 TABLET ORAL at 10:35

## 2024-05-22 RX ADMIN — SODIUM CHLORIDE, SODIUM LACTATE, POTASSIUM CHLORIDE, CALCIUM CHLORIDE AND DEXTROSE MONOHYDRATE: 5; 600; 310; 30; 20 INJECTION, SOLUTION INTRAVENOUS at 09:14

## 2024-05-22 RX ADMIN — LEVOTHYROXINE SODIUM 175 MCG: 0.15 TABLET ORAL at 05:27

## 2024-05-22 RX ADMIN — LORAZEPAM 2 MG: 2 INJECTION INTRAMUSCULAR; INTRAVENOUS at 18:12

## 2024-05-22 ASSESSMENT — ACTIVITIES OF DAILY LIVING (ADL)
ADLS_ACUITY_SCORE: 38
ADLS_ACUITY_SCORE: 42
ADLS_ACUITY_SCORE: 42
ADLS_ACUITY_SCORE: 38
ADLS_ACUITY_SCORE: 39
ADLS_ACUITY_SCORE: 39
ADLS_ACUITY_SCORE: 38
ADLS_ACUITY_SCORE: 39
ADLS_ACUITY_SCORE: 38
ADLS_ACUITY_SCORE: 42
ADLS_ACUITY_SCORE: 38
ADLS_ACUITY_SCORE: 42
ADLS_ACUITY_SCORE: 38

## 2024-05-22 NOTE — PROGRESS NOTES
Writer contacted pt to complete SHASHI consult. Pt appears to very confused and was unable to track our conversation at this time. Writer spoke with Nurse that was in the room with him and she agreed he is not well enough to complete a SHASHI consult at this time.    Writer will continue to follow Pt.     DWIGHT Martinez

## 2024-05-22 NOTE — PLAN OF CARE
Problem: Adult Inpatient Plan of Care  Goal: Absence of Hospital-Acquired Illness or Injury  Intervention: Identify and Manage Fall Risk  Recent Flowsheet Documentation  Taken 5/22/2024 1000 by Rosemary Bowles RN  Safety Promotion/Fall Prevention:   clutter free environment maintained   increase visualization of patient   room door open     Problem: Adult Inpatient Plan of Care  Goal: Optimal Comfort and Wellbeing  Outcome: Not Progressing     Problem: Alcohol Withdrawal  Goal: Alcohol Withdrawal Symptom Control  Outcome: Not Progressing   Goal Outcome Evaluation:    Pt tremulous, having visual hallucinations this morning.  He reports seeing animals in his room.  Restless at times, multiple attempts made to get out of bed.  Given ativan per CIWA scale, dose of zyprexa.  No hallucinations at this time and tremors improved.  Pt still restless, attempting to swing legs over side of bed.  He does not seem to understand his risk for falls.   in place for pt safety.         Overall Patient Progress: no change

## 2024-05-22 NOTE — PROGRESS NOTES
[facility]    Medicine Progress Note - Hospitalist Service    Date of Admission:  5/19/2024    Assessment & Plan   Getachew Barcenas is a 54 year old male with past medical hx of severe alcohol use disorder, frequent falls due to vertigo, paroxysmal Vtach, chronic subdural hematoma, LULI, anxiety who presents on 5/19/2024 with 3 falls in the last 2 weeks and alcohol withdrawal.     Frequent falls   Chronic vertigo  Peripheral neuropathy  Has severe alcohol use disorder as below. Hx of frequent falls while drinking. Has had 3 falls in last 2 weeks due to increased alcohol intake, chronic vertigo, and neuropathy in toes. Last fall 6-7d ago. Ht his head every time. CT head and c-spine only shows stable chronic subdural hematoma (see below). He has chronic vertigo due to the subdural hematoma, which he states nothing has worked for. He knows alcohol is contributing to his falls.    He arrived tachycardic to 120s. Tmax 100.1. WBC WNL. Does not meet SIRS criteria. Lactic acidosis as below.  B12 WNL. Lower suspicion for Wernickes, as he's had no neuro changes, no nystagmus, and CT head negative. Suspect falls are mainly due to intoxication.  In the ER he received Folate, Thiamine, 2L D5LR, Ceftriaxone. Low concern for infection.  Still tremulous. Eating well. No abd pain.     - PT/OT/SW consulted; PT home w/ assist  - continue neurontin protocol for ETOH withdrawal(apparently was on neurontin in past-stopped taking) with continued treatment after taper for neuropathic pain  -  Meclizine 25 TID PRN for vertigo  - Blood cultures pending     Alcohol use disorder, severe, with acute withdrawal  Alcoholic Hallucinosis  Resting tremor, acute on chronic  Pt has been to IP tx twice, last time in summer 2023. He was admitted in 3/2023 for withdrawal and SW was arranging IP tx, but it took too long so he left AMA. He states after his IP tx he started drinking more. He used to drink a 1.75L bottle of vodka  every 4d, but now drinks 1.75L every 1-2d.   He hasn't taken his PTA meds in 2 weeks due to being fatigued and not being able to get out of bed. He attributes his fatigue to not being on his Levothyroxine (see below), and doesn't believe alcohol is contributing to his fatigue. He last drank 5/15. Alcohol WNL on admit.  In acute mild withdrawal on admission. Has a resting tremor at baseline, but it's worse on admission.  On admission he states he wants to stop drinking, but doesn't want IP tx, Acamprosate, or Naltrexone. He states Acamprosate made his chronic dizziness worse.   LFTs improving.     - Chem Dep consulted 05/22  - IV thiamine started 05/21, stopped clonidine  - mIVF d5lr   - CIWA protocol with IV vitamins/ neurontin  - Restart thiamine on discharge. Pt no longer taking these.   - SW to see for rx options     Lactic acidosis, resolved  Elevated anion gap with ketosis, likely alcoholic  Lactate 4.2 ?  4.2 ?  3.3. Ketones 2.2 ?  0.6.  Anion gap 28. VBG with pH 7.51, CO2 35, bicarb 28. Likely due to severe alcohol use and liver impairment.      Low grade fever  ? Etiology. Had temp of 100.1 in ER. No sn/sx of infection. Cxr negative for infection. UA negative.  Has remained afebrile so far. Follow blood cx ordered in ED.     Chronic subdural hematoma (H)  Hx of chronic subdural hematoma due to frequent falls while drinking alcohol. S/p emobolization 11/2022. Follows with neurosurgery at [facility] with serial imaging, last visit 11/8/23. CT head on admission shows stable SDH.     Pancytopenia  Partially due to IV fluid hydration as well as bone marrow suppression from alcohol  No obvious blood loss  Platelets greater than 100 hemoglobin is greater than 10 and no neutropenia    Continue to monitor but expect slow improvement with cessation from alcohol  Transfuse for hemoglobin less than 7 and platelets less than 50 if bleeding and less than 10K regardless of bleeding     Hepatic steatosis  Elevated LFTs, acute on  chronic    Pt had choledocholithiasis in 12/2023. He was tx with ERCP and cholecystectomy. He had an ERCP in 1/2024 for stent removal.     On admission  (baseline ~100),  (baseline ~100), Tbili 9.3 (baseline ~4), direct bili 4.43 (baseline ~5). . INR 1.12. Maddrey score 10. MELD 20.   LFTS improving. No intervention     Hypothyroidism  Toxic diffuse goiter  On admission TSH 18.81 and T4 1. Pt stopped taking Levothyroxine 2 wks ago due to fatigue as above.   Restarted PTA Levothyroxine 175     Hypophosphatemia  Hypomagnesemia  Hypokalemia  Hyponatremia  On admission Mg 1.5. K 3.2. Phos 0.5. Na 129. Likely due to chronic alcohol use.   - RN repletion protocol  - Restart potassium and mag replacement on discharge. Pt reported no longer taking these     Elevated lipase  Lipase 219 on admission. Pt without abdominal pain or chest pain. No n/v.      Ventricular tachycardia (paroxysmal) (H)  Had possible non-sustained VT vs SVT on admission in 3/2023. Was asymptomatic during it.   Stable. Stop tele     Elevated CK  399 on admission. Denies myalgias. Likely due to alcohol use and several metabolic derangements.          Diet: Regular Diet Adult    DVT Prophylaxis: Ambulate every shift  Triana Catheter: Not present  Lines: None     Cardiac Monitoring: None  Code Status: Full Code      Clinically Significant Risk Factors        # Hypokalemia: Lowest K = 3 mmol/L in last 2 days, will replace as needed    # Hypercalcemia: corrected calcium is >10.1, will monitor as appropriate  # Hypomagnesemia: Lowest Mg = 1.5 mg/dL in last 2 days, will replace as needed   # Hypoalbuminemia: Lowest albumin = 3.2 g/dL at 5/21/2024  5:14 AM, will monitor as appropriate   # Thrombocytopenia: Lowest platelets = 82 in last 2 days, will monitor for bleeding                     Disposition Plan     Medically Ready for Discharge: Anticipated in 2-4 Days             Christian Pretty DO  Hospitalist Service  [facility]  [facility]  Securely message with [employer] (more info)  Text page via [facility] Paging/Directory   ______________________________________________________________________    Interval History   Having sx-triggered meds for CIWA and starting to develop some hallucinations without agitation. Tachycardic. Giving lorazepam and one time dose of zyprexa.     Physical Exam   Vital Signs: Temp: 97.7  F (36.5  C) Temp src: Oral BP: (!) 147/98 Pulse: 109   Resp: 22 SpO2: 94 % O2 Device: None (Room air) Oxygen Delivery: 1 LPM  Weight: 239 lbs 10.24 oz    Physical Exam  Constitutional:       General: Pt is not in acute distress.  HENT:      Head: Normocephalic and atraumatic.      Nose: Nose normal.   Eyes:      Conjunctiva/sclera: Conjunctivae normal.   Cardio/Pulmonary:      Effort: Pulmonary effort is normal. He is tachycardic.  Abdominal:      General: Abdomen is flat.   Skin:     Findings: No rash.   Neurological:      General: No focal deficit present. He is tremulous.      Mental Status: He is alert.   Psychiatric:         Mood and Affect: Mood normal.       Medical Decision Making             Data

## 2024-05-22 NOTE — PLAN OF CARE
"Goal Outcome Evaluation:      Plan of Care Reviewed With: patient    Overall Patient Progress: no changeOverall Patient Progress: no change    Outcome Evaluation: Pt CIWA scores 5-11 treated per MAR.  Scores are driven by continuous hand shaking tremors and patient stating he sees animals in the room.  Denies the animals making noise and states if he touches them they disapper. Pt seems aware they are invisble stating the animals disappear if he touches them so he knows theyr'e \"not really there.\"  Patient's disorientation increases over the course of the night including stating he needed to \"go downstairs to fix that but it's no big deal.\"  CIWA level reached 13 in the early AM. Pt stated he wanted to go home because he \"could take these pills there\" and stated his daughter could take care of him. Explanation of POC to preserve safety during withdrawal given and accepted by patient.    Visit Vitals  /82 (BP Location: Right arm)   Pulse 88   Temp 98.5  F (36.9  C) (Oral)   Resp 24       Problem: Adult Inpatient Plan of Care  Goal: Plan of Care Review  Description: The Plan of Care Review/Shift note should be completed every shift.  The Outcome Evaluation is a brief statement about your assessment that the patient is improving, declining, or no change.  This information will be displayed automatically on your shift  note.  Outcome: Not Progressing  Flowsheets (Taken 5/22/2024 0452)  Outcome Evaluation: Pt CIWA scores 5-11 treated per MAR.  Scores are driven by continuous hand shaking tremors and patient stating he sees animals in the room.  Denies the animals making noise and states if he touches them they disapper. Pt seems aware they are invisble stating the animals disappear if he touches them so he knows theyr'e \"not really there.\"  Plan of Care Reviewed With: patient  Overall Patient Progress: no change  Goal: Patient-Specific Goal (Individualized)  Description: You can add care plan individualizations to " "a care plan. Examples of Individualization might be:  \"Parent requests to be called daily at 9am for status\", \"I have a hard time hearing out of my right ear\", or \"Do not touch me to wake me up as it startles  me\".  Outcome: Not Progressing  Goal: Absence of Hospital-Acquired Illness or Injury  Outcome: Not Progressing  Intervention: Identify and Manage Fall Risk  Recent Flowsheet Documentation  Taken 5/22/2024 0102 by Yolanda Bullard RN  Safety Promotion/Fall Prevention:   safety round/check completed   supervised activity   clutter free environment maintained   increased rounding and observation   mobility aid in reach   nonskid shoes/slippers when out of bed   room near nurse's station  Intervention: Prevent Skin Injury  Recent Flowsheet Documentation  Taken 5/22/2024 0007 by Yolanda Bullard RN  Body Position:   weight shifting   supine, head elevated  Intervention: Prevent Infection  Recent Flowsheet Documentation  Taken 5/22/2024 0102 by Yolanda Bullard RN  Infection Prevention: rest/sleep promoted  Goal: Optimal Comfort and Wellbeing  Outcome: Not Progressing  Goal: Readiness for Transition of Care  Outcome: Not Progressing     "

## 2024-05-22 NOTE — PLAN OF CARE
Goal Outcome Evaluation:    Problem: Alcohol Withdrawal  Goal: Alcohol Withdrawal Symptom Control  5/21/2024 2126 by Elizabeth Jasso, RN  Outcome: Not Progressing  5/21/2024 2126 by Elizabeth Jasso, RN  Outcome: Not Progressing    Patient having hallucinations. CIWA score 15 ativan oral 2 mg given recheck 5. Patient attempting to get out of bed, continuously reminding to use call light.

## 2024-05-23 LAB
ALBUMIN SERPL BCG-MCNC: 3.2 G/DL (ref 3.5–5.2)
ANION GAP SERPL CALCULATED.3IONS-SCNC: 11 MMOL/L (ref 7–15)
BASOPHILS # BLD AUTO: 0 10E3/UL (ref 0–0.2)
BASOPHILS NFR BLD AUTO: 1 %
BUN SERPL-MCNC: 4.5 MG/DL (ref 6–20)
CALCIUM SERPL-MCNC: 8.6 MG/DL (ref 8.6–10)
CHLORIDE SERPL-SCNC: 101 MMOL/L (ref 98–107)
CREAT SERPL-MCNC: 0.87 MG/DL (ref 0.67–1.17)
DEPRECATED HCO3 PLAS-SCNC: 26 MMOL/L (ref 22–29)
EGFRCR SERPLBLD CKD-EPI 2021: >90 ML/MIN/1.73M2
EOSINOPHIL # BLD AUTO: 0.1 10E3/UL (ref 0–0.7)
EOSINOPHIL NFR BLD AUTO: 4 %
ERYTHROCYTE [DISTWIDTH] IN BLOOD BY AUTOMATED COUNT: 13.2 % (ref 10–15)
GLUCOSE SERPL-MCNC: 85 MG/DL (ref 70–99)
HCT VFR BLD AUTO: 32.1 % (ref 40–53)
HGB BLD-MCNC: 10.5 G/DL (ref 13.3–17.7)
IMM GRANULOCYTES # BLD: 0.1 10E3/UL
IMM GRANULOCYTES NFR BLD: 3 %
LYMPHOCYTES # BLD AUTO: 1.1 10E3/UL (ref 0.8–5.3)
LYMPHOCYTES NFR BLD AUTO: 35 %
MAGNESIUM SERPL-MCNC: 1.9 MG/DL (ref 1.7–2.3)
MCH RBC QN AUTO: 34.4 PG (ref 26.5–33)
MCHC RBC AUTO-ENTMCNC: 32.7 G/DL (ref 31.5–36.5)
MCV RBC AUTO: 105 FL (ref 78–100)
MONOCYTES # BLD AUTO: 0.6 10E3/UL (ref 0–1.3)
MONOCYTES NFR BLD AUTO: 20 %
NEUTROPHILS # BLD AUTO: 1.2 10E3/UL (ref 1.6–8.3)
NEUTROPHILS NFR BLD AUTO: 37 %
NRBC # BLD AUTO: 0.1 10E3/UL
NRBC BLD AUTO-RTO: 2 /100
PHOSPHATE SERPL-MCNC: 4.2 MG/DL (ref 2.5–4.5)
PLATELET # BLD AUTO: 132 10E3/UL (ref 150–450)
POTASSIUM SERPL-SCNC: 3.5 MMOL/L (ref 3.4–5.3)
RBC # BLD AUTO: 3.05 10E6/UL (ref 4.4–5.9)
SODIUM SERPL-SCNC: 138 MMOL/L (ref 135–145)
WBC # BLD AUTO: 3.1 10E3/UL (ref 4–11)

## 2024-05-23 PROCEDURE — 258N000003 HC RX IP 258 OP 636: Performed by: STUDENT IN AN ORGANIZED HEALTH CARE EDUCATION/TRAINING PROGRAM

## 2024-05-23 PROCEDURE — 250N000011 HC RX IP 250 OP 636: Performed by: STUDENT IN AN ORGANIZED HEALTH CARE EDUCATION/TRAINING PROGRAM

## 2024-05-23 PROCEDURE — 200N000001 HC R&B ICU

## 2024-05-23 PROCEDURE — 93005 ELECTROCARDIOGRAM TRACING: CPT

## 2024-05-23 PROCEDURE — 250N000011 HC RX IP 250 OP 636

## 2024-05-23 PROCEDURE — 80069 RENAL FUNCTION PANEL: CPT | Performed by: STUDENT IN AN ORGANIZED HEALTH CARE EDUCATION/TRAINING PROGRAM

## 2024-05-23 PROCEDURE — 36415 COLL VENOUS BLD VENIPUNCTURE: CPT | Performed by: STUDENT IN AN ORGANIZED HEALTH CARE EDUCATION/TRAINING PROGRAM

## 2024-05-23 PROCEDURE — 85025 COMPLETE CBC W/AUTO DIFF WBC: CPT | Performed by: STUDENT IN AN ORGANIZED HEALTH CARE EDUCATION/TRAINING PROGRAM

## 2024-05-23 PROCEDURE — 83735 ASSAY OF MAGNESIUM: CPT | Performed by: STUDENT IN AN ORGANIZED HEALTH CARE EDUCATION/TRAINING PROGRAM

## 2024-05-23 PROCEDURE — 250N000013 HC RX MED GY IP 250 OP 250 PS 637: Performed by: STUDENT IN AN ORGANIZED HEALTH CARE EDUCATION/TRAINING PROGRAM

## 2024-05-23 PROCEDURE — 99232 SBSQ HOSP IP/OBS MODERATE 35: CPT | Performed by: STUDENT IN AN ORGANIZED HEALTH CARE EDUCATION/TRAINING PROGRAM

## 2024-05-23 PROCEDURE — 250N000013 HC RX MED GY IP 250 OP 250 PS 637

## 2024-05-23 RX ORDER — PROCHLORPERAZINE MALEATE 5 MG
5 TABLET ORAL EVERY 6 HOURS PRN
Status: DISCONTINUED | OUTPATIENT
Start: 2024-05-23 | End: 2024-05-26 | Stop reason: HOSPADM

## 2024-05-23 RX ORDER — PHENOBARBITAL 16.2 MG/1
32.4 TABLET ORAL 2 TIMES DAILY
Status: DISCONTINUED | OUTPATIENT
Start: 2024-05-24 | End: 2024-05-23

## 2024-05-23 RX ORDER — PHENOBARBITAL SODIUM 130 MG/ML
3 INJECTION, SOLUTION INTRAMUSCULAR; INTRAVENOUS
Status: DISCONTINUED | OUTPATIENT
Start: 2024-05-23 | End: 2024-05-23 | Stop reason: DRUGHIGH

## 2024-05-23 RX ORDER — GABAPENTIN 100 MG/1
100 CAPSULE ORAL EVERY 8 HOURS
Status: DISCONTINUED | OUTPATIENT
Start: 2024-05-27 | End: 2024-05-24

## 2024-05-23 RX ORDER — PHENOBARBITAL 16.2 MG/1
32.4 TABLET ORAL 2 TIMES DAILY
Status: DISCONTINUED | OUTPATIENT
Start: 2024-05-24 | End: 2024-05-23 | Stop reason: DRUGHIGH

## 2024-05-23 RX ORDER — PHENOBARBITAL 16.2 MG/1
64.8 TABLET ORAL 2 TIMES DAILY
Status: DISCONTINUED | OUTPATIENT
Start: 2024-05-23 | End: 2024-05-23 | Stop reason: DRUGHIGH

## 2024-05-23 RX ORDER — PROCHLORPERAZINE 25 MG
25 SUPPOSITORY, RECTAL RECTAL EVERY 12 HOURS PRN
Status: DISCONTINUED | OUTPATIENT
Start: 2024-05-23 | End: 2024-05-26 | Stop reason: HOSPADM

## 2024-05-23 RX ORDER — PHENOBARBITAL SODIUM 130 MG/ML
4 INJECTION, SOLUTION INTRAMUSCULAR; INTRAVENOUS
Status: DISCONTINUED | OUTPATIENT
Start: 2024-05-23 | End: 2024-05-23 | Stop reason: DRUGHIGH

## 2024-05-23 RX ORDER — LORAZEPAM 2 MG/ML
.5-1 INJECTION INTRAMUSCULAR EVERY 30 MIN PRN
Status: DISCONTINUED | OUTPATIENT
Start: 2024-05-23 | End: 2024-05-26 | Stop reason: HOSPADM

## 2024-05-23 RX ORDER — PHENOBARBITAL 16.2 MG/1
64.8 TABLET ORAL 2 TIMES DAILY
Status: DISCONTINUED | OUTPATIENT
Start: 2024-05-24 | End: 2024-05-24

## 2024-05-23 RX ORDER — PHENOBARBITAL 16.2 MG/1
32.4 TABLET ORAL 2 TIMES DAILY
Status: DISCONTINUED | OUTPATIENT
Start: 2024-05-25 | End: 2024-05-24

## 2024-05-23 RX ORDER — OLANZAPINE 5 MG/1
5 TABLET, ORALLY DISINTEGRATING ORAL EVERY 6 HOURS PRN
Status: DISCONTINUED | OUTPATIENT
Start: 2024-05-23 | End: 2024-05-24

## 2024-05-23 RX ORDER — LORAZEPAM 0.5 MG/1
.5-1 TABLET ORAL EVERY 30 MIN PRN
Status: DISCONTINUED | OUTPATIENT
Start: 2024-05-23 | End: 2024-05-26 | Stop reason: HOSPADM

## 2024-05-23 RX ORDER — PHENOBARBITAL 16.2 MG/1
64.8 TABLET ORAL 2 TIMES DAILY
Status: DISCONTINUED | OUTPATIENT
Start: 2024-05-23 | End: 2024-05-23

## 2024-05-23 RX ADMIN — PHENOBARBITAL SODIUM 328.9 MG: 130 INJECTION, SOLUTION INTRAMUSCULAR; INTRAVENOUS at 10:53

## 2024-05-23 RX ADMIN — PHENOBARBITAL SODIUM 247 MG: 130 INJECTION, SOLUTION INTRAMUSCULAR; INTRAVENOUS at 14:28

## 2024-05-23 RX ADMIN — LORAZEPAM 1 MG: 2 INJECTION INTRAMUSCULAR; INTRAVENOUS at 04:10

## 2024-05-23 RX ADMIN — NICOTINE 1 PATCH: 21 PATCH, EXTENDED RELEASE TRANSDERMAL at 08:00

## 2024-05-23 RX ADMIN — GABAPENTIN 600 MG: 300 CAPSULE ORAL at 05:17

## 2024-05-23 RX ADMIN — FOLIC ACID 1 MG: 1 TABLET ORAL at 07:53

## 2024-05-23 RX ADMIN — THIAMINE HYDROCHLORIDE 500 MG: 100 INJECTION, SOLUTION INTRAMUSCULAR; INTRAVENOUS at 07:53

## 2024-05-23 RX ADMIN — LORAZEPAM 1 MG: 2 INJECTION INTRAMUSCULAR; INTRAVENOUS at 10:43

## 2024-05-23 RX ADMIN — HALOPERIDOL LACTATE 5 MG: 5 INJECTION, SOLUTION INTRAMUSCULAR at 22:55

## 2024-05-23 RX ADMIN — LEVOTHYROXINE SODIUM 175 MCG: 0.15 TABLET ORAL at 05:18

## 2024-05-23 RX ADMIN — LORAZEPAM 1 MG: 2 INJECTION INTRAMUSCULAR; INTRAVENOUS at 12:59

## 2024-05-23 RX ADMIN — LORAZEPAM 1 MG: 2 INJECTION INTRAMUSCULAR; INTRAVENOUS at 11:47

## 2024-05-23 RX ADMIN — LORAZEPAM 1 MG: 2 INJECTION INTRAMUSCULAR; INTRAVENOUS at 09:16

## 2024-05-23 RX ADMIN — LORAZEPAM 1 MG: 2 INJECTION INTRAMUSCULAR; INTRAVENOUS at 22:46

## 2024-05-23 RX ADMIN — GABAPENTIN 600 MG: 300 CAPSULE ORAL at 22:48

## 2024-05-23 RX ADMIN — LORAZEPAM 1 MG: 2 INJECTION INTRAMUSCULAR; INTRAVENOUS at 11:16

## 2024-05-23 RX ADMIN — SODIUM CHLORIDE, SODIUM LACTATE, POTASSIUM CHLORIDE, CALCIUM CHLORIDE AND DEXTROSE MONOHYDRATE: 5; 600; 310; 30; 20 INJECTION, SOLUTION INTRAVENOUS at 07:48

## 2024-05-23 RX ADMIN — OLANZAPINE 5 MG: 5 TABLET, ORALLY DISINTEGRATING ORAL at 11:38

## 2024-05-23 RX ADMIN — OLANZAPINE 5 MG: 5 TABLET, ORALLY DISINTEGRATING ORAL at 12:02

## 2024-05-23 RX ADMIN — PHENOBARBITAL SODIUM 247 MG: 130 INJECTION, SOLUTION INTRAMUSCULAR; INTRAVENOUS at 17:22

## 2024-05-23 RX ADMIN — LORAZEPAM 1 MG: 2 INJECTION INTRAMUSCULAR; INTRAVENOUS at 23:25

## 2024-05-23 ASSESSMENT — ACTIVITIES OF DAILY LIVING (ADL)
ADLS_ACUITY_SCORE: 42
ADLS_ACUITY_SCORE: 42
ADLS_ACUITY_SCORE: 47
ADLS_ACUITY_SCORE: 47
ADLS_ACUITY_SCORE: 42
ADLS_ACUITY_SCORE: 43
ADLS_ACUITY_SCORE: 43
ADLS_ACUITY_SCORE: 49
ADLS_ACUITY_SCORE: 49
ADLS_ACUITY_SCORE: 42
ADLS_ACUITY_SCORE: 42
ADLS_ACUITY_SCORE: 49
ADLS_ACUITY_SCORE: 45
ADLS_ACUITY_SCORE: 49
ADLS_ACUITY_SCORE: 42
ADLS_ACUITY_SCORE: 42
ADLS_ACUITY_SCORE: 47
ADLS_ACUITY_SCORE: 47
ADLS_ACUITY_SCORE: 42
ADLS_ACUITY_SCORE: 49
ADLS_ACUITY_SCORE: 49
ADLS_ACUITY_SCORE: 42
ADLS_ACUITY_SCORE: 47

## 2024-05-23 NOTE — PROGRESS NOTES
Assumed patient care from [id number]  A/Ox self   Assist of  bedfast  Pain: 0/10  Diet: Regular  VSS  Patient scoring high on CIWA.  Pt attempted multiple times to get out of bed, unable to redirect and became further agitated when staff attempted to keep him in bed. IV ativian utilized with good relief.

## 2024-05-23 NOTE — PLAN OF CARE
Physical Therapy Discharge Summary    Reason for therapy discharge:    Consistently not appropriate for PT given medical status/alcohol withdrawal.    Progress towards therapy goal(s). See goals on Care Plan in Epic electronic health record for goal details.  Goals not met.  Barriers to achieving goals:   limited tolerance for therapy.    Therapy recommendation(s):    Please re-order PT once appropriate to participate in meaningful therapy session.

## 2024-05-23 NOTE — PLAN OF CARE
Problem: Alcohol Withdrawal  Goal: Alcohol Withdrawal Symptom Control  Outcome: Progressing  Intervention: Minimize or Manage Alcohol Withdrawal Symptoms  Flowsheets (Taken 5/23/2024 0559)  Sensory Stimulation Regulation:   auditory stimulation minimized   quiet environment promoted   lighting decreased  Aspiration Precautions: awake/alert before oral intake     Problem: Alcohol Withdrawal  Goal: Optimal Neurologic Function  Outcome: Progressing  Intervention: Minimize or Manage Acute Neurologic Symptoms  Recent Flowsheet Documentation  Taken 5/23/2024 0559 by Naty Robledo RN  Sensory Stimulation Regulation:   auditory stimulation minimized   quiet environment promoted   lighting decreased     Problem: Fall Injury Risk  Goal: Absence of Fall and Fall-Related Injury  Outcome: Progressing  Intervention: Identify and Manage Contributors  Flowsheets  Taken 5/23/2024 0559  Medication Review/Management: medications reviewed  Taken 5/22/2024 2320  Medication Review/Management: medications reviewed  Intervention: Promote Injury-Free Environment  Flowsheets  Taken 5/23/2024 0559  Safety Promotion/Fall Prevention:   activity supervised   assistive device/personal items within reach   bedside attendant   clutter free environment maintained   nonskid shoes/slippers when out of bed   patient and family education  Taken 5/22/2024 2320  Safety Promotion/Fall Prevention:   activity supervised   assistive device/personal items within reach   bedside attendant   clutter free environment maintained   nonskid shoes/slippers when out of bed   patient and family education       Goal Outcome Evaluation:      Plan of Care Reviewed With: patient    Overall Patient Progress: no change    Outcome Evaluation: CIWA scores = 7-12, medicated per protocol. Mostly cooperative at taking oral meds. A2 sitting at bedside to use urinal. Incontinent at times.    Mag, phos, K+ all WDL this morning; AM rechecks ordered for tomorrow.

## 2024-05-23 NOTE — PLAN OF CARE
Occupational Therapy Discharge Summary    Reason for therapy discharge:    Pt appears not approp for skilled OT at this time    Progress towards therapy goal(s). See goals on Care Plan in [facility] electronic health record for goal details.  Goals not met.  Barriers to achieving goals:   limited tolerance for therapy.    Therapy recommendation(s):    Please reorder OT skilled care when pt appears approp and has completed withdrawal process able to participate in therapies.

## 2024-05-23 NOTE — PROGRESS NOTES
[facility] TRANSPORT NOTE  Data:   Reason for Transport:  ICU    Getachew Barcenas was transported to ICU via cart at 0956.  Patient was accompanied by Nursing Assistant. Equipment used for transport: None. Family was aware of reason for transport: no    Action:  Report: given to CHARLIE Ibarra    Response:  Patient's condition when transferred off unit was stable.    Naty Adhikari RN

## 2024-05-23 NOTE — PROGRESS NOTES
[facility]    Medicine Progress Note - Hospitalist Service    Date of Admission:  5/19/2024    Assessment & Plan   Getachew Barcenas is a 54 year old male with past medical hx of severe alcohol use disorder, frequent falls due to vertigo, paroxysmal Vtach, chronic subdural hematoma, LULI, anxiety who presents on 5/19/2024 with 3 falls in the last 2 weeks and alcohol withdrawal. Patient withdrawal course complicated with the development of alcoholic hallucinosis ~05/21-22, and increasing CIWA scores leading into 05/32 and therefore was transferred to the ICU to start phenobarbital therapy.      Acute Toxic Metabolic Encephalopathy 2/2 EtOH withdrawal  Alcohol use disorder, severe, with acute withdrawal  Alcoholic Hallucinosis  Resting tremor, acute on chronic  Pt has been to IP tx twice, last time in summer 2023. He was admitted in 3/2023 for withdrawal and SW was arranging IP tx, but it took too long so he left AMA. He states after his IP tx he started drinking more. He used to drink a 1.75L bottle of vodka every 4d, but now drinks 1.75L every 1-2d.   He hasn't taken his PTA meds in 2 weeks due to being fatigued and not being able to get out of bed. He attributes his fatigue to not being on his Levothyroxine (see below), and doesn't believe alcohol is contributing to his fatigue. He last drank 5/15. Alcohol WNL on admit.  In acute mild withdrawal on admission. Has a resting tremor at baseline, but it's worse on admission.  On admission he states he wants to stop drinking, but doesn't want IP tx, Acamprosate, or Naltrexone. He states Acamprosate made his chronic dizziness worse.   LFTs improving.   05/23: CIWAs continued to increase despite gabapentin and sx-triggered CIWA (per our protocol), given worsening CIWA and hallucinosis, will start phenobarb as below with reduced dosing lorazepam prn for sx . EKG obtained 05/23 withOUT QT Prolongation (hx of non-sustained VT without QT prolongation is  low risk for prolonged QT -> VT so okay to proceed with phenobarb)    - Phenobarb Taper (Day 1 05/23)  Day 1: IV Phenobarb 4mg/kg x1  followed by 3mg/kg IV phenobarb q3h x2 doses  Day 2: PO Phenobarb 64.8mg BID x 2 doses  Day 3: PO 32.4mg Phenobarb BID x 2 doses   Day 4: PO 32.4mg Phenobarb BID x 1 dose   - PRNs available for agitation  - Chem Dep consulted 05/22; patient wasn't clear enough mentally  - IV thiamine started 05/21, stopped clonidine  - mIVF d5lr    Frequent falls   Chronic vertigo  Peripheral neuropathy  Has severe alcohol use disorder as below. Hx of frequent falls while drinking. Has had 3 falls in last 2 weeks due to increased alcohol intake, chronic vertigo, and neuropathy in toes. Last fall 6-7d ago. Ht his head every time. CT head and c-spine only shows stable chronic subdural hematoma (see below). He has chronic vertigo due to the subdural hematoma, which he states nothing has worked for. He knows alcohol is contributing to his falls.    He arrived tachycardic to 120s. Tmax 100.1. WBC WNL. Does not meet SIRS criteria. Lactic acidosis as below.  B12 WNL. Lower suspicion for Wernickes, as he's had no neuro changes, no nystagmus, and CT head negative. Suspect falls are mainly due to intoxication.  In the ER he received Folate, Thiamine, 2L D5LR, Ceftriaxone. Low concern for infection.  Still tremulous. Eating well. No abd pain.     - PT/OT/SW consulted; PT home w/ assist  - continue neurontin protocol for ETOH withdrawal(apparently was on neurontin in past-stopped taking) with continued treatment after taper for neuropathic pain  -  Meclizine 25 TID PRN for vertigo  - Blood cultures pending (NGTD 3d 05/23)     Lactic acidosis, resolved  Elevated anion gap with ketosis, likely alcoholic  Lactate 4.2 ?  4.2 ?  3.3. Ketones 2.2 ?  0.6.  Anion gap 28. VBG with pH 7.51, CO2 35, bicarb 28. Likely due to severe alcohol use and liver impairment.      Low grade fever  ? Etiology. Had temp of 100.1 in ER.  No sn/sx of infection. Cxr negative for infection. UA negative.  Has remained afebrile so far. Follow blood cx ordered in ED.     Chronic subdural hematoma (H)  Hx of chronic subdural hematoma due to frequent falls while drinking alcohol. S/p emobolization 11/2022. Follows with neurosurgery at [facility] with serial imaging, last visit 11/8/23. CT head on admission shows stable SDH.     Pancytopenia  Partially due to IV fluid hydration as well as bone marrow suppression from alcohol  No obvious blood loss  Platelets greater than 100 hemoglobin is greater than 10 and no neutropenia    Continue to monitor but expect slow improvement with cessation from alcohol  Transfuse for hemoglobin less than 7 and platelets less than 50 if bleeding and less than 10K regardless of bleeding     Hepatic steatosis  Elevated LFTs, acute on chronic    Pt had choledocholithiasis in 12/2023. He was tx with ERCP and cholecystectomy. He had an ERCP in 1/2024 for stent removal.     On admission  (baseline ~100),  (baseline ~100), Tbili 9.3 (baseline ~4), direct bili 4.43 (baseline ~5). . INR 1.12. Maddrey score 10. MELD 20.   LFTS improving. No intervention     Hypothyroidism  Toxic diffuse goiter  On admission TSH 18.81 and T4 1. Pt stopped taking Levothyroxine 2 wks ago due to fatigue as above.   Restarted PTA Levothyroxine 175     Hypophosphatemia  Hypomagnesemia  Hypokalemia  Hyponatremia  On admission Mg 1.5. K 3.2. Phos 0.5. Na 129. Likely due to chronic alcohol use.   - RN repletion protocol  - Restart potassium and mag replacement on discharge. Pt reported no longer taking these     Elevated lipase  Lipase 219 on admission. Pt without abdominal pain or chest pain. No n/v.      Ventricular tachycardia (paroxysmal) (H)  Had possible non-sustained VT vs SVT on admission in 3/2023. Was asymptomatic during it.   Stable. Stop tele     Elevated CK  399 on admission. Denies myalgias. Likely due to alcohol use and several  metabolic derangements.          Diet: Regular Diet Adult    DVT Prophylaxis: Ambulate every shift  Triana Catheter: Not present  Lines: None     Cardiac Monitoring: None  Code Status: Full Code      Clinically Significant Risk Factors           # Hypercalcemia: corrected calcium is >10.1, will monitor as appropriate  # Hypomagnesemia: Lowest Mg = 1.5 mg/dL in last 2 days, will replace as needed   # Hypoalbuminemia: Lowest albumin = 3.2 g/dL at 5/23/2024  5:15 AM, will monitor as appropriate   # Thrombocytopenia: Lowest platelets = 111 in last 2 days, will monitor for bleeding                     Disposition Plan     Medically Ready for Discharge: Anticipated in 2-4 Days             Christian Pretty DO  Hospitalist Service  [facility]  Securely message with [employer] (more info)  Text page via [employer] Paging/Directory   ______________________________________________________________________    Interval History   Having sx-triggered meds for CIWA and starting to develop some hallucinations without agitation. Tachycardic. Giving lorazepam and one time dose of zyprexa.     Physical Exam   Vital Signs: Temp: 97.6  F (36.4  C) Temp src: Axillary BP: (!) 139/97 Pulse: 105   Resp: 26 SpO2: 90 % O2 Device: None (Room air)    Weight: 239 lbs 10.24 oz    Physical Exam  Constitutional:       General: Pt is not in acute distress.  HENT:      Head: Normocephalic and atraumatic.      Nose: Nose normal.   Eyes:      Conjunctiva/sclera: Conjunctivae normal.   Cardio/Pulmonary:      Effort: Pulmonary effort is normal. He is tachycardic.  Abdominal:      General: Abdomen is flat.   Skin:     Findings: No rash.   Neurological:      General: No focal deficit present. He is tremulous.      Mental Status: He is alert.   Psychiatric:         Mood and Affect: Mood normal.       Medical Decision Making             Data

## 2024-05-23 NOTE — SIGNIFICANT EVENT
Pt transferred for higher level of care ([facility] status) - pt scoring 20-28 requiring prn zyprexa & q30 prn lorazepam per ciwa scores.  Pt not allowing staff to change him when incontinent - grabbing at staff using a hard  & not following directions.  Remains impulsive continuously trying to get oob - visually & auditory hallucinations, nonsensical.  Dr. Pretty notified - new orders, pt changed to ICU status for closer monitoring.

## 2024-05-24 LAB
ALBUMIN SERPL BCG-MCNC: 3.6 G/DL (ref 3.5–5.2)
ALBUMIN SERPL BCG-MCNC: 3.6 G/DL (ref 3.5–5.2)
ALP SERPL-CCNC: 136 U/L (ref 40–150)
ALT SERPL W P-5'-P-CCNC: 84 U/L (ref 0–70)
ANION GAP SERPL CALCULATED.3IONS-SCNC: 12 MMOL/L (ref 7–15)
AST SERPL W P-5'-P-CCNC: 150 U/L (ref 0–45)
BACTERIA BLD CULT: NO GROWTH
BACTERIA BLD CULT: NO GROWTH
BASOPHILS # BLD AUTO: 0 10E3/UL (ref 0–0.2)
BASOPHILS NFR BLD AUTO: 1 %
BILIRUB DIRECT SERPL-MCNC: 1.47 MG/DL (ref 0–0.3)
BILIRUB SERPL-MCNC: 2.3 MG/DL
BUN SERPL-MCNC: 3 MG/DL (ref 6–20)
CALCIUM SERPL-MCNC: 9 MG/DL (ref 8.6–10)
CHLORIDE SERPL-SCNC: 98 MMOL/L (ref 98–107)
CREAT SERPL-MCNC: 0.67 MG/DL (ref 0.67–1.17)
DEPRECATED HCO3 PLAS-SCNC: 26 MMOL/L (ref 22–29)
EGFRCR SERPLBLD CKD-EPI 2021: >90 ML/MIN/1.73M2
EOSINOPHIL # BLD AUTO: 0.1 10E3/UL (ref 0–0.7)
EOSINOPHIL NFR BLD AUTO: 3 %
ERYTHROCYTE [DISTWIDTH] IN BLOOD BY AUTOMATED COUNT: 13.6 % (ref 10–15)
GLUCOSE SERPL-MCNC: 90 MG/DL (ref 70–99)
HCT VFR BLD AUTO: 39.4 % (ref 40–53)
HGB BLD-MCNC: 13.4 G/DL (ref 13.3–17.7)
IMM GRANULOCYTES # BLD: 0 10E3/UL
IMM GRANULOCYTES NFR BLD: 1 %
LYMPHOCYTES # BLD AUTO: 0.9 10E3/UL (ref 0.8–5.3)
LYMPHOCYTES NFR BLD AUTO: 29 %
MAGNESIUM SERPL-MCNC: 1.9 MG/DL (ref 1.7–2.3)
MCH RBC QN AUTO: 35 PG (ref 26.5–33)
MCHC RBC AUTO-ENTMCNC: 34 G/DL (ref 31.5–36.5)
MCV RBC AUTO: 103 FL (ref 78–100)
MONOCYTES # BLD AUTO: 0.5 10E3/UL (ref 0–1.3)
MONOCYTES NFR BLD AUTO: 18 %
NEUTROPHILS # BLD AUTO: 1.4 10E3/UL (ref 1.6–8.3)
NEUTROPHILS NFR BLD AUTO: 47 %
NRBC # BLD AUTO: 0 10E3/UL
NRBC BLD AUTO-RTO: 0 /100
PHOSPHATE SERPL-MCNC: 4.4 MG/DL (ref 2.5–4.5)
PLATELET # BLD AUTO: 146 10E3/UL (ref 150–450)
POTASSIUM SERPL-SCNC: 3.9 MMOL/L (ref 3.4–5.3)
PROT SERPL-MCNC: 6.4 G/DL (ref 6.4–8.3)
RBC # BLD AUTO: 3.83 10E6/UL (ref 4.4–5.9)
SODIUM SERPL-SCNC: 136 MMOL/L (ref 135–145)
WBC # BLD AUTO: 3 10E3/UL (ref 4–11)

## 2024-05-24 PROCEDURE — 99232 SBSQ HOSP IP/OBS MODERATE 35: CPT | Performed by: INTERNAL MEDICINE

## 2024-05-24 PROCEDURE — 250N000011 HC RX IP 250 OP 636: Performed by: INTERNAL MEDICINE

## 2024-05-24 PROCEDURE — 83735 ASSAY OF MAGNESIUM: CPT | Performed by: STUDENT IN AN ORGANIZED HEALTH CARE EDUCATION/TRAINING PROGRAM

## 2024-05-24 PROCEDURE — 84100 ASSAY OF PHOSPHORUS: CPT | Performed by: STUDENT IN AN ORGANIZED HEALTH CARE EDUCATION/TRAINING PROGRAM

## 2024-05-24 PROCEDURE — 250N000011 HC RX IP 250 OP 636: Performed by: STUDENT IN AN ORGANIZED HEALTH CARE EDUCATION/TRAINING PROGRAM

## 2024-05-24 PROCEDURE — 36415 COLL VENOUS BLD VENIPUNCTURE: CPT | Performed by: STUDENT IN AN ORGANIZED HEALTH CARE EDUCATION/TRAINING PROGRAM

## 2024-05-24 PROCEDURE — 250N000013 HC RX MED GY IP 250 OP 250 PS 637: Performed by: INTERNAL MEDICINE

## 2024-05-24 PROCEDURE — 250N000013 HC RX MED GY IP 250 OP 250 PS 637: Performed by: STUDENT IN AN ORGANIZED HEALTH CARE EDUCATION/TRAINING PROGRAM

## 2024-05-24 PROCEDURE — 80053 COMPREHEN METABOLIC PANEL: CPT | Performed by: STUDENT IN AN ORGANIZED HEALTH CARE EDUCATION/TRAINING PROGRAM

## 2024-05-24 PROCEDURE — 250N000011 HC RX IP 250 OP 636: Mod: JZ

## 2024-05-24 PROCEDURE — 258N000003 HC RX IP 258 OP 636: Performed by: INTERNAL MEDICINE

## 2024-05-24 PROCEDURE — 85025 COMPLETE CBC W/AUTO DIFF WBC: CPT | Performed by: STUDENT IN AN ORGANIZED HEALTH CARE EDUCATION/TRAINING PROGRAM

## 2024-05-24 PROCEDURE — 200N000001 HC R&B ICU

## 2024-05-24 RX ADMIN — PHENOBARBITAL SODIUM 65 MG: 65 INJECTION INTRAMUSCULAR at 10:04

## 2024-05-24 RX ADMIN — PHENOBARBITAL SODIUM 65 MG: 65 INJECTION INTRAMUSCULAR at 21:28

## 2024-05-24 RX ADMIN — LORAZEPAM 1 MG: 2 INJECTION INTRAMUSCULAR; INTRAVENOUS at 10:21

## 2024-05-24 RX ADMIN — LORAZEPAM 1 MG: 2 INJECTION INTRAMUSCULAR; INTRAVENOUS at 06:36

## 2024-05-24 RX ADMIN — NICOTINE 1 PATCH: 21 PATCH, EXTENDED RELEASE TRANSDERMAL at 09:24

## 2024-05-24 RX ADMIN — LORAZEPAM 1 MG: 2 INJECTION INTRAMUSCULAR; INTRAVENOUS at 04:32

## 2024-05-24 RX ADMIN — LORAZEPAM 1 MG: 2 INJECTION INTRAMUSCULAR; INTRAVENOUS at 15:17

## 2024-05-24 RX ADMIN — SODIUM CHLORIDE, SODIUM LACTATE, POTASSIUM CHLORIDE, CALCIUM CHLORIDE AND DEXTROSE MONOHYDRATE: 5; 600; 310; 30; 20 INJECTION, SOLUTION INTRAVENOUS at 04:36

## 2024-05-24 RX ADMIN — HALOPERIDOL LACTATE 5 MG: 5 INJECTION, SOLUTION INTRAMUSCULAR at 12:01

## 2024-05-24 RX ADMIN — HALOPERIDOL LACTATE 5 MG: 5 INJECTION, SOLUTION INTRAMUSCULAR at 04:33

## 2024-05-24 RX ADMIN — LORAZEPAM 1 MG: 2 INJECTION INTRAMUSCULAR; INTRAVENOUS at 14:38

## 2024-05-24 RX ADMIN — LORAZEPAM 1 MG: 2 INJECTION INTRAMUSCULAR; INTRAVENOUS at 15:55

## 2024-05-24 RX ADMIN — TRAZODONE HYDROCHLORIDE 200 MG: 100 TABLET ORAL at 22:44

## 2024-05-24 ASSESSMENT — ACTIVITIES OF DAILY LIVING (ADL)
ADLS_ACUITY_SCORE: 53
ADLS_ACUITY_SCORE: 55
ADLS_ACUITY_SCORE: 47
ADLS_ACUITY_SCORE: 48
ADLS_ACUITY_SCORE: 47
ADLS_ACUITY_SCORE: 55
ADLS_ACUITY_SCORE: 51
ADLS_ACUITY_SCORE: 53
ADLS_ACUITY_SCORE: 47
ADLS_ACUITY_SCORE: 48
ADLS_ACUITY_SCORE: 51
ADLS_ACUITY_SCORE: 47
ADLS_ACUITY_SCORE: 51
ADLS_ACUITY_SCORE: 48
ADLS_ACUITY_SCORE: 47
ADLS_ACUITY_SCORE: 47
ADLS_ACUITY_SCORE: 53
ADLS_ACUITY_SCORE: 47
ADLS_ACUITY_SCORE: 53
ADLS_ACUITY_SCORE: 47
ADLS_ACUITY_SCORE: 47

## 2024-05-24 NOTE — PROGRESS NOTES
[facility] Medicine Progress Note  Date of Service (when I saw the patient): 05/24/2024    REASON FOR ADMISSION / INTERVAL HISTORY:  Getachew Barcenas is a 54 year old male with past medical hx of severe alcohol use disorder, frequent falls due to vertigo, paroxysmal Vtach, chronic subdural hematoma, LULI, anxiety who presents on 5/19/2024 with 3 falls in the last 2 weeks and alcohol withdrawal.   See details below.       Assessment/ Plan     Acute Toxic Metabolic Encephalopathy 2/2 EtOH withdrawal  Alcohol use disorder, severe, with acute withdrawal  Alcoholic Hallucinosis  Resting tremor, acute on chronic  Pt has been to IP tx twice, last time in summer 2023. He was admitted in 3/2023 for withdrawal and SW was arranging IP tx, but it took too long so he left AMA. He states after his IP tx he started drinking more. He used to drink a 1.75L bottle of vodka every 4d, but now drinks 1.75L every 1-2d.   He hasn't taken his PTA meds in 2 weeks due to being fatigued and not being able to get out of bed. He attributes his fatigue to not being on his Levothyroxine (see below), and doesn't believe alcohol is contributing to his fatigue. He last drank 5/15. Alcohol WNL on admit.  In acute mild withdrawal on admission. Has a resting tremor at baseline, but it's worse on admission.  On admission he states he wants to stop drinking, but doesn't want IP tx, Acamprosate, or Naltrexone. He states Acamprosate made his chronic dizziness worse.   LFTs improving.   Pt deteriorated with worsening hallucinations 5/23 and was transferred to ICU. He was started on low dose phenobarb protocol. EKG was cked-no QT prolongation. Was started on-  Day 1: IV Phenobarb 4mg/kg x1  followed by 3mg/kg IV phenobarb q3h x2 doses  Day 2: PO Phenobarb 64.8mg BID x 2 doses  Day 3: PO 32.4mg Phenobarb BID x 2 doses   Day 4: PO 32.4mg Phenobarb BID x 1 dose    Chem Dep consulted -came to see pt today 5/24-pt unable to  participate in SUDs consult at this time. Need to f/up when more awake and done with withdrawal.   Pt is still hallucinating. Will continue pheno leon protocol-change it to IV. Stop neurontin protocol. Continue sx triggered ativan protocol. Continue thiamine/ folate     Frequent falls   Chronic vertigo  Peripheral neuropathy  Has severe alcohol use disorder as below. Hx of frequent falls while drinking. Has had 3 falls in last 2 weeks due to increased alcohol intake, chronic vertigo, and neuropathy in toes. Last fall 6-7d ago. Ht his head every time. CT head and c-spine only shows stable chronic subdural hematoma (see below). He has chronic vertigo due to the subdural hematoma, which he states nothing has worked for. He knows alcohol is contributing to his falls.    - PT/OT/SW consulted-will need to re-assess pt after withdrawal over.     Lactic acidosis, resolved  Elevated anion gap with ketosis, likely alcoholic  Lactate 4.2 ?  4.2 ?  3.3. Ketones 2.2 ?  0.6.  Anion gap 28. VBG with pH 7.51, CO2 35, bicarb 28. Likely due to severe alcohol use and liver impairment.      Low grade fever  ? Etiology. Had temp of 100.1 in ER. No sn/sx of infection. Cxr negative for infection. UA negative.  Has remained afebrile so far. Follow blood cx 5/19 NTD     Chronic subdural hematoma (H)  Hx of chronic subdural hematoma due to frequent falls while drinking alcohol. S/p emobolization 11/2022. Follows with neurosurgery at [facility] with serial imaging, last visit 11/8/23. CT head on admission shows stable SDH.      Pancytopenia  Partially due to IV fluid hydration as well as bone marrow suppression from alcohol  No obvious blood loss  Platelets greater than 100 hemoglobin is greater than 10 and no neutropenia     Continue to monitor but expect slow improvement with cessation from alcohol  Transfuse for hemoglobin less than 7 and platelets less than 50 if bleeding and less than 10K regardless of bleeding     Hepatic steatosis  Elevated  LFTs, acute on chronic    Pt had choledocholithiasis in 12/2023. He was tx with ERCP and cholecystectomy. He had an ERCP in 1/2024 for stent removal.     On admission  (baseline ~100),  (baseline ~100), Tbili 9.3 (baseline ~4), direct bili 4.43 (baseline ~5). . INR 1.12. Maddrey score 10. MELD 20.   LFTS improving. No intervention     Hypothyroidism  Toxic diffuse goiter  On admission TSH 18.81 and T4 1. Pt stopped taking Levothyroxine 2 wks ago due to fatigue as above.   Restarted PTA Levothyroxine 175     Hypophosphatemia  Hypomagnesemia  Hypokalemia  Hyponatremia  On admission Mg 1.5. K 3.2. Phos 0.5. Na 129. Likely due to chronic alcohol use.   - RN repletion protocol  - Restart potassium and mag replacement on discharge. Pt reported no longer taking these     Elevated lipase  Lipase 219 on admission. Pt without abdominal pain or chest pain. No n/v.      Ventricular tachycardia (paroxysmal) (H)  Had possible non-sustained VT vs SVT on admission in 3/2023. Was asymptomatic during it.   Stable. Stop tele     Elevated CK  399 on admission. Denies myalgias. Likely due to alcohol use and several metabolic derangements.     Dispo  Will be in hospital 2-3 days atleast    Diet: Regular Diet Adult    DVT Prophylaxis: Low Risk/Ambulatory with no VTE prophylaxis indicated and Ambulate every shift  Triana Catheter: Not present  Code Status: Full Code        KRISHNA LIN MD   Pg [phone]        ROS:  As described in A/P and Exam.  Otherwise ALL are  negative.    PHYSICAL EXAM:  All vitals have been reviewed    Blood pressure (!) 106/90, pulse 79, temperature 97.7  F (36.5  C), temperature source Axillary, resp. rate 11, weight 107.6 kg (237 lb 3.4 oz), SpO2 97%.    I/O this shift:  In: 150 [I.V.:150]  Out: -     GENERAL APPEARANCE: healthy, sleeping and no distress  EYES: conjunctiva clear, eyes grossly normal  HENT: external ears and nose normal   RESP: lungs clear to auscultation - no rales, rhonchi  or wheezes  CV: regular rate and rhythm, normal S1 S2, no S3 or S4 and no murmur, click or rub   ABDOMEN: soft, nontender, no HSM or masses and bowel sounds normal  MS: no clubbing, cyanosis; no edema  SKIN: clear without significant rashes or lesions  NEURO: -non-focal moves all 4 extr    ROUTINE  LABS (Last four results)  CMP  Recent Labs   Lab 05/24/24  0627 05/23/24  0515 05/22/24  1419 05/22/24  0542 05/21/24  1347 05/21/24  0514 05/20/24  1435 05/20/24  0259    138  --  138  --  136  --  128*   POTASSIUM 3.9 3.5  --  3.5 3.9 3.0*  --  3.6   CHLORIDE 98 101  --  98  --  96*  --  89*   CO2 26 26  --  28  --  26  --  25   ANIONGAP 12 11  --  12  --  14  --  14   GLC 90 85  --  104*  --  89  --  112*   BUN 3.0* 4.5*  --  6.1  --  7.1  --  8.3   CR 0.67 0.87  --  0.90  --  0.88  --  0.92   GFRESTIMATED >90 >90  --  >90  --  >90  --  >90   ABDULKADIR 9.0 8.6  --  9.6  --  8.7  --  8.3*   MAG 1.9 1.9 2.3 1.5*  --  1.8  --  1.8   PHOS 4.4 4.2  --  2.2*  --  4.2   < > 1.0*   PROTTOTAL 6.4  --   --  5.6*  --  5.4*  --  6.0*   ALBUMIN 3.6  3.6 3.2*  --  3.4*  3.4*  --  3.2*  --  3.5   BILITOTAL 2.3*  --   --  2.7*  --  3.7*  --  6.1*   ALKPHOS 136  --   --  148  --  101  --  102   *  --   --  146*  --  155*  --  218*   ALT 84*  --   --  68  --  63  --  117*    < > = values in this interval not displayed.     CBC  Recent Labs   Lab 05/24/24  0627 05/23/24  0515 05/22/24  0542 05/21/24  0514   WBC 3.0* 3.1* 3.6* 3.3*   RBC 3.83* 3.05* 3.17* 2.96*   HGB 13.4 10.5* 11.0* 10.3*   HCT 39.4* 32.1* 32.7* 29.9*   * 105* 103* 101*   MCH 35.0* 34.4* 34.7* 34.8*   MCHC 34.0 32.7 33.6 34.4   RDW 13.6 13.2 12.8 12.4   * 132* 111* 82*     INR  Recent Labs   Lab 05/19/24  1118   INR 1.12     Arterial Blood Gas  Recent Labs   Lab 05/19/24  1351   O2PER 0       No results found for this or any previous visit (from the past 24 hour(s)).

## 2024-05-24 NOTE — PROGRESS NOTES
Care Management Follow Up    Length of Stay (days): 4  Expected Discharge Date: 5/26/24   Concerns to be Addressed: discharge planning, substance/tobacco abuse/use     Patient plan of care discussed at interdisciplinary rounds: Yes  Anticipated Discharge Disposition: Home, Inpatient Chemical Dependency  Disposition Comments: TBD  Anticipated Discharge Services: None  Anticipated Discharge DME: None  Patient/family educated on Medicare website which has current facility and service quality ratings:    Education Provided on the Discharge Plan:  Not at this time  Patient/Family in Agreement with the Plan:    Referrals Placed by CM/SW:  None  Private pay costs discussed: Not applicable    Additional Information:    Care Management:    Unable to meet with the patient for discharge planning due to medical status.  Care Management will monitor and intervene as needed.      MD will place SUDS Consult if and when appropriate.       Crystal Marroquin RN, Care Coordinator

## 2024-05-24 NOTE — PLAN OF CARE
"Goal Outcome Evaluation:  Using PRN medications as pt unable to follow commands. Some words are clear but most garbled and illogical. Pt gets agitated when he states \"I need to pee\" external catheter working well though pt does not comprehend teaching and attempts  to sit up in bed or  Get out of bed, tremulous and weak, unable to safely get out of bed at this time.                      "

## 2024-05-24 NOTE — PLAN OF CARE
Goal Outcome Evaluation: pt has tremors,is trying to sit up in bed but unable , not able to make coherent conversation. Talking but not to writer. Not following commands. Attempted to have pt take oral medications, he was unable to drink from the straw but did swallow water from a medicine cup. He didn't swallow the capsules, he was biting on them and spit them out.

## 2024-05-24 NOTE — CONSULTS
Pt is unable to participate in a SHASHI's consult at this time due to his physical health.    Please reorder SHASHI consult when pt appears approp and has completed withdrawal process able to participate.    [email or web address]

## 2024-05-24 NOTE — PLAN OF CARE
"      End Of Shift Note    Subjective/Objective:    Neuro: this morning patient arouses with verbal stimulation, not following commands, not redirectable, impulsive, attempts to get out of bed. Patient was briefly oriented to self, place and time at noon for a few minutes then back to being only oriented to self. This afternoon patient has been more vocal, talking to \"people\" in the room, when there is no one in the room.     Cardiac: remains in sinus rhythm/tach    Resp: lung sounds clear denae    GI/: voiding spontaneously, incontinent    MSK: SANCHEZ X4, repositions self in bed    Skin: scattered bruises, abrasions, and scabs, petechiae on back     LDAs: PIV X2  Patient fed himself lunch.  Patient has been medicated with haldol 5 mg IV for visual hallucinations, seeing cats in room, petting cats, trying to open the bedside rail stating \"it's my cupboard\". Patient has been medicated with ativan 1mg IV per CIWA scoring.  Patient has been becoming more and more impulsive, pulled 2 IV's out, pulling BP cuff and monitor leads off, constantly attempting to crawl out of the bed.  Patient's sister Eveline updated.       Goal Outcome Evaluation:      Plan of Care Reviewed With: patient    Overall Patient Progress: improvingOverall Patient Progress: improving      Problem: Adult Inpatient Plan of Care  Goal: Absence of Hospital-Acquired Illness or Injury  Intervention: Identify and Manage Fall Risk  Recent Flowsheet Documentation  Taken 5/24/2024 0800 by Maura Barcenas RN  Safety Promotion/Fall Prevention:   activity supervised   assistive device/personal items within reach   bedside attendant   clutter free environment maintained   increase visualization of patient   lighting adjusted   nonskid shoes/slippers when out of bed   room door open   room near nurse's station   room organization consistent   supervised activity   treat underlying cause   treat reversible contributory factors     Problem: Adult Inpatient Plan of " Care  Goal: Absence of Hospital-Acquired Illness or Injury  Intervention: Prevent Skin Injury  Recent Flowsheet Documentation  Taken 5/24/2024 0800 by Maura Barcenas RN  Body Position:   position changed independently   weight shifting  Skin Protection:   adhesive use limited   pulse oximeter probe site changed  Device Skin Pressure Protection:   absorbent pad utilized/changed   tubing/devices free from skin contact     Problem: Adult Inpatient Plan of Care  Goal: Absence of Hospital-Acquired Illness or Injury  Intervention: Prevent and Manage VTE (Venous Thromboembolism) Risk  Recent Flowsheet Documentation  Taken 5/24/2024 0800 by Maura Barcenas RN  VTE Prevention/Management: SCDs (sequential compression devices) on     Problem: Adult Inpatient Plan of Care  Goal: Absence of Hospital-Acquired Illness or Injury  Intervention: Prevent Infection  Recent Flowsheet Documentation  Taken 5/24/2024 0800 by Maura Barcenas RN  Infection Prevention:   hand hygiene promoted   single patient room provided     Problem: Alcohol Withdrawal  Goal: Optimal Neurologic Function  Outcome: Progressing  Intervention: Minimize or Manage Acute Neurologic Symptoms  Recent Flowsheet Documentation  Taken 5/24/2024 0800 by Maura Barcenas RN  Airway/Ventilation Management:   airway patency maintained   calming measures promoted     Problem: Fall Injury Risk  Goal: Absence of Fall and Fall-Related Injury  Intervention: Promote Injury-Free Environment  Recent Flowsheet Documentation  Taken 5/24/2024 0800 by Maura Barcenas RN  Safety Promotion/Fall Prevention:   activity supervised   assistive device/personal items within reach   bedside attendant   clutter free environment maintained   increase visualization of patient   lighting adjusted   nonskid shoes/slippers when out of bed   room door open   room near nurse's station   room organization consistent   supervised activity   treat underlying cause   treat reversible contributory factors      Problem: Risk for Delirium  Goal: Improved Behavioral Control  Intervention: Minimize Safety Risk  Recent Flowsheet Documentation  Taken 5/24/2024 0800 by Maura Barcenas, RN  Communication Enhancement Strategies:   extra time allowed for response   one-step directions provided  Enhanced Safety Measures:  at bedside  Trust Relationship/Rapport:   care explained   reassurance provided     Problem: Risk for Delirium  Goal: Improved Attention and Thought Clarity  Intervention: Maximize Cognitive Function  Recent Flowsheet Documentation  Taken 5/24/2024 0800 by Maura Barcenas, RN  Reorientation Measures:   calendar in view   clock in view   reorientation provided

## 2024-05-25 LAB
HOLD SPECIMEN: NORMAL
MAGNESIUM SERPL-MCNC: 1.9 MG/DL (ref 1.7–2.3)
PHOSPHATE SERPL-MCNC: 3.7 MG/DL (ref 2.5–4.5)
POTASSIUM SERPL-SCNC: 3.9 MMOL/L (ref 3.4–5.3)

## 2024-05-25 PROCEDURE — 84100 ASSAY OF PHOSPHORUS: CPT | Performed by: STUDENT IN AN ORGANIZED HEALTH CARE EDUCATION/TRAINING PROGRAM

## 2024-05-25 PROCEDURE — 250N000011 HC RX IP 250 OP 636: Performed by: STUDENT IN AN ORGANIZED HEALTH CARE EDUCATION/TRAINING PROGRAM

## 2024-05-25 PROCEDURE — 250N000013 HC RX MED GY IP 250 OP 250 PS 637

## 2024-05-25 PROCEDURE — 250N000013 HC RX MED GY IP 250 OP 250 PS 637: Performed by: INTERNAL MEDICINE

## 2024-05-25 PROCEDURE — 120N000004 HC R&B MS OVERFLOW

## 2024-05-25 PROCEDURE — 83735 ASSAY OF MAGNESIUM: CPT | Performed by: STUDENT IN AN ORGANIZED HEALTH CARE EDUCATION/TRAINING PROGRAM

## 2024-05-25 PROCEDURE — 36415 COLL VENOUS BLD VENIPUNCTURE: CPT | Performed by: STUDENT IN AN ORGANIZED HEALTH CARE EDUCATION/TRAINING PROGRAM

## 2024-05-25 PROCEDURE — 84132 ASSAY OF SERUM POTASSIUM: CPT | Performed by: STUDENT IN AN ORGANIZED HEALTH CARE EDUCATION/TRAINING PROGRAM

## 2024-05-25 PROCEDURE — 250N000013 HC RX MED GY IP 250 OP 250 PS 637: Performed by: STUDENT IN AN ORGANIZED HEALTH CARE EDUCATION/TRAINING PROGRAM

## 2024-05-25 PROCEDURE — 99232 SBSQ HOSP IP/OBS MODERATE 35: CPT | Performed by: INTERNAL MEDICINE

## 2024-05-25 RX ORDER — PHENOBARBITAL 16.2 MG/1
32.4 TABLET ORAL EVERY 12 HOURS
Status: COMPLETED | OUTPATIENT
Start: 2024-05-25 | End: 2024-05-26

## 2024-05-25 RX ADMIN — TRAZODONE HYDROCHLORIDE 200 MG: 100 TABLET ORAL at 21:31

## 2024-05-25 RX ADMIN — THIAMINE HCL TAB 100 MG 100 MG: 100 TAB at 08:11

## 2024-05-25 RX ADMIN — FOLIC ACID 1 MG: 1 TABLET ORAL at 08:11

## 2024-05-25 RX ADMIN — OLANZAPINE 5 MG: 5 TABLET, ORALLY DISINTEGRATING ORAL at 21:31

## 2024-05-25 RX ADMIN — OLANZAPINE 10 MG: 5 TABLET, ORALLY DISINTEGRATING ORAL at 13:23

## 2024-05-25 RX ADMIN — NICOTINE 1 PATCH: 21 PATCH, EXTENDED RELEASE TRANSDERMAL at 08:11

## 2024-05-25 RX ADMIN — LEVOTHYROXINE SODIUM 175 MCG: 0.15 TABLET ORAL at 06:20

## 2024-05-25 RX ADMIN — Medication 1 TABLET: at 10:08

## 2024-05-25 RX ADMIN — PHENOBARBITAL 32.4 MG: 16.2 TABLET ORAL at 21:31

## 2024-05-25 RX ADMIN — SODIUM CHLORIDE, SODIUM LACTATE, POTASSIUM CHLORIDE, CALCIUM CHLORIDE AND DEXTROSE MONOHYDRATE 50 ML/HR: 5; 600; 310; 30; 20 INJECTION, SOLUTION INTRAVENOUS at 04:50

## 2024-05-25 RX ADMIN — PHENOBARBITAL 32.4 MG: 16.2 TABLET ORAL at 10:08

## 2024-05-25 RX ADMIN — LORAZEPAM 1 MG: 0.5 TABLET ORAL at 13:23

## 2024-05-25 ASSESSMENT — ACTIVITIES OF DAILY LIVING (ADL)
ADLS_ACUITY_SCORE: 46
ADLS_ACUITY_SCORE: 48
ADLS_ACUITY_SCORE: 42
ADLS_ACUITY_SCORE: 42
ADLS_ACUITY_SCORE: 46
ADLS_ACUITY_SCORE: 48
ADLS_ACUITY_SCORE: 48
ADLS_ACUITY_SCORE: 42
ADLS_ACUITY_SCORE: 46
ADLS_ACUITY_SCORE: 42
ADLS_ACUITY_SCORE: 48
ADLS_ACUITY_SCORE: 42
ADLS_ACUITY_SCORE: 46
ADLS_ACUITY_SCORE: 48
ADLS_ACUITY_SCORE: 45
ADLS_ACUITY_SCORE: 48
ADLS_ACUITY_SCORE: 46
ADLS_ACUITY_SCORE: 42
ADLS_ACUITY_SCORE: 46
ADLS_ACUITY_SCORE: 41
ADLS_ACUITY_SCORE: 42

## 2024-05-25 NOTE — PROGRESS NOTES
End Of Shift Note    Situation: pt admitted 5/19 for treatment of tachycardia and alcohol withdrawal, CIWA score remains <7 - no Ativan administered this shift, no visual disturbances reported or  observed     Plan: safety    Subjective/Objective:    Neuro: pt is alert, he is able to follow simple commands, needs anticipated, easily arousable from sleep     Cardiac: NSR at rest, intermittent tachycardia with activity (bed mobility)    Resp: he is well oxygenated on RA    GI/: purwik in place, good urine output    MSK: A X 2 w/bed boost, pt independently shifts weight    Skin: scattered bruising and scabbing present upon admission, healing    LDAs: PIV (R) forearm covered w/coban for protection, assessed Q2h    Daughter present at bedside before bedtime with pt's preferred food, good appetite     CIVF    Phenobarbital infused per MD order    Disposition TBD

## 2024-05-25 NOTE — PROGRESS NOTES
[facility] Medicine Progress Note  Date of Service (when I saw the patient): 05/25/2024    REASON FOR ADMISSION / INTERVAL HISTORY:  Getachew Barcenas is a 54 year old male with past medical hx of severe alcohol use disorder, frequent falls due to vertigo, paroxysmal Vtach, chronic subdural hematoma, LULI, anxiety who presents on 5/19/2024 with 3 falls in the last 2 weeks and alcohol withdrawal.   See details below.       Assessment/ Plan     Acute Toxic Metabolic Encephalopathy 2/2 EtOH withdrawal  Alcohol use disorder, severe, with acute withdrawal  Alcoholic Hallucinosis  Resting tremor, acute on chronic  Pt has been to IP tx twice, last time in summer 2023. He was admitted in 3/2023 for withdrawal and SW was arranging IP tx, but it took too long so he left AMA. He states after his IP tx he started drinking more. He used to drink a 1.75L bottle of vodka every 4d, but now drinks 1.75L every 1-2d.   He hasn't taken his PTA meds in 2 weeks due to being fatigued and not being able to get out of bed. He attributes his fatigue to not being on his Levothyroxine (see below), and doesn't believe alcohol is contributing to his fatigue. He last drank 5/15. Alcohol WNL on admit.  In acute mild withdrawal on admission. Has a resting tremor at baseline, but it's worse on admission.  On admission he states he wants to stop drinking, but doesn't want IP tx, Acamprosate, or Naltrexone. He states Acamprosate made his chronic dizziness worse.   LFTs improving.   Pt deteriorated with worsening hallucinations 5/23 and was transferred to ICU. He was started on low dose phenobarb protocol. EKG was cked-no QT prolongation. Was started on-  Day 1: IV Phenobarb 4mg/kg x1  followed by 3mg/kg IV phenobarb q3h x2 doses  Day 2: PO Phenobarb 64.8mg BID x 2 doses  Day 3: PO 32.4mg Phenobarb BID x 2 doses   Day 4: PO 32.4mg Phenobarb BID x 1 dose    Chem Dep consulted -came to see pt today 5/24-pt unable to  participate in SUDs consult at this time. Need to f/up when more awake and done with withdrawal.   Pt is still mumbling/ confused -not much hallucinating. Sitting in chair-ate breakfast. Will continue pheno leon protocol-change it to po. Stopped neurontin protocol 5/24.  Continue sx triggered ativan protocol. Continue thiamine/ folate     Frequent falls   Chronic vertigo  Peripheral neuropathy  Has severe alcohol use disorder as below. Hx of frequent falls while drinking. Has had 3 falls in last 2 weeks due to increased alcohol intake, chronic vertigo, and neuropathy in toes. Last fall 6-7d ago. Ht his head every time. CT head and c-spine only shows stable chronic subdural hematoma (see below). He has chronic vertigo due to the subdural hematoma, which he states nothing has worked for. He knows alcohol is contributing to his falls.    - PT/OT/SW consulted-will need to re-assess pt after withdrawal over.     Lactic acidosis, resolved  Elevated anion gap with ketosis, likely alcoholic  Lactate 4.2 ?  4.2 ?  3.3. Ketones 2.2 ?  0.6.  Anion gap 28. VBG with pH 7.51, CO2 35, bicarb 28. Likely due to severe alcohol use and liver impairment.      Low grade fever  ? Etiology. Had temp of 100.1 in ER. No sn/sx of infection. Cxr negative for infection. UA negative.  Has remained afebrile so far. Follow blood cx 5/19 NTD     Chronic subdural hematoma (H)  Hx of chronic subdural hematoma due to frequent falls while drinking alcohol. S/p emobolization 11/2022. Follows with neurosurgery at [facility] with serial imaging, last visit 11/8/23. CT head on admission shows stable SDH.      Pancytopenia  Partially due to IV fluid hydration as well as bone marrow suppression from alcohol  Stable.      Hepatic steatosis  Elevated LFTs, acute on chronic    Pt had choledocholithiasis in 12/2023. He was tx with ERCP and cholecystectomy. He had an ERCP in 1/2024 for stent removal.     On admission  (baseline ~100),  (baseline ~100),  Tbili 9.3 (baseline ~4), direct bili 4.43 (baseline ~5). . INR 1.12. Maddrey score 10. MELD 20.   LFTS improving. No intervention     Hypothyroidism  Toxic diffuse goiter  On admission TSH 18.81 and T4 1. Pt stopped taking Levothyroxine 2 wks ago due to fatigue as above.   Restarted PTA Levothyroxine 175     Hypophosphatemia  Hypomagnesemia  Hypokalemia  Hyponatremia  On admission Mg 1.5. K 3.2. Phos 0.5. Na 129. Likely due to chronic alcohol use.   - RN repletion protocol  - Restart potassium and mag replacement on discharge. Pt reported no longer taking these     Elevated lipase  Lipase 219 on admission. Pt without abdominal pain or chest pain. No n/v.      Ventricular tachycardia (paroxysmal) (H)  Had possible non-sustained VT vs SVT on admission in 3/2023. Was asymptomatic during it.   Stable.      Elevated CK  399 on admission. Denies myalgias. Likely due to alcohol use and several metabolic derangements.     Dispo  Will be in hospital 2-3 days atleast    Diet: Regular Diet Adult    DVT Prophylaxis: Low Risk/Ambulatory with no VTE prophylaxis indicated and Ambulate every shift  Triana Catheter: Not present  Code Status: Full Code        KRISHNA LIN MD   Pg [phone]        ROS:  As described in A/P and Exam.  Otherwise ALL are  negative.    PHYSICAL EXAM:  All vitals have been reviewed    Blood pressure (!) 126/91, pulse 95, temperature 98  F (36.7  C), temperature source Oral, resp. rate 16, weight 107.6 kg (237 lb 3.4 oz), SpO2 94%.    I/O this shift:  In: 360 [P.O.:360]  Out: 400 [Urine:400]    GENERAL APPEARANCE: healthy, sitting in chair and no distress  EYES: conjunctiva clear, eyes grossly normal  HENT: external ears and nose normal   RESP: lungs clear to auscultation - no rales, rhonchi or wheezes  CV: regular rate and rhythm, normal S1 S2, no S3 or S4 and no murmur, click or rub   ABDOMEN: soft, nontender, no HSM or masses and bowel sounds normal  MS: no clubbing, cyanosis; no edema  SKIN:  clear without significant rashes or lesions  NEURO: -non-focal moves all 4 extr    ROUTINE  LABS (Last four results)  CMP  Recent Labs   Lab 05/25/24  0527 05/24/24  0627 05/23/24  0515 05/22/24  1419 05/22/24  0542 05/21/24  1347 05/21/24  0514 05/20/24  1435 05/20/24  0259   NA  --  136 138  --  138  --  136  --  128*   POTASSIUM 3.9 3.9 3.5  --  3.5   < > 3.0*  --  3.6   CHLORIDE  --  98 101  --  98  --  96*  --  89*   CO2  --  26 26  --  28  --  26  --  25   ANIONGAP  --  12 11  --  12  --  14  --  14   GLC  --  90 85  --  104*  --  89  --  112*   BUN  --  3.0* 4.5*  --  6.1  --  7.1  --  8.3   CR  --  0.67 0.87  --  0.90  --  0.88  --  0.92   GFRESTIMATED  --  >90 >90  --  >90  --  >90  --  >90   ABDULKADIR  --  9.0 8.6  --  9.6  --  8.7  --  8.3*   MAG 1.9 1.9 1.9 2.3 1.5*  --  1.8  --  1.8   PHOS 3.7 4.4 4.2  --  2.2*  --  4.2   < > 1.0*   PROTTOTAL  --  6.4  --   --  5.6*  --  5.4*  --  6.0*   ALBUMIN  --  3.6  3.6 3.2*  --  3.4*  3.4*  --  3.2*  --  3.5   BILITOTAL  --  2.3*  --   --  2.7*  --  3.7*  --  6.1*   ALKPHOS  --  136  --   --  148  --  101  --  102   AST  --  150*  --   --  146*  --  155*  --  218*   ALT  --  84*  --   --  68  --  63  --  117*    < > = values in this interval not displayed.     CBC  Recent Labs   Lab 05/24/24 0627 05/23/24  0515 05/22/24  0542 05/21/24  0514   WBC 3.0* 3.1* 3.6* 3.3*   RBC 3.83* 3.05* 3.17* 2.96*   HGB 13.4 10.5* 11.0* 10.3*   HCT 39.4* 32.1* 32.7* 29.9*   * 105* 103* 101*   MCH 35.0* 34.4* 34.7* 34.8*   MCHC 34.0 32.7 33.6 34.4   RDW 13.6 13.2 12.8 12.4   * 132* 111* 82*     INR  Recent Labs   Lab 05/19/24  1118   INR 1.12     Arterial Blood Gas  Recent Labs   Lab 05/19/24  1351   O2PER 0       No results found for this or any previous visit (from the past 24 hour(s)).

## 2024-05-25 NOTE — PLAN OF CARE
End Of Shift Note    Neuro: Alert and oriented to self, place and time, not oriented to situations. At times will be oriented to only self.  Follows simple commands.     Cardiac: Denies chest pain    Resp: lung sounds clear bilaterally, remains on room air    GI/: voiding spontaneously, incontinent    MSK: transferred to the chair/bed with assist of 2 person, gait belt. Walked in the weber with 2 assist, gait belt and walker.  Gait unsteady, wide based (penguin like walking), requires step by step instructions.    Skin: scattered bruises, abrasions and scabs    LDAs: PIV X1      Goal Outcome Evaluation:      Plan of Care Reviewed With: patient    Overall Patient Progress: improvingOverall Patient Progress: improving       Problem: Adult Inpatient Plan of Care  Goal: Absence of Hospital-Acquired Illness or Injury  Intervention: Identify and Manage Fall Risk  Recent Flowsheet Documentation  Taken 5/25/2024 0800 by Maura Barcenas, RN  Safety Promotion/Fall Prevention:   activity supervised   assistive device/personal items within reach   bedside attendant   clutter free environment maintained   increase visualization of patient   lighting adjusted   nonskid shoes/slippers when out of bed   room door open   room near nurse's station   room organization consistent   safety round/check completed   supervised activity   treat reversible contributory factors   treat underlying cause  Intervention: Prevent Skin Injury  Recent Flowsheet Documentation  Taken 5/25/2024 0800 by Maura Barcenas RN  Body Position: position changed independently  Skin Protection:   adhesive use limited   incontinence pads utilized   protective footwear used   pulse oximeter probe site changed   transparent dressing maintained  Device Skin Pressure Protection:   absorbent pad utilized/changed   adhesive use limited   tubing/devices free from skin contact  Intervention: Prevent and Manage VTE (Venous Thromboembolism) Risk  Recent Flowsheet  Documentation  Taken 5/25/2024 0800 by Maura Barcenas RN  VTE Prevention/Management: SCDs (sequential compression devices) off  Intervention: Prevent Infection  Recent Flowsheet Documentation  Taken 5/25/2024 0800 by Maura Barcenas RN  Infection Prevention:   hand hygiene promoted   single patient room provided     Problem: Adult Inpatient Plan of Care  Goal: Optimal Comfort and Wellbeing  Intervention: Provide Person-Centered Care  Recent Flowsheet Documentation  Taken 5/25/2024 0800 by Maura Barcenas RN  Trust Relationship/Rapport:   care explained   choices provided   questions answered   questions encouraged   reassurance provided   thoughts/feelings acknowledged     Problem: Adult Inpatient Plan of Care  Goal: Absence of Hospital-Acquired Illness or Injury  Outcome: Progressing  Intervention: Identify and Manage Fall Risk  Recent Flowsheet Documentation  Taken 5/25/2024 0800 by Maura Barcenas RN  Safety Promotion/Fall Prevention:   activity supervised   assistive device/personal items within reach   bedside attendant   clutter free environment maintained   increase visualization of patient   lighting adjusted   nonskid shoes/slippers when out of bed   room door open   room near nurse's station   room organization consistent   safety round/check completed   supervised activity   treat reversible contributory factors   treat underlying cause  Intervention: Prevent Skin Injury  Recent Flowsheet Documentation  Taken 5/25/2024 0800 by Maura Barcenas RN  Body Position: position changed independently  Skin Protection:   adhesive use limited   incontinence pads utilized   protective footwear used   pulse oximeter probe site changed   transparent dressing maintained  Device Skin Pressure Protection:   absorbent pad utilized/changed   adhesive use limited   tubing/devices free from skin contact  Intervention: Prevent and Manage VTE (Venous Thromboembolism) Risk  Recent Flowsheet Documentation  Taken 5/25/2024 0800 by  Maura Barcenas, RN  VTE Prevention/Management: SCDs (sequential compression devices) off  Intervention: Prevent Infection  Recent Flowsheet Documentation  Taken 5/25/2024 0800 by Maura Barcenas, RN  Infection Prevention:   hand hygiene promoted   single patient room provided     Problem: Adult Inpatient Plan of Care  Goal: Optimal Comfort and Wellbeing  Intervention: Provide Person-Centered Care  Recent Flowsheet Documentation  Taken 5/25/2024 0800 by Maura Barcenas, RN  Trust Relationship/Rapport:   care explained   choices provided   questions answered   questions encouraged   reassurance provided   thoughts/feelings acknowledged

## 2024-05-26 VITALS
RESPIRATION RATE: 20 BRPM | OXYGEN SATURATION: 94 % | DIASTOLIC BLOOD PRESSURE: 95 MMHG | HEART RATE: 79 BPM | WEIGHT: 237.22 LBS | BODY MASS INDEX: 30.46 KG/M2 | SYSTOLIC BLOOD PRESSURE: 130 MMHG | TEMPERATURE: 97.7 F

## 2024-05-26 LAB
ALBUMIN SERPL BCG-MCNC: 3.4 G/DL (ref 3.5–5.2)
ALP SERPL-CCNC: 134 U/L (ref 40–150)
ALT SERPL W P-5'-P-CCNC: 72 U/L (ref 0–70)
AST SERPL W P-5'-P-CCNC: 93 U/L (ref 0–45)
BILIRUB DIRECT SERPL-MCNC: 1.07 MG/DL (ref 0–0.3)
BILIRUB SERPL-MCNC: 1.8 MG/DL
HOLD SPECIMEN: NORMAL
MAGNESIUM SERPL-MCNC: 1.9 MG/DL (ref 1.7–2.3)
PHOSPHATE SERPL-MCNC: 5.1 MG/DL (ref 2.5–4.5)
POTASSIUM SERPL-SCNC: 4.5 MMOL/L (ref 3.4–5.3)
PROT SERPL-MCNC: 6 G/DL (ref 6.4–8.3)

## 2024-05-26 PROCEDURE — 36415 COLL VENOUS BLD VENIPUNCTURE: CPT | Performed by: INTERNAL MEDICINE

## 2024-05-26 PROCEDURE — 99239 HOSP IP/OBS DSCHRG MGMT >30: CPT | Performed by: INTERNAL MEDICINE

## 2024-05-26 PROCEDURE — 84100 ASSAY OF PHOSPHORUS: CPT | Performed by: INTERNAL MEDICINE

## 2024-05-26 PROCEDURE — 84132 ASSAY OF SERUM POTASSIUM: CPT | Performed by: INTERNAL MEDICINE

## 2024-05-26 PROCEDURE — 82040 ASSAY OF SERUM ALBUMIN: CPT | Performed by: INTERNAL MEDICINE

## 2024-05-26 PROCEDURE — 250N000013 HC RX MED GY IP 250 OP 250 PS 637: Performed by: INTERNAL MEDICINE

## 2024-05-26 PROCEDURE — 83735 ASSAY OF MAGNESIUM: CPT | Performed by: INTERNAL MEDICINE

## 2024-05-26 RX ORDER — LANOLIN ALCOHOL/MO/W.PET/CERES
100 CREAM (GRAM) TOPICAL DAILY
Qty: 30 TABLET | Refills: 1 | Status: SHIPPED | OUTPATIENT
Start: 2024-05-27

## 2024-05-26 RX ORDER — FOLIC ACID 1 MG/1
1 TABLET ORAL DAILY
Qty: 30 TABLET | Refills: 1 | Status: SHIPPED | OUTPATIENT
Start: 2024-05-27

## 2024-05-26 RX ORDER — TRAZODONE HYDROCHLORIDE 100 MG/1
200 TABLET ORAL AT BEDTIME
COMMUNITY
Start: 2024-05-26

## 2024-05-26 RX ORDER — MULTIPLE VITAMINS W/ MINERALS TAB 9MG-400MCG
1 TAB ORAL DAILY
COMMUNITY
Start: 2024-05-27

## 2024-05-26 RX ORDER — NICOTINE 21 MG/24HR
1 PATCH, TRANSDERMAL 24 HOURS TRANSDERMAL DAILY
Qty: 30 PATCH | Refills: 0 | Status: SHIPPED | OUTPATIENT
Start: 2024-05-27

## 2024-05-26 RX ADMIN — LEVOTHYROXINE SODIUM 175 MCG: 0.15 TABLET ORAL at 07:01

## 2024-05-26 RX ADMIN — NICOTINE 1 PATCH: 21 PATCH, EXTENDED RELEASE TRANSDERMAL at 08:00

## 2024-05-26 RX ADMIN — FOLIC ACID 1 MG: 1 TABLET ORAL at 08:00

## 2024-05-26 RX ADMIN — THIAMINE HCL TAB 100 MG 100 MG: 100 TAB at 08:00

## 2024-05-26 RX ADMIN — Medication 1 TABLET: at 10:56

## 2024-05-26 RX ADMIN — PHENOBARBITAL 32.4 MG: 16.2 TABLET ORAL at 10:56

## 2024-05-26 ASSESSMENT — ACTIVITIES OF DAILY LIVING (ADL)
ADLS_ACUITY_SCORE: 39
ADLS_ACUITY_SCORE: 46
ADLS_ACUITY_SCORE: 37
ADLS_ACUITY_SCORE: 46
ADLS_ACUITY_SCORE: 37
ADLS_ACUITY_SCORE: 46
ADLS_ACUITY_SCORE: 37
ADLS_ACUITY_SCORE: 39
ADLS_ACUITY_SCORE: 46
ADLS_ACUITY_SCORE: 46
ADLS_ACUITY_SCORE: 37
ADLS_ACUITY_SCORE: 46
ADLS_ACUITY_SCORE: 46

## 2024-05-26 NOTE — PROGRESS NOTES
Status unchanged from last note placed by Maura, Day Charge RN, up to BR with 2 assist gait belt and rolling walker, needs step by step instructions to get to bathroom and then back to bed, gait is slow and at times seems to be overly sleepy, with eyes half open.  Just settled to bed after 10 pm medications given, did give 5 mg of Zyprexa for rest.  Continue with plan of care.

## 2024-05-26 NOTE — DISCHARGE SUMMARY
[facility] Discharge Summary    Getachew Barcenas MRN# [id number]   Age: 54 year old YOB: 1970     Date of Admission:  5/19/2024  Date of Discharge::  5/26/2024  Admitting Physician:  Jada Walter MD  Discharge Physician:  Jada Walter MD  Primary Physician: Domonique Alcantara  Transferring Facility: N/A     Home clinic: unknown          Admission Diagnoses:   Hypokalemia [E87.6]  Hypomagnesemia [E83.42]  Falls frequently [R29.6]  Alcohol withdrawal, uncomplicated (H) [F10.930]          Discharge Diagnosis:     Principle diagnosis: Acute Toxic Metabolic Encephalopathy 2/2 EtOH withdrawal  Alcohol use disorder, severe, with acute withdrawal  Alcoholic Hallucinosis  Resting tremor, acute on chronic  Secondary diagnoses:  Patient Active Problem List   Diagnosis    Impingement syndrome, shoulder, left    Hypoglycemia    Hypothyroidism    Falls frequently    LFT elevation    Hypophosphatemia    Hypomagnesemia    Hypokalemia    Mood disorder (H24)    Right forearm cellulitis    Cat bite    Alcohol use disorder, severe, dependence (H)    Abdominal wall pain in left upper quadrant    Anemia due to unknown mechanism    Anxiety    Blunt abdominal trauma, initial encounter    Constipation    Contusion of head of pancreas, initial encounter    Diverticulosis    Hepatic steatosis    Hypertriglyceridemia    Hypoxemia associated with sleep    Insomnia    Obstructive sleep apnea syndrome    Parasomnia    Periodic limb movement disorder    Peripheral neuropathy    PVC's (premature ventricular contractions)    Sinus arrhythmia seen on electrocardiogram    Inadequate sleep hygiene    Thiamine deficiency    Toxic diffuse goiter    Tremor    Vertigo    Other neutropenia (H24)    Metabolic acidosis, increased anion gap    Thrombocytopenia (H24)    Alcoholic intoxication with complication (H24)    Lactic acidosis    Ventricular tachycardia (paroxysmal) (H)    Chronic subdural hematoma (H)    Alcoholic intoxication without  complication (H24)    Low magnesium level    Alcohol withdrawal, uncomplicated (H)    Tachycardia    Fall, initial encounter    Contusion of forehead, initial encounter          Brief History of Presenting Illness:   As per admit hx  Getachew Barcenas is a 54 year old male with past medical hx of severe alcohol use disorder, frequent falls due to vertigo, paroxysmal Vtach, chronic subdural hematoma, LULI, anxiety who presents on 5/19/2024 with 3 falls in the last 2 weeks and alcohol withdrawal.     Patient has a history of severe alcohol use and dependence.  He has a chronic subdural hematoma due to frequent falls while drinking alcohol.  He was admitted in March 2023 for alcohol withdrawal and social work was attempting to find him an inpatient treatment facility.  However he left AMA because it was taking too long and stated he would find his own.  He states that he went to inpatient treatment at [facility] in summer 2023.     He presents today stating that he is fallen 3 times in the last 2 weeks, with the last one 6 or 7 days ago.  States he has hit his head every single time.  He attributes his falls to chronic dizziness and peripheral neuropathy in his toes.  He states that he has chronic balance issues.     States he has been drinking a lot more alcohol since his last inpatient treatment.  Formerly he drank one 1.75L bottle of vodka every 4 days, but now he drinks 1.75 L every day.  States that he stopped drinking 4 days ago cold turkey.  He wants to quit drinking, but does not want inpatient treatment, naltrexone, acamprosate, medical help.  He states that acamprosate makes his chronic dizziness worse.     He states he has been more fatigued in the last 2 weeks, and he attributes this to not taking his levothyroxine.  He states that he was too tired to get out of bed and get his meds that were in the kitchen.  He is not sure how long he has been off his meds but thinks it is about a week.  He does not believe that  alcohol and withdrawal is contributing to his fatigue.            Hospital Course:   Acute Toxic Metabolic Encephalopathy 2/2 EtOH withdrawal  Alcohol use disorder, severe, with acute withdrawal  Alcoholic Hallucinosis  Resting tremor, acute on chronic  Pt has been to IP tx twice, last time in summer 2023. He was admitted in 3/2023 for withdrawal and SW was arranging IP tx, but it took too long so he left AMA. He states after his IP tx he started drinking more. He used to drink a 1.75L bottle of vodka every 4d, but now drinks 1.75L every 1-2d.   He hasn't taken his PTA meds in 2 weeks due to being fatigued and not being able to get out of bed. He attributes his fatigue to not being on his Levothyroxine (see below), and doesn't believe alcohol is contributing to his fatigue. He last drank 5/15. Alcohol WNL on admit.  In acute mild withdrawal on admission. Has a resting tremor at baseline, but it's worse on admission.  On admission he states he wants to stop drinking, but doesn't want IP tx, Acamprosate, or Naltrexone. He states Acamprosate made his chronic dizziness worse.   LFTs improving.   Pt deteriorated with worsening hallucinations 5/23 and was transferred to ICU. He was started on low dose phenobarb protocol. EKG was cked-no QT prolongation. Was started on-  Day 1: IV Phenobarb 4mg/kg x1  followed by 3mg/kg IV phenobarb q3h x2 doses  Day 2: PO Phenobarb 64.8mg BID x 2 doses  Day 3: PO 32.4mg Phenobarb BID x 3 doses    Chem Dep consulted -came to see pt 5/24-pt unable to participate in SUDs at that time.  Stopped neurontin protocol 5/24.  Completed phenobarb protocol 5/25. SW saw pt today- Patient declined SUDS Assessment for Inpatient or Outpatient CD treatment. Community CD Resources placed in Discharge AVS. Will Continue thiamine/ folate     Frequent falls   Chronic vertigo  Peripheral neuropathy  Has severe alcohol use disorder as below. Hx of frequent falls while drinking. Has had 3 falls in last 2 weeks  due to increased alcohol intake, chronic vertigo, and neuropathy in toes. Last fall 6-7d ago. Ht his head every time. CT head and c-spine only shows stable chronic subdural hematoma (see below). He has chronic vertigo due to the subdural hematoma, which he states nothing has worked for. He knows alcohol is contributing to his falls.   Wants to leave today. PT consulted -havnt seen pt yet. Pt walking with walker  support per nursing. Wants to leave AMA if not discharged     Lactic acidosis, resolved  Elevated anion gap with ketosis, likely alcoholic  Lactate 4.2 ?  4.2 ?  3.3. Ketones 2.2 ?  0.6.  Anion gap 28. VBG with pH 7.51, CO2 35, bicarb 28. Likely due to severe alcohol use and liver impairment.   Resolved      Low grade fever  ? Etiology. Had temp of 100.1 in ER. No sn/sx of infection. Cxr negative for infection. UA negative.  Has remained afebrile so far. Follow blood cx 5/19 NTD     Chronic subdural hematoma (H)  Hx of chronic subdural hematoma due to frequent falls while drinking alcohol. S/p emobolization 11/2022. Follows with neurosurgery at [facility] with serial imaging, last visit 11/8/23. CT head on admission shows stable SDH.      Pancytopenia  Partially due to IV fluid hydration as well as bone marrow suppression from alcohol  Stable.      Hepatic steatosis  Elevated LFTs, acute on chronic    Pt had choledocholithiasis in 12/2023. He was tx with ERCP and cholecystectomy. He had an ERCP in 1/2024 for stent removal.     On admission  (baseline ~100),  (baseline ~100), Tbili 9.3 (baseline ~4), direct bili 4.43 (baseline ~5). . INR 1.12. Maddrey score 10. MELD 20.   LFTS improving. No intervention     Hypothyroidism  Toxic diffuse goiter  On admission TSH 18.81 and T4 1. Pt stopped taking Levothyroxine 2 wks ago due to fatigue as above.   Restarted PTA Levothyroxine 175     Hypophosphatemia  Hypomagnesemia  Hypokalemia  Hyponatremia  On admission Mg 1.5. K 3.2. Phos 0.5. Na 129. Likely  due to chronic alcohol use.   Replaced      Elevated lipase  Lipase 219 on admission. Pt without abdominal pain or chest pain. No n/v.      Ventricular tachycardia (paroxysmal) (H)  Had possible non-sustained VT vs SVT on admission in 3/2023. Was asymptomatic during it.   Stable.      Elevated CK  399 on admission. Denies myalgias. Likely due to alcohol use and several metabolic derangements.    Dispo  Wants to leave today. PT consulted -havnt seen pt yet. Pt walking with 1person support per nursing. Wants to leave AMA if not discharged. Discussed with pt again-understands why he came in to hospital, risk of leaving now-risk of fall and bleed-risk of death. Understands this , will take it his responsibility and would like to go. I do not think pt should leave today for safety reasons but he is able to understand and is his own decision maker and has decisional making capacity at this time. Denies any suicidal ideation/ thoughts. Made pt walk with walker-did walk. Has walker at home.   High risk of falls/ re-admission. Pt understands                Procedures:   No procedures performed during this admission         Allergies:      Allergies   Allergen Reactions    Oxycodone Hallucination             Medications Prior to Admission:     Medications Prior to Admission   Medication Sig Dispense Refill Last Dose    acetaminophen (TYLENOL) 500 MG tablet [ACETAMINOPHEN (TYLENOL) 500 MG TABLET] Take 1-2 tablets (500-1,000 mg total) by mouth every 6 (six) hours as needed for pain.  0 Unknown at prn    levothyroxine (SYNTHROID/LEVOTHROID) 175 MCG tablet Take 175 mcg by mouth every morning   5/19/2024 at am    [DISCONTINUED] Multiple Vitamin (TAB-A-MARYJO) TABS Take 1 tablet by mouth daily   Past Week    [DISCONTINUED] traZODone (DESYREL) 100 MG tablet Take 100 mg by mouth at bedtime   Past Week             Discharge Medications:     Current Discharge Medication List        START taking these medications    Details   folic acid  (FOLVITE) 1 MG tablet Take 1 tablet (1 mg) by mouth daily  Qty: 30 tablet, Refills: 1    Associated Diagnoses: Alcoholic intoxication without complication (H24)      multivitamin w/minerals (THERA-VIT-M) tablet Take 1 tablet by mouth daily      nicotine (NICODERM CQ) 21 MG/24HR 24 hr patch Place 1 patch onto the skin daily  Qty: 30 patch, Refills: 0    Associated Diagnoses: Tobacco abuse      thiamine (B-1) 100 MG tablet Take 1 tablet (100 mg) by mouth daily  Qty: 30 tablet, Refills: 1    Associated Diagnoses: Alcoholic intoxication without complication (H24)           CONTINUE these medications which have CHANGED    Details   traZODone (DESYREL) 100 MG tablet Take 2 tablets (200 mg) by mouth at bedtime           CONTINUE these medications which have NOT CHANGED    Details   acetaminophen (TYLENOL) 500 MG tablet [ACETAMINOPHEN (TYLENOL) 500 MG TABLET] Take 1-2 tablets (500-1,000 mg total) by mouth every 6 (six) hours as needed for pain.  Refills: 0    Associated Diagnoses: Contusion of head of pancreas, initial encounter      levothyroxine (SYNTHROID/LEVOTHROID) 175 MCG tablet Take 175 mcg by mouth every morning           STOP taking these medications       Multiple Vitamin (TAB-A-MARYJO) TABS Comments:   Reason for Stopping:                     Consultations:   No consultations were requested during this admission            Discharge Exam:   Blood pressure (!) 130/95, pulse 79, temperature 97.7  F (36.5  C), temperature source Oral, resp. rate 20, weight 107.6 kg (237 lb 3.4 oz), SpO2 94%.  GENERAL APPEARANCE: healthy, alert and no distress  EYES: conjunctiva clear, eyes grossly normal  HENT: external ears and nose normal   NECK: supple, no masses or adenopathy  RESP: lungs clear to auscultation - no rales, rhonchi or wheezes  CV: regular rate and rhythm, normal S1 S2, no S3 or S4 and no murmur, click or rub   ABDOMEN: soft, nontender, no HSM or masses and bowel sounds normal  MS: no clubbing, cyanosis; no  edema  SKIN: clear without significant rashes or lesions  NEURO: Normal strength and tone, sensory exam grossly normal, mentation intact and speech normal    Unresulted Labs Ordered in the Past 30 Days of this Admission       No orders found from 4/19/2024 to 5/20/2024.            No results found for this or any previous visit (from the past 24 hour(s)).         Pending Tests at Discharge:   None         Discharge Instructions and Follow-Up:     Discharge diet: Regular   Discharge activity: Activity as tolerated   Discharge follow-up: Follow up with primary care provider in 1-2 weeks           Discharge Disposition:     Discharged to home      Attestation:  I have reviewed today's vital signs, notes, medications, labs and imaging.    Time Spent on this Encounter   I, Jada Walter MD, personally saw the patient today and spent greater than 30 minutes discharging this patient.    Jada Walter MD

## 2024-05-26 NOTE — PROGRESS NOTES
"Called pts sister Kelly for discharge ride home. Sister states that she is not his point of contact that his daughter is the one who \"helps with his bills and healthcare stuff\". States that she doesn't speak with pt often and would not be available for ride home.  "

## 2024-05-26 NOTE — PROGRESS NOTES
"Pt informed writer he had ride arranged with friend \"Ayden\" writer spoke with Ayden briefly on pts cell phone and he verified he was on his way for transportation and arrive in the next 30 minutes.  "

## 2024-05-26 NOTE — PROGRESS NOTES
Transportation with [employer] arranged, awaiting call back with estimated times.

## 2024-05-26 NOTE — DISCHARGE INSTRUCTIONS
"         Mental Health: All Ages  Assessment and Referrals, Individual and Group Therapy, Psychiatry, Intensive Outpatient Program, Partial Hospitalization Program, Dual Diagnosis Program, and 55+ Seniors Program.     Substance Use Disorder: Adult & Adolescent  Assessment and Referrals, Outpatient SHASHI Programs (Day & Evening Groups), Dual Diagnosis, Intensive Outpatient Programs with [facility], Residential Treatment, Addiction Medicine, Detox, and Medication Assisted Treatment (MAT) Services.     Problem Gambling Treatment  Assessment and Referrals, Individual & Group Therapy, Rule 82, and Court Ordered.     Several Locations PLUS Telephone or Video Visits Offered.       Community Resources  [facility] 's website is:  [email or web address]  [facility]'s website is:  [email or web address]    [facility]'s website is:  https://www.[facility].org    Alcoholics Anonymous website is:  http://www.aa.org  Community Drug & Alcohol Support Resources   Alcoholics Anonymous   24/7 Phone Line: 267.487.5386   https://[facility].org/   For additional list of online meetings: http://aa-intergroup.org     Narcotics Anonymous of [place]  24 Hour Helpline: 1-945.367.2363   https://www.[facility].org/   For online meetings, click \"Find a Meeting\" on homepage    [place] [facility]  [address] [place] [address]   Phone: [phone]  http://WatchGuard    "

## 2024-05-26 NOTE — PROGRESS NOTES
[employer] does not have  in area at this time.    Ambulance dispatch for [facility] contacted for [facility] wheelchair transportation set up. Dispatch reports they will be here within the next hour or so. Awaiting further updates.

## 2024-05-26 NOTE — PROGRESS NOTES
"Writer called pts daughter James regarding discharge ride home. Daughter states \"no one will give him a ride, we are cutting him off for how he chooses to live his life\" writer verified that family would not be available, daughter agreed.  "

## 2024-05-26 NOTE — PLAN OF CARE
Goal Outcome Evaluation:      Plan of Care Reviewed With: patient    Overall Patient Progress: improvingOverall Patient Progress: improving    Outcome Evaluation: Continues to be drowsy at times and at other times alert & oriented X 4 , when first awakened speech is more garbaled Ciwa scores less then 8.  Noted not aggressive or diffucult to work with, denies pain when questioned.  Cooperative with most cares, continues to be weak and moves slowly when up with rolling walker and gait belt.  Plan of care continues.

## 2024-05-26 NOTE — PROGRESS NOTES
Care Management Discharge Note    Discharge Date: 05/26/2024       Discharge Disposition: Home    Discharge Services: None    Discharge DME: None    Discharge Transportation: family or friend will provide    Private pay costs discussed: Not applicable    Does the patient's insurance plan have a 3 day qualifying hospital stay waiver?  No    Patient/family educated on Medicare website which has current facility and service quality ratings:  No    Education Provided on the Discharge Plan:  Yes  Persons Notified of Discharge Plans: Patient  Patient/Family in Agreement with the Plan:  Yes    Handoff Referral Completed: No    Additional Information:    Met with the Patient for discharge planning.  The Patient was very drowsy during conversation.  Discussed SUDS Assessment.  Patient declined SUDS Assessment for Inpatient or Outpatient CD treatment.  Community CD Resources placed in Discharge AVS.      There are no discharge needs anticipated.    Plan:  Home      Crystal Marroquin RN

## 2024-05-26 NOTE — PROGRESS NOTES
"Pt friend \"Ayden\" called nurses station and informed writer that pts father called him and he was instructed to \"not give pt a ride home\" states they are seeking guardianship and doesn't want anyone to assist him. Writer informed Ayden that his discharge is not related to or will affect guardianship and pt will be discharged home. Ayden stated he \"didn't want to get in the middle of family disputes\" asked that writer tell pt that he \"blew a tire or something, please don't tell him his dad called me\".   "

## 2024-05-29 ENCOUNTER — TELEPHONE (OUTPATIENT)
Dept: FAMILY MEDICINE | Facility: CLINIC | Age: 54
End: 2024-05-29
Payer: COMMERCIAL

## 2024-05-29 ENCOUNTER — PATIENT OUTREACH (OUTPATIENT)
Dept: CARE COORDINATION | Facility: CLINIC | Age: 54
End: 2024-05-29
Payer: COMMERCIAL

## 2024-05-29 NOTE — PROGRESS NOTES
Connected Care Resource Center:   [facility] Resource Center Contact  [facility]/Voicemail     Clinical Data: Post-Discharge Outreach     Outreach attempted x 2.  Left message on patient's voicemail, providing [facility]'s central phone number of [phone] ([phone]) for questions/concerns and/or to schedule an appt with an [facility] provider, if they do not have a PCP.      Plan:  General acute hospital will do no further outreaches at this time.       Linda Rose MA  Connected Care Resource Center, [facility]    *Connected Care Resource Team does NOT follow patient ongoing. Referrals are identified based on internal discharge reports and the outreach is to ensure patient has an understanding of their discharge instructions.

## 2024-05-29 NOTE — TELEPHONE ENCOUNTER
Left message for patient to call back. He will need to contact hospital he was discharged from about getting medications. His primary care provider is out of [facility].     Zandra Shukla RN'

## 2024-05-29 NOTE — TELEPHONE ENCOUNTER
General Call    Contacts         Type Contact Phone/Fax    05/29/2024 09:32 AM CDT Phone (Incoming) Dav Barcenas (Self) [phone] (M)          Reason for Call:  patient called in and stated that he got a ride home from the hospital and his pills were left at the hospital. Said these were mainly vitamins but he really needs these filled and sent to his pharmacy to  since they did not send the pills with him.     Could we send this information to you in [facility] or would you prefer to receive a phone call?:   Patient would prefer a phone call   Okay to leave a detailed message?: Yes at Cell number on file:    Telephone Information:   Mobile [phone]

## 2024-05-30 NOTE — TELEPHONE ENCOUNTER
Called patient and lmtcb.  Patient will need to contact hospital he was discharged from about getting medications. In addition, his PCP is out of [facility].     Saqib Sin RN

## 2024-05-31 NOTE — TELEPHONE ENCOUNTER
Attempt #3 to call patient. LMTCB. Closing encounter do to unsuccessful outreach.     Saqib Sin RN

## 2024-08-30 ENCOUNTER — TRANSCRIBE ORDERS (OUTPATIENT)
Dept: OTHER | Age: 54
End: 2024-08-30

## 2024-08-30 DIAGNOSIS — R51.9 CHRONIC NONINTRACTABLE HEADACHE, UNSPECIFIED HEADACHE TYPE: Primary | ICD-10-CM

## 2024-08-30 DIAGNOSIS — R41.3 MEMORY LOSS: ICD-10-CM

## 2024-08-30 DIAGNOSIS — R42 DIZZINESS: ICD-10-CM

## 2024-08-30 DIAGNOSIS — G89.29 CHRONIC NONINTRACTABLE HEADACHE, UNSPECIFIED HEADACHE TYPE: Primary | ICD-10-CM

## 2025-01-14 NOTE — TELEPHONE ENCOUNTER
REASON FOR VISIT: dizziness/ headaches    DATE OF APPT: 1/23/2025   NOTES (FOR ALL VISITS) STATUS DETAILS   OFFICE NOTE from referring provider Received Domonique Wylie PA-C 8/30/2024   MEDICATION LIST N/A    IMAGING  (FOR ALL VISITS)     XR N/A    MRI (HEAD, NECK, SPINE) N/A    CT (HEAD, NECK, SPINE) [facility]  CT Head 5/19/2024

## 2025-01-21 NOTE — PROGRESS NOTES
[facility]  Section of General Neurology  New Patient Visit      Getachew Barcenas MRN# [id number]   Age: 54 year old YOB: 1970              Assessment and Plan:   Assessment:  Dav Barcenas is a pleasant 54 year old man who presents in consultation.  He has a PMH of etoh overuse previously, SDH from falls.  He presents in consultation for dizziness, headaches, tinnitus.    We discussed that his previous history of subdurals do put him at risk for the headaches that he is noting.  CT scan reviewed from May, without new findings.  Discussed options in these regards, importance of not hitting his head again of course. He has given up high risk hobbies like riding his [employer].    Should headaches worsen to the point he wishes to trial prescription strength headache options we have those.  Tinnitus likely relates to hearing loss.  He has hearing aids but doesn't like to wear them as it worsens some of his other neurological symptoms.  If he could find hearing aids he could tolerate I think this would improve tinnitus.     Plan:  Magnesium glycinate 200 mg Riboflavin (vitamin b2) 400 mg daily  Vestibular PT   Meclizine 25 mg TIDPRN  Melatonin OK to trial 1-3 mg QHSPRN  Agree with trazodone instead of tylenol PM for sleep  All questions answered.  Would be happy to see him back with any worsening or new changes/questions    Gama Bailon MD   of Neurology   [facility]      History of Presenting Symptoms:   Getachew Barcenas is a 54 year old male who presents today for evaluation of dizziness, headaches    Med list reviewed notable for trazodone, vitamins    A lot of concussions since high school   SDH reviewed  L foot surgery discussed    Headache constant but bearable.   In back of head, worsening  Often at night  Tinnitus---has hearing aids, makes headaches worse so doesn't wear them.     Dizziness--worst when standing.    Drinks enough water he  thinks.    Not as bad but dizzy head side to side   Discussed options  Vestibular PT  Meclizine as needed    Nightmares increasing, ? Relationship to tylenol PM in his eyes    Enjoys investing, trading stocks        He lives in [place]  Job: previous president of semi truck company       2023 neurology note reviewed:  History of Present Illness:   Getachew Barcenas is a 53 year old man with PMH significant for chronic marked alcohol use disorder with a few relapses in the last few years, currently 10 days sober.  He also has acute on chronic bilateral subdural hematomas in November 2022 s/p bilateral MMA embolization November 7, 2022 and follows with neurosurgery.  He presents to the neuromuscular department for consultation of bilateral feet paresthesias from [facility].     He reports feeling numbness and paresthesias of the toes and a little bit of the distal feet for about 3 years now.  Can fluctuate a little bit, but it never truly abates.  It is equal on both sides and not more intense in either side.  He has tried gabapentin 600 mg 3 times daily which helps a little bit.  He previously described a little bit of weakness in the feet in previous years, but thinks this is improved in the last months.  He endorses some hip involvement last year when he was drinking heavily, but since the subdural evacuation he has more episodes of remission from alcohol and feels disputed in order involved.  He does continue to have paresthesia in bilateral fifth digits, especially when waking up, but this is mild compared to fingertip paresthesias that were present previously.  He endorses previously having severe alcohol use and having nutritional deficiencies because he would have gastritis, unable to keep most foods down and sometimes, and having low vitamin levels.  As above, he is not 10 days sober and he can already tell that his dizziness, balance, and paresthesias of the feet are already slightly improved during this time.   He is also supplementing a multivitamin daily, magnesium daily, phosphorus several times a week, and thinks he is also taking independent B1 vitamins daily.  He says his diet is also improved in the last few months.     Last EMG performed at [facility] in early 2022.  It shows a length-dependent axonal sensorimotor peripheral neuropathy with slight asymmetry of the right side, although not meeting criteria for greater than 50% change in laterality.  This was except for the EDB which was NR on the right and low on the left side, although this can be present due to local trauma of the foot.  It does show mild slowing of conduction velocity, but this is probably a treatable due to axonal loss.    IMPRESSION/PLAN:     Getachew Barcenas is a 53 year old man with PMH significant for chronic alcohol abuse with a few relapses in the last few years, currently 10 days sober.  He also has acute on chronic bilateral subdural hematomas in November 2022 s/p bilateral MMA embolization November 7, 2022 and follows with neurosurgery.  He presents with prominent paresthesias and numbness of bilateral toes, although this symptom, as well as imbalance and dizziness are all resolving in the setting of days of sobriety.  On independent review of the EMG, it does not specifically meet criteria for asymmetry in multiple nerves, and the asymmetry in the peroneal motor responses from the EDB  (right NR, left 2.9 mV), may be attributable to local trauma of the foot.  Additionally, he is clinically improving in the setting of abstinence and vitamin repletion.  It is very unlikely that his neuropathy is due to an alternative (inflammatory/mononeuropathy multiplex) etiology based on his clinical course, recent resolution, and exam.  Finally, he does have probable bilateral ulnar neuropathy without any functional change at this time.        - Encouraged continued abstinence and had a long discussion about how alcohol abuse and nutritional  deficiencies are likely contributing to his clinical picture.  - Continue vitamin supplementation, including B vitamins especially B1  - No further neurodiagnostic tests are indicated at this time. No need for nerve biopsy  - He had several questions about prognosis and optimizing his current condition which we discussed at length, mostly revolving around sobriety.  - No follow-up needed with our clinic at this time, but he is encouraged to call us if his symptoms change or worsen, including his hand function in the setting of probable ulnar neuropathy.        Patient seen and discussed with attending Dr. Tatyana Ochoa MD  Neuromuscular Medicine Fellow, PGY-5  [facility]     ATTENDING ADDENDUM: Patient seen and examined with fellow Dr Ochoa at the [facility] today. Agree with his impression and plan. 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 He is improving with abstinence and nutritional supplementation. Clinically his neuropathy is predominantly sensory length dependent, and symmetric. We do not believe he has mononeuropathy multiplex therefore we don't recommend biopsy. Workup by Dr Rivera in [facility] was complete; no further blood tests to add. Recommendations as per Dr Ochoa's note. Follow-up prn.      Billing MDM level 4 (moderate) based on 1) Problems assessed: Two or more stable chronic conditions (subdural hematoma from fall, alcoholic neuropathy) and 2) Amount/Complexity: Review of the results of 3 or more unique tests, as above     Quinn Joe MD, FAAN             Past Medical History:     Patient Active Problem List   Diagnosis    Impingement syndrome, shoulder, left    Hypoglycemia    Hypothyroidism    Falls frequently    LFT elevation    Hypophosphatemia    Hypomagnesemia    Hypokalemia    Mood disorder    Right forearm cellulitis    Cat bite     Alcohol use disorder, severe, dependence (H)    Abdominal wall pain in left upper quadrant    Anemia due to unknown mechanism    Anxiety    Blunt abdominal trauma, initial encounter    Constipation    Contusion of head of pancreas, initial encounter    Diverticulosis    Hepatic steatosis    Hypertriglyceridemia    Hypoxemia associated with sleep    Insomnia    Obstructive sleep apnea syndrome    Parasomnia    Periodic limb movement disorder    Peripheral neuropathy    PVC's (premature ventricular contractions)    Sinus arrhythmia seen on electrocardiogram    Inadequate sleep hygiene    Thiamine deficiency    Toxic diffuse goiter    Tremor    Vertigo    Other neutropenia    Metabolic acidosis, increased anion gap    Thrombocytopenia    Alcoholic intoxication with complication    Lactic acidosis    Ventricular tachycardia (paroxysmal) (H)    Chronic subdural hematoma (H)    Alcoholic intoxication without complication    Low magnesium level    Alcohol withdrawal, uncomplicated (H)    Tachycardia    Fall, initial encounter    Contusion of forehead, initial encounter     Past Medical History:   Diagnosis Date    Abnormal TSH 11/02/2022    Alcohol abuse 08/24/2022    Formatting of this note might be different from the original.  Relapse in 2022.      Alcohol use disorder, severe, dependence (H) 02/24/2022    Lactic acid acidosis 02/20/2022    Nonalcoholic fatty liver disease 09/14/2022    Subdural hematoma (H) 11/03/2022        Past Surgical History:     Past Surgical History:   Procedure Laterality Date    COLONOSCOPY  11/01/2023    ENDOSCOPIC RETROGRADE CHOLANGIOPANCREATOGRAM N/A 12/8/2023    Procedure: ENDOSCOPIC RETROGRADE CHOLANGIOPANCREATOGRAPHY, WITH biliary sphincterotomy, stone extraction, and stent placement;  Surgeon: Brian Witt MD;  Location: [facility] OR    ENDOSCOPIC RETROGRADE CHOLANGIOPANCREATOGRAM N/A 1/25/2024    Procedure: Endoscopic retrograde cholangiopancreatography with biliary stent removal  "and debris removal;  Surgeon: Brian Witt MD;  Location: [facility] OR    IR CAROTID CEREBRAL ANGIOGRAM BILATERAL  11/07/2022    LAPAROSCOPIC CHOLECYSTECTOMY N/A 1/25/2024    Procedure: Laparoscopic cholecystectomy;  Surgeon: Getachew Torres MD;  Location: [facility] OR    PICC TRIPLE LUMEN PLACEMENT  11/03/2022             Social History:     Social History     Tobacco Use    Smoking status: Never    Smokeless tobacco: Current     Types: Chew   Substance Use Topics    Alcohol use: Not Currently    Drug use: No        Family History:   No family history on file.     Medications:     Current Outpatient Medications   Medication Sig Dispense Refill    acetaminophen (TYLENOL) 500 MG tablet [ACETAMINOPHEN (TYLENOL) 500 MG TABLET] Take 1-2 tablets (500-1,000 mg total) by mouth every 6 (six) hours as needed for pain.  0    folic acid (FOLVITE) 1 MG tablet Take 1 tablet (1 mg) by mouth daily 30 tablet 1    levothyroxine (SYNTHROID/LEVOTHROID) 175 MCG tablet Take 175 mcg by mouth every morning      multivitamin w/minerals (THERA-VIT-M) tablet Take 1 tablet by mouth daily      nicotine (NICODERM CQ) 21 MG/24HR 24 hr patch Place 1 patch onto the skin daily 30 patch 0    thiamine (B-1) 100 MG tablet Take 1 tablet (100 mg) by mouth daily 30 tablet 1    traZODone (DESYREL) 100 MG tablet Take 2 tablets (200 mg) by mouth at bedtime       No current facility-administered medications for this visit.        Allergies:     Allergies   Allergen Reactions    Oxycodone Hallucination        Review of Systems:   As noted above     Physical Exam:   Vitals: /83 (BP Location: Right arm, Patient Position: Sitting, Cuff Size: Adult Large)   Pulse 91   Ht 1.88 m (6' 2\")   Wt 107.5 kg (237 lb)   BMI 30.43 kg/m       Neuro:   General Appearance: No apparent distress, well-nourished, well-groomed, pleasant     Mental Status: Alert and oriented to person, place, and time. Speech fluent and comprehension intact. No dysarthria.      Cranial " Nerves:   II: Visual fields: normal  III: Pupils: 3 mm, equal, round, reactive to light   III,IV,VI: Extraocular Movements: intact   V: Facial sensation: intact to light touch  VII: Facial strength: intact without asymmetry      Motor Exam:   5/5 diffusely  where tested, limited by boot in LLE    Sensory: intact to light touch    Coordination: no dysmetria noted    Reflexes: biceps, triceps, brachioradialis, patellar 1+ and symmetric.            Data: Pertinent prior to visit   Imaging:  EXAM: CT HEAD WITHOUT CONTRAST  LOCATION: [facility]  DATE: 05/19/2024     INDICATION: Closed head injury.  COMPARISON: Head CT 11/08/2023.  TECHNIQUE: Routine CT Head without IV contrast. Multiplanar reformats. Dose reduction techniques were used.     FINDINGS:  INTRACRANIAL CONTENTS: There is a stable isodense subdural collection along the lateral convexity measuring 5 mm maximum thickness. No evidence for new or increasing intracranial hemorrhage. No CT evidence of acute infarct. Mild presumed chronic small   vessel ischemic changes. Mild generalized volume loss. No hydrocephalus.      VISUALIZED ORBITS/SINUSES/MASTOIDS: No intraorbital abnormality. No paranasal sinus mucosal disease. No middle ear or mastoid effusion.     BONES/SOFT TISSUES: No acute abnormality.                                                                      IMPRESSION:  1.  Stable thin isodense subdural collection along the left cerebral convexity measuring up to 5 mm maximum thickness. No evidence for new or increasing intracranial hemorrhage.  2.  No CT evidence for acute intracranial process.  3.  Brain atrophy and presumed chronic microvascular ischemic changes as above.    I personally reviewed the above images and reports.  The imaging represents to me know previous SDH, subtle, no active bleeding.  Reviewed with patient            The total time of this encounter today amounted to 60 minutes. This time included time  spent with the patient, prep work, ordering tests, and performing post visit documentation.

## 2025-01-23 ENCOUNTER — OFFICE VISIT (OUTPATIENT)
Dept: NEUROLOGY | Facility: CLINIC | Age: 55
End: 2025-01-23
Payer: COMMERCIAL

## 2025-01-23 ENCOUNTER — PRE VISIT (OUTPATIENT)
Dept: NEUROLOGY | Facility: CLINIC | Age: 55
End: 2025-01-23

## 2025-01-23 VITALS
BODY MASS INDEX: 30.42 KG/M2 | HEIGHT: 74 IN | HEART RATE: 91 BPM | SYSTOLIC BLOOD PRESSURE: 128 MMHG | DIASTOLIC BLOOD PRESSURE: 83 MMHG | WEIGHT: 237 LBS

## 2025-01-23 DIAGNOSIS — R42 DIZZINESS: ICD-10-CM

## 2025-01-23 DIAGNOSIS — R51.9 CHRONIC NONINTRACTABLE HEADACHE, UNSPECIFIED HEADACHE TYPE: ICD-10-CM

## 2025-01-23 DIAGNOSIS — G89.29 CHRONIC NONINTRACTABLE HEADACHE, UNSPECIFIED HEADACHE TYPE: ICD-10-CM

## 2025-01-23 RX ORDER — RIBOFLAVIN (VITAMIN B2) 400 MG
400 TABLET ORAL DAILY
Qty: 90 TABLET | Refills: 3 | Status: SHIPPED | OUTPATIENT
Start: 2025-01-23

## 2025-01-23 RX ORDER — MECLIZINE HYDROCHLORIDE 25 MG/1
25 TABLET ORAL 3 TIMES DAILY PRN
Qty: 90 TABLET | Refills: 5 | Status: SHIPPED | OUTPATIENT
Start: 2025-01-23

## 2025-01-23 RX ORDER — MAGNESIUM GLYCINATE 100 MG
200 CAPSULE ORAL DAILY
Qty: 180 CAPSULE | Refills: 3 | Status: SHIPPED | OUTPATIENT
Start: 2025-01-23

## 2025-01-23 NOTE — PATIENT INSTRUCTIONS
Magnesium oxide 400 mg or Magnesium glycinate 200 mg Riboflavin (vitamin b2) 400 mg daily  Coenzyme q10 has some data      PT   Meclizine up to three times a day    Melatonin OK to trial 1-3 mg     Next option for headache  Imitrex as needed     Agree with trazodone instead of tylenol PM    Let me know if headaches worsen to point you want to try a prescription med

## 2025-01-23 NOTE — LETTER
1/23/2025      Getachew Barcenas  [address] [place] [id number]      Dear Colleague,    Thank you for referring your patient, Getachew Barcenas, to the [facility] [place]. Please see a copy of my visit note below.    [facility]  Section of General Neurology  New Patient Visit      Getachew Barcenas MRN# [id number]   Age: 54 year old YOB: 1970              Assessment and Plan:   Assessment:  Dav Barcenas is a pleasant 54 year old man who presents in consultation.  He has a PMH of etoh overuse previously, SDH from falls.  He presents in consultation for dizziness, headaches, tinnitus.    We discussed that his previous history of subdurals do put him at risk for the headaches that he is noting.  CT scan reviewed from May, without new findings.  Discussed options in these regards, importance of not hitting his head again of course. He has given up high risk hobbies like riding his [place].    Should headaches worsen to the point he wishes to trial prescription strength headache options we have those.  Tinnitus likely relates to hearing loss.  He has hearing aids but doesn't like to wear them as it worsens some of his other neurological symptoms.  If he could find hearing aids he could tolerate I think this would improve tinnitus.     Plan:  Magnesium glycinate 200 mg Riboflavin (vitamin b2) 400 mg daily  Vestibular PT   Meclizine 25 mg TIDPRN  Melatonin OK to trial 1-3 mg QHSPRN  Agree with trazodone instead of tylenol PM for sleep  All questions answered.  Would be happy to see him back with any worsening or new changes/questions    Gama Bailon MD   of Neurology   [facility]      History of Presenting Symptoms:   Getachew Barcenas is a 54 year old male who presents today for evaluation of dizziness, headaches    Med list reviewed notable for trazodone, vitamins    A lot of concussions since high school   SDH reviewed  L  foot surgery discussed    Headache constant but bearable.   In back of head, worsening  Often at night  Tinnitus---has hearing aids, makes headaches worse so doesn't wear them.     Dizziness--worst when standing.    Drinks enough water he thinks.    Not as bad but dizzy head side to side   Discussed options  Vestibular PT  Meclizine as needed    Nightmares increasing, ? Relationship to tylenol PM in his eyes    Enjoys investing, trading stocks        He lives in [place]  Job: previous president of semi truck company       2023 neurology note reviewed:  History of Present Illness:   Getachew Barcenas is a 53 year old man with PMH significant for chronic marked alcohol use disorder with a few relapses in the last few years, currently 10 days sober.  He also has acute on chronic bilateral subdural hematomas in November 2022 s/p bilateral MMA embolization November 7, 2022 and follows with neurosurgery.  He presents to the neuromuscular department for consultation of bilateral feet paresthesias from [facility].     He reports feeling numbness and paresthesias of the toes and a little bit of the distal feet for about 3 years now.  Can fluctuate a little bit, but it never truly abates.  It is equal on both sides and not more intense in either side.  He has tried gabapentin 600 mg 3 times daily which helps a little bit.  He previously described a little bit of weakness in the feet in previous years, but thinks this is improved in the last months.  He endorses some hip involvement last year when he was drinking heavily, but since the subdural evacuation he has more episodes of remission from alcohol and feels disputed in order involved.  He does continue to have paresthesia in bilateral fifth digits, especially when waking up, but this is mild compared to fingertip paresthesias that were present previously.  He endorses previously having severe alcohol use and having nutritional deficiencies because he would have gastritis,  unable to keep most foods down and sometimes, and having low vitamin levels.  As above, he is not 10 days sober and he can already tell that his dizziness, balance, and paresthesias of the feet are already slightly improved during this time.  He is also supplementing a multivitamin daily, magnesium daily, phosphorus several times a week, and thinks he is also taking independent B1 vitamins daily.  He says his diet is also improved in the last few months.     Last EMG performed at [facility] in early 2022.  It shows a length-dependent axonal sensorimotor peripheral neuropathy with slight asymmetry of the right side, although not meeting criteria for greater than 50% change in laterality.  This was except for the EDB which was NR on the right and low on the left side, although this can be present due to local trauma of the foot.  It does show mild slowing of conduction velocity, but this is probably a treatable due to axonal loss.    IMPRESSION/PLAN:     Getachew Barcenas is a 53 year old man with PMH significant for chronic alcohol abuse with a few relapses in the last few years, currently 10 days sober.  He also has acute on chronic bilateral subdural hematomas in November 2022 s/p bilateral MMA embolization November 7, 2022 and follows with neurosurgery.  He presents with prominent paresthesias and numbness of bilateral toes, although this symptom, as well as imbalance and dizziness are all resolving in the setting of days of sobriety.  On independent review of the EMG, it does not specifically meet criteria for asymmetry in multiple nerves, and the asymmetry in the peroneal motor responses from the EDB  (right NR, left 2.9 mV), may be attributable to local trauma of the foot.  Additionally, he is clinically improving in the setting of abstinence and vitamin repletion.  It is very unlikely that his neuropathy is due to an alternative (inflammatory/mononeuropathy multiplex) etiology based on his clinical course,  recent resolution, and exam.  Finally, he does have probable bilateral ulnar neuropathy without any functional change at this time.        - Encouraged continued abstinence and had a long discussion about how alcohol abuse and nutritional deficiencies are likely contributing to his clinical picture.  - Continue vitamin supplementation, including B vitamins especially B1  - No further neurodiagnostic tests are indicated at this time. No need for nerve biopsy  - He had several questions about prognosis and optimizing his current condition which we discussed at length, mostly revolving around sobriety.  - No follow-up needed with our clinic at this time, but he is encouraged to call us if his symptoms change or worsen, including his hand function in the setting of probable ulnar neuropathy.        Patient seen and discussed with attending Dr. Tatyana Ochoa MD  Neuromuscular Medicine Fellow, PGY-5  [facility]     ATTENDING ADDENDUM: Patient seen and examined with fellow Dr Ochoa at the [facility] today. Agree with his impression and plan. 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 He is improving with abstinence and nutritional supplementation. Clinically his neuropathy is predominantly sensory length dependent, and symmetric. We do not believe he has mononeuropathy multiplex therefore we don't recommend biopsy. Workup by Dr Rivera in [facility] was complete; no further blood tests to add. Recommendations as per Dr Ochoa's note. Follow-up prn.      Billing MDM level 4 (moderate) based on 1) Problems assessed: Two or more stable chronic conditions (subdural hematoma from fall, alcoholic neuropathy) and 2) Amount/Complexity: Review of the results of 3 or more unique tests, as above     Quinn Joe MD, FAAN             Past Medical History:     Patient Active Problem List    Diagnosis     Impingement syndrome, shoulder, left     Hypoglycemia     Hypothyroidism     Falls frequently     LFT elevation     Hypophosphatemia     Hypomagnesemia     Hypokalemia     Mood disorder     Right forearm cellulitis     Cat bite     Alcohol use disorder, severe, dependence (H)     Abdominal wall pain in left upper quadrant     Anemia due to unknown mechanism     Anxiety     Blunt abdominal trauma, initial encounter     Constipation     Contusion of head of pancreas, initial encounter     Diverticulosis     Hepatic steatosis     Hypertriglyceridemia     Hypoxemia associated with sleep     Insomnia     Obstructive sleep apnea syndrome     Parasomnia     Periodic limb movement disorder     Peripheral neuropathy     PVC's (premature ventricular contractions)     Sinus arrhythmia seen on electrocardiogram     Inadequate sleep hygiene     Thiamine deficiency     Toxic diffuse goiter     Tremor     Vertigo     Other neutropenia     Metabolic acidosis, increased anion gap     Thrombocytopenia     Alcoholic intoxication with complication     Lactic acidosis     Ventricular tachycardia (paroxysmal) (H)     Chronic subdural hematoma (H)     Alcoholic intoxication without complication     Low magnesium level     Alcohol withdrawal, uncomplicated (H)     Tachycardia     Fall, initial encounter     Contusion of forehead, initial encounter     Past Medical History:   Diagnosis Date     Abnormal TSH 11/02/2022     Alcohol abuse 08/24/2022    Formatting of this note might be different from the original.  Relapse in 2022.       Alcohol use disorder, severe, dependence (H) 02/24/2022     Lactic acid acidosis 02/20/2022     Nonalcoholic fatty liver disease 09/14/2022     Subdural hematoma (H) 11/03/2022        Past Surgical History:     Past Surgical History:   Procedure Laterality Date     COLONOSCOPY  11/01/2023     ENDOSCOPIC RETROGRADE CHOLANGIOPANCREATOGRAM N/A 12/8/2023    Procedure: ENDOSCOPIC RETROGRADE  CHOLANGIOPANCREATOGRAPHY, WITH biliary sphincterotomy, stone extraction, and stent placement;  Surgeon: Brian Witt MD;  Location: [facility] OR     ENDOSCOPIC RETROGRADE CHOLANGIOPANCREATOGRAM N/A 1/25/2024    Procedure: Endoscopic retrograde cholangiopancreatography with biliary stent removal and debris removal;  Surgeon: Brian Witt MD;  Location: [facility] OR     IR CAROTID CEREBRAL ANGIOGRAM BILATERAL  11/07/2022     LAPAROSCOPIC CHOLECYSTECTOMY N/A 1/25/2024    Procedure: Laparoscopic cholecystectomy;  Surgeon: Getachew Torres MD;  Location: [facility] OR     PICC TRIPLE LUMEN PLACEMENT  11/03/2022             Social History:     Social History     Tobacco Use     Smoking status: Never     Smokeless tobacco: Current     Types: Chew   Substance Use Topics     Alcohol use: Not Currently     Drug use: No        Family History:   No family history on file.     Medications:     Current Outpatient Medications   Medication Sig Dispense Refill     acetaminophen (TYLENOL) 500 MG tablet [ACETAMINOPHEN (TYLENOL) 500 MG TABLET] Take 1-2 tablets (500-1,000 mg total) by mouth every 6 (six) hours as needed for pain.  0     folic acid (FOLVITE) 1 MG tablet Take 1 tablet (1 mg) by mouth daily 30 tablet 1     levothyroxine (SYNTHROID/LEVOTHROID) 175 MCG tablet Take 175 mcg by mouth every morning       multivitamin w/minerals (THERA-VIT-M) tablet Take 1 tablet by mouth daily       nicotine (NICODERM CQ) 21 MG/24HR 24 hr patch Place 1 patch onto the skin daily 30 patch 0     thiamine (B-1) 100 MG tablet Take 1 tablet (100 mg) by mouth daily 30 tablet 1     traZODone (DESYREL) 100 MG tablet Take 2 tablets (200 mg) by mouth at bedtime       No current facility-administered medications for this visit.        Allergies:     Allergies   Allergen Reactions     Oxycodone Hallucination        Review of Systems:   As noted above     Physical Exam:   Vitals: /83 (BP Location: Right arm, Patient Position: Sitting, Cuff Size:  "Adult Large)   Pulse 91   Ht 1.88 m (6' 2\")   Wt 107.5 kg (237 lb)   BMI 30.43 kg/m       Neuro:   General Appearance: No apparent distress, well-nourished, well-groomed, pleasant     Mental Status: Alert and oriented to person, place, and time. Speech fluent and comprehension intact. No dysarthria.      Cranial Nerves:   II: Visual fields: normal  III: Pupils: 3 mm, equal, round, reactive to light   III,IV,VI: Extraocular Movements: intact   V: Facial sensation: intact to light touch  VII: Facial strength: intact without asymmetry      Motor Exam:   5/5 diffusely  where tested, limited by boot in LLE    Sensory: intact to light touch    Coordination: no dysmetria noted    Reflexes: biceps, triceps, brachioradialis, patellar 1+ and symmetric.            Data: Pertinent prior to visit   Imaging:  EXAM: CT HEAD WITHOUT CONTRAST  LOCATION: [facility]  DATE: 05/19/2024     INDICATION: Closed head injury.  COMPARISON: Head CT 11/08/2023.  TECHNIQUE: Routine CT Head without IV contrast. Multiplanar reformats. Dose reduction techniques were used.     FINDINGS:  INTRACRANIAL CONTENTS: There is a stable isodense subdural collection along the lateral convexity measuring 5 mm maximum thickness. No evidence for new or increasing intracranial hemorrhage. No CT evidence of acute infarct. Mild presumed chronic small   vessel ischemic changes. Mild generalized volume loss. No hydrocephalus.      VISUALIZED ORBITS/SINUSES/MASTOIDS: No intraorbital abnormality. No paranasal sinus mucosal disease. No middle ear or mastoid effusion.     BONES/SOFT TISSUES: No acute abnormality.                                                                      IMPRESSION:  1.  Stable thin isodense subdural collection along the left cerebral convexity measuring up to 5 mm maximum thickness. No evidence for new or increasing intracranial hemorrhage.  2.  No CT evidence for acute intracranial process.  3.  Brain atrophy " and presumed chronic microvascular ischemic changes as above.    I personally reviewed the above images and reports.  The imaging represents to me know previous SDH, subtle, no active bleeding.  Reviewed with patient            The total time of this encounter today amounted to 60 minutes. This time included time spent with the patient, prep work, ordering tests, and performing post visit documentation.      Again, thank you for allowing me to participate in the care of your patient.        Sincerely,        Carlos Bailon MD    Electronically signed

## 2025-01-23 NOTE — NURSING NOTE
"Getachew Barcenas's goals for this visit include:   Chief Complaint   Patient presents with    New Patient     DIZZINESS, HEADACHES       He requests these members of his care team be copied on today's visit information: yes    PCP: Domonique Alcantara    Referring Provider:  Domonique Alcantara PA-C  [address]  [place] [address]    /83 (BP Location: Right arm, Patient Position: Sitting, Cuff Size: Adult Large)   Pulse 91   Ht 1.88 m (6' 2\")   Wt 107.5 kg (237 lb)   BMI 30.43 kg/m      Do you need any medication refills at today's visit? No  JESS Green, CMA ([facility])      "

## 2025-01-29 ENCOUNTER — TRANSCRIBE ORDERS (OUTPATIENT)
Dept: OTHER | Age: 55
End: 2025-01-29

## 2025-01-29 DIAGNOSIS — M21.42 PES PLANUS OF LEFT FOOT: ICD-10-CM

## 2025-01-29 DIAGNOSIS — Z98.890 POST-OPERATIVE STATE: Primary | ICD-10-CM

## 2025-02-18 ENCOUNTER — THERAPY VISIT (OUTPATIENT)
Dept: PHYSICAL THERAPY | Facility: CLINIC | Age: 55
End: 2025-02-18
Payer: COMMERCIAL

## 2025-02-18 DIAGNOSIS — M79.672 LEFT FOOT PAIN: Primary | ICD-10-CM

## 2025-02-18 DIAGNOSIS — R42 DIZZINESS: ICD-10-CM

## 2025-02-18 DIAGNOSIS — Z47.89 ORTHOPEDIC AFTERCARE: ICD-10-CM

## 2025-02-18 PROCEDURE — 97140 MANUAL THERAPY 1/> REGIONS: CPT | Mod: GP | Performed by: PHYSICAL THERAPIST

## 2025-02-18 PROCEDURE — 97162 PT EVAL MOD COMPLEX 30 MIN: CPT | Mod: GP | Performed by: PHYSICAL THERAPIST

## 2025-02-18 PROCEDURE — 97110 THERAPEUTIC EXERCISES: CPT | Mod: GP | Performed by: PHYSICAL THERAPIST

## 2025-02-18 ASSESSMENT — ACTIVITIES OF DAILY LIVING (ADL)
WALKING_2_BLOCKS: EXTREME DIFFICULTY OR UNABLE TO PERFORM ACTIVITY
PERFORMING_LIGHT_ACTIVITIES_AROUND_YOUR_HOME: MODERATE DIFFICULTY
LEFS_SCORE(%): 0
LEFS_RAW_SCORE: 0
GETTING_INTO_AND_OUT_OF_A_BATH: QUITE A BIT OF DIFFICULTY
SQUATTING: QUITE A BIT OF DIFFICULTY
ANY_OF_YOUR_USUAL_WORK,_HOUSEWORK_OR_SCHOOL_ACTIVITIES: QUITE A BIT OF DIFFICULTY
PUTTING_ON_YOUR_SHOES_OR_SOCKS: QUITE A BIT OF DIFFICULTY
STANDING_FOR_1_HOUR: EXTREME DIFFICULTY OR UNABLE TO PERFORM ACTIVITY
GOING_UP_OR_DOWN_10_STAIRS: EXTREME DIFFICULTY OR UNABLE TO PERFORM ACTIVITY
MAKING_SHARP_TURNS_WHILE_RUNNING_FAST: EXTREME DIFFICULTY OR UNABLE TO PERFORM ACTIVITY
SITTING_FOR_1_HOUR: A LITTLE BIT OF DIFFICULTY
RUNNING_ON_UNEVEN_GROUND: EXTREME DIFFICULTY OR UNABLE TO PERFORM ACTIVITY
WALKING_BETWEEN_ROOMS: QUITE A BIT OF DIFFICULTY
GETTING_INTO_OR_OUT_OF_A_CAR: QUITE A BIT OF DIFFICULTY
LIFTING_AN_OBJECT,_LIKE_A_BAG_OF_GROCERIES_FROM_THE_FLOOR: QUITE A BIT OF DIFFICULTY
ROLLING_OVER_IN_BED: MODERATE DIFFICULTY
WALKING_A_MILE: EXTREME DIFFICULTY OR UNABLE TO PERFORM ACTIVITY
PERFORMING_HEAVY_ACTIVITIES_AROUND_YOUR_HOME: EXTREME DIFFICULTY OR UNABLE TO PERFORM ACTIVITY
PLEASE_INDICATE_YOR_PRIMARY_REASON_FOR_REFERRAL_TO_THERAPY:: FOOT AND/OR ANKLE
YOUR_USUAL_HOBBIES,_RECREATIONAL_OR_SPORTING_ACTIVITIES: EXTREME DIFFICULTY OR UNABLE TO PERFORM ACTIVITY
SHOPPING: EXTREME DIFFICULTY OR UNABLE TO PERFORM ACTIVITY
RUNNING_ON_EVEN_GROUND: EXTREME DIFFICULTY OR UNABLE TO PERFORM ACTIVITY

## 2025-02-18 NOTE — PROGRESS NOTES
PHYSICAL THERAPY EVALUATION  Type of Visit: Evaluation       Fall Risk Screen:  Have you fallen 2 or more times in the past year?: Yes  Have you fallen and had an injury in the past year?: Yes  Is patient a fall risk?: Yes; Department fall risk interventions implemented    Subjective         Presenting condition or subjective complaint: ankle foot surgery rehab plus dizzyness  Off knee scooter x 3 weeks. Sore by afternoon and needed crutches.     Subtalar joint arthrodesis, talonavicular joint arthrodesis, Lopez calcaneal osteotomy, Mirtha gastrocnemius recession-left foot  DOS: 11/12/2024     History of concussion, dizziness, prior SDH,   Date of onset: 11/12/25    Relevant medical history: Arthritis; Concussions; Dizziness; Hearing problems; Migraines or headaches; Numbness or tingling in perianal area; Progressive neurological deficits; Severe dizziness; Sleep disorder like apnea; Thyroid problems   Dates & types of surgery: knee hernia brain gall blatter    Prior diagnostic imaging/testing results: MRI; X-ray     Prior therapy history for the same diagnosis, illness or injury: Yes ankle spraun    Prior Level of Function  Transfers:   Ambulation:   ADL:   IADL:     Living Environment  Social support: Alone   Type of home: House   Stairs to enter the home: No       Ramp: No   Stairs inside the home: No       Help at home: Self Cares (home health aide/personal care attendant, family, etc); Home management tasks (cooking, cleaning); Home and Yard maintenance tasks  Equipment owned: Walker; Crutches; Bath bench     Employment: Not Applicable    Hobbies/Interests: golf walking fishing reading    Patient goals for therapy: walk and stand    Pain assessment: See objective evaluation for additional pain details     Objective      Cognitive Status Examination  Orientation: Oriented to person, place and time   Level of Consciousness: Alert  Follows Commands and Answers Questions: 100% of the time  Personal Safety and  Judgement:   Memory:     OBSERVATION: incisions appear to be healing well  INTEGUMENTARY:   POSTURE:   PALPATION: na  RANGE OF MOTION:   STRENGTH:     BED MOBILITY:     TRANSFERS:     WHEELCHAIR MOBILITY: na    GAIT:   Level of [place]:   Assistive Device(s):   Gait Deviations:   Gait Distance: na  Stairs: na    BALANCE:         SENSATION:     REFLEXES:   COORDINATION: na  MUSCLE TONE:        VESTIBULAR EVALUATION  ADDITIONAL HISTORY:  Description of symptoms:  Chronic dizziness, tinnitus, pt relates this as side effect of prior SDH  Dizzy attacks: constant; standing too fast, turning head side to side   Start:     Last attack:     Frequency of occurrences:     Length of attack:    Difficulty hearing:  no acute changes  Noise in ears?    Yes, constant  Alleviates symptoms:  sitting, slower transitions  Worsens symptoms:  transitions  Activities that bring on symptoms:         Pertinent visual history: see chart  Pertinent history of current vestibular problem: Migraines, Motion sickness, Prior concussion(s)  DHI:      Cervicogenic Screen    Neck ROM    Vertebral Artery Test    Alar Ligament Test    Transverse Ligament Test    Distraction    Neck Torsion Test (head still, body rotating)    Neck Torsion Test (head and body rotating)         Oculomotor Screen    Ocular ROM    Smooth Pursuit    Saccades    VOR    VOR Cancellation    Head Impulse Test    Convergence Testing         Infrared Goggle Exam Vestibular Suppressant in Last 24 Hours?   Exam Completed With:    Spontaneous Nystagmus    Gaze Evoked Nystagmus    Head Shake Horizontal Nystagmus    Positional Testing    Supine Head-Hanging Test     Left Right   Harmony-Hallpike     Sidelying Test     HSCC Supine Roll Test     HSCC Forward Roll Test     Jefferson and Lean Test -  Sitting Erect    Jefferson and Lean Test - Seated, Head Bent 60 Degrees Forward    Jefferson and Lean Test - Seated, Head Bent Backwards       BPPV Canal(s):   BPPV Type:     Dynamic Visual Acuity (DVA)     Static Acuity (LogMar)    Horizontal Head Movement at 1 Hz (LogMar)    Horizontal Head Movement at 2 Hz (LogMar)       Vestibular objectives limited due to time constraints.  Assess future sesssions as appropriate. Pt electing primary focus on foot  FOOT/ANKLE EVALUATION  PAIN: Pain Location: foot   INTEGUMENTARY (edema, incisions):   POSTURE:   GAIT:   Weightbearing Status: WBAT  Assistive Device(s): None  Gait Deviations: Antalgic  Stride length decreased  Step to pattern  BALANCE/PROPRIOCEPTION: unable complete SLSS  WEIGHT BEARING ALIGNMENT:   NON-WEIGHTBEARING ALIGNMENT:    ROM:   (Degrees) Left AROM Left PROM  Right AROM Right PROM   Ankle Dorsiflexion Lacking 10  12    Ankle Plantarflexion 20  40    Ankle Inversion 0  10    Ankle Eversion 3  10    Great Toe Flexion 0 10 10    Great Toe Extension 10 10 15    Pain: mild discomfort end range AROM all planess  End feel:       STRENGTH: antigravity all planes. Mmt limited due to poor rom  FLEXIBILITY: decreased gastroc  SPECIAL TESTS:   FUNCTIONAL TESTS:   PALPATION:   JOINT MOBILITY:     Assessment & Plan   CLINICAL IMPRESSIONS  Medical Diagnosis: Post-operative state    Pes planus of left foot    Treatment Diagnosis: Left ankle pain and weakness, balance impairment, vertigo   Impression/Assessment: Patient is a 54 year old male with left ankle impaired ROM and strength, impaired gait and balance and dizzinesss complaints.  The following significant findings have been identified: Pain, Decreased ROM/flexibility, Decreased joint mobility, Decreased strength, Impaired balance, Impaired gait, Impaired muscle performance, Decreased activity tolerance, Instability, and Dizziness. These impairments interfere with their ability to perform self care tasks, recreational activities, household chores, household mobility, and community mobility as compared to previous level of function.     Clinical Decision Making (Complexity):  Clinical Presentation:  Evolving/Changing  Clinical Presentation Rationale: based on medical and personal factors listed in PT evaluation  Clinical Decision Making (Complexity): Moderate complexity    PLAN OF CARE  Treatment Interventions:  Modalities: Cryotherapy, Cupping  Interventions: Gait Training, Manual Therapy, Neuromuscular Re-education, Therapeutic Activity, Therapeutic Exercise, Self-Care/Home Management    Long Term Goals     PT Goal 1  Goal Identifier: 1  Goal Description: Pt will be able to ambulate x 10 minutes without gait aid  Rationale: to maximize safety and independence with performance of ADLs and functional tasks;to maximize safety and independence within the home;to maximize safety and independence within the community  Goal Progress: 1-2 minutes then restsing  Target Date: 04/29/25  PT Goal 2  Goal Identifier: 2  Goal Description: Pt will be able to stand x 10 minutes without exacerbation of symptoms  Rationale: to maximize safety and independence with performance of ADLs and functional tasks;to maximize safety and independence within the home;to maximize safety and independence with self cares  Target Date: 04/29/25      Frequency of Treatment: 1x/week  Duration of Treatment: 10 weekss    Recommended Referrals to Other Professionals:   Education Assessment:        Risks and benefits of evaluation/treatment have been explained.   Patient/Family/caregiver agrees with Plan of Care.     Evaluation Time:     PT Eval, Moderate Complexity Minutes (63600): 20       Signing Clinician: Vania Cosme, TRISTAN        [facility]                                                                                   OUTPATIENT PHYSICAL THERAPY      PLAN OF TREATMENT FOR OUTPATIENT REHABILITATION   Patient's Last Name, First Name, Getachew Ruby YOB: 1970   Provider's Name   [facility]   Medical Record No.  [id number]     Onset Date: 11/12/25  Start of Care  Date: 02/18/25     Medical Diagnosis:  Post-operative state    Pes planus of left foot      PT Treatment Diagnosis:  Left ankle pain and weakness, balance impairment, vertigo Plan of Treatment  Frequency/Duration: 1x/week/ 10 weekss    Certification date from 02/18/25 to 04/29/25         See note for plan of treatment details and functional goals     Vania Cosme, PT                         I CERTIFY THE NEED FOR THESE SERVICES FURNISHED UNDER        THIS PLAN OF TREATMENT AND WHILE UNDER MY CARE .             Physician Signature               Date    X_____________________________________________________                  Referring Provider:  Pablo Abdullahi    Initial Assessment  See Epic Evaluation- Start of Care Date: 02/18/25

## 2025-03-18 ENCOUNTER — THERAPY VISIT (OUTPATIENT)
Age: 55
End: 2025-03-18
Payer: COMMERCIAL

## 2025-03-18 DIAGNOSIS — M79.672 LEFT FOOT PAIN: Primary | ICD-10-CM

## 2025-03-18 DIAGNOSIS — Z47.89 ORTHOPEDIC AFTERCARE: ICD-10-CM

## 2025-03-18 PROCEDURE — 97110 THERAPEUTIC EXERCISES: CPT | Mod: GP | Performed by: PHYSICAL THERAPIST

## 2025-03-18 PROCEDURE — 97112 NEUROMUSCULAR REEDUCATION: CPT | Mod: GP | Performed by: PHYSICAL THERAPIST

## 2025-03-18 PROCEDURE — 97140 MANUAL THERAPY 1/> REGIONS: CPT | Mod: GP | Performed by: PHYSICAL THERAPIST

## 2025-03-20 ASSESSMENT — ACTIVITIES OF DAILY LIVING (ADL)
YOUR_USUAL_HOBBIES,_RECREATIONAL_OR_SPORTING_ACTIVITIES: EXTREME DIFFICULTY OR UNABLE TO PERFORM ACTIVITY
MAKING_SHARP_TURNS_WHILE_RUNNING_FAST: EXTREME DIFFICULTY OR UNABLE TO PERFORM ACTIVITY
I_HAVE_HAD_PAIN_IN_THE_SHOULDER_OR_NECK_AT_SOME_TIME_IN_THE_LAST_2_WEEKS: AGREE
LIFTING: I CAN ONLY LIFT VERY LIGHT WEIGHTS.
ANY_OF_YOUR_USUAL_WORK,_HOUSEWORK_OR_SCHOOL_ACTIVITIES: QUITE A BIT OF DIFFICULTY
HOW_BOTHERSOME_HAS_YOUR_BACK_PAIN_BEEN_IN_THE_LAST_2_WEEKS: MODERATELY
PERFORMING_HEAVY_ACTIVITIES_AROUND_YOUR_HOME: EXTREME DIFFICULTY OR UNABLE TO PERFORM ACTIVITY
GOING_UP_OR_DOWN_10_STAIRS: EXTREME DIFFICULTY OR UNABLE TO PERFORM ACTIVITY
SHOPPING: EXTREME DIFFICULTY OR UNABLE TO PERFORM ACTIVITY
COMPUTED_OSWESTRY_SCORE: 62.22
WALKING_BETWEEN_ROOMS: MODERATE DIFFICULTY
IN_THE_LAST_2_WEEKS_I_HAVE_DRESSED_MORE_SLOWLY_THAN_USUAL_BECAUSE_OF_BACK_PAIN: AGREE
KEELE_TOTAL_SCORE: 5
PERSONAL_CARE: I NEED SOME HELP BUT MANAGE MOST OF MY PERSONAL CARE.
LEFS_RAW_SCORE: 0
WALKING_2_BLOCKS: QUITE A BIT OF DIFFICULTY
PLEASE_INDICATE_YOR_PRIMARY_REASON_FOR_REFERRAL_TO_THERAPY:: LOWER BACK, MID BACK, AND/OR SACRUM
SITTING: PAIN PREVENTS ME FROM SITTING FOR MORE THAN 1 HOUR.
WALKING_A_MILE: EXTREME DIFFICULTY OR UNABLE TO PERFORM ACTIVITY
KEELE ASSESSMENT OF PARTICIPATION_SUB_SCORE_(Q5-9): 3
I_HAVE_ONLY_WALKED_SHORT_DISTANCES_BECAUSE_OF_MY_BACK_PAIN: DISAGREE
PUTTING_ON_YOUR_SHOES_OR_SOCKS: QUITE A BIT OF DIFFICULTY
PLEASE_INDICATE_YOR_PRIMARY_REASON_FOR_REFERRAL_TO_THERAPY:: ANKLE AND/OR FOOT
OSWESTRY_DISABILITY_INDEX:_COUNT: 9
MY_BACK_PAIN_HAS_SPREAD_DOWN_MY_LEG(S)_AT_SOME_TIME_IN_THE_LAST_2_WEEKS: DISAGREE
STANDING_FOR_1_HOUR: EXTREME DIFFICULTY OR UNABLE TO PERFORM ACTIVITY
STANDING: PAIN PREVENTS ME FROM STANDING FOR MORE THAN 10 MINUTES.
LIFTING_AN_OBJECT,_LIKE_A_BAG_OF_GROCERIES_FROM_THE_FLOOR: QUITE A BIT OF DIFFICULTY
OSWESTRY DISABILITY INDEX_TOTAL_SCORE: 28
IN_GENERAL_I_HAVE_NOT_ENJOYED_ALL_THE_THINGS_I_USED_TO_ENJOY: AGREE
PAIN_INTENSITY: THE PAIN IS MODERATE AT THE MOMENT.
ROLLING_OVER_IN_BED: MODERATE DIFFICULTY
PERFORMING_LIGHT_ACTIVITIES_AROUND_YOUR_HOME: MODERATE DIFFICULTY
LEFS_SCORE(%): 0
GETTING_INTO_AND_OUT_OF_A_BATH: MODERATE DIFFICULTY
GETTING_INTO_OR_OUT_OF_A_CAR: MODERATE DIFFICULTY
SQUATTING: EXTREME DIFFICULTY OR UNABLE TO PERFORM ACTIVITY
TRAVELING: PAIN RESTRICTS ME TO SHORT NECESSARY TRIPS OF UNDER 30 MINUTES.
SITTING_FOR_1_HOUR: MODERATE DIFFICULTY
RUNNING_ON_EVEN_GROUND: EXTREME DIFFICULTY OR UNABLE TO PERFORM ACTIVITY
WORRYING_THOUGHTS_HAVE_BEEN_GOING_THROUGH_MY_MIND_A_LOT_OF_THE_TIME: AGREE
SECTION_4-WALKING: PAIN PREVENTS ME FROM WALKING MORE THAN 100 YARDS.
SOCIAL_LIFE: PAIN HAS RESTRICTED MY SOCIAL LIFE AND I DO NOT GO OUT AS OFTEN.
RUNNING_ON_UNEVEN_GROUND: EXTREME DIFFICULTY OR UNABLE TO PERFORM ACTIVITY
SLEEPING: BECAUSE OF PAIN I HAVE LESS THAN 4 HOURS SLEEP.

## 2025-03-25 ENCOUNTER — THERAPY VISIT (OUTPATIENT)
Age: 55
End: 2025-03-25
Payer: COMMERCIAL

## 2025-03-25 DIAGNOSIS — M79.672 LEFT FOOT PAIN: Primary | ICD-10-CM

## 2025-03-25 DIAGNOSIS — Z47.89 ORTHOPEDIC AFTERCARE: ICD-10-CM

## 2025-03-25 PROCEDURE — 97110 THERAPEUTIC EXERCISES: CPT | Mod: GP | Performed by: PHYSICAL THERAPIST

## 2025-03-25 PROCEDURE — 97112 NEUROMUSCULAR REEDUCATION: CPT | Mod: GP | Performed by: PHYSICAL THERAPIST

## 2025-03-25 PROCEDURE — 97140 MANUAL THERAPY 1/> REGIONS: CPT | Mod: GP | Performed by: PHYSICAL THERAPIST

## 2025-04-01 ENCOUNTER — THERAPY VISIT (OUTPATIENT)
Age: 55
End: 2025-04-01
Payer: COMMERCIAL

## 2025-04-01 DIAGNOSIS — Z47.89 ORTHOPEDIC AFTERCARE: ICD-10-CM

## 2025-04-01 DIAGNOSIS — M79.672 LEFT FOOT PAIN: Primary | ICD-10-CM

## 2025-04-01 PROCEDURE — 97110 THERAPEUTIC EXERCISES: CPT | Mod: GP | Performed by: PHYSICAL THERAPIST

## 2025-04-01 PROCEDURE — 97140 MANUAL THERAPY 1/> REGIONS: CPT | Mod: GP | Performed by: PHYSICAL THERAPIST

## 2025-04-15 ENCOUNTER — THERAPY VISIT (OUTPATIENT)
Age: 55
End: 2025-04-15
Payer: COMMERCIAL

## 2025-04-15 DIAGNOSIS — M79.672 LEFT FOOT PAIN: Primary | ICD-10-CM

## 2025-04-15 DIAGNOSIS — Z47.89 ORTHOPEDIC AFTERCARE: ICD-10-CM

## 2025-04-15 PROCEDURE — 97112 NEUROMUSCULAR REEDUCATION: CPT | Mod: GP | Performed by: PHYSICAL THERAPIST

## 2025-04-15 PROCEDURE — 97140 MANUAL THERAPY 1/> REGIONS: CPT | Mod: GP | Performed by: PHYSICAL THERAPIST

## 2025-04-15 PROCEDURE — 97110 THERAPEUTIC EXERCISES: CPT | Mod: GP | Performed by: PHYSICAL THERAPIST

## 2025-07-07 NOTE — OP NOTE
[facility] Operative Note    Pre-operative diagnosis: Acute cholecystitis [K81.0]   Post-operative diagnosis *chronic cholecystitis   Procedure: Procedure(s):  Laparoscopic cholecystectomy   Surgeon: Getachew Torres MD   Assistants(s): MD Naren Torres MD   Estimated blood loss: 30ml    Specimens: Gallbladder and contents   Findings: Dense adhesions along infundibulum.  Moderated dilated CBD from stenting/CBD stones.  Gallbladder taken completely off of liver and cystic duct controlled with endoloops.          Getachew Barcenas was brought to the operating room and placed supine on the operating table.  They had received indocyanine in the preoperative area.  ABX were given.  They were sedated and intubated without issue.  GI performed their endoscopy and removed the existing stents.  After this, an OG tube was placed.  They had not received a TAP block and so local anesthetic was used along the incisions.  The abdomen was prepped and draped in sterile fashion and a time out was performed.    A veress needle was placed in the left upper quadrant and the adomen insufflated without issue. A supraumbilical incision was made sharply and a 12mm port was placed and the laparoscope inserted.  Three 5mm ports were placed in the right upper abdomen below the costal margin.  He was positioned with his head and right side up.  No injury from Veress entry was seen.    The gallbladder was grasped via the assistant port and retracted cephalad.  The gallbladder appeared chronically inflamed.  There were dense adhesions along the gallbladder from the omentum and within the hepatocystic triangle.  The infundibulum was grasped and retracted laterally.  Using firefly we were able to identify the common bile duct.  Using hook cautery the peritoneal lining around the infundibulum was dissected off of the gallbladder.  The cystic artery was isolated and the gallbladder was dissected of the cystic plate.  After identifying our  critical view of safety with only these two tubular structures entering the gallbladder we clipped the cystic artery with 5mm clips and transected between the 2nd and 3rd.  We encountered dense adhesions along the proximal cystic duct, and it was too dilated to accommodate clipping.  The gallbladder was then taken off of the liver with cautery, leaving it attached by the cystic duct.  This was controlled with PDS endoloop x3 and then transected.  It was placed into an endocatch bag and removed from the abdomen.  The clips and endoloops were inspected and found to be intact without spillage of blood or bile.  Spillage of bile and small gravely stones had occurred at our transection point. This was irrigated out and visible stones removed.  We performed a TAP block.    The umbilical fascia was closed with interrupted 0 vicryl suture.  The ports were then removed and the abdomen allowed to collapse. Skin was closed with 4.0 monocryl and skin glue applied. Getachew MICHAEL Nuneson  was then woken from anesthesia, extubated and brought to recovery.    I performed the procedure.     DISCHARGE

## (undated) DEVICE — PREP CHLORAPREP 26ML TINTED HI-LITE ORANGE 930815

## (undated) DEVICE — SOL NACL 0.9% IRRIG 1000ML BOTTLE 2F7124

## (undated) DEVICE — Device

## (undated) DEVICE — ESU GROUND PAD ADULT W/CORD E7507

## (undated) DEVICE — DEVICE SUTURE PASSER 14GA WECK EFX EFXSP2

## (undated) DEVICE — SU MONOCRYL 4-0 PS-2 27" UND Y426H

## (undated) DEVICE — ENDO DEVICE LOCKING AND BIOPSY CAP M00545261

## (undated) DEVICE — SU DERMABOND ADVANCED .7ML DNX12

## (undated) DEVICE — DRAPE FEM ANGIO 89X71" 1090

## (undated) DEVICE — ENDO POUCH UNIV RETRIEVAL SYSTEM INZII 10MM CD001

## (undated) DEVICE — DRAPE SHEET MED 44X70" 9355

## (undated) DEVICE — PACK ENDOSCOPY GI CUSTOM UMMC

## (undated) DEVICE — ENDO BITE BLOCK ADULT OMNI-BLOC

## (undated) DEVICE — NDL 30GA 0.5" 305106

## (undated) DEVICE — BALLOON EXTRACTION 15X1950MM 3.2MM TL B-V243Q-A

## (undated) DEVICE — TUBING SMOKE EVAC PNEUMOCLEAR HIGH FLOW 0620050250

## (undated) DEVICE — LINEN TOWEL PACK X6 WHITE 5487

## (undated) DEVICE — SU VICRYL 0 UR-6 27" J603H

## (undated) DEVICE — ENDO TUBING CO2 SMARTCAP STERILE DISP 100145CO2EXT

## (undated) DEVICE — WIRE GUIDE 0.025"X270CM ANG VISIGLIDE G-240-2527A

## (undated) DEVICE — BLADE CLIPPER SGL USE 9680

## (undated) DEVICE — LINEN TOWEL PACK X30 5481

## (undated) DEVICE — SUCTION MANIFOLD NEPTUNE 2 SYS 4 PORT 0702-020-000

## (undated) DEVICE — SOL NACL 0.9% INJ 1000ML BAG 2B1324X

## (undated) DEVICE — ENDO SNARE POLYPECTOMY OVAL 15MM LOOP SD-240U-15

## (undated) DEVICE — ANTIFOG SOLUTION W/FOAM PAD 31142527

## (undated) DEVICE — ESU ELEC BLADE 2.75" COATED/INSULATED E1455

## (undated) DEVICE — ENDO FUSION OMNI-TOME 21 FS-OMNI-21 G48675

## (undated) DEVICE — ENDO ENDO DISTAL MAJ-2315

## (undated) DEVICE — INTR ENDOSCOPIC STENT FUSION OASIS 09.0FRX200CM

## (undated) DEVICE — SUCTION IRR STRYKERFLOW II W/TIP 250-070-520

## (undated) DEVICE — LINEN TOWEL PACK X5 5464

## (undated) DEVICE — SOL WATER IRRIG 1000ML BOTTLE 2F7114

## (undated) DEVICE — ENDO DISSECTOR BLUNT 05MM  BTD05

## (undated) DEVICE — SU PDS II 0 ENDOLOOP EZ10G

## (undated) DEVICE — DRSG GAUZE 4X4" TRAY 6939

## (undated) DEVICE — BIOPSY VALVE BIOSHIELD 00711135

## (undated) DEVICE — KIT ENDO FIRST STEP DISINFECTANT 200ML W/POUCH EP-4

## (undated) DEVICE — GUIDEWIRE NOVAGOLD .018X260CM STR TIP M00552000

## (undated) DEVICE — KIT CONNECTOR FOR OLYMPUS ENDOSCOPES DEFENDO 100310

## (undated) DEVICE — NDL INSUFFLATION 13GA 120MM C2201

## (undated) DEVICE — LINEN GOWN XLG 5407

## (undated) DEVICE — GLOVE BIOGEL PI MICRO SZ 7.5 48575

## (undated) DEVICE — ENDO FUSION OMNI-TOME G31903

## (undated) DEVICE — CLIP APPLIER ENDO 5MM M/L LIGAMAX EL5ML

## (undated) RX ORDER — FENTANYL CITRATE 50 UG/ML
INJECTION, SOLUTION INTRAMUSCULAR; INTRAVENOUS
Status: DISPENSED
Start: 2023-12-08

## (undated) RX ORDER — EPHEDRINE SULFATE 50 MG/ML
INJECTION, SOLUTION INTRAMUSCULAR; INTRAVENOUS; SUBCUTANEOUS
Status: DISPENSED
Start: 2023-12-08

## (undated) RX ORDER — PROPOFOL 10 MG/ML
INJECTION, EMULSION INTRAVENOUS
Status: DISPENSED
Start: 2022-11-07

## (undated) RX ORDER — BUPIVACAINE HYDROCHLORIDE AND EPINEPHRINE 2.5; 5 MG/ML; UG/ML
INJECTION, SOLUTION EPIDURAL; INFILTRATION; INTRACAUDAL; PERINEURAL
Status: DISPENSED
Start: 2024-01-25

## (undated) RX ORDER — OXYCODONE HYDROCHLORIDE 5 MG/1
TABLET ORAL
Status: DISPENSED
Start: 2024-01-25

## (undated) RX ORDER — DEXAMETHASONE SODIUM PHOSPHATE 4 MG/ML
INJECTION, SOLUTION INTRA-ARTICULAR; INTRALESIONAL; INTRAMUSCULAR; INTRAVENOUS; SOFT TISSUE
Status: DISPENSED
Start: 2024-01-25

## (undated) RX ORDER — FENTANYL CITRATE-0.9 % NACL/PF 10 MCG/ML
PLASTIC BAG, INJECTION (ML) INTRAVENOUS
Status: DISPENSED
Start: 2022-11-07

## (undated) RX ORDER — PROPOFOL 10 MG/ML
INJECTION, EMULSION INTRAVENOUS
Status: DISPENSED
Start: 2024-01-25

## (undated) RX ORDER — FENTANYL CITRATE 50 UG/ML
INJECTION, SOLUTION INTRAMUSCULAR; INTRAVENOUS
Status: DISPENSED
Start: 2024-01-25

## (undated) RX ORDER — ONDANSETRON 2 MG/ML
INJECTION INTRAMUSCULAR; INTRAVENOUS
Status: DISPENSED
Start: 2023-12-08

## (undated) RX ORDER — HEPARIN SODIUM 200 [USP'U]/100ML
INJECTION, SOLUTION INTRAVENOUS
Status: DISPENSED
Start: 2022-11-07

## (undated) RX ORDER — HEPARIN SODIUM 1000 [USP'U]/ML
INJECTION, SOLUTION INTRAVENOUS; SUBCUTANEOUS
Status: DISPENSED
Start: 2022-11-07

## (undated) RX ORDER — ONDANSETRON 2 MG/ML
INJECTION INTRAMUSCULAR; INTRAVENOUS
Status: DISPENSED
Start: 2024-01-25

## (undated) RX ORDER — SODIUM CHLORIDE 9 MG/ML
INJECTION, SOLUTION INTRAVENOUS
Status: DISPENSED
Start: 2022-11-07

## (undated) RX ORDER — INDOCYANINE GREEN AND WATER 25 MG
KIT INJECTION
Status: DISPENSED
Start: 2024-01-25

## (undated) RX ORDER — SODIUM CHLORIDE, SODIUM LACTATE, POTASSIUM CHLORIDE, CALCIUM CHLORIDE 600; 310; 30; 20 MG/100ML; MG/100ML; MG/100ML; MG/100ML
INJECTION, SOLUTION INTRAVENOUS
Status: DISPENSED
Start: 2024-01-25

## (undated) RX ORDER — FENTANYL CITRATE 50 UG/ML
INJECTION, SOLUTION INTRAMUSCULAR; INTRAVENOUS
Status: DISPENSED
Start: 2022-11-07

## (undated) RX ORDER — LIDOCAINE HYDROCHLORIDE 20 MG/ML
INJECTION, SOLUTION EPIDURAL; INFILTRATION; INTRACAUDAL; PERINEURAL
Status: DISPENSED
Start: 2022-11-07

## (undated) RX ORDER — DEXAMETHASONE SODIUM PHOSPHATE 4 MG/ML
INJECTION, SOLUTION INTRA-ARTICULAR; INTRALESIONAL; INTRAMUSCULAR; INTRAVENOUS; SOFT TISSUE
Status: DISPENSED
Start: 2023-12-08

## (undated) RX ORDER — SODIUM CHLORIDE, SODIUM LACTATE, POTASSIUM CHLORIDE, CALCIUM CHLORIDE 600; 310; 30; 20 MG/100ML; MG/100ML; MG/100ML; MG/100ML
INJECTION, SOLUTION INTRAVENOUS
Status: DISPENSED
Start: 2022-11-07

## (undated) RX ORDER — ONDANSETRON 2 MG/ML
INJECTION INTRAMUSCULAR; INTRAVENOUS
Status: DISPENSED
Start: 2022-11-07

## (undated) RX ORDER — LIDOCAINE HYDROCHLORIDE 10 MG/ML
INJECTION, SOLUTION EPIDURAL; INFILTRATION; INTRACAUDAL; PERINEURAL
Status: DISPENSED
Start: 2022-11-07

## (undated) RX ORDER — DEXAMETHASONE SODIUM PHOSPHATE 4 MG/ML
INJECTION, SOLUTION INTRA-ARTICULAR; INTRALESIONAL; INTRAMUSCULAR; INTRAVENOUS; SOFT TISSUE
Status: DISPENSED
Start: 2022-11-07

## (undated) RX ORDER — CEFAZOLIN SODIUM/WATER 2 G/20 ML
SYRINGE (ML) INTRAVENOUS
Status: DISPENSED
Start: 2024-01-25

## (undated) RX ORDER — FENTANYL CITRATE-0.9 % NACL/PF 10 MCG/ML
PLASTIC BAG, INJECTION (ML) INTRAVENOUS
Status: DISPENSED
Start: 2024-01-25

## (undated) RX ORDER — HYDROMORPHONE HCL IN WATER/PF 6 MG/30 ML
PATIENT CONTROLLED ANALGESIA SYRINGE INTRAVENOUS
Status: DISPENSED
Start: 2023-12-08